# Patient Record
Sex: MALE | Race: WHITE | Employment: OTHER | URBAN - NONMETROPOLITAN AREA
[De-identification: names, ages, dates, MRNs, and addresses within clinical notes are randomized per-mention and may not be internally consistent; named-entity substitution may affect disease eponyms.]

---

## 2017-04-13 LAB
ANION GAP SERPL CALCULATED.3IONS-SCNC: 15 MMOL/L
BUN BLDV-MCNC: 15 MG/DL (ref 10–20)
CALCIUM SERPL-MCNC: 9.9 MG/DL (ref 8.7–10.8)
CHLORIDE BLD-SCNC: 101 MMOL/L (ref 95–111)
CO2: 25 MMOL/L (ref 21–32)
CREAT SERPL-MCNC: 1 MG/DL (ref 0.5–1.3)
EGFR AFRICAN AMERICAN: 89
EGFR IF NONAFRICAN AMERICAN: 74
GLUCOSE: 128 MG/DL (ref 70–100)
POTASSIUM SERPL-SCNC: 4.4 MMOL/L (ref 3.5–5.4)
SODIUM BLD-SCNC: 137 MMOL/L (ref 134–147)

## 2017-04-26 ENCOUNTER — OFFICE VISIT (OUTPATIENT)
Dept: NEPHROLOGY | Age: 72
End: 2017-04-26

## 2017-04-26 VITALS
SYSTOLIC BLOOD PRESSURE: 138 MMHG | HEART RATE: 84 BPM | DIASTOLIC BLOOD PRESSURE: 84 MMHG | WEIGHT: 215 LBS | BODY MASS INDEX: 29.16 KG/M2 | OXYGEN SATURATION: 96 %

## 2017-04-26 DIAGNOSIS — I10 HTN (HYPERTENSION), BENIGN: ICD-10-CM

## 2017-04-26 DIAGNOSIS — N28.1 RENAL CYST: ICD-10-CM

## 2017-04-26 DIAGNOSIS — N18.2 CKD (CHRONIC KIDNEY DISEASE) STAGE 2, GFR 60-89 ML/MIN: Primary | ICD-10-CM

## 2017-04-26 PROCEDURE — G8427 DOCREV CUR MEDS BY ELIG CLIN: HCPCS | Performed by: INTERNAL MEDICINE

## 2017-04-26 PROCEDURE — 4040F PNEUMOC VAC/ADMIN/RCVD: CPT | Performed by: INTERNAL MEDICINE

## 2017-04-26 PROCEDURE — 1123F ACP DISCUSS/DSCN MKR DOCD: CPT | Performed by: INTERNAL MEDICINE

## 2017-04-26 PROCEDURE — 99213 OFFICE O/P EST LOW 20 MIN: CPT | Performed by: INTERNAL MEDICINE

## 2017-04-26 PROCEDURE — 1036F TOBACCO NON-USER: CPT | Performed by: INTERNAL MEDICINE

## 2017-04-26 PROCEDURE — 3017F COLORECTAL CA SCREEN DOC REV: CPT | Performed by: INTERNAL MEDICINE

## 2017-04-26 PROCEDURE — G8420 CALC BMI NORM PARAMETERS: HCPCS | Performed by: INTERNAL MEDICINE

## 2017-04-26 RX ORDER — LISINOPRIL 20 MG/1
20 TABLET ORAL DAILY
Qty: 90 TABLET | Refills: 3 | Status: SHIPPED | OUTPATIENT
Start: 2017-04-26 | End: 2018-04-25 | Stop reason: SDUPTHER

## 2017-04-26 RX ORDER — LEVOTHYROXINE SODIUM 0.05 MG/1
50 TABLET ORAL DAILY
COMMUNITY
End: 2021-04-12 | Stop reason: DRUGHIGH

## 2017-04-26 RX ORDER — ATORVASTATIN CALCIUM 40 MG/1
40 TABLET, FILM COATED ORAL DAILY
COMMUNITY
End: 2021-04-12 | Stop reason: DRUGHIGH

## 2018-04-10 LAB
ANION GAP SERPL CALCULATED.3IONS-SCNC: 16 MEQ/L (ref 10–19)
BUN BLDV-MCNC: 20 MG/DL (ref 8–23)
CALCIUM SERPL-MCNC: 9.4 MG/DL (ref 8.5–10.5)
CHLORIDE BLD-SCNC: 102 MEQ/L (ref 95–107)
CO2: 23 MEQ/L (ref 19–31)
CREAT SERPL-MCNC: 1 MG/DL (ref 0.8–1.4)
EGFR AFRICAN AMERICAN: 87.4 ML/MIN/1.73 M2
EGFR IF NONAFRICAN AMERICAN: 75.4 ML/MIN/1.73 M2
GLUCOSE: 134 MG/DL (ref 70–99)
POTASSIUM SERPL-SCNC: 4.6 MEQ/L (ref 3.5–5.4)
SODIUM BLD-SCNC: 141 MEQ/L (ref 135–146)

## 2018-04-13 LAB
CREATINE, URINE: 128.6 MG/DL (ref 39–259)
PROTEIN, URINE: 21 MG/DL
PROTEIN/CREAT RATIO: 163 MG/G

## 2018-04-25 ENCOUNTER — OFFICE VISIT (OUTPATIENT)
Dept: NEPHROLOGY | Age: 73
End: 2018-04-25
Payer: MEDICARE

## 2018-04-25 VITALS — DIASTOLIC BLOOD PRESSURE: 82 MMHG | SYSTOLIC BLOOD PRESSURE: 118 MMHG | OXYGEN SATURATION: 94 % | HEART RATE: 92 BPM

## 2018-04-25 DIAGNOSIS — N18.2 CKD (CHRONIC KIDNEY DISEASE) STAGE 2, GFR 60-89 ML/MIN: Primary | ICD-10-CM

## 2018-04-25 DIAGNOSIS — N28.1 RENAL CYST: ICD-10-CM

## 2018-04-25 DIAGNOSIS — I10 HTN (HYPERTENSION), BENIGN: ICD-10-CM

## 2018-04-25 PROCEDURE — G8419 CALC BMI OUT NRM PARAM NOF/U: HCPCS | Performed by: INTERNAL MEDICINE

## 2018-04-25 PROCEDURE — 1123F ACP DISCUSS/DSCN MKR DOCD: CPT | Performed by: INTERNAL MEDICINE

## 2018-04-25 PROCEDURE — 3017F COLORECTAL CA SCREEN DOC REV: CPT | Performed by: INTERNAL MEDICINE

## 2018-04-25 PROCEDURE — 1036F TOBACCO NON-USER: CPT | Performed by: INTERNAL MEDICINE

## 2018-04-25 PROCEDURE — G8427 DOCREV CUR MEDS BY ELIG CLIN: HCPCS | Performed by: INTERNAL MEDICINE

## 2018-04-25 PROCEDURE — 4040F PNEUMOC VAC/ADMIN/RCVD: CPT | Performed by: INTERNAL MEDICINE

## 2018-04-25 PROCEDURE — 99213 OFFICE O/P EST LOW 20 MIN: CPT | Performed by: INTERNAL MEDICINE

## 2018-04-25 RX ORDER — LISINOPRIL 20 MG/1
20 TABLET ORAL DAILY
Qty: 90 TABLET | Refills: 3 | Status: ON HOLD | OUTPATIENT
Start: 2018-04-25 | End: 2018-10-28

## 2018-10-21 ENCOUNTER — HOSPITAL ENCOUNTER (INPATIENT)
Age: 73
LOS: 7 days | Discharge: HOME | DRG: 871 | End: 2018-10-28
Attending: EMERGENCY MEDICINE | Admitting: HOSPITALIST
Payer: MEDICARE

## 2018-10-21 ENCOUNTER — APPOINTMENT (OUTPATIENT)
Dept: GENERAL RADIOLOGY | Age: 73
DRG: 871 | End: 2018-10-21
Payer: MEDICARE

## 2018-10-21 DIAGNOSIS — I24.9 ACS (ACUTE CORONARY SYNDROME) (HCC): Primary | ICD-10-CM

## 2018-10-21 DIAGNOSIS — A49.1 STREPTOCOCCUS AGALACTIAE INFECTION: ICD-10-CM

## 2018-10-21 DIAGNOSIS — R50.9 FEVER, UNSPECIFIED FEVER CAUSE: ICD-10-CM

## 2018-10-21 DIAGNOSIS — R57.0 CARDIOGENIC SHOCK (HCC): ICD-10-CM

## 2018-10-21 LAB
ALBUMIN SERPL-MCNC: 3.3 G/DL (ref 3.5–5.1)
ALP BLD-CCNC: 72 U/L (ref 38–126)
ALT SERPL-CCNC: 30 U/L (ref 11–66)
ANION GAP SERPL CALCULATED.3IONS-SCNC: 18 MEQ/L (ref 8–16)
APTT: 25.2 SECONDS (ref 22–38)
AST SERPL-CCNC: 34 U/L (ref 5–40)
BACTERIA: ABNORMAL
BASOPHILS # BLD: 0.3 %
BASOPHILS ABSOLUTE: 0 THOU/MM3 (ref 0–0.1)
BILIRUB SERPL-MCNC: 0.8 MG/DL (ref 0.3–1.2)
BILIRUBIN URINE: ABNORMAL
BLOOD, URINE: NEGATIVE
BUN BLDV-MCNC: 53 MG/DL (ref 7–22)
CALCIUM SERPL-MCNC: 8.7 MG/DL (ref 8.5–10.5)
CASTS: ABNORMAL /LPF
CASTS: ABNORMAL /LPF
CHARACTER, URINE: ABNORMAL
CHLORIDE BLD-SCNC: 96 MEQ/L (ref 98–111)
CO2: 19 MEQ/L (ref 23–33)
COLOR: ABNORMAL
CREAT SERPL-MCNC: 2.4 MG/DL (ref 0.4–1.2)
CRYSTALS: ABNORMAL
EKG ATRIAL RATE: 116 BPM
EKG ATRIAL RATE: 96 BPM
EKG P AXIS: 55 DEGREES
EKG P AXIS: 70 DEGREES
EKG P-R INTERVAL: 128 MS
EKG P-R INTERVAL: 148 MS
EKG Q-T INTERVAL: 370 MS
EKG Q-T INTERVAL: 412 MS
EKG QRS DURATION: 154 MS
EKG QRS DURATION: 158 MS
EKG QTC CALCULATION (BAZETT): 514 MS
EKG QTC CALCULATION (BAZETT): 520 MS
EKG R AXIS: -6 DEGREES
EKG R AXIS: 11 DEGREES
EKG T AXIS: 142 DEGREES
EKG T AXIS: 169 DEGREES
EKG VENTRICULAR RATE: 116 BPM
EKG VENTRICULAR RATE: 96 BPM
EOSINOPHIL # BLD: 0 %
EOSINOPHILS ABSOLUTE: 0 THOU/MM3 (ref 0–0.4)
EPITHELIAL CELLS, UA: ABNORMAL /HPF
ERYTHROCYTE [DISTWIDTH] IN BLOOD BY AUTOMATED COUNT: 13.3 % (ref 11.5–14.5)
ERYTHROCYTE [DISTWIDTH] IN BLOOD BY AUTOMATED COUNT: 45.4 FL (ref 35–45)
FLU A ANTIGEN: NEGATIVE
FLU B ANTIGEN: NEGATIVE
GFR SERPL CREATININE-BSD FRML MDRD: 27 ML/MIN/1.73M2
GLUCOSE BLD-MCNC: 185 MG/DL (ref 70–108)
GLUCOSE, URINE: NEGATIVE MG/DL
HCT VFR BLD CALC: 35.4 % (ref 42–52)
HEMOGLOBIN: 12.2 GM/DL (ref 14–18)
ICTOTEST: NEGATIVE
IMMATURE GRANS (ABS): 0.06 THOU/MM3 (ref 0–0.07)
IMMATURE GRANULOCYTES: 0.7 %
KETONES, URINE: NEGATIVE
LACTIC ACID, SEPSIS: 0.7 MMOL/L (ref 0.5–1.9)
LACTIC ACID, SEPSIS: 1.7 MMOL/L (ref 0.5–1.9)
LEUKOCYTE ESTERASE, URINE: ABNORMAL
LV EF: 35 %
LVEF MODALITY: NORMAL
LYMPHOCYTES # BLD: 4.1 %
LYMPHOCYTES ABSOLUTE: 0.4 THOU/MM3 (ref 1–4.8)
MCH RBC QN AUTO: 31.8 PG (ref 26–33)
MCHC RBC AUTO-ENTMCNC: 34.5 GM/DL (ref 32.2–35.5)
MCV RBC AUTO: 92.2 FL (ref 80–94)
MISCELLANEOUS LAB TEST RESULT: ABNORMAL
MONOCYTES # BLD: 12 %
MONOCYTES ABSOLUTE: 1.1 THOU/MM3 (ref 0.4–1.3)
NITRITE, URINE: NEGATIVE
NUCLEATED RED BLOOD CELLS: 0 /100 WBC
OSMOLALITY CALCULATION: 285.6 MOSMOL/KG (ref 275–300)
PH UA: 5
PLATELET # BLD: 147 THOU/MM3 (ref 130–400)
PMV BLD AUTO: 11.3 FL (ref 9.4–12.4)
POTASSIUM REFLEX MAGNESIUM: 4 MEQ/L (ref 3.5–5.2)
PRO-BNP: 2837 PG/ML (ref 0–900)
PROTEIN UA: 30 MG/DL
RBC # BLD: 3.84 MILL/MM3 (ref 4.7–6.1)
RBC URINE: ABNORMAL /HPF
RENAL EPITHELIAL, UA: ABNORMAL
SEG NEUTROPHILS: 82.9 %
SEGMENTED NEUTROPHILS ABSOLUTE COUNT: 7.3 THOU/MM3 (ref 1.8–7.7)
SODIUM BLD-SCNC: 133 MEQ/L (ref 135–145)
SPECIFIC GRAVITY UA: 1.02 (ref 1–1.03)
TOTAL PROTEIN: 7.4 G/DL (ref 6.1–8)
TROPONIN T: 2.26 NG/ML
UROBILINOGEN, URINE: 1 EU/DL
WBC # BLD: 8.8 THOU/MM3 (ref 4.8–10.8)
WBC UA: ABNORMAL /HPF
YEAST: ABNORMAL

## 2018-10-21 PROCEDURE — 02HV33Z INSERTION OF INFUSION DEVICE INTO SUPERIOR VENA CAVA, PERCUTANEOUS APPROACH: ICD-10-PCS | Performed by: EMERGENCY MEDICINE

## 2018-10-21 PROCEDURE — 84484 ASSAY OF TROPONIN QUANT: CPT

## 2018-10-21 PROCEDURE — 87086 URINE CULTURE/COLONY COUNT: CPT

## 2018-10-21 PROCEDURE — 93005 ELECTROCARDIOGRAM TRACING: CPT | Performed by: EMERGENCY MEDICINE

## 2018-10-21 PROCEDURE — 6370000000 HC RX 637 (ALT 250 FOR IP): Performed by: EMERGENCY MEDICINE

## 2018-10-21 PROCEDURE — 99285 EMERGENCY DEPT VISIT HI MDM: CPT

## 2018-10-21 PROCEDURE — 72170 X-RAY EXAM OF PELVIS: CPT

## 2018-10-21 PROCEDURE — 6370000000 HC RX 637 (ALT 250 FOR IP)

## 2018-10-21 PROCEDURE — 83605 ASSAY OF LACTIC ACID: CPT

## 2018-10-21 PROCEDURE — 87081 CULTURE SCREEN ONLY: CPT

## 2018-10-21 PROCEDURE — 94760 N-INVAS EAR/PLS OXIMETRY 1: CPT

## 2018-10-21 PROCEDURE — 2709999900 HC NON-CHARGEABLE SUPPLY

## 2018-10-21 PROCEDURE — 6360000002 HC RX W HCPCS: Performed by: EMERGENCY MEDICINE

## 2018-10-21 PROCEDURE — 71045 X-RAY EXAM CHEST 1 VIEW: CPT

## 2018-10-21 PROCEDURE — 83880 ASSAY OF NATRIURETIC PEPTIDE: CPT

## 2018-10-21 PROCEDURE — 96365 THER/PROPH/DIAG IV INF INIT: CPT

## 2018-10-21 PROCEDURE — 2000000000 HC ICU R&B

## 2018-10-21 PROCEDURE — 2580000003 HC RX 258: Performed by: EMERGENCY MEDICINE

## 2018-10-21 PROCEDURE — C1751 CATH, INF, PER/CENT/MIDLINE: HCPCS

## 2018-10-21 PROCEDURE — 87077 CULTURE AEROBIC IDENTIFY: CPT

## 2018-10-21 PROCEDURE — 93306 TTE W/DOPPLER COMPLETE: CPT

## 2018-10-21 PROCEDURE — 85730 THROMBOPLASTIN TIME PARTIAL: CPT

## 2018-10-21 PROCEDURE — 2500000003 HC RX 250 WO HCPCS: Performed by: EMERGENCY MEDICINE

## 2018-10-21 PROCEDURE — 2580000003 HC RX 258: Performed by: HOSPITALIST

## 2018-10-21 PROCEDURE — 81001 URINALYSIS AUTO W/SCOPE: CPT

## 2018-10-21 PROCEDURE — 87040 BLOOD CULTURE FOR BACTERIA: CPT

## 2018-10-21 PROCEDURE — 80053 COMPREHEN METABOLIC PANEL: CPT

## 2018-10-21 PROCEDURE — 36556 INSERT NON-TUNNEL CV CATH: CPT

## 2018-10-21 PROCEDURE — 87641 MR-STAPH DNA AMP PROBE: CPT

## 2018-10-21 PROCEDURE — 87804 INFLUENZA ASSAY W/OPTIC: CPT

## 2018-10-21 PROCEDURE — 36415 COLL VENOUS BLD VENIPUNCTURE: CPT

## 2018-10-21 PROCEDURE — 96367 TX/PROPH/DG ADDL SEQ IV INF: CPT

## 2018-10-21 PROCEDURE — 6370000000 HC RX 637 (ALT 250 FOR IP): Performed by: HOSPITALIST

## 2018-10-21 PROCEDURE — 87186 SC STD MICRODIL/AGAR DIL: CPT

## 2018-10-21 PROCEDURE — 93010 ELECTROCARDIOGRAM REPORT: CPT | Performed by: NUCLEAR MEDICINE

## 2018-10-21 PROCEDURE — 87801 DETECT AGNT MULT DNA AMPLI: CPT

## 2018-10-21 PROCEDURE — 85025 COMPLETE CBC W/AUTO DIFF WBC: CPT

## 2018-10-21 PROCEDURE — 99223 1ST HOSP IP/OBS HIGH 75: CPT | Performed by: HOSPITALIST

## 2018-10-21 RX ORDER — LEVOFLOXACIN 5 MG/ML
750 INJECTION, SOLUTION INTRAVENOUS ONCE
Status: DISCONTINUED | OUTPATIENT
Start: 2018-10-21 | End: 2018-10-21

## 2018-10-21 RX ORDER — 0.9 % SODIUM CHLORIDE 0.9 %
2000 INTRAVENOUS SOLUTION INTRAVENOUS ONCE
Status: COMPLETED | OUTPATIENT
Start: 2018-10-21 | End: 2018-10-21

## 2018-10-21 RX ORDER — LISINOPRIL 20 MG/1
20 TABLET ORAL DAILY
Status: DISCONTINUED | OUTPATIENT
Start: 2018-10-22 | End: 2018-10-23

## 2018-10-21 RX ORDER — POTASSIUM CHLORIDE 20MEQ/15ML
40 LIQUID (ML) ORAL PRN
Status: DISCONTINUED | OUTPATIENT
Start: 2018-10-21 | End: 2018-10-28 | Stop reason: HOSPADM

## 2018-10-21 RX ORDER — SODIUM CHLORIDE 0.9 % (FLUSH) 0.9 %
10 SYRINGE (ML) INJECTION EVERY 12 HOURS SCHEDULED
Status: DISCONTINUED | OUTPATIENT
Start: 2018-10-21 | End: 2018-10-23 | Stop reason: SDUPTHER

## 2018-10-21 RX ORDER — HEPARIN SODIUM 1000 [USP'U]/ML
2000 INJECTION, SOLUTION INTRAVENOUS; SUBCUTANEOUS PRN
Status: DISCONTINUED | OUTPATIENT
Start: 2018-10-21 | End: 2018-10-23

## 2018-10-21 RX ORDER — ACETAMINOPHEN 500 MG
TABLET ORAL
Status: COMPLETED
Start: 2018-10-21 | End: 2018-10-21

## 2018-10-21 RX ORDER — ASPIRIN 81 MG/1
TABLET, CHEWABLE ORAL DAILY PRN
Status: COMPLETED | OUTPATIENT
Start: 2018-10-21 | End: 2018-10-21

## 2018-10-21 RX ORDER — HEPARIN SODIUM 10000 [USP'U]/100ML
1000 INJECTION, SOLUTION INTRAVENOUS CONTINUOUS
Status: DISCONTINUED | OUTPATIENT
Start: 2018-10-21 | End: 2018-10-23

## 2018-10-21 RX ORDER — 0.9 % SODIUM CHLORIDE 0.9 %
1000 INTRAVENOUS SOLUTION INTRAVENOUS ONCE
Status: COMPLETED | OUTPATIENT
Start: 2018-10-21 | End: 2018-10-21

## 2018-10-21 RX ORDER — HEPARIN SODIUM 1000 [USP'U]/ML
4000 INJECTION, SOLUTION INTRAVENOUS; SUBCUTANEOUS PRN
Status: DISCONTINUED | OUTPATIENT
Start: 2018-10-21 | End: 2018-10-23

## 2018-10-21 RX ORDER — POTASSIUM CHLORIDE 20 MEQ/1
40 TABLET, EXTENDED RELEASE ORAL PRN
Status: DISCONTINUED | OUTPATIENT
Start: 2018-10-21 | End: 2018-10-28 | Stop reason: HOSPADM

## 2018-10-21 RX ORDER — SODIUM CHLORIDE 0.9 % (FLUSH) 0.9 %
10 SYRINGE (ML) INJECTION PRN
Status: DISCONTINUED | OUTPATIENT
Start: 2018-10-21 | End: 2018-10-23 | Stop reason: SDUPTHER

## 2018-10-21 RX ORDER — NIFEDIPINE 60 MG/1
60 TABLET, EXTENDED RELEASE ORAL DAILY
Status: DISCONTINUED | OUTPATIENT
Start: 2018-10-22 | End: 2018-10-23

## 2018-10-21 RX ORDER — LEVOTHYROXINE SODIUM 0.05 MG/1
50 TABLET ORAL DAILY
Status: DISCONTINUED | OUTPATIENT
Start: 2018-10-22 | End: 2018-10-28 | Stop reason: HOSPADM

## 2018-10-21 RX ORDER — ONDANSETRON 2 MG/ML
4 INJECTION INTRAMUSCULAR; INTRAVENOUS EVERY 6 HOURS PRN
Status: DISCONTINUED | OUTPATIENT
Start: 2018-10-21 | End: 2018-10-28 | Stop reason: HOSPADM

## 2018-10-21 RX ORDER — ATORVASTATIN CALCIUM 40 MG/1
40 TABLET, FILM COATED ORAL NIGHTLY
Status: DISCONTINUED | OUTPATIENT
Start: 2018-10-22 | End: 2018-10-28 | Stop reason: HOSPADM

## 2018-10-21 RX ORDER — GABAPENTIN 400 MG/1
800 CAPSULE ORAL 3 TIMES DAILY
Status: DISCONTINUED | OUTPATIENT
Start: 2018-10-21 | End: 2018-10-28 | Stop reason: HOSPADM

## 2018-10-21 RX ORDER — SODIUM CHLORIDE 9 MG/ML
INJECTION, SOLUTION INTRAVENOUS CONTINUOUS
Status: DISCONTINUED | OUTPATIENT
Start: 2018-10-21 | End: 2018-10-23

## 2018-10-21 RX ORDER — POTASSIUM CHLORIDE 7.45 MG/ML
10 INJECTION INTRAVENOUS PRN
Status: DISCONTINUED | OUTPATIENT
Start: 2018-10-21 | End: 2018-10-28 | Stop reason: HOSPADM

## 2018-10-21 RX ORDER — ACETAMINOPHEN 500 MG
1000 TABLET ORAL ONCE
Status: COMPLETED | OUTPATIENT
Start: 2018-10-21 | End: 2018-10-21

## 2018-10-21 RX ORDER — HEPARIN SODIUM 1000 [USP'U]/ML
4000 INJECTION, SOLUTION INTRAVENOUS; SUBCUTANEOUS ONCE
Status: COMPLETED | OUTPATIENT
Start: 2018-10-21 | End: 2018-10-21

## 2018-10-21 RX ADMIN — ACETAMINOPHEN 1000 MG: 500 TABLET, FILM COATED ORAL at 17:14

## 2018-10-21 RX ADMIN — SODIUM CHLORIDE 1000 ML: 9 INJECTION, SOLUTION INTRAVENOUS at 17:25

## 2018-10-21 RX ADMIN — SODIUM CHLORIDE 1000 ML: 9 INJECTION, SOLUTION INTRAVENOUS at 18:08

## 2018-10-21 RX ADMIN — SODIUM CHLORIDE: 9 INJECTION, SOLUTION INTRAVENOUS at 20:15

## 2018-10-21 RX ADMIN — Medication 2 MCG/MIN: at 19:40

## 2018-10-21 RX ADMIN — ASPIRIN 324 MG: 81 TABLET, CHEWABLE ORAL at 17:20

## 2018-10-21 RX ADMIN — CEFTRIAXONE SODIUM 1 G: 1 INJECTION, POWDER, FOR SOLUTION INTRAMUSCULAR; INTRAVENOUS at 18:44

## 2018-10-21 RX ADMIN — HEPARIN SODIUM 1000 UNITS/HR: 10000 INJECTION, SOLUTION INTRAVENOUS at 20:06

## 2018-10-21 RX ADMIN — GABAPENTIN 800 MG: 400 CAPSULE ORAL at 23:26

## 2018-10-21 RX ADMIN — AZITHROMYCIN MONOHYDRATE 500 MG: 500 INJECTION, POWDER, LYOPHILIZED, FOR SOLUTION INTRAVENOUS at 20:02

## 2018-10-21 RX ADMIN — Medication 1000 MG: at 17:14

## 2018-10-21 RX ADMIN — HEPARIN SODIUM 4000 UNITS: 1000 INJECTION INTRAVENOUS; SUBCUTANEOUS at 20:07

## 2018-10-21 ASSESSMENT — ENCOUNTER SYMPTOMS
DIARRHEA: 0
EYE REDNESS: 0
WHEEZING: 0
ABDOMINAL PAIN: 0
BACK PAIN: 0
EYE DISCHARGE: 0
COUGH: 0
NAUSEA: 1
SHORTNESS OF BREATH: 0
RHINORRHEA: 0
VOMITING: 0
SORE THROAT: 0

## 2018-10-21 NOTE — LETTER
Beneficiary Notification Letter     This [place] Provider is Participating in an Innovative Payment and [address] [place] [address] [place] from Yanet Mu[place]:   Jennifer is participating in a Medicare initiative called the [facility] for [facility]. You are receiving this letter because your health care provider has identified you as a patient who is receiving care through this initiative. Health care providers participating in the [facility], including Jennifer, will work with Medicare to improve care for patients. Your Medicare rights have not been changed. You still have all the same Medicare rights and protections, including the right to choose which hospital, doctor, or other health care provider you see. However, because Jennifer chose to participate in the [address] [place] [address], all Medicare beneficiaries who meet the eligibility criteria of this initiative will receive care under the initiative. If you do not wish to receive care under the [employer] [facility] [address] [facility], you must choose a health care provider that does not participate in this initiative for your care. Regardless of which health care provider you see, Medicare will continue to cover all of your medically necessary services. Bundled Payments for Care Improvement Advanced aims to help improve your care     The [employer]led Payments [facility] [address] [facility] is an innovative Medicare initiative that encourages your doctors, hospitals, and other health care providers to work more closely together so you get better care during and following certain hospital stays.  In this initiative, doctors and hospitals may work closely with certain health care providers and

## 2018-10-21 NOTE — H&P
ONE TOUCH ULTRA TEST strip as needed 1/30/17   Historical Provider, MD   Cholecalciferol (VITAMIN D3) 5000 UNITS TABS Take 5,000 Units by mouth daily    Historical Provider, MD       Allergies:  Patient has no known allergies. Social History:      The patient currently lives at home    TOBACCO:   reports that he has quit smoking. His smoking use included Cigarettes. He has never used smokeless tobacco.  ETOH:   reports that he drinks alcohol. Family History:      Reviewed in detail and negative for DM, CAD, Cancer, CVA. Positive as follows:    History reviewed. No pertinent family history. Diet:  DIET GENERAL; No Added Salt (3-4 GM)    REVIEW OF SYSTEMS:   Pertinent positives as noted in the HPI. All other systems reviewed and negative. PHYSICAL EXAM:    /72   Pulse 115   Temp 101.1 °F (38.4 °C) (Core)   Resp 28   Ht 6' (1.829 m)   Wt 216 lb 4.3 oz (98.1 kg)   SpO2 98%   BMI 29.33 kg/m²     General appearance:  No apparent distress but weak. Appears stated age and cooperative. HEENT:  Normal cephalic, atraumatic without obvious deformity. Conjunctivae/corneas clear. Neck: Supple. No jugular venous distention. Trachea midline. Respiratory:  Normal respiratory effort. Clear to auscultation, bilaterally without Rales/Wheezes/Rhonchi. Cardiovascular:  Regular rate and rhythm with normal S1/S2. Abdomen: Soft, non-tender, non-distended with normal bowel sounds. Musculoskeletal:  No clubbing, cyanosis or edema bilaterally. Skin: Dry and warm. Neurologic:  Neurovascularly intact without any focal sensory/motor deficits.  Cranial nerves: II-XII intact, grossly non-focal.  Psychiatric:  Alert and oriented, thought content appropriate, normal insight  Capillary Refill: Brisk,< 3 seconds   Peripheral Pulses: +2 palpable, equal bilaterally       Labs:     Recent Labs      10/21/18   1725  10/22/18   0428   WBC  8.8  13.9*   HGB  12.2*  12.3*   HCT  35.4*  36.1*   PLT  147  168     Recent Anticoagulation    Code Status: Full Code      Disposition:    [] Home       [] TCU       [x] Rehab       [] Psych       [] SNF       [] [place]       [] Other-    ASSESSMENT:    Active Hospital Problems    Diagnosis Date Noted    ACS (acute coronary syndrome) ([facility] [I24.9] 10/21/2018     Priority: High    Cardiogenic shock ([facility] [R57.0] 10/21/2018    CKD (chronic kidney disease) stage 3, GFR 30-59 ml/min ([facility] [N18.3] 09/23/2015    HTN (hypertension), benign [I10] 09/23/2015       PLAN:    1. ACS and Cardiogenic shock - On pressor. Cardiology aware. Called from ER. No Cath tonight per ER report. Follow up with cardiology recommendation in AM.  Echocardiogram shows global hypokinesis and decreased ejection fraction  2. CKD3  3. Essential hypertension  4. DM2 - sliding scale insulin        Thank you MAHENDRA ELLISON for the opportunity to be involved in this patient's care.     Electronically signed by Caro Galeazzi, DO on 10/22/2018 at 4:54 AM

## 2018-10-22 ENCOUNTER — APPOINTMENT (OUTPATIENT)
Dept: GENERAL RADIOLOGY | Age: 73
DRG: 871 | End: 2018-10-22
Payer: MEDICARE

## 2018-10-22 LAB
ACINETOBACTER BAUMANNII FILM ARRAY: NOT DETECTED
ACINETOBACTER BAUMANNII FILM ARRAY: NOT DETECTED
ALBUMIN SERPL-MCNC: 3 G/DL (ref 3.5–5.1)
ALP BLD-CCNC: 71 U/L (ref 38–126)
ALT SERPL-CCNC: 32 U/L (ref 11–66)
ANION GAP SERPL CALCULATED.3IONS-SCNC: 15 MEQ/L (ref 8–16)
APTT: 42.1 SECONDS (ref 22–38)
APTT: 54.9 SECONDS (ref 22–38)
APTT: 61.5 SECONDS (ref 22–38)
APTT: 62 SECONDS (ref 22–38)
AST SERPL-CCNC: 47 U/L (ref 5–40)
BILIRUB SERPL-MCNC: 0.5 MG/DL (ref 0.3–1.2)
BOTTLE TYPE: ABNORMAL
BOTTLE TYPE: ABNORMAL
BUN BLDV-MCNC: 39 MG/DL (ref 7–22)
CALCIUM SERPL-MCNC: 8 MG/DL (ref 8.5–10.5)
CANDIDA ALBICANS FILM ARRAY: NOT DETECTED
CANDIDA ALBICANS FILM ARRAY: NOT DETECTED
CANDIDA GLABRATA FILM ARRAY: NOT DETECTED
CANDIDA GLABRATA FILM ARRAY: NOT DETECTED
CANDIDA KRUSEI FILM ARRAY: NOT DETECTED
CANDIDA KRUSEI FILM ARRAY: NOT DETECTED
CANDIDA PARAPSILOSIS FILM ARRAY: NOT DETECTED
CANDIDA PARAPSILOSIS FILM ARRAY: NOT DETECTED
CANDIDA TROPICALIS FILM ARRAY: NOT DETECTED
CANDIDA TROPICALIS FILM ARRAY: NOT DETECTED
CARBAPENEM RESITANT FILM ARRAY: ABNORMAL
CARBAPENEM RESITANT FILM ARRAY: ABNORMAL
CHLORIDE BLD-SCNC: 105 MEQ/L (ref 98–111)
CO2: 20 MEQ/L (ref 23–33)
CREAT SERPL-MCNC: 1.8 MG/DL (ref 0.4–1.2)
EKG ATRIAL RATE: 115 BPM
EKG P AXIS: 44 DEGREES
EKG P-R INTERVAL: 128 MS
EKG Q-T INTERVAL: 382 MS
EKG QRS DURATION: 154 MS
EKG QTC CALCULATION (BAZETT): 528 MS
EKG R AXIS: 3 DEGREES
EKG T AXIS: 129 DEGREES
EKG VENTRICULAR RATE: 115 BPM
ENTERBACTER CLOACAE FILM ARRAY: NOT DETECTED
ENTERBACTER CLOACAE FILM ARRAY: NOT DETECTED
ENTERBACTERIACEAE FILM ARRAY: NOT DETECTED
ENTERBACTERIACEAE FILM ARRAY: NOT DETECTED
ENTEROCOCCUS FILM ARRAY: NOT DETECTED
ENTEROCOCCUS FILM ARRAY: NOT DETECTED
ERYTHROCYTE [DISTWIDTH] IN BLOOD BY AUTOMATED COUNT: 13.4 % (ref 11.5–14.5)
ERYTHROCYTE [DISTWIDTH] IN BLOOD BY AUTOMATED COUNT: 46.6 FL (ref 35–45)
ESCHERICHIA COLI FILM ARRAY: NOT DETECTED
ESCHERICHIA COLI FILM ARRAY: NOT DETECTED
GFR SERPL CREATININE-BSD FRML MDRD: 37 ML/MIN/1.73M2
GLUCOSE BLD-MCNC: 140 MG/DL (ref 70–108)
GLUCOSE BLD-MCNC: 167 MG/DL (ref 70–108)
GLUCOSE BLD-MCNC: 183 MG/DL (ref 70–108)
GLUCOSE BLD-MCNC: 196 MG/DL (ref 70–108)
GLUCOSE, WHOLE BLOOD: 125 MG/DL (ref 70–108)
HAEMOPHILUS INFLUENZA FILM ARRAY: NOT DETECTED
HAEMOPHILUS INFLUENZA FILM ARRAY: NOT DETECTED
HCT VFR BLD CALC: 36.1 % (ref 42–52)
HEMOGLOBIN: 12.3 GM/DL (ref 14–18)
KLEBSIELLA OXYTOCA FILM ARRAY: NOT DETECTED
KLEBSIELLA OXYTOCA FILM ARRAY: NOT DETECTED
KLEBSIELLA PNEUMONIAE FILM ARRAY: NOT DETECTED
KLEBSIELLA PNEUMONIAE FILM ARRAY: NOT DETECTED
LACTIC ACID: 1.6 MMOL/L (ref 0.5–2.2)
LISTERIA MONOCYTOGENES FILM ARRAY: NOT DETECTED
LISTERIA MONOCYTOGENES FILM ARRAY: NOT DETECTED
MCH RBC QN AUTO: 32.2 PG (ref 26–33)
MCHC RBC AUTO-ENTMCNC: 34.1 GM/DL (ref 32.2–35.5)
MCV RBC AUTO: 94.5 FL (ref 80–94)
METHICILLIN RESISTANT FILM ARRAY: ABNORMAL
METHICILLIN RESISTANT FILM ARRAY: ABNORMAL
MRSA SCREEN RT-PCR: NEGATIVE
NEISSERIA MENIGITIDIS FILM ARRAY: NOT DETECTED
NEISSERIA MENIGITIDIS FILM ARRAY: NOT DETECTED
PLATELET # BLD: 168 THOU/MM3 (ref 130–400)
PMV BLD AUTO: 11.1 FL (ref 9.4–12.4)
POTASSIUM REFLEX MAGNESIUM: 3.6 MEQ/L (ref 3.5–5.2)
PROCALCITONIN: 4.21 NG/ML (ref 0.01–0.09)
PROTEUS FILM ARRAY: NOT DETECTED
PROTEUS FILM ARRAY: NOT DETECTED
PSEUDOMONAS AERUGINOSA FILM ARRAY: NOT DETECTED
PSEUDOMONAS AERUGINOSA FILM ARRAY: NOT DETECTED
RBC # BLD: 3.82 MILL/MM3 (ref 4.7–6.1)
SERRATIA MARCESCENS FILM ARRAY: NOT DETECTED
SERRATIA MARCESCENS FILM ARRAY: NOT DETECTED
SODIUM BLD-SCNC: 140 MEQ/L (ref 135–145)
SOURCE OF BLOOD CULTURE: ABNORMAL
SOURCE OF BLOOD CULTURE: ABNORMAL
STAPH AUREUS FILM ARRAY: NOT DETECTED
STAPH AUREUS FILM ARRAY: NOT DETECTED
STAPHYLOCOCCUS FILM ARRAY: NOT DETECTED
STAPHYLOCOCCUS FILM ARRAY: NOT DETECTED
STREP AGALACTIAE FILM ARRAY: DETECTED
STREP AGALACTIAE FILM ARRAY: DETECTED
STREP PNEUMONIAE FILM ARRAY: NOT DETECTED
STREP PNEUMONIAE FILM ARRAY: NOT DETECTED
STREP PYOCGENES FILM ARRAY: NOT DETECTED
STREP PYOCGENES FILM ARRAY: NOT DETECTED
STREPTOCOCCUS FILM ARRAY: DETECTED
STREPTOCOCCUS FILM ARRAY: DETECTED
TOTAL PROTEIN: 7 G/DL (ref 6.1–8)
TROPONIN T: 0.82 NG/ML
VANCOMYCIN RESISTANT FILM ARRAY: ABNORMAL
VANCOMYCIN RESISTANT FILM ARRAY: ABNORMAL
WBC # BLD: 13.9 THOU/MM3 (ref 4.8–10.8)

## 2018-10-22 PROCEDURE — 6370000000 HC RX 637 (ALT 250 FOR IP): Performed by: HOSPITALIST

## 2018-10-22 PROCEDURE — 6360000002 HC RX W HCPCS: Performed by: EMERGENCY MEDICINE

## 2018-10-22 PROCEDURE — 99222 1ST HOSP IP/OBS MODERATE 55: CPT | Performed by: INTERNAL MEDICINE

## 2018-10-22 PROCEDURE — 93010 ELECTROCARDIOGRAM REPORT: CPT | Performed by: INTERNAL MEDICINE

## 2018-10-22 PROCEDURE — 2000000000 HC ICU R&B

## 2018-10-22 PROCEDURE — 93005 ELECTROCARDIOGRAM TRACING: CPT | Performed by: HOSPITALIST

## 2018-10-22 PROCEDURE — 71045 X-RAY EXAM CHEST 1 VIEW: CPT

## 2018-10-22 PROCEDURE — 82948 REAGENT STRIP/BLOOD GLUCOSE: CPT

## 2018-10-22 PROCEDURE — 94640 AIRWAY INHALATION TREATMENT: CPT

## 2018-10-22 PROCEDURE — 36592 COLLECT BLOOD FROM PICC: CPT

## 2018-10-22 PROCEDURE — 84484 ASSAY OF TROPONIN QUANT: CPT

## 2018-10-22 PROCEDURE — 94760 N-INVAS EAR/PLS OXIMETRY 1: CPT

## 2018-10-22 PROCEDURE — 6370000000 HC RX 637 (ALT 250 FOR IP): Performed by: INTERNAL MEDICINE

## 2018-10-22 PROCEDURE — 2700000000 HC OXYGEN THERAPY PER DAY

## 2018-10-22 PROCEDURE — 2580000003 HC RX 258: Performed by: PHYSICIAN ASSISTANT

## 2018-10-22 PROCEDURE — 82947 ASSAY GLUCOSE BLOOD QUANT: CPT

## 2018-10-22 PROCEDURE — 6360000002 HC RX W HCPCS: Performed by: PHYSICIAN ASSISTANT

## 2018-10-22 PROCEDURE — 2580000003 HC RX 258: Performed by: HOSPITALIST

## 2018-10-22 PROCEDURE — 36415 COLL VENOUS BLD VENIPUNCTURE: CPT

## 2018-10-22 PROCEDURE — 80053 COMPREHEN METABOLIC PANEL: CPT

## 2018-10-22 PROCEDURE — 2709999900 HC NON-CHARGEABLE SUPPLY

## 2018-10-22 PROCEDURE — 84145 PROCALCITONIN (PCT): CPT

## 2018-10-22 PROCEDURE — 99233 SBSQ HOSP IP/OBS HIGH 50: CPT | Performed by: INTERNAL MEDICINE

## 2018-10-22 PROCEDURE — 93005 ELECTROCARDIOGRAM TRACING: CPT | Performed by: INTERNAL MEDICINE

## 2018-10-22 PROCEDURE — 83605 ASSAY OF LACTIC ACID: CPT

## 2018-10-22 PROCEDURE — 2500000003 HC RX 250 WO HCPCS: Performed by: EMERGENCY MEDICINE

## 2018-10-22 PROCEDURE — 85730 THROMBOPLASTIN TIME PARTIAL: CPT

## 2018-10-22 PROCEDURE — 85027 COMPLETE CBC AUTOMATED: CPT

## 2018-10-22 RX ORDER — DEXTROSE MONOHYDRATE 50 MG/ML
100 INJECTION, SOLUTION INTRAVENOUS PRN
Status: DISCONTINUED | OUTPATIENT
Start: 2018-10-22 | End: 2018-10-28 | Stop reason: HOSPADM

## 2018-10-22 RX ORDER — IPRATROPIUM BROMIDE AND ALBUTEROL SULFATE 2.5; .5 MG/3ML; MG/3ML
1 SOLUTION RESPIRATORY (INHALATION) EVERY 6 HOURS PRN
Status: DISCONTINUED | OUTPATIENT
Start: 2018-10-22 | End: 2018-10-28 | Stop reason: HOSPADM

## 2018-10-22 RX ORDER — DEXTROSE MONOHYDRATE 25 G/50ML
12.5 INJECTION, SOLUTION INTRAVENOUS PRN
Status: DISCONTINUED | OUTPATIENT
Start: 2018-10-22 | End: 2018-10-28 | Stop reason: HOSPADM

## 2018-10-22 RX ORDER — ASPIRIN 81 MG/1
81 TABLET, CHEWABLE ORAL DAILY
Status: DISCONTINUED | OUTPATIENT
Start: 2018-10-22 | End: 2018-10-28 | Stop reason: HOSPADM

## 2018-10-22 RX ORDER — ACETAMINOPHEN 325 MG/1
650 TABLET ORAL EVERY 6 HOURS PRN
Status: DISCONTINUED | OUTPATIENT
Start: 2018-10-22 | End: 2018-10-27 | Stop reason: SDUPTHER

## 2018-10-22 RX ORDER — NICOTINE POLACRILEX 4 MG
15 LOZENGE BUCCAL PRN
Status: DISCONTINUED | OUTPATIENT
Start: 2018-10-22 | End: 2018-10-28 | Stop reason: HOSPADM

## 2018-10-22 RX ADMIN — GABAPENTIN 800 MG: 400 CAPSULE ORAL at 22:31

## 2018-10-22 RX ADMIN — HEPARIN SODIUM 2000 UNITS: 1000 INJECTION INTRAVENOUS; SUBCUTANEOUS at 03:01

## 2018-10-22 RX ADMIN — CEFTRIAXONE SODIUM 2 G: 2 INJECTION, POWDER, FOR SOLUTION INTRAMUSCULAR; INTRAVENOUS at 09:23

## 2018-10-22 RX ADMIN — Medication 10 ML: at 09:23

## 2018-10-22 RX ADMIN — Medication 2 MCG/MIN: at 23:36

## 2018-10-22 RX ADMIN — ATORVASTATIN CALCIUM 40 MG: 40 TABLET, FILM COATED ORAL at 22:32

## 2018-10-22 RX ADMIN — ASPIRIN 81 MG CHEWABLE TABLET 81 MG: 81 TABLET CHEWABLE at 15:44

## 2018-10-22 RX ADMIN — LEVOTHYROXINE SODIUM 50 MCG: 50 TABLET ORAL at 10:31

## 2018-10-22 RX ADMIN — VITAMIN D, TAB 1000IU (100/BT) 5000 UNITS: 25 TAB at 10:31

## 2018-10-22 RX ADMIN — Medication 2 UNITS: at 17:11

## 2018-10-22 RX ADMIN — SODIUM CHLORIDE: 9 INJECTION, SOLUTION INTRAVENOUS at 08:52

## 2018-10-22 RX ADMIN — Medication 2 UNITS: at 10:31

## 2018-10-22 RX ADMIN — ACETAMINOPHEN 650 MG: 325 TABLET ORAL at 04:59

## 2018-10-22 RX ADMIN — IPRATROPIUM BROMIDE AND ALBUTEROL SULFATE 1 AMPULE: .5; 3 SOLUTION RESPIRATORY (INHALATION) at 21:48

## 2018-10-22 RX ADMIN — SODIUM CHLORIDE: 9 INJECTION, SOLUTION INTRAVENOUS at 20:04

## 2018-10-22 RX ADMIN — GABAPENTIN 800 MG: 400 CAPSULE ORAL at 09:23

## 2018-10-22 RX ADMIN — HEPARIN SODIUM 15 UNITS/KG/HR: 10000 INJECTION, SOLUTION INTRAVENOUS at 16:09

## 2018-10-22 RX ADMIN — Medication 10 ML: at 20:22

## 2018-10-22 RX ADMIN — CEFTRIAXONE SODIUM 2 G: 2 INJECTION, POWDER, FOR SOLUTION INTRAMUSCULAR; INTRAVENOUS at 20:22

## 2018-10-22 RX ADMIN — ACETAMINOPHEN 650 MG: 325 TABLET ORAL at 22:32

## 2018-10-22 ASSESSMENT — PAIN SCALES - GENERAL: PAINLEVEL_OUTOF10: 0

## 2018-10-22 NOTE — FLOWSHEET NOTE
10/22/18 0442   Provider Notification   Reason for Communication Review case   Provider Name Dr Valery Romano   Provider Notification Physician   Method of Communication Call   Response See orders   Notification Time [id number]   Contacted physician for pt sob, temp. Tachycardia and chest pain. Stat chest xray ordered along with breathing treatments and labs.

## 2018-10-22 NOTE — PROGRESS NOTES
non-tender, non-distended with normal bowel sounds. Musculoskeletal: passive and active ROM x 4 extremities. Skin: Skin color, texture, turgor normal.  No rashes or lesions. Neurologic:  Neurovascularly intact without any focal sensory/motor deficits. Cranial nerves: II-XII intact, grossly non-focal.  Psychiatric: Alert and oriented, thought content appropriate, normal insight  Capillary Refill: Brisk,< 3 seconds   Peripheral Pulses: +2 palpable, equal bilaterally       Labs:   Recent Labs      10/21/18   1725  10/22/18   0428   WBC  8.8  13.9*   HGB  12.2*  12.3*   HCT  35.4*  36.1*   PLT  147  168     Recent Labs      10/21/18   1725  10/22/18   0428   NA  133*  140   K  4.0  3.6   CL  96*  105   CO2  19*  20*   BUN  53*  39*   CREATININE  2.4*  1.8*   CALCIUM  8.7  8.0*     Recent Labs      10/21/18   1725  10/22/18   0428   AST  34  47*   ALT  30  32   BILITOT  0.8  0.5   ALKPHOS  72  71     No results for input(s): INR in the last 72 hours. No results for input(s): [place]ison [place] in the last 72 hours. Urinalysis:    Lab Results   Component Value Date    NITRU NEGATIVE 10/21/2018    WBCUA 5-10 10/21/2018    BACTERIA NONE 10/21/2018    RBCUA 0-2 10/21/2018    BLOODU NEGATIVE 10/21/2018    SPECGRAV 1.019 10/21/2018       Radiology:  XR CHEST PORTABLE   Final Result      New mild interstitial pulmonary edema. **This report has been created using voice recognition software. It may contain minor errors which are inherent in voice recognition technology. **      Final report electronically signed by Dr. Priscilla Hess on 10/22/2018 5:09 AM      XR PELVIS (1-2 VIEWS)   Final Result       Right femoral line tip is looped upon itself. This should be repositioned. These findings were telephoned to Dr. Radha Patrick, at 8:00 PM on 10/21/2018. **This report has been created using voice recognition software. It may contain minor errors which are inherent in voice recognition technology. **      Final report electronically signed by Dr. Ryan Blackman on 10/21/2018 8:00 PM      XR CHEST PORTABLE   Final Result   Stable radiographic appearance of the chest. No evidence of an acute process. **This report has been created using voice recognition software. It may contain minor errors which are inherent in voice recognition technology. **      Final report electronically signed by Dr. Ryan Blackman on 10/21/2018 5:30 PM          Diet: DIET GENERAL; No Added Salt (3-4 GM)    DVT prophylaxis: [] Lovenox                                 [] SCDs                                 [] SQ Heparin                                 [] Encourage ambulation           [x] Already on Anticoagulation (Heparin drip)      Disposition:    [x] Home       [] TCU       [] Rehab       [] Psych       [] SNF       [] [place]       [] Other-    Code Status: Full Code    Electronically signed by BRIT Pugh on 10/22/2018 at 3:37 PM   Telemetry shows sinus rhythm. Case discussed with Dr. Sarah Green. Suspect dental carries as source of bacteremia. Electronically signed by Danielle Mcneil MD.

## 2018-10-22 NOTE — CARE COORDINATION
10/22/18, 9:42 AM      Rishi Santa       Admitted from: ED 10/21/2018/ [address] day: 1   Location: -04/004-A Reason for admit: ACS (acute coronary syndrome) ([facility] [I24.9] Status: IP  Admit order signed?: yes  PMH:  has a past medical history of Diabetes mellitus ([facility] HTN (hypertension), benign; Hyperlipidemia; and Neuropathy. Procedure:   10/21 Echo with EF 35%; moderative global hypokinesis of LV; Hypokinetic motion of the mid anteroseptal wall noted in LV; moderate aortic stenosis; mild to mod aortic regrug; myxomatotic degeneration of mitral valve; mod to severe mitral regurg  10/21 CVC Right femoral  Pertinent abnormal Imaging:  10/21 CXR: No acute process  10/22 CXR: New mild interstitial pulmonary edema  Medications:  Scheduled Meds:   insulin lispro  0-12 Units Subcutaneous TID WC    insulin lispro  0-6 Units Subcutaneous Nightly    cefTRIAXone (ROCEPHIN) IV  2 g Intravenous Q12H    vitamin D  5,000 Units Oral Daily    atorvastatin  40 mg Oral Nightly    gabapentin  800 mg Oral TID    levothyroxine  50 mcg Oral Daily    lisinopril  20 mg Oral Daily    NIFEdipine  60 mg Oral Daily    sodium chloride flush  10 mL Intravenous 2 times per day     Continuous Infusions:   dextrose      norepinephrine 8 mcg/min (10/22/18 0621)    heparin (porcine) 13 Units/kg/hr (10/22/18 0231)    sodium chloride 100 mL/hr at 10/22/18 8036      Pertinent Info/Orders/Treatment Plan: Presented with generalized weakness for 2 weeks; intermittent chest pain x 2 days. Temp was 104. 1. Hypotensive in ED - started on levo. Troponin 2.26, LBBB - started on heparin. BC x2 +strept agalactiae. Initially was on NRB mask - now Sats 97% on 4L O2. Will need cardiac workup once infection has cleared. Ox4. Cardiology consulted. Telemetry, I&O, saenz care, up with assist. IVF, heparin drip, levo @ 8 mcg/min, lipitor, IV rocephin, neurontin, SSI ACHS, synthroid, lisinopril, procardia xl, Electrolyte replacement protocols.

## 2018-10-23 ENCOUNTER — APPOINTMENT (OUTPATIENT)
Dept: GENERAL RADIOLOGY | Age: 73
DRG: 871 | End: 2018-10-23
Payer: MEDICARE

## 2018-10-23 LAB
ANION GAP SERPL CALCULATED.3IONS-SCNC: 14 MEQ/L (ref 8–16)
APTT: 131.6 SECONDS (ref 22–38)
APTT: 42.8 SECONDS (ref 22–38)
APTT: 48.6 SECONDS (ref 22–38)
BLOOD CULTURE, ROUTINE: ABNORMAL
BLOOD CULTURE, ROUTINE: ABNORMAL
BUN BLDV-MCNC: 25 MG/DL (ref 7–22)
C-REACTIVE PROTEIN: 18.25 MG/DL (ref 0–1)
CALCIUM SERPL-MCNC: 7.6 MG/DL (ref 8.5–10.5)
CHLORIDE BLD-SCNC: 105 MEQ/L (ref 98–111)
CO2: 20 MEQ/L (ref 23–33)
CREAT SERPL-MCNC: 1.2 MG/DL (ref 0.4–1.2)
EKG ATRIAL RATE: 104 BPM
EKG P AXIS: 46 DEGREES
EKG P-R INTERVAL: 124 MS
EKG Q-T INTERVAL: 394 MS
EKG QRS DURATION: 156 MS
EKG QTC CALCULATION (BAZETT): 518 MS
EKG R AXIS: -8 DEGREES
EKG T AXIS: 156 DEGREES
EKG VENTRICULAR RATE: 104 BPM
ERYTHROCYTE [DISTWIDTH] IN BLOOD BY AUTOMATED COUNT: 14.1 % (ref 11.5–14.5)
ERYTHROCYTE [DISTWIDTH] IN BLOOD BY AUTOMATED COUNT: 50 FL (ref 35–45)
GFR SERPL CREATININE-BSD FRML MDRD: 60 ML/MIN/1.73M2
GLUCOSE BLD-MCNC: 115 MG/DL (ref 70–108)
GLUCOSE BLD-MCNC: 182 MG/DL (ref 70–108)
GLUCOSE, WHOLE BLOOD: 161 MG/DL (ref 70–108)
GLUCOSE, WHOLE BLOOD: 179 MG/DL (ref 70–108)
GLUCOSE, WHOLE BLOOD: 186 MG/DL (ref 70–108)
HCT VFR BLD CALC: 32.2 % (ref 42–52)
HEMOGLOBIN: 10.5 GM/DL (ref 14–18)
MAGNESIUM: 1.9 MG/DL (ref 1.6–2.4)
MCH RBC QN AUTO: 31.8 PG (ref 26–33)
MCHC RBC AUTO-ENTMCNC: 32.6 GM/DL (ref 32.2–35.5)
MCV RBC AUTO: 97.6 FL (ref 80–94)
MRSA SCREEN: NORMAL
ORGANISM: ABNORMAL
ORGANISM: ABNORMAL
PLATELET # BLD: 169 THOU/MM3 (ref 130–400)
PLATELET # BLD: 185 THOU/MM3 (ref 130–400)
PMV BLD AUTO: 11.3 FL (ref 9.4–12.4)
POTASSIUM REFLEX MAGNESIUM: 3.8 MEQ/L (ref 3.5–5.2)
POTASSIUM SERPL-SCNC: 3.3 MEQ/L (ref 3.5–5.2)
PRO-BNP: 4342 PG/ML (ref 0–900)
PROCALCITONIN: 2.13 NG/ML (ref 0.01–0.09)
PROCALCITONIN: 2.15 NG/ML (ref 0.01–0.09)
RBC # BLD: 3.3 MILL/MM3 (ref 4.7–6.1)
SODIUM BLD-SCNC: 139 MEQ/L (ref 135–145)
TSH SERPL DL<=0.05 MIU/L-ACNC: 2.82 UIU/ML (ref 0.4–4.2)
URINE CULTURE, ROUTINE: NORMAL
VRE CULTURE: NORMAL
WBC # BLD: 13.8 THOU/MM3 (ref 4.8–10.8)

## 2018-10-23 PROCEDURE — 6370000000 HC RX 637 (ALT 250 FOR IP): Performed by: INTERNAL MEDICINE

## 2018-10-23 PROCEDURE — 99233 SBSQ HOSP IP/OBS HIGH 50: CPT | Performed by: INTERNAL MEDICINE

## 2018-10-23 PROCEDURE — 36415 COLL VENOUS BLD VENIPUNCTURE: CPT

## 2018-10-23 PROCEDURE — 2580000003 HC RX 258: Performed by: HOSPITALIST

## 2018-10-23 PROCEDURE — 71045 X-RAY EXAM CHEST 1 VIEW: CPT

## 2018-10-23 PROCEDURE — 87040 BLOOD CULTURE FOR BACTERIA: CPT

## 2018-10-23 PROCEDURE — 85027 COMPLETE CBC AUTOMATED: CPT

## 2018-10-23 PROCEDURE — 6370000000 HC RX 637 (ALT 250 FOR IP): Performed by: NURSE PRACTITIONER

## 2018-10-23 PROCEDURE — 2000000000 HC ICU R&B

## 2018-10-23 PROCEDURE — 99232 SBSQ HOSP IP/OBS MODERATE 35: CPT | Performed by: PHYSICIAN ASSISTANT

## 2018-10-23 PROCEDURE — 84145 PROCALCITONIN (PCT): CPT

## 2018-10-23 PROCEDURE — 6360000002 HC RX W HCPCS: Performed by: EMERGENCY MEDICINE

## 2018-10-23 PROCEDURE — 36592 COLLECT BLOOD FROM PICC: CPT

## 2018-10-23 PROCEDURE — 6360000002 HC RX W HCPCS: Performed by: NURSE PRACTITIONER

## 2018-10-23 PROCEDURE — 85730 THROMBOPLASTIN TIME PARTIAL: CPT

## 2018-10-23 PROCEDURE — 36569 INSJ PICC 5 YR+ W/O IMAGING: CPT

## 2018-10-23 PROCEDURE — 82948 REAGENT STRIP/BLOOD GLUCOSE: CPT

## 2018-10-23 PROCEDURE — 93010 ELECTROCARDIOGRAM REPORT: CPT | Performed by: INTERNAL MEDICINE

## 2018-10-23 PROCEDURE — 82947 ASSAY GLUCOSE BLOOD QUANT: CPT

## 2018-10-23 PROCEDURE — 2580000003 HC RX 258: Performed by: PHYSICIAN ASSISTANT

## 2018-10-23 PROCEDURE — 83735 ASSAY OF MAGNESIUM: CPT

## 2018-10-23 PROCEDURE — 84443 ASSAY THYROID STIM HORMONE: CPT

## 2018-10-23 PROCEDURE — 2580000003 HC RX 258: Performed by: NURSE PRACTITIONER

## 2018-10-23 PROCEDURE — 2709999900 HC NON-CHARGEABLE SUPPLY

## 2018-10-23 PROCEDURE — 6370000000 HC RX 637 (ALT 250 FOR IP): Performed by: PHYSICIAN ASSISTANT

## 2018-10-23 PROCEDURE — 2700000000 HC OXYGEN THERAPY PER DAY

## 2018-10-23 PROCEDURE — 86140 C-REACTIVE PROTEIN: CPT

## 2018-10-23 PROCEDURE — 6370000000 HC RX 637 (ALT 250 FOR IP)

## 2018-10-23 PROCEDURE — 83880 ASSAY OF NATRIURETIC PEPTIDE: CPT

## 2018-10-23 PROCEDURE — 76937 US GUIDE VASCULAR ACCESS: CPT

## 2018-10-23 PROCEDURE — 6360000002 HC RX W HCPCS: Performed by: PHYSICIAN ASSISTANT

## 2018-10-23 PROCEDURE — 70355 PANORAMIC X-RAY OF JAWS: CPT

## 2018-10-23 PROCEDURE — A4212 NON CORING NEEDLE OR STYLET: HCPCS

## 2018-10-23 PROCEDURE — 02HV33Z INSERTION OF INFUSION DEVICE INTO SUPERIOR VENA CAVA, PERCUTANEOUS APPROACH: ICD-10-PCS | Performed by: HOSPITALIST

## 2018-10-23 PROCEDURE — C1751 CATH, INF, PER/CENT/MIDLINE: HCPCS

## 2018-10-23 PROCEDURE — 85049 AUTOMATED PLATELET COUNT: CPT

## 2018-10-23 PROCEDURE — 80048 BASIC METABOLIC PNL TOTAL CA: CPT

## 2018-10-23 PROCEDURE — 2500000003 HC RX 250 WO HCPCS: Performed by: NURSE PRACTITIONER

## 2018-10-23 PROCEDURE — 94660 CPAP INITIATION&MGMT: CPT

## 2018-10-23 PROCEDURE — 6370000000 HC RX 637 (ALT 250 FOR IP): Performed by: HOSPITALIST

## 2018-10-23 PROCEDURE — 84132 ASSAY OF SERUM POTASSIUM: CPT

## 2018-10-23 RX ORDER — LIDOCAINE HYDROCHLORIDE 10 MG/ML
5 INJECTION, SOLUTION EPIDURAL; INFILTRATION; INTRACAUDAL; PERINEURAL ONCE
Status: DISCONTINUED | OUTPATIENT
Start: 2018-10-23 | End: 2018-10-27

## 2018-10-23 RX ORDER — CHLORHEXIDINE GLUCONATE 0.12 MG/ML
15 RINSE ORAL 2 TIMES DAILY
Status: DISCONTINUED | OUTPATIENT
Start: 2018-10-23 | End: 2018-10-26

## 2018-10-23 RX ORDER — SODIUM CHLORIDE 0.9 % (FLUSH) 0.9 %
10 SYRINGE (ML) INJECTION PRN
Status: DISCONTINUED | OUTPATIENT
Start: 2018-10-23 | End: 2018-10-27 | Stop reason: SDUPTHER

## 2018-10-23 RX ORDER — SODIUM CHLORIDE 0.9 % (FLUSH) 0.9 %
10 SYRINGE (ML) INJECTION EVERY 12 HOURS SCHEDULED
Status: DISCONTINUED | OUTPATIENT
Start: 2018-10-23 | End: 2018-10-27 | Stop reason: SDUPTHER

## 2018-10-23 RX ORDER — BUMETANIDE 0.25 MG/ML
2 INJECTION, SOLUTION INTRAMUSCULAR; INTRAVENOUS ONCE
Status: COMPLETED | OUTPATIENT
Start: 2018-10-23 | End: 2018-10-23

## 2018-10-23 RX ORDER — CLOPIDOGREL BISULFATE 75 MG/1
75 TABLET ORAL DAILY
Status: DISCONTINUED | OUTPATIENT
Start: 2018-10-23 | End: 2018-10-28

## 2018-10-23 RX ORDER — POTASSIUM CHLORIDE 750 MG/1
40 TABLET, FILM COATED, EXTENDED RELEASE ORAL ONCE
Status: COMPLETED | OUTPATIENT
Start: 2018-10-23 | End: 2018-10-23

## 2018-10-23 RX ADMIN — CLOPIDOGREL BISULFATE 75 MG: 75 TABLET ORAL at 14:03

## 2018-10-23 RX ADMIN — GABAPENTIN 800 MG: 400 CAPSULE ORAL at 14:06

## 2018-10-23 RX ADMIN — Medication 10 ML: at 09:57

## 2018-10-23 RX ADMIN — CEFTRIAXONE SODIUM 2 G: 2 INJECTION, POWDER, FOR SOLUTION INTRAMUSCULAR; INTRAVENOUS at 09:09

## 2018-10-23 RX ADMIN — ENOXAPARIN SODIUM 100 MG: 100 INJECTION SUBCUTANEOUS at 16:01

## 2018-10-23 RX ADMIN — Medication 2 UNITS: at 08:22

## 2018-10-23 RX ADMIN — ASPIRIN 81 MG CHEWABLE TABLET 81 MG: 81 TABLET CHEWABLE at 09:57

## 2018-10-23 RX ADMIN — VITAMIN D, TAB 1000IU (100/BT) 5000 UNITS: 25 TAB at 09:57

## 2018-10-23 RX ADMIN — Medication 15 ML: at 20:44

## 2018-10-23 RX ADMIN — ACETAMINOPHEN 650 MG: 325 TABLET ORAL at 04:32

## 2018-10-23 RX ADMIN — HEPARIN SODIUM 2000 UNITS: 1000 INJECTION INTRAVENOUS; SUBCUTANEOUS at 01:22

## 2018-10-23 RX ADMIN — HEPARIN SODIUM 14 UNITS/KG/HR: 10000 INJECTION, SOLUTION INTRAVENOUS at 11:04

## 2018-10-23 RX ADMIN — LEVOTHYROXINE SODIUM 50 MCG: 50 TABLET ORAL at 10:02

## 2018-10-23 RX ADMIN — BUMETANIDE 2 MG: 0.25 INJECTION INTRAMUSCULAR; INTRAVENOUS at 10:09

## 2018-10-23 RX ADMIN — POTASSIUM CHLORIDE 40 MEQ: 750 TABLET, FILM COATED, EXTENDED RELEASE ORAL at 16:01

## 2018-10-23 RX ADMIN — ATORVASTATIN CALCIUM 40 MG: 40 TABLET, FILM COATED ORAL at 20:41

## 2018-10-23 RX ADMIN — GABAPENTIN 800 MG: 400 CAPSULE ORAL at 09:57

## 2018-10-23 RX ADMIN — GABAPENTIN 800 MG: 400 CAPSULE ORAL at 20:41

## 2018-10-23 RX ADMIN — Medication 2 UNITS: at 14:03

## 2018-10-23 RX ADMIN — Medication 10 ML: at 20:44

## 2018-10-23 RX ADMIN — CEFTRIAXONE SODIUM 1 G: 1 INJECTION, POWDER, FOR SOLUTION INTRAMUSCULAR; INTRAVENOUS at 19:54

## 2018-10-23 RX ADMIN — INSULIN LISPRO 1 UNITS: 100 INJECTION, SOLUTION INTRAVENOUS; SUBCUTANEOUS at 21:34

## 2018-10-23 ASSESSMENT — PAIN SCALES - GENERAL
PAINLEVEL_OUTOF10: 4
PAINLEVEL_OUTOF10: 0

## 2018-10-23 NOTE — PROGRESS NOTES
has not been restarted . - Glucose is 140-180              - SSI & Accuchecks      7. DLD               - Continue home medication: Atorvastatin.     8. Hypothyroidism               - Continue home medication: Synthyroid    - TSH 2.820     9. Essential HTN               - Pt takes Nifedipine at home. This should be held while on pressor support.    - BB and ACE will be needed once able to tolerate due to reduced EF of 35%       Dispo: Repeat labs this AM, pending, Chest xray due to respiratory distress overnight, Panorex xray ordered. Electronically signed by Delmer Faulkner on 10/23/2018 at 8:43 AM     Case discussed with Dr. Michelle Savage. Bacteremia. Heart failure. Need MICHELE. Sinus rhythm. ID consulted for cardiology to determine when OK for heart cath. Electronically signed by Will Silva MD.

## 2018-10-23 NOTE — PROGRESS NOTES
Nurse called to bedside, pt complaining of shortness of breath and mild chest pain, vital signs and O2 sats are stable. Patient states this episode is similar to the intermittent chest pain and shortness of breath that brought him into the ED. Stat ekg ordered. Some expiratory wheezes heard in bilat upper lobes. Patient placed on 2 L NC. Patient expresses some relief of the symptoms. Respiratory at bedside to administer breathing treatment. Patient states relief of symptoms after aerosol treatment.  Lung sounds diminished with expiratory wheezes in right upper lobe  EKG shows no changes from previous EKG

## 2018-10-23 NOTE — CARE COORDINATION
10/23/18, 3:09 PM      [address] day: 2  Location: -04/004-A Reason for admit: ACS (acute coronary syndrome) ([facility] [I24.9]   Procedure:   10/21 CXR: No acute process  10/21 Echo with EF 35%; moderative global hypokinesis of LV; Hypokinetic motion of the mid anteroseptal wall noted in LV; moderate aortic stenosis; mild to mod aortic regrug; myxomatotic degeneration of mitral valve; mod to severe mitral regurg  10/21 CVC Right femoral - 10/23 removed  10/22 CXR: New mild interstitial pulmonary edema  10/23 CXR: Worsening perihilar infiltrates. This is more pronounced on the right than the left. The differential diagnosis includes pulmonary edema and pneumonia  10/23 XR Panorex: Chronic changes detailed above. No convincing evidence of acute dental abscess  10/23 PICC MARINA  Treatment Plan of Care: Flash pulmonary edema last night and was placed on bipap. Now on room air. Bipap now PRN. Tmax 101.1. MICHELE ordered. Heparin off today. 2L fld restriction and 2 gm sodium diet. ID consulted. Will need cardiac cath once infection cleared by ID. Ox4. BC x2 +strept agalactiae. Cardiology consulted. Telemetry, I&O, saenz care, up with assist. Levo @ 4 mcg/min, asa, lipitor, plavix, IV rocephin, lovenox, neurontin, SSI ACHS, synthroid, Electrolyte replacement protocols. Received 2 mg IV bumex this am x1. Labs: K+ 3.3, Pro jenny 2.15, crp 18.25, pro-bnp 4342, wbc 13.8, Hgb 10.5. PCP: Deidre ELLISON  Readmission Risk Score: 15%  Discharge Plan: Home with wife, denies needs, declines HH.

## 2018-10-23 NOTE — PROGRESS NOTES
PICC Procedure Note    Coleen Santa   Admitted- 10/21/2018  4:57 PM  Admission diagnosis- ACS (acute coronary syndrome) [facility]) [I24.9]      Attending Physician- Juana Mariee DO  Ordering Physician-SRIRAM Jaffe-CNP  Indication for Insertion: Poor Vascular Access    Catheter Insertion Date- 10/23/2018   Lot Number- [id number]  Gauge-6  Lumen-triple    Insertion Site- MARINA Basilic  Vein Diameter- 3 mm  Catheter Length- 47 cm  Internal Length- 47 cm  Exposed Catheter Length- 0cm   Upper Arm Circumference- 26cm  Tip Confirmation System Bundle met- Yes  If X-ray required, Tip Location- N/A  Radiologist- N/A    PICC insertion successful- Yes  Ultrasound- yes    Okay To Use PICC- Yes    Electronically signed by Merle Olivera RN on 10/23/2018 at 12:44 PM

## 2018-10-24 LAB
ANION GAP SERPL CALCULATED.3IONS-SCNC: 11 MEQ/L (ref 8–16)
BUN BLDV-MCNC: 21 MG/DL (ref 7–22)
CALCIUM SERPL-MCNC: 8 MG/DL (ref 8.5–10.5)
CHLORIDE BLD-SCNC: 106 MEQ/L (ref 98–111)
CO2: 23 MEQ/L (ref 23–33)
CREAT SERPL-MCNC: 1 MG/DL (ref 0.4–1.2)
GFR SERPL CREATININE-BSD FRML MDRD: 74 ML/MIN/1.73M2
GLUCOSE BLD-MCNC: 103 MG/DL (ref 70–108)
GLUCOSE BLD-MCNC: 117 MG/DL (ref 70–108)
GLUCOSE BLD-MCNC: 129 MG/DL (ref 70–108)
GLUCOSE BLD-MCNC: 153 MG/DL (ref 70–108)
GLUCOSE BLD-MCNC: 201 MG/DL (ref 70–108)
POTASSIUM REFLEX MAGNESIUM: 3.8 MEQ/L (ref 3.5–5.2)
POTASSIUM SERPL-SCNC: 3.5 MEQ/L (ref 3.5–5.2)
SODIUM BLD-SCNC: 140 MEQ/L (ref 135–145)

## 2018-10-24 PROCEDURE — 6370000000 HC RX 637 (ALT 250 FOR IP): Performed by: PHYSICIAN ASSISTANT

## 2018-10-24 PROCEDURE — 6360000002 HC RX W HCPCS: Performed by: NURSE PRACTITIONER

## 2018-10-24 PROCEDURE — 6370000000 HC RX 637 (ALT 250 FOR IP): Performed by: HOSPITALIST

## 2018-10-24 PROCEDURE — 99233 SBSQ HOSP IP/OBS HIGH 50: CPT | Performed by: INTERNAL MEDICINE

## 2018-10-24 PROCEDURE — 6370000000 HC RX 637 (ALT 250 FOR IP): Performed by: NURSE PRACTITIONER

## 2018-10-24 PROCEDURE — 2580000003 HC RX 258: Performed by: NURSE PRACTITIONER

## 2018-10-24 PROCEDURE — 2500000003 HC RX 250 WO HCPCS: Performed by: NURSE PRACTITIONER

## 2018-10-24 PROCEDURE — 84132 ASSAY OF SERUM POTASSIUM: CPT

## 2018-10-24 PROCEDURE — 36415 COLL VENOUS BLD VENIPUNCTURE: CPT

## 2018-10-24 PROCEDURE — 6370000000 HC RX 637 (ALT 250 FOR IP): Performed by: INTERNAL MEDICINE

## 2018-10-24 PROCEDURE — 82948 REAGENT STRIP/BLOOD GLUCOSE: CPT

## 2018-10-24 PROCEDURE — 80048 BASIC METABOLIC PNL TOTAL CA: CPT

## 2018-10-24 PROCEDURE — 36592 COLLECT BLOOD FROM PICC: CPT

## 2018-10-24 PROCEDURE — 6370000000 HC RX 637 (ALT 250 FOR IP)

## 2018-10-24 PROCEDURE — 99232 SBSQ HOSP IP/OBS MODERATE 35: CPT | Performed by: PHYSICIAN ASSISTANT

## 2018-10-24 PROCEDURE — 2709999900 HC NON-CHARGEABLE SUPPLY

## 2018-10-24 PROCEDURE — 2140000000 HC CCU INTERMEDIATE R&B

## 2018-10-24 RX ORDER — LISINOPRIL 10 MG/1
10 TABLET ORAL DAILY
Status: DISCONTINUED | OUTPATIENT
Start: 2018-10-24 | End: 2018-10-28 | Stop reason: HOSPADM

## 2018-10-24 RX ORDER — POTASSIUM CHLORIDE 20 MEQ/1
40 TABLET, EXTENDED RELEASE ORAL ONCE
Status: COMPLETED | OUTPATIENT
Start: 2018-10-24 | End: 2018-10-24

## 2018-10-24 RX ORDER — MIDAZOLAM HYDROCHLORIDE 1 MG/ML
INJECTION INTRAMUSCULAR; INTRAVENOUS
Status: DISCONTINUED
Start: 2018-10-24 | End: 2018-10-24 | Stop reason: WASHOUT

## 2018-10-24 RX ORDER — BUMETANIDE 0.25 MG/ML
1 INJECTION, SOLUTION INTRAMUSCULAR; INTRAVENOUS ONCE
Status: COMPLETED | OUTPATIENT
Start: 2018-10-24 | End: 2018-10-24

## 2018-10-24 RX ORDER — FENTANYL CITRATE 50 UG/ML
INJECTION, SOLUTION INTRAMUSCULAR; INTRAVENOUS
Status: DISCONTINUED
Start: 2018-10-24 | End: 2018-10-24 | Stop reason: WASHOUT

## 2018-10-24 RX ADMIN — CEFTRIAXONE SODIUM 1 G: 1 INJECTION, POWDER, FOR SOLUTION INTRAMUSCULAR; INTRAVENOUS at 08:32

## 2018-10-24 RX ADMIN — Medication 15 ML: at 21:15

## 2018-10-24 RX ADMIN — CEFTRIAXONE SODIUM 1 G: 1 INJECTION, POWDER, FOR SOLUTION INTRAMUSCULAR; INTRAVENOUS at 22:30

## 2018-10-24 RX ADMIN — BUMETANIDE 1 MG: 0.25 INJECTION INTRAMUSCULAR; INTRAVENOUS at 08:41

## 2018-10-24 RX ADMIN — Medication 10 ML: at 21:16

## 2018-10-24 RX ADMIN — CLOPIDOGREL BISULFATE 75 MG: 75 TABLET ORAL at 08:44

## 2018-10-24 RX ADMIN — INSULIN LISPRO 1 UNITS: 100 INJECTION, SOLUTION INTRAVENOUS; SUBCUTANEOUS at 21:25

## 2018-10-24 RX ADMIN — ENOXAPARIN SODIUM 100 MG: 100 INJECTION SUBCUTANEOUS at 18:07

## 2018-10-24 RX ADMIN — GABAPENTIN 800 MG: 400 CAPSULE ORAL at 21:15

## 2018-10-24 RX ADMIN — POTASSIUM CHLORIDE 40 MEQ: 20 TABLET, EXTENDED RELEASE ORAL at 21:15

## 2018-10-24 RX ADMIN — VITAMIN D, TAB 1000IU (100/BT) 5000 UNITS: 25 TAB at 08:40

## 2018-10-24 RX ADMIN — LISINOPRIL 10 MG: 10 TABLET ORAL at 09:26

## 2018-10-24 RX ADMIN — ATORVASTATIN CALCIUM 40 MG: 40 TABLET, FILM COATED ORAL at 21:15

## 2018-10-24 RX ADMIN — LEVOTHYROXINE SODIUM 50 MCG: 50 TABLET ORAL at 07:07

## 2018-10-24 RX ADMIN — Medication 15 ML: at 08:41

## 2018-10-24 RX ADMIN — ASPIRIN 81 MG CHEWABLE TABLET 81 MG: 81 TABLET CHEWABLE at 08:41

## 2018-10-24 RX ADMIN — ENOXAPARIN SODIUM 100 MG: 100 INJECTION SUBCUTANEOUS at 03:29

## 2018-10-24 RX ADMIN — GABAPENTIN 800 MG: 400 CAPSULE ORAL at 08:41

## 2018-10-24 RX ADMIN — Medication 10 ML: at 08:49

## 2018-10-24 ASSESSMENT — PAIN SCALES - GENERAL
PAINLEVEL_OUTOF10: 0

## 2018-10-24 NOTE — PROGRESS NOTES
Aerogen nebulizer function checked. Nebulizer is in upright position and aerosolization present. Rate of administration of Epoprostenol is 2.97 ml/hour. No issues seen with administration. Back-up Aerogen Solo nebulizer remains at bedside.

## 2018-10-24 NOTE — PROGRESS NOTES
Progress note: Infectious diseases    Patient - Noe Grant,  Age - 72 y.o.    - 1945      Room Number - 4D-04/004-A   MRN -  [id number]   [facility]  Date of Admission -  10/21/2018  4:57 PM    SUBJECTIVE:   No new issues. Plan for MICHELE  OBJECTIVE   VITALS    height is 6' (1.829 m) and weight is 210 lb 8.6 oz (95.5 kg). His oral temperature is 97.8 °F (36.6 °C). His blood pressure is 98/54 (abnormal) and his pulse is 96. His respiration is 20 and oxygen saturation is 94%.        Wt Readings from Last 3 Encounters:   10/24/18 210 lb 8.6 oz (95.5 kg)   17 215 lb (97.5 kg)   10/26/16 217 lb 9.6 oz (98.7 kg)       I/O (24 Hours)    Intake/Output Summary (Last 24 hours) at 10/24/18 1522  Last data filed at 10/24/18 1440   Gross per 24 hour   Intake           978.77 ml   Output             1650 ml   Net          -671.23 ml       General Appearance  Awake, alert, oriented,     HEENT - normocephalic, atraumatic, pink conjunctiva,  anicteric sclera, dental caries  Neck - Supple, no mass  Lungs -  Bilateral   air entry, no rhonchi, no wheeze  Cardiovascular - Heart sounds are normal. Systolic murmur at the base and regurgitant murmur at the apex  Abdomen - soft, not distended, nontender,   Neurologic -oriented  Skin - No bruising or bleeding  Extremities - No edema, no cyanosis, clubbing     MEDICATIONS:      lisinopril  10 mg Oral Daily    clopidogrel  75 mg Oral Daily    lidocaine 1 % injection  5 mL Intradermal Once    sodium chloride flush  10 mL Intravenous 2 times per day    cefTRIAXone (ROCEPHIN) IV  1 g Intravenous Q12H    enoxaparin  1 mg/kg Subcutaneous Q12H    chlorhexidine  15 mL Mouth/Throat BID    insulin lispro  0-12 Units Subcutaneous TID WC    insulin lispro  0-6 Units Subcutaneous Nightly    aspirin  81 mg Oral Daily    vitamin D  5,000 Units Oral Daily    atorvastatin  40 mg Oral BLOODU  NEGATIVE   RBCUA  0-2   WBCUA  5-10   BACTERIA  NONE   NITRU  NEGATIVE   BILIRUBINUR  SMALL*   UROBILINOGEN  1.0   KETUA  NEGATIVE   LABCAST  >15 HYALINE  0-4 C. GRAN     Micro:   Lab Results   Component Value Date    BC No growth-preliminary 10/23/2018         IMAGING:         Problem list of patient:     Patient Active Problem List   Diagnosis Code    CKD (chronic kidney disease) stage 3, GFR 30-59 ml/min ([facility] N18.3    HTN (hypertension), benign I10    Vitamin D deficiency E55.9    MISA (acute kidney injury) ([facility] N17.9    CKD (chronic kidney disease) stage 2, GFR 60-89 ml/min N18.2    Renal cyst N28.1    ACS (acute coronary syndrome) ([facility]) I24.9    Cardiogenic shock ([facility] R57.0         ASSESSMENT/PLAN   Sepsis due to Streptotoccus  CKD  ACS with low EF: will need work up  MICHELE to rule out endocardites  Will change rocephin to 2 gm daily to decrease fluid adminstration      Yayo Rodriguez MD, FACP 10/24/2018 3:22 PM

## 2018-10-25 ENCOUNTER — APPOINTMENT (OUTPATIENT)
Dept: CT IMAGING | Age: 73
DRG: 871 | End: 2018-10-25
Payer: MEDICARE

## 2018-10-25 ENCOUNTER — ANESTHESIA (OUTPATIENT)
Dept: ENDOSCOPY | Age: 73
DRG: 871 | End: 2018-10-25
Payer: MEDICARE

## 2018-10-25 ENCOUNTER — ANESTHESIA EVENT (OUTPATIENT)
Dept: ENDOSCOPY | Age: 73
DRG: 871 | End: 2018-10-25
Payer: MEDICARE

## 2018-10-25 ENCOUNTER — APPOINTMENT (OUTPATIENT)
Dept: INTERVENTIONAL RADIOLOGY/VASCULAR | Age: 73
DRG: 871 | End: 2018-10-25
Payer: MEDICARE

## 2018-10-25 VITALS
DIASTOLIC BLOOD PRESSURE: 58 MMHG | RESPIRATION RATE: 19 BRPM | SYSTOLIC BLOOD PRESSURE: 103 MMHG | OXYGEN SATURATION: 97 %

## 2018-10-25 LAB
ANION GAP SERPL CALCULATED.3IONS-SCNC: 12 MEQ/L (ref 8–16)
BUN BLDV-MCNC: 21 MG/DL (ref 7–22)
CALCIUM SERPL-MCNC: 8.3 MG/DL (ref 8.5–10.5)
CHLORIDE BLD-SCNC: 105 MEQ/L (ref 98–111)
CO2: 23 MEQ/L (ref 23–33)
CREAT SERPL-MCNC: 1 MG/DL (ref 0.4–1.2)
GFR SERPL CREATININE-BSD FRML MDRD: 74 ML/MIN/1.73M2
GLUCOSE BLD-MCNC: 108 MG/DL (ref 70–108)
GLUCOSE BLD-MCNC: 116 MG/DL (ref 70–108)
GLUCOSE BLD-MCNC: 124 MG/DL (ref 70–108)
GLUCOSE BLD-MCNC: 136 MG/DL (ref 70–108)
GLUCOSE BLD-MCNC: 168 MG/DL (ref 70–108)
LV EF: 28 %
LVEF MODALITY: NORMAL
POTASSIUM REFLEX MAGNESIUM: 4.2 MEQ/L (ref 3.5–5.2)
SODIUM BLD-SCNC: 140 MEQ/L (ref 135–145)

## 2018-10-25 PROCEDURE — 2500000003 HC RX 250 WO HCPCS: Performed by: ANESTHESIOLOGY

## 2018-10-25 PROCEDURE — G8988 SELF CARE GOAL STATUS: HCPCS

## 2018-10-25 PROCEDURE — 7100000000 HC PACU RECOVERY - FIRST 15 MIN: Performed by: INTERNAL MEDICINE

## 2018-10-25 PROCEDURE — 2580000003 HC RX 258: Performed by: ANESTHESIOLOGY

## 2018-10-25 PROCEDURE — 93325 DOPPLER ECHO COLOR FLOW MAPG: CPT | Performed by: INTERNAL MEDICINE

## 2018-10-25 PROCEDURE — 82948 REAGENT STRIP/BLOOD GLUCOSE: CPT

## 2018-10-25 PROCEDURE — G8978 MOBILITY CURRENT STATUS: HCPCS

## 2018-10-25 PROCEDURE — 97166 OT EVAL MOD COMPLEX 45 MIN: CPT

## 2018-10-25 PROCEDURE — 93312 ECHO TRANSESOPHAGEAL: CPT

## 2018-10-25 PROCEDURE — 71250 CT THORAX DX C-: CPT

## 2018-10-25 PROCEDURE — G8979 MOBILITY GOAL STATUS: HCPCS

## 2018-10-25 PROCEDURE — 3700000001 HC ADD 15 MINUTES (ANESTHESIA): Performed by: INTERNAL MEDICINE

## 2018-10-25 PROCEDURE — 93312 ECHO TRANSESOPHAGEAL: CPT | Performed by: INTERNAL MEDICINE

## 2018-10-25 PROCEDURE — 93880 EXTRACRANIAL BILAT STUDY: CPT

## 2018-10-25 PROCEDURE — 6370000000 HC RX 637 (ALT 250 FOR IP): Performed by: HOSPITALIST

## 2018-10-25 PROCEDURE — 6370000000 HC RX 637 (ALT 250 FOR IP): Performed by: NURSE PRACTITIONER

## 2018-10-25 PROCEDURE — 2140000000 HC CCU INTERMEDIATE R&B

## 2018-10-25 PROCEDURE — 6370000000 HC RX 637 (ALT 250 FOR IP): Performed by: INTERNAL MEDICINE

## 2018-10-25 PROCEDURE — 93320 DOPPLER ECHO COMPLETE: CPT | Performed by: INTERNAL MEDICINE

## 2018-10-25 PROCEDURE — 93320 DOPPLER ECHO COMPLETE: CPT

## 2018-10-25 PROCEDURE — 2709999900 HC NON-CHARGEABLE SUPPLY

## 2018-10-25 PROCEDURE — 97535 SELF CARE MNGMENT TRAINING: CPT

## 2018-10-25 PROCEDURE — 2580000003 HC RX 258: Performed by: NURSE PRACTITIONER

## 2018-10-25 PROCEDURE — 6370000000 HC RX 637 (ALT 250 FOR IP): Performed by: PHYSICIAN ASSISTANT

## 2018-10-25 PROCEDURE — 6360000002 HC RX W HCPCS: Performed by: NURSE PRACTITIONER

## 2018-10-25 PROCEDURE — 6360000002 HC RX W HCPCS: Performed by: ANESTHESIOLOGY

## 2018-10-25 PROCEDURE — 97162 PT EVAL MOD COMPLEX 30 MIN: CPT

## 2018-10-25 PROCEDURE — G8987 SELF CARE CURRENT STATUS: HCPCS

## 2018-10-25 PROCEDURE — 99233 SBSQ HOSP IP/OBS HIGH 50: CPT | Performed by: INTERNAL MEDICINE

## 2018-10-25 PROCEDURE — B246ZZ4 ULTRASONOGRAPHY OF RIGHT AND LEFT HEART, TRANSESOPHAGEAL: ICD-10-PCS | Performed by: INTERNAL MEDICINE

## 2018-10-25 PROCEDURE — 80048 BASIC METABOLIC PNL TOTAL CA: CPT

## 2018-10-25 PROCEDURE — 99223 1ST HOSP IP/OBS HIGH 75: CPT | Performed by: THORACIC SURGERY (CARDIOTHORACIC VASCULAR SURGERY)

## 2018-10-25 PROCEDURE — 3700000000 HC ANESTHESIA ATTENDED CARE: Performed by: INTERNAL MEDICINE

## 2018-10-25 PROCEDURE — 36415 COLL VENOUS BLD VENIPUNCTURE: CPT

## 2018-10-25 PROCEDURE — 93325 DOPPLER ECHO COLOR FLOW MAPG: CPT

## 2018-10-25 RX ORDER — LIDOCAINE HYDROCHLORIDE 20 MG/ML
INJECTION, SOLUTION INFILTRATION; PERINEURAL PRN
Status: DISCONTINUED | OUTPATIENT
Start: 2018-10-25 | End: 2018-10-25 | Stop reason: SDUPTHER

## 2018-10-25 RX ORDER — SODIUM CHLORIDE 9 MG/ML
INJECTION, SOLUTION INTRAVENOUS CONTINUOUS PRN
Status: DISCONTINUED | OUTPATIENT
Start: 2018-10-25 | End: 2018-10-25 | Stop reason: SDUPTHER

## 2018-10-25 RX ORDER — PROPOFOL 10 MG/ML
INJECTION, EMULSION INTRAVENOUS PRN
Status: DISCONTINUED | OUTPATIENT
Start: 2018-10-25 | End: 2018-10-25 | Stop reason: SDUPTHER

## 2018-10-25 RX ADMIN — GABAPENTIN 800 MG: 400 CAPSULE ORAL at 20:16

## 2018-10-25 RX ADMIN — LEVOTHYROXINE SODIUM 50 MCG: 50 TABLET ORAL at 09:17

## 2018-10-25 RX ADMIN — CLOPIDOGREL BISULFATE 75 MG: 75 TABLET ORAL at 09:17

## 2018-10-25 RX ADMIN — LIDOCAINE HYDROCHLORIDE 100 MG: 20 INJECTION, SOLUTION INFILTRATION; PERINEURAL at 12:37

## 2018-10-25 RX ADMIN — CEFTRIAXONE SODIUM 1 G: 1 INJECTION, POWDER, FOR SOLUTION INTRAMUSCULAR; INTRAVENOUS at 08:26

## 2018-10-25 RX ADMIN — Medication 10 ML: at 08:42

## 2018-10-25 RX ADMIN — INSULIN LISPRO 1 UNITS: 100 INJECTION, SOLUTION INTRAVENOUS; SUBCUTANEOUS at 20:25

## 2018-10-25 RX ADMIN — VITAMIN D, TAB 1000IU (100/BT) 5000 UNITS: 25 TAB at 09:18

## 2018-10-25 RX ADMIN — GABAPENTIN 800 MG: 400 CAPSULE ORAL at 17:56

## 2018-10-25 RX ADMIN — PROPOFOL 50 MG: 10 INJECTION, EMULSION INTRAVENOUS at 12:36

## 2018-10-25 RX ADMIN — ASPIRIN 81 MG CHEWABLE TABLET 81 MG: 81 TABLET CHEWABLE at 09:17

## 2018-10-25 RX ADMIN — ENOXAPARIN SODIUM 100 MG: 100 INJECTION SUBCUTANEOUS at 03:43

## 2018-10-25 RX ADMIN — SODIUM CHLORIDE: 9 INJECTION, SOLUTION INTRAVENOUS at 12:19

## 2018-10-25 RX ADMIN — Medication 10 ML: at 20:16

## 2018-10-25 RX ADMIN — ENOXAPARIN SODIUM 100 MG: 100 INJECTION SUBCUTANEOUS at 17:55

## 2018-10-25 RX ADMIN — ATORVASTATIN CALCIUM 40 MG: 40 TABLET, FILM COATED ORAL at 20:16

## 2018-10-25 RX ADMIN — PROPOFOL 30 MG: 10 INJECTION, EMULSION INTRAVENOUS at 12:39

## 2018-10-25 RX ADMIN — PROPOFOL 20 MG: 10 INJECTION, EMULSION INTRAVENOUS at 12:38

## 2018-10-25 RX ADMIN — GABAPENTIN 800 MG: 400 CAPSULE ORAL at 09:17

## 2018-10-25 RX ADMIN — CEFTRIAXONE SODIUM 1 G: 1 INJECTION, POWDER, FOR SOLUTION INTRAMUSCULAR; INTRAVENOUS at 20:16

## 2018-10-25 RX ADMIN — PROPOFOL 30 MG: 10 INJECTION, EMULSION INTRAVENOUS at 12:41

## 2018-10-25 RX ADMIN — LISINOPRIL 10 MG: 10 TABLET ORAL at 09:18

## 2018-10-25 RX ADMIN — Medication 15 ML: at 20:20

## 2018-10-25 RX ADMIN — Medication 15 ML: at 09:18

## 2018-10-25 ASSESSMENT — ENCOUNTER SYMPTOMS
ABDOMINAL PAIN: 0
COLOR CHANGE: 0
EYE DISCHARGE: 0
SHORTNESS OF BREATH: 1
CHEST TIGHTNESS: 1

## 2018-10-25 ASSESSMENT — PAIN SCALES - GENERAL
PAINLEVEL_OUTOF10: 0

## 2018-10-25 NOTE — PROGRESS NOTES
NA  139   --   140   --   140   K  3.8   < >  3.8  3.5  4.2   CL  105   --   106   --   105   CO2  20*   --   23   --   23   BUN  25*   --   21   --   21   CREATININE  1.2   --   1.0   --   1.0   CALCIUM  7.6*   --   8.0*   --   8.3*    < > = values in this interval not displayed. No results for input(s): AST, ALT, BILIDIR, BILITOT, ALKPHOS in the last 72 hours. No results for input(s): INR in the last 72 hours. No results for input(s): Myra Roseline in the last 72 hours. Urinalysis:    Lab Results   Component Value Date    NITRU NEGATIVE 10/21/2018    WBCUA 5-10 10/21/2018    BACTERIA NONE 10/21/2018    RBCUA 0-2 10/21/2018    BLOODU NEGATIVE 10/21/2018    SPECGRAV 1.019 10/21/2018       Radiology:  RADIOLOGY REPORT   Final Result      XR Panorex   Final Result   Chronic changes detailed above. No convincing evidence of acute dental abscess            **This report has been created using voice recognition software. It may contain minor errors which are inherent in voice recognition technology. **      Final report electronically signed by Dr. Erika May on 10/23/2018 10:08 AM      XR CHEST PORTABLE   Final Result   Worsening perihilar infiltrates. This is more pronounced on the right than the left. The differential diagnosis includes pulmonary edema and pneumonia. **This report has been created using voice recognition software. It may contain minor errors which are inherent in voice recognition technology. **      Final report electronically signed by Dr. Mendel Cheers on 10/23/2018 9:09 AM      XR CHEST PORTABLE   Final Result      New mild interstitial pulmonary edema. **This report has been created using voice recognition software. It may contain minor errors which are inherent in voice recognition technology. **      Final report electronically signed by Dr. Danielle Sims on 10/22/2018 5:09 AM      XR PELVIS (1-2 VIEWS)   Final Result       Right femoral line tip is looped

## 2018-10-25 NOTE — CONSULTS
Cardiothoracic Surgery History & Physical       Patient:  Sita Cary  YOB: 1945    MRN: [id number]     Acct: [de-identified]    PCP: Jodi ELLISON    Date of Admission: 10/21/2018    Chief Complaint:    Chief Complaint   Patient presents with    Fatigue       History Of Present Illness:  72 y.o. diabetic male admitted with fever, fatigue, and hypotension; acute symptoms present for 2 weeks, but has been chronically fatigued and dyspneic on minimal exertion for months. Found to have group B strep bacteremia. Feels better, hypotension resolved. Denies chest pain. Past Medical History:          Diagnosis Date    Diabetes mellitus ([facility]     HTN (hypertension), benign     Hyperlipidemia     Neuropathy        Past Surgical History:          Procedure Laterality Date    CATARACT REMOVAL WITH IMPLANT         Medications Prior to Admission:      Prior to Admission medications    Medication Sig Start Date End Date Taking? Authorizing Provider   lisinopril (PRINIVIL;ZESTRIL) 20 MG tablet Take 1 tablet by mouth daily 4/25/18  Yes Jazmín Kerr MD   atorvastatin (LIPITOR) 80 MG tablet Take 40 mg by mouth daily    Yes Historical Provider, MD   levothyroxine (SYNTHROID) 50 MCG tablet Take 50 mcg by mouth Daily   Yes Historical Provider, MD   ONE TOUCH ULTRA TEST strip as needed 1/30/17  Yes Historical Provider, MD   NIFEdipine (ADALAT CC) 60 MG extended release tablet Take 1 tablet by mouth daily 11/2/16  Yes Jazmín Kerr MD   Cholecalciferol (VITAMIN D3) 5000 UNITS TABS Take 5,000 Units by mouth daily   Yes Historical Provider, MD   glimepiride (AMARYL) 1 MG tablet Take 0.5 mg by mouth daily  4/10/16  Yes Historical Provider, MD   gabapentin (NEURONTIN) 800 MG tablet Take 800 mg by mouth 3 times daily as needed. Anastacio Quevedo Historical Provider, MD       Allergies:  Patient has no known allergies. Social History:      TOBACCO:   reports that he has quit smoking. His smoking use included Cigarettes.  He

## 2018-10-25 NOTE — PROGRESS NOTES
[address] [address]  INPATIENT PHYSICAL THERAPY  EVALUATION  [facility] ENDOSCOPY - [facility] ENDO POOL RM/NONE    Time In: 7  Time Out: 1026  Timed Code Treatment Minutes: 0 Minutes  Minutes: 9          Date: 10/25/2018  Patient Name: Jennifer Yang,  Gender:  male        MRN: [id number]  : 1945  (72 y.o.)      Referring Practitioner: Dr. Fely Martin  Diagnosis: ACS  Additional Pertinent Hx: 72 y.o. male who presents to the Emergency Department on 10/21/18 with daughter and wife at bedside. Patient has a history of diabetes, hypertension, hyperlipidemia, and neuropathy and is a former smoker. The patient comes in this afternoon with multiple complaints including fatigue, generalized weakness,  nausea, fever, and chest pains. According to family, patient has intermittently experienced the chest pain over the past week, but patient states he last had pain 1 month ago     Past Medical History:   Diagnosis Date    Diabetes mellitus ([address]     HTN (hypertension), benign     Hyperlipidemia     Neuropathy      Past Surgical History:   Procedure Laterality Date    CATARACT REMOVAL WITH IMPLANT         Restrictions/Precautions:  Fall Risk    Subjective:  Chart Reviewed: Yes  Patient assessed for rehabilitation services?: Yes  Family / Caregiver Present: No  Subjective: pt stated will work with PT but doesn't want to get too tired before goes for MICHELE at 1230    General:       Vision: Impaired  Vision Exceptions: Wears glasses at all times    Hearing: Within functional limits    Pain:  Denies.   Pain Assessment  Pain Level: 0       Social/Functional History:    Lives With: Spouse  Type of Home: House  Home Layout: One level  Home Access: Stairs to enter with rails  Entrance Stairs - Number of Steps: 4  Entrance Stairs - Rails: Both  Home Equipment: 4 wheeled walker, Cane   Bathroom Shower/Tub: Tub/Shower unit  Bathroom Toilet: Standard  Bathroom Accessibility: Accessible  Receives Help From: Family  ADL Assistance:

## 2018-10-25 NOTE — PRE SEDATION
proceed; the consent form was signed. MEDICAL HISTORY  []ASHD/ANGINA/MI/CHF   []Hypertension  []Diabetes  []Hyperlipidemia  []Smoking  []Family Hx of ASHD  [x]Additional information:       has a past medical history of Diabetes mellitus ([address] HTN (hypertension), benign; Hyperlipidemia; and Neuropathy. SURGICAL HISTORY   has a past surgical history that includes Cataract removal with implant.   Additional information:       ALLERGIES   Allergies as of 10/21/2018    (No Known Allergies)     Additional information:       MEDICATIONS   Aspirin  [x] 81 mg  [] 325 mg  [] None  Antiplatelet drug therapy use last 5 days  []No [x]Yes  Coumadin Use Last 5 Days [x]No []Yes  Other anticoagulant use last 5 days  [x]No []Yes    Current Facility-Administered Medications:     lisinopril (PRINIVIL;ZESTRIL) tablet 10 mg, 10 mg, Oral, Daily, SRIRAM Pan CNP, 10 mg at 10/25/18 9234    clopidogrel (PLAVIX) tablet 75 mg, 75 mg, Oral, Daily, Monica Cameron PA-C, 75 mg at 10/25/18 0917    lidocaine PF 1 % injection 5 mL, 5 mL, Intradermal, Once, SRIRAM Pan CNP    sodium chloride flush 0.9 % injection 10 mL, 10 mL, Intravenous, 2 times per day, SRIRAM Pan CNP, 10 mL at 10/25/18 0842    sodium chloride flush 0.9 % injection 10 mL, 10 mL, Intravenous, PRN, SRIRAM Pan CNP    cefTRIAXone (ROCEPHIN) 1 g IVPB in 50 mL D5W minibag, 1 g, Intravenous, Q12H, SRIRAM Pan CNP, Stopped at 10/25/18 0923    enoxaparin (LOVENOX) injection 100 mg, 1 mg/kg, Subcutaneous, Q12H, SRIRAM Pan CNP, 100 mg at 10/25/18 0343    chlorhexidine (PERIDEX) 0.12 % solution 15 mL, 15 mL, Mouth/Throat, BID, SRIRAM Pan CNP, 15 mL at 10/25/18 0918    insulin lispro (HUMALOG) injection vial 0-12 Units, 0-12 Units, Subcutaneous, TID RISHI, Montgomery Hodgkin, DO, 2 Units at 10/23/18

## 2018-10-26 PROBLEM — Z22.338 STREPTOCOCCUS A CARRIER OR SUSPECTED CARRIER: Status: ACTIVE | Noted: 2018-10-26

## 2018-10-26 LAB
ANION GAP SERPL CALCULATED.3IONS-SCNC: 11 MEQ/L (ref 8–16)
BASOPHILS # BLD: 0.7 %
BASOPHILS ABSOLUTE: 0 THOU/MM3 (ref 0–0.1)
BUN BLDV-MCNC: 17 MG/DL (ref 7–22)
CALCIUM SERPL-MCNC: 8.6 MG/DL (ref 8.5–10.5)
CHLORIDE BLD-SCNC: 108 MEQ/L (ref 98–111)
CO2: 24 MEQ/L (ref 23–33)
CREAT SERPL-MCNC: 0.9 MG/DL (ref 0.4–1.2)
EOSINOPHIL # BLD: 2.2 %
EOSINOPHILS ABSOLUTE: 0.2 THOU/MM3 (ref 0–0.4)
ERYTHROCYTE [DISTWIDTH] IN BLOOD BY AUTOMATED COUNT: 13.9 % (ref 11.5–14.5)
ERYTHROCYTE [DISTWIDTH] IN BLOOD BY AUTOMATED COUNT: 50.2 FL (ref 35–45)
GFR SERPL CREATININE-BSD FRML MDRD: 83 ML/MIN/1.73M2
GLUCOSE BLD-MCNC: 115 MG/DL (ref 70–108)
GLUCOSE BLD-MCNC: 119 MG/DL (ref 70–108)
GLUCOSE BLD-MCNC: 120 MG/DL (ref 70–108)
GLUCOSE BLD-MCNC: 129 MG/DL (ref 70–108)
GLUCOSE BLD-MCNC: 135 MG/DL (ref 70–108)
HCT VFR BLD CALC: 29.1 % (ref 42–52)
HEMOGLOBIN: 9.3 GM/DL (ref 14–18)
IMMATURE GRANS (ABS): 0.04 THOU/MM3 (ref 0–0.07)
IMMATURE GRANULOCYTES: 0.6 %
LYMPHOCYTES # BLD: 18.5 %
LYMPHOCYTES ABSOLUTE: 1.3 THOU/MM3 (ref 1–4.8)
MAGNESIUM: 1.8 MG/DL (ref 1.6–2.4)
MCH RBC QN AUTO: 31.5 PG (ref 26–33)
MCHC RBC AUTO-ENTMCNC: 32 GM/DL (ref 32.2–35.5)
MCV RBC AUTO: 98.6 FL (ref 80–94)
MONOCYTES # BLD: 7.5 %
MONOCYTES ABSOLUTE: 0.5 THOU/MM3 (ref 0.4–1.3)
NUCLEATED RED BLOOD CELLS: 0 /100 WBC
PHOSPHORUS: 2.9 MG/DL (ref 2.4–4.7)
PLATELET # BLD: 193 THOU/MM3 (ref 130–400)
PMV BLD AUTO: 10.1 FL (ref 9.4–12.4)
POTASSIUM SERPL-SCNC: 4.2 MEQ/L (ref 3.5–5.2)
RBC # BLD: 2.95 MILL/MM3 (ref 4.7–6.1)
SEG NEUTROPHILS: 70.5 %
SEGMENTED NEUTROPHILS ABSOLUTE COUNT: 4.9 THOU/MM3 (ref 1.8–7.7)
SODIUM BLD-SCNC: 143 MEQ/L (ref 135–145)
WBC # BLD: 6.9 THOU/MM3 (ref 4.8–10.8)

## 2018-10-26 PROCEDURE — 84100 ASSAY OF PHOSPHORUS: CPT

## 2018-10-26 PROCEDURE — 6360000002 HC RX W HCPCS: Performed by: INTERNAL MEDICINE

## 2018-10-26 PROCEDURE — 6360000002 HC RX W HCPCS: Performed by: NURSE PRACTITIONER

## 2018-10-26 PROCEDURE — 6370000000 HC RX 637 (ALT 250 FOR IP): Performed by: NURSE PRACTITIONER

## 2018-10-26 PROCEDURE — 2580000003 HC RX 258: Performed by: INTERNAL MEDICINE

## 2018-10-26 PROCEDURE — 97110 THERAPEUTIC EXERCISES: CPT

## 2018-10-26 PROCEDURE — 97116 GAIT TRAINING THERAPY: CPT

## 2018-10-26 PROCEDURE — APPSS60 APP SPLIT SHARED TIME 46-60 MINUTES: Performed by: PHYSICIAN ASSISTANT

## 2018-10-26 PROCEDURE — 85025 COMPLETE CBC W/AUTO DIFF WBC: CPT

## 2018-10-26 PROCEDURE — 80048 BASIC METABOLIC PNL TOTAL CA: CPT

## 2018-10-26 PROCEDURE — 6370000000 HC RX 637 (ALT 250 FOR IP): Performed by: HOSPITALIST

## 2018-10-26 PROCEDURE — 2140000000 HC CCU INTERMEDIATE R&B

## 2018-10-26 PROCEDURE — 6370000000 HC RX 637 (ALT 250 FOR IP): Performed by: INTERNAL MEDICINE

## 2018-10-26 PROCEDURE — 82948 REAGENT STRIP/BLOOD GLUCOSE: CPT

## 2018-10-26 PROCEDURE — 6370000000 HC RX 637 (ALT 250 FOR IP): Performed by: PHYSICIAN ASSISTANT

## 2018-10-26 PROCEDURE — 83735 ASSAY OF MAGNESIUM: CPT

## 2018-10-26 PROCEDURE — 97530 THERAPEUTIC ACTIVITIES: CPT

## 2018-10-26 PROCEDURE — 99233 SBSQ HOSP IP/OBS HIGH 50: CPT | Performed by: INTERNAL MEDICINE

## 2018-10-26 PROCEDURE — 2709999900 HC NON-CHARGEABLE SUPPLY

## 2018-10-26 PROCEDURE — 36415 COLL VENOUS BLD VENIPUNCTURE: CPT

## 2018-10-26 PROCEDURE — 2580000003 HC RX 258: Performed by: NURSE PRACTITIONER

## 2018-10-26 RX ADMIN — Medication 15 ML: at 09:45

## 2018-10-26 RX ADMIN — ENOXAPARIN SODIUM 100 MG: 100 INJECTION SUBCUTANEOUS at 10:35

## 2018-10-26 RX ADMIN — ATORVASTATIN CALCIUM 40 MG: 40 TABLET, FILM COATED ORAL at 20:16

## 2018-10-26 RX ADMIN — GABAPENTIN 800 MG: 400 CAPSULE ORAL at 09:45

## 2018-10-26 RX ADMIN — GABAPENTIN 800 MG: 400 CAPSULE ORAL at 16:50

## 2018-10-26 RX ADMIN — ASPIRIN 81 MG CHEWABLE TABLET 81 MG: 81 TABLET CHEWABLE at 09:45

## 2018-10-26 RX ADMIN — CLOPIDOGREL BISULFATE 75 MG: 75 TABLET ORAL at 09:45

## 2018-10-26 RX ADMIN — VITAMIN D, TAB 1000IU (100/BT) 5000 UNITS: 25 TAB at 09:45

## 2018-10-26 RX ADMIN — LISINOPRIL 10 MG: 10 TABLET ORAL at 09:45

## 2018-10-26 RX ADMIN — CEFTRIAXONE SODIUM 2 G: 2 INJECTION, POWDER, FOR SOLUTION INTRAMUSCULAR; INTRAVENOUS at 09:44

## 2018-10-26 RX ADMIN — Medication 10 ML: at 20:16

## 2018-10-26 RX ADMIN — Medication 10 ML: at 09:51

## 2018-10-26 RX ADMIN — LEVOTHYROXINE SODIUM 50 MCG: 50 TABLET ORAL at 04:33

## 2018-10-26 RX ADMIN — GABAPENTIN 800 MG: 400 CAPSULE ORAL at 20:16

## 2018-10-26 ASSESSMENT — ENCOUNTER SYMPTOMS: DYSPNEA ACTIVITY LEVEL: AFTER AMBULATING 1 FLIGHT OF STAIRS

## 2018-10-26 ASSESSMENT — PAIN SCALES - GENERAL
PAINLEVEL_OUTOF10: 0
PAINLEVEL_OUTOF10: 0

## 2018-10-26 NOTE — PROGRESS NOTES
Hospitalist Progress Note    Patient:  Oralia Alvarez      Unit/Bed:3B-36/036-A    YOB: 1945    MRN: [id number]       Acct: [de-identified]     PCP: Smiley ELLISON    Date of Admission: 10/21/2018    Chief Complaint: generalized weakness     Hospital Course:  Rossana Easley is a 72 y. o. male presented to [address] [place] [address] with generalized weakness.  Patient has been feeling weak for 2 weeks.  Patient had chest pain on and off for 2 days. While in the ER further work up identified troponin of 2 and LBBB on EKG. Cardiology evaluation at time of admission for possible STEMI and cardiology decided not to intervene at that time. Zee [place] was started on heparin drip patient was admitted to the ICU. Patient became hypotensive while in ER. Central line was placed and Levophed was started. Bipap was applied and responsive to IVP Bumex.       Pt treated for septic shock with levophed from bacteremia due to Strep Agalactiae. Concerns poor dentition-Panorex Xray 4 broken crowns-no acute dental abscess identified. PCT 2.15 and CRP 18.25. ID recommending IV Rocephin.      Also found to have new onset LBBB with elevated troponin. Found to have LVEF 35% with moderate AS and MR. Cardiology on board. Will heart catheterization.      MICHELE on 10/25 not indicative of I/E however does show severe MR and moderate AS. Plan is for patient to get a heart catheterization on 10/27, teeth extraction on 11/2/18, followed by open heart surgery for a bioprosthetic AVR/MVR. Will get teeth extraction and valve repair as an outpatient. Subjective: no acute events overnight. Denies any lightheadedness, dizziness, chest pain, sob, fevers or chills.        Medications:  Reviewed    Infusion Medications    dextrose       Scheduled Medications    cefTRIAXone (ROCEPHIN) IV  2 g Intravenous Q24H    lisinopril  10 mg Oral Daily    clopidogrel  75 mg Oral Daily    lidocaine 1 % injection  5 mL Intradermal Once    sodium chloride flush  10 mL Intravenous 2 times per day    enoxaparin  1 mg/kg Subcutaneous Q12H    insulin lispro  0-12 Units Subcutaneous TID WC    insulin lispro  0-6 Units Subcutaneous Nightly    aspirin  81 mg Oral Daily    vitamin D  5,000 Units Oral Daily    atorvastatin  40 mg Oral Nightly    gabapentin  800 mg Oral TID    levothyroxine  50 mcg Oral Daily     PRN Meds: sodium chloride flush, glucose, dextrose, glucagon (rDNA), dextrose, acetaminophen, ipratropium-albuterol, ondansetron, potassium chloride **OR** potassium chloride **OR** potassium chloride, magnesium sulfate      Intake/Output Summary (Last 24 hours) at 10/26/18 1534  Last data filed at 10/26/18 1316   Gross per 24 hour   Intake              530 ml   Output              225 ml   Net              305 ml       Diet:  DIET CARDIAC; Low Sodium (2 GM)    Exam:  /60   Pulse 100   Temp 98.4 °F (36.9 °C) (Oral)   Resp 18   Ht 6' (1.829 m)   Wt 211 lb 11.2 oz (96 kg)   SpO2 94%   BMI 28.71 kg/m²     General appearance: Pleasant chronically ill in appearance . HEENT: Pupils equal, round, and reactive to light. Conjunctivae/corneas clear. Neck: Supple, with full range of motion. No jugular venous distention. Trachea midline. Respiratory:  clear with expiratory wheezing thru out, orthopnea and exertional mild dyspnea noted.   No retractions    Cardiovascular: RRR Cardiac Telemetry Sinus Tachycardia with LBBB, audible systolic 2/6 murmer    Abdomen: Soft, non-tender, non-distended with normal bowel sounds. Musculoskeletal: passive and active ROM x 4 extremities. Skin: Skin color, texture, turgor normal.  No rashes or lesions. Neurologic:  Neurovascularly intact without any focal sensory/motor deficits.  Cranial nerves: II-XII intact, grossly non-focal.  Psychiatric: Alert and oriented, thought content appropriate, normal insight  Capillary Refill: Brisk,< 3 seconds   Peripheral Pulses: +2 palpable, equal bilaterally       Labs:   Recent Labs 10/26/18   0440   WBC  6.9   HGB  9.3*   HCT  29.1*   PLT  193     Recent Labs      10/24/18   0342  10/24/18   1525  10/25/18   0343  10/26/18   0440   NA  140   --   140  143   K  3.8  3.5  4.2  4.2   CL  106   --   105  108   CO2  23   --   23  24   BUN  21   --   21  17   CREATININE  1.0   --   1.0  0.9   CALCIUM  8.0*   --   8.3*  8.6   PHOS   --    --    --   2.9     No results for input(s): AST, ALT, BILIDIR, BILITOT, ALKPHOS in the last 72 hours. No results for input(s): INR in the last 72 hours. No results for input(s): Luberta Moll in the last 72 hours. Urinalysis:    Lab Results   Component Value Date    NITRU NEGATIVE 10/21/2018    WBCUA 5-10 10/21/2018    BACTERIA NONE 10/21/2018    RBCUA 0-2 10/21/2018    BLOODU NEGATIVE 10/21/2018    SPECGRAV 1.019 10/21/2018       Radiology:  CT CHEST WO CONTRAST   Final Result   1. Fibroemphysematous lungs. Mild bibasilar atelectasis/pneumonia. Small effusion right side. 2. There is moderate calcification of the aortic valve but only mild calcification of the aortic root. Extensive coronary artery calcifications are seen. **This report has been created using voice recognition software. It may contain minor errors which are inherent in voice recognition technology. **      Final report electronically signed by Dr. Aby Stevenson on 10/26/2018 8:38 AM      VL DUP CAROTID BILATERAL   Final Result   Atherosclerotic changes in the bilateral carotid bulbs resulting in 50-69% stenosis on the left side and less than 50% stenosis on the right side. **This report has been created using voice recognition software. It may contain minor errors which are inherent in voice recognition technology. **      Final report electronically signed by Dr Светлана Baig on 10/25/2018 5:44 PM      RADIOLOGY REPORT   Final Result      XR Panorex   Final Result   Chronic changes detailed above.  No convincing evidence of acute dental abscess            **This

## 2018-10-26 NOTE — PROGRESS NOTES
Aayush Richardson 62  INPATIENT OCCUPATIONAL THERAPY  [facility] CCU-STEPDOWN 3B  DAILY NOTE    Time:  Time In: 6593  Time Out: 1458  Timed Code Treatment Minutes: 23 Minutes  Minutes: 23          Date: 10/26/2018  Patient Name: Luis Fernando Lovell,   Gender: male      Room: -36/036-A  MRN: [id number]  : 1945  (72 y.o.)  Referring Practitioner: Dr. Margie Alvarado  Diagnosis: ACS   Additional Pertinent Hx: 72 y.o. male who presents to the Emergency Department on 10/21/18 with daughter and wife at bedside. Patient has a history of diabetes, hypertension, hyperlipidemia, and neuropathy and is a former smoker. The patient comes in this afternoon with multiple complaints including fatigue, generalized weakness,  nausea, fever, and chest pains. According to family, patient has intermittently experienced the chest pain over the past week, but patient states he last had pain 1 month ago    Past Medical History:   Diagnosis Date    Diabetes mellitus ([address]     HTN (hypertension), benign     Hyperlipidemia     Neuropathy      Past Surgical History:   Procedure Laterality Date    CATARACT REMOVAL WITH IMPLANT      MA ECHO TRANSESOPHAG R-T 2D IMG ACQUISJ I&R ONLY Left 10/25/2018    TRANSESOPHAGEAL ECHOCARDIOGRAM performed by Boubacar Brooks MD at [facility]       Restrictions/Precautions:  Fall Risk                            Prior Level of Function:  ADL Assistance: Independent  Ambulation Assistance: Independent  Transfer Assistance: Independent  Additional Comments: Pt reporting he was able to complete his self care task but it was making him more tired PTA. no AD used PTA    Subjective       Subjective: Pt lying in bed when arrived. He was agreeable to OT . PT stated he feels alot better today.    Comments: RN okayed therapy session      Pain:  Pain Assessment  Patient Currently in Pain: Denies       Objective  Cognition Comment: discussed heart surgery in 3 weeks and what to expect and to continue HEP and ambulation when go home prior to surgery to condition self for surgery     ADL  LE Dressing: Supervision (don slippers sitting EOB )          Bed mobility  Supine to Sit: Modified independent    Transfers  Sit to stand: Contact guard assistance (EOB and hand placment verbal cues )  Stand to sit: Contact guard assistance (hand placement verbal cues to EOB )       Balance  Sitting Balance: Stand by assistance  Standing Balance: Contact guard assistance     Time: x1 min   Activity: preparing to ambulate      Functional Mobility  Functional - Mobility Device: Rolling Walker  Activity: Other  Assist Level: Stand by assistance  Functional Mobility Comments: pt ambulated in hallway of unit and back to room short distance with no LOB and palak to talk while walking. Pt had slight SOB when returned to room. Pt stated he has a cane at home and a RW to use if need       Type of ROM/Therapeutic Exercise: AROM  Comment: yellow theraband x15 reps x1 set sitting EOB in all planes and joints with RB between each exercise. Pt completed exercises to increase strength and endurance for surgery in 3 weeks and safety with transfers             Activity Tolerance:  Activity Tolerance: Patient Tolerated treatment well    Assessment:     Performance deficits / Impairments: Decreased safe awareness, Decreased balance, Decreased ADL status, Decreased endurance  Prognosis: Good    Discharge Recommendations:  Discharge Recommendations: Home with assist PRN    Patient Education:  Patient Education: HEP, surgery expectations , and continue theraband and walking prior to surgery   Barriers to Learning: none     Equipment Recommendations:  Equipment Needed: No    Safety:  Safety Devices in place: Yes  Type of devices:  All fall risk precautions in place, Bed alarm in place, Call light within reach, Nurse notified (pt left sitting EOB)    Plan:  Times per week: 5x  Current Treatment Recommendations: Strengthening, Endurance Training, Patient/Caregiver

## 2018-10-26 NOTE — PROGRESS NOTES
Acct: [de-identified]  Admit Date:  10/21/2018  Primary Cardiologist: Seen by Dr Carmen Case    Chief Complaint/Reason for Consultation: Need AVR/MVR    Subjective (Events in last 24 hours):   Pt denies any CP, SOB or palpitations. Has poor dentition and needs eval by dentist.  Also needs heart cath proir to his double valve.         Objective:   /60   Pulse 100   Temp 98.4 °F (36.9 °C) (Oral)   Resp 18   Ht 6' (1.829 m)   Wt 211 lb 11.2 oz (96 kg)   SpO2 94%   BMI 28.71 kg/m²        TELEMETRY: NSR    Physical Exam:  General Appearance: alert and oriented to person, place and time, in no acute distress  Cardiovascular: normal rate, regular rhythm, normal S1 and S2, no murmurs, rubs, clicks, or gallops, distal pulses intact, no carotid bruits, no JVD  Pulmonary/Chest: clear to auscultation bilaterally- no wheezes, rales or rhonchi, normal air movement, no respiratory distress  Abdomen: soft, non-tender, non-distended, normal bowel sounds, no masses Extremities: no cyanosis, clubbing or edema, pulse   Skin: warm and dry, no rash or erythema  Head: normocephalic and atraumatic  Eyes: pupils equal, round, and reactive to light  Neck: supple and non-tender without mass, no thyromegaly   Musculoskeletal: normal range of motion, no joint swelling, deformity or tenderness  Neurological: alert, oriented, normal speech, no focal findings or movement disorder noted    Medications:    cefTRIAXone (ROCEPHIN) IV  2 g Intravenous Q24H    lisinopril  10 mg Oral Daily    clopidogrel  75 mg Oral Daily    lidocaine 1 % injection  5 mL Intradermal Once    sodium chloride flush  10 mL Intravenous 2 times per day    enoxaparin  1 mg/kg Subcutaneous Q12H    chlorhexidine  15 mL Mouth/Throat BID    insulin lispro  0-12 Units Subcutaneous TID WC    insulin lispro  0-6 Units Subcutaneous Nightly    aspirin  81 mg Oral Daily    vitamin D  5,000 Units Oral Daily    atorvastatin  40 mg Oral Nightly    gabapentin  800 mg Oral TID    levothyroxine  50 mcg Oral Daily      dextrose         sodium chloride flush 10 mL PRN   glucose 15 g PRN   dextrose 12.5 g PRN   glucagon (rDNA) 1 mg PRN   dextrose 100 mL/hr PRN   acetaminophen 650 mg Q6H PRN   ipratropium-albuterol 1 ampule Q6H PRN   ondansetron 4 mg Q6H PRN   potassium chloride 40 mEq PRN   Or     potassium chloride 40 mEq PRN   Or     potassium chloride 10 mEq PRN   magnesium sulfate 1 g PRN       Diagnostics:  EKG:      MICHELE: 10/26/2018  Conclusions      Summary   Mildly dilated left ventricle size and severely reduced systolic function.   Ejection fraction was estimated at 25-30%.   There was severe global hypokinesis   Left atrial size was normal with no thrombus identified. APPENDAGE: The   left atrial appendage size was normal with no thrombus identified.   DOPPLER: The function was normal (normal emptying velocity).   The aortic valve was trileaflet with increased thickness and reduced   cuspal separation. There was no echocardiographic evidence of a   vegetation. DOPPLER: Transaortic velocity was elevated to 2.8 m/s (KAMRAN   0.99 cm2, mean gradient 27 mmHg) likely due to the regurgitation and   elevated ejection volume across the AoV.   There is severe aortic regurgitation related to malcoaptation due to   calcification of the [place] and RCC.   There was moderate to severe mitral regurgitation.   No obvious definitive vegetation seen.         Lab Data:    Cardiac Enzymes:  No results for input(s): CKTOTAL, CKMB, CKMBINDEX, TROPONINI in the last 72 hours.     CBC:   Lab Results   Component Value Date    WBC 6.9 10/26/2018    RBC 2.95 10/26/2018    RBC 0-5 10/14/2016    HGB 9.3 10/26/2018    HCT 29.1 10/26/2018     10/26/2018       CMP:  Lab Results   Component Value Date     10/26/2018    K 4.2 10/26/2018    K 4.2 10/25/2018     10/26/2018    CO2 24 10/26/2018    BUN 17 10/26/2018    CREATININE 0.9 10/26/2018    LABGLOM 83 10/26/2018    GLUCOSE 115 10/26/2018

## 2018-10-26 NOTE — PLAN OF CARE
Problem: Discharge Planning:  Goal: Discharged to appropriate level of care  Discharged to appropriate level of care   Outcome: Ongoing  Pt plans to be discharged home with wife when able. Problem: Cardiac Output - Decreased:  Goal: Hemodynamic stability will improve  Hemodynamic stability will improve   Outcome: Ongoing  Ongoing assessment & interventions provided throughout shift. Patient on continuous telemetry monitoring, heart tones, vitals & pulses checked at least 3 times per shift & PRN. Vitals within acceptable limits. Peripheral pulses palpable. Problem: Falls - Risk of:  Goal: Will remain free from falls  Will remain free from falls    Outcome: Ongoing  Assessment & interventions provided throughout shift. Bed locked & in low position, call light in reach, side-rails up x2, non-slip socks on when ambulating, reminded patient to use call light to call for assistance. Problem: Pain:  Goal: Control of acute pain  Control of acute pain   Outcome: Ongoing  Ongoing assessment & interventions provided throughout shift. Reminded patient to report any pain, pressure, or shortness of breath to the nurse. Pain medications provided per physician's orders.     Problem: Serum Glucose Level - Abnormal:  Goal: Ability to maintain appropriate glucose levels will improve  Ability to maintain appropriate glucose levels will improve   Outcome: Ongoing  Low Dose Corrective Insulin Scale for Meals  Glucose:               Dose:         No insulin  140-199      1 unit  200-249                2 units  250-299                3 units  300-349                4 units  350-399                5 units  Over 399              6 units      Low Dose Corrective Insulin Scale for Bedtime  Glucose: Dose:               No Insulin  140-249 1 Unit  250-349 2 Units  Over 350 3 Units    Problem: Infection - Central Venous Catheter-Associated Bloodstream Infection:  Intervention: Central line needs assessment  Sterile
Problem: Discharge Planning:  Goal: Participates in care planning  Participates in care planning   Outcome: Ongoing  Discharge planning in process and discussed with patient/family. Social work consulted for any additional needs. Care manager aware of discharge needs. Goal: Discharged to appropriate level of care  Discharged to appropriate level of care   Outcome: Ongoing  Patient continues to require ICU care    Problem: Cardiac Output - Decreased:  Goal: Hemodynamic stability will improve  Hemodynamic stability will improve   Outcome: Ongoing  Patient continues to require levophed gtt for blood pressure control    Problem: Skin Integrity - Impaired:  Goal: Absence of new skin breakdown  Absence of new skin breakdown   Outcome: Met This Shift  No signs of new skin breakdown with each assessment. Skin remains warm, dry, intact. Mucous membranes pink & moist. Patient is able to turn self. Problem: Falls - Risk of:  Goal: Will remain free from falls  Will remain free from falls   Outcome: Met This Shift  Call light in reach, bed in lowest position, and bed alarm activated. Education given on use of call light before ambulation and when in need of assistance. Patient expressed understanding. Hourly visual checks performed and charted. Toileting offered to patient, saenz catheter in place. No falls this shift, at any time. Arm band and falling star in place. Will continue to monitor. Problem: Pain:  Goal: Pain level will decrease  Pain level will decrease   Outcome: Ongoing  Patient denies pain at this time  Goal: Control of acute pain  Control of acute pain   Outcome: Ongoing  Patient denies pain at this time    Problem: Infection - Central Venous Catheter-Associated Bloodstream Infection:  Goal: Will show no infection signs and symptoms  Will show no infection signs and symptoms   Outcome: Ongoing  Patient remains afebrile at this time.  Will continue to monitor and treat as
will improve  Ability to maintain appropriate glucose levels will improve   Outcome: Met This Shift  Patient's glucose was slightly elevated at the beginning of the shift, requiring one unit of insulin, however has shown no other signs or symptoms of an inappropriate glucose level. Problem: Infection - Central Venous Catheter-Associated Bloodstream Infection:  Goal: Will show no infection signs and symptoms  Will show no infection signs and symptoms   Outcome: Met This Shift  Patient shows no signs or symptoms of infection in his PICC at this time. Will continue to assess. Problem: Musculor/Skeletal Functional Status  Goal: Highest potential functional level  Outcome: Ongoing  Continue to work with PT to return to the highest potential function level. Comments: Care plan reviewed with patient. Patient verbalizes understanding of the care plan and contributed to goal setting.

## 2018-10-26 NOTE — CARE COORDINATION
10/26/18 3:33 PM    Dr. Trena Rangel states patient to stay and get dose of ATB on Sunday, 10/28 and then can get discharged. 1520 Antibiotic therapy plans faxed to [facility],  [facility], and [facility]. Called [facility] and spoke with Florin Romo; patient scheduled for OP IV ATB for 3 weeks beginning Monday, October 29th at 1200. Remainder of M-F appointments will be at 12:00 noon except for Monday 11/7 at 0900. (Pt has appointment at [facility] at [phone] on 10/29 and with Dr. Sharon Green on 11/7 at 36.) Saturday and Sunday appointments on 11/3, 11/4, 11/10, 11/11, 11/17, and 11/18 at [facility] at 1200. Appointments added to AVS. Primary RN, Kaia Eid, updated.

## 2018-10-27 ENCOUNTER — APPOINTMENT (OUTPATIENT)
Dept: GENERAL RADIOLOGY | Age: 73
DRG: 871 | End: 2018-10-27
Payer: MEDICARE

## 2018-10-27 LAB
ABO: NORMAL
ANION GAP SERPL CALCULATED.3IONS-SCNC: 14 MEQ/L (ref 8–16)
ANTIBODY SCREEN: NORMAL
BUN BLDV-MCNC: 14 MG/DL (ref 7–22)
CALCIUM SERPL-MCNC: 8.7 MG/DL (ref 8.5–10.5)
CHLORIDE BLD-SCNC: 104 MEQ/L (ref 98–111)
CHOLESTEROL, TOTAL: 105 MG/DL (ref 100–199)
CO2: 24 MEQ/L (ref 23–33)
CREAT SERPL-MCNC: 0.9 MG/DL (ref 0.4–1.2)
ERYTHROCYTE [DISTWIDTH] IN BLOOD BY AUTOMATED COUNT: 14 % (ref 11.5–14.5)
ERYTHROCYTE [DISTWIDTH] IN BLOOD BY AUTOMATED COUNT: 49.6 FL (ref 35–45)
GFR SERPL CREATININE-BSD FRML MDRD: 83 ML/MIN/1.73M2
GLUCOSE BLD-MCNC: 125 MG/DL (ref 70–108)
GLUCOSE BLD-MCNC: 135 MG/DL (ref 70–108)
GLUCOSE BLD-MCNC: 137 MG/DL (ref 70–108)
GLUCOSE BLD-MCNC: 150 MG/DL (ref 70–108)
GLUCOSE BLD-MCNC: 164 MG/DL (ref 70–108)
HCT VFR BLD CALC: 29.1 % (ref 42–52)
HDLC SERPL-MCNC: 16 MG/DL
HEMOGLOBIN: 9.6 GM/DL (ref 14–18)
INR BLD: 1.18 (ref 0.85–1.13)
LDL CHOLESTEROL CALCULATED: 58 MG/DL
MCH RBC QN AUTO: 32.2 PG (ref 26–33)
MCHC RBC AUTO-ENTMCNC: 33 GM/DL (ref 32.2–35.5)
MCV RBC AUTO: 97.7 FL (ref 80–94)
PLATELET # BLD: 207 THOU/MM3 (ref 130–400)
PMV BLD AUTO: 9.8 FL (ref 9.4–12.4)
POTASSIUM REFLEX MAGNESIUM: 4.1 MEQ/L (ref 3.5–5.2)
RBC # BLD: 2.98 MILL/MM3 (ref 4.7–6.1)
RH FACTOR: NORMAL
SODIUM BLD-SCNC: 142 MEQ/L (ref 135–145)
TRIGL SERPL-MCNC: 155 MG/DL (ref 0–199)
WBC # BLD: 7 THOU/MM3 (ref 4.8–10.8)

## 2018-10-27 PROCEDURE — 85610 PROTHROMBIN TIME: CPT

## 2018-10-27 PROCEDURE — 93454 CORONARY ARTERY ANGIO S&I: CPT | Performed by: INTERNAL MEDICINE

## 2018-10-27 PROCEDURE — 2709999900 HC NON-CHARGEABLE SUPPLY

## 2018-10-27 PROCEDURE — 71045 X-RAY EXAM CHEST 1 VIEW: CPT

## 2018-10-27 PROCEDURE — 6370000000 HC RX 637 (ALT 250 FOR IP): Performed by: NURSE PRACTITIONER

## 2018-10-27 PROCEDURE — 82948 REAGENT STRIP/BLOOD GLUCOSE: CPT

## 2018-10-27 PROCEDURE — C1894 INTRO/SHEATH, NON-LASER: HCPCS

## 2018-10-27 PROCEDURE — B2111ZZ FLUOROSCOPY OF MULTIPLE CORONARY ARTERIES USING LOW OSMOLAR CONTRAST: ICD-10-PCS | Performed by: INTERNAL MEDICINE

## 2018-10-27 PROCEDURE — 36415 COLL VENOUS BLD VENIPUNCTURE: CPT

## 2018-10-27 PROCEDURE — C1769 GUIDE WIRE: HCPCS

## 2018-10-27 PROCEDURE — 86850 RBC ANTIBODY SCREEN: CPT

## 2018-10-27 PROCEDURE — 80048 BASIC METABOLIC PNL TOTAL CA: CPT

## 2018-10-27 PROCEDURE — 2580000003 HC RX 258: Performed by: PHYSICIAN ASSISTANT

## 2018-10-27 PROCEDURE — 93458 L HRT ARTERY/VENTRICLE ANGIO: CPT | Performed by: INTERNAL MEDICINE

## 2018-10-27 PROCEDURE — 86901 BLOOD TYPING SEROLOGIC RH(D): CPT

## 2018-10-27 PROCEDURE — 85027 COMPLETE CBC AUTOMATED: CPT

## 2018-10-27 PROCEDURE — 6360000002 HC RX W HCPCS: Performed by: INTERNAL MEDICINE

## 2018-10-27 PROCEDURE — 2500000003 HC RX 250 WO HCPCS

## 2018-10-27 PROCEDURE — 6360000002 HC RX W HCPCS

## 2018-10-27 PROCEDURE — 99233 SBSQ HOSP IP/OBS HIGH 50: CPT | Performed by: INTERNAL MEDICINE

## 2018-10-27 PROCEDURE — 2580000003 HC RX 258: Performed by: INTERNAL MEDICINE

## 2018-10-27 PROCEDURE — 4A023N8 MEASUREMENT OF CARDIAC SAMPLING AND PRESSURE, BILATERAL, PERCUTANEOUS APPROACH: ICD-10-PCS | Performed by: INTERNAL MEDICINE

## 2018-10-27 PROCEDURE — 6360000004 HC RX CONTRAST MEDICATION: Performed by: INTERNAL MEDICINE

## 2018-10-27 PROCEDURE — 6370000000 HC RX 637 (ALT 250 FOR IP): Performed by: HOSPITALIST

## 2018-10-27 PROCEDURE — 6370000000 HC RX 637 (ALT 250 FOR IP): Performed by: PHYSICIAN ASSISTANT

## 2018-10-27 PROCEDURE — 93005 ELECTROCARDIOGRAM TRACING: CPT | Performed by: PHYSICIAN ASSISTANT

## 2018-10-27 PROCEDURE — 80061 LIPID PANEL: CPT

## 2018-10-27 PROCEDURE — 86900 BLOOD TYPING SEROLOGIC ABO: CPT

## 2018-10-27 PROCEDURE — 2140000000 HC CCU INTERMEDIATE R&B

## 2018-10-27 RX ORDER — ASPIRIN 81 MG/1
324 TABLET, CHEWABLE ORAL ONCE
Status: DISCONTINUED | OUTPATIENT
Start: 2018-10-27 | End: 2018-10-27

## 2018-10-27 RX ORDER — SODIUM CHLORIDE 9 MG/ML
INJECTION, SOLUTION INTRAVENOUS CONTINUOUS
Status: DISCONTINUED | OUTPATIENT
Start: 2018-10-27 | End: 2018-10-27

## 2018-10-27 RX ORDER — SODIUM CHLORIDE 0.9 % (FLUSH) 0.9 %
10 SYRINGE (ML) INJECTION EVERY 12 HOURS SCHEDULED
Status: DISCONTINUED | OUTPATIENT
Start: 2018-10-27 | End: 2018-10-27 | Stop reason: SDUPTHER

## 2018-10-27 RX ORDER — DIPHENHYDRAMINE HYDROCHLORIDE 50 MG/ML
25 INJECTION INTRAMUSCULAR; INTRAVENOUS ONCE
Status: DISCONTINUED | OUTPATIENT
Start: 2018-10-27 | End: 2018-10-27

## 2018-10-27 RX ORDER — SODIUM CHLORIDE 0.9 % (FLUSH) 0.9 %
10 SYRINGE (ML) INJECTION PRN
Status: DISCONTINUED | OUTPATIENT
Start: 2018-10-27 | End: 2018-10-27 | Stop reason: SDUPTHER

## 2018-10-27 RX ORDER — SODIUM CHLORIDE 9 MG/ML
INJECTION, SOLUTION INTRAVENOUS CONTINUOUS
Status: ACTIVE | OUTPATIENT
Start: 2018-10-27 | End: 2018-10-27

## 2018-10-27 RX ORDER — SODIUM CHLORIDE 0.9 % (FLUSH) 0.9 %
10 SYRINGE (ML) INJECTION PRN
Status: DISCONTINUED | OUTPATIENT
Start: 2018-10-27 | End: 2018-10-28 | Stop reason: HOSPADM

## 2018-10-27 RX ORDER — ACETAMINOPHEN 325 MG/1
650 TABLET ORAL EVERY 4 HOURS PRN
Status: DISCONTINUED | OUTPATIENT
Start: 2018-10-27 | End: 2018-10-28 | Stop reason: HOSPADM

## 2018-10-27 RX ORDER — SODIUM CHLORIDE 0.9 % (FLUSH) 0.9 %
10 SYRINGE (ML) INJECTION EVERY 12 HOURS SCHEDULED
Status: DISCONTINUED | OUTPATIENT
Start: 2018-10-27 | End: 2018-10-28 | Stop reason: HOSPADM

## 2018-10-27 RX ORDER — NITROGLYCERIN 0.4 MG/1
0.4 TABLET SUBLINGUAL EVERY 5 MIN PRN
Status: DISCONTINUED | OUTPATIENT
Start: 2018-10-27 | End: 2018-10-28 | Stop reason: HOSPADM

## 2018-10-27 RX ORDER — ASPIRIN 81 MG/1
324 TABLET, CHEWABLE ORAL ONCE
Status: COMPLETED | OUTPATIENT
Start: 2018-10-27 | End: 2018-10-27

## 2018-10-27 RX ADMIN — IOPAMIDOL 70 ML: 755 INJECTION, SOLUTION INTRAVENOUS at 09:14

## 2018-10-27 RX ADMIN — LISINOPRIL 10 MG: 10 TABLET ORAL at 11:39

## 2018-10-27 RX ADMIN — GABAPENTIN 800 MG: 400 CAPSULE ORAL at 10:50

## 2018-10-27 RX ADMIN — Medication 10 ML: at 10:57

## 2018-10-27 RX ADMIN — Medication 10 ML: at 20:38

## 2018-10-27 RX ADMIN — ASPIRIN 81 MG CHEWABLE TABLET 324 MG: 81 TABLET CHEWABLE at 06:11

## 2018-10-27 RX ADMIN — GABAPENTIN 800 MG: 400 CAPSULE ORAL at 20:38

## 2018-10-27 RX ADMIN — LEVOTHYROXINE SODIUM 50 MCG: 50 TABLET ORAL at 06:11

## 2018-10-27 RX ADMIN — SODIUM CHLORIDE: 9 INJECTION, SOLUTION INTRAVENOUS at 06:11

## 2018-10-27 RX ADMIN — VITAMIN D, TAB 1000IU (100/BT) 5000 UNITS: 25 TAB at 10:50

## 2018-10-27 RX ADMIN — ATORVASTATIN CALCIUM 40 MG: 40 TABLET, FILM COATED ORAL at 20:38

## 2018-10-27 RX ADMIN — Medication 2 UNITS: at 18:21

## 2018-10-27 RX ADMIN — CLOPIDOGREL BISULFATE 75 MG: 75 TABLET ORAL at 10:50

## 2018-10-27 RX ADMIN — CEFTRIAXONE SODIUM 2 G: 2 INJECTION, POWDER, FOR SOLUTION INTRAMUSCULAR; INTRAVENOUS at 10:56

## 2018-10-27 RX ADMIN — GABAPENTIN 800 MG: 400 CAPSULE ORAL at 13:32

## 2018-10-27 ASSESSMENT — PAIN SCALES - GENERAL: PAINLEVEL_OUTOF10: 0

## 2018-10-27 NOTE — PROGRESS NOTES
clopidogrel  75 mg Oral Daily    insulin lispro  0-12 Units Subcutaneous TID     insulin lispro  0-6 Units Subcutaneous Nightly    aspirin  81 mg Oral Daily    vitamin D  5,000 Units Oral Daily    atorvastatin  40 mg Oral Nightly    gabapentin  800 mg Oral TID    levothyroxine  50 mcg Oral Daily     PRN Meds: sodium chloride flush, nitroGLYCERIN, sodium chloride flush, acetaminophen, magnesium hydroxide, glucose, dextrose, glucagon (rDNA), dextrose, acetaminophen, ipratropium-albuterol, ondansetron, potassium chloride **OR** potassium chloride **OR** potassium chloride, magnesium sulfate      Intake/Output Summary (Last 24 hours) at 10/27/18 1010  Last data filed at 10/27/18 0430   Gross per 24 hour   Intake              390 ml   Output              500 ml   Net             -110 ml       Diet:  Diet NPO Time Specified  DIET CARDIAC; Low Sodium (2 GM)    Exam:  BP (!) 98/58   Pulse 110   Temp 98.3 °F (36.8 °C) (Oral)   Resp 18   Ht 6' (1.829 m)   Wt 211 lb 11.2 oz (96 kg)   SpO2 91%   BMI 28.71 kg/m²     General appearance: No apparent distress, appears stated age and cooperative. HEENT: Pupils equal, round, and reactive to light. Conjunctivae/corneas clear. Neck: Supple, with full range of motion. No jugular venous distention. Trachea midline. Respiratory:  Normal respiratory effort. Clear to auscultation, bilaterally without Rales/Wheezes/Rhonchi. Cardiovascular: Regular rate and rhythm with normal S1/S2 without murmurs, rubs or gallops. Abdomen: Soft, non-tender, non-distended with normal bowel sounds. Musculoskeletal: passive and active ROM x 4 extremities. Skin: Skin color, texture, turgor normal.  No rashes or lesions. Neurologic:  Neurovascularly intact without any focal sensory/motor deficits.  Cranial nerves: II-XII intact, grossly non-focal.  Psychiatric: Alert and oriented, thought content appropriate, normal insight  Capillary Refill: Brisk,< 3 seconds   Peripheral Pulses: +2 palpable, equal bilaterally       Labs:   Recent Labs      10/26/18   0440  10/27/18   0559   WBC  6.9  7.0   HGB  9.3*  9.6*   HCT  29.1*  29.1*   PLT  193  207     Recent Labs      10/25/18   0343  10/26/18   0440  10/27/18   0559   NA  140  143  142   K  4.2  4.2  4.1   CL  105  108  104   CO2  23  24  24   BUN  21  17  14   CREATININE  1.0  0.9  0.9   CALCIUM  8.3*  8.6  8.7   PHOS   --   2.9   --      No results for input(s): AST, ALT, BILIDIR, BILITOT, ALKPHOS in the last 72 hours. Recent Labs      10/27/18   0559   INR  1.18*     No results for input(s): [place] Hosteller in the last 72 hours. Urinalysis:    Lab Results   Component Value Date    NITRU NEGATIVE 10/21/2018    WBCUA 5-10 10/21/2018    BACTERIA NONE 10/21/2018    RBCUA 0-2 10/21/2018    BLOODU NEGATIVE 10/21/2018    SPECGRAV 1.019 10/21/2018       Radiology:  CT CHEST WO CONTRAST   Final Result   1. Fibroemphysematous lungs. Mild bibasilar atelectasis/pneumonia. Small effusion right side. 2. There is moderate calcification of the aortic valve but only mild calcification of the aortic root. Extensive coronary artery calcifications are seen. **This report has been created using voice recognition software. It may contain minor errors which are inherent in voice recognition technology. **      Final report electronically signed by Dr. Yarely Costello on 10/26/2018 8:38 AM      VL DUP CAROTID BILATERAL   Final Result   Atherosclerotic changes in the bilateral carotid bulbs resulting in 50-69% stenosis on the left side and less than 50% stenosis on the right side. **This report has been created using voice recognition software. It may contain minor errors which are inherent in voice recognition technology. **      Final report electronically signed by Dr Dunia Burch on 10/25/2018 5:44 PM      RADIOLOGY REPORT   Final Result      XR Panorex   Final Result   Chronic changes detailed above.  No convincing evidence of acute dental abscess            **This report has been created using voice recognition software. It may contain minor errors which are inherent in voice recognition technology. **      Final report electronically signed by Dr. Erika May on 10/23/2018 10:08 AM      XR CHEST PORTABLE   Final Result   Worsening perihilar infiltrates. This is more pronounced on the right than the left. The differential diagnosis includes pulmonary edema and pneumonia. **This report has been created using voice recognition software. It may contain minor errors which are inherent in voice recognition technology. **      Final report electronically signed by Dr. Mendel Cheers on 10/23/2018 9:09 AM      XR CHEST PORTABLE   Final Result      New mild interstitial pulmonary edema. **This report has been created using voice recognition software. It may contain minor errors which are inherent in voice recognition technology. **      Final report electronically signed by Dr. Danielle Sims on 10/22/2018 5:09 AM      XR PELVIS (1-2 VIEWS)   Final Result       Right femoral line tip is looped upon itself. This should be repositioned. These findings were telephoned to Dr. Brenden Spears, at 8:00 PM on 10/21/2018. **This report has been created using voice recognition software. It may contain minor errors which are inherent in voice recognition technology. **      Final report electronically signed by Dr. Mendel Cheers on 10/21/2018 8:00 PM      XR CHEST PORTABLE   Final Result   Stable radiographic appearance of the chest. No evidence of an acute process. **This report has been created using voice recognition software. It may contain minor errors which are inherent in voice recognition technology. **      Final report electronically signed by Dr. Mendel Cheers on 10/21/2018 5:30 PM      XR CHEST 1 VW    (Results Pending)       Diet: Diet NPO Time Specified  DIET CARDIAC; Low Sodium (2 GM)    DVT prophylaxis: [x]

## 2018-10-28 VITALS
HEIGHT: 72 IN | RESPIRATION RATE: 16 BRPM | TEMPERATURE: 98.1 F | BODY MASS INDEX: 28.68 KG/M2 | DIASTOLIC BLOOD PRESSURE: 60 MMHG | OXYGEN SATURATION: 92 % | WEIGHT: 211.7 LBS | SYSTOLIC BLOOD PRESSURE: 111 MMHG | HEART RATE: 92 BPM

## 2018-10-28 LAB
ANION GAP SERPL CALCULATED.3IONS-SCNC: 10 MEQ/L (ref 8–16)
BASOPHILS # BLD: 0.5 %
BASOPHILS ABSOLUTE: 0 THOU/MM3 (ref 0–0.1)
BUN BLDV-MCNC: 13 MG/DL (ref 7–22)
CALCIUM SERPL-MCNC: 8.7 MG/DL (ref 8.5–10.5)
CHLORIDE BLD-SCNC: 107 MEQ/L (ref 98–111)
CO2: 25 MEQ/L (ref 23–33)
CREAT SERPL-MCNC: 0.8 MG/DL (ref 0.4–1.2)
EKG ATRIAL RATE: 103 BPM
EKG P AXIS: 64 DEGREES
EKG P-R INTERVAL: 136 MS
EKG Q-T INTERVAL: 420 MS
EKG QRS DURATION: 152 MS
EKG QTC CALCULATION (BAZETT): 550 MS
EKG R AXIS: 13 DEGREES
EKG T AXIS: 160 DEGREES
EKG VENTRICULAR RATE: 103 BPM
EOSINOPHIL # BLD: 1.9 %
EOSINOPHILS ABSOLUTE: 0.1 THOU/MM3 (ref 0–0.4)
ERYTHROCYTE [DISTWIDTH] IN BLOOD BY AUTOMATED COUNT: 14.1 % (ref 11.5–14.5)
ERYTHROCYTE [DISTWIDTH] IN BLOOD BY AUTOMATED COUNT: 51.6 FL (ref 35–45)
GFR SERPL CREATININE-BSD FRML MDRD: > 90 ML/MIN/1.73M2
GLUCOSE BLD-MCNC: 120 MG/DL (ref 70–108)
GLUCOSE BLD-MCNC: 128 MG/DL (ref 70–108)
GLUCOSE BLD-MCNC: 132 MG/DL (ref 70–108)
HCT VFR BLD CALC: 28.1 % (ref 42–52)
HEMOGLOBIN: 8.9 GM/DL (ref 14–18)
IMMATURE GRANS (ABS): 0.05 THOU/MM3 (ref 0–0.07)
IMMATURE GRANULOCYTES: 0.8 %
LYMPHOCYTES # BLD: 17 %
LYMPHOCYTES ABSOLUTE: 1.1 THOU/MM3 (ref 1–4.8)
MAGNESIUM: 1.8 MG/DL (ref 1.6–2.4)
MCH RBC QN AUTO: 31.7 PG (ref 26–33)
MCHC RBC AUTO-ENTMCNC: 31.7 GM/DL (ref 32.2–35.5)
MCV RBC AUTO: 100 FL (ref 80–94)
MONOCYTES # BLD: 8.2 %
MONOCYTES ABSOLUTE: 0.5 THOU/MM3 (ref 0.4–1.3)
NUCLEATED RED BLOOD CELLS: 0 /100 WBC
PLATELET # BLD: 206 THOU/MM3 (ref 130–400)
PMV BLD AUTO: 10.1 FL (ref 9.4–12.4)
POTASSIUM SERPL-SCNC: 4.2 MEQ/L (ref 3.5–5.2)
RBC # BLD: 2.81 MILL/MM3 (ref 4.7–6.1)
SEG NEUTROPHILS: 71.6 %
SEGMENTED NEUTROPHILS ABSOLUTE COUNT: 4.6 THOU/MM3 (ref 1.8–7.7)
SODIUM BLD-SCNC: 142 MEQ/L (ref 135–145)
WBC # BLD: 6.4 THOU/MM3 (ref 4.8–10.8)

## 2018-10-28 PROCEDURE — 93010 ELECTROCARDIOGRAM REPORT: CPT | Performed by: INTERNAL MEDICINE

## 2018-10-28 PROCEDURE — 2580000003 HC RX 258: Performed by: INTERNAL MEDICINE

## 2018-10-28 PROCEDURE — 6360000002 HC RX W HCPCS: Performed by: INTERNAL MEDICINE

## 2018-10-28 PROCEDURE — 83735 ASSAY OF MAGNESIUM: CPT

## 2018-10-28 PROCEDURE — 36415 COLL VENOUS BLD VENIPUNCTURE: CPT

## 2018-10-28 PROCEDURE — 85025 COMPLETE CBC W/AUTO DIFF WBC: CPT

## 2018-10-28 PROCEDURE — 6370000000 HC RX 637 (ALT 250 FOR IP): Performed by: NURSE PRACTITIONER

## 2018-10-28 PROCEDURE — 2709999900 HC NON-CHARGEABLE SUPPLY

## 2018-10-28 PROCEDURE — 6370000000 HC RX 637 (ALT 250 FOR IP): Performed by: HOSPITALIST

## 2018-10-28 PROCEDURE — 80048 BASIC METABOLIC PNL TOTAL CA: CPT

## 2018-10-28 PROCEDURE — 99239 HOSP IP/OBS DSCHRG MGMT >30: CPT | Performed by: INTERNAL MEDICINE

## 2018-10-28 PROCEDURE — 82948 REAGENT STRIP/BLOOD GLUCOSE: CPT

## 2018-10-28 PROCEDURE — 6370000000 HC RX 637 (ALT 250 FOR IP): Performed by: INTERNAL MEDICINE

## 2018-10-28 RX ORDER — BUMETANIDE 1 MG/1
0.5 TABLET ORAL ONCE
Status: COMPLETED | OUTPATIENT
Start: 2018-10-28 | End: 2018-10-28

## 2018-10-28 RX ORDER — LISINOPRIL 10 MG/1
10 TABLET ORAL DAILY
Qty: 30 TABLET | Refills: 1 | Status: ON HOLD | OUTPATIENT
Start: 2018-10-28 | End: 2018-11-09 | Stop reason: HOSPADM

## 2018-10-28 RX ORDER — BUMETANIDE 0.5 MG/1
0.5 TABLET ORAL DAILY
Qty: 30 TABLET | Refills: 0 | Status: SHIPPED | OUTPATIENT
Start: 2018-10-28 | End: 2018-11-05

## 2018-10-28 RX ORDER — ASPIRIN 81 MG/1
81 TABLET, CHEWABLE ORAL DAILY
Qty: 30 TABLET | Refills: 3 | Status: ON HOLD | OUTPATIENT
Start: 2018-10-29 | End: 2018-12-06 | Stop reason: HOSPADM

## 2018-10-28 RX ORDER — NITROGLYCERIN 0.4 MG/1
TABLET SUBLINGUAL
Qty: 25 TABLET | Refills: 3 | Status: SHIPPED | OUTPATIENT
Start: 2018-10-28 | End: 2021-12-13 | Stop reason: SDUPTHER

## 2018-10-28 RX ADMIN — GABAPENTIN 800 MG: 400 CAPSULE ORAL at 10:25

## 2018-10-28 RX ADMIN — Medication 10 ML: at 10:27

## 2018-10-28 RX ADMIN — METOPROLOL TARTRATE 25 MG: 25 TABLET ORAL at 11:15

## 2018-10-28 RX ADMIN — LEVOTHYROXINE SODIUM 50 MCG: 50 TABLET ORAL at 06:22

## 2018-10-28 RX ADMIN — ASPIRIN 81 MG CHEWABLE TABLET 81 MG: 81 TABLET CHEWABLE at 10:25

## 2018-10-28 RX ADMIN — CEFTRIAXONE SODIUM 2 G: 2 INJECTION, POWDER, FOR SOLUTION INTRAMUSCULAR; INTRAVENOUS at 10:26

## 2018-10-28 RX ADMIN — LISINOPRIL 10 MG: 10 TABLET ORAL at 10:25

## 2018-10-28 RX ADMIN — BUMETANIDE 0.5 MG: 1 TABLET ORAL at 13:23

## 2018-10-28 RX ADMIN — VITAMIN D, TAB 1000IU (100/BT) 5000 UNITS: 25 TAB at 10:26

## 2018-10-28 ASSESSMENT — PAIN SCALES - GENERAL: PAINLEVEL_OUTOF10: 0

## 2018-10-28 NOTE — DISCHARGE SUMMARY
Hospital Medicine Discharge Summary      Patient Identification:   Arelis Cruz   : 1945  MRN: [id number]   Account: [de-identified]      Patient's PCP: Allison Oh Date: 10/21/2018     Discharge Date:   10/28/18    Admitting Physician: Montgomery Hodgkin, DO     Discharge Physician: Margaret Morrow MD     Discharge Diagnoses: Septic Shock, Group A Strep Bacteremia, Severe CAD, Severe MR and Moderate AS    Active Hospital Problems    Diagnosis Date Noted    Streptococcus A carrier or suspected carrier [Z22.338] 10/26/2018    Aortic stenosis with mitral and aortic insufficiency [I08.0]     ACS (acute coronary syndrome) ([facility] [I24.9] 10/21/2018    Cardiogenic shock ([facility] [R57.0] 10/21/2018    CKD (chronic kidney disease) stage 3, GFR 30-59 ml/min ([facility] [N18.3] 2015    HTN (hypertension), benign [I10] 2015       The patient was seen and examined on day of discharge and this discharge summary is in conjunction with any daily progress note from day of discharge. Hospital Course:   Coco Arevalo is a 72 y. o. male presented to [address] [place] [address] with generalized weakness.  Patient has been feeling weak for 2 weeks.  Patient had chest pain on and off for 2 days. While in the ER further work up identified troponin of 2 and LBBB on EKG. Cardiology evaluation at time of admission for possible STEMI and cardiology decided not to intervene at that time. Michael Fitting was started on heparin drip patient was admitted to the ICU. Patient became hypotensive while in ER. Central line was placed and Levophed was started. Bipap was applied and responsive to IVP Bumex.       Pt treated for septic shock with levophed from bacteremia due to Strep Agalactiae. Concerns poor dentition-Panorex Xray 4 broken crowns-no acute dental abscess identified. PCT 2.15 and CRP 18.25. ID recommending IV Rocephin for 2-3 weeks.  Will schedule outpatient IV ABx visits.       Also found to have new onset LBBB with elevated troponin. Found to have LVEF 35% with moderate AS and MR. [facility] Catheterization showing severe CAD with 100 RCA occlusion, OM1 severe disease, and Cx and LAD disease.      MICHELE on 10/25 not indicative of I/E however does show severe MR and moderate AS. Plan is for Dental evaluation on 10/29, teeth extraction on 11/2, then CABG and AVR + MVR evaluation in CTS clinic with Dr. Flaco Abbott on 11/7/18. Discharged on: Lopressor 25 BID, Lisinopril 10, Bumex 0.5 qd. Exam:     Vitals:  Vitals:    10/27/18 2030 10/28/18 0002 10/28/18 0409 10/28/18 1015   BP: (!) 105/57 112/64 (!) 108/58 (!) 118/58   Pulse: 94 96 96 91   Resp: 18 18 18 16   Temp: 98.5 °F (36.9 °C) 97.9 °F (36.6 °C) 98.3 °F (36.8 °C) 99 °F (37.2 °C)   TempSrc: Oral Oral Oral Oral   SpO2: 94% 94% 92% 93%   Weight:       Height:         Weight: Weight: 211 lb 11.2 oz (96 kg)     24 hour intake/output:  Intake/Output Summary (Last 24 hours) at 10/28/18 1047  Last data filed at 10/28/18 0533   Gross per 24 hour   Intake          1700.21 ml   Output             1200 ml   Net           500.21 ml         General appearance:  No apparent distress, appears stated age and cooperative. HEENT:  Normal cephalic, atraumatic without obvious deformity. Pupils equal, round, and reactive to light. Extra ocular muscles intact. Conjunctivae/corneas clear. Neck: Supple, with full range of motion. No jugular venous distention. Trachea midline. Respiratory:  Normal respiratory effort. Clear to auscultation, bilaterally without Rales/Wheezes/Rhonchi. Cardiovascular:  Regular rate and rhythm with normal S1/S2 without murmurs, rubs or gallops. Abdomen: Soft, non-tender, non-distended with normal bowel sounds. Musculoskeletal:  No clubbing, cyanosis or edema bilaterally. Full range of motion without deformity. Skin: Skin color, texture, turgor normal.  No rashes or lesions. Neurologic:  Neurovascularly intact without any focal sensory/motor deficits.  Cranial nerves: II-XII

## 2018-10-29 ENCOUNTER — CARE COORDINATION (OUTPATIENT)
Dept: CASE MANAGEMENT | Age: 73
End: 2018-10-29

## 2018-10-29 ENCOUNTER — HOSPITAL ENCOUNTER (OUTPATIENT)
Dept: NURSING | Age: 73
Discharge: HOME | End: 2018-10-29
Payer: MEDICARE

## 2018-10-29 VITALS
TEMPERATURE: 97.3 F | RESPIRATION RATE: 16 BRPM | HEART RATE: 78 BPM | SYSTOLIC BLOOD PRESSURE: 100 MMHG | DIASTOLIC BLOOD PRESSURE: 61 MMHG

## 2018-10-29 DIAGNOSIS — A40.0 SEPSIS DUE TO STREPTOCOCCUS PYOGENES (HCC): ICD-10-CM

## 2018-10-29 LAB — BLOOD CULTURE, ROUTINE: NORMAL

## 2018-10-29 PROCEDURE — 6360000002 HC RX W HCPCS: Performed by: INTERNAL MEDICINE

## 2018-10-29 PROCEDURE — 96365 THER/PROPH/DIAG IV INF INIT: CPT

## 2018-10-29 PROCEDURE — 2709999900 HC NON-CHARGEABLE SUPPLY

## 2018-10-29 PROCEDURE — 2580000003 HC RX 258: Performed by: INTERNAL MEDICINE

## 2018-10-29 RX ORDER — SODIUM CHLORIDE 0.9 % (FLUSH) 0.9 %
10 SYRINGE (ML) INJECTION PRN
Status: DISCONTINUED | OUTPATIENT
Start: 2018-10-29 | End: 2018-10-30 | Stop reason: HOSPADM

## 2018-10-29 RX ORDER — SODIUM CHLORIDE 0.9 % (FLUSH) 0.9 %
10 SYRINGE (ML) INJECTION PRN
Status: CANCELLED | OUTPATIENT
Start: 2018-10-29

## 2018-10-29 RX ADMIN — Medication 10 ML: at 10:14

## 2018-10-29 RX ADMIN — CEFTRIAXONE SODIUM: 2 INJECTION, POWDER, FOR SOLUTION INTRAMUSCULAR; INTRAVENOUS at 10:14

## 2018-10-29 RX ADMIN — Medication 10 ML: at 10:47

## 2018-10-29 ASSESSMENT — PAIN SCALES - GENERAL: PAINLEVEL_OUTOF10: 0

## 2018-10-29 NOTE — PROGRESS NOTES
1000 pt arrives to [facility] with son for daily IV Rocephin infusion. Oriented to room and call system. All questions and concerns addressed. Refreshments offered, pt and family declines. Side rail up x1, call light in reach  [address] PT TO READ.   1030 __m__ Safety:       (Environmental)   [place] to environment   Ensure ID band is correct and in place/ allergy band as needed   Assess for fall risk   Initiate fall precautions as applicable (fall band, side rails, etc.)   Call light within reach   Bed in low position/ wheels locked    _m___ Pain:        Assess pain level and characteristics   Administer analgesics as ordered   Assess effectiveness of pain management and report to MD as needed    _m___ Knowledge Deficit:   Assess baseline knowledge   Provide teaching at level of understanding   Provide teaching via preferred learning method   Evaluate teaching effectiveness    _m_ Hemodynamic/Respiratory Status:       (Pre and Post Procedure Monitoring)   Assess/Monitor vital signs and LOC   Assess Baseline SpO2 prior to any sedation   Obtain weight/height   Assess vital signs/ LOC until patient meets discharge criteria   Monitor procedure site and notify MD of any issues    _m___ Infection-Risk of Central Venous Catheter:   Monitor for infection signs and symptoms (catheter site redness, temperature elevation, etc)   Assess for infection risks   Educate regarding infection prevention   Manage central venous catheter (flushes/ dressing changes per protocol)    1055 WRITTEN DISCHARGE INSTRUCTIONS GIVEN TO PT-VERBALIZES UNDERSTANDING  1100   PT DISCHARGED PER WHEEL CHAIR IN SATISFACTORY CONDITION

## 2018-10-30 ENCOUNTER — HOSPITAL ENCOUNTER (OUTPATIENT)
Dept: NURSING | Age: 73
Discharge: HOME | End: 2018-10-30
Payer: MEDICARE

## 2018-10-30 VITALS
RESPIRATION RATE: 16 BRPM | DIASTOLIC BLOOD PRESSURE: 52 MMHG | HEART RATE: 76 BPM | TEMPERATURE: 97 F | SYSTOLIC BLOOD PRESSURE: 101 MMHG

## 2018-10-30 DIAGNOSIS — A40.0 SEPSIS DUE TO STREPTOCOCCUS PYOGENES (HCC): ICD-10-CM

## 2018-10-30 PROCEDURE — 6360000002 HC RX W HCPCS: Performed by: INTERNAL MEDICINE

## 2018-10-30 PROCEDURE — 2709999900 HC NON-CHARGEABLE SUPPLY

## 2018-10-30 PROCEDURE — 2580000003 HC RX 258: Performed by: INTERNAL MEDICINE

## 2018-10-30 PROCEDURE — 96365 THER/PROPH/DIAG IV INF INIT: CPT

## 2018-10-30 RX ORDER — SODIUM CHLORIDE 0.9 % (FLUSH) 0.9 %
10 SYRINGE (ML) INJECTION PRN
Status: DISCONTINUED | OUTPATIENT
Start: 2018-10-30 | End: 2018-10-31 | Stop reason: HOSPADM

## 2018-10-30 RX ORDER — SODIUM CHLORIDE 0.9 % (FLUSH) 0.9 %
10 SYRINGE (ML) INJECTION PRN
Status: CANCELLED | OUTPATIENT
Start: 2018-10-30

## 2018-10-30 RX ADMIN — Medication 10 ML: at 11:13

## 2018-10-30 RX ADMIN — Medication 10 ML: at 11:51

## 2018-10-30 RX ADMIN — CEFTRIAXONE SODIUM: 2 INJECTION, POWDER, FOR SOLUTION INTRAMUSCULAR; INTRAVENOUS at 11:13

## 2018-10-30 NOTE — PROGRESS NOTES
1105 pt arrives to \[facility]"" for IV Rocephin infusion. All questions and concerns addressed. Pt arrives via wheelchair, daughter at bedside  [address] PT TO READ.   1110 denies needs, refreshments and blankets offered to pt and family  200 __m__ Safety:       (Environmental)   [place] to environment   Ensure ID band is correct and in place/ allergy band as needed   Assess for fall risk   Initiate fall precautions as applicable (fall band, side rails, etc.)   Call light within reach   Bed in low position/ wheels locked    __m__ Pain:        Assess pain level and characteristics   Administer analgesics as ordered   Assess effectiveness of pain management and report to MD as needed    __m__ Knowledge Deficit:   Assess baseline knowledge   Provide teaching at level of understanding   Provide teaching via preferred learning method   Evaluate teaching effectiveness    __m__ Hemodynamic/Respiratory Status:       (Pre and Post Procedure Monitoring)   Assess/Monitor vital signs and LOC   Assess Baseline SpO2 prior to any sedation   Obtain weight/height   Assess vital signs/ LOC until patient meets discharge criteria   Monitor procedure site and notify MD of any issues    _m___ Infection-Risk of Central Venous Catheter:   Monitor for infection signs and symptoms (catheter site redness, temperature elevation, etc)   Assess for infection risks   Educate regarding infection prevention   Manage central venous catheter (flushes/ dressing changes per protocol)    1145 infusion complete  1150 WRITTEN DISCHARGE INSTRUCTIONS GIVEN TO PT-VERBALIZES UNDERSTANDING  1155   PT DISCHARGED PER WHEEL CHAIR IN SATISFACTORY CONDITION

## 2018-10-31 ENCOUNTER — HOSPITAL ENCOUNTER (OUTPATIENT)
Dept: NURSING | Age: 73
Discharge: HOME | End: 2018-10-31
Payer: MEDICARE

## 2018-10-31 VITALS
TEMPERATURE: 97.6 F | DIASTOLIC BLOOD PRESSURE: 53 MMHG | OXYGEN SATURATION: 96 % | HEART RATE: 77 BPM | RESPIRATION RATE: 18 BRPM | SYSTOLIC BLOOD PRESSURE: 102 MMHG

## 2018-10-31 DIAGNOSIS — A40.0 SEPSIS DUE TO STREPTOCOCCUS PYOGENES (HCC): ICD-10-CM

## 2018-10-31 PROCEDURE — 2580000003 HC RX 258: Performed by: INTERNAL MEDICINE

## 2018-10-31 PROCEDURE — 2709999900 HC NON-CHARGEABLE SUPPLY

## 2018-10-31 PROCEDURE — 6360000002 HC RX W HCPCS: Performed by: INTERNAL MEDICINE

## 2018-10-31 PROCEDURE — 96365 THER/PROPH/DIAG IV INF INIT: CPT

## 2018-10-31 PROCEDURE — G0463 HOSPITAL OUTPT CLINIC VISIT: HCPCS

## 2018-10-31 RX ORDER — SODIUM CHLORIDE 0.9 % (FLUSH) 0.9 %
10 SYRINGE (ML) INJECTION PRN
Status: CANCELLED | OUTPATIENT
Start: 2018-10-31

## 2018-10-31 RX ORDER — SODIUM CHLORIDE 0.9 % (FLUSH) 0.9 %
10 SYRINGE (ML) INJECTION PRN
Status: DISCONTINUED | OUTPATIENT
Start: 2018-10-31 | End: 2018-11-01 | Stop reason: HOSPADM

## 2018-10-31 RX ADMIN — Medication 10 ML: at 11:34

## 2018-10-31 RX ADMIN — Medication 10 ML: at 12:09

## 2018-10-31 RX ADMIN — CEFTRIAXONE SODIUM 2 G: 2 INJECTION, POWDER, FOR SOLUTION INTRAMUSCULAR; INTRAVENOUS at 11:37

## 2018-10-31 RX ADMIN — Medication 10 ML: at 11:35

## 2018-10-31 RX ADMIN — Medication 10 ML: at 11:33

## 2018-10-31 ASSESSMENT — PAIN - FUNCTIONAL ASSESSMENT: PAIN_FUNCTIONAL_ASSESSMENT: 0-10

## 2018-10-31 NOTE — PROGRESS NOTES
1130:  ARRIVES VIA WC  FOR IV ROCEPHIN. PROCESS REVIEWED AND PT RIGHTS AND RESPONSIBILITIES OFFERED TO PT.  3558:  PICC DRESSING COMPLETE  1220:  TOLERATED INFUSION WITHOUT COMPLAINTS. 1215:  PT DISCHARGED VIA WC IN CARE OF FAMILY.   INSTRUCTIONS GIVEN.    _M___ Safety:       (Environmental)   [place] to environment   Ensure ID band is correct and in place/ allergy band as needed   Assess for fall risk   Initiate fall precautions as applicable (fall band, side rails, etc.)   Call light within reach   Bed in low position/ wheels locked    _M___ Pain:        Assess pain level and characteristics   Administer analgesics as ordered   Assess effectiveness of pain management and report to MD as needed    _M___ Knowledge Deficit:   Assess baseline knowledge   Provide teaching at level of understanding   Provide teaching via preferred learning method   Evaluate teaching effectiveness    _M___ Hemodynamic/Respiratory Status:       (Pre and Post Procedure Monitoring)   Assess/Monitor vital signs and LOC   Assess Baseline SpO2 prior to any sedation   Obtain weight/height   Assess vital signs/ LOC until patient meets discharge criteria   Monitor procedure site and notify MD of any issues    _M__ Infection-Risk of Central Venous Catheter:  PICC MARINA   Monitor for infection signs and symptoms (catheter site redness, temperature elevation, etc)   Assess for infection risks   Educate regarding infection prevention   Manage central venous catheter (flushes/ dressing changes per protocol)

## 2018-11-01 ENCOUNTER — HOSPITAL ENCOUNTER (OUTPATIENT)
Dept: NURSING | Age: 73
Discharge: HOME | End: 2018-11-01
Payer: MEDICARE

## 2018-11-01 VITALS
TEMPERATURE: 97.3 F | HEART RATE: 75 BPM | RESPIRATION RATE: 18 BRPM | DIASTOLIC BLOOD PRESSURE: 53 MMHG | SYSTOLIC BLOOD PRESSURE: 88 MMHG

## 2018-11-01 DIAGNOSIS — A40.0 SEPSIS DUE TO STREPTOCOCCUS PYOGENES (HCC): ICD-10-CM

## 2018-11-01 LAB
ALBUMIN SERPL-MCNC: 3.4 G/DL (ref 3.5–5.1)
ALP BLD-CCNC: 79 U/L (ref 38–126)
ALT SERPL-CCNC: 37 U/L (ref 11–66)
ANION GAP SERPL CALCULATED.3IONS-SCNC: 13 MEQ/L (ref 8–16)
AST SERPL-CCNC: 28 U/L (ref 5–40)
BASOPHILS # BLD: 0.7 %
BASOPHILS ABSOLUTE: 0.1 THOU/MM3 (ref 0–0.1)
BILIRUB SERPL-MCNC: 0.4 MG/DL (ref 0.3–1.2)
BILIRUBIN DIRECT: < 0.2 MG/DL (ref 0–0.3)
BUN BLDV-MCNC: 29 MG/DL (ref 7–22)
C-REACTIVE PROTEIN: 2.43 MG/DL (ref 0–1)
CALCIUM SERPL-MCNC: 9.1 MG/DL (ref 8.5–10.5)
CHLORIDE BLD-SCNC: 102 MEQ/L (ref 98–111)
CO2: 25 MEQ/L (ref 23–33)
CREAT SERPL-MCNC: 1.7 MG/DL (ref 0.4–1.2)
EOSINOPHIL # BLD: 0.8 %
EOSINOPHILS ABSOLUTE: 0.1 THOU/MM3 (ref 0–0.4)
ERYTHROCYTE [DISTWIDTH] IN BLOOD BY AUTOMATED COUNT: 14.3 % (ref 11.5–14.5)
ERYTHROCYTE [DISTWIDTH] IN BLOOD BY AUTOMATED COUNT: 50.6 FL (ref 35–45)
GFR SERPL CREATININE-BSD FRML MDRD: 40 ML/MIN/1.73M2
GLUCOSE BLD-MCNC: 121 MG/DL (ref 70–108)
HCT VFR BLD CALC: 31.2 % (ref 42–52)
HEMOGLOBIN: 9.9 GM/DL (ref 14–18)
IMMATURE GRANS (ABS): 0.02 THOU/MM3 (ref 0–0.07)
IMMATURE GRANULOCYTES: 0.2 %
LYMPHOCYTES # BLD: 14.9 %
LYMPHOCYTES ABSOLUTE: 1.3 THOU/MM3 (ref 1–4.8)
MCH RBC QN AUTO: 31.1 PG (ref 26–33)
MCHC RBC AUTO-ENTMCNC: 31.7 GM/DL (ref 32.2–35.5)
MCV RBC AUTO: 98.1 FL (ref 80–94)
MONOCYTES # BLD: 7.5 %
MONOCYTES ABSOLUTE: 0.7 THOU/MM3 (ref 0.4–1.3)
NUCLEATED RED BLOOD CELLS: 0 /100 WBC
PLATELET # BLD: 284 THOU/MM3 (ref 130–400)
PMV BLD AUTO: 9.7 FL (ref 9.4–12.4)
POTASSIUM SERPL-SCNC: 4.4 MEQ/L (ref 3.5–5.2)
RBC # BLD: 3.18 MILL/MM3 (ref 4.7–6.1)
SEG NEUTROPHILS: 75.9 %
SEGMENTED NEUTROPHILS ABSOLUTE COUNT: 6.8 THOU/MM3 (ref 1.8–7.7)
SODIUM BLD-SCNC: 140 MEQ/L (ref 135–145)
TOTAL PROTEIN: 7.7 G/DL (ref 6.1–8)
WBC # BLD: 8.9 THOU/MM3 (ref 4.8–10.8)

## 2018-11-01 PROCEDURE — 85025 COMPLETE CBC W/AUTO DIFF WBC: CPT

## 2018-11-01 PROCEDURE — 82248 BILIRUBIN DIRECT: CPT

## 2018-11-01 PROCEDURE — 2709999900 HC NON-CHARGEABLE SUPPLY

## 2018-11-01 PROCEDURE — 36592 COLLECT BLOOD FROM PICC: CPT

## 2018-11-01 PROCEDURE — 2580000003 HC RX 258: Performed by: INTERNAL MEDICINE

## 2018-11-01 PROCEDURE — 96365 THER/PROPH/DIAG IV INF INIT: CPT

## 2018-11-01 PROCEDURE — 36415 COLL VENOUS BLD VENIPUNCTURE: CPT

## 2018-11-01 PROCEDURE — 86140 C-REACTIVE PROTEIN: CPT

## 2018-11-01 PROCEDURE — 80053 COMPREHEN METABOLIC PANEL: CPT

## 2018-11-01 PROCEDURE — 6360000002 HC RX W HCPCS: Performed by: INTERNAL MEDICINE

## 2018-11-01 RX ORDER — SODIUM CHLORIDE 0.9 % (FLUSH) 0.9 %
10 SYRINGE (ML) INJECTION PRN
Status: DISCONTINUED | OUTPATIENT
Start: 2018-11-01 | End: 2018-11-02 | Stop reason: HOSPADM

## 2018-11-01 RX ORDER — SODIUM CHLORIDE 0.9 % (FLUSH) 0.9 %
10 SYRINGE (ML) INJECTION PRN
Status: CANCELLED | OUTPATIENT
Start: 2018-11-01

## 2018-11-01 RX ADMIN — CEFTRIAXONE SODIUM 2 G: 2 INJECTION, POWDER, FOR SOLUTION INTRAMUSCULAR; INTRAVENOUS at 11:00

## 2018-11-01 RX ADMIN — Medication 10 ML: at 11:00

## 2018-11-01 NOTE — PROGRESS NOTES
1055 pt arrives via wheelchair for IV rocephin and lab work. Infusion explained and questions answered. PT RIGHTS AND RESPONSIBILITIES OFFERED TO PT.  4286 blood work sent to lab as ordered. Pt denies needs at this time. 1138 infusion complete. Pt tolerated it well with no complaints. BP low. Pt states he took whole pill instead of half pill of BP med. Pt educated to go to ER if BP gets lower or has and S&S (SOB, lower BP, dizziness). Pt verbalized understanding. [address] [place] [address] Pky with Jonathon Cortez at Dr. Shaylee Cline office about some of pt's labwork being significantly elevated from last lab draw (BUN, CR). Jonathon Cortez states that she will let Dr Cleopatra Marvin know. Labwork faxed to Dr. Shaylee Cline office.                _m___ Safety:       (Environmental)   [place] to environment   Ensure ID band is correct and in place/ allergy band as needed   Assess for fall risk   Initiate fall precautions as applicable (fall band, side rails, etc.)   Call light within reach   Bed in low position/ wheels locked    __m__ Pain:        Assess pain level and characteristics   Administer analgesics as ordered   Assess effectiveness of pain management and report to MD as needed    __m__ Knowledge Deficit:   Assess baseline knowledge   Provide teaching at level of understanding   Provide teaching via preferred learning method   Evaluate teaching effectiveness    __m__ Hemodynamic/Respiratory Status:       (Pre and Post Procedure Monitoring)   Assess/Monitor vital signs and LOC   Assess Baseline SpO2 prior to any sedation   Obtain weight/height   Assess vital signs/ LOC until patient meets discharge criteria   Monitor procedure site and notify MD of any issues    _m___ Infection-Risk of Central Venous Catheter:   Monitor for infection signs and symptoms (catheter site redness, temperature elevation, etc)   Assess for infection risks   Educate regarding infection prevention   Manage central venous catheter (flushes/ dressing changes per protocol)

## 2018-11-02 ENCOUNTER — HOSPITAL ENCOUNTER (OUTPATIENT)
Dept: NURSING | Age: 73
Discharge: HOME | End: 2018-11-02
Payer: MEDICARE

## 2018-11-02 ENCOUNTER — TELEPHONE (OUTPATIENT)
Dept: CARDIOLOGY CLINIC | Age: 73
End: 2018-11-02

## 2018-11-02 VITALS
SYSTOLIC BLOOD PRESSURE: 99 MMHG | TEMPERATURE: 97 F | RESPIRATION RATE: 18 BRPM | DIASTOLIC BLOOD PRESSURE: 53 MMHG | HEART RATE: 88 BPM

## 2018-11-02 DIAGNOSIS — A40.0 SEPSIS DUE TO STREPTOCOCCUS PYOGENES (HCC): ICD-10-CM

## 2018-11-02 PROCEDURE — 6360000002 HC RX W HCPCS: Performed by: INTERNAL MEDICINE

## 2018-11-02 PROCEDURE — 96365 THER/PROPH/DIAG IV INF INIT: CPT

## 2018-11-02 PROCEDURE — 2709999900 HC NON-CHARGEABLE SUPPLY

## 2018-11-02 PROCEDURE — 2580000003 HC RX 258: Performed by: INTERNAL MEDICINE

## 2018-11-02 RX ORDER — SODIUM CHLORIDE 0.9 % (FLUSH) 0.9 %
10 SYRINGE (ML) INJECTION PRN
Status: CANCELLED | OUTPATIENT
Start: 2018-11-02

## 2018-11-02 RX ORDER — SODIUM CHLORIDE 0.9 % (FLUSH) 0.9 %
10 SYRINGE (ML) INJECTION PRN
Status: DISCONTINUED | OUTPATIENT
Start: 2018-11-02 | End: 2018-11-03 | Stop reason: HOSPADM

## 2018-11-02 RX ADMIN — CEFTRIAXONE SODIUM 2 G: 2 INJECTION, POWDER, FOR SOLUTION INTRAMUSCULAR; INTRAVENOUS at 10:39

## 2018-11-02 RX ADMIN — Medication 10 ML: at 10:36

## 2018-11-02 RX ADMIN — Medication 10 ML: at 11:12

## 2018-11-02 RX ADMIN — Medication 10 ML: at 10:35

## 2018-11-02 RX ADMIN — Medication 10 ML: at 10:34

## 2018-11-02 ASSESSMENT — PAIN SCALES - GENERAL: PAINLEVEL_OUTOF10: 0

## 2018-11-02 NOTE — PROGRESS NOTES
[address] __m__ Safety:       (Environmental)   [place] to environment   Ensure ID band is correct and in place/ allergy band as needed   Assess for fall risk   Initiate fall precautions as applicable (fall band, side rails, etc.)   Call light within reach   Bed in low position/ wheels locked    __m__ Pain:        Assess pain level and characteristics   Administer analgesics as ordered   Assess effectiveness of pain management and report to MD as needed    _m___ Knowledge Deficit:   Assess baseline knowledge   Provide teaching at level of understanding   Provide teaching via preferred learning method   Evaluate teaching effectiveness    __m__ Hemodynamic/Respiratory Status:       (Pre and Post Procedure Monitoring)   Assess/Monitor vital signs and LOC   Assess Baseline SpO2 prior to any sedation   Obtain weight/height   Assess vital signs/ LOC until patient meets discharge criteria   Monitor procedure site and notify MD of any issues    __m_ Infection-Risk of Central Venous Catheter:   Monitor for infection signs and symptoms (catheter site redness, temperature elevation, etc)   Assess for infection risks   Educate regarding infection prevention   Manage central venous catheter (flushes/ dressing changes per protocol)    1115   PT DISCHARGED PER WHEEL CHAIR IN SATISFACTORY CONDITION

## 2018-11-02 NOTE — PROGRESS NOTES
1033:  ARRIVES VIA  FOR IV ROCEPHIN. PROCESS REVIEWED AND PT RIGHTS AND RESPONSIBILITIES OFFERED TO PT.  ASSISTED TO BED  1045:  READING PAPER.   NO COMPLAINTS

## 2018-11-03 ENCOUNTER — HOSPITAL ENCOUNTER (OUTPATIENT)
Dept: GENERAL RADIOLOGY | Age: 73
Discharge: HOME | DRG: 291 | End: 2018-11-03
Payer: MEDICARE

## 2018-11-03 VITALS
TEMPERATURE: 96.7 F | HEART RATE: 86 BPM | DIASTOLIC BLOOD PRESSURE: 55 MMHG | OXYGEN SATURATION: 96 % | RESPIRATION RATE: 18 BRPM | SYSTOLIC BLOOD PRESSURE: 109 MMHG

## 2018-11-03 DIAGNOSIS — A40.0 SEPSIS DUE TO STREPTOCOCCUS PYOGENES (HCC): ICD-10-CM

## 2018-11-03 PROCEDURE — 2709999900 HC NON-CHARGEABLE SUPPLY

## 2018-11-03 PROCEDURE — 2580000003 HC RX 258: Performed by: INTERNAL MEDICINE

## 2018-11-03 PROCEDURE — 6360000002 HC RX W HCPCS: Performed by: INTERNAL MEDICINE

## 2018-11-03 PROCEDURE — 96365 THER/PROPH/DIAG IV INF INIT: CPT

## 2018-11-03 RX ORDER — SODIUM CHLORIDE 0.9 % (FLUSH) 0.9 %
10 SYRINGE (ML) INJECTION PRN
Status: DISCONTINUED | OUTPATIENT
Start: 2018-11-03 | End: 2018-11-04 | Stop reason: HOSPADM

## 2018-11-03 RX ORDER — SODIUM CHLORIDE 0.9 % (FLUSH) 0.9 %
10 SYRINGE (ML) INJECTION PRN
Status: CANCELLED | OUTPATIENT
Start: 2018-11-03

## 2018-11-03 RX ADMIN — Medication 10 ML: at 09:39

## 2018-11-03 RX ADMIN — Medication 10 ML: at 10:07

## 2018-11-03 RX ADMIN — CEFTRIAXONE SODIUM 2 G: 2 INJECTION, POWDER, FOR SOLUTION INTRAMUSCULAR; INTRAVENOUS at 09:40

## 2018-11-03 NOTE — PROGRESS NOTES
__met__ Safety   GOAL: Patient will have ID or Allergy band on in the Urgent Care       (Environmental)   [place] to environment   Ensure ID band is correct and in place/ allergy band as needed   Assess for fall risk   Initiate fall precautions as applicable (fall band, side rails, etc.)   Call light within reach   Bed in low position/ wheels locked    __met__ Pain   GOAL:  Patient pain will be under control while here in the Urgent Care      Assess pain level and characteristics   Administer analgesics as ordered   Assess effectiveness of pain management and report to MD as needed    _met__ Knowledge Deficit:   GOAL: Patient will be educated on the medication they are receiving here in the Urgent Care   Assess baseline knowledge   Provide teaching at level of understanding   Provide teaching via preferred learning method   Evaluate teaching effectiveness    __met__ Hemodynamic/Respiratory Status:   GOAL: Patient vital signs will be assessed and monitored in the Urgent Care       (Pre and Post Procedure Monitoring)   Assess/Monitor vital signs and LOC   Assess Baseline SpO2 prior to any sedation   Obtain weight/height   Assess vital signs/ LOC until patient meets discharge criteria   Monitor procedure site and notify MD of any issues    __met__ Infection-Risk of Central Venous Catheter:   GOAL: Patient will be monitored for infection while in the Urgent Care   Monitor for infection signs and symptoms (catheter site redness, temperature elevation, etc)   Assess for infection risks   Educate regarding infection prevention   Manage central venous catheter (flushes/ dressing changes per protocol)    CARE PLAN END DATE: 11/3/2018

## 2018-11-03 NOTE — PROGRESS NOTES
To [facility] for oupt IV antibiotics. Vitals obtained, stable. Denies needs or concerns. Medicated as ordered.

## 2018-11-04 ENCOUNTER — HOSPITAL ENCOUNTER (OUTPATIENT)
Dept: GENERAL RADIOLOGY | Age: 73
Discharge: HOME | DRG: 291 | End: 2018-11-04
Payer: MEDICARE

## 2018-11-04 VITALS
HEART RATE: 77 BPM | SYSTOLIC BLOOD PRESSURE: 82 MMHG | TEMPERATURE: 97 F | RESPIRATION RATE: 18 BRPM | OXYGEN SATURATION: 95 % | DIASTOLIC BLOOD PRESSURE: 49 MMHG

## 2018-11-04 DIAGNOSIS — A40.0 SEPSIS DUE TO STREPTOCOCCUS PYOGENES (HCC): ICD-10-CM

## 2018-11-04 PROCEDURE — 2580000003 HC RX 258: Performed by: INTERNAL MEDICINE

## 2018-11-04 PROCEDURE — 6360000002 HC RX W HCPCS: Performed by: INTERNAL MEDICINE

## 2018-11-04 PROCEDURE — 2709999900 HC NON-CHARGEABLE SUPPLY

## 2018-11-04 PROCEDURE — 96365 THER/PROPH/DIAG IV INF INIT: CPT

## 2018-11-04 RX ORDER — SODIUM CHLORIDE 0.9 % (FLUSH) 0.9 %
10 SYRINGE (ML) INJECTION PRN
Status: DISCONTINUED | OUTPATIENT
Start: 2018-11-04 | End: 2018-11-05 | Stop reason: HOSPADM

## 2018-11-04 RX ORDER — SODIUM CHLORIDE 0.9 % (FLUSH) 0.9 %
10 SYRINGE (ML) INJECTION PRN
Status: CANCELLED | OUTPATIENT
Start: 2018-11-04

## 2018-11-04 RX ADMIN — Medication 10 ML: at 10:15

## 2018-11-04 RX ADMIN — CEFTRIAXONE SODIUM 2 G: 2 INJECTION, POWDER, FOR SOLUTION INTRAMUSCULAR; INTRAVENOUS at 10:16

## 2018-11-04 RX ADMIN — Medication 10 ML: at 10:43

## 2018-11-04 NOTE — PROGRESS NOTES
Ambulatory to [facility] room 1 for outpt IV antibiotics. Vitals obtained, BP low. Pt states that that is normal for him recently. States no dizziness, lightheadedness, or any other symptoms. No other concerns. Medicated as ordered.

## 2018-11-04 NOTE — PROGRESS NOTES
__met__ Safety   GOAL: Patient will have ID or Allergy band on in the Urgent Care       (Environmental)   [place] to environment   Ensure ID band is correct and in place/ allergy band as needed   Assess for fall risk   Initiate fall precautions as applicable (fall band, side rails, etc.)   Call light within reach   Bed in low position/ wheels locked    __met__ Pain   GOAL:  Patient pain will be under control while here in the Urgent Care      Assess pain level and characteristics   Administer analgesics as ordered   Assess effectiveness of pain management and report to MD as needed    _met__ Knowledge Deficit:   GOAL: Patient will be educated on the medication they are receiving here in the Urgent Care   Assess baseline knowledge   Provide teaching at level of understanding   Provide teaching via preferred learning method   Evaluate teaching effectiveness    __met__ Hemodynamic/Respiratory Status:   GOAL: Patient vital signs will be assessed and monitored in the Urgent Care       (Pre and Post Procedure Monitoring)   Assess/Monitor vital signs and LOC   Assess Baseline SpO2 prior to any sedation   Obtain weight/height   Assess vital signs/ LOC until patient meets discharge criteria   Monitor procedure site and notify MD of any issues    __met__ Infection-Risk of Central Venous Catheter:   GOAL: Patient will be monitored for infection while in the Urgent Care   Monitor for infection signs and symptoms (catheter site redness, temperature elevation, etc)   Assess for infection risks   Educate regarding infection prevention   Manage central venous catheter (flushes/ dressing changes per protocol)    CARE PLAN END DATE: 11/4/2108

## 2018-11-05 ENCOUNTER — HOSPITAL ENCOUNTER (OUTPATIENT)
Dept: NURSING | Age: 73
Discharge: HOME | DRG: 291 | End: 2018-11-05
Payer: MEDICARE

## 2018-11-05 ENCOUNTER — HOSPITAL ENCOUNTER (EMERGENCY)
Age: 73
Discharge: HOME | DRG: 291 | End: 2018-11-05
Attending: EMERGENCY MEDICINE
Payer: MEDICARE

## 2018-11-05 ENCOUNTER — APPOINTMENT (OUTPATIENT)
Dept: GENERAL RADIOLOGY | Age: 73
DRG: 291 | End: 2018-11-05
Payer: MEDICARE

## 2018-11-05 VITALS
OXYGEN SATURATION: 95 % | BODY MASS INDEX: 27.94 KG/M2 | DIASTOLIC BLOOD PRESSURE: 55 MMHG | WEIGHT: 206.3 LBS | RESPIRATION RATE: 18 BRPM | HEIGHT: 72 IN | HEART RATE: 89 BPM | TEMPERATURE: 97.7 F | SYSTOLIC BLOOD PRESSURE: 116 MMHG

## 2018-11-05 VITALS
SYSTOLIC BLOOD PRESSURE: 97 MMHG | DIASTOLIC BLOOD PRESSURE: 57 MMHG | OXYGEN SATURATION: 94 % | RESPIRATION RATE: 16 BRPM | HEART RATE: 93 BPM

## 2018-11-05 DIAGNOSIS — I08.0 AORTIC STENOSIS WITH MITRAL AND AORTIC INSUFFICIENCY: ICD-10-CM

## 2018-11-05 DIAGNOSIS — A40.0 SEPSIS DUE TO STREPTOCOCCUS PYOGENES (HCC): ICD-10-CM

## 2018-11-05 DIAGNOSIS — Z86.19: ICD-10-CM

## 2018-11-05 DIAGNOSIS — I25.10 CORONARY ARTERY DISEASE INVOLVING NATIVE CORONARY ARTERY OF NATIVE HEART WITHOUT ANGINA PECTORIS: ICD-10-CM

## 2018-11-05 DIAGNOSIS — I50.41 ACUTE COMBINED SYSTOLIC AND DIASTOLIC CONGESTIVE HEART FAILURE (HCC): Primary | ICD-10-CM

## 2018-11-05 DIAGNOSIS — I24.9 ACS (ACUTE CORONARY SYNDROME) (HCC): ICD-10-CM

## 2018-11-05 LAB
ALBUMIN SERPL-MCNC: 3.2 G/DL (ref 3.5–5.1)
ALP BLD-CCNC: 86 U/L (ref 38–126)
ALT SERPL-CCNC: 20 U/L (ref 11–66)
ANION GAP SERPL CALCULATED.3IONS-SCNC: 15 MEQ/L (ref 8–16)
AST SERPL-CCNC: 21 U/L (ref 5–40)
BASOPHILS # BLD: 0.8 %
BASOPHILS ABSOLUTE: 0 THOU/MM3 (ref 0–0.1)
BILIRUB SERPL-MCNC: 0.6 MG/DL (ref 0.3–1.2)
BILIRUBIN DIRECT: < 0.2 MG/DL (ref 0–0.3)
BUN BLDV-MCNC: 33 MG/DL (ref 7–22)
CALCIUM SERPL-MCNC: 9 MG/DL (ref 8.5–10.5)
CHLORIDE BLD-SCNC: 101 MEQ/L (ref 98–111)
CO2: 25 MEQ/L (ref 23–33)
CREAT SERPL-MCNC: 1.5 MG/DL (ref 0.4–1.2)
EKG ATRIAL RATE: 98 BPM
EKG P AXIS: 61 DEGREES
EKG P-R INTERVAL: 140 MS
EKG Q-T INTERVAL: 402 MS
EKG QRS DURATION: 152 MS
EKG QTC CALCULATION (BAZETT): 513 MS
EKG R AXIS: 22 DEGREES
EKG T AXIS: -129 DEGREES
EKG VENTRICULAR RATE: 98 BPM
EOSINOPHIL # BLD: 0.7 %
EOSINOPHILS ABSOLUTE: 0 THOU/MM3 (ref 0–0.4)
ERYTHROCYTE [DISTWIDTH] IN BLOOD BY AUTOMATED COUNT: 14.6 % (ref 11.5–14.5)
ERYTHROCYTE [DISTWIDTH] IN BLOOD BY AUTOMATED COUNT: 51.8 FL (ref 35–45)
GFR SERPL CREATININE-BSD FRML MDRD: 46 ML/MIN/1.73M2
GLUCOSE BLD-MCNC: 109 MG/DL (ref 70–108)
HCT VFR BLD CALC: 30.6 % (ref 42–52)
HEMOGLOBIN: 9.8 GM/DL (ref 14–18)
IMMATURE GRANS (ABS): 0.01 THOU/MM3 (ref 0–0.07)
IMMATURE GRANULOCYTES: 0.2 %
LACTIC ACID: 1.5 MMOL/L (ref 0.5–2.2)
LYMPHOCYTES # BLD: 17.8 %
LYMPHOCYTES ABSOLUTE: 1.1 THOU/MM3 (ref 1–4.8)
MAGNESIUM: 2.6 MG/DL (ref 1.6–2.4)
MCH RBC QN AUTO: 31.4 PG (ref 26–33)
MCHC RBC AUTO-ENTMCNC: 32 GM/DL (ref 32.2–35.5)
MCV RBC AUTO: 98.1 FL (ref 80–94)
MONOCYTES # BLD: 9.1 %
MONOCYTES ABSOLUTE: 0.5 THOU/MM3 (ref 0.4–1.3)
NUCLEATED RED BLOOD CELLS: 0 /100 WBC
OSMOLALITY CALCULATION: 289.1 MOSMOL/KG (ref 275–300)
PLATELET # BLD: 283 THOU/MM3 (ref 130–400)
PMV BLD AUTO: 9.9 FL (ref 9.4–12.4)
POTASSIUM SERPL-SCNC: 4.7 MEQ/L (ref 3.5–5.2)
PRO-BNP: 5006 PG/ML (ref 0–900)
PROLACTIN: 12.8 NG/ML
RBC # BLD: 3.12 MILL/MM3 (ref 4.7–6.1)
SEG NEUTROPHILS: 71.4 %
SEGMENTED NEUTROPHILS ABSOLUTE COUNT: 4.3 THOU/MM3 (ref 1.8–7.7)
SODIUM BLD-SCNC: 141 MEQ/L (ref 135–145)
TOTAL PROTEIN: 7.7 G/DL (ref 6.1–8)
TROPONIN T: 0.11 NG/ML
WBC # BLD: 6 THOU/MM3 (ref 4.8–10.8)

## 2018-11-05 PROCEDURE — 2580000003 HC RX 258: Performed by: EMERGENCY MEDICINE

## 2018-11-05 PROCEDURE — 87040 BLOOD CULTURE FOR BACTERIA: CPT

## 2018-11-05 PROCEDURE — 2709999900 HC NON-CHARGEABLE SUPPLY

## 2018-11-05 PROCEDURE — 85025 COMPLETE CBC W/AUTO DIFF WBC: CPT

## 2018-11-05 PROCEDURE — 80053 COMPREHEN METABOLIC PANEL: CPT

## 2018-11-05 PROCEDURE — 93010 ELECTROCARDIOGRAM REPORT: CPT | Performed by: NUCLEAR MEDICINE

## 2018-11-05 PROCEDURE — 2500000003 HC RX 250 WO HCPCS: Performed by: EMERGENCY MEDICINE

## 2018-11-05 PROCEDURE — 71046 X-RAY EXAM CHEST 2 VIEWS: CPT

## 2018-11-05 PROCEDURE — 84484 ASSAY OF TROPONIN QUANT: CPT

## 2018-11-05 PROCEDURE — 6360000002 HC RX W HCPCS: Performed by: INTERNAL MEDICINE

## 2018-11-05 PROCEDURE — 82248 BILIRUBIN DIRECT: CPT

## 2018-11-05 PROCEDURE — 99285 EMERGENCY DEPT VISIT HI MDM: CPT

## 2018-11-05 PROCEDURE — 96365 THER/PROPH/DIAG IV INF INIT: CPT

## 2018-11-05 PROCEDURE — 83735 ASSAY OF MAGNESIUM: CPT

## 2018-11-05 PROCEDURE — 36415 COLL VENOUS BLD VENIPUNCTURE: CPT

## 2018-11-05 PROCEDURE — 93005 ELECTROCARDIOGRAM TRACING: CPT | Performed by: EMERGENCY MEDICINE

## 2018-11-05 PROCEDURE — 83605 ASSAY OF LACTIC ACID: CPT

## 2018-11-05 PROCEDURE — 2580000003 HC RX 258: Performed by: INTERNAL MEDICINE

## 2018-11-05 PROCEDURE — 84146 ASSAY OF PROLACTIN: CPT

## 2018-11-05 PROCEDURE — 96374 THER/PROPH/DIAG INJ IV PUSH: CPT

## 2018-11-05 PROCEDURE — 83880 ASSAY OF NATRIURETIC PEPTIDE: CPT

## 2018-11-05 RX ORDER — BUMETANIDE 0.25 MG/ML
1 INJECTION, SOLUTION INTRAMUSCULAR; INTRAVENOUS ONCE
Status: COMPLETED | OUTPATIENT
Start: 2018-11-05 | End: 2018-11-05

## 2018-11-05 RX ORDER — SODIUM CHLORIDE 0.9 % (FLUSH) 0.9 %
10 SYRINGE (ML) INJECTION PRN
Status: DISCONTINUED | OUTPATIENT
Start: 2018-11-05 | End: 2018-11-06 | Stop reason: HOSPADM

## 2018-11-05 RX ORDER — SODIUM CHLORIDE 9 MG/ML
INJECTION, SOLUTION INTRAVENOUS CONTINUOUS
Status: DISCONTINUED | OUTPATIENT
Start: 2018-11-05 | End: 2018-11-05 | Stop reason: HOSPADM

## 2018-11-05 RX ORDER — BUMETANIDE 0.5 MG/1
0.5 TABLET ORAL 2 TIMES DAILY
Qty: 60 TABLET | Refills: 0 | Status: ON HOLD | OUTPATIENT
Start: 2018-11-05 | End: 2018-12-06 | Stop reason: HOSPADM

## 2018-11-05 RX ORDER — SODIUM CHLORIDE 0.9 % (FLUSH) 0.9 %
10 SYRINGE (ML) INJECTION PRN
Status: CANCELLED | OUTPATIENT
Start: 2018-11-05

## 2018-11-05 RX ADMIN — Medication 10 ML: at 12:22

## 2018-11-05 RX ADMIN — CEFTRIAXONE SODIUM 2 G: 2 INJECTION, POWDER, FOR SOLUTION INTRAMUSCULAR; INTRAVENOUS at 12:23

## 2018-11-05 RX ADMIN — BUMETANIDE 1 MG: 0.25 INJECTION INTRAMUSCULAR; INTRAVENOUS at 18:51

## 2018-11-05 RX ADMIN — Medication 10 ML: at 12:55

## 2018-11-05 RX ADMIN — SODIUM CHLORIDE: 9 INJECTION, SOLUTION INTRAVENOUS at 17:18

## 2018-11-05 ASSESSMENT — ENCOUNTER SYMPTOMS
RHINORRHEA: 0
SHORTNESS OF BREATH: 1
VOMITING: 0
CHEST TIGHTNESS: 1
ABDOMINAL PAIN: 0
COUGH: 0
SORE THROAT: 0
NAUSEA: 0
WHEEZING: 0
DIARRHEA: 0
EYE DISCHARGE: 0
EYE REDNESS: 0
BACK PAIN: 0

## 2018-11-05 NOTE — PROGRESS NOTES
1214 pt arrives via wheelchair with son for IV rocephin infusion. Infusion explained and questions answered. PT RIGHTS AND RESPONSIBILITIES OFFERED TO PT.  1254 infusion complete. Pt tolerated it well with no complaints. Vitals stable.  Pt discharged via wheelchair with instructions with no complaints.           m____ Safety:       (Environmental)   [place] to environment   Ensure ID band is correct and in place/ allergy band as needed   Assess for fall risk   Initiate fall precautions as applicable (fall band, side rails, etc.)   Call light within reach   Bed in low position/ wheels locked    _m___ Pain:        Assess pain level and characteristics   Administer analgesics as ordered   Assess effectiveness of pain management and report to MD as needed    _m___ Knowledge Deficit:   Assess baseline knowledge   Provide teaching at level of understanding   Provide teaching via preferred learning method   Evaluate teaching effectiveness    _m___ Hemodynamic/Respiratory Status:       (Pre and Post Procedure Monitoring)   Assess/Monitor vital signs and LOC   Assess Baseline SpO2 prior to any sedation   Obtain weight/height   Assess vital signs/ LOC until patient meets discharge criteria   Monitor procedure site and notify MD of any issues    _m___ Infection-Risk of Central Venous Catheter:   Monitor for infection signs and symptoms (catheter site redness, temperature elevation, etc)   Assess for infection risks   Educate regarding infection prevention   Manage central venous catheter (flushes/ dressing changes per protocol)

## 2018-11-06 ENCOUNTER — HOSPITAL ENCOUNTER (INPATIENT)
Age: 73
LOS: 3 days | Discharge: HOME | DRG: 291 | End: 2018-11-09
Attending: EMERGENCY MEDICINE | Admitting: HOSPITALIST
Payer: MEDICARE

## 2018-11-06 ENCOUNTER — APPOINTMENT (OUTPATIENT)
Dept: GENERAL RADIOLOGY | Age: 73
DRG: 291 | End: 2018-11-06
Payer: MEDICARE

## 2018-11-06 DIAGNOSIS — I50.23 HEART FAILURE, SYSTOLIC, WITH ACUTE DECOMPENSATION (HCC): Primary | ICD-10-CM

## 2018-11-06 PROBLEM — I50.9 CHF EXACERBATION (HCC): Status: ACTIVE | Noted: 2018-11-06

## 2018-11-06 PROBLEM — E44.0 MODERATE MALNUTRITION (HCC): Status: ACTIVE | Noted: 2018-11-06

## 2018-11-06 LAB
ANION GAP SERPL CALCULATED.3IONS-SCNC: 16 MEQ/L (ref 8–16)
BASOPHILS # BLD: 1 %
BASOPHILS ABSOLUTE: 0.1 THOU/MM3 (ref 0–0.1)
BUN BLDV-MCNC: 32 MG/DL (ref 7–22)
CALCIUM SERPL-MCNC: 8.8 MG/DL (ref 8.5–10.5)
CHLORIDE BLD-SCNC: 101 MEQ/L (ref 98–111)
CO2: 23 MEQ/L (ref 23–33)
CREAT SERPL-MCNC: 1.5 MG/DL (ref 0.4–1.2)
EKG ATRIAL RATE: 88 BPM
EKG P AXIS: 65 DEGREES
EKG P-R INTERVAL: 138 MS
EKG Q-T INTERVAL: 428 MS
EKG QRS DURATION: 158 MS
EKG QTC CALCULATION (BAZETT): 517 MS
EKG R AXIS: 36 DEGREES
EKG T AXIS: -94 DEGREES
EKG VENTRICULAR RATE: 88 BPM
EOSINOPHIL # BLD: 1 %
EOSINOPHILS ABSOLUTE: 0.1 THOU/MM3 (ref 0–0.4)
ERYTHROCYTE [DISTWIDTH] IN BLOOD BY AUTOMATED COUNT: 14.6 % (ref 11.5–14.5)
ERYTHROCYTE [DISTWIDTH] IN BLOOD BY AUTOMATED COUNT: 51.8 FL (ref 35–45)
GFR SERPL CREATININE-BSD FRML MDRD: 46 ML/MIN/1.73M2
GLUCOSE BLD-MCNC: 115 MG/DL (ref 70–108)
GLUCOSE BLD-MCNC: 122 MG/DL (ref 70–108)
GLUCOSE BLD-MCNC: 129 MG/DL (ref 70–108)
HCT VFR BLD CALC: 30.1 % (ref 42–52)
HEMOGLOBIN: 9.7 GM/DL (ref 14–18)
IMMATURE GRANS (ABS): 0.02 THOU/MM3 (ref 0–0.07)
IMMATURE GRANULOCYTES: 0.3 %
LYMPHOCYTES # BLD: 11.4 %
LYMPHOCYTES ABSOLUTE: 0.8 THOU/MM3 (ref 1–4.8)
MCH RBC QN AUTO: 31.4 PG (ref 26–33)
MCHC RBC AUTO-ENTMCNC: 32.2 GM/DL (ref 32.2–35.5)
MCV RBC AUTO: 97.4 FL (ref 80–94)
MONOCYTES # BLD: 7.7 %
MONOCYTES ABSOLUTE: 0.5 THOU/MM3 (ref 0.4–1.3)
NUCLEATED RED BLOOD CELLS: 0 /100 WBC
OSMOLALITY CALCULATION: 288 MOSMOL/KG (ref 275–300)
PLATELET # BLD: 277 THOU/MM3 (ref 130–400)
PMV BLD AUTO: 10 FL (ref 9.4–12.4)
POTASSIUM REFLEX MAGNESIUM: 4.3 MEQ/L (ref 3.5–5.2)
PRO-BNP: 4608 PG/ML (ref 0–900)
RBC # BLD: 3.09 MILL/MM3 (ref 4.7–6.1)
SEG NEUTROPHILS: 78.6 %
SEGMENTED NEUTROPHILS ABSOLUTE COUNT: 5.4 THOU/MM3 (ref 1.8–7.7)
SODIUM BLD-SCNC: 140 MEQ/L (ref 135–145)
TROPONIN T: 0.09 NG/ML
TROPONIN T: 0.11 NG/ML
TROPONIN T: 0.11 NG/ML
WBC # BLD: 6.9 THOU/MM3 (ref 4.8–10.8)

## 2018-11-06 PROCEDURE — 99223 1ST HOSP IP/OBS HIGH 75: CPT | Performed by: HOSPITALIST

## 2018-11-06 PROCEDURE — 6360000002 HC RX W HCPCS: Performed by: HOSPITALIST

## 2018-11-06 PROCEDURE — 93010 ELECTROCARDIOGRAM REPORT: CPT | Performed by: NUCLEAR MEDICINE

## 2018-11-06 PROCEDURE — 36415 COLL VENOUS BLD VENIPUNCTURE: CPT

## 2018-11-06 PROCEDURE — 83880 ASSAY OF NATRIURETIC PEPTIDE: CPT

## 2018-11-06 PROCEDURE — 93005 ELECTROCARDIOGRAM TRACING: CPT | Performed by: EMERGENCY MEDICINE

## 2018-11-06 PROCEDURE — 71045 X-RAY EXAM CHEST 1 VIEW: CPT

## 2018-11-06 PROCEDURE — 99285 EMERGENCY DEPT VISIT HI MDM: CPT

## 2018-11-06 PROCEDURE — 99232 SBSQ HOSP IP/OBS MODERATE 35: CPT | Performed by: FAMILY MEDICINE

## 2018-11-06 PROCEDURE — 99222 1ST HOSP IP/OBS MODERATE 55: CPT | Performed by: INTERNAL MEDICINE

## 2018-11-06 PROCEDURE — 6370000000 HC RX 637 (ALT 250 FOR IP): Performed by: HOSPITALIST

## 2018-11-06 PROCEDURE — 80048 BASIC METABOLIC PNL TOTAL CA: CPT

## 2018-11-06 PROCEDURE — 85025 COMPLETE CBC W/AUTO DIFF WBC: CPT

## 2018-11-06 PROCEDURE — 84484 ASSAY OF TROPONIN QUANT: CPT

## 2018-11-06 PROCEDURE — 82948 REAGENT STRIP/BLOOD GLUCOSE: CPT

## 2018-11-06 PROCEDURE — 2580000003 HC RX 258: Performed by: HOSPITALIST

## 2018-11-06 PROCEDURE — 1200000003 HC TELEMETRY R&B

## 2018-11-06 PROCEDURE — 99223 1ST HOSP IP/OBS HIGH 75: CPT | Performed by: INTERNAL MEDICINE

## 2018-11-06 RX ORDER — LEVOTHYROXINE SODIUM 0.05 MG/1
50 TABLET ORAL DAILY
Status: DISCONTINUED | OUTPATIENT
Start: 2018-11-06 | End: 2018-11-09 | Stop reason: HOSPADM

## 2018-11-06 RX ORDER — BUMETANIDE 0.25 MG/ML
0.5 INJECTION, SOLUTION INTRAMUSCULAR; INTRAVENOUS 2 TIMES DAILY
Status: DISCONTINUED | OUTPATIENT
Start: 2018-11-06 | End: 2018-11-06 | Stop reason: CLARIF

## 2018-11-06 RX ORDER — ASPIRIN 81 MG/1
324 TABLET, CHEWABLE ORAL ONCE
Status: DISCONTINUED | OUTPATIENT
Start: 2018-11-06 | End: 2018-11-06 | Stop reason: ALTCHOICE

## 2018-11-06 RX ORDER — ONDANSETRON 2 MG/ML
4 INJECTION INTRAMUSCULAR; INTRAVENOUS EVERY 6 HOURS PRN
Status: DISCONTINUED | OUTPATIENT
Start: 2018-11-06 | End: 2018-11-09 | Stop reason: HOSPADM

## 2018-11-06 RX ORDER — LISINOPRIL 10 MG/1
10 TABLET ORAL DAILY
Status: DISCONTINUED | OUTPATIENT
Start: 2018-11-06 | End: 2018-11-06

## 2018-11-06 RX ORDER — POTASSIUM CHLORIDE 7.45 MG/ML
10 INJECTION INTRAVENOUS PRN
Status: DISCONTINUED | OUTPATIENT
Start: 2018-11-06 | End: 2018-11-09 | Stop reason: HOSPADM

## 2018-11-06 RX ORDER — SODIUM CHLORIDE 0.9 % (FLUSH) 0.9 %
10 SYRINGE (ML) INJECTION EVERY 12 HOURS SCHEDULED
Status: DISCONTINUED | OUTPATIENT
Start: 2018-11-06 | End: 2018-11-09 | Stop reason: HOSPADM

## 2018-11-06 RX ORDER — POTASSIUM CHLORIDE 20MEQ/15ML
40 LIQUID (ML) ORAL PRN
Status: DISCONTINUED | OUTPATIENT
Start: 2018-11-06 | End: 2018-11-09 | Stop reason: HOSPADM

## 2018-11-06 RX ORDER — ATORVASTATIN CALCIUM 40 MG/1
40 TABLET, FILM COATED ORAL DAILY
Status: DISCONTINUED | OUTPATIENT
Start: 2018-11-06 | End: 2018-11-09 | Stop reason: HOSPADM

## 2018-11-06 RX ORDER — FUROSEMIDE 10 MG/ML
20 INJECTION INTRAMUSCULAR; INTRAVENOUS 2 TIMES DAILY
Status: DISCONTINUED | OUTPATIENT
Start: 2018-11-06 | End: 2018-11-09 | Stop reason: HOSPADM

## 2018-11-06 RX ORDER — NITROGLYCERIN 0.4 MG/1
0.4 TABLET SUBLINGUAL EVERY 5 MIN PRN
Status: DISCONTINUED | OUTPATIENT
Start: 2018-11-06 | End: 2018-11-09 | Stop reason: HOSPADM

## 2018-11-06 RX ORDER — SODIUM CHLORIDE 0.9 % (FLUSH) 0.9 %
10 SYRINGE (ML) INJECTION PRN
Status: DISCONTINUED | OUTPATIENT
Start: 2018-11-06 | End: 2018-11-09 | Stop reason: HOSPADM

## 2018-11-06 RX ORDER — HEPARIN SODIUM 5000 [USP'U]/ML
5000 INJECTION, SOLUTION INTRAVENOUS; SUBCUTANEOUS EVERY 8 HOURS
Status: DISCONTINUED | OUTPATIENT
Start: 2018-11-06 | End: 2018-11-09 | Stop reason: HOSPADM

## 2018-11-06 RX ORDER — POTASSIUM CHLORIDE 20 MEQ/1
40 TABLET, EXTENDED RELEASE ORAL PRN
Status: DISCONTINUED | OUTPATIENT
Start: 2018-11-06 | End: 2018-11-09 | Stop reason: HOSPADM

## 2018-11-06 RX ORDER — NIFEDIPINE 60 MG/1
60 TABLET, FILM COATED, EXTENDED RELEASE ORAL DAILY
Status: ON HOLD | COMMUNITY
End: 2018-11-09 | Stop reason: HOSPADM

## 2018-11-06 RX ORDER — BUMETANIDE 0.5 MG/1
0.5 TABLET ORAL 2 TIMES DAILY
Status: DISCONTINUED | OUTPATIENT
Start: 2018-11-06 | End: 2018-11-06

## 2018-11-06 RX ORDER — ASPIRIN 81 MG/1
81 TABLET, CHEWABLE ORAL DAILY
Status: DISCONTINUED | OUTPATIENT
Start: 2018-11-06 | End: 2018-11-09 | Stop reason: HOSPADM

## 2018-11-06 RX ADMIN — Medication 10 ML: at 21:32

## 2018-11-06 RX ADMIN — HEPARIN SODIUM 5000 UNITS: 5000 INJECTION INTRAVENOUS; SUBCUTANEOUS at 09:21

## 2018-11-06 RX ADMIN — METOPROLOL TARTRATE 25 MG: 25 TABLET ORAL at 11:31

## 2018-11-06 RX ADMIN — ATORVASTATIN CALCIUM 40 MG: 40 TABLET, FILM COATED ORAL at 20:34

## 2018-11-06 RX ADMIN — CEFTRIAXONE SODIUM 1 G: 1 INJECTION, POWDER, FOR SOLUTION INTRAMUSCULAR; INTRAVENOUS at 12:40

## 2018-11-06 RX ADMIN — METOPROLOL TARTRATE 25 MG: 25 TABLET ORAL at 20:34

## 2018-11-06 RX ADMIN — ASPIRIN 81 MG 81 MG: 81 TABLET ORAL at 09:21

## 2018-11-06 RX ADMIN — Medication 10 ML: at 12:41

## 2018-11-06 RX ADMIN — HEPARIN SODIUM 5000 UNITS: 5000 INJECTION INTRAVENOUS; SUBCUTANEOUS at 17:22

## 2018-11-06 RX ADMIN — BUMETANIDE 0.5 MG: 0.5 TABLET ORAL at 09:20

## 2018-11-06 RX ADMIN — HEPARIN SODIUM 5000 UNITS: 5000 INJECTION INTRAVENOUS; SUBCUTANEOUS at 21:29

## 2018-11-06 RX ADMIN — BUMETANIDE 0.5 MG: 0.5 TABLET ORAL at 17:30

## 2018-11-06 RX ADMIN — LEVOTHYROXINE SODIUM 50 MCG: 50 TABLET ORAL at 09:21

## 2018-11-06 ASSESSMENT — PAIN DESCRIPTION - ORIENTATION: ORIENTATION: LEFT;UPPER

## 2018-11-06 ASSESSMENT — ENCOUNTER SYMPTOMS
EYE REDNESS: 0
EYE DISCHARGE: 0
NAUSEA: 0
RHINORRHEA: 0
DIARRHEA: 0
COUGH: 0
SHORTNESS OF BREATH: 1
VOMITING: 0
EYES NEGATIVE: 1
COUGH: 1
SORE THROAT: 0
BACK PAIN: 0
WHEEZING: 0
CHEST TIGHTNESS: 1
ABDOMINAL PAIN: 0

## 2018-11-06 ASSESSMENT — PAIN SCALES - GENERAL
PAINLEVEL_OUTOF10: 0
PAINLEVEL_OUTOF10: 4

## 2018-11-06 ASSESSMENT — PAIN DESCRIPTION - PAIN TYPE: TYPE: ACUTE PAIN

## 2018-11-06 ASSESSMENT — PAIN DESCRIPTION - LOCATION: LOCATION: CHEST

## 2018-11-07 ENCOUNTER — PREP FOR PROCEDURE (OUTPATIENT)
Dept: CARDIOTHORACIC SURGERY | Age: 73
End: 2018-11-07

## 2018-11-07 DIAGNOSIS — Z01.818 PREOP EXAMINATION: Primary | ICD-10-CM

## 2018-11-07 PROBLEM — I25.10 MULTI-VESSEL CORONARY ARTERY STENOSIS: Status: ACTIVE | Noted: 2018-11-07

## 2018-11-07 PROBLEM — I50.23 ACUTE ON CHRONIC SYSTOLIC CHF (CONGESTIVE HEART FAILURE), NYHA CLASS 3 (HCC): Status: ACTIVE | Noted: 2018-11-07

## 2018-11-07 LAB
ALBUMIN SERPL-MCNC: 3.5 G/DL (ref 3.5–5.1)
ALP BLD-CCNC: 87 U/L (ref 38–126)
ALT SERPL-CCNC: 17 U/L (ref 11–66)
ANION GAP SERPL CALCULATED.3IONS-SCNC: 13 MEQ/L (ref 8–16)
AST SERPL-CCNC: 18 U/L (ref 5–40)
BILIRUB SERPL-MCNC: 0.6 MG/DL (ref 0.3–1.2)
BUN BLDV-MCNC: 32 MG/DL (ref 7–22)
CALCIUM SERPL-MCNC: 8.8 MG/DL (ref 8.5–10.5)
CHLORIDE BLD-SCNC: 101 MEQ/L (ref 98–111)
CHLORIDE, URINE: 81 MEQ/L
CO2: 28 MEQ/L (ref 23–33)
CREAT SERPL-MCNC: 1.4 MG/DL (ref 0.4–1.2)
EKG ATRIAL RATE: 89 BPM
EKG P AXIS: 53 DEGREES
EKG P-R INTERVAL: 130 MS
EKG Q-T INTERVAL: 430 MS
EKG QRS DURATION: 156 MS
EKG QTC CALCULATION (BAZETT): 523 MS
EKG R AXIS: 2 DEGREES
EKG T AXIS: -96 DEGREES
EKG VENTRICULAR RATE: 89 BPM
ERYTHROCYTE [DISTWIDTH] IN BLOOD BY AUTOMATED COUNT: 14.7 % (ref 11.5–14.5)
ERYTHROCYTE [DISTWIDTH] IN BLOOD BY AUTOMATED COUNT: 52.8 FL (ref 35–45)
GFR SERPL CREATININE-BSD FRML MDRD: 50 ML/MIN/1.73M2
GLUCOSE BLD-MCNC: 100 MG/DL (ref 70–108)
GLUCOSE BLD-MCNC: 102 MG/DL (ref 70–108)
GLUCOSE BLD-MCNC: 107 MG/DL (ref 70–108)
GLUCOSE BLD-MCNC: 109 MG/DL (ref 70–108)
HCT VFR BLD CALC: 29.1 % (ref 42–52)
HEMOGLOBIN: 9.2 GM/DL (ref 14–18)
MCH RBC QN AUTO: 31.2 PG (ref 26–33)
MCHC RBC AUTO-ENTMCNC: 31.6 GM/DL (ref 32.2–35.5)
MCV RBC AUTO: 98.6 FL (ref 80–94)
PLATELET # BLD: 214 THOU/MM3 (ref 130–400)
PMV BLD AUTO: 10 FL (ref 9.4–12.4)
POTASSIUM REFLEX MAGNESIUM: 4.2 MEQ/L (ref 3.5–5.2)
POTASSIUM, URINE: 44.3 MEQ/L
RBC # BLD: 2.95 MILL/MM3 (ref 4.7–6.1)
SODIUM BLD-SCNC: 142 MEQ/L (ref 135–145)
SODIUM URINE: 73 MEQ/L
TOTAL PROTEIN: 7.4 G/DL (ref 6.1–8)
WBC # BLD: 5.2 THOU/MM3 (ref 4.8–10.8)

## 2018-11-07 PROCEDURE — 84133 ASSAY OF URINE POTASSIUM: CPT

## 2018-11-07 PROCEDURE — 36415 COLL VENOUS BLD VENIPUNCTURE: CPT

## 2018-11-07 PROCEDURE — 6360000002 HC RX W HCPCS: Performed by: HOSPITALIST

## 2018-11-07 PROCEDURE — 85027 COMPLETE CBC AUTOMATED: CPT

## 2018-11-07 PROCEDURE — 80053 COMPREHEN METABOLIC PANEL: CPT

## 2018-11-07 PROCEDURE — 99232 SBSQ HOSP IP/OBS MODERATE 35: CPT | Performed by: NURSE PRACTITIONER

## 2018-11-07 PROCEDURE — 84300 ASSAY OF URINE SODIUM: CPT

## 2018-11-07 PROCEDURE — 36592 COLLECT BLOOD FROM PICC: CPT

## 2018-11-07 PROCEDURE — 6370000000 HC RX 637 (ALT 250 FOR IP): Performed by: HOSPITALIST

## 2018-11-07 PROCEDURE — 6370000000 HC RX 637 (ALT 250 FOR IP): Performed by: NURSE PRACTITIONER

## 2018-11-07 PROCEDURE — 6360000002 HC RX W HCPCS: Performed by: INTERNAL MEDICINE

## 2018-11-07 PROCEDURE — 82436 ASSAY OF URINE CHLORIDE: CPT

## 2018-11-07 PROCEDURE — 6360000002 HC RX W HCPCS: Performed by: NURSE PRACTITIONER

## 2018-11-07 PROCEDURE — 2580000003 HC RX 258: Performed by: HOSPITALIST

## 2018-11-07 PROCEDURE — 83036 HEMOGLOBIN GLYCOSYLATED A1C: CPT

## 2018-11-07 PROCEDURE — 93010 ELECTROCARDIOGRAM REPORT: CPT | Performed by: NUCLEAR MEDICINE

## 2018-11-07 PROCEDURE — 94760 N-INVAS EAR/PLS OXIMETRY 1: CPT

## 2018-11-07 PROCEDURE — 82948 REAGENT STRIP/BLOOD GLUCOSE: CPT

## 2018-11-07 PROCEDURE — 93005 ELECTROCARDIOGRAM TRACING: CPT | Performed by: INTERNAL MEDICINE

## 2018-11-07 PROCEDURE — 1200000003 HC TELEMETRY R&B

## 2018-11-07 PROCEDURE — 99232 SBSQ HOSP IP/OBS MODERATE 35: CPT | Performed by: INTERNAL MEDICINE

## 2018-11-07 PROCEDURE — 99233 SBSQ HOSP IP/OBS HIGH 50: CPT | Performed by: INTERNAL MEDICINE

## 2018-11-07 RX ORDER — DOBUTAMINE HYDROCHLORIDE 200 MG/100ML
2.5 INJECTION INTRAVENOUS CONTINUOUS
Status: DISCONTINUED | OUTPATIENT
Start: 2018-11-07 | End: 2018-11-08

## 2018-11-07 RX ORDER — SODIUM CHLORIDE 0.9 % (FLUSH) 0.9 %
10 SYRINGE (ML) INJECTION EVERY 12 HOURS SCHEDULED
Status: CANCELLED | OUTPATIENT
Start: 2018-11-07

## 2018-11-07 RX ORDER — CHLORHEXIDINE GLUCONATE 0.12 MG/ML
15 RINSE ORAL ONCE
Status: CANCELLED | OUTPATIENT
Start: 2018-11-07 | End: 2018-11-07

## 2018-11-07 RX ORDER — CHLORHEXIDINE GLUCONATE 4 G/100ML
SOLUTION TOPICAL ONCE
Status: CANCELLED | OUTPATIENT
Start: 2018-11-07 | End: 2018-11-07

## 2018-11-07 RX ORDER — FUROSEMIDE 10 MG/ML
20 INJECTION INTRAMUSCULAR; INTRAVENOUS ONCE
Status: COMPLETED | OUTPATIENT
Start: 2018-11-07 | End: 2018-11-07

## 2018-11-07 RX ORDER — GABAPENTIN 400 MG/1
800 CAPSULE ORAL 3 TIMES DAILY PRN
Status: DISCONTINUED | OUTPATIENT
Start: 2018-11-07 | End: 2018-11-09 | Stop reason: HOSPADM

## 2018-11-07 RX ORDER — METOPROLOL SUCCINATE 25 MG/1
25 TABLET, EXTENDED RELEASE ORAL DAILY
Status: DISCONTINUED | OUTPATIENT
Start: 2018-11-08 | End: 2018-11-09 | Stop reason: HOSPADM

## 2018-11-07 RX ORDER — AMIODARONE HYDROCHLORIDE 200 MG/1
200 TABLET ORAL ONCE
Status: CANCELLED | OUTPATIENT
Start: 2018-11-07 | End: 2018-11-07

## 2018-11-07 RX ORDER — SODIUM CHLORIDE 0.9 % (FLUSH) 0.9 %
10 SYRINGE (ML) INJECTION PRN
Status: CANCELLED | OUTPATIENT
Start: 2018-11-07

## 2018-11-07 RX ADMIN — METOPROLOL TARTRATE 25 MG: 25 TABLET ORAL at 21:59

## 2018-11-07 RX ADMIN — ATORVASTATIN CALCIUM 40 MG: 40 TABLET, FILM COATED ORAL at 21:59

## 2018-11-07 RX ADMIN — HEPARIN SODIUM 5000 UNITS: 5000 INJECTION INTRAVENOUS; SUBCUTANEOUS at 13:24

## 2018-11-07 RX ADMIN — FUROSEMIDE 20 MG: 10 INJECTION, SOLUTION INTRAMUSCULAR; INTRAVENOUS at 20:06

## 2018-11-07 RX ADMIN — ASPIRIN 81 MG 81 MG: 81 TABLET ORAL at 09:30

## 2018-11-07 RX ADMIN — HEPARIN SODIUM 5000 UNITS: 5000 INJECTION INTRAVENOUS; SUBCUTANEOUS at 21:58

## 2018-11-07 RX ADMIN — FUROSEMIDE 20 MG: 10 INJECTION, SOLUTION INTRAMUSCULAR; INTRAVENOUS at 09:30

## 2018-11-07 RX ADMIN — DOBUTAMINE HYDROCHLORIDE 2.5 MCG/KG/MIN: 200 INJECTION INTRAVENOUS at 13:18

## 2018-11-07 RX ADMIN — METOPROLOL TARTRATE 25 MG: 25 TABLET ORAL at 09:30

## 2018-11-07 RX ADMIN — Medication 10 ML: at 20:20

## 2018-11-07 RX ADMIN — FUROSEMIDE 20 MG: 10 INJECTION, SOLUTION INTRAMUSCULAR; INTRAVENOUS at 20:07

## 2018-11-07 RX ADMIN — LEVOTHYROXINE SODIUM 50 MCG: 50 TABLET ORAL at 04:55

## 2018-11-07 RX ADMIN — Medication 10 ML: at 09:00

## 2018-11-07 RX ADMIN — Medication 10 ML: at 22:02

## 2018-11-07 RX ADMIN — CEFTRIAXONE SODIUM 1 G: 1 INJECTION, POWDER, FOR SOLUTION INTRAMUSCULAR; INTRAVENOUS at 12:36

## 2018-11-07 RX ADMIN — FUROSEMIDE 20 MG: 10 INJECTION, SOLUTION INTRAMUSCULAR; INTRAVENOUS at 00:16

## 2018-11-07 RX ADMIN — Medication 10 ML: at 20:21

## 2018-11-07 RX ADMIN — HEPARIN SODIUM 5000 UNITS: 5000 INJECTION INTRAVENOUS; SUBCUTANEOUS at 04:54

## 2018-11-07 ASSESSMENT — PAIN DESCRIPTION - ORIENTATION
ORIENTATION: LEFT;RIGHT
ORIENTATION: RIGHT;LEFT

## 2018-11-07 ASSESSMENT — PAIN DESCRIPTION - LOCATION
LOCATION: FOOT
LOCATION: FOOT

## 2018-11-07 ASSESSMENT — PAIN SCALES - GENERAL
PAINLEVEL_OUTOF10: 2
PAINLEVEL_OUTOF10: 0
PAINLEVEL_OUTOF10: 4
PAINLEVEL_OUTOF10: 0

## 2018-11-07 ASSESSMENT — PAIN DESCRIPTION - PAIN TYPE: TYPE: NEUROPATHIC PAIN;CHRONIC PAIN

## 2018-11-08 ENCOUNTER — HOSPITAL ENCOUNTER (OUTPATIENT)
Dept: NURSING | Age: 73
End: 2018-11-08
Payer: MEDICARE

## 2018-11-08 ENCOUNTER — APPOINTMENT (OUTPATIENT)
Dept: GENERAL RADIOLOGY | Age: 73
DRG: 291 | End: 2018-11-08
Payer: MEDICARE

## 2018-11-08 LAB
ANION GAP SERPL CALCULATED.3IONS-SCNC: 15 MEQ/L (ref 8–16)
AVERAGE GLUCOSE: 120 MG/DL (ref 70–126)
BUN BLDV-MCNC: 31 MG/DL (ref 7–22)
CALCIUM SERPL-MCNC: 8.9 MG/DL (ref 8.5–10.5)
CHLORIDE BLD-SCNC: 99 MEQ/L (ref 98–111)
CO2: 27 MEQ/L (ref 23–33)
CREAT SERPL-MCNC: 1.4 MG/DL (ref 0.4–1.2)
FERRITIN: 406 NG/ML (ref 22–322)
FOLATE: 8.5 NG/ML (ref 4.8–24.2)
GFR SERPL CREATININE-BSD FRML MDRD: 50 ML/MIN/1.73M2
GLUCOSE BLD-MCNC: 106 MG/DL (ref 70–108)
GLUCOSE BLD-MCNC: 111 MG/DL (ref 70–108)
GLUCOSE BLD-MCNC: 111 MG/DL (ref 70–108)
GLUCOSE BLD-MCNC: 112 MG/DL (ref 70–108)
GLUCOSE BLD-MCNC: 132 MG/DL (ref 70–108)
HBA1C MFR BLD: 6 % (ref 4.4–6.4)
IRON: 49 UG/DL (ref 65–195)
POTASSIUM SERPL-SCNC: 3.8 MEQ/L (ref 3.5–5.2)
SODIUM BLD-SCNC: 141 MEQ/L (ref 135–145)
VITAMIN B-12: 418 PG/ML (ref 211–911)

## 2018-11-08 PROCEDURE — 6370000000 HC RX 637 (ALT 250 FOR IP): Performed by: INTERNAL MEDICINE

## 2018-11-08 PROCEDURE — 99232 SBSQ HOSP IP/OBS MODERATE 35: CPT | Performed by: INTERNAL MEDICINE

## 2018-11-08 PROCEDURE — 36415 COLL VENOUS BLD VENIPUNCTURE: CPT

## 2018-11-08 PROCEDURE — 6360000002 HC RX W HCPCS: Performed by: INTERNAL MEDICINE

## 2018-11-08 PROCEDURE — 6360000002 HC RX W HCPCS: Performed by: HOSPITALIST

## 2018-11-08 PROCEDURE — 2700000000 HC OXYGEN THERAPY PER DAY

## 2018-11-08 PROCEDURE — 83921 ORGANIC ACID SINGLE QUANT: CPT

## 2018-11-08 PROCEDURE — 36592 COLLECT BLOOD FROM PICC: CPT

## 2018-11-08 PROCEDURE — 6370000000 HC RX 637 (ALT 250 FOR IP): Performed by: NURSE PRACTITIONER

## 2018-11-08 PROCEDURE — 94761 N-INVAS EAR/PLS OXIMETRY MLT: CPT

## 2018-11-08 PROCEDURE — 6370000000 HC RX 637 (ALT 250 FOR IP): Performed by: HOSPITALIST

## 2018-11-08 PROCEDURE — 99232 SBSQ HOSP IP/OBS MODERATE 35: CPT | Performed by: FAMILY MEDICINE

## 2018-11-08 PROCEDURE — 1200000003 HC TELEMETRY R&B

## 2018-11-08 PROCEDURE — 93005 ELECTROCARDIOGRAM TRACING: CPT | Performed by: INTERNAL MEDICINE

## 2018-11-08 PROCEDURE — 71045 X-RAY EXAM CHEST 1 VIEW: CPT

## 2018-11-08 PROCEDURE — 2580000003 HC RX 258: Performed by: HOSPITALIST

## 2018-11-08 PROCEDURE — 82607 VITAMIN B-12: CPT

## 2018-11-08 PROCEDURE — 94760 N-INVAS EAR/PLS OXIMETRY 1: CPT

## 2018-11-08 PROCEDURE — 82948 REAGENT STRIP/BLOOD GLUCOSE: CPT

## 2018-11-08 PROCEDURE — 84466 ASSAY OF TRANSFERRIN: CPT

## 2018-11-08 PROCEDURE — 82728 ASSAY OF FERRITIN: CPT

## 2018-11-08 PROCEDURE — 80048 BASIC METABOLIC PNL TOTAL CA: CPT

## 2018-11-08 PROCEDURE — 82746 ASSAY OF FOLIC ACID SERUM: CPT

## 2018-11-08 PROCEDURE — 83540 ASSAY OF IRON: CPT

## 2018-11-08 RX ORDER — IPRATROPIUM BROMIDE AND ALBUTEROL SULFATE 2.5; .5 MG/3ML; MG/3ML
1 SOLUTION RESPIRATORY (INHALATION)
Status: DISCONTINUED | OUTPATIENT
Start: 2018-11-09 | End: 2018-11-09 | Stop reason: HOSPADM

## 2018-11-08 RX ORDER — MIDODRINE HYDROCHLORIDE 5 MG/1
5 TABLET ORAL
Status: DISCONTINUED | OUTPATIENT
Start: 2018-11-08 | End: 2018-11-09

## 2018-11-08 RX ADMIN — HEPARIN SODIUM 5000 UNITS: 5000 INJECTION INTRAVENOUS; SUBCUTANEOUS at 05:35

## 2018-11-08 RX ADMIN — FUROSEMIDE 20 MG: 10 INJECTION, SOLUTION INTRAMUSCULAR; INTRAVENOUS at 08:34

## 2018-11-08 RX ADMIN — Medication 10 ML: at 20:53

## 2018-11-08 RX ADMIN — GABAPENTIN 800 MG: 400 CAPSULE ORAL at 05:35

## 2018-11-08 RX ADMIN — ASPIRIN 81 MG 81 MG: 81 TABLET ORAL at 08:34

## 2018-11-08 RX ADMIN — MIDODRINE HYDROCHLORIDE 5 MG: 5 TABLET ORAL at 17:56

## 2018-11-08 RX ADMIN — HEPARIN SODIUM 5000 UNITS: 5000 INJECTION INTRAVENOUS; SUBCUTANEOUS at 14:56

## 2018-11-08 RX ADMIN — METOPROLOL SUCCINATE 25 MG: 25 TABLET, FILM COATED, EXTENDED RELEASE ORAL at 08:33

## 2018-11-08 RX ADMIN — CEFTRIAXONE SODIUM 1 G: 1 INJECTION, POWDER, FOR SOLUTION INTRAMUSCULAR; INTRAVENOUS at 12:21

## 2018-11-08 RX ADMIN — NITROGLYCERIN 0.4 MG: 0.4 TABLET, ORALLY DISINTEGRATING SUBLINGUAL at 20:53

## 2018-11-08 RX ADMIN — ATORVASTATIN CALCIUM 40 MG: 40 TABLET, FILM COATED ORAL at 20:53

## 2018-11-08 RX ADMIN — LEVOTHYROXINE SODIUM 50 MCG: 50 TABLET ORAL at 05:35

## 2018-11-08 RX ADMIN — MIDODRINE HYDROCHLORIDE 5 MG: 5 TABLET ORAL at 12:21

## 2018-11-08 RX ADMIN — FUROSEMIDE 20 MG: 10 INJECTION, SOLUTION INTRAMUSCULAR; INTRAVENOUS at 17:56

## 2018-11-08 ASSESSMENT — PAIN DESCRIPTION - PAIN TYPE: TYPE: NEUROPATHIC PAIN;CHRONIC PAIN

## 2018-11-08 ASSESSMENT — PAIN DESCRIPTION - DESCRIPTORS: DESCRIPTORS: PRESSURE

## 2018-11-08 ASSESSMENT — PAIN SCALES - GENERAL
PAINLEVEL_OUTOF10: 0
PAINLEVEL_OUTOF10: 4
PAINLEVEL_OUTOF10: 6

## 2018-11-08 ASSESSMENT — PAIN DESCRIPTION - LOCATION
LOCATION: CHEST
LOCATION: FOOT

## 2018-11-08 ASSESSMENT — PAIN DESCRIPTION - ORIENTATION
ORIENTATION: LEFT;RIGHT
ORIENTATION: LEFT

## 2018-11-09 ENCOUNTER — APPOINTMENT (OUTPATIENT)
Dept: GENERAL RADIOLOGY | Age: 73
DRG: 219 | End: 2018-11-09
Payer: MEDICARE

## 2018-11-09 ENCOUNTER — HOSPITAL ENCOUNTER (INPATIENT)
Age: 73
LOS: 26 days | Discharge: HOME HEALTH | DRG: 219 | End: 2018-12-06
Attending: EMERGENCY MEDICINE | Admitting: THORACIC SURGERY (CARDIOTHORACIC VASCULAR SURGERY)
Payer: MEDICARE

## 2018-11-09 ENCOUNTER — APPOINTMENT (OUTPATIENT)
Dept: NUCLEAR MEDICINE | Age: 73
DRG: 291 | End: 2018-11-09
Payer: MEDICARE

## 2018-11-09 ENCOUNTER — APPOINTMENT (OUTPATIENT)
Dept: INTERVENTIONAL RADIOLOGY/VASCULAR | Age: 73
DRG: 291 | End: 2018-11-09
Payer: MEDICARE

## 2018-11-09 ENCOUNTER — HOSPITAL ENCOUNTER (OUTPATIENT)
Dept: NURSING | Age: 73
Discharge: HOME | End: 2018-11-09
Payer: MEDICARE

## 2018-11-09 VITALS
DIASTOLIC BLOOD PRESSURE: 55 MMHG | WEIGHT: 201.5 LBS | BODY MASS INDEX: 27.29 KG/M2 | HEIGHT: 72 IN | SYSTOLIC BLOOD PRESSURE: 106 MMHG | OXYGEN SATURATION: 95 % | HEART RATE: 95 BPM | TEMPERATURE: 98.6 F | RESPIRATION RATE: 18 BRPM

## 2018-11-09 DIAGNOSIS — R07.9 CHEST PAIN, UNSPECIFIED TYPE: ICD-10-CM

## 2018-11-09 DIAGNOSIS — I38 VALVULAR DISEASE: Primary | ICD-10-CM

## 2018-11-09 DIAGNOSIS — N17.9 AKI (ACUTE KIDNEY INJURY) (HCC): ICD-10-CM

## 2018-11-09 DIAGNOSIS — R77.8 ELEVATED TROPONIN LEVEL: ICD-10-CM

## 2018-11-09 LAB
ALBUMIN SERPL-MCNC: 3.4 G/DL (ref 3.5–5.1)
ALBUMIN SERPL-MCNC: 3.6 G/DL (ref 3.5–5.1)
ALP BLD-CCNC: 88 U/L (ref 38–126)
ALT SERPL-CCNC: 20 U/L (ref 11–66)
ANION GAP SERPL CALCULATED.3IONS-SCNC: 13 MEQ/L (ref 8–16)
ANION GAP SERPL CALCULATED.3IONS-SCNC: 16 MEQ/L (ref 8–16)
APTT: 27.9 SECONDS (ref 22–38)
AST SERPL-CCNC: 27 U/L (ref 5–40)
BASOPHILS # BLD: 1.3 %
BASOPHILS ABSOLUTE: 0.1 THOU/MM3 (ref 0–0.1)
BILIRUB SERPL-MCNC: 0.6 MG/DL (ref 0.3–1.2)
BUN BLDV-MCNC: 28 MG/DL (ref 7–22)
BUN BLDV-MCNC: 29 MG/DL (ref 7–22)
CALCIUM SERPL-MCNC: 9.1 MG/DL (ref 8.5–10.5)
CALCIUM SERPL-MCNC: 9.5 MG/DL (ref 8.5–10.5)
CHLORIDE BLD-SCNC: 99 MEQ/L (ref 98–111)
CHLORIDE BLD-SCNC: 99 MEQ/L (ref 98–111)
CO2: 28 MEQ/L (ref 23–33)
CO2: 30 MEQ/L (ref 23–33)
CREAT SERPL-MCNC: 1.3 MG/DL (ref 0.4–1.2)
CREAT SERPL-MCNC: 1.4 MG/DL (ref 0.4–1.2)
D-DIMER QUANTITATIVE: 1588 NG/ML FEU (ref 0–500)
EKG ATRIAL RATE: 103 BPM
EKG ATRIAL RATE: 104 BPM
EKG P AXIS: 45 DEGREES
EKG P AXIS: 72 DEGREES
EKG P-R INTERVAL: 130 MS
EKG P-R INTERVAL: 136 MS
EKG Q-T INTERVAL: 396 MS
EKG Q-T INTERVAL: 404 MS
EKG QRS DURATION: 152 MS
EKG QRS DURATION: 158 MS
EKG QTC CALCULATION (BAZETT): 520 MS
EKG QTC CALCULATION (BAZETT): 529 MS
EKG R AXIS: 16 DEGREES
EKG R AXIS: 8 DEGREES
EKG T AXIS: -60 DEGREES
EKG T AXIS: -89 DEGREES
EKG VENTRICULAR RATE: 103 BPM
EKG VENTRICULAR RATE: 104 BPM
EOSINOPHIL # BLD: 1.4 %
EOSINOPHILS ABSOLUTE: 0.1 THOU/MM3 (ref 0–0.4)
ERYTHROCYTE [DISTWIDTH] IN BLOOD BY AUTOMATED COUNT: 14.6 % (ref 11.5–14.5)
ERYTHROCYTE [DISTWIDTH] IN BLOOD BY AUTOMATED COUNT: 14.7 % (ref 11.5–14.5)
ERYTHROCYTE [DISTWIDTH] IN BLOOD BY AUTOMATED COUNT: 50.5 FL (ref 35–45)
ERYTHROCYTE [DISTWIDTH] IN BLOOD BY AUTOMATED COUNT: 51.7 FL (ref 35–45)
GFR SERPL CREATININE-BSD FRML MDRD: 50 ML/MIN/1.73M2
GFR SERPL CREATININE-BSD FRML MDRD: 54 ML/MIN/1.73M2
GLUCOSE BLD-MCNC: 113 MG/DL (ref 70–108)
GLUCOSE BLD-MCNC: 115 MG/DL (ref 70–108)
GLUCOSE BLD-MCNC: 127 MG/DL (ref 70–108)
GLUCOSE BLD-MCNC: 129 MG/DL (ref 70–108)
GLUCOSE BLD-MCNC: 132 MG/DL (ref 70–108)
HCT VFR BLD CALC: 28.8 % (ref 42–52)
HCT VFR BLD CALC: 30.4 % (ref 42–52)
HEMOGLOBIN: 9.3 GM/DL (ref 14–18)
HEMOGLOBIN: 9.9 GM/DL (ref 14–18)
IMMATURE GRANS (ABS): 0.03 THOU/MM3 (ref 0–0.07)
IMMATURE GRANULOCYTES: 0.4 %
INR BLD: 1.16 (ref 0.85–1.13)
LYMPHOCYTES # BLD: 12.1 %
LYMPHOCYTES ABSOLUTE: 0.8 THOU/MM3 (ref 1–4.8)
MAGNESIUM: 2.2 MG/DL (ref 1.6–2.4)
MCH RBC QN AUTO: 31.1 PG (ref 26–33)
MCH RBC QN AUTO: 31.1 PG (ref 26–33)
MCHC RBC AUTO-ENTMCNC: 32.3 GM/DL (ref 32.2–35.5)
MCHC RBC AUTO-ENTMCNC: 32.6 GM/DL (ref 32.2–35.5)
MCV RBC AUTO: 95.6 FL (ref 80–94)
MCV RBC AUTO: 96.3 FL (ref 80–94)
MONOCYTES # BLD: 8.6 %
MONOCYTES ABSOLUTE: 0.6 THOU/MM3 (ref 0.4–1.3)
NUCLEATED RED BLOOD CELLS: 0 /100 WBC
OSMOLALITY CALCULATION: 292 MOSMOL/KG (ref 275–300)
PHOSPHORUS: 4.3 MG/DL (ref 2.4–4.7)
PLATELET # BLD: 221 THOU/MM3 (ref 130–400)
PLATELET # BLD: 282 THOU/MM3 (ref 130–400)
PMV BLD AUTO: 10.1 FL (ref 9.4–12.4)
PMV BLD AUTO: 10.3 FL (ref 9.4–12.4)
POTASSIUM SERPL-SCNC: 3.9 MEQ/L (ref 3.5–5.2)
POTASSIUM SERPL-SCNC: 4 MEQ/L (ref 3.5–5.2)
PRO-BNP: 3627 PG/ML (ref 0–900)
RBC # BLD: 2.99 MILL/MM3 (ref 4.7–6.1)
RBC # BLD: 3.18 MILL/MM3 (ref 4.7–6.1)
SEG NEUTROPHILS: 76.2 %
SEGMENTED NEUTROPHILS ABSOLUTE COUNT: 5.3 THOU/MM3 (ref 1.8–7.7)
SODIUM BLD-SCNC: 142 MEQ/L (ref 135–145)
SODIUM BLD-SCNC: 143 MEQ/L (ref 135–145)
TOTAL PROTEIN: 7.7 G/DL (ref 6.1–8)
TROPONIN T: 0.09 NG/ML
TROPONIN T: 0.1 NG/ML
WBC # BLD: 5.2 THOU/MM3 (ref 4.8–10.8)
WBC # BLD: 7 THOU/MM3 (ref 4.8–10.8)

## 2018-11-09 PROCEDURE — 2700000000 HC OXYGEN THERAPY PER DAY

## 2018-11-09 PROCEDURE — 6370000000 HC RX 637 (ALT 250 FOR IP): Performed by: HOSPITALIST

## 2018-11-09 PROCEDURE — 99231 SBSQ HOSP IP/OBS SF/LOW 25: CPT | Performed by: PHYSICIAN ASSISTANT

## 2018-11-09 PROCEDURE — 99285 EMERGENCY DEPT VISIT HI MDM: CPT

## 2018-11-09 PROCEDURE — 2709999900 HC NON-CHARGEABLE SUPPLY

## 2018-11-09 PROCEDURE — 6360000002 HC RX W HCPCS: Performed by: EMERGENCY MEDICINE

## 2018-11-09 PROCEDURE — 85730 THROMBOPLASTIN TIME PARTIAL: CPT

## 2018-11-09 PROCEDURE — 96372 THER/PROPH/DIAG INJ SC/IM: CPT

## 2018-11-09 PROCEDURE — 6360000002 HC RX W HCPCS: Performed by: HOSPITALIST

## 2018-11-09 PROCEDURE — 6360000002 HC RX W HCPCS: Performed by: INTERNAL MEDICINE

## 2018-11-09 PROCEDURE — 85379 FIBRIN DEGRADATION QUANT: CPT

## 2018-11-09 PROCEDURE — 93010 ELECTROCARDIOGRAM REPORT: CPT | Performed by: NUCLEAR MEDICINE

## 2018-11-09 PROCEDURE — 83880 ASSAY OF NATRIURETIC PEPTIDE: CPT

## 2018-11-09 PROCEDURE — 93005 ELECTROCARDIOGRAM TRACING: CPT | Performed by: HOSPITALIST

## 2018-11-09 PROCEDURE — 6370000000 HC RX 637 (ALT 250 FOR IP): Performed by: EMERGENCY MEDICINE

## 2018-11-09 PROCEDURE — 93005 ELECTROCARDIOGRAM TRACING: CPT | Performed by: EMERGENCY MEDICINE

## 2018-11-09 PROCEDURE — 85025 COMPLETE CBC W/AUTO DIFF WBC: CPT

## 2018-11-09 PROCEDURE — 80053 COMPREHEN METABOLIC PANEL: CPT

## 2018-11-09 PROCEDURE — 82948 REAGENT STRIP/BLOOD GLUCOSE: CPT

## 2018-11-09 PROCEDURE — 6370000000 HC RX 637 (ALT 250 FOR IP): Performed by: INTERNAL MEDICINE

## 2018-11-09 PROCEDURE — A9540 TC99M MAA: HCPCS | Performed by: FAMILY MEDICINE

## 2018-11-09 PROCEDURE — 93970 EXTREMITY STUDY: CPT

## 2018-11-09 PROCEDURE — 96375 TX/PRO/DX INJ NEW DRUG ADDON: CPT

## 2018-11-09 PROCEDURE — 78582 LUNG VENTILAT&PERFUS IMAGING: CPT

## 2018-11-09 PROCEDURE — G0378 HOSPITAL OBSERVATION PER HR: HCPCS

## 2018-11-09 PROCEDURE — 85027 COMPLETE CBC AUTOMATED: CPT

## 2018-11-09 PROCEDURE — 6370000000 HC RX 637 (ALT 250 FOR IP): Performed by: NURSE PRACTITIONER

## 2018-11-09 PROCEDURE — 71046 X-RAY EXAM CHEST 2 VIEWS: CPT

## 2018-11-09 PROCEDURE — 84484 ASSAY OF TROPONIN QUANT: CPT

## 2018-11-09 PROCEDURE — 94760 N-INVAS EAR/PLS OXIMETRY 1: CPT

## 2018-11-09 PROCEDURE — A9558 XE133 XENON 10MCI: HCPCS | Performed by: FAMILY MEDICINE

## 2018-11-09 PROCEDURE — 94761 N-INVAS EAR/PLS OXIMETRY MLT: CPT

## 2018-11-09 PROCEDURE — 2580000003 HC RX 258: Performed by: HOSPITALIST

## 2018-11-09 PROCEDURE — 99232 SBSQ HOSP IP/OBS MODERATE 35: CPT | Performed by: INTERNAL MEDICINE

## 2018-11-09 PROCEDURE — 99239 HOSP IP/OBS DSCHRG MGMT >30: CPT | Performed by: FAMILY MEDICINE

## 2018-11-09 PROCEDURE — 3430000000 HC RX DIAGNOSTIC RADIOPHARMACEUTICAL: Performed by: FAMILY MEDICINE

## 2018-11-09 PROCEDURE — 36415 COLL VENOUS BLD VENIPUNCTURE: CPT

## 2018-11-09 PROCEDURE — 94640 AIRWAY INHALATION TREATMENT: CPT

## 2018-11-09 PROCEDURE — 85610 PROTHROMBIN TIME: CPT

## 2018-11-09 PROCEDURE — 80069 RENAL FUNCTION PANEL: CPT

## 2018-11-09 PROCEDURE — 83735 ASSAY OF MAGNESIUM: CPT

## 2018-11-09 RX ORDER — BUMETANIDE 1 MG/1
0.5 TABLET ORAL 2 TIMES DAILY
Status: DISCONTINUED | OUTPATIENT
Start: 2018-11-09 | End: 2018-11-14

## 2018-11-09 RX ORDER — MIDODRINE HYDROCHLORIDE 10 MG/1
10 TABLET ORAL ONCE
Status: COMPLETED | OUTPATIENT
Start: 2018-11-09 | End: 2018-11-09

## 2018-11-09 RX ORDER — FENTANYL CITRATE 50 UG/ML
25 INJECTION, SOLUTION INTRAMUSCULAR; INTRAVENOUS ONCE
Status: COMPLETED | OUTPATIENT
Start: 2018-11-09 | End: 2018-11-09

## 2018-11-09 RX ORDER — NICOTINE POLACRILEX 4 MG
15 LOZENGE BUCCAL PRN
Status: DISCONTINUED | OUTPATIENT
Start: 2018-11-09 | End: 2018-11-14

## 2018-11-09 RX ORDER — ONDANSETRON 2 MG/ML
4 INJECTION INTRAMUSCULAR; INTRAVENOUS ONCE
Status: DISCONTINUED | OUTPATIENT
Start: 2018-11-09 | End: 2018-11-14

## 2018-11-09 RX ORDER — 0.9 % SODIUM CHLORIDE 0.9 %
250 INTRAVENOUS SOLUTION INTRAVENOUS ONCE
Status: DISCONTINUED | OUTPATIENT
Start: 2018-11-09 | End: 2018-11-14

## 2018-11-09 RX ORDER — DEXTROSE MONOHYDRATE 50 MG/ML
100 INJECTION, SOLUTION INTRAVENOUS PRN
Status: DISCONTINUED | OUTPATIENT
Start: 2018-11-09 | End: 2018-11-14

## 2018-11-09 RX ORDER — DEXTROSE MONOHYDRATE 25 G/50ML
12.5 INJECTION, SOLUTION INTRAVENOUS PRN
Status: DISCONTINUED | OUTPATIENT
Start: 2018-11-09 | End: 2018-11-14

## 2018-11-09 RX ORDER — ASPIRIN 81 MG/1
81 TABLET, CHEWABLE ORAL DAILY
Status: DISCONTINUED | OUTPATIENT
Start: 2018-11-10 | End: 2018-11-13

## 2018-11-09 RX ORDER — ONDANSETRON 2 MG/ML
4 INJECTION INTRAMUSCULAR; INTRAVENOUS EVERY 6 HOURS PRN
Status: DISCONTINUED | OUTPATIENT
Start: 2018-11-09 | End: 2018-11-14

## 2018-11-09 RX ORDER — MIDODRINE HYDROCHLORIDE 10 MG/1
10 TABLET ORAL
Qty: 30 TABLET | Refills: 0 | Status: SHIPPED | OUTPATIENT
Start: 2018-11-09 | End: 2018-11-09

## 2018-11-09 RX ORDER — HEPARIN SODIUM 5000 [USP'U]/ML
5000 INJECTION, SOLUTION INTRAVENOUS; SUBCUTANEOUS EVERY 8 HOURS
Status: DISCONTINUED | OUTPATIENT
Start: 2018-11-09 | End: 2018-11-10

## 2018-11-09 RX ORDER — NITROGLYCERIN 0.4 MG/1
0.4 TABLET SUBLINGUAL EVERY 5 MIN PRN
Status: DISCONTINUED | OUTPATIENT
Start: 2018-11-09 | End: 2018-11-14

## 2018-11-09 RX ORDER — SODIUM CHLORIDE 0.9 % (FLUSH) 0.9 %
10 SYRINGE (ML) INJECTION PRN
Status: DISCONTINUED | OUTPATIENT
Start: 2018-11-09 | End: 2018-11-13 | Stop reason: SDUPTHER

## 2018-11-09 RX ORDER — ASPIRIN 81 MG/1
324 TABLET, CHEWABLE ORAL ONCE
Status: COMPLETED | OUTPATIENT
Start: 2018-11-09 | End: 2018-11-09

## 2018-11-09 RX ORDER — MIDODRINE HYDROCHLORIDE 10 MG/1
10 TABLET ORAL
Qty: 90 TABLET | Refills: 0 | Status: SHIPPED | OUTPATIENT
Start: 2018-11-09 | End: 2018-11-09

## 2018-11-09 RX ORDER — LEVOTHYROXINE SODIUM 0.05 MG/1
50 TABLET ORAL DAILY
Status: DISCONTINUED | OUTPATIENT
Start: 2018-11-10 | End: 2018-11-14

## 2018-11-09 RX ORDER — GABAPENTIN 400 MG/1
800 CAPSULE ORAL 2 TIMES DAILY
Status: DISCONTINUED | OUTPATIENT
Start: 2018-11-09 | End: 2018-11-14

## 2018-11-09 RX ORDER — ACETAMINOPHEN 325 MG/1
650 TABLET ORAL EVERY 6 HOURS PRN
Status: DISCONTINUED | OUTPATIENT
Start: 2018-11-09 | End: 2018-11-13

## 2018-11-09 RX ORDER — MIDODRINE HYDROCHLORIDE 10 MG/1
10 TABLET ORAL
Status: DISCONTINUED | OUTPATIENT
Start: 2018-11-09 | End: 2018-11-09 | Stop reason: HOSPADM

## 2018-11-09 RX ORDER — ATORVASTATIN CALCIUM 40 MG/1
40 TABLET, FILM COATED ORAL DAILY
Status: DISCONTINUED | OUTPATIENT
Start: 2018-11-09 | End: 2018-11-10

## 2018-11-09 RX ORDER — MIDODRINE HYDROCHLORIDE 10 MG/1
10 TABLET ORAL
Qty: 90 TABLET | Refills: 0 | Status: ON HOLD | OUTPATIENT
Start: 2018-11-09 | End: 2018-12-06 | Stop reason: HOSPADM

## 2018-11-09 RX ORDER — XENON XE-133 10 MCI/1
7.8 GAS RESPIRATORY (INHALATION)
Status: COMPLETED | OUTPATIENT
Start: 2018-11-09 | End: 2018-11-09

## 2018-11-09 RX ORDER — MIDODRINE HYDROCHLORIDE 10 MG/1
10 TABLET ORAL
Status: DISCONTINUED | OUTPATIENT
Start: 2018-11-10 | End: 2018-11-14

## 2018-11-09 RX ORDER — SODIUM CHLORIDE 0.9 % (FLUSH) 0.9 %
10 SYRINGE (ML) INJECTION EVERY 12 HOURS SCHEDULED
Status: DISCONTINUED | OUTPATIENT
Start: 2018-11-09 | End: 2018-11-13 | Stop reason: SDUPTHER

## 2018-11-09 RX ADMIN — Medication 10 ML: at 23:40

## 2018-11-09 RX ADMIN — ASPIRIN 81 MG 324 MG: 81 TABLET ORAL at 16:56

## 2018-11-09 RX ADMIN — MIDODRINE HYDROCHLORIDE 10 MG: 10 TABLET ORAL at 23:39

## 2018-11-09 RX ADMIN — IPRATROPIUM BROMIDE AND ALBUTEROL SULFATE 1 AMPULE: .5; 3 SOLUTION RESPIRATORY (INHALATION) at 07:57

## 2018-11-09 RX ADMIN — FENTANYL CITRATE 25 MCG: 50 INJECTION INTRAMUSCULAR; INTRAVENOUS at 18:19

## 2018-11-09 RX ADMIN — Medication 10 ML: at 11:12

## 2018-11-09 RX ADMIN — Medication 3.1 MILLICURIE: at 09:35

## 2018-11-09 RX ADMIN — XENON XE-133 7.8 MILLICURIE: 10 GAS RESPIRATORY (INHALATION) at 09:30

## 2018-11-09 RX ADMIN — LEVOTHYROXINE SODIUM 50 MCG: 50 TABLET ORAL at 04:54

## 2018-11-09 RX ADMIN — ACETAMINOPHEN 650 MG: 325 TABLET ORAL at 21:58

## 2018-11-09 RX ADMIN — ATORVASTATIN CALCIUM 40 MG: 40 TABLET, FILM COATED ORAL at 23:39

## 2018-11-09 RX ADMIN — HEPARIN SODIUM 5000 UNITS: 5000 INJECTION INTRAVENOUS; SUBCUTANEOUS at 23:27

## 2018-11-09 RX ADMIN — CEFTRIAXONE SODIUM 1 G: 1 INJECTION, POWDER, FOR SOLUTION INTRAMUSCULAR; INTRAVENOUS at 11:13

## 2018-11-09 RX ADMIN — FUROSEMIDE 20 MG: 10 INJECTION, SOLUTION INTRAMUSCULAR; INTRAVENOUS at 11:11

## 2018-11-09 RX ADMIN — BUMETANIDE 0.5 MG: 1 TABLET ORAL at 23:28

## 2018-11-09 RX ADMIN — MIDODRINE HYDROCHLORIDE 10 MG: 5 TABLET ORAL at 11:11

## 2018-11-09 RX ADMIN — METOPROLOL TARTRATE 25 MG: 25 TABLET ORAL at 23:28

## 2018-11-09 RX ADMIN — METOPROLOL SUCCINATE 25 MG: 25 TABLET, FILM COATED, EXTENDED RELEASE ORAL at 11:11

## 2018-11-09 ASSESSMENT — PAIN SCALES - GENERAL
PAINLEVEL_OUTOF10: 6
PAINLEVEL_OUTOF10: 7
PAINLEVEL_OUTOF10: 6
PAINLEVEL_OUTOF10: 7
PAINLEVEL_OUTOF10: 0

## 2018-11-09 ASSESSMENT — ENCOUNTER SYMPTOMS
ABDOMINAL PAIN: 0
WHEEZING: 0
RHINORRHEA: 0
VOMITING: 0
BACK PAIN: 0
COUGH: 0
DIARRHEA: 0
SORE THROAT: 0
EYE REDNESS: 0
NAUSEA: 0
SHORTNESS OF BREATH: 1
EYE DISCHARGE: 0

## 2018-11-09 ASSESSMENT — PAIN DESCRIPTION - LOCATION
LOCATION: JAW
LOCATION: JAW

## 2018-11-09 ASSESSMENT — PAIN DESCRIPTION - ONSET
ONSET: ON-GOING
ONSET: ON-GOING

## 2018-11-09 ASSESSMENT — HEART SCORE: ECG: 1

## 2018-11-09 ASSESSMENT — PAIN DESCRIPTION - DESCRIPTORS
DESCRIPTORS: ACHING
DESCRIPTORS: ACHING

## 2018-11-09 ASSESSMENT — PAIN DESCRIPTION - FREQUENCY: FREQUENCY: CONTINUOUS

## 2018-11-09 ASSESSMENT — PAIN DESCRIPTION - PAIN TYPE
TYPE: ACUTE PAIN
TYPE: ACUTE PAIN

## 2018-11-09 ASSESSMENT — PAIN DESCRIPTION - PROGRESSION: CLINICAL_PROGRESSION: GRADUALLY WORSENING

## 2018-11-09 ASSESSMENT — PAIN DESCRIPTION - ORIENTATION
ORIENTATION: RIGHT;LEFT
ORIENTATION: RIGHT;LEFT

## 2018-11-09 NOTE — ED TRIAGE NOTES
Pt to ED from dentist office. Pt was discharged today from this facility and sent to the dentist to have teeth removed due to suspection of infection coming from teeth. During procedure pt develped sob and chest pain. Pt states taking 2 nitro with full relief. Pt reports he was being laid flat and that causes him difficulty in breathing. ekg obtained. Pt in no distress.  Vitals as documented

## 2018-11-09 NOTE — ED PROVIDER NOTES
[facility]  eMERGENCY dEPARTMENT eNCOUnter          [address] [facility]       Chief Complaint   Patient presents with    Shortness of Breath    Chest Pain       Nurses Notes reviewed and I agree except as noted in the HPI. HISTORY OF PRESENT ILLNESS    Caleb Hatfield is a 72 y.o. male with a history of hypertension, CAD, CHF, and chronic kidney disease who presents to the ED complaining of shortness of breath and chest pain. Patient was admitted with bacteremia from a dental infection, has indwelling RUE PICC for antibiotics, found to have CAD on cardiac cath recently and will need cardiac surgery and was discharged this morning. He is scheduled to have CABG and valve replacement, and needed his infected teeth extracted beforehand. During the dental extraction today while dentist was attempting to remove a broken piece of the root of the last tooth (all others had been extracted without difficulty), patient began experiencing chest pain and shortness of breath. The episode lasted about 20 minutes. Patient took 2 nitro which provided relief of symptoms. He states he was being laid flat during the extraction, which causes him to become short of breath. Patient is no longer experiencing pain. He is not experiencing nausea, vomiting, diaphoresis, palpations, abdominal pain, fainting, fever, cough, or pain in lower extremities. The patient's cardiologist is Dr. Kaushal Quintanilla. Patient has a life vest and PICC line. Location/Symptom: chest pain, shortness of breath  Timing/Onset: today  Context/Setting: laying flat during tooth extraction  Modifying Factors: laying flat      REVIEW OF SYSTEMS     Review of Systems   Constitutional: Negative for appetite change, chills, fatigue and fever. HENT: Negative for congestion, ear pain, rhinorrhea and sore throat. Eyes: Negative for discharge, redness and visual disturbance. Respiratory: Positive for shortness of breath. Negative for cough and wheezing. Cardiovascular: Positive for chest pain. Negative for palpitations and leg swelling. Gastrointestinal: Negative for abdominal pain, diarrhea, nausea and vomiting. Genitourinary: Negative for decreased urine volume, difficulty urinating, dysuria and hematuria. Musculoskeletal: Negative for arthralgias, back pain, joint swelling and neck pain. Skin: Negative for pallor and rash. Allergic/Immunologic: Negative for environmental allergies. Neurological: Negative for dizziness, syncope, weakness, light-headedness, numbness and headaches. Hematological: Negative for adenopathy. Psychiatric/Behavioral: Negative for confusion and suicidal ideas. The patient is nervous/anxious. PAST MEDICAL HISTORY    has a past medical history of CAD (coronary artery disease); CHF (congestive heart failure) ([facility] [address] Chronic kidney disease; Diabetes mellitus ([facility] [address] HTN (hypertension), benign; Hyperlipidemia; Neuropathy; Positive blood culture; and Thyroid disease. SURGICAL HISTORY      has a past surgical history that includes Cataract removal with implant (Bilateral); pr echo transesophag r-t 2d img acquisj i&r only (Left, 10/25/2018); and Colonoscopy. CURRENT MEDICATIONS       Previous Medications    ASPIRIN 81 MG CHEWABLE TABLET    Take 1 tablet by mouth daily    ATORVASTATIN (LIPITOR) 40 MG TABLET    Take 40 mg by mouth daily     BUMETANIDE (BUMEX) 0.5 MG TABLET    Take 1 tablet by mouth 2 times daily    GABAPENTIN (NEURONTIN) 800 MG TABLET    Take 800 mg by mouth 3 times daily as needed. Danica Villegas GLIMEPIRIDE (AMARYL) 1 MG TABLET    Take 0.5 mg by mouth daily     LEVOTHYROXINE (SYNTHROID) 50 MCG TABLET    Take 50 mcg by mouth Daily    METOPROLOL TARTRATE (LOPRESSOR) 25 MG TABLET    Take 1 tablet by mouth 2 times daily    MIDODRINE (PROAMATINE) 10 MG TABLET    Take 1 tablet by mouth 3 times daily (with meals)    NITROGLYCERIN (NITROSTAT) 0.4 MG SL TABLET    up to max of 3 total doses.  If no relief after 1 dose,

## 2018-11-10 PROBLEM — E44.0 MODERATE MALNUTRITION (HCC): Status: ACTIVE | Noted: 2018-11-10

## 2018-11-10 LAB
ALBUMIN SERPL-MCNC: 3.5 G/DL (ref 3.5–5.1)
ALP BLD-CCNC: 85 U/L (ref 38–126)
ALT SERPL-CCNC: 19 U/L (ref 11–66)
ANION GAP SERPL CALCULATED.3IONS-SCNC: 16 MEQ/L (ref 8–16)
AST SERPL-CCNC: 26 U/L (ref 5–40)
BASOPHILS # BLD: 1 %
BASOPHILS ABSOLUTE: 0.1 THOU/MM3 (ref 0–0.1)
BILIRUB SERPL-MCNC: 0.8 MG/DL (ref 0.3–1.2)
BUN BLDV-MCNC: 31 MG/DL (ref 7–22)
CALCIUM SERPL-MCNC: 9.4 MG/DL (ref 8.5–10.5)
CHLORIDE BLD-SCNC: 98 MEQ/L (ref 98–111)
CHOLESTEROL, TOTAL: 131 MG/DL (ref 100–199)
CO2: 28 MEQ/L (ref 23–33)
CREAT SERPL-MCNC: 1.7 MG/DL (ref 0.4–1.2)
EKG ATRIAL RATE: 84 BPM
EKG ATRIAL RATE: 96 BPM
EKG P AXIS: 28 DEGREES
EKG P AXIS: 69 DEGREES
EKG P-R INTERVAL: 128 MS
EKG P-R INTERVAL: 152 MS
EKG Q-T INTERVAL: 422 MS
EKG Q-T INTERVAL: 444 MS
EKG QRS DURATION: 158 MS
EKG QRS DURATION: 160 MS
EKG QTC CALCULATION (BAZETT): 524 MS
EKG QTC CALCULATION (BAZETT): 533 MS
EKG R AXIS: 10 DEGREES
EKG R AXIS: 25 DEGREES
EKG T AXIS: -113 DEGREES
EKG T AXIS: -129 DEGREES
EKG VENTRICULAR RATE: 84 BPM
EKG VENTRICULAR RATE: 96 BPM
EOSINOPHIL # BLD: 2.4 %
EOSINOPHILS ABSOLUTE: 0.1 THOU/MM3 (ref 0–0.4)
ERYTHROCYTE [DISTWIDTH] IN BLOOD BY AUTOMATED COUNT: 14.8 % (ref 11.5–14.5)
ERYTHROCYTE [DISTWIDTH] IN BLOOD BY AUTOMATED COUNT: 51.4 FL (ref 35–45)
GFR SERPL CREATININE-BSD FRML MDRD: 40 ML/MIN/1.73M2
GLUCOSE BLD-MCNC: 108 MG/DL (ref 70–108)
GLUCOSE BLD-MCNC: 113 MG/DL (ref 70–108)
GLUCOSE BLD-MCNC: 118 MG/DL (ref 70–108)
GLUCOSE BLD-MCNC: 127 MG/DL (ref 70–108)
GLUCOSE BLD-MCNC: 131 MG/DL (ref 70–108)
GLUCOSE BLD-MCNC: 173 MG/DL (ref 70–108)
HCT VFR BLD CALC: 29.2 % (ref 42–52)
HDLC SERPL-MCNC: 27 MG/DL
HEMOGLOBIN: 9.3 GM/DL (ref 14–18)
IMMATURE GRANS (ABS): 0.02 THOU/MM3 (ref 0–0.07)
IMMATURE GRANULOCYTES: 0.3 %
INR BLD: 1.18 (ref 0.85–1.13)
LDL CHOLESTEROL CALCULATED: 70 MG/DL
LYMPHOCYTES # BLD: 14.5 %
LYMPHOCYTES ABSOLUTE: 0.9 THOU/MM3 (ref 1–4.8)
MAGNESIUM: 2.4 MG/DL (ref 1.6–2.4)
MCH RBC QN AUTO: 30.8 PG (ref 26–33)
MCHC RBC AUTO-ENTMCNC: 31.8 GM/DL (ref 32.2–35.5)
MCV RBC AUTO: 96.7 FL (ref 80–94)
MONOCYTES # BLD: 11.1 %
MONOCYTES ABSOLUTE: 0.7 THOU/MM3 (ref 0.4–1.3)
NUCLEATED RED BLOOD CELLS: 0 /100 WBC
PLATELET # BLD: 239 THOU/MM3 (ref 130–400)
PMV BLD AUTO: 10 FL (ref 9.4–12.4)
POTASSIUM REFLEX MAGNESIUM: 4 MEQ/L (ref 3.5–5.2)
PROCALCITONIN: 0.47 NG/ML (ref 0.01–0.09)
RBC # BLD: 3.02 MILL/MM3 (ref 4.7–6.1)
SEG NEUTROPHILS: 70.7 %
SEGMENTED NEUTROPHILS ABSOLUTE COUNT: 4.4 THOU/MM3 (ref 1.8–7.7)
SODIUM BLD-SCNC: 142 MEQ/L (ref 135–145)
TOTAL PROTEIN: 7.4 G/DL (ref 6.1–8)
TRANSFERRIN: 239 MG/DL (ref 200–400)
TRIGL SERPL-MCNC: 172 MG/DL (ref 0–199)
TROPONIN T: 0.2 NG/ML
TROPONIN T: 0.22 NG/ML
WBC # BLD: 6.2 THOU/MM3 (ref 4.8–10.8)

## 2018-11-10 PROCEDURE — 93010 ELECTROCARDIOGRAM REPORT: CPT | Performed by: NUCLEAR MEDICINE

## 2018-11-10 PROCEDURE — 80053 COMPREHEN METABOLIC PANEL: CPT

## 2018-11-10 PROCEDURE — 99222 1ST HOSP IP/OBS MODERATE 55: CPT | Performed by: INTERNAL MEDICINE

## 2018-11-10 PROCEDURE — 6370000000 HC RX 637 (ALT 250 FOR IP): Performed by: FAMILY MEDICINE

## 2018-11-10 PROCEDURE — 2709999900 HC NON-CHARGEABLE SUPPLY

## 2018-11-10 PROCEDURE — 51798 US URINE CAPACITY MEASURE: CPT

## 2018-11-10 PROCEDURE — 82948 REAGENT STRIP/BLOOD GLUCOSE: CPT

## 2018-11-10 PROCEDURE — 99232 SBSQ HOSP IP/OBS MODERATE 35: CPT | Performed by: FAMILY MEDICINE

## 2018-11-10 PROCEDURE — 6370000000 HC RX 637 (ALT 250 FOR IP): Performed by: INTERNAL MEDICINE

## 2018-11-10 PROCEDURE — 85025 COMPLETE CBC W/AUTO DIFF WBC: CPT

## 2018-11-10 PROCEDURE — 80061 LIPID PANEL: CPT

## 2018-11-10 PROCEDURE — 93005 ELECTROCARDIOGRAM TRACING: CPT | Performed by: INTERNAL MEDICINE

## 2018-11-10 PROCEDURE — 2700000000 HC OXYGEN THERAPY PER DAY

## 2018-11-10 PROCEDURE — 84145 PROCALCITONIN (PCT): CPT

## 2018-11-10 PROCEDURE — 84484 ASSAY OF TROPONIN QUANT: CPT

## 2018-11-10 PROCEDURE — 99223 1ST HOSP IP/OBS HIGH 75: CPT | Performed by: HOSPITALIST

## 2018-11-10 PROCEDURE — 6370000000 HC RX 637 (ALT 250 FOR IP): Performed by: HOSPITALIST

## 2018-11-10 PROCEDURE — 94640 AIRWAY INHALATION TREATMENT: CPT

## 2018-11-10 PROCEDURE — 36415 COLL VENOUS BLD VENIPUNCTURE: CPT

## 2018-11-10 PROCEDURE — 1200000003 HC TELEMETRY R&B

## 2018-11-10 PROCEDURE — 96365 THER/PROPH/DIAG IV INF INIT: CPT

## 2018-11-10 PROCEDURE — 6360000002 HC RX W HCPCS: Performed by: HOSPITALIST

## 2018-11-10 PROCEDURE — 85610 PROTHROMBIN TIME: CPT

## 2018-11-10 PROCEDURE — 6360000002 HC RX W HCPCS: Performed by: FAMILY MEDICINE

## 2018-11-10 PROCEDURE — 94760 N-INVAS EAR/PLS OXIMETRY 1: CPT

## 2018-11-10 PROCEDURE — 83735 ASSAY OF MAGNESIUM: CPT

## 2018-11-10 PROCEDURE — 2580000003 HC RX 258: Performed by: HOSPITALIST

## 2018-11-10 RX ORDER — ALBUTEROL SULFATE 2.5 MG/3ML
2.5 SOLUTION RESPIRATORY (INHALATION) ONCE
Status: COMPLETED | OUTPATIENT
Start: 2018-11-10 | End: 2018-11-10

## 2018-11-10 RX ORDER — ATORVASTATIN CALCIUM 80 MG/1
80 TABLET, FILM COATED ORAL DAILY
Status: DISCONTINUED | OUTPATIENT
Start: 2018-11-10 | End: 2018-11-14

## 2018-11-10 RX ADMIN — MIDODRINE HYDROCHLORIDE 10 MG: 10 TABLET ORAL at 11:33

## 2018-11-10 RX ADMIN — CEFTRIAXONE SODIUM 1 G: 1 INJECTION, POWDER, FOR SOLUTION INTRAMUSCULAR; INTRAVENOUS at 11:33

## 2018-11-10 RX ADMIN — LEVOTHYROXINE SODIUM 50 MCG: 50 TABLET ORAL at 08:42

## 2018-11-10 RX ADMIN — BUMETANIDE 0.5 MG: 1 TABLET ORAL at 16:59

## 2018-11-10 RX ADMIN — NITROGLYCERIN 0.4 MG: 0.4 TABLET SUBLINGUAL at 02:47

## 2018-11-10 RX ADMIN — METOPROLOL TARTRATE 25 MG: 25 TABLET ORAL at 20:52

## 2018-11-10 RX ADMIN — ATORVASTATIN CALCIUM 80 MG: 80 TABLET, FILM COATED ORAL at 20:53

## 2018-11-10 RX ADMIN — ASPIRIN 81 MG 81 MG: 81 TABLET ORAL at 08:42

## 2018-11-10 RX ADMIN — MIDODRINE HYDROCHLORIDE 10 MG: 10 TABLET ORAL at 08:42

## 2018-11-10 RX ADMIN — BUMETANIDE 0.5 MG: 1 TABLET ORAL at 08:42

## 2018-11-10 RX ADMIN — ACETAMINOPHEN 650 MG: 325 TABLET ORAL at 04:08

## 2018-11-10 RX ADMIN — ACETAMINOPHEN 650 MG: 325 TABLET ORAL at 10:08

## 2018-11-10 RX ADMIN — METOPROLOL TARTRATE 25 MG: 25 TABLET ORAL at 08:42

## 2018-11-10 RX ADMIN — MIDODRINE HYDROCHLORIDE 10 MG: 10 TABLET ORAL at 16:59

## 2018-11-10 RX ADMIN — ALBUTEROL SULFATE 2.5 MG: 2.5 SOLUTION RESPIRATORY (INHALATION) at 11:58

## 2018-11-10 RX ADMIN — GABAPENTIN 800 MG: 400 CAPSULE ORAL at 08:42

## 2018-11-10 RX ADMIN — Medication 10 ML: at 08:43

## 2018-11-10 ASSESSMENT — PAIN SCALES - GENERAL
PAINLEVEL_OUTOF10: 5
PAINLEVEL_OUTOF10: 4
PAINLEVEL_OUTOF10: 0
PAINLEVEL_OUTOF10: 3
PAINLEVEL_OUTOF10: 5

## 2018-11-10 ASSESSMENT — PAIN DESCRIPTION - PAIN TYPE
TYPE: ACUTE PAIN
TYPE: ACUTE PAIN

## 2018-11-10 ASSESSMENT — PAIN DESCRIPTION - LOCATION
LOCATION: JAW
LOCATION: MOUTH

## 2018-11-10 ASSESSMENT — PAIN DESCRIPTION - DESCRIPTORS: DESCRIPTORS: ACHING

## 2018-11-10 ASSESSMENT — PAIN DESCRIPTION - ONSET: ONSET: ON-GOING

## 2018-11-10 ASSESSMENT — PAIN DESCRIPTION - PROGRESSION: CLINICAL_PROGRESSION: GRADUALLY WORSENING

## 2018-11-10 ASSESSMENT — PAIN DESCRIPTION - ORIENTATION
ORIENTATION: RIGHT;LEFT
ORIENTATION: RIGHT;LEFT

## 2018-11-10 ASSESSMENT — PAIN DESCRIPTION - FREQUENCY: FREQUENCY: CONTINUOUS

## 2018-11-10 NOTE — PROGRESS NOTES
electronically signed by Dr Dunia Burch on 11/9/2018 4:59 PM              Assessment/Plan: This is a 72 y.o. w CHF, CAD, CKD stage 2, cardiomyopathy, EF 25-30% (MICHELE 10/25/18) admitted for NSTEMI    1. NSTEMI    -cardio on-board. Appreciate Dr. Nick Hamman input  -due to risk of bleeding, heparin cannot start right now, Dr. Nick Hamman recommend to continue watchful monitoring and that patient will stay in the hospital and see patient's physician on Monday and plan procedure ( CABG and valvular repair) 11/14/18.   -cont statin, asa, metoprolol. Lisinopril on hold due to MISA  -VS per protocol  -tele monitoring    2. Multivessel CAD     -s/p cardiac cath 10/27/18: \"left anterior descending artery is of  moderate size caliber and has a 50% mid vessel narrowing.  The distal  vessel is of good caliber. The circumflex vessel has a proximal 70% stenosis with a large OM  branch with an 80% proximal stenosis including the small aneurysm. There is an OM2 vessel distal to this which is of small caliber and diffusely diseased. The right coronary artery is 100% occluded proximally. \"  -cardio on-board. Plan for CABG 11/14/18  -cont statin, asa, metoprolol       3. Recent acute systolic CHF exacerbation, EF 25-30% (MICHELE 10/25/18), improving    -CXR on admission 11/9/18: \"Cardiomegaly with mild pulmonary vascular congestion which is slightly improved since prior exam.Bibasilar opacities which may be on the basis of atelectasis or infiltrate\"  -pt is on bumex 0.5 mg po bid, metoprolol 25 mg po daily  -fluid and salt restriction   -I/O  -daily weights      4. Possible pneumona    -CXR on 11/9/18 showed bibasilar opacities which may be on the basis of atelectasis or infiltrate  -pt has crackles on bibasilar lung fields which could be from pulmonary congestion. Pt is afebrile, WBC normal. Pt is already on IV Rocephin for h/o strep agalactiae bacteremia, will check procalcitonin, will add other abx for possible pna if procalcitonin abnl.  Pt has wheezing today, no h/o asthma or copd, give 1 dose of albuterol neb. 5. MISA on CKD2    -creatinine now 1.7, was 1.3 on discharge 11/9/18  -nephro consult  -bmp in am  -avoid nsaids and nephrotoxic      6. H/o strep agalactiae bacteremia    -on Rocephin  -blood culture on 11/5/18: no growth so far  -pt was seen by Dr. Ad Basurto ( ID) on 10/23/18.   -I spoke w Dr. Ad Basurto over the phone on 11/8/18 and discussed the case w him. Dr. Ad Basurto recommend cont IV Rocephin until he sees pt.     7. Severe Aortic regurgitation     -cardio on-board. Plan for valvular repair 11/14/18  -life vest in placed     8. Moderate to severe MR     -cardio on-board. Plan for valvular repair 11/14/18  -life vest in placed     9. Hypothyroidism     -cont synthroid      10. Mild hypotension     -BP stable. Pt is asymptomatic   -on midodrine   -VS per protocol        Diet: DIET CARDIAC;    DVT prophylaxis: [] Lovenox                                 [x] SCDs                                 [] SQ Heparin                                 [] Encourage ambulation           [] Already on Anticoagulation     Disposition:    [] Home       [] TCU       [] Rehab       [] Psych       [] SNF       [] [place]       [x] Other- awaiting CABG and valvular repair.  Switch to inpatient from obs    Code Status: Full Code        Electronically signed by Jessica Colbert MD on 11/10/2018 at 11:33 AM

## 2018-11-10 NOTE — CONSULTS
[facility]  Cardiology Consult      Patient:  Zuhair Coronado  YOB: 1945    MRN: [id number]   Acct: [de-identified]     Primary Care Physician: Artice Barthel DAS        REASON FOR CONSULT:         CHIEF COMPLAINT:        HISTORY OF PRESENT ILLNESS:                All labs, EKG's, diagnostic testing and images as well as cardiac cath, stress testing   were reviewed during this encounter    PREVIOUS CARDIAC TESTING    Ekg:     Echo:    Str. Test:    Cath:      Past Medical History:    Past Medical History:   Diagnosis Date    CAD (coronary artery disease)     [facility]     Chronic kidney disease     Diabetes mellitus ([facility]     HTN (hypertension), benign     Hyperlipidemia     Mitral and aortic incompetence     Neuropathy     Positive blood culture     Being treated with rocephhin as outpt.  Thyroid disease        Past Surgical History:    Past Surgical History:   Procedure Laterality Date    CARDIAC SURGERY      heart cath    CATARACT REMOVAL WITH IMPLANT Bilateral     COLONOSCOPY      EYE SURGERY      [place] ECHO TRANSESOPHAG R-T 2D IMG ACQUISJ I&R ONLY Left 10/25/2018    TRANSESOPHAGEAL ECHOCARDIOGRAM performed by Gia Villanueva MD at [facility]       Medications Prior to Admission:    Prescriptions Prior to Admission: midodrine (PROAMATINE) 10 MG tablet, Take 1 tablet by mouth 3 times daily (with meals)  bumetanide (BUMEX) 0.5 MG tablet, Take 1 tablet by mouth 2 times daily  aspirin 81 MG chewable tablet, Take 1 tablet by mouth daily  nitroGLYCERIN (NITROSTAT) 0.4 MG SL tablet, up to max of 3 total doses. If no relief after 1 dose, call 911.   metoprolol tartrate (LOPRESSOR) 25 MG tablet, Take 1 tablet by mouth 2 times daily  atorvastatin (LIPITOR) 40 MG tablet, Take 40 mg by mouth daily   levothyroxine (SYNTHROID) 50 MCG tablet, Take 50 mcg by mouth Daily  glimepiride (AMARYL) 1 MG tablet, Take 0.5 mg by mouth daily   gabapentin (NEURONTIN) 800 MG tablet, Take 800 mg by mouth 3 times daily as needed. .  ONE TOUCH ULTRA TEST strip, as needed    Allergies:    Patient has no known allergies. Social History:    reports that he is a non-smoker but has been exposed to tobacco smoke. He has been exposed to tobacco smoke for the past 3.00 years. He has never used smokeless tobacco. He reports that he does not drink alcohol or use drugs. Family History:   family history includes Cancer (age of onset: 28) in his brother; Cancer (age of onset: 46) in his brother; Diabetes in his mother; Other in his mother. REVIEW OF SYSTEMS:    Constitutional: negative for anorexia, chills and fevers,weight change  Respiratory: negative for cough, dyspnea on exertion, hemoptysis, shortness of breath and wheezing  Cardiovascular: negative for  orthopnea, palpitations and syncope. Gastrointestinal: negative for abdominal pain,nausea , vomiting, constipation, diarrhea.   Hematologic/lymphatic: negative for bruising,prolonged bleeding,blood clots  Musculoskeletal:negative for muscle weakness, myalgias,wasting  Neurological: negative for coordination problems, dizziness, gait problems and vertigo  Behavioral/Psych:negative for mood/sleep disturbance      PHYSICAL EXAM:  Vitals:Patient Vitals for the past 24 hrs:   BP Temp Temp src Pulse Resp SpO2 Height Weight   11/10/18 0333 (!) 97/51 98 °F (36.7 °C) Oral 90 20 98 % - -   11/10/18 0235 (!) 103/51 - - 87 - 98 % - -   11/10/18 0212 (!) 95/53 - - 95 - - - -   11/10/18 0153 (!) 101/57 - - 98 16 96 % - -   11/10/18 0151 (!) 88/55 - - 100 - 96 % - -   11/09/18 2340 (!) 100/47 97.7 °F (36.5 °C) Oral 83 18 98 % - -   11/09/18 2037 - - - - - 92 % - -   11/09/18 2016 110/65 98.3 °F (36.8 °C) Oral 92 16 93 % - -   11/09/18 1902 (!) 98/58 97.5 °F (36.4 °C) Oral 93 16 90 % - -   11/09/18 1826 (!) 110/55 - - 86 19 94 % - -   11/09/18 1753 (!) 113/56 - - 86 19 92 % - -   11/09/18 1658 (!) 118/49 - - 94 19 92 % - -   11/09/18

## 2018-11-10 NOTE — CONSULTS
[facility]    [address]  [facility]  11/10/2018 12:19 PM    Pt Name:    Kendrick Amin  MRN:     [id number]   [id number]  YOB: 1945  Admit Date:    11/9/2018  4:01 PM  Primary Care Physician:  Aline ELLISON        Reason for Consult:      History: MISA on CKD  The patient is a 72 y.o. male with past medical history as below, just discharged from [facility]. Kaleigh on 11/9 was readmitted last night due to shortness of breath and chest pain while at his dentist appt getting teeth extraction. His wife states he started having trouble breathing when they tried to lie him flat and had chest pain so he presented to the ED. He was recently hospitalized for decompensated systolic CHF and is supposed to have CABG + aortic and mitral valve replacement on 11/14. Patient was seen by Dr. Brendan Hdez last hospital stay for MISA. His creatinine peaked at 1.7 with juan of 1.3. This admission his creatinine noted to be 1.7 so nephrology was consulted. He was receiving Midodrine 10 mg TID and lasix 20 mg BID and was discharged with Bumex but never took any of his meds because he never actually went home. BP in the 21M systolic. He denies any difficulty urinating. No dizziness. Previous creatinine in October was 0.8. Past Medical History:  Past Medical History:   Diagnosis Date    CAD (coronary artery disease)     CHF (congestive heart failure) (H[facility])     Chronic kidney disease     [facility] mellitus [facility]     HTN (hypertension), benign     Hyperlipidemia     Mitral and aortic incompetence     Neuropathy     Positive blood culture     Being treated with rocephhin as outpt.     Thyroid disease        Past Surgical History:  Past Surgical History:   Procedure Laterality Date    CARDIAC SURGERY      heart cath    CATARACT REMOVAL WITH IMPLANT Bilateral     COLONOSCOPY      EYE SURGERY      [place] ECHO TRANSESOPHAG R-T 2D IMG ACQUISJ I&R ONLY gabapentin (NEURONTIN) 800 MG tablet Take 800 mg by mouth 3 times daily as needed. Rui Segura Historical Provider, MD   ONE TOUCH ULTRA TEST strip as needed 1/30/17   Historical Provider, MD       Review of Systems:  Constitutional: no fever or chills  Head: No headaches  Eyes: no blurry vision, no discharge  Ears: no ear pain or hearing changes  Nose: no runny nose or epistaxis  Respiratory: +orthopnea + dyspnea on exertion  Cardiovascular: + chest pain, no edema +life vest  GI: no nausea, no vomiting or diarrhea  : denies any hematuria, no flank pain  Skin: no rash  Musculoskeletal: no joint pain, moves all ext  Neuro: no tremor, no slurred speech  Psychiatric: stable mood, no depression or insomnia    Current Meds:  Infusion:    dextrose       Meds:    cefTRIAXone (ROCEPHIN) IV  1 g Intravenous Q24H    sodium chloride  250 mL Intravenous Once    ondansetron  4 mg Intravenous Once    sodium chloride flush  10 mL Intravenous 2 times per day    aspirin  81 mg Oral Daily    atorvastatin  40 mg Oral Daily    bumetanide  0.5 mg Oral BID    levothyroxine  50 mcg Oral Daily    midodrine  10 mg Oral TID WC    metoprolol tartrate  25 mg Oral BID    gabapentin  800 mg Oral BID    insulin lispro  0-6 Units Subcutaneous 4x Daily AC & HS     Meds prn: sodium chloride flush, magnesium hydroxide, ondansetron, nitroGLYCERIN, glucose, dextrose, glucagon (rDNA), dextrose, acetaminophen     Allergies/Intolerances: ALLERGIES: Patient has no known allergies. 24HR INTAKE/OUTPUT:    Intake/Output Summary (Last 24 hours) at 11/10/18 1219  Last data filed at 11/10/18 1211   Gross per 24 hour   Intake              310 ml   Output                0 ml   Net              310 ml     I/O last 3 completed shifts:   In: 100 [P.O.:100]  Out: 0   I/O this shift:  In: 210 [P.O.:180; I.V.:30]  Out: -   Admission weight: 206 lb (93.4 kg)  Wt Readings from Last 3 Encounters:   11/09/18 206 lb (93.4 kg)   11/09/18 201 lb 8 oz (91.4 kg) 11/05/18 206 lb 4.8 oz (93.6 kg)     Body mass index is 27.94 kg/m². Physical Examination:  VITALS:  BP (!) 98/54   Pulse 79   Temp 97.5 °F (36.4 °C) (Oral)   Resp 18   Ht 6' (1.829 m)   Wt 206 lb (93.4 kg)   SpO2 99%   BMI 27.94 kg/m²   Weight:   Wt Readings from Last 3 Encounters:   11/09/18 206 lb (93.4 kg)   11/09/18 201 lb 8 oz (91.4 kg)   11/05/18 206 lb 4.8 oz (93.6 kg)     Constitutional and General Appearance: awake, alert, nontoxic  Eyes: no icteric sclera, no pallor conjunctiva, no discharge seen from either eye  Ears and Nose: normal external appearance of left and right ear and nose. No active drainage from nose. Oral: moist oral mucus membranes  Neck: No jugular venous distention, appears symmetric, good ROM  Lungs: CTAB no W/R/R  Heart: regular rate, S1, S2  +murmur  +life vest  Extremities: no LE edema no cyanosis  GI: soft, non-tender, no guarding  Skin: no rash seen on exposed extremities  Musculo: moves all extremities  Neuro: no slurred speech, no facial drooping, no asterixis  Psychiatric: Awake, alert, Oriented    Lab Data  CBC:   Recent Labs      11/09/18   0445  11/09/18   1630  11/10/18   0440   WBC  5.2  7.0  6.2   HGB  9.3*  9.9*  9.3*   HCT  28.8*  30.4*  29.2*   PLT  221  282  239     BMP:  Recent Labs      11/09/18   0445  11/09/18   1630  11/10/18   0440   NA  142  143  142   K  4.0  3.9  4.0   CL  99  99  98   CO2  30  28  28   BUN  29*  28*  31*   CREATININE  1.3*  1.4*  1.7*   GLUCOSE  115*  127*  118*   CALCIUM  9.1  9.5  9.4   MG   --   2.2  2.4     PTH: @PTH@  TSH: No results for input(s): TSH in the last 72 hours.   HgBa1c:   Recent Labs      11/07/18   2200   LABA1C  6.0     Hepatic: Recent Labs      11/09/18   0445  11/09/18   1630  11/10/18   0440   LABALBU  3.4*  3.6  3.5   AST   --   27  26   ALT   --   20  19   BILITOT   --   0.6  0.8   ALKPHOS   --   88  85     ABGs: No results found for: PHART, PO2ART, [id number]  Troponin: No results for input(s): TROPONINI

## 2018-11-10 NOTE — PLAN OF CARE
Problem: Pain:  Goal: Pain level will decrease  Pain level will decrease   Outcome: Ongoing  PRN tylenol  Goal: Control of acute pain  Control of acute pain   Outcome: Ongoing  PRN tylenol   Goal: Control of chronic pain  Control of chronic pain   Outcome: Ongoing  PRN tylenol     Problem: Falls - Risk of:  Goal: Will remain free from falls  Will remain free from falls   Outcome: Ongoing  Bed alarm on. Call light is with in reach. Goal: Absence of physical injury  Absence of physical injury   Outcome: Ongoing  Free from injury    Problem: Discharge Planning:  Goal: Discharged to appropriate level of care  Discharged to appropriate level of care  Outcome: Ongoing  Discharge home with wife. Date and time pending. Comments: Care plan reviewed with patient. Patient  verbalize understanding of the plan of care and contribute to goal setting.

## 2018-11-10 NOTE — PROGRESS NOTES
· Problem: Moderate malnutrition, In context of acute illness or injury  · Etiology: related to Insufficient energy/nutrient consumption     Signs and symptoms:  as evidenced by Intake 0-25%    Objective Information:  · Nutrition-Focused Physical Findings:  APPEARS LETHARGIC, MILD FAT & MUSCLE LOSS  · Wound Type: None  · Current Nutrition Therapies:  · Oral Diet Orders: Cardiac   · Oral Diet intake: 1-25%  · Oral Nutrition Supplement (ONS) Orders: Diabetic Oral Supplement  · ONS intake:  (new order)  · Anthropometric Measures:  · Ht: 6' (182.9 cm)   · Current Body Wt: 205 lb 14.6 oz (93.4 kg) (stated wt.)  · Admission Body Wt: 201 lb 8 oz (91.4 kg) (11/6/18 on 6K was last actual wt. )  · Usual Body Wt:  (219-220# per pt, per EMR: 10/21/18: 216# 4.3OZ, (no edema), 10/26/18): 211# 11.2oz, 11/5/18: 206# 4.8oz )  · % Weight Change: 5.1% wt. loss in last 20 days, pt. admitted with CHF, on Bumex,     · [place] Body Wt: 178 lb (80.7 kg),   · BMI Classification: BMI 25.0 - 29.9 Overweight    Nutrition Interventions:   Continue current diet, Start ONS  Continued Inpatient Monitoring, Education not appropriate at this time, Coordination of Care (Pt. received CHF diet education/handouts on 11/6/18 by our staff, )    Nutrition Evaluation:   · Evaluation: Goals set   · Goals: Pt. will consume 75% or more at meals during LOS.      · Monitoring: Meal Intake, Supplement Intake, Diet Tolerance, Weight, Skin Integrity, Pertinent Labs, Patient/Family Education, Monitor Hemodynamic Status, Monitor Bowel Function      Electronically signed by Danni Lambert RD, LD on 11/10/18 at 12:04 PM    Contact Number: [phone]

## 2018-11-11 LAB
ANION GAP SERPL CALCULATED.3IONS-SCNC: 13 MEQ/L (ref 8–16)
BLOOD CULTURE, ROUTINE: NORMAL
BLOOD CULTURE, ROUTINE: NORMAL
BUN BLDV-MCNC: 38 MG/DL (ref 7–22)
CALCIUM SERPL-MCNC: 9.5 MG/DL (ref 8.5–10.5)
CHLORIDE BLD-SCNC: 97 MEQ/L (ref 98–111)
CO2: 30 MEQ/L (ref 23–33)
CREAT SERPL-MCNC: 1.7 MG/DL (ref 0.4–1.2)
EKG ATRIAL RATE: 86 BPM
EKG P AXIS: 38 DEGREES
EKG P-R INTERVAL: 132 MS
EKG Q-T INTERVAL: 430 MS
EKG QRS DURATION: 156 MS
EKG QTC CALCULATION (BAZETT): 514 MS
EKG R AXIS: 11 DEGREES
EKG T AXIS: -123 DEGREES
EKG VENTRICULAR RATE: 86 BPM
GFR SERPL CREATININE-BSD FRML MDRD: 40 ML/MIN/1.73M2
GLUCOSE BLD-MCNC: 118 MG/DL (ref 70–108)
GLUCOSE BLD-MCNC: 127 MG/DL (ref 70–108)
GLUCOSE BLD-MCNC: 138 MG/DL (ref 70–108)
GLUCOSE BLD-MCNC: 139 MG/DL (ref 70–108)
GLUCOSE BLD-MCNC: 151 MG/DL (ref 70–108)
METHYLMALONIC ACID: NORMAL
POTASSIUM REFLEX MAGNESIUM: 4.2 MEQ/L (ref 3.5–5.2)
POTASSIUM SERPL-SCNC: 4.2 MEQ/L (ref 3.5–5.2)
SODIUM BLD-SCNC: 140 MEQ/L (ref 135–145)

## 2018-11-11 PROCEDURE — 99999 PR OFFICE/OUTPT VISIT,PROCEDURE ONLY: CPT | Performed by: NURSE PRACTITIONER

## 2018-11-11 PROCEDURE — APPSS30 APP SPLIT SHARED TIME 16-30 MINUTES: Performed by: NURSE PRACTITIONER

## 2018-11-11 PROCEDURE — 99232 SBSQ HOSP IP/OBS MODERATE 35: CPT | Performed by: FAMILY MEDICINE

## 2018-11-11 PROCEDURE — 82948 REAGENT STRIP/BLOOD GLUCOSE: CPT

## 2018-11-11 PROCEDURE — 93010 ELECTROCARDIOGRAM REPORT: CPT | Performed by: NUCLEAR MEDICINE

## 2018-11-11 PROCEDURE — 51798 US URINE CAPACITY MEASURE: CPT

## 2018-11-11 PROCEDURE — 6370000000 HC RX 637 (ALT 250 FOR IP): Performed by: FAMILY MEDICINE

## 2018-11-11 PROCEDURE — 80048 BASIC METABOLIC PNL TOTAL CA: CPT

## 2018-11-11 PROCEDURE — 1200000003 HC TELEMETRY R&B

## 2018-11-11 PROCEDURE — 36415 COLL VENOUS BLD VENIPUNCTURE: CPT

## 2018-11-11 PROCEDURE — 6370000000 HC RX 637 (ALT 250 FOR IP): Performed by: INTERNAL MEDICINE

## 2018-11-11 PROCEDURE — 99231 SBSQ HOSP IP/OBS SF/LOW 25: CPT | Performed by: PHYSICIAN ASSISTANT

## 2018-11-11 PROCEDURE — 6370000000 HC RX 637 (ALT 250 FOR IP): Performed by: HOSPITALIST

## 2018-11-11 PROCEDURE — 2580000003 HC RX 258: Performed by: HOSPITALIST

## 2018-11-11 PROCEDURE — 6360000002 HC RX W HCPCS: Performed by: HOSPITALIST

## 2018-11-11 PROCEDURE — 99232 SBSQ HOSP IP/OBS MODERATE 35: CPT | Performed by: INTERNAL MEDICINE

## 2018-11-11 PROCEDURE — 93005 ELECTROCARDIOGRAM TRACING: CPT | Performed by: FAMILY MEDICINE

## 2018-11-11 RX ORDER — GUAIFENESIN 600 MG/1
600 TABLET, EXTENDED RELEASE ORAL 2 TIMES DAILY
Status: DISCONTINUED | OUTPATIENT
Start: 2018-11-11 | End: 2018-11-14

## 2018-11-11 RX ORDER — POLYETHYLENE GLYCOL 3350 17 G/17G
17 POWDER, FOR SOLUTION ORAL DAILY PRN
Status: DISCONTINUED | OUTPATIENT
Start: 2018-11-11 | End: 2018-11-14

## 2018-11-11 RX ADMIN — ATORVASTATIN CALCIUM 80 MG: 80 TABLET, FILM COATED ORAL at 20:48

## 2018-11-11 RX ADMIN — MIDODRINE HYDROCHLORIDE 10 MG: 10 TABLET ORAL at 11:26

## 2018-11-11 RX ADMIN — CEFTRIAXONE SODIUM 1 G: 1 INJECTION, POWDER, FOR SOLUTION INTRAMUSCULAR; INTRAVENOUS at 11:26

## 2018-11-11 RX ADMIN — METOPROLOL TARTRATE 25 MG: 25 TABLET ORAL at 20:48

## 2018-11-11 RX ADMIN — GABAPENTIN 800 MG: 400 CAPSULE ORAL at 09:06

## 2018-11-11 RX ADMIN — GUAIFENESIN 600 MG: 600 TABLET, EXTENDED RELEASE ORAL at 20:55

## 2018-11-11 RX ADMIN — ASPIRIN 81 MG 81 MG: 81 TABLET ORAL at 09:06

## 2018-11-11 RX ADMIN — BUMETANIDE 0.5 MG: 1 TABLET ORAL at 17:15

## 2018-11-11 RX ADMIN — MIDODRINE HYDROCHLORIDE 10 MG: 10 TABLET ORAL at 17:16

## 2018-11-11 RX ADMIN — MIDODRINE HYDROCHLORIDE 10 MG: 10 TABLET ORAL at 09:05

## 2018-11-11 RX ADMIN — Medication 10 ML: at 09:06

## 2018-11-11 RX ADMIN — LEVOTHYROXINE SODIUM 50 MCG: 50 TABLET ORAL at 09:05

## 2018-11-11 ASSESSMENT — PAIN SCALES - GENERAL
PAINLEVEL_OUTOF10: 0
PAINLEVEL_OUTOF10: 0

## 2018-11-11 NOTE — PROGRESS NOTES
function.   Ejection fraction was estimated at 25-30%. There was severe global   hypokinesis and the wall thickness was within normal limits.      Right Atrium   Right atrial size was normal with no thrombus identified. ATRIAL SEPTUM:   No defect or patent foramen ovale was identified. There was no   right-to-left shunt, with provocative maneuvers to increase right atrial   pressure.      Right Ventricle   The right ventricular size was normal with normal systolic function and   wall thickness.      Pericardial Effusion   The pericardium was normal in appearance with no evidence of a pericardial   effusion.      Pleural Effusion   No evidence of pleural effusion.      Procedure Descriptor   -The transesophageal approach was used.   -Probe inserted with no complications by cardiologist.   -The study included complete 2D imaging, complete spectral doppler, and   color doppler.   -Procedure performed without complications.        Left Heart Cath:   FINDINGS:  Left main vessel is of moderate size caliber and shows no  focal luminal narrowing.  The left anterior descending artery is of  moderate size caliber and has a 50% mid vessel narrowing.  The distal  vessel is of good caliber.     The circumflex vessel has a proximal 70% stenosis with a large OM  branch with an 80% proximal stenosis including the small aneurysm.     There is an OM2 vessel distal to this which is of small caliber and  diffusely diseased.     The right coronary artery is 100% occluded proximally     Swapnil Nayak MD  D: 10/29/2018     Lab Data:    Cardiac Enzymes:  No results for input(s): CKTOTAL, CKMB, CKMBINDEX, TROPONINI in the last 72 hours.     CBC:   Lab Results   Component Value Date    WBC 6.2 11/10/2018    RBC 3.02 11/10/2018    RBC 0-5 10/14/2016    HGB 9.3 11/10/2018    HCT 29.2 11/10/2018     11/10/2018       CMP:  Lab Results   Component Value Date     11/10/2018    K 4.0 11/10/2018    CL 98 11/10/2018    CO2 28 11/10/2018

## 2018-11-11 NOTE — PLAN OF CARE
Problem: Pain:  Goal: Pain level will decrease  Pain level will decrease   Outcome: Ongoing  Patient voices pain 0/10. Patients pain goal is 0/10. PRN pain medications given as ordered. Problem: Falls - Risk of:  Goal: Will remain free from falls  Will remain free from falls   Outcome: Ongoing  Patient absent of falls this shift, fall band intact, bed alarm set, falling star magnet in place. Problem: Discharge Planning:  Goal: Discharged to appropriate level of care  Discharged to appropriate level of care   Outcome: Ongoing  Patient plans to be transferred to another unit prior to CABG procedure. Comments: Care plan reviewed with patient. Patient verbalize understanding of the plan of care and contribute to goal setting.

## 2018-11-12 ENCOUNTER — APPOINTMENT (OUTPATIENT)
Dept: GENERAL RADIOLOGY | Age: 73
DRG: 219 | End: 2018-11-12
Payer: MEDICARE

## 2018-11-12 LAB
ANION GAP SERPL CALCULATED.3IONS-SCNC: 12 MEQ/L (ref 8–16)
BUN BLDV-MCNC: 34 MG/DL (ref 7–22)
CALCIUM SERPL-MCNC: 9.5 MG/DL (ref 8.5–10.5)
CHLORIDE BLD-SCNC: 98 MEQ/L (ref 98–111)
CO2: 29 MEQ/L (ref 23–33)
CREAT SERPL-MCNC: 1.3 MG/DL (ref 0.4–1.2)
ERYTHROCYTE [DISTWIDTH] IN BLOOD BY AUTOMATED COUNT: 15.3 % (ref 11.5–14.5)
ERYTHROCYTE [DISTWIDTH] IN BLOOD BY AUTOMATED COUNT: 55.1 FL (ref 35–45)
GFR SERPL CREATININE-BSD FRML MDRD: 54 ML/MIN/1.73M2
GLUCOSE BLD-MCNC: 119 MG/DL (ref 70–108)
GLUCOSE BLD-MCNC: 126 MG/DL (ref 70–108)
GLUCOSE BLD-MCNC: 157 MG/DL (ref 70–108)
GLUCOSE BLD-MCNC: 158 MG/DL (ref 70–108)
GLUCOSE BLD-MCNC: 176 MG/DL (ref 70–108)
HCT VFR BLD CALC: 29 % (ref 42–52)
HEMOGLOBIN: 9.2 GM/DL (ref 14–18)
MCH RBC QN AUTO: 31.5 PG (ref 26–33)
MCHC RBC AUTO-ENTMCNC: 31.7 GM/DL (ref 32.2–35.5)
MCV RBC AUTO: 99.3 FL (ref 80–94)
PLATELET # BLD: 212 THOU/MM3 (ref 130–400)
PMV BLD AUTO: 10.8 FL (ref 9.4–12.4)
POTASSIUM REFLEX MAGNESIUM: 4.1 MEQ/L (ref 3.5–5.2)
POTASSIUM SERPL-SCNC: 4.1 MEQ/L (ref 3.5–5.2)
RBC # BLD: 2.92 MILL/MM3 (ref 4.7–6.1)
SODIUM BLD-SCNC: 139 MEQ/L (ref 135–145)
WBC # BLD: 5.6 THOU/MM3 (ref 4.8–10.8)

## 2018-11-12 PROCEDURE — G8987 SELF CARE CURRENT STATUS: HCPCS

## 2018-11-12 PROCEDURE — 6370000000 HC RX 637 (ALT 250 FOR IP): Performed by: HOSPITALIST

## 2018-11-12 PROCEDURE — 71046 X-RAY EXAM CHEST 2 VIEWS: CPT

## 2018-11-12 PROCEDURE — 6370000000 HC RX 637 (ALT 250 FOR IP): Performed by: FAMILY MEDICINE

## 2018-11-12 PROCEDURE — 6360000002 HC RX W HCPCS: Performed by: HOSPITALIST

## 2018-11-12 PROCEDURE — 99232 SBSQ HOSP IP/OBS MODERATE 35: CPT | Performed by: PHYSICIAN ASSISTANT

## 2018-11-12 PROCEDURE — 99232 SBSQ HOSP IP/OBS MODERATE 35: CPT | Performed by: INTERNAL MEDICINE

## 2018-11-12 PROCEDURE — 6370000000 HC RX 637 (ALT 250 FOR IP): Performed by: INTERNAL MEDICINE

## 2018-11-12 PROCEDURE — 99233 SBSQ HOSP IP/OBS HIGH 50: CPT | Performed by: HOSPITALIST

## 2018-11-12 PROCEDURE — G8979 MOBILITY GOAL STATUS: HCPCS

## 2018-11-12 PROCEDURE — 36415 COLL VENOUS BLD VENIPUNCTURE: CPT

## 2018-11-12 PROCEDURE — 2580000003 HC RX 258: Performed by: HOSPITALIST

## 2018-11-12 PROCEDURE — G8988 SELF CARE GOAL STATUS: HCPCS

## 2018-11-12 PROCEDURE — 97161 PT EVAL LOW COMPLEX 20 MIN: CPT

## 2018-11-12 PROCEDURE — G8978 MOBILITY CURRENT STATUS: HCPCS

## 2018-11-12 PROCEDURE — 80048 BASIC METABOLIC PNL TOTAL CA: CPT

## 2018-11-12 PROCEDURE — 82948 REAGENT STRIP/BLOOD GLUCOSE: CPT

## 2018-11-12 PROCEDURE — 97165 OT EVAL LOW COMPLEX 30 MIN: CPT

## 2018-11-12 PROCEDURE — 2709999900 HC NON-CHARGEABLE SUPPLY

## 2018-11-12 PROCEDURE — 85027 COMPLETE CBC AUTOMATED: CPT

## 2018-11-12 PROCEDURE — 2060000000 HC ICU INTERMEDIATE R&B

## 2018-11-12 RX ORDER — HEPARIN SODIUM 5000 [USP'U]/ML
5000 INJECTION, SOLUTION INTRAVENOUS; SUBCUTANEOUS EVERY 8 HOURS SCHEDULED
Status: DISCONTINUED | OUTPATIENT
Start: 2018-11-12 | End: 2018-11-14

## 2018-11-12 RX ADMIN — Medication 10 ML: at 09:46

## 2018-11-12 RX ADMIN — METOPROLOL TARTRATE 25 MG: 25 TABLET ORAL at 09:45

## 2018-11-12 RX ADMIN — GABAPENTIN 800 MG: 400 CAPSULE ORAL at 21:36

## 2018-11-12 RX ADMIN — Medication 10 ML: at 21:39

## 2018-11-12 RX ADMIN — ASPIRIN 81 MG 81 MG: 81 TABLET ORAL at 09:53

## 2018-11-12 RX ADMIN — MIDODRINE HYDROCHLORIDE 10 MG: 10 TABLET ORAL at 21:36

## 2018-11-12 RX ADMIN — GUAIFENESIN 600 MG: 600 TABLET, EXTENDED RELEASE ORAL at 21:36

## 2018-11-12 RX ADMIN — LEVOTHYROXINE SODIUM 50 MCG: 50 TABLET ORAL at 04:18

## 2018-11-12 RX ADMIN — BUMETANIDE 0.5 MG: 1 TABLET ORAL at 09:46

## 2018-11-12 RX ADMIN — HEPARIN SODIUM 5000 UNITS: 5000 INJECTION INTRAVENOUS; SUBCUTANEOUS at 21:36

## 2018-11-12 RX ADMIN — CEFTRIAXONE SODIUM 1 G: 1 INJECTION, POWDER, FOR SOLUTION INTRAMUSCULAR; INTRAVENOUS at 12:14

## 2018-11-12 RX ADMIN — MIDODRINE HYDROCHLORIDE 10 MG: 10 TABLET ORAL at 09:45

## 2018-11-12 RX ADMIN — ATORVASTATIN CALCIUM 80 MG: 80 TABLET, FILM COATED ORAL at 21:35

## 2018-11-12 RX ADMIN — GABAPENTIN 800 MG: 400 CAPSULE ORAL at 09:45

## 2018-11-12 RX ADMIN — MIDODRINE HYDROCHLORIDE 10 MG: 10 TABLET ORAL at 12:14

## 2018-11-12 RX ADMIN — GUAIFENESIN 600 MG: 600 TABLET, EXTENDED RELEASE ORAL at 09:45

## 2018-11-12 RX ADMIN — ACETAMINOPHEN 650 MG: 325 TABLET ORAL at 19:46

## 2018-11-12 ASSESSMENT — PAIN SCALES - GENERAL
PAINLEVEL_OUTOF10: 0
PAINLEVEL_OUTOF10: 6
PAINLEVEL_OUTOF10: 0
PAINLEVEL_OUTOF10: 0
PAINLEVEL_OUTOF10: 5

## 2018-11-12 ASSESSMENT — PAIN DESCRIPTION - PROGRESSION
CLINICAL_PROGRESSION: GRADUALLY WORSENING
CLINICAL_PROGRESSION: GRADUALLY IMPROVING

## 2018-11-12 ASSESSMENT — PAIN DESCRIPTION - LOCATION
LOCATION: MOUTH
LOCATION: MOUTH

## 2018-11-12 ASSESSMENT — PAIN DESCRIPTION - FREQUENCY
FREQUENCY: INTERMITTENT
FREQUENCY: CONTINUOUS

## 2018-11-12 ASSESSMENT — PAIN DESCRIPTION - DESCRIPTORS
DESCRIPTORS: ACHING;SORE
DESCRIPTORS: ACHING;SORE

## 2018-11-12 ASSESSMENT — PAIN DESCRIPTION - ONSET
ONSET: ON-GOING
ONSET: ON-GOING

## 2018-11-12 ASSESSMENT — PAIN DESCRIPTION - PAIN TYPE
TYPE: SURGICAL PAIN
TYPE: SURGICAL PAIN

## 2018-11-12 NOTE — PROGRESS NOTES
notified    Plan:  Times per week: 3-5X GM  Times per day: Daily  Specific instructions for Next Treatment: therex and mobility  Current Treatment Recommendations: Strengthening, Functional Mobility Training, Transfer Training, Balance Training, Endurance Training, Gait Training, Stair training, Patient/Caregiver Education & Training, Safety Education & Training, Home Exercise Program, Equipment Evaluation, Education, & procurement    Goals:  Patient goals : not stated  Short term goals  Time Frame for Short term goals: 2 weeks  Short term goal 1: bed mobility with MOD I to get in/out of bed  Short term goal 2: transfer with MOD I to get in/out of chairs  Short term goal 3: amb 150'x1 with/without AD and MOD I to walk safely in home  Short term goal 4: negotiate 4 steps with HRs and MOD I to enter home safely  Long term goals  Time Frame for Long term goals : no LTGs set secondary to short ELOS    Evaluation Complexity: Based on the findings of patient history, examination, clinical presentation, and decision making during this evaluation, the evaluation of Noe Grant  is of low complexity. PT G-Codes  Functional Limitation: Mobility: Walking and moving around  Mobility: Walking and Moving Around Current Status (): At least 20 percent but less than 40 percent impaired, limited or restricted  Mobility: Walking and Moving Around Goal Status ():  At least 20 percent but less than 40 percent impaired, limited or restricted       AM-PAC Inpatient Mobility without Stair Climbing Raw Score : 17  AM-PAC Inpatient without Stair Climbing T-Scale Score : 48.47  Mobility Inpatient CMS 0-100% Score: 32.72  Mobility Inpatient without Stair CMS G-Code Modifier : MAURICE

## 2018-11-12 NOTE — PROGRESS NOTES
Oral BID    levothyroxine  50 mcg Oral Daily    midodrine  10 mg Oral TID WC    metoprolol tartrate  25 mg Oral BID    gabapentin  800 mg Oral BID    insulin lispro  0-6 Units Subcutaneous 4x Daily AC & HS      dextrose         polyethylene glycol 17 g Daily PRN   sodium chloride flush 10 mL PRN   magnesium hydroxide 30 mL Daily PRN   ondansetron 4 mg Q6H PRN   nitroGLYCERIN 0.4 mg Q5 Min PRN   glucose 15 g PRN   dextrose 12.5 g PRN   glucagon (rDNA) 1 mg PRN   dextrose 100 mL/hr PRN   acetaminophen 650 mg Q6H PRN       Lab Data:    Cardiac Enzymes:  No results for input(s): CKTOTAL, CKMB, CKMBINDEX, TROPONINI in the last 72 hours.     CBC:   Lab Results   Component Value Date    WBC 5.6 11/12/2018    RBC 2.92 11/12/2018    RBC 0-5 10/14/2016    HGB 9.2 11/12/2018    HCT 29.0 11/12/2018     11/12/2018       CMP:  Lab Results   Component Value Date     11/12/2018    K 4.1 11/12/2018    K 4.2 11/11/2018    CL 98 11/12/2018    CO2 29 11/12/2018    BUN 34 11/12/2018    CREATININE 1.3 11/12/2018    LABGLOM 54 11/12/2018    GLUCOSE 126 11/12/2018    GLUCOSE 134 04/09/2018    CALCIUM 9.5 11/12/2018       Hepatic Function Panel:  Lab Results   Component Value Date    ALKPHOS 85 11/10/2018    ALT 19 11/10/2018    AST 26 11/10/2018    PROT 7.4 11/10/2018    BILITOT 0.8 11/10/2018    BILITOT NEGATIVE 10/14/2016    BILIDIR <0.2 11/05/2018    LABALBU 3.5 11/10/2018       Magnesium:    Lab Results   Component Value Date    MG 2.4 11/10/2018       PT/INR:    Lab Results   Component Value Date    INR 1.18 11/10/2018       HgBA1c:    Lab Results   Component Value Date    LABA1C 6.0 11/07/2018       FLP:  Lab Results   Component Value Date    TRIG 172 11/10/2018    HDL 27 11/10/2018    LDLCALC 70 11/10/2018       TSH:    Lab Results   Component Value Date    TSH 2.820 10/23/2018         Assessment:    Acute on chronic systolic CHF - clinically compensated  ICMP - ef 25-30 per MICHELE 10/25/18 - wearing lifevest  MV

## 2018-11-12 NOTE — FLOWSHEET NOTE
Patient is a 68-year-old male who was sitting in his chair with the TV on upon entry to his room. He was approachable, but seemed to be reserved and anxious during the encounter. He expressed recovering from having open-heart surgery. He added that he was able to get up and move about the area. He shared that he was a retired  in [place]. Patient is anxious in appearance and had little to share by way of conversation, simply answering briefly questions which were asked. He is sustained through support from his wife and other family members. A spiritual assessment was not able to be conducted at this time due to the patient receiving a phone call during the encounter which was answered. Lg Jimenez will attempt to follow-up with the patient while rounding in the unit during his shift. Chaplains remain available for further emotional and spiritual support as needed. 11/12/18 0900   Encounter Summary   Services provided to: Patient   Referral/Consult From: Rounding   Support System Spouse   Continue Visiting Yes  (11/12/2018)   Complexity of Encounter Low   Length of Encounter 30 minutes   Spiritual/Yazdanism   Type Spiritual support   Assessment Approachable; Anxious   Intervention Active listening;Explored feelings, thoughts, concerns; Discussed illness/injury and it's impact;Sustaining presence/ Ministry of presence   Outcome Receptive; Expressed feelings/needs/concerns;Engaged in conversation

## 2018-11-12 NOTE — CARE COORDINATION
prescriptions? yes  Patient is taking medications as prescribed? yes  Cause for readmission? Chest pain    Readmission Risk Score: 21%    Discharge Planning  Current Residence:  Private Residence  Living Arrangements:  Spouse/Significant Other   Support Systems:  Spouse/Significant Other, Family Members, Children  Current Services PTA:   no  Potential Assistance Needed:  N/A  Potential Assistance Purchasing Medications:  No  Does patient want to participate in local refill/ meds to beds program?  No  Type of Home Care Services:  None  Patient expects to be discharged to:  home w/ wife  Expected Discharge date:  11/10/18  Follow Up Appointment: Best Day/ Time: Tuesday AM    Discharge Plan: Spoke with Dr Jeremy Mayfield earlier; updated patient needs to get to dental appointment for tooth extractions at [facility] that will enable him to have CABG and valvular repair this coming Wed. Life Vest in place; on phone with Donavan Palacio CNP re: plan to remove teeth p/t surgery. 1155 am-[place] spoke with Dr Jeremy Mayfield re: plan.        Evaluation: no

## 2018-11-12 NOTE — PROGRESS NOTES
G-codes  Functional Assessment Tool Used: [facility]  Score: 19  Functional Limitation: Self care  Self Care Current Status (): At least 40 percent but less than 60 percent impaired, limited or restricted  Self Care Goal Status ():  At least 20 percent but less than 40 percent impaired, limited or restricted  AM-[facility] Inpatient Daily Activity Raw Score: 19  AM-PAC Inpatient ADL T-Scale Score : 40.22  ADL Inpatient CMS 0-100% Score: 42.8  ADL Inpatient CMS G-Code Modifier : CK

## 2018-11-13 ENCOUNTER — ANESTHESIA EVENT (OUTPATIENT)
Dept: OPERATING ROOM | Age: 73
DRG: 219 | End: 2018-11-13
Payer: MEDICARE

## 2018-11-13 ENCOUNTER — APPOINTMENT (OUTPATIENT)
Dept: INTERVENTIONAL RADIOLOGY/VASCULAR | Age: 73
DRG: 219 | End: 2018-11-13
Payer: MEDICARE

## 2018-11-13 LAB
ALLEN TEST: POSITIVE
ANION GAP SERPL CALCULATED.3IONS-SCNC: 12 MEQ/L (ref 8–16)
BACTERIA: ABNORMAL
BASE EXCESS (CALCULATED): 6.8 MMOL/L (ref -2.5–2.5)
BILIRUBIN URINE: NEGATIVE
BLOOD, URINE: NEGATIVE
BUN BLDV-MCNC: 31 MG/DL (ref 7–22)
CALCIUM SERPL-MCNC: 9.7 MG/DL (ref 8.5–10.5)
CASTS: ABNORMAL /LPF
CASTS: ABNORMAL /LPF
CHARACTER, URINE: CLEAR
CHLORIDE BLD-SCNC: 98 MEQ/L (ref 98–111)
CO2: 30 MEQ/L (ref 23–33)
COLLECTED BY:: ABNORMAL
COLOR: YELLOW
CREAT SERPL-MCNC: 1.2 MG/DL (ref 0.4–1.2)
CRYSTALS: ABNORMAL
DEVICE: ABNORMAL
EPITHELIAL CELLS, UA: ABNORMAL /HPF
ERYTHROCYTE [DISTWIDTH] IN BLOOD BY AUTOMATED COUNT: 15.5 % (ref 11.5–14.5)
ERYTHROCYTE [DISTWIDTH] IN BLOOD BY AUTOMATED COUNT: 55.1 FL (ref 35–45)
GFR SERPL CREATININE-BSD FRML MDRD: 60 ML/MIN/1.73M2
GLUCOSE BLD-MCNC: 125 MG/DL (ref 70–108)
GLUCOSE BLD-MCNC: 128 MG/DL (ref 70–108)
GLUCOSE BLD-MCNC: 145 MG/DL (ref 70–108)
GLUCOSE BLD-MCNC: 158 MG/DL (ref 70–108)
GLUCOSE BLD-MCNC: 160 MG/DL (ref 70–108)
GLUCOSE, URINE: NEGATIVE MG/DL
HCO3: 32 MMOL/L (ref 23–28)
HCT VFR BLD CALC: 28.4 % (ref 42–52)
HEMOGLOBIN: 8.8 GM/DL (ref 14–18)
KETONES, URINE: NEGATIVE
LEUKOCYTE EST, POC: ABNORMAL
MCH RBC QN AUTO: 31.1 PG (ref 26–33)
MCHC RBC AUTO-ENTMCNC: 31 GM/DL (ref 32.2–35.5)
MCV RBC AUTO: 100.4 FL (ref 80–94)
MISCELLANEOUS LAB TEST RESULT: ABNORMAL
MISCELLANEOUS LAB TEST RESULT: ABNORMAL
NITRITE, URINE: NEGATIVE
O2 SATURATION: 88 %
PCO2: 45 MMHG (ref 35–45)
PH BLOOD GAS: 7.45 (ref 7.35–7.45)
PH UA: 5.5
PLATELET # BLD: 193 THOU/MM3 (ref 130–400)
PMV BLD AUTO: 10.3 FL (ref 9.4–12.4)
PO2: 53 MMHG (ref 71–104)
POTASSIUM REFLEX MAGNESIUM: 4 MEQ/L (ref 3.5–5.2)
PROTEIN UA: NEGATIVE MG/DL
RBC # BLD: 2.83 MILL/MM3 (ref 4.7–6.1)
RBC URINE: ABNORMAL /HPF
RENAL EPITHELIAL, UA: ABNORMAL
SODIUM BLD-SCNC: 140 MEQ/L (ref 135–145)
SOURCE, BLOOD GAS: ABNORMAL
SPECIFIC GRAVITY UA: 1.02 (ref 1–1.03)
UROBILINOGEN, URINE: 0.2 EU/DL
WBC # BLD: 5.6 THOU/MM3 (ref 4.8–10.8)
WBC UA: ABNORMAL /HPF
YEAST: ABNORMAL

## 2018-11-13 PROCEDURE — 2060000000 HC ICU INTERMEDIATE R&B

## 2018-11-13 PROCEDURE — 6370000000 HC RX 637 (ALT 250 FOR IP): Performed by: FAMILY MEDICINE

## 2018-11-13 PROCEDURE — 86850 RBC ANTIBODY SCREEN: CPT

## 2018-11-13 PROCEDURE — 81001 URINALYSIS AUTO W/SCOPE: CPT

## 2018-11-13 PROCEDURE — 82803 BLOOD GASES ANY COMBINATION: CPT

## 2018-11-13 PROCEDURE — 86901 BLOOD TYPING SEROLOGIC RH(D): CPT

## 2018-11-13 PROCEDURE — 36600 WITHDRAWAL OF ARTERIAL BLOOD: CPT

## 2018-11-13 PROCEDURE — 2580000003 HC RX 258: Performed by: HOSPITALIST

## 2018-11-13 PROCEDURE — 93880 EXTRACRANIAL BILAT STUDY: CPT

## 2018-11-13 PROCEDURE — 2709999900 HC NON-CHARGEABLE SUPPLY

## 2018-11-13 PROCEDURE — 99231 SBSQ HOSP IP/OBS SF/LOW 25: CPT | Performed by: PHYSICIAN ASSISTANT

## 2018-11-13 PROCEDURE — 97116 GAIT TRAINING THERAPY: CPT

## 2018-11-13 PROCEDURE — 99232 SBSQ HOSP IP/OBS MODERATE 35: CPT | Performed by: INTERNAL MEDICINE

## 2018-11-13 PROCEDURE — 6370000000 HC RX 637 (ALT 250 FOR IP): Performed by: HOSPITALIST

## 2018-11-13 PROCEDURE — 6370000000 HC RX 637 (ALT 250 FOR IP): Performed by: INTERNAL MEDICINE

## 2018-11-13 PROCEDURE — 6360000002 HC RX W HCPCS: Performed by: HOSPITALIST

## 2018-11-13 PROCEDURE — 36415 COLL VENOUS BLD VENIPUNCTURE: CPT

## 2018-11-13 PROCEDURE — 99233 SBSQ HOSP IP/OBS HIGH 50: CPT | Performed by: INTERNAL MEDICINE

## 2018-11-13 PROCEDURE — 2580000003 HC RX 258: Performed by: PHYSICIAN ASSISTANT

## 2018-11-13 PROCEDURE — 36592 COLLECT BLOOD FROM PICC: CPT

## 2018-11-13 PROCEDURE — 82948 REAGENT STRIP/BLOOD GLUCOSE: CPT

## 2018-11-13 PROCEDURE — 86923 COMPATIBILITY TEST ELECTRIC: CPT

## 2018-11-13 PROCEDURE — 97110 THERAPEUTIC EXERCISES: CPT

## 2018-11-13 PROCEDURE — 85027 COMPLETE CBC AUTOMATED: CPT

## 2018-11-13 PROCEDURE — 80048 BASIC METABOLIC PNL TOTAL CA: CPT

## 2018-11-13 PROCEDURE — 86900 BLOOD TYPING SEROLOGIC ABO: CPT

## 2018-11-13 RX ORDER — SODIUM CHLORIDE 0.9 % (FLUSH) 0.9 %
10 SYRINGE (ML) INJECTION EVERY 12 HOURS SCHEDULED
Status: DISCONTINUED | OUTPATIENT
Start: 2018-11-13 | End: 2018-11-14

## 2018-11-13 RX ORDER — SODIUM CHLORIDE 0.9 % (FLUSH) 0.9 %
10 SYRINGE (ML) INJECTION PRN
Status: DISCONTINUED | OUTPATIENT
Start: 2018-11-13 | End: 2018-11-14

## 2018-11-13 RX ORDER — HYDROCODONE BITARTRATE AND ACETAMINOPHEN 5; 325 MG/1; MG/1
1 TABLET ORAL EVERY 6 HOURS PRN
Status: DISCONTINUED | OUTPATIENT
Start: 2018-11-13 | End: 2018-11-14

## 2018-11-13 RX ORDER — ASPIRIN 81 MG/1
81 TABLET ORAL DAILY
Status: DISCONTINUED | OUTPATIENT
Start: 2018-11-14 | End: 2018-11-14

## 2018-11-13 RX ORDER — ACETAMINOPHEN 325 MG/1
325 TABLET ORAL EVERY 6 HOURS PRN
Status: DISCONTINUED | OUTPATIENT
Start: 2018-11-13 | End: 2018-11-14

## 2018-11-13 RX ORDER — LISINOPRIL 5 MG/1
5 TABLET ORAL DAILY
Status: ON HOLD | COMMUNITY
End: 2018-12-06 | Stop reason: HOSPADM

## 2018-11-13 RX ADMIN — LEVOTHYROXINE SODIUM 50 MCG: 50 TABLET ORAL at 05:56

## 2018-11-13 RX ADMIN — MAGNESIUM HYDROXIDE 30 ML: 400 SUSPENSION ORAL at 08:44

## 2018-11-13 RX ADMIN — HEPARIN SODIUM 5000 UNITS: 5000 INJECTION INTRAVENOUS; SUBCUTANEOUS at 12:16

## 2018-11-13 RX ADMIN — GUAIFENESIN 600 MG: 600 TABLET, EXTENDED RELEASE ORAL at 08:39

## 2018-11-13 RX ADMIN — Medication 1 UNITS: at 16:50

## 2018-11-13 RX ADMIN — MIDODRINE HYDROCHLORIDE 10 MG: 10 TABLET ORAL at 11:52

## 2018-11-13 RX ADMIN — GABAPENTIN 800 MG: 400 CAPSULE ORAL at 20:29

## 2018-11-13 RX ADMIN — Medication 10 ML: at 20:30

## 2018-11-13 RX ADMIN — MIDODRINE HYDROCHLORIDE 10 MG: 10 TABLET ORAL at 08:39

## 2018-11-13 RX ADMIN — Medication 1 UNITS: at 21:28

## 2018-11-13 RX ADMIN — METOPROLOL TARTRATE 25 MG: 25 TABLET ORAL at 08:40

## 2018-11-13 RX ADMIN — ACETAMINOPHEN 650 MG: 325 TABLET ORAL at 02:36

## 2018-11-13 RX ADMIN — HYDROCODONE BITARTRATE AND ACETAMINOPHEN 1 TABLET: 5; 325 TABLET ORAL at 10:45

## 2018-11-13 RX ADMIN — Medication 1 UNITS: at 12:15

## 2018-11-13 RX ADMIN — CEFTRIAXONE SODIUM 1 G: 1 INJECTION, POWDER, FOR SOLUTION INTRAMUSCULAR; INTRAVENOUS at 10:45

## 2018-11-13 RX ADMIN — BUMETANIDE 0.5 MG: 1 TABLET ORAL at 16:49

## 2018-11-13 RX ADMIN — BUMETANIDE 0.5 MG: 1 TABLET ORAL at 08:39

## 2018-11-13 RX ADMIN — Medication 10 ML: at 08:48

## 2018-11-13 RX ADMIN — GABAPENTIN 800 MG: 400 CAPSULE ORAL at 08:40

## 2018-11-13 RX ADMIN — GUAIFENESIN 600 MG: 600 TABLET, EXTENDED RELEASE ORAL at 20:29

## 2018-11-13 RX ADMIN — HEPARIN SODIUM 5000 UNITS: 5000 INJECTION INTRAVENOUS; SUBCUTANEOUS at 05:56

## 2018-11-13 RX ADMIN — MIDODRINE HYDROCHLORIDE 10 MG: 10 TABLET ORAL at 16:49

## 2018-11-13 RX ADMIN — METOPROLOL TARTRATE 25 MG: 25 TABLET ORAL at 20:29

## 2018-11-13 RX ADMIN — ATORVASTATIN CALCIUM 80 MG: 80 TABLET, FILM COATED ORAL at 20:29

## 2018-11-13 RX ADMIN — ASPIRIN 81 MG 81 MG: 81 TABLET ORAL at 08:39

## 2018-11-13 ASSESSMENT — PAIN SCALES - GENERAL
PAINLEVEL_OUTOF10: 6
PAINLEVEL_OUTOF10: 0
PAINLEVEL_OUTOF10: 0
PAINLEVEL_OUTOF10: 6
PAINLEVEL_OUTOF10: 0

## 2018-11-13 ASSESSMENT — PAIN DESCRIPTION - DESCRIPTORS: DESCRIPTORS: ACHING;SORE

## 2018-11-13 ASSESSMENT — PAIN DESCRIPTION - PROGRESSION: CLINICAL_PROGRESSION: GRADUALLY WORSENING

## 2018-11-13 ASSESSMENT — PAIN DESCRIPTION - FREQUENCY: FREQUENCY: CONTINUOUS

## 2018-11-13 ASSESSMENT — PAIN DESCRIPTION - PAIN TYPE: TYPE: SURGICAL PAIN

## 2018-11-13 ASSESSMENT — PAIN DESCRIPTION - LOCATION: LOCATION: MOUTH

## 2018-11-13 ASSESSMENT — PAIN DESCRIPTION - ONSET: ONSET: ON-GOING

## 2018-11-13 NOTE — CARE COORDINATION
DISCHARGE BARRIERS  11/13/18, 11:45 AM    Reason for Referral: Discharge planning/OHS scheduled for 11/14/18  Mental Status: alert and oriented  Decision Making: patient is able to make own decisions with assistance/input from spouse  Family/Social/Home Environment: Patient is a 72year old  male. Spouse present and appears to be in good health and continues to work. They reside in a mobile home. Their grandchildren (ages 16 and 25 and still in high school) reside with them. There are 4 steps with handrails on both sides to enter the home. Patient states that he does not have any difficulty using the steps. Bathroom has a tub/shower combination and patient reports being able to get in and out safely and is able to do his own personal care. Spouse does most of the housework with assistance from their grandchildren. Both continue to drive. Discussed needs after discharge-home health with 24 assistance from family vs rehab/recuperation stay in an ECF. Questions answered re: medicare coverage. If home, patient states that family will be available to stay with him. Advised him and spouse that SW will follow post operatively and will be discussing discharge plans/needs again. Current Services: none PTA  Current Equipment: none PTA  Payment Source: medicare with supplement  Concerns or Barriers to Discharge: none indicated  Collabrative List of ECF/HH were provided: yes-ECF and HH    Teach Back Method used with patient and spouse regarding care plan and discharge needs   Patient and spouse verbalize understanding of the plan of care and contribute to goal setting. Anticipated Needs/Discharge Plan: home with home health vs ECF pending post operative progress.     Electronically signed by LUIS Davidson on 11/13/2018 at 11:45 AM

## 2018-11-13 NOTE — PROGRESS NOTES
\   [facility]  INPATIENT PHYSICAL THERAPY  DAILYNOTE  STRZ ICU STEPDOWN TELEMETRY 4K - 5A-13/103-L    Time In: 1100  Time Out: 1124  Timed Code Treatment Minutes: 24 Minutes  Minutes: 24          Date: 2018  Patient Name: Linda Lara,  Gender:  male        MRN: [id number]  : 1945  (77 y.o.)     Referring Practitioner: Dr. Loli Hall  Diagnosis: chest pain  Additional Pertinent Hx: admit with above diagnosis, NSTEMI, pt possible surgery  for valvular repair     Past Medical History:   Diagnosis Date    CAD (coronary artery disease)     [facility]estive [facility] failure) [facility]     Chronic kidney disease     Diabetes mellitus ([facility]     HTN (hypertension), benign     Hyperlipidemia     Mitral and aortic incompetence     Neuropathy     Positive blood culture     Being treated with rocephhin as outpt.     Thyroid disease      Past Surgical History:   Procedure Laterality Date    CARDIAC SURGERY      heart cath    CATARACT REMOVAL WITH IMPLANT Bilateral     COLONOSCOPY      EYE SURGERY      OK ECHO TRANSESOPHAG R-T 2D IMG ACQUISJ I&R ONLY Left 10/25/2018    TRANSESOPHAGEAL ECHOCARDIOGRAM performed by Paul Vidal MD at [facility]       Restrictions/Precautions:  General Precautions                    Other position/activity restrictions: O2       Prior Level of Function:  ADL Assistance: Independent  Homemaking Assistance: Needs assistance (wife completes most, able to do yardwork, has a son that helps)  Ambulation Assistance: Independent  Transfer Assistance: Independent       Subjective:     Subjective: nursing ok'd therapy pt is scheduled for open heart sx tomorrow, discussed what to expect following sx his sternal precautions and activity following, pt on 3L O2 min SOB following activity he wasn't able to tolerate HOB flat     Pain:   .  Pain Assessment  Pain Level: 0       Social/Functional:  Lives With: Spouse  Type of Home: House  Home Layout: One level  Home Access: Stairs to enter with rails  Entrance Stairs - Number of Steps: 4  Entrance Stairs - Rails: Both  Home Equipment: Rolling walker, Cane (cane intermittently for longer trips ie: grocery shopping)     Objective:  Supine to Sit: Stand by assistance (discussed how to do with sternal precautions )  Sit to Supine: Stand by assistance (again discussed how to do following sx as he used his UEs )    Transfers  Sit to Stand: Minimal Assistance (without use of UEs in prep for open heart sx tomorrow and took 2 attempts to come to stand )  Stand to sit: Contact guard assistance (poor control on descend)       Ambulation 1  Device: No Device  Assistance: Contact guard assistance  Quality of Gait: pt on 3L O2 with min path deviation and slgihtly unsteady noted ER at [facility] with decreased step length min SOB following walk  Distance: 100x1     Exercises:  Exercises  Comments: initiated him on ex that he will do following open heart sx pt completed ankle pumps, hip flexion, long arc quads, upper trunk rotations, shoulder rolls and shoulder horz abd/add with min SOB with activity x 10 reps each                 Activity Tolerance:  Activity Tolerance: Patient Tolerated treatment well    Assessment: Body structures, Functions, Activity limitations: Decreased functional mobility , Decreased strength, Decreased endurance, Decreased balance  Assessment: pt tolerated session well, he is to have open heart sx tomorrow will cont therapy following and await for new orders   Prognosis: Excellent     REQUIRES PT FOLLOW UP: Yes    Discharge Recommendations:  Discharge Recommendations: Continue to assess pending progress (will see how pt does following open heart sx )    Patient Education:  Patient Education: POC, what to expect following ex     Equipment Recommendations:  Equipment Needed: No    Safety:  Type of devices:  All fall risk precautions in place, Gait belt, Left in chair, Call light within reach, Chair alarm in place, Nurse

## 2018-11-13 NOTE — PROGRESS NOTES
Hospitalist Progress Note    Patient:  Oralia Alvarez      Unit/Bed:4K-19/019-A    YOB: 1945    MRN: [id number]       Acct: [de-identified]     PCP: Smiley ELLISON    Date of Admission: 11/9/2018      Assessment/Plan:  Active Hospital Problems    Diagnosis Date Noted    Arterial hypotension [I95.9]     Elevated troponin level [R74.8]     Moderate malnutrition ([facility] [E44.0] 11/10/2018    Chest pain [R07.9] 11/09/2018    MISA (acute kidney injury) ([facility] [facility] [N17.9] 04/27/2016               Recent NSTEMI - with Multivessel CAD - planned CABG and MVR/AVR on 11/14/18.   - Cardiology, CT Surgery following, appreciate care  - continue OMT as able with limitation of hypotension    Severe valvular disease - severe AR/MR   - as above, plan for repair on 11/14/18    Acute Hypoxic Respiratory Failure - with mild O2 requirement  - suspect this is related to AR/MR and pulmonary edema  - also noted bilateral pleural effusions on CXR 11/12  - continue to monitor, continue Bumex    Hypotension - cont midodrine, avoid ACE/ARB per cardiology. CAD - cont ASA, statin, BB    Chronic Systolic CHF - Ischemic Cardiomyopathy - 25-30% EF per MICHELE `0/15/18  - life vest in place  - currently clinically compensated. - Cont bumex, BB.  (unable to use Ace/Arb with hypotension)  - Daily weights, I/O. MISA on CKD 2 - renal function resolving back towards baseline  - suspect multifactorial - Cardiorenal syndrome, hypotension related, vs HIWOT. - improving back towards baseline  - avoid nephrotoxins, NSAIDs  - renally dose medications  - Nephrology following, appreciate participation. - monitor RFP    History of Strep Agalectia bacteremia - related to previous dental extraction, discharged on 10/28 with IV rocephin. Per previous Hospitalist d/w  [facility] - to continue IV Rocephin until seen by ID/ [facility] in 1 week.    - 11/5 blood culture - no growth    Dental Carries - with incomplete extraction PTA, this was completed Abdomen: Soft, non-tender, non-distended with normal bowel sounds. Musculoskeletal: passive and active ROM x 4 extremities. Skin: Skin color, texture, turgor normal.  No rashes or lesions. Neurologic:  Neurovascularly intact without any focal sensory/motor deficits. Cranial nerves:  grossly non-focal.  Psychiatric: Alert and oriented, thought content appropriate, normal insight  Capillary Refill: Brisk,< 3 seconds   Peripheral Pulses: +2 palpable, equal bilaterally       Labs:   Recent Labs      11/12/18   0435  11/13/18   0603   WBC  5.6  5.6   HGB  9.2*  8.8*   HCT  29.0*  28.4*   PLT  212  193     Recent Labs      11/11/18   1053  11/12/18   0435  11/13/18   0603   NA  140  139  140   K  4.2  4.2  4.1  4.1  4.0   CL  97*  98  98   CO2  30  29  30   BUN  38*  34*  31*   CREATININE  1.7*  1.3*  1.2   CALCIUM  9.5  9.5  9.7     No results for input(s): AST, ALT, BILIDIR, BILITOT, ALKPHOS in the last 72 hours. No results for input(s): INR in the last 72 hours. No results for input(s): Claretha Rocker in the last 72 hours. Microbiology:      Urinalysis:    Lab Results   Component Value Date    NITRU NEGATIVE 10/21/2018    WBCUA 5-10 10/21/2018    BACTERIA NONE 10/21/2018    RBCUA 0-2 10/21/2018    BLOODU NEGATIVE 10/21/2018    SPECGRAV 1.019 10/21/2018       Radiology:  XR CHEST STANDARD (2 VW)   Final Result   Bilateral pleural effusions left greater than right. Increased left pleural effusion. Since the previous exam. Very subtle minimal increased interstitial markings at the lower lungs               **This report has been created using voice recognition software. It may contain minor errors which are inherent in voice recognition technology. **      Final report electronically signed by Dr. Jeremy Paz on 11/12/2018 9:42 AM      XR CHEST STANDARD (2 VW)   Final Result      1.  Cardiomegaly with mild pulmonary vascular congestion which is slightly improved since prior exam.   2. Bibasilar opacities which may be on the basis of atelectasis or infiltrate. **This report has been created using voice recognition software. It may contain minor errors which are inherent in voice recognition technology. **      Final report electronically signed by Dr Stacie Mai on 11/9/2018 4:59 PM          DVT prophylaxis: [] Lovenox                                  [] SCDs                                 [x] SQ Heparin                                 [] Encourage ambulation           [] Already on Anticoagulation     Code Status: Full Code    PT/OT Eval Status:     Tele:   [x] yes             [] no      Electronically signed by Radhika Queen DO on 11/13/2018 at 7:44 AM

## 2018-11-14 ENCOUNTER — ANESTHESIA (OUTPATIENT)
Dept: OPERATING ROOM | Age: 73
DRG: 219 | End: 2018-11-14
Payer: MEDICARE

## 2018-11-14 ENCOUNTER — APPOINTMENT (OUTPATIENT)
Dept: GENERAL RADIOLOGY | Age: 73
DRG: 219 | End: 2018-11-14
Payer: MEDICARE

## 2018-11-14 VITALS
TEMPERATURE: 98.6 F | SYSTOLIC BLOOD PRESSURE: 127 MMHG | RESPIRATION RATE: 12 BRPM | OXYGEN SATURATION: 85 % | DIASTOLIC BLOOD PRESSURE: 75 MMHG

## 2018-11-14 LAB
ABO: NORMAL
ACTIVATED CLOTTING TIME: 130 SECONDS (ref 99–130)
ACTIVATED CLOTTING TIME: 143 SECONDS (ref 99–130)
ACTIVATED CLOTTING TIME: 500 SECONDS (ref 99–130)
ACTIVATED CLOTTING TIME: 516 SECONDS (ref 99–130)
ACTIVATED CLOTTING TIME: 520 SECONDS (ref 99–130)
ACTIVATED CLOTTING TIME: 526 SECONDS (ref 99–130)
ACTIVATED CLOTTING TIME: 551 SECONDS (ref 99–130)
ACTIVATED CLOTTING TIME: 560 SECONDS (ref 99–130)
ACTIVATED CLOTTING TIME: 606 SECONDS (ref 99–130)
ACTIVATED CLOTTING TIME: 679 SECONDS (ref 99–130)
ACTIVATED CLOTTING TIME: 739 SECONDS (ref 99–130)
ALLEN TEST: ABNORMAL
ANION GAP SERPL CALCULATED.3IONS-SCNC: 10 MEQ/L (ref 8–16)
ANION GAP SERPL CALCULATED.3IONS-SCNC: 18 MEQ/L (ref 8–16)
ANTIBODY SCREEN: NORMAL
BASE EXCESS (CALCULATED): -0.6 MMOL/L (ref -2.5–2.5)
BASE EXCESS (CALCULATED): -1 MMOL/L (ref -2.5–2.5)
BASE EXCESS (CALCULATED): -2.5 MMOL/L (ref -2.5–2.5)
BASE EXCESS (CALCULATED): -3.4 MMOL/L (ref -2.5–2.5)
BASE EXCESS (CALCULATED): 1.6 MMOL/L (ref -2.5–2.5)
BASE EXCESS (CALCULATED): 1.9 MMOL/L (ref -2.5–2.5)
BASE EXCESS (CALCULATED): 6.7 MMOL/L (ref -2.5–2.5)
BASE EXCESS (CALCULATED): 6.8 MMOL/L (ref -2.5–2.5)
BASE EXCESS (CALCULATED): 7.3 MMOL/L (ref -2.5–2.5)
BASE EXCESS (CALCULATED): 7.4 MMOL/L (ref -2.5–2.5)
BASE EXCESS MIXED: 2.5 MMOL/L (ref -2–3)
BUN BLDV-MCNC: 28 MG/DL (ref 7–22)
BUN BLDV-MCNC: 33 MG/DL (ref 7–22)
CALCIUM IONIZED SERUM: 1.06 MMOL/L (ref 1.12–1.32)
CALCIUM IONIZED SERUM: 1.07 MMOL/L (ref 1.12–1.32)
CALCIUM IONIZED SERUM: 1.1 MMOL/L (ref 1.12–1.32)
CALCIUM IONIZED SERUM: 1.1 MMOL/L (ref 1.12–1.32)
CALCIUM IONIZED SERUM: 1.13 MMOL/L (ref 1.12–1.32)
CALCIUM IONIZED SERUM: 1.19 MMOL/L (ref 1.12–1.32)
CALCIUM IONIZED SERUM: 1.2 MMOL/L (ref 1.12–1.32)
CALCIUM IONIZED SERUM: 1.44 MMOL/L (ref 1.12–1.32)
CALCIUM IONIZED: 1.2 MMOL/L (ref 1.12–1.32)
CALCIUM SERPL-MCNC: 9.6 MG/DL (ref 8.5–10.5)
CALCIUM SERPL-MCNC: 9.6 MG/DL (ref 8.5–10.5)
CARTRIDGE COLOR: NORMAL
CHLORIDE BLD-SCNC: 95 MEQ/L (ref 98–111)
CHLORIDE BLD-SCNC: 99 MEQ/L (ref 98–111)
CO2: 25 MEQ/L (ref 23–33)
CO2: 31 MEQ/L (ref 23–33)
COLLECTED BY:: ABNORMAL
COLLECTED BY:: NORMAL
COLLECTED BY:: NORMAL
COMMENT: ABNORMAL
CREAT SERPL-MCNC: 1.3 MG/DL (ref 0.4–1.2)
CREAT SERPL-MCNC: 1.4 MG/DL (ref 0.4–1.2)
DEVICE: ABNORMAL
DEVICE: NORMAL
DEVICE: NORMAL
ERYTHROCYTE [DISTWIDTH] IN BLOOD BY AUTOMATED COUNT: 15.9 % (ref 11.5–14.5)
ERYTHROCYTE [DISTWIDTH] IN BLOOD BY AUTOMATED COUNT: 16 % (ref 11.5–14.5)
ERYTHROCYTE [DISTWIDTH] IN BLOOD BY AUTOMATED COUNT: 55 FL (ref 35–45)
ERYTHROCYTE [DISTWIDTH] IN BLOOD BY AUTOMATED COUNT: 56.3 FL (ref 35–45)
GFR SERPL CREATININE-BSD FRML MDRD: 50 ML/MIN/1.73M2
GFR SERPL CREATININE-BSD FRML MDRD: 54 ML/MIN/1.73M2
GLUCOSE BLD-MCNC: 124 MG/DL (ref 70–108)
GLUCOSE BLD-MCNC: 133 MG/DL (ref 70–108)
GLUCOSE BLD-MCNC: 177 MG/DL (ref 70–108)
GLUCOSE, WHOLE BLOOD: 149 MG/DL (ref 70–108)
GLUCOSE, WHOLE BLOOD: 157 MG/DL (ref 70–108)
GLUCOSE, WHOLE BLOOD: 163 MG/DL (ref 70–108)
GLUCOSE, WHOLE BLOOD: 163 MG/DL (ref 70–108)
GLUCOSE, WHOLE BLOOD: 168 MG/DL (ref 70–108)
GLUCOSE, WHOLE BLOOD: 171 MG/DL (ref 70–108)
GLUCOSE, WHOLE BLOOD: 175 MG/DL (ref 70–108)
GLUCOSE, WHOLE BLOOD: 179 MG/DL (ref 70–108)
GLUCOSE, WHOLE BLOOD: 180 MG/DL (ref 70–108)
GLUCOSE, WHOLE BLOOD: 184 MG/DL (ref 70–108)
GLUCOSE, WHOLE BLOOD: 198 MG/DL (ref 70–108)
GLUCOSE, WHOLE BLOOD: 199 MG/DL (ref 70–108)
GLUCOSE, WHOLE BLOOD: 206 MG/DL (ref 70–108)
GLUCOSE, WHOLE BLOOD: 230 MG/DL (ref 70–108)
GLUCOSE, WHOLE BLOOD: 262 MG/DL (ref 70–108)
HCO3, MIXED: 27 MMOL/L (ref 23–28)
HCO3: 22 MMOL/L (ref 23–28)
HCO3: 23 MMOL/L (ref 23–28)
HCO3: 24 MMOL/L (ref 23–28)
HCO3: 24 MMOL/L (ref 23–28)
HCO3: 26 MMOL/L (ref 23–28)
HCO3: 27 MMOL/L (ref 23–28)
HCO3: 29 MMOL/L (ref 23–28)
HCO3: 31 MMOL/L (ref 23–28)
HCO3: 31 MMOL/L (ref 23–28)
HCO3: 32 MMOL/L (ref 23–28)
HCT VFR BLD CALC: 19.5 % (ref 42–52)
HCT VFR BLD CALC: 26 % (ref 42–52)
HCT VFR BLD CALC: 26.3 % (ref 42–52)
HCT VFR BLD CALC: 29.2 % (ref 42–52)
HEMOGLOBIN FINGERSTICK, POC: 6.7 G/DL (ref 14–18)
HEMOGLOBIN FINGERSTICK, POC: 7.2 G/DL (ref 14–18)
HEMOGLOBIN FINGERSTICK, POC: 7.4 G/DL (ref 14–18)
HEMOGLOBIN FINGERSTICK, POC: 7.6 G/DL (ref 14–18)
HEMOGLOBIN FINGERSTICK, POC: 8 G/DL (ref 14–18)
HEMOGLOBIN FINGERSTICK, POC: 8.7 G/DL (ref 14–18)
HEMOGLOBIN: 6.7 GM/DL (ref 14–18)
HEMOGLOBIN: 8.4 GM/DL (ref 14–18)
HEMOGLOBIN: 8.7 GM/DL (ref 14–18)
HEMOGLOBIN: 9.3 GM/DL (ref 14–18)
IFIO2: 50
IFIO2: 50
IFIO2: 80
MAGNESIUM: 3.6 MG/DL (ref 1.6–2.4)
MCH RBC QN AUTO: 31.3 PG (ref 26–33)
MCH RBC QN AUTO: 31.7 PG (ref 26–33)
MCHC RBC AUTO-ENTMCNC: 31.8 GM/DL (ref 32.2–35.5)
MCHC RBC AUTO-ENTMCNC: 32.3 GM/DL (ref 32.2–35.5)
MCV RBC AUTO: 97 FL (ref 80–94)
MCV RBC AUTO: 99.7 FL (ref 80–94)
MODE: ABNORMAL
O2 SAT, MIXED: 74 %
O2 SATURATION: 100 %
O2 SATURATION: 93 %
O2 SATURATION: 94 %
O2 SATURATION: 95 %
O2 SATURATION: 96 %
O2 SATURATION: 98 %
O2 SATURATION: 99 %
PATIENT BOLUS: NORMAL
PATIENT HEPARIN CONCENTRATION: 0
PATIENT HEPARIN CONCENTRATION: 2
PATIENT HEPARIN CONCENTRATION: 2
PATIENT HEPARIN CONCENTRATION: 3
PATIENT HEPARIN CONCENTRATION: 3
PATIENT HEPARIN CONCENTRATION: 4
PATIENT HEPARIN CONCENTRATION: NORMAL
PCO2, MIXED VENOUS: 40 MMHG (ref 41–51)
PCO2: 30 MMHG (ref 35–45)
PCO2: 33 MMHG (ref 35–45)
PCO2: 38 MMHG (ref 35–45)
PCO2: 40 MMHG (ref 35–45)
PCO2: 41 MMHG (ref 35–45)
PCO2: 47 MMHG (ref 35–45)
PH BLOOD GAS: 7.36 (ref 7.35–7.45)
PH BLOOD GAS: 7.37 (ref 7.35–7.45)
PH BLOOD GAS: 7.38 (ref 7.35–7.45)
PH BLOOD GAS: 7.41 (ref 7.35–7.45)
PH BLOOD GAS: 7.42 (ref 7.35–7.45)
PH BLOOD GAS: 7.44 (ref 7.35–7.45)
PH BLOOD GAS: 7.46 (ref 7.35–7.45)
PH BLOOD GAS: 7.49 (ref 7.35–7.45)
PH BLOOD GAS: 7.5 (ref 7.35–7.45)
PH BLOOD GAS: 7.59 (ref 7.35–7.45)
PH, MIXED: 7.44 (ref 7.31–7.41)
PLATELET # BLD: 120 THOU/MM3 (ref 130–400)
PLATELET # BLD: 133 THOU/MM3 (ref 130–400)
PLATELET # BLD: 170 THOU/MM3 (ref 130–400)
PLATELET # BLD: 223 THOU/MM3 (ref 130–400)
PMV BLD AUTO: 10.1 FL (ref 9.4–12.4)
PMV BLD AUTO: 10.7 FL (ref 9.4–12.4)
PO2 MIXED: 38 MMHG (ref 25–40)
PO2: 103 MMHG (ref 71–104)
PO2: 156 MMHG (ref 71–104)
PO2: 474 MMHG (ref 71–104)
PO2: 520 MMHG (ref 71–104)
PO2: 564 MMHG (ref 71–104)
PO2: 629 MMHG (ref 71–104)
PO2: 70 MMHG (ref 71–104)
PO2: 71 MMHG (ref 71–104)
PO2: 74 MMHG (ref 71–104)
PO2: 75 MMHG (ref 71–104)
POTASSIUM REFLEX MAGNESIUM: 4.4 MEQ/L (ref 3.5–5.2)
POTASSIUM SERPL-SCNC: 3.8 MEQ/L (ref 3.5–5.2)
POTASSIUM SERPL-SCNC: 4.1 MEQ/L (ref 3.5–5.2)
POTASSIUM, WHOLE BLOOD: 3.6 MEQ/L (ref 3.5–4.9)
POTASSIUM, WHOLE BLOOD: 4.1 MEQ/L (ref 3.5–4.9)
POTASSIUM, WHOLE BLOOD: 4.2 MEQ/L (ref 3.5–4.9)
POTASSIUM, WHOLE BLOOD: 4.2 MEQ/L (ref 3.5–4.9)
POTASSIUM, WHOLE BLOOD: 4.5 MEQ/L (ref 3.5–4.9)
POTASSIUM, WHOLE BLOOD: 4.7 MEQ/L (ref 3.5–4.9)
POTASSIUM, WHOLE BLOOD: 5.1 MEQ/L (ref 3.5–4.9)
POTASSIUM, WHOLE BLOOD: 5.6 MEQ/L (ref 3.5–4.9)
PROJECTED HEPARIN CONCENTATION: 3
RANGE: NORMAL
RBC # BLD: 2.68 MILL/MM3 (ref 4.7–6.1)
RBC # BLD: 2.93 MILL/MM3 (ref 4.7–6.1)
RH FACTOR: NORMAL
SET PEEP: 5 MMHG
SET PEEP: 8 MMHG
SET PEEP: 8 MMHG
SET PRESS SUPP: 10 CMH2O
SET RESPIRATORY RATE: 16 BPM
SET RESPIRATORY RATE: 16 BPM
SET TIDAL VOLUME: 620 ML
SET TIDAL VOLUME: 620 ML
SODIUM BLD-SCNC: 136 MEQ/L (ref 135–145)
SODIUM BLD-SCNC: 142 MEQ/L (ref 135–145)
SODIUM, WHOLE BLOOD: 137 MEQ/L (ref 138–146)
SODIUM, WHOLE BLOOD: 137 MEQ/L (ref 138–146)
SODIUM, WHOLE BLOOD: 139 MEQ/L (ref 138–146)
SODIUM, WHOLE BLOOD: 139 MEQ/L (ref 138–146)
SODIUM, WHOLE BLOOD: 140 MEQ/L (ref 138–146)
SODIUM, WHOLE BLOOD: 141 MEQ/L (ref 138–146)
SODIUM, WHOLE BLOOD: 141 MEQ/L (ref 138–146)
SODIUM, WHOLE BLOOD: 142 MEQ/L (ref 138–146)
SOURCE, BLOOD GAS: ABNORMAL
SOURCE, BLOOD GAS: NORMAL
SOURCE, BLOOD GAS: NORMAL
WBC # BLD: 10.7 THOU/MM3 (ref 4.8–10.8)
WBC # BLD: 6.2 THOU/MM3 (ref 4.8–10.8)

## 2018-11-14 PROCEDURE — 3700000001 HC ADD 15 MINUTES (ANESTHESIA): Performed by: THORACIC SURGERY (CARDIOTHORACIC VASCULAR SURGERY)

## 2018-11-14 PROCEDURE — 33426 REPAIR OF MITRAL VALVE: CPT | Performed by: THORACIC SURGERY (CARDIOTHORACIC VASCULAR SURGERY)

## 2018-11-14 PROCEDURE — 84295 ASSAY OF SERUM SODIUM: CPT

## 2018-11-14 PROCEDURE — 2580000003 HC RX 258: Performed by: PHYSICIAN ASSISTANT

## 2018-11-14 PROCEDURE — 2709999900 HC NON-CHARGEABLE SUPPLY: Performed by: THORACIC SURGERY (CARDIOTHORACIC VASCULAR SURGERY)

## 2018-11-14 PROCEDURE — 33405 REPLACEMENT AORTIC VALVE OPN: CPT | Performed by: PHYSICIAN ASSISTANT

## 2018-11-14 PROCEDURE — 99232 SBSQ HOSP IP/OBS MODERATE 35: CPT | Performed by: INTERNAL MEDICINE

## 2018-11-14 PROCEDURE — 2700000000 HC OXYGEN THERAPY PER DAY

## 2018-11-14 PROCEDURE — 36592 COLLECT BLOOD FROM PICC: CPT

## 2018-11-14 PROCEDURE — 82948 REAGENT STRIP/BLOOD GLUCOSE: CPT

## 2018-11-14 PROCEDURE — 2580000003 HC RX 258: Performed by: NURSE PRACTITIONER

## 2018-11-14 PROCEDURE — 021009W BYPASS CORONARY ARTERY, ONE ARTERY FROM AORTA WITH AUTOLOGOUS VENOUS TISSUE, OPEN APPROACH: ICD-10-PCS | Performed by: THORACIC SURGERY (CARDIOTHORACIC VASCULAR SURGERY)

## 2018-11-14 PROCEDURE — 84132 ASSAY OF SERUM POTASSIUM: CPT

## 2018-11-14 PROCEDURE — P9035 PLATELET PHERES LEUKOREDUCED: HCPCS

## 2018-11-14 PROCEDURE — S0028 INJECTION, FAMOTIDINE, 20 MG: HCPCS | Performed by: THORACIC SURGERY (CARDIOTHORACIC VASCULAR SURGERY)

## 2018-11-14 PROCEDURE — 85520 HEPARIN ASSAY: CPT

## 2018-11-14 PROCEDURE — 2580000003 HC RX 258: Performed by: REGISTERED NURSE

## 2018-11-14 PROCEDURE — 33517 CABG ARTERY-VEIN SINGLE: CPT | Performed by: THORACIC SURGERY (CARDIOTHORACIC VASCULAR SURGERY)

## 2018-11-14 PROCEDURE — 85027 COMPLETE CBC AUTOMATED: CPT

## 2018-11-14 PROCEDURE — 71045 X-RAY EXAM CHEST 1 VIEW: CPT

## 2018-11-14 PROCEDURE — 33970 AORTIC CIRCULATION ASSIST: CPT | Performed by: THORACIC SURGERY (CARDIOTHORACIC VASCULAR SURGERY)

## 2018-11-14 PROCEDURE — 2500000003 HC RX 250 WO HCPCS: Performed by: ANESTHESIOLOGY

## 2018-11-14 PROCEDURE — 02100Z9 BYPASS CORONARY ARTERY, ONE ARTERY FROM LEFT INTERNAL MAMMARY, OPEN APPROACH: ICD-10-PCS | Performed by: THORACIC SURGERY (CARDIOTHORACIC VASCULAR SURGERY)

## 2018-11-14 PROCEDURE — 02UG0JZ SUPPLEMENT MITRAL VALVE WITH SYNTHETIC SUBSTITUTE, OPEN APPROACH: ICD-10-PCS | Performed by: THORACIC SURGERY (CARDIOTHORACIC VASCULAR SURGERY)

## 2018-11-14 PROCEDURE — 2709999900 HC NON-CHARGEABLE SUPPLY

## 2018-11-14 PROCEDURE — 3700000000 HC ANESTHESIA ATTENDED CARE: Performed by: THORACIC SURGERY (CARDIOTHORACIC VASCULAR SURGERY)

## 2018-11-14 PROCEDURE — 33508 ENDOSCOPIC VEIN HARVEST: CPT | Performed by: THORACIC SURGERY (CARDIOTHORACIC VASCULAR SURGERY)

## 2018-11-14 PROCEDURE — 6370000000 HC RX 637 (ALT 250 FOR IP): Performed by: NURSE PRACTITIONER

## 2018-11-14 PROCEDURE — 82330 ASSAY OF CALCIUM: CPT

## 2018-11-14 PROCEDURE — 2500000003 HC RX 250 WO HCPCS: Performed by: THORACIC SURGERY (CARDIOTHORACIC VASCULAR SURGERY)

## 2018-11-14 PROCEDURE — C1773 RET DEV, INSERTABLE: HCPCS | Performed by: THORACIC SURGERY (CARDIOTHORACIC VASCULAR SURGERY)

## 2018-11-14 PROCEDURE — 88305 TISSUE EXAM BY PATHOLOGIST: CPT

## 2018-11-14 PROCEDURE — 33426 REPAIR OF MITRAL VALVE: CPT | Performed by: PHYSICIAN ASSISTANT

## 2018-11-14 PROCEDURE — 3600000008 HC SURGERY OHS BASE: Performed by: THORACIC SURGERY (CARDIOTHORACIC VASCULAR SURGERY)

## 2018-11-14 PROCEDURE — 5A02210 ASSISTANCE WITH CARDIAC OUTPUT USING BALLOON PUMP, CONTINUOUS: ICD-10-PCS | Performed by: THORACIC SURGERY (CARDIOTHORACIC VASCULAR SURGERY)

## 2018-11-14 PROCEDURE — 94002 VENT MGMT INPAT INIT DAY: CPT

## 2018-11-14 PROCEDURE — 37799 UNLISTED PX VASCULAR SURGERY: CPT

## 2018-11-14 PROCEDURE — 82803 BLOOD GASES ANY COMBINATION: CPT

## 2018-11-14 PROCEDURE — 33405 REPLACEMENT AORTIC VALVE OPN: CPT | Performed by: THORACIC SURGERY (CARDIOTHORACIC VASCULAR SURGERY)

## 2018-11-14 PROCEDURE — 6360000002 HC RX W HCPCS: Performed by: PHYSICIAN ASSISTANT

## 2018-11-14 PROCEDURE — P9017 PLASMA 1 DONOR FRZ W/IN 8 HR: HCPCS

## 2018-11-14 PROCEDURE — X2RF032 REPLACEMENT OF AORTIC VALVE USING ZOOPLASTIC TISSUE, RAPID DEPLOYMENT TECHNIQUE, OPEN APPROACH, NEW TECHNOLOGY GROUP 2: ICD-10-PCS | Performed by: THORACIC SURGERY (CARDIOTHORACIC VASCULAR SURGERY)

## 2018-11-14 PROCEDURE — 6360000002 HC RX W HCPCS: Performed by: REGISTERED NURSE

## 2018-11-14 PROCEDURE — 2580000003 HC RX 258: Performed by: THORACIC SURGERY (CARDIOTHORACIC VASCULAR SURGERY)

## 2018-11-14 PROCEDURE — 6360000002 HC RX W HCPCS: Performed by: THORACIC SURGERY (CARDIOTHORACIC VASCULAR SURGERY)

## 2018-11-14 PROCEDURE — 2720000010 HC SURG SUPPLY STERILE: Performed by: THORACIC SURGERY (CARDIOTHORACIC VASCULAR SURGERY)

## 2018-11-14 PROCEDURE — 2780000006 HC MISC HEART VALVE: Performed by: THORACIC SURGERY (CARDIOTHORACIC VASCULAR SURGERY)

## 2018-11-14 PROCEDURE — 36415 COLL VENOUS BLD VENIPUNCTURE: CPT

## 2018-11-14 PROCEDURE — 2000000000 HC ICU R&B

## 2018-11-14 PROCEDURE — 32551 INSERTION OF CHEST TUBE: CPT

## 2018-11-14 PROCEDURE — 83735 ASSAY OF MAGNESIUM: CPT

## 2018-11-14 PROCEDURE — 82947 ASSAY GLUCOSE BLOOD QUANT: CPT

## 2018-11-14 PROCEDURE — 80048 BASIC METABOLIC PNL TOTAL CA: CPT

## 2018-11-14 PROCEDURE — 2580000003 HC RX 258: Performed by: ANESTHESIOLOGY

## 2018-11-14 PROCEDURE — 33533 CABG ARTERIAL SINGLE: CPT | Performed by: THORACIC SURGERY (CARDIOTHORACIC VASCULAR SURGERY)

## 2018-11-14 PROCEDURE — 2780000010 HC IMPLANT OTHER: Performed by: THORACIC SURGERY (CARDIOTHORACIC VASCULAR SURGERY)

## 2018-11-14 PROCEDURE — 06BP4ZZ EXCISION OF RIGHT SAPHENOUS VEIN, PERCUTANEOUS ENDOSCOPIC APPROACH: ICD-10-PCS | Performed by: THORACIC SURGERY (CARDIOTHORACIC VASCULAR SURGERY)

## 2018-11-14 PROCEDURE — P9016 RBC LEUKOCYTES REDUCED: HCPCS

## 2018-11-14 PROCEDURE — 6370000000 HC RX 637 (ALT 250 FOR IP): Performed by: THORACIC SURGERY (CARDIOTHORACIC VASCULAR SURGERY)

## 2018-11-14 PROCEDURE — 3600000018 HC SURGERY OHS ADDTL 15MIN: Performed by: THORACIC SURGERY (CARDIOTHORACIC VASCULAR SURGERY)

## 2018-11-14 PROCEDURE — P9045 ALBUMIN (HUMAN), 5%, 250 ML: HCPCS | Performed by: THORACIC SURGERY (CARDIOTHORACIC VASCULAR SURGERY)

## 2018-11-14 PROCEDURE — 85018 HEMOGLOBIN: CPT

## 2018-11-14 PROCEDURE — 6360000002 HC RX W HCPCS

## 2018-11-14 PROCEDURE — 6370000000 HC RX 637 (ALT 250 FOR IP): Performed by: HOSPITALIST

## 2018-11-14 PROCEDURE — P9045 ALBUMIN (HUMAN), 5%, 250 ML: HCPCS | Performed by: REGISTERED NURSE

## 2018-11-14 PROCEDURE — 2500000003 HC RX 250 WO HCPCS: Performed by: REGISTERED NURSE

## 2018-11-14 PROCEDURE — 5A1221Z PERFORMANCE OF CARDIAC OUTPUT, CONTINUOUS: ICD-10-PCS | Performed by: THORACIC SURGERY (CARDIOTHORACIC VASCULAR SURGERY)

## 2018-11-14 DEVICE — RING ANNULO HEART MITRAL PHYSIO 28MM: Type: IMPLANTABLE DEVICE | Site: HEART | Status: FUNCTIONAL

## 2018-11-14 DEVICE — ZINACTIVE USE 2540329 DEVICE OCCL CLP L40MM PLUNG GRP FLX SHFT FOR GILLINOV: Type: IMPLANTABLE DEVICE | Site: HEART | Status: FUNCTIONAL

## 2018-11-14 DEVICE — VALVE AORT SZ 23 H17MM CUF DIA28MM TISS ANNULUS DIA23MM BOV: Type: IMPLANTABLE DEVICE | Site: HEART | Status: FUNCTIONAL

## 2018-11-14 RX ORDER — SENNOSIDES 8.8 MG/5ML
10 LIQUID ORAL DAILY PRN
Status: DISCONTINUED | OUTPATIENT
Start: 2018-11-14 | End: 2018-12-06 | Stop reason: HOSPADM

## 2018-11-14 RX ORDER — NITROGLYCERIN 20 MG/100ML
INJECTION INTRAVENOUS PRN
Status: DISCONTINUED | OUTPATIENT
Start: 2018-11-14 | End: 2018-11-14 | Stop reason: SDUPTHER

## 2018-11-14 RX ORDER — SODIUM CHLORIDE, SODIUM LACTATE, POTASSIUM CHLORIDE, CALCIUM CHLORIDE 600; 310; 30; 20 MG/100ML; MG/100ML; MG/100ML; MG/100ML
INJECTION, SOLUTION INTRAVENOUS CONTINUOUS PRN
Status: DISCONTINUED | OUTPATIENT
Start: 2018-11-14 | End: 2018-11-14 | Stop reason: SDUPTHER

## 2018-11-14 RX ORDER — MIDAZOLAM HYDROCHLORIDE 1 MG/ML
INJECTION INTRAMUSCULAR; INTRAVENOUS PRN
Status: DISCONTINUED | OUTPATIENT
Start: 2018-11-14 | End: 2018-11-14 | Stop reason: SDUPTHER

## 2018-11-14 RX ORDER — MORPHINE SULFATE 2 MG/ML
INJECTION, SOLUTION INTRAMUSCULAR; INTRAVENOUS
Status: COMPLETED
Start: 2018-11-14 | End: 2018-11-14

## 2018-11-14 RX ORDER — SODIUM CHLORIDE 9 MG/ML
INJECTION, SOLUTION INTRAVENOUS CONTINUOUS
Status: DISCONTINUED | OUTPATIENT
Start: 2018-11-14 | End: 2018-11-19

## 2018-11-14 RX ORDER — AMIODARONE HYDROCHLORIDE 200 MG/1
200 TABLET ORAL 2 TIMES DAILY
Status: DISCONTINUED | OUTPATIENT
Start: 2018-11-14 | End: 2018-11-17 | Stop reason: SDUPTHER

## 2018-11-14 RX ORDER — CHLORHEXIDINE GLUCONATE 4 G/100ML
SOLUTION TOPICAL ONCE
Status: COMPLETED | OUTPATIENT
Start: 2018-11-14 | End: 2018-11-14

## 2018-11-14 RX ORDER — PROTAMINE SULFATE 10 MG/ML
50 INJECTION, SOLUTION INTRAVENOUS
Status: ACTIVE | OUTPATIENT
Start: 2018-11-14 | End: 2018-11-14

## 2018-11-14 RX ORDER — LIDOCAINE HYDROCHLORIDE 20 MG/ML
INJECTION, SOLUTION INFILTRATION; PERINEURAL PRN
Status: DISCONTINUED | OUTPATIENT
Start: 2018-11-14 | End: 2018-11-14 | Stop reason: SDUPTHER

## 2018-11-14 RX ORDER — NOREPINEPHRINE BITARTRATE 1 MG/ML
INJECTION, SOLUTION INTRAVENOUS PRN
Status: DISCONTINUED | OUTPATIENT
Start: 2018-11-14 | End: 2018-11-14 | Stop reason: SDUPTHER

## 2018-11-14 RX ORDER — SODIUM CHLORIDE 0.9 % (FLUSH) 0.9 %
10 SYRINGE (ML) INJECTION PRN
Status: DISCONTINUED | OUTPATIENT
Start: 2018-11-14 | End: 2018-12-06 | Stop reason: HOSPADM

## 2018-11-14 RX ORDER — SODIUM CHLORIDE 0.9 % (FLUSH) 0.9 %
10 SYRINGE (ML) INJECTION EVERY 12 HOURS SCHEDULED
Status: DISCONTINUED | OUTPATIENT
Start: 2018-11-14 | End: 2018-12-06 | Stop reason: HOSPADM

## 2018-11-14 RX ORDER — BISACODYL 10 MG
10 SUPPOSITORY, RECTAL RECTAL DAILY PRN
Status: DISCONTINUED | OUTPATIENT
Start: 2018-11-14 | End: 2018-12-06 | Stop reason: HOSPADM

## 2018-11-14 RX ORDER — KETAMINE HYDROCHLORIDE 50 MG/ML
INJECTION, SOLUTION, CONCENTRATE INTRAMUSCULAR; INTRAVENOUS PRN
Status: DISCONTINUED | OUTPATIENT
Start: 2018-11-14 | End: 2018-11-14 | Stop reason: SDUPTHER

## 2018-11-14 RX ORDER — VANCOMYCIN HYDROCHLORIDE 1 G/20ML
INJECTION, POWDER, LYOPHILIZED, FOR SOLUTION INTRAVENOUS PRN
Status: DISCONTINUED | OUTPATIENT
Start: 2018-11-14 | End: 2018-11-14 | Stop reason: SDUPTHER

## 2018-11-14 RX ORDER — POTASSIUM CHLORIDE 29.8 MG/ML
20 INJECTION INTRAVENOUS PRN
Status: DISCONTINUED | OUTPATIENT
Start: 2018-11-15 | End: 2018-12-06 | Stop reason: HOSPADM

## 2018-11-14 RX ORDER — ALBUMIN, HUMAN INJ 5% 5 %
SOLUTION INTRAVENOUS PRN
Status: DISCONTINUED | OUTPATIENT
Start: 2018-11-14 | End: 2018-11-14 | Stop reason: SDUPTHER

## 2018-11-14 RX ORDER — ACETAMINOPHEN 650 MG/1
650 SUPPOSITORY RECTAL EVERY 4 HOURS PRN
Status: DISCONTINUED | OUTPATIENT
Start: 2018-11-14 | End: 2018-12-06 | Stop reason: HOSPADM

## 2018-11-14 RX ORDER — DEXTROSE MONOHYDRATE 50 MG/ML
100 INJECTION, SOLUTION INTRAVENOUS PRN
Status: DISCONTINUED | OUTPATIENT
Start: 2018-11-14 | End: 2018-12-06 | Stop reason: HOSPADM

## 2018-11-14 RX ORDER — CALCIUM CHLORIDE 100 MG/ML
INJECTION INTRAVENOUS; INTRAVENTRICULAR PRN
Status: DISCONTINUED | OUTPATIENT
Start: 2018-11-14 | End: 2018-11-14 | Stop reason: SDUPTHER

## 2018-11-14 RX ORDER — HEPARIN SODIUM 1000 [USP'U]/ML
INJECTION, SOLUTION INTRAVENOUS; SUBCUTANEOUS PRN
Status: DISCONTINUED | OUTPATIENT
Start: 2018-11-14 | End: 2018-11-14 | Stop reason: SDUPTHER

## 2018-11-14 RX ORDER — ONDANSETRON 2 MG/ML
4 INJECTION INTRAMUSCULAR; INTRAVENOUS EVERY 6 HOURS PRN
Status: DISCONTINUED | OUTPATIENT
Start: 2018-11-14 | End: 2018-12-06 | Stop reason: HOSPADM

## 2018-11-14 RX ORDER — ALBUTEROL SULFATE 90 UG/1
2 AEROSOL, METERED RESPIRATORY (INHALATION) EVERY 6 HOURS PRN
Status: DISCONTINUED | OUTPATIENT
Start: 2018-11-14 | End: 2018-12-06 | Stop reason: HOSPADM

## 2018-11-14 RX ORDER — DEXTROSE MONOHYDRATE 25 G/50ML
12.5 INJECTION, SOLUTION INTRAVENOUS PRN
Status: DISCONTINUED | OUTPATIENT
Start: 2018-11-14 | End: 2018-12-06 | Stop reason: HOSPADM

## 2018-11-14 RX ORDER — DOCUSATE SODIUM 100 MG/1
100 CAPSULE, LIQUID FILLED ORAL 2 TIMES DAILY
Status: DISCONTINUED | OUTPATIENT
Start: 2018-11-15 | End: 2018-11-15 | Stop reason: SDUPTHER

## 2018-11-14 RX ORDER — PROTAMINE SULFATE 10 MG/ML
INJECTION, SOLUTION INTRAVENOUS PRN
Status: DISCONTINUED | OUTPATIENT
Start: 2018-11-14 | End: 2018-11-14 | Stop reason: SDUPTHER

## 2018-11-14 RX ORDER — SODIUM CHLORIDE 0.9 % (FLUSH) 0.9 %
10 SYRINGE (ML) INJECTION EVERY 12 HOURS SCHEDULED
Status: DISCONTINUED | OUTPATIENT
Start: 2018-11-14 | End: 2018-11-14

## 2018-11-14 RX ORDER — VECURONIUM BROMIDE 1 MG/ML
INJECTION, POWDER, LYOPHILIZED, FOR SOLUTION INTRAVENOUS PRN
Status: DISCONTINUED | OUTPATIENT
Start: 2018-11-14 | End: 2018-11-14 | Stop reason: SDUPTHER

## 2018-11-14 RX ORDER — NICOTINE POLACRILEX 4 MG
15 LOZENGE BUCCAL PRN
Status: DISCONTINUED | OUTPATIENT
Start: 2018-11-14 | End: 2018-12-06 | Stop reason: HOSPADM

## 2018-11-14 RX ORDER — FAMOTIDINE 20 MG/1
20 TABLET, FILM COATED ORAL 2 TIMES DAILY
Status: DISCONTINUED | OUTPATIENT
Start: 2018-11-16 | End: 2018-11-18

## 2018-11-14 RX ORDER — OXYCODONE HYDROCHLORIDE 5 MG/1
5 TABLET ORAL EVERY 4 HOURS PRN
Status: DISCONTINUED | OUTPATIENT
Start: 2018-11-14 | End: 2018-12-06 | Stop reason: HOSPADM

## 2018-11-14 RX ORDER — AMIODARONE HYDROCHLORIDE 200 MG/1
200 TABLET ORAL ONCE
Status: COMPLETED | OUTPATIENT
Start: 2018-11-14 | End: 2018-11-14

## 2018-11-14 RX ORDER — ATORVASTATIN CALCIUM 20 MG/1
20 TABLET, FILM COATED ORAL NIGHTLY
Status: DISCONTINUED | OUTPATIENT
Start: 2018-11-15 | End: 2018-11-14

## 2018-11-14 RX ORDER — M-VIT,TX,IRON,MINS/CALC/FOLIC 27MG-0.4MG
1 TABLET ORAL
Status: DISCONTINUED | OUTPATIENT
Start: 2018-11-15 | End: 2018-12-06 | Stop reason: HOSPADM

## 2018-11-14 RX ORDER — VASOPRESSIN 20 U/ML
INJECTION PARENTERAL PRN
Status: DISCONTINUED | OUTPATIENT
Start: 2018-11-14 | End: 2018-11-14 | Stop reason: SDUPTHER

## 2018-11-14 RX ORDER — PAPAVERINE HYDROCHLORIDE 30 MG/ML
INJECTION INTRAMUSCULAR; INTRAVENOUS PRN
Status: DISCONTINUED | OUTPATIENT
Start: 2018-11-14 | End: 2018-11-14 | Stop reason: HOSPADM

## 2018-11-14 RX ORDER — POTASSIUM CHLORIDE 29.8 MG/ML
20 INJECTION INTRAVENOUS ONCE
Status: COMPLETED | OUTPATIENT
Start: 2018-11-14 | End: 2018-11-14

## 2018-11-14 RX ORDER — AMINOCAPROIC ACID 250 MG/ML
INJECTION, SOLUTION INTRAVENOUS PRN
Status: DISCONTINUED | OUTPATIENT
Start: 2018-11-14 | End: 2018-11-14 | Stop reason: SDUPTHER

## 2018-11-14 RX ORDER — OXYCODONE HYDROCHLORIDE 5 MG/1
10 TABLET ORAL EVERY 4 HOURS PRN
Status: DISCONTINUED | OUTPATIENT
Start: 2018-11-14 | End: 2018-12-06 | Stop reason: HOSPADM

## 2018-11-14 RX ORDER — SODIUM CHLORIDE 0.9 % (FLUSH) 0.9 %
10 SYRINGE (ML) INJECTION PRN
Status: DISCONTINUED | OUTPATIENT
Start: 2018-11-14 | End: 2018-11-14

## 2018-11-14 RX ORDER — FENTANYL CITRATE 50 UG/ML
INJECTION, SOLUTION INTRAMUSCULAR; INTRAVENOUS PRN
Status: DISCONTINUED | OUTPATIENT
Start: 2018-11-14 | End: 2018-11-14 | Stop reason: SDUPTHER

## 2018-11-14 RX ORDER — ATORVASTATIN CALCIUM 40 MG/1
40 TABLET, FILM COATED ORAL NIGHTLY
Status: DISCONTINUED | OUTPATIENT
Start: 2018-11-15 | End: 2018-12-06 | Stop reason: HOSPADM

## 2018-11-14 RX ORDER — CHLORHEXIDINE GLUCONATE 0.12 MG/ML
15 RINSE ORAL ONCE
Status: COMPLETED | OUTPATIENT
Start: 2018-11-14 | End: 2018-11-14

## 2018-11-14 RX ORDER — ENALAPRILAT 2.5 MG/2ML
0.62 INJECTION INTRAVENOUS
Status: DISCONTINUED | OUTPATIENT
Start: 2018-11-14 | End: 2018-12-06 | Stop reason: HOSPADM

## 2018-11-14 RX ORDER — ALBUMIN, HUMAN INJ 5% 5 %
SOLUTION INTRAVENOUS
Status: DISPENSED
Start: 2018-11-14 | End: 2018-11-15

## 2018-11-14 RX ORDER — ACETAMINOPHEN 325 MG/1
650 TABLET ORAL EVERY 4 HOURS PRN
Status: DISCONTINUED | OUTPATIENT
Start: 2018-11-14 | End: 2018-12-06 | Stop reason: HOSPADM

## 2018-11-14 RX ORDER — MORPHINE SULFATE 2 MG/ML
2 INJECTION, SOLUTION INTRAMUSCULAR; INTRAVENOUS
Status: DISCONTINUED | OUTPATIENT
Start: 2018-11-14 | End: 2018-12-06 | Stop reason: HOSPADM

## 2018-11-14 RX ORDER — ETOMIDATE 2 MG/ML
INJECTION INTRAVENOUS PRN
Status: DISCONTINUED | OUTPATIENT
Start: 2018-11-14 | End: 2018-11-14 | Stop reason: SDUPTHER

## 2018-11-14 RX ORDER — ALBUMIN, HUMAN INJ 5% 5 %
25 SOLUTION INTRAVENOUS PRN
Status: DISCONTINUED | OUTPATIENT
Start: 2018-11-14 | End: 2018-12-06 | Stop reason: HOSPADM

## 2018-11-14 RX ADMIN — EPINEPHRINE 10 MCG: 0.1 INJECTION, SOLUTION ENDOTRACHEAL; INTRACARDIAC; INTRAVENOUS at 14:37

## 2018-11-14 RX ADMIN — SODIUM CHLORIDE 5 UNITS/HR: 9 INJECTION, SOLUTION INTRAVENOUS at 09:13

## 2018-11-14 RX ADMIN — EPINEPHRINE 10 MCG: 0.1 INJECTION, SOLUTION ENDOTRACHEAL; INTRACARDIAC; INTRAVENOUS at 14:21

## 2018-11-14 RX ADMIN — ETOMIDATE 20 MG: 2 INJECTION INTRAVENOUS at 08:04

## 2018-11-14 RX ADMIN — AMINOCAPROIC ACID 5000 MG: 250 INJECTION, SOLUTION INTRAVENOUS at 08:40

## 2018-11-14 RX ADMIN — VASOPRESSIN 2 UNITS: 20 INJECTION INTRAVENOUS at 14:35

## 2018-11-14 RX ADMIN — EPINEPHRINE 10 MCG: 0.1 INJECTION, SOLUTION ENDOTRACHEAL; INTRACARDIAC; INTRAVENOUS at 08:10

## 2018-11-14 RX ADMIN — NITROGLYCERIN 60 MCG: 20 INJECTION INTRAVENOUS at 13:55

## 2018-11-14 RX ADMIN — EPINEPHRINE 2 MCG/MIN: 1 INJECTION, SOLUTION INTRAMUSCULAR; INTRAVENOUS; SUBCUTANEOUS at 08:28

## 2018-11-14 RX ADMIN — Medication 2 G: at 08:40

## 2018-11-14 RX ADMIN — VASOPRESSIN 2 UNITS: 20 INJECTION INTRAVENOUS at 13:35

## 2018-11-14 RX ADMIN — AMIODARONE HYDROCHLORIDE 200 MG: 200 TABLET ORAL at 06:54

## 2018-11-14 RX ADMIN — HEPARIN SODIUM 40000 UNITS: 1000 INJECTION, SOLUTION INTRAVENOUS; SUBCUTANEOUS at 08:57

## 2018-11-14 RX ADMIN — DEXMEDETOMIDINE 0.2 MCG/KG/HR: 100 INJECTION, SOLUTION, CONCENTRATE INTRAVENOUS at 16:55

## 2018-11-14 RX ADMIN — KETAMINE HYDROCHLORIDE 50 MG: 50 INJECTION, SOLUTION INTRAMUSCULAR; INTRAVENOUS at 08:48

## 2018-11-14 RX ADMIN — EPINEPHRINE 10 MCG: 0.1 INJECTION, SOLUTION ENDOTRACHEAL; INTRACARDIAC; INTRAVENOUS at 13:38

## 2018-11-14 RX ADMIN — ALBUMIN (HUMAN) 250 ML: 12.5 SOLUTION INTRAVENOUS at 14:34

## 2018-11-14 RX ADMIN — SODIUM CHLORIDE: 9 INJECTION, SOLUTION INTRAVENOUS at 15:45

## 2018-11-14 RX ADMIN — EPINEPHRINE 20 MCG: 0.1 INJECTION, SOLUTION ENDOTRACHEAL; INTRACARDIAC; INTRAVENOUS at 14:25

## 2018-11-14 RX ADMIN — EPINEPHRINE 10 MCG: 0.1 INJECTION, SOLUTION ENDOTRACHEAL; INTRACARDIAC; INTRAVENOUS at 13:32

## 2018-11-14 RX ADMIN — NITROGLYCERIN 60 MCG: 20 INJECTION INTRAVENOUS at 14:04

## 2018-11-14 RX ADMIN — ALBUMIN (HUMAN) 250 ML: 12.5 SOLUTION INTRAVENOUS at 14:45

## 2018-11-14 RX ADMIN — POTASSIUM CHLORIDE 20 MEQ: 29.8 INJECTION, SOLUTION INTRAVENOUS at 18:33

## 2018-11-14 RX ADMIN — METOPROLOL TARTRATE 25 MG: 25 TABLET ORAL at 06:54

## 2018-11-14 RX ADMIN — LEVOTHYROXINE SODIUM 50 MCG: 50 TABLET ORAL at 06:54

## 2018-11-14 RX ADMIN — SODIUM CHLORIDE, POTASSIUM CHLORIDE, SODIUM LACTATE AND CALCIUM CHLORIDE: 600; 310; 30; 20 INJECTION, SOLUTION INTRAVENOUS at 07:49

## 2018-11-14 RX ADMIN — Medication 2 MCG/MIN: at 08:28

## 2018-11-14 RX ADMIN — CHLORHEXIDINE GLUCONATE: 4 SOLUTION TOPICAL at 06:43

## 2018-11-14 RX ADMIN — FAMOTIDINE 20 MG: 10 INJECTION, SOLUTION INTRAVENOUS at 20:30

## 2018-11-14 RX ADMIN — PROTAMINE SULFATE 200 MG: 10 INJECTION, SOLUTION INTRAVENOUS at 13:52

## 2018-11-14 RX ADMIN — VASOPRESSIN 2 UNITS: 20 INJECTION INTRAVENOUS at 13:31

## 2018-11-14 RX ADMIN — AMINOCAPROIC ACID 5000 MG: 250 INJECTION, SOLUTION INTRAVENOUS at 13:52

## 2018-11-14 RX ADMIN — EPINEPHRINE 10 MCG: 0.1 INJECTION, SOLUTION ENDOTRACHEAL; INTRACARDIAC; INTRAVENOUS at 14:24

## 2018-11-14 RX ADMIN — ALBUMIN (HUMAN) 25 G: 12.5 INJECTION, SOLUTION INTRAVENOUS at 19:02

## 2018-11-14 RX ADMIN — Medication 15 ML: at 06:54

## 2018-11-14 RX ADMIN — EPINEPHRINE 10 MCG: 0.1 INJECTION, SOLUTION ENDOTRACHEAL; INTRACARDIAC; INTRAVENOUS at 08:59

## 2018-11-14 RX ADMIN — MORPHINE SULFATE 2 MG: 2 INJECTION, SOLUTION INTRAMUSCULAR; INTRAVENOUS at 16:27

## 2018-11-14 RX ADMIN — VASOPRESSIN 0.04 UNITS/MIN: 20 INJECTION INTRAVENOUS at 13:40

## 2018-11-14 RX ADMIN — EPINEPHRINE 10 MCG: 0.1 INJECTION, SOLUTION ENDOTRACHEAL; INTRACARDIAC; INTRAVENOUS at 14:15

## 2018-11-14 RX ADMIN — ALBUMIN (HUMAN) 250 ML: 12.5 SOLUTION INTRAVENOUS at 13:50

## 2018-11-14 RX ADMIN — ALBUMIN (HUMAN) 250 ML: 12.5 SOLUTION INTRAVENOUS at 13:58

## 2018-11-14 RX ADMIN — CALCIUM CHLORIDE 1 G: 100 INJECTION, SOLUTION INTRAVENOUS; INTRAVENTRICULAR at 13:38

## 2018-11-14 RX ADMIN — Medication 2 G: at 20:30

## 2018-11-14 RX ADMIN — EPINEPHRINE 10 MCG: 0.1 INJECTION, SOLUTION ENDOTRACHEAL; INTRACARDIAC; INTRAVENOUS at 08:30

## 2018-11-14 RX ADMIN — NITROGLYCERIN 60 MCG: 20 INJECTION INTRAVENOUS at 14:01

## 2018-11-14 RX ADMIN — VECURONIUM BROMIDE 10 MG: 10 INJECTION, POWDER, LYOPHILIZED, FOR SOLUTION INTRAVENOUS at 08:48

## 2018-11-14 RX ADMIN — ALBUMIN (HUMAN) 25 G: 12.5 INJECTION, SOLUTION INTRAVENOUS at 16:00

## 2018-11-14 RX ADMIN — LIDOCAINE HYDROCHLORIDE 100 MG: 20 INJECTION, SOLUTION INFILTRATION; PERINEURAL at 08:04

## 2018-11-14 RX ADMIN — MIDAZOLAM HYDROCHLORIDE 3 MG: 1 INJECTION, SOLUTION INTRAMUSCULAR; INTRAVENOUS at 09:18

## 2018-11-14 RX ADMIN — VANCOMYCIN HYDROCHLORIDE 1500 MG: 1 INJECTION, POWDER, LYOPHILIZED, FOR SOLUTION INTRAVENOUS at 17:33

## 2018-11-14 RX ADMIN — OXYCODONE HYDROCHLORIDE 10 MG: 5 TABLET ORAL at 20:30

## 2018-11-14 RX ADMIN — POTASSIUM CHLORIDE 20 MEQ: 29.8 INJECTION, SOLUTION INTRAVENOUS at 23:15

## 2018-11-14 RX ADMIN — NOREPINEPHRINE BITARTRATE 6.4 MCG: 1 INJECTION INTRAVENOUS at 08:30

## 2018-11-14 RX ADMIN — AMIODARONE HYDROCHLORIDE 200 MG: 200 TABLET ORAL at 20:30

## 2018-11-14 RX ADMIN — EPINEPHRINE 12 MCG/MIN: 1 INJECTION, SOLUTION, CONCENTRATE INTRAVENOUS at 20:27

## 2018-11-14 RX ADMIN — VASOPRESSIN 0.03 UNITS/MIN: 20 INJECTION INTRAVENOUS at 20:26

## 2018-11-14 RX ADMIN — VANCOMYCIN HYDROCHLORIDE 1000 MG: 1 INJECTION, POWDER, LYOPHILIZED, FOR SOLUTION INTRAVENOUS at 08:44

## 2018-11-14 RX ADMIN — AMINOCAPROIC ACID 1 G/HR: 250 INJECTION, SOLUTION INTRAVENOUS at 13:52

## 2018-11-14 RX ADMIN — ALBUMIN (HUMAN) 25 G: 12.5 INJECTION, SOLUTION INTRAVENOUS at 16:39

## 2018-11-14 RX ADMIN — EPINEPHRINE 10 MCG: 0.1 INJECTION, SOLUTION ENDOTRACHEAL; INTRACARDIAC; INTRAVENOUS at 08:20

## 2018-11-14 RX ADMIN — EPINEPHRINE 10 MCG: 0.1 INJECTION, SOLUTION ENDOTRACHEAL; INTRACARDIAC; INTRAVENOUS at 08:15

## 2018-11-14 RX ADMIN — VASOPRESSIN 2 UNITS: 20 INJECTION INTRAVENOUS at 14:15

## 2018-11-14 RX ADMIN — FENTANYL CITRATE 100 MCG: 50 INJECTION INTRAMUSCULAR; INTRAVENOUS at 08:04

## 2018-11-14 RX ADMIN — EPINEPHRINE 20 MCG: 0.1 INJECTION, SOLUTION ENDOTRACHEAL; INTRACARDIAC; INTRAVENOUS at 14:34

## 2018-11-14 RX ADMIN — Medication 10 ML: at 20:30

## 2018-11-14 RX ADMIN — NOREPINEPHRINE BITARTRATE 6.4 MCG: 1 INJECTION INTRAVENOUS at 08:10

## 2018-11-14 RX ADMIN — VASOPRESSIN 2 UNITS: 20 INJECTION INTRAVENOUS at 13:38

## 2018-11-14 RX ADMIN — VECURONIUM BROMIDE 10 MG: 10 INJECTION, POWDER, LYOPHILIZED, FOR SOLUTION INTRAVENOUS at 08:04

## 2018-11-14 RX ADMIN — Medication 2 G: at 12:39

## 2018-11-14 RX ADMIN — NOREPINEPHRINE BITARTRATE 6.4 MCG: 1 INJECTION INTRAVENOUS at 08:20

## 2018-11-14 RX ADMIN — VASOPRESSIN 2 UNITS: 20 INJECTION INTRAVENOUS at 13:40

## 2018-11-14 RX ADMIN — MIDAZOLAM HYDROCHLORIDE 2 MG: 1 INJECTION, SOLUTION INTRAMUSCULAR; INTRAVENOUS at 07:58

## 2018-11-14 RX ADMIN — Medication 2.1 UNITS/HR: at 15:55

## 2018-11-14 RX ADMIN — FENTANYL CITRATE 150 MCG: 50 INJECTION INTRAMUSCULAR; INTRAVENOUS at 08:48

## 2018-11-14 ASSESSMENT — PULMONARY FUNCTION TESTS
PIF_VALUE: 31
PIF_VALUE: 22
PIF_VALUE: 29
PIF_VALUE: 1
PIF_VALUE: 23
PIF_VALUE: 23
PIF_VALUE: 21
PIF_VALUE: 1
PIF_VALUE: 2
PIF_VALUE: 1
PIF_VALUE: 21
PIF_VALUE: 1
PIF_VALUE: 1
PIF_VALUE: 23
PIF_VALUE: 21
PIF_VALUE: 1
PIF_VALUE: 26
PIF_VALUE: 21
PIF_VALUE: 24
PIF_VALUE: 1
PIF_VALUE: 28
PIF_VALUE: 1
PIF_VALUE: 0
PIF_VALUE: 20
PIF_VALUE: 25
PIF_VALUE: 26
PIF_VALUE: 1
PIF_VALUE: 23
PIF_VALUE: 25
PIF_VALUE: 24
PIF_VALUE: 0
PIF_VALUE: 28
PIF_VALUE: 21
PIF_VALUE: 23
PIF_VALUE: 20
PIF_VALUE: 21
PIF_VALUE: 23
PIF_VALUE: 0
PIF_VALUE: 22
PIF_VALUE: 1
PIF_VALUE: 1
PIF_VALUE: 23
PIF_VALUE: 1
PIF_VALUE: 23
PIF_VALUE: 1
PIF_VALUE: 25
PIF_VALUE: 19
PIF_VALUE: 22
PIF_VALUE: 21
PIF_VALUE: 23
PIF_VALUE: 21
PIF_VALUE: 21
PIF_VALUE: 1
PIF_VALUE: 32
PIF_VALUE: 1
PIF_VALUE: 22
PIF_VALUE: 21
PIF_VALUE: 22
PIF_VALUE: 21
PIF_VALUE: 23
PIF_VALUE: 6
PIF_VALUE: 1
PIF_VALUE: 25
PIF_VALUE: 1
PIF_VALUE: 21
PIF_VALUE: 22
PIF_VALUE: 0
PIF_VALUE: 21
PIF_VALUE: 1
PIF_VALUE: 22
PIF_VALUE: 24
PIF_VALUE: 0
PIF_VALUE: 1
PIF_VALUE: 1
PIF_VALUE: 24
PIF_VALUE: 23
PIF_VALUE: 25
PIF_VALUE: 23
PIF_VALUE: 1
PIF_VALUE: 23
PIF_VALUE: 22
PIF_VALUE: 1
PIF_VALUE: 0
PIF_VALUE: 23
PIF_VALUE: 1
PIF_VALUE: 21
PIF_VALUE: 1
PIF_VALUE: 22
PIF_VALUE: 23
PIF_VALUE: 22
PIF_VALUE: 24
PIF_VALUE: 21
PIF_VALUE: 1
PIF_VALUE: 23
PIF_VALUE: 1
PIF_VALUE: 1
PIF_VALUE: 26
PIF_VALUE: 26
PIF_VALUE: 1
PIF_VALUE: 1
PIF_VALUE: 26
PIF_VALUE: 1
PIF_VALUE: 1
PIF_VALUE: 24
PIF_VALUE: 25
PIF_VALUE: 1
PIF_VALUE: 21
PIF_VALUE: 0
PIF_VALUE: 22
PIF_VALUE: 1
PIF_VALUE: 25
PIF_VALUE: 1
PIF_VALUE: 23
PIF_VALUE: 1
PIF_VALUE: 23
PIF_VALUE: 24
PIF_VALUE: 1
PIF_VALUE: 23
PIF_VALUE: 41
PIF_VALUE: 1
PIF_VALUE: 20
PIF_VALUE: 21
PIF_VALUE: 23
PIF_VALUE: 2
PIF_VALUE: 21
PIF_VALUE: 0
PIF_VALUE: 22
PIF_VALUE: 28
PIF_VALUE: 34
PIF_VALUE: 1
PIF_VALUE: 22
PIF_VALUE: 1
PIF_VALUE: 21
PIF_VALUE: 1
PIF_VALUE: 26
PIF_VALUE: 0
PIF_VALUE: 22
PIF_VALUE: 1
PIF_VALUE: 24
PIF_VALUE: 1
PIF_VALUE: 20
PIF_VALUE: 1
PIF_VALUE: 25
PIF_VALUE: 24
PIF_VALUE: 0
PIF_VALUE: 21
PIF_VALUE: 26
PIF_VALUE: 25
PIF_VALUE: 1
PIF_VALUE: 0
PIF_VALUE: 1
PIF_VALUE: 0
PIF_VALUE: 1
PIF_VALUE: 28
PIF_VALUE: 1
PIF_VALUE: 0
PIF_VALUE: 25
PIF_VALUE: 2
PIF_VALUE: 23
PIF_VALUE: 22
PIF_VALUE: 2
PIF_VALUE: 1
PIF_VALUE: 20
PIF_VALUE: 0
PIF_VALUE: 22
PIF_VALUE: 1
PIF_VALUE: 24
PIF_VALUE: 1
PIF_VALUE: 0
PIF_VALUE: 0
PIF_VALUE: 27
PIF_VALUE: 22
PIF_VALUE: 0
PIF_VALUE: 24
PIF_VALUE: 1
PIF_VALUE: 29
PIF_VALUE: 26
PIF_VALUE: 23
PIF_VALUE: 22
PIF_VALUE: 0
PIF_VALUE: 1
PIF_VALUE: 24
PIF_VALUE: 24
PIF_VALUE: 0
PIF_VALUE: 26
PIF_VALUE: 24
PIF_VALUE: 27
PIF_VALUE: 1
PIF_VALUE: 28
PIF_VALUE: 33
PIF_VALUE: 0
PIF_VALUE: 0
PIF_VALUE: 24
PIF_VALUE: 0
PIF_VALUE: 23
PIF_VALUE: 0
PIF_VALUE: 22
PIF_VALUE: 25
PIF_VALUE: 1
PIF_VALUE: 21
PIF_VALUE: 22
PIF_VALUE: 22
PIF_VALUE: 21
PIF_VALUE: 1
PIF_VALUE: 1
PIF_VALUE: 24
PIF_VALUE: 38
PIF_VALUE: 23
PIF_VALUE: 22
PIF_VALUE: 31
PIF_VALUE: 20
PIF_VALUE: 30
PIF_VALUE: 24
PIF_VALUE: 23
PIF_VALUE: 0
PIF_VALUE: 22
PIF_VALUE: 23
PIF_VALUE: 1
PIF_VALUE: 1
PIF_VALUE: 22
PIF_VALUE: 21
PIF_VALUE: 1
PIF_VALUE: 4
PIF_VALUE: 1
PIF_VALUE: 2
PIF_VALUE: 1
PIF_VALUE: 22
PIF_VALUE: 1
PIF_VALUE: 25
PIF_VALUE: 1
PIF_VALUE: 21
PIF_VALUE: 0
PIF_VALUE: 1
PIF_VALUE: 0
PIF_VALUE: 1
PIF_VALUE: 24
PIF_VALUE: 0
PIF_VALUE: 21
PIF_VALUE: 24
PIF_VALUE: 21
PIF_VALUE: 20
PIF_VALUE: 1
PIF_VALUE: 21
PIF_VALUE: 23
PIF_VALUE: 1
PIF_VALUE: 24
PIF_VALUE: 22
PIF_VALUE: 46
PIF_VALUE: 0
PIF_VALUE: 27
PIF_VALUE: 1
PIF_VALUE: 1
PIF_VALUE: 25
PIF_VALUE: 1
PIF_VALUE: 24
PIF_VALUE: 1
PIF_VALUE: 1
PIF_VALUE: 22
PIF_VALUE: 1
PIF_VALUE: 1
PIF_VALUE: 0
PIF_VALUE: 22
PIF_VALUE: 23
PIF_VALUE: 0
PIF_VALUE: 23
PIF_VALUE: 20
PIF_VALUE: 24
PIF_VALUE: 26
PIF_VALUE: 28
PIF_VALUE: 26
PIF_VALUE: 23
PIF_VALUE: 23
PIF_VALUE: 22
PIF_VALUE: 1
PIF_VALUE: 0
PIF_VALUE: 8
PIF_VALUE: 1
PIF_VALUE: 24
PIF_VALUE: 19
PIF_VALUE: 0
PIF_VALUE: 0
PIF_VALUE: 25
PIF_VALUE: 20
PIF_VALUE: 18
PIF_VALUE: 1
PIF_VALUE: 0
PIF_VALUE: 21
PIF_VALUE: 26
PIF_VALUE: 0
PIF_VALUE: 23
PIF_VALUE: 1
PIF_VALUE: 15
PIF_VALUE: 1
PIF_VALUE: 23
PIF_VALUE: 1
PIF_VALUE: 22
PIF_VALUE: 23
PIF_VALUE: 1
PIF_VALUE: 23
PIF_VALUE: 1
PIF_VALUE: 2
PIF_VALUE: 23
PIF_VALUE: 1
PIF_VALUE: 25
PIF_VALUE: 26
PIF_VALUE: 0
PIF_VALUE: 23
PIF_VALUE: 25
PIF_VALUE: 21
PIF_VALUE: 31
PIF_VALUE: 1
PIF_VALUE: 0
PIF_VALUE: 21
PIF_VALUE: 0
PIF_VALUE: 21
PIF_VALUE: 24
PIF_VALUE: 26
PIF_VALUE: 1
PIF_VALUE: 22
PIF_VALUE: 1
PIF_VALUE: 24
PIF_VALUE: 25
PIF_VALUE: 21
PIF_VALUE: 21
PIF_VALUE: 2
PIF_VALUE: 23
PIF_VALUE: 25
PIF_VALUE: 0
PIF_VALUE: 22
PIF_VALUE: 22
PIF_VALUE: 23
PIF_VALUE: 20
PIF_VALUE: 29
PIF_VALUE: 1
PIF_VALUE: 22
PIF_VALUE: 32
PIF_VALUE: 1
PIF_VALUE: 0
PIF_VALUE: 24
PIF_VALUE: 24
PIF_VALUE: 1
PIF_VALUE: 1
PIF_VALUE: 28
PIF_VALUE: 23
PIF_VALUE: 1
PIF_VALUE: 1
PIF_VALUE: 21
PIF_VALUE: 22
PIF_VALUE: 1
PIF_VALUE: 27
PIF_VALUE: 1
PIF_VALUE: 1
PIF_VALUE: 39
PIF_VALUE: 25
PIF_VALUE: 1
PIF_VALUE: 29
PIF_VALUE: 0
PIF_VALUE: 1
PIF_VALUE: 23
PIF_VALUE: 1
PIF_VALUE: 1
PIF_VALUE: 24
PIF_VALUE: 28
PIF_VALUE: 24
PIF_VALUE: 23
PIF_VALUE: 24
PIF_VALUE: 1
PIF_VALUE: 25
PIF_VALUE: 0
PIF_VALUE: 1
PIF_VALUE: 23
PIF_VALUE: 1
PIF_VALUE: 26
PIF_VALUE: 22
PIF_VALUE: 0
PIF_VALUE: 0
PIF_VALUE: 28
PIF_VALUE: 22
PIF_VALUE: 22
PIF_VALUE: 1
PIF_VALUE: 24
PIF_VALUE: 1
PIF_VALUE: 25
PIF_VALUE: 21
PIF_VALUE: 0
PIF_VALUE: 23
PIF_VALUE: 28
PIF_VALUE: 1
PIF_VALUE: 19
PIF_VALUE: 1
PIF_VALUE: 1
PIF_VALUE: 18
PIF_VALUE: 1
PIF_VALUE: 0
PIF_VALUE: 21
PIF_VALUE: 1
PIF_VALUE: 1
PIF_VALUE: 21
PIF_VALUE: 23
PIF_VALUE: 1
PIF_VALUE: 23
PIF_VALUE: 1
PIF_VALUE: 1
PIF_VALUE: 25
PIF_VALUE: 1
PIF_VALUE: 0
PIF_VALUE: 22
PIF_VALUE: 1
PIF_VALUE: 22
PIF_VALUE: 0

## 2018-11-14 ASSESSMENT — PAIN SCALES - GENERAL
PAINLEVEL_OUTOF10: 6
PAINLEVEL_OUTOF10: 0

## 2018-11-14 NOTE — PLAN OF CARE
Problem: Nutrition  Goal: Optimal nutrition therapy  Outcome: Ongoing  Nutrition Problem: Moderate malnutrition, In context of acute illness or injury  Intervention: Food and/or Nutrient Delivery:  (Diet start per dr as pt is able. Plan restart Glucerna tid when diet is FL or mroe)  Nutritional Goals: Pt. will consume 75% or more at meals during LOS.

## 2018-11-14 NOTE — PROGRESS NOTES
Findings:    · Wound Type: Surgical Wound (11/14 CABG & MVR)  · Current Nutrition Therapies:  · Oral Diet Orders: NPO   · Oral Diet intake: NPO  · Oral Nutrition Supplement (ONS) Orders: None  · ONS intake: NPO  · Anthropometric Measures:  · Ht: 6' (182.9 cm)   · Current Body Wt: 201 lb 15.1 oz (91.6 kg) (11/14 no edema)  · Admission Body Wt: 201 lb 8 oz (91.4 kg) (11/6/18 on 6K was last actual wt. )  · Usual Body Wt:  (219-220# per pt, per EMR: 10/21/18: 216# 4.3OZ, (no edema), 10/26/18): 211# 11.2oz, 11/5/18: 206# 4.8oz )  · % Weight Change: 5.1% wt. loss in last 20 days, pt. admitted with CHF, on Bumex,     · [place] Body Wt: 178 lb (80.7 kg),     · Body mass index is 27.38 kg/m². · BMI Classification: BMI 25.0 - 29.9 Overweight    Nutrition Interventions:    (Diet start per dr as pt is able. Plan restart Glucerna tid when diet is FL or mroe)  Continued Inpatient Monitoring, Education not appropriate at this time, Coordination of Care (Pt. received CHF diet education/handouts on 11/6/18 by our staff, )    Nutrition Evaluation:   · Evaluation: Goals set   · Goals: Pt. will consume 75% or more at meals during LOS.      · Monitoring: Meal Intake, Supplement Intake, Diet Tolerance, Weight, Skin Integrity, Pertinent Labs, Patient/Family Education, Monitor Hemodynamic Status, Monitor Bowel Function      Electronically signed by Shamar Zamarripa RD, LD on 11/14/18 at 3:44 PM    Contact Number: [phone]

## 2018-11-14 NOTE — OP NOTE
from 30% to 40%. CARDIOPULMONARY BYPASS TIME: 216    CROSSCLAMP TIME: 160    DESCRIPTION:  The patient was prepped and draped in sterile fashion in the supine position. A formal time-out was performed. Heparin was administered. Working in two teams, the chest was opened, while a segment of greater saphenous vein was harvested using endoscopic techniques from the right thigh. The left internal mammary artery was mobilized from the chest wall and divided at its distal bifurcation. A retractor was placed, and a pericardial well was created. The ascending aorta was palpated and found to be free of calcification. The proximal arch was cannulated with a 21 mm arterial cannula. A standard single-stage bicaval venous cannulation was performed. A retrograde cardioplegia cannula was inserted via right atrial pursestring into the coronary sinus. An antegrade cardioplegia cannula was placed in the mid distal ascending aorta. Cardiopulmonary bypass was initiated. The coronary targets were inspected. The aorta was crossclamped. Due to the aortic insufficiency, arrest was obtained with 800 ml of cold blood cardioplegia administered retrograde. An oblique aortotomy was performed. 250 ml of cold blood cardioplegia was administered via each of the coronary ostia. Thereafter, at 20 minute intervals throughout the crossclamped portion of the operation, cold blood cardioplegia was administered down the completed vein graft and the coronary sinus. Carbon dioxide gas was suffused into the operative field to minimize the entrainment of air into the left heart. The inferior wall was inspected; the PDA remnant of the chronically occluded right coronary system was miniscule, and thus too small to graft. The posterior wall was exposed.  An arteriotomy was made in the mid first obtuse marginal coronary artery, and a separate segment of reversed greater saphenous vein was anastomosed in end to side fashion using running 7-0 around the annulus. The outflow tract was irrigated copiously. The stitches were passed through the sewing ring of the valve, which seated easily. The stitches were tied down. The aortotomy was closed with running 4-0 Prolene. The adventitia was trimmed off the mid ascending aorta. A 4.4 mm punch aortotomy was made. The appropriately trimmed proximal end of the vein graft was anastomosed to the ascending aorta using running 6-0 Prolene. The left atriotomy in the interatrial groove was closed. The patient was then placed in steep Trendelenburg position with the aortic root vent turned on full and retrograde cardioplegia infusing, so as to de-air the left heart. The crossclamp was removed, and the vein graft was de-aired. The right atriotomy was closed. Atrial and ventricular pacing wires were placed. Attempts to weaned from cardiopulmonary bypass initially resulted in right ventricular dysfunction and hypotension. An intra-aortic balloon pump was placed via Seldinger technique into the left common femoral artery, with verification of catheter position under MICHELE. The patient then weaned from CPB easily on moderate inotropic support. The above noted post-CPB MICHELE findings were noted. Protamine was administered. The patient was de-cannulated. All sites were verified as hemostatic. Chest tubes were placed in the mediastinum and the pleural cavities. The chest was closed using #7 wire for the sternum, followed by the usual 3-layer soft tissue closure. The patient transferred to the ICU in stable condition.

## 2018-11-14 NOTE — DISCHARGE INSTR - COC
Continuity of Care Form    Patient Name: Caleb Hatfield   :  1945  MRN:  [id number]    Admit date:  2018  Discharge date:  ***    Code Status Order: Full Code   Advance Directives:   Advance Care Flowsheet Documentation     Date/Time Healthcare Directive Type of Healthcare Directive Copy in [address] Agent's Name Healthcare Agent's Phone Number    18 2057  No, patient does not have an advance directive for healthcare treatment -- -- -- -- --          Admitting Physician:  Devonte Hayes MD  PCP: Merril Schilder    Discharging Nurse: [facility] Unit/Room#: 4D-09/009-A  Discharging Unit Phone Number: ***    Emergency Contact:   Extended Emergency Contact Information  Primary Emergency Contact: KennedymitulSharri  Address: [address] [place] [address] Phone: [phone]  Mobile Phone: [phone]  Relation: Spouse  Secondary Emergency Contact: [facility]  Mobile Phone: [phone]  Relation: Child    Past Surgical History:  Past Surgical History:   Procedure Laterality Date    CARDIAC SURGERY      heart cath    CATARACT REMOVAL WITH IMPLANT Bilateral     COLONOSCOPY      EYE SURGERY      OR ECHO TRANSESOPHAG R-T 2D IMG ACQUISJ I&R ONLY Left 10/25/2018    TRANSESOPHAGEAL ECHOCARDIOGRAM performed by Bam Garg MD at [facility]       Immunization History: There is no immunization history on file for this patient.     Active Problems:  Patient Active Problem List   Diagnosis Code    CKD (chronic kidney disease) stage 3, GFR 30-59 ml/min ([facility]) N18.3    HTN (hypertension), benign I10    Vitamin D deficiency E55.9    MISA (acute kidney injury) ([facility] N17.9    CKD (chronic kidney disease) stage 2, GFR 60-89 ml/min N18.2    Renal cyst N28.1    ACS (acute coronary syndrome) ([facility]) I24.9    Cardiogenic shock ([facility] [address] R57.0    Aortic stenosis with mitral and aortic Dressing/Treatment Ace Wrap 2018  3:40 PM   Dressing Status Clean;Dry; Intact 2018  3:40 PM   Number of days: 0        Elimination:  Continence:   · Bowel: {YES / MN:57161}  · Bladder: {YES / LN:64173}  Urinary Catheter: {Urinary Catheter:945106014}   Colostomy/Ileostomy/Ileal Conduit: {YES / ML:78997}       Date of Last BM: ***    Intake/Output Summary (Last 24 hours) at 18  Last data filed at 18 183   Gross per 24 hour   Intake             5443 ml   Output             2441 ml   Net             3002 ml     I/O last 3 completed shifts: In: 9078 [P.O.:600; I.V.:1100; Blood:2731]  Out:  [Urine:1075;  Other:1000]    Safety Concerns:     [employer] Safety Concerns:067775449}    Impairments/Disabilities:      [employer] Impairments/Disabilities:359458267}    Nutrition Therapy:  Current Nutrition Therapy:   [employer] Diet List:085994256}    Routes of Feeding: {CHP DME Other Feedings:676460917}  Liquids: {Slp liquid thickness:84829}  Daily Fluid Restriction: {CHP DME Yes amt example:781057493}  Last Modified Barium Swallow with Video (Video Swallowing Test): {Done Not Done MDSU:945012012}    Treatments at the Time of Hospital Discharge:   Respiratory Treatments: ***  Oxygen Therapy:  {Therapy; copd oxygen:21565}  Ventilator:    { CC Vent YRSX:580668200}    Rehab Therapies: {THERAPEUTIC INTERVENTION:2226068741}  Weight Bearing Status/Restrictions: [employer]  Weight Bearin}  Other Medical Equipment (for information only, NOT a DME order):  {EQUIPMENT:844973093}  Other Treatments: ***    Patient's personal belongings (please select all that are sent with patient):  {P DME Belongings:543006796}    RN SIGNATURE:  {Esignature:054576977}    CASE MANAGEMENT/SOCIAL WORK SECTION    Inpatient Status Date: 11-    Readmission Risk Assessment Score:  Readmission Risk              Risk of Unplanned Readmission:        27           Discharging to Facility/ Agency   · Name: [facility]

## 2018-11-15 ENCOUNTER — APPOINTMENT (OUTPATIENT)
Dept: CT IMAGING | Age: 73
DRG: 219 | End: 2018-11-15
Payer: MEDICARE

## 2018-11-15 ENCOUNTER — CARE COORDINATION (OUTPATIENT)
Dept: CASE MANAGEMENT | Age: 73
End: 2018-11-15

## 2018-11-15 ENCOUNTER — APPOINTMENT (OUTPATIENT)
Dept: GENERAL RADIOLOGY | Age: 73
DRG: 219 | End: 2018-11-15
Payer: MEDICARE

## 2018-11-15 LAB
ANION GAP SERPL CALCULATED.3IONS-SCNC: 15 MEQ/L (ref 8–16)
BASE EXCESS (CALCULATED): 0.4 MMOL/L (ref -2.5–2.5)
BUN BLDV-MCNC: 27 MG/DL (ref 7–22)
CALCIUM SERPL-MCNC: 8.5 MG/DL (ref 8.5–10.5)
CHLORIDE BLD-SCNC: 104 MEQ/L (ref 98–111)
CO2: 22 MEQ/L (ref 23–33)
COLLECTED BY:: ABNORMAL
CREAT SERPL-MCNC: 1.7 MG/DL (ref 0.4–1.2)
EKG ATRIAL RATE: 67 BPM
EKG Q-T INTERVAL: 430 MS
EKG QRS DURATION: 132 MS
EKG QTC CALCULATION (BAZETT): 477 MS
EKG R AXIS: 40 DEGREES
EKG T AXIS: -9 DEGREES
EKG VENTRICULAR RATE: 74 BPM
ERYTHROCYTE [DISTWIDTH] IN BLOOD BY AUTOMATED COUNT: 16.9 % (ref 11.5–14.5)
ERYTHROCYTE [DISTWIDTH] IN BLOOD BY AUTOMATED COUNT: 56.8 FL (ref 35–45)
GFR SERPL CREATININE-BSD FRML MDRD: 40 ML/MIN/1.73M2
GLUCOSE BLD-MCNC: 115 MG/DL (ref 70–108)
GLUCOSE BLD-MCNC: 130 MG/DL (ref 70–108)
GLUCOSE, WHOLE BLOOD: 101 MG/DL (ref 70–108)
GLUCOSE, WHOLE BLOOD: 101 MG/DL (ref 70–108)
GLUCOSE, WHOLE BLOOD: 106 MG/DL (ref 70–108)
GLUCOSE, WHOLE BLOOD: 108 MG/DL (ref 70–108)
GLUCOSE, WHOLE BLOOD: 109 MG/DL (ref 70–108)
GLUCOSE, WHOLE BLOOD: 109 MG/DL (ref 70–108)
GLUCOSE, WHOLE BLOOD: 115 MG/DL (ref 70–108)
GLUCOSE, WHOLE BLOOD: 116 MG/DL (ref 70–108)
GLUCOSE, WHOLE BLOOD: 118 MG/DL (ref 70–108)
GLUCOSE, WHOLE BLOOD: 125 MG/DL (ref 70–108)
GLUCOSE, WHOLE BLOOD: 125 MG/DL (ref 70–108)
GLUCOSE, WHOLE BLOOD: 130 MG/DL (ref 70–108)
GLUCOSE, WHOLE BLOOD: 134 MG/DL (ref 70–108)
GLUCOSE, WHOLE BLOOD: 160 MG/DL (ref 70–108)
GLUCOSE, WHOLE BLOOD: 165 MG/DL (ref 70–108)
GLUCOSE, WHOLE BLOOD: 91 MG/DL (ref 70–108)
HCO3: 24 MMOL/L (ref 23–28)
HCT VFR BLD CALC: 24.8 % (ref 42–52)
HEMOGLOBIN: 8.1 GM/DL (ref 14–18)
MAGNESIUM: 2.5 MG/DL (ref 1.6–2.4)
MCH RBC QN AUTO: 30.8 PG (ref 26–33)
MCHC RBC AUTO-ENTMCNC: 32.7 GM/DL (ref 32.2–35.5)
MCV RBC AUTO: 94.3 FL (ref 80–94)
O2 SATURATION: 95 %
PCO2: 33 MMHG (ref 35–45)
PH BLOOD GAS: 7.47 (ref 7.35–7.45)
PLATELET # BLD: 107 THOU/MM3 (ref 130–400)
PMV BLD AUTO: 11.1 FL (ref 9.4–12.4)
PO2: 72 MMHG (ref 71–104)
POTASSIUM SERPL-SCNC: 4.8 MEQ/L (ref 3.5–5.2)
RBC # BLD: 2.63 MILL/MM3 (ref 4.7–6.1)
SODIUM BLD-SCNC: 141 MEQ/L (ref 135–145)
WBC # BLD: 10.9 THOU/MM3 (ref 4.8–10.8)

## 2018-11-15 PROCEDURE — 82947 ASSAY GLUCOSE BLOOD QUANT: CPT

## 2018-11-15 PROCEDURE — 71045 X-RAY EXAM CHEST 1 VIEW: CPT

## 2018-11-15 PROCEDURE — 36415 COLL VENOUS BLD VENIPUNCTURE: CPT

## 2018-11-15 PROCEDURE — 2500000003 HC RX 250 WO HCPCS: Performed by: THORACIC SURGERY (CARDIOTHORACIC VASCULAR SURGERY)

## 2018-11-15 PROCEDURE — 80048 BASIC METABOLIC PNL TOTAL CA: CPT

## 2018-11-15 PROCEDURE — 93010 ELECTROCARDIOGRAM REPORT: CPT | Performed by: INTERNAL MEDICINE

## 2018-11-15 PROCEDURE — 2709999900 HC NON-CHARGEABLE SUPPLY

## 2018-11-15 PROCEDURE — 71250 CT THORAX DX C-: CPT

## 2018-11-15 PROCEDURE — 93005 ELECTROCARDIOGRAM TRACING: CPT | Performed by: THORACIC SURGERY (CARDIOTHORACIC VASCULAR SURGERY)

## 2018-11-15 PROCEDURE — 82803 BLOOD GASES ANY COMBINATION: CPT

## 2018-11-15 PROCEDURE — 2000000000 HC ICU R&B

## 2018-11-15 PROCEDURE — 2700000000 HC OXYGEN THERAPY PER DAY

## 2018-11-15 PROCEDURE — 99232 SBSQ HOSP IP/OBS MODERATE 35: CPT | Performed by: INTERNAL MEDICINE

## 2018-11-15 PROCEDURE — 6370000000 HC RX 637 (ALT 250 FOR IP): Performed by: THORACIC SURGERY (CARDIOTHORACIC VASCULAR SURGERY)

## 2018-11-15 PROCEDURE — S0028 INJECTION, FAMOTIDINE, 20 MG: HCPCS | Performed by: THORACIC SURGERY (CARDIOTHORACIC VASCULAR SURGERY)

## 2018-11-15 PROCEDURE — 83735 ASSAY OF MAGNESIUM: CPT

## 2018-11-15 PROCEDURE — 94003 VENT MGMT INPAT SUBQ DAY: CPT

## 2018-11-15 PROCEDURE — 6360000002 HC RX W HCPCS: Performed by: THORACIC SURGERY (CARDIOTHORACIC VASCULAR SURGERY)

## 2018-11-15 PROCEDURE — 2580000003 HC RX 258: Performed by: THORACIC SURGERY (CARDIOTHORACIC VASCULAR SURGERY)

## 2018-11-15 PROCEDURE — 94770 HC ETCO2 MONITOR DAILY: CPT

## 2018-11-15 PROCEDURE — P9045 ALBUMIN (HUMAN), 5%, 250 ML: HCPCS | Performed by: THORACIC SURGERY (CARDIOTHORACIC VASCULAR SURGERY)

## 2018-11-15 PROCEDURE — 82948 REAGENT STRIP/BLOOD GLUCOSE: CPT

## 2018-11-15 PROCEDURE — 85027 COMPLETE CBC AUTOMATED: CPT

## 2018-11-15 RX ORDER — ASPIRIN 325 MG
325 TABLET ORAL DAILY
Status: DISCONTINUED | OUTPATIENT
Start: 2018-11-15 | End: 2018-12-06 | Stop reason: HOSPADM

## 2018-11-15 RX ADMIN — AMIODARONE HYDROCHLORIDE 200 MG: 200 TABLET ORAL at 22:21

## 2018-11-15 RX ADMIN — Medication 2 G: at 11:32

## 2018-11-15 RX ADMIN — ONDANSETRON 4 MG: 2 INJECTION INTRAMUSCULAR; INTRAVENOUS at 14:31

## 2018-11-15 RX ADMIN — EPINEPHRINE 6 MCG/MIN: 1 INJECTION, SOLUTION, CONCENTRATE INTRAVENOUS at 19:56

## 2018-11-15 RX ADMIN — DOCUSATE SODIUM 100 MG: 50 LIQUID ORAL at 11:35

## 2018-11-15 RX ADMIN — FAMOTIDINE 20 MG: 10 INJECTION, SOLUTION INTRAVENOUS at 21:34

## 2018-11-15 RX ADMIN — MORPHINE SULFATE 2 MG: 2 INJECTION, SOLUTION INTRAMUSCULAR; INTRAVENOUS at 14:30

## 2018-11-15 RX ADMIN — Medication 2 G: at 04:18

## 2018-11-15 RX ADMIN — EPINEPHRINE 10 MCG/MIN: 1 INJECTION, SOLUTION, CONCENTRATE INTRAVENOUS at 07:20

## 2018-11-15 RX ADMIN — Medication 10 ML: at 21:35

## 2018-11-15 RX ADMIN — VANCOMYCIN HYDROCHLORIDE 1500 MG: 1 INJECTION, POWDER, LYOPHILIZED, FOR SOLUTION INTRAVENOUS at 16:08

## 2018-11-15 RX ADMIN — DEXMEDETOMIDINE 0.6 MCG/KG/HR: 100 INJECTION, SOLUTION, CONCENTRATE INTRAVENOUS at 19:02

## 2018-11-15 RX ADMIN — Medication 2 G: at 20:41

## 2018-11-15 RX ADMIN — DOCUSATE SODIUM 100 MG: 50 LIQUID ORAL at 20:59

## 2018-11-15 RX ADMIN — ATORVASTATIN CALCIUM 40 MG: 40 TABLET, FILM COATED ORAL at 22:21

## 2018-11-15 RX ADMIN — Medication 5.9 UNITS/HR: at 19:44

## 2018-11-15 RX ADMIN — FAMOTIDINE 20 MG: 10 INJECTION, SOLUTION INTRAVENOUS at 09:41

## 2018-11-15 RX ADMIN — Medication 4.3 UNITS/HR: at 02:37

## 2018-11-15 RX ADMIN — ALBUMIN (HUMAN) 25 G: 12.5 INJECTION, SOLUTION INTRAVENOUS at 09:48

## 2018-11-15 RX ADMIN — AMIODARONE HYDROCHLORIDE 200 MG: 200 TABLET ORAL at 10:42

## 2018-11-15 RX ADMIN — SODIUM CHLORIDE: 9 INJECTION, SOLUTION INTRAVENOUS at 20:59

## 2018-11-15 RX ADMIN — ACETAMINOPHEN 650 MG: 325 TABLET ORAL at 04:44

## 2018-11-15 RX ADMIN — OXYCODONE HYDROCHLORIDE 10 MG: 5 TABLET ORAL at 16:34

## 2018-11-15 RX ADMIN — MORPHINE SULFATE 2 MG: 2 INJECTION, SOLUTION INTRAMUSCULAR; INTRAVENOUS at 11:35

## 2018-11-15 RX ADMIN — MORPHINE SULFATE 2 MG: 2 INJECTION, SOLUTION INTRAMUSCULAR; INTRAVENOUS at 21:38

## 2018-11-15 RX ADMIN — OXYCODONE HYDROCHLORIDE 10 MG: 5 TABLET ORAL at 20:58

## 2018-11-15 RX ADMIN — ENOXAPARIN SODIUM 40 MG: 40 INJECTION SUBCUTANEOUS at 09:40

## 2018-11-15 RX ADMIN — Medication 17.6 MG: at 20:59

## 2018-11-15 RX ADMIN — MULTIPLE VITAMINS W/ MINERALS TAB 1 TABLET: TAB at 10:42

## 2018-11-15 RX ADMIN — DEXMEDETOMIDINE 0.6 MCG/KG/HR: 100 INJECTION, SOLUTION, CONCENTRATE INTRAVENOUS at 11:32

## 2018-11-15 RX ADMIN — ASPIRIN 325 MG: 325 TABLET ORAL at 09:51

## 2018-11-15 RX ADMIN — POTASSIUM CHLORIDE 20 MEQ: 29.8 INJECTION, SOLUTION INTRAVENOUS at 00:17

## 2018-11-15 RX ADMIN — DEXMEDETOMIDINE 0.4 MCG/KG/HR: 100 INJECTION, SOLUTION, CONCENTRATE INTRAVENOUS at 00:41

## 2018-11-15 RX ADMIN — Medication 10 ML: at 10:56

## 2018-11-15 ASSESSMENT — PULMONARY FUNCTION TESTS
PIF_VALUE: 30
PIF_VALUE: 28
PIF_VALUE: 26
PIF_VALUE: 28
PIF_VALUE: 31
PIF_VALUE: 30

## 2018-11-15 ASSESSMENT — PAIN SCALES - GENERAL
PAINLEVEL_OUTOF10: 7
PAINLEVEL_OUTOF10: 6
PAINLEVEL_OUTOF10: 8
PAINLEVEL_OUTOF10: 6
PAINLEVEL_OUTOF10: 8

## 2018-11-15 ASSESSMENT — PAIN DESCRIPTION - PAIN TYPE
TYPE: ACUTE PAIN;SURGICAL PAIN
TYPE: ACUTE PAIN;SURGICAL PAIN

## 2018-11-15 NOTE — PROGRESS NOTES
Cardiovascular Surgery Progress Note      Alert, neurologically intact, intubated  Oxygenation somewhat marginal  Adequate hemodynamics, on epi 10, but with low filling pressures  CXR shows opacity right hemithorax  -Additional colloid   -Non-contrast CT chest to evaluate right hemithorax  -Wean epi as tolerated

## 2018-11-15 NOTE — PROGRESS NOTES
loss    Objective Information:  · Nutrition-Focused Physical Findings: Last BM 11/10; neuro intact per RN; 5 teeth extracted 11/9  · Wound Type: Surgical Wound (11/14 CABG & MVR)  · Current Nutrition Therapies:  · Oral Diet Orders: NPO   · Anthropometric Measures:  · Ht: 6' (182.9 cm)   · Current Body Wt: 218 lb (98.9 kg) (11/15 with no edema)  · Admission Body Wt: 201 lb 8 oz (91.4 kg) (11/6/18 on 6K was last actual wt. )  · Usual Body Wt:  (219-220# per pt, per EMR: 10/21/18: 216# 4.3OZ, (no edema), 10/26/18): 211# 11.2oz, 11/5/18: 206# 4.8oz )  · % Weight Change: 5.1% wt. loss in last 20 days, pt. admitted with CHF, on Bumex,     · [place] Body Wt: 178 lb (80.7 kg),   · % Ideal Body 122%  · BMI Classification: BMI 25.0 - 29.9 Overweight (29.6)    Nutrition Interventions:   Continue NPO (if unable to extubate today recommend Oxepa goal of 60ml/hour)  Continued Inpatient Monitoring, Education not appropriate at this time, Coordination of Care (Pt. received CHF diet education/handouts on 11/6/18 by our staff, )    Nutrition Evaluation:   · Evaluation: No progress toward goals   · Goals: adequate nutrient intake in 1-4 days    · Monitoring: Nutrition Progression, Wound Healing, Weight, Pertinent Labs, Constipation, Patient/Family Education      Electronically signed by Marleni Shine RD, LD on 11/15/18 at 2:37 PM    Contact Number: [phone]

## 2018-11-15 NOTE — PROGRESS NOTES
Encounters:   11/15/18 218 lb 0.6 oz (98.9 kg)   11/09/18 201 lb 8 oz (91.4 kg)   11/05/18 206 lb 4.8 oz (93.6 kg)        Physical Exam :  General Appearance:  Well developed. No distress  Mouth/Throat:  Oral mucosa moist  Neck:  Supple, no JVD  Lungs:  Breath sounds: reasonably clear  Heart[de-identified]  S1,S2 heard  Abdomen:  Soft, non - tender  Musculoskeletal:  Edema - none     Last 3 CBC  Recent Labs      11/14/18   0030   11/14/18   1232   11/14/18   1402  11/14/18   1500  11/15/18   0625   WBC  6.2   --    --    --    --   10.7  10.9*   RBC  2.93*   --    --    --    --   2.68*  2.63*   HGB  9.3*   < >  6.7*   < >  7.6*  8.4*  8.1*   HCT  29.2*   < >  19.5*   --    --   26.0*  24.8*   PLT  223   < >  133   --    --   120*  107*    < > = values in this interval not displayed. Last 3 CMP  Recent Labs      11/14/18   0030   11/14/18   1412  11/14/18   1500  11/14/18   2120  11/15/18   0625   NA  136   < >  139  142   --   141   K  4.4   < >  4.1  4.1  3.8  4.8   CL  95*   --    --   99   --   104   CO2  31   --    --   25   --   22*   BUN  33*   --    --   28*   --   27*   CREATININE  1.3*   --    --   1.4*   --   1.7*   CALCIUM  9.6   --    --   9.6   --   8.5    < > = values in this interval not displayed. Assessment / Plan   Renal - MISA , differential include cardio-renal with use of ACE Vs contrast induced Nephropathy (10/27/18)  · Renal function baseline is close to 1.0 however has been running close to 1.2 recently  · No been mild acute kidney injury status post cardiac surgery. However making decent urine would expect the renal function to improve  · Can give diuretics as needed and follow BMP     Mild hypotension - doing reasonable on epinephrine.   He was on Midodrin prior  Electrolytes - WNL  CAD with aortic stenosis -Status post surgery 11/14/18  Aute on chronic systolReasonably compensated diuretics as needed   meds reviewed and D/W patient , family  and the nursing staff     Dr. Froy Hawkins,

## 2018-11-16 ENCOUNTER — APPOINTMENT (OUTPATIENT)
Dept: GENERAL RADIOLOGY | Age: 73
DRG: 219 | End: 2018-11-16
Payer: MEDICARE

## 2018-11-16 LAB
ANION GAP SERPL CALCULATED.3IONS-SCNC: 11 MEQ/L (ref 8–16)
BACTERIA: ABNORMAL /HPF
BASE EXCESS MIXED: 1 MMOL/L (ref -2–3)
BILIRUBIN URINE: NEGATIVE
BLOOD, URINE: ABNORMAL
BUN BLDV-MCNC: 27 MG/DL (ref 7–22)
CALCIUM SERPL-MCNC: 8.2 MG/DL (ref 8.5–10.5)
CASTS UA: ABNORMAL /LPF
CHARACTER, URINE: CLEAR
CHLORIDE BLD-SCNC: 102 MEQ/L (ref 98–111)
CO2: 24 MEQ/L (ref 23–33)
COLLECTED BY:: ABNORMAL
COLOR: YELLOW
CREAT SERPL-MCNC: 1.4 MG/DL (ref 0.4–1.2)
CRYSTALS, UA: ABNORMAL
DEVICE: ABNORMAL
EKG ATRIAL RATE: 69 BPM
EKG P AXIS: -26 DEGREES
EKG P-R INTERVAL: 144 MS
EKG Q-T INTERVAL: 438 MS
EKG QRS DURATION: 138 MS
EKG QTC CALCULATION (BAZETT): 469 MS
EKG R AXIS: 53 DEGREES
EKG T AXIS: -31 DEGREES
EKG VENTRICULAR RATE: 69 BPM
EPITHELIAL CELLS, UA: ABNORMAL /HPF
ERYTHROCYTE [DISTWIDTH] IN BLOOD BY AUTOMATED COUNT: 16.8 % (ref 11.5–14.5)
ERYTHROCYTE [DISTWIDTH] IN BLOOD BY AUTOMATED COUNT: 59 FL (ref 35–45)
FIO2, MIXED VENOUS: 30
GFR SERPL CREATININE-BSD FRML MDRD: 50 ML/MIN/1.73M2
GLUCOSE BLD-MCNC: 110 MG/DL (ref 70–108)
GLUCOSE BLD-MCNC: 115 MG/DL (ref 70–108)
GLUCOSE BLD-MCNC: 116 MG/DL (ref 70–108)
GLUCOSE BLD-MCNC: 119 MG/DL (ref 70–108)
GLUCOSE BLD-MCNC: 124 MG/DL (ref 70–108)
GLUCOSE BLD-MCNC: 124 MG/DL (ref 70–108)
GLUCOSE BLD-MCNC: 128 MG/DL (ref 70–108)
GLUCOSE BLD-MCNC: 129 MG/DL (ref 70–108)
GLUCOSE BLD-MCNC: 172 MG/DL (ref 70–108)
GLUCOSE BLD-MCNC: 89 MG/DL (ref 70–108)
GLUCOSE URINE: NEGATIVE MG/DL
GLUCOSE, WHOLE BLOOD: 101 MG/DL (ref 70–108)
GLUCOSE, WHOLE BLOOD: 106 MG/DL (ref 70–108)
GLUCOSE, WHOLE BLOOD: 108 MG/DL (ref 70–108)
GLUCOSE, WHOLE BLOOD: 76 MG/DL (ref 70–108)
GLUCOSE, WHOLE BLOOD: 93 MG/DL (ref 70–108)
HCO3, MIXED: 25 MMOL/L (ref 23–28)
HCT VFR BLD CALC: 23.2 % (ref 42–52)
HEMOGLOBIN: 7.4 GM/DL (ref 14–18)
KETONES, URINE: ABNORMAL
LEUKOCYTE ESTERASE, URINE: NEGATIVE
MAGNESIUM: 2.4 MG/DL (ref 1.6–2.4)
MCH RBC QN AUTO: 31 PG (ref 26–33)
MCHC RBC AUTO-ENTMCNC: 31.9 GM/DL (ref 32.2–35.5)
MCV RBC AUTO: 97.1 FL (ref 80–94)
MODE: ABNORMAL
NITRITE, URINE: NEGATIVE
O2 SAT, MIXED: 54 %
PCO2, MIXED VENOUS: 37 MMHG (ref 41–51)
PH UA: 5
PH, MIXED: 7.44 (ref 7.31–7.41)
PLATELET # BLD: 81 THOU/MM3 (ref 130–400)
PMV BLD AUTO: 11.1 FL (ref 9.4–12.4)
PO2 MIXED: 27 MMHG (ref 25–40)
POTASSIUM SERPL-SCNC: 4.6 MEQ/L (ref 3.5–5.2)
PROTEIN UA: 30
RBC # BLD: 2.39 MILL/MM3 (ref 4.7–6.1)
RBC URINE: ABNORMAL /HPF
RENAL EPITHELIAL, UA: ABNORMAL
SET PEEP: 6 MMHG
SET PRESS SUPP: 16 CMH2O
SITE: ABNORMAL
SODIUM BLD-SCNC: 137 MEQ/L (ref 135–145)
SPECIFIC GRAVITY, URINE: >= 1.03 (ref 1–1.03)
UROBILINOGEN, URINE: 0.2 EU/DL
WBC # BLD: 11.8 THOU/MM3 (ref 4.8–10.8)
WBC UA: ABNORMAL /HPF
YEAST: ABNORMAL

## 2018-11-16 PROCEDURE — 5A1945Z RESPIRATORY VENTILATION, 24-96 CONSECUTIVE HOURS: ICD-10-PCS | Performed by: THORACIC SURGERY (CARDIOTHORACIC VASCULAR SURGERY)

## 2018-11-16 PROCEDURE — 87186 SC STD MICRODIL/AGAR DIL: CPT

## 2018-11-16 PROCEDURE — 02HV33Z INSERTION OF INFUSION DEVICE INTO SUPERIOR VENA CAVA, PERCUTANEOUS APPROACH: ICD-10-PCS | Performed by: INTERNAL MEDICINE

## 2018-11-16 PROCEDURE — 93010 ELECTROCARDIOGRAM REPORT: CPT | Performed by: INTERNAL MEDICINE

## 2018-11-16 PROCEDURE — 83735 ASSAY OF MAGNESIUM: CPT

## 2018-11-16 PROCEDURE — 94640 AIRWAY INHALATION TREATMENT: CPT

## 2018-11-16 PROCEDURE — 6370000000 HC RX 637 (ALT 250 FOR IP): Performed by: NURSE PRACTITIONER

## 2018-11-16 PROCEDURE — 87070 CULTURE OTHR SPECIMN AEROBIC: CPT

## 2018-11-16 PROCEDURE — 87205 SMEAR GRAM STAIN: CPT

## 2018-11-16 PROCEDURE — 2500000003 HC RX 250 WO HCPCS: Performed by: THORACIC SURGERY (CARDIOTHORACIC VASCULAR SURGERY)

## 2018-11-16 PROCEDURE — 81001 URINALYSIS AUTO W/SCOPE: CPT

## 2018-11-16 PROCEDURE — 99232 SBSQ HOSP IP/OBS MODERATE 35: CPT | Performed by: INTERNAL MEDICINE

## 2018-11-16 PROCEDURE — 36415 COLL VENOUS BLD VENIPUNCTURE: CPT

## 2018-11-16 PROCEDURE — C1894 INTRO/SHEATH, NON-LASER: HCPCS

## 2018-11-16 PROCEDURE — C1751 CATH, INF, PER/CENT/MIDLINE: HCPCS

## 2018-11-16 PROCEDURE — 80048 BASIC METABOLIC PNL TOTAL CA: CPT

## 2018-11-16 PROCEDURE — 99291 CRITICAL CARE FIRST HOUR: CPT | Performed by: INTERNAL MEDICINE

## 2018-11-16 PROCEDURE — 93503 INSERT/PLACE HEART CATHETER: CPT | Performed by: INTERNAL MEDICINE

## 2018-11-16 PROCEDURE — 85027 COMPLETE CBC AUTOMATED: CPT

## 2018-11-16 PROCEDURE — 6370000000 HC RX 637 (ALT 250 FOR IP): Performed by: THORACIC SURGERY (CARDIOTHORACIC VASCULAR SURGERY)

## 2018-11-16 PROCEDURE — 6360000002 HC RX W HCPCS: Performed by: THORACIC SURGERY (CARDIOTHORACIC VASCULAR SURGERY)

## 2018-11-16 PROCEDURE — 82948 REAGENT STRIP/BLOOD GLUCOSE: CPT

## 2018-11-16 PROCEDURE — 93503 INSERT/PLACE HEART CATHETER: CPT

## 2018-11-16 PROCEDURE — 99292 CRITICAL CARE ADDL 30 MIN: CPT | Performed by: INTERNAL MEDICINE

## 2018-11-16 PROCEDURE — 2700000000 HC OXYGEN THERAPY PER DAY

## 2018-11-16 PROCEDURE — 2580000003 HC RX 258: Performed by: THORACIC SURGERY (CARDIOTHORACIC VASCULAR SURGERY)

## 2018-11-16 PROCEDURE — 94003 VENT MGMT INPAT SUBQ DAY: CPT

## 2018-11-16 PROCEDURE — 2709999900 HC NON-CHARGEABLE SUPPLY

## 2018-11-16 PROCEDURE — 2000000000 HC ICU R&B

## 2018-11-16 PROCEDURE — 36556 INSERT NON-TUNNEL CV CATH: CPT | Performed by: INTERNAL MEDICINE

## 2018-11-16 PROCEDURE — 93005 ELECTROCARDIOGRAM TRACING: CPT | Performed by: THORACIC SURGERY (CARDIOTHORACIC VASCULAR SURGERY)

## 2018-11-16 PROCEDURE — 82947 ASSAY GLUCOSE BLOOD QUANT: CPT

## 2018-11-16 PROCEDURE — 87040 BLOOD CULTURE FOR BACTERIA: CPT

## 2018-11-16 PROCEDURE — S0028 INJECTION, FAMOTIDINE, 20 MG: HCPCS | Performed by: THORACIC SURGERY (CARDIOTHORACIC VASCULAR SURGERY)

## 2018-11-16 PROCEDURE — 3209999900 FLUORO FOR SURGICAL PROCEDURES

## 2018-11-16 PROCEDURE — 71045 X-RAY EXAM CHEST 1 VIEW: CPT

## 2018-11-16 RX ORDER — ALBUTEROL SULFATE 2.5 MG/3ML
2.5 SOLUTION RESPIRATORY (INHALATION)
Status: DISCONTINUED | OUTPATIENT
Start: 2018-11-16 | End: 2018-11-26

## 2018-11-16 RX ORDER — 0.9 % SODIUM CHLORIDE 0.9 %
250 INTRAVENOUS SOLUTION INTRAVENOUS ONCE
Status: COMPLETED | OUTPATIENT
Start: 2018-11-16 | End: 2018-11-16

## 2018-11-16 RX ADMIN — AMIODARONE HYDROCHLORIDE 200 MG: 200 TABLET ORAL at 21:03

## 2018-11-16 RX ADMIN — OXYCODONE HYDROCHLORIDE 10 MG: 5 TABLET ORAL at 05:42

## 2018-11-16 RX ADMIN — DEXMEDETOMIDINE 0.6 MCG/KG/HR: 100 INJECTION, SOLUTION, CONCENTRATE INTRAVENOUS at 10:17

## 2018-11-16 RX ADMIN — MORPHINE SULFATE 2 MG: 2 INJECTION, SOLUTION INTRAMUSCULAR; INTRAVENOUS at 09:37

## 2018-11-16 RX ADMIN — ACETAMINOPHEN 650 MG: 325 TABLET ORAL at 01:51

## 2018-11-16 RX ADMIN — DOCUSATE SODIUM 100 MG: 50 LIQUID ORAL at 14:18

## 2018-11-16 RX ADMIN — INSULIN LISPRO 1 UNITS: 100 INJECTION, SOLUTION INTRAVENOUS; SUBCUTANEOUS at 21:26

## 2018-11-16 RX ADMIN — FAMOTIDINE 20 MG: 10 INJECTION, SOLUTION INTRAVENOUS at 21:06

## 2018-11-16 RX ADMIN — PIPERACILLIN SODIUM AND TAZOBACTAM SODIUM 3.38 G: 3; .375 INJECTION, POWDER, LYOPHILIZED, FOR SOLUTION INTRAVENOUS at 04:11

## 2018-11-16 RX ADMIN — EPINEPHRINE 6 MCG/MIN: 1 INJECTION, SOLUTION, CONCENTRATE INTRAVENOUS at 11:46

## 2018-11-16 RX ADMIN — ASPIRIN 325 MG: 325 TABLET ORAL at 14:18

## 2018-11-16 RX ADMIN — MORPHINE SULFATE 2 MG: 2 INJECTION, SOLUTION INTRAMUSCULAR; INTRAVENOUS at 09:40

## 2018-11-16 RX ADMIN — OXYCODONE HYDROCHLORIDE 10 MG: 5 TABLET ORAL at 01:06

## 2018-11-16 RX ADMIN — Medication 10 ML: at 08:50

## 2018-11-16 RX ADMIN — AMIODARONE HYDROCHLORIDE 200 MG: 200 TABLET ORAL at 14:21

## 2018-11-16 RX ADMIN — Medication 10 ML: at 21:03

## 2018-11-16 RX ADMIN — SODIUM CHLORIDE 250 ML: 9 INJECTION, SOLUTION INTRAVENOUS at 05:43

## 2018-11-16 RX ADMIN — ATORVASTATIN CALCIUM 40 MG: 40 TABLET, FILM COATED ORAL at 21:03

## 2018-11-16 RX ADMIN — NYSTATIN 500000 UNITS: 500000 SUSPENSION ORAL at 22:15

## 2018-11-16 RX ADMIN — DEXMEDETOMIDINE 0.6 MCG/KG/HR: 100 INJECTION, SOLUTION, CONCENTRATE INTRAVENOUS at 00:37

## 2018-11-16 RX ADMIN — OXYCODONE HYDROCHLORIDE 5 MG: 5 TABLET ORAL at 20:34

## 2018-11-16 RX ADMIN — PIPERACILLIN SODIUM AND TAZOBACTAM SODIUM 3.38 G: 3; .375 INJECTION, POWDER, LYOPHILIZED, FOR SOLUTION INTRAVENOUS at 11:48

## 2018-11-16 RX ADMIN — FAMOTIDINE 20 MG: 10 INJECTION, SOLUTION INTRAVENOUS at 11:48

## 2018-11-16 RX ADMIN — PIPERACILLIN SODIUM AND TAZOBACTAM SODIUM 3.38 G: 3; .375 INJECTION, POWDER, LYOPHILIZED, FOR SOLUTION INTRAVENOUS at 21:11

## 2018-11-16 RX ADMIN — MORPHINE SULFATE 2 MG: 2 INJECTION, SOLUTION INTRAMUSCULAR; INTRAVENOUS at 02:10

## 2018-11-16 RX ADMIN — ALBUTEROL SULFATE 2.5 MG: 2.5 SOLUTION RESPIRATORY (INHALATION) at 19:15

## 2018-11-16 RX ADMIN — ENOXAPARIN SODIUM 40 MG: 40 INJECTION SUBCUTANEOUS at 14:40

## 2018-11-16 ASSESSMENT — PAIN SCALES - GENERAL
PAINLEVEL_OUTOF10: 3
PAINLEVEL_OUTOF10: 0
PAINLEVEL_OUTOF10: 2
PAINLEVEL_OUTOF10: 2
PAINLEVEL_OUTOF10: 6
PAINLEVEL_OUTOF10: 2
PAINLEVEL_OUTOF10: 6
PAINLEVEL_OUTOF10: 6

## 2018-11-16 ASSESSMENT — PAIN DESCRIPTION - PAIN TYPE: TYPE: ACUTE PAIN;SURGICAL PAIN

## 2018-11-16 ASSESSMENT — PULMONARY FUNCTION TESTS
PIF_VALUE: 25
PIF_VALUE: 24
PIF_VALUE: 29
PIF_VALUE: 29

## 2018-11-16 NOTE — CONSULTS
Sat: 98%: I/O: [id number]. Pulmonary artery systolic pressure 28. Pulmonary artery diastolic pressure 25. CVP 13. Opal 0.23. General:   Ill-appearing white male. HEENT:  normocephalic and atraumatic. No scleral icterus. Neck: supple. No JVD. Lungs: Right sided crackles. No retractions  Cardiac: RRR  Abdomen: soft. Nontender. Extremities:  No clubbing, cyanosis x 4. Upper extremity edema. Vasculature: capillary refill < 3 seconds. Skin:  warm and dry. Psych:  Awake and following commands on mechanical ventilator. Lymph:  No supraclavicular adenopathy. Neurologic:  No focal deficit. Data: (All radiographs, tracings, PFTs, and imaging are personally viewed and interpreted unless otherwise noted).  Chest x-ray shows right-sided infiltrates consistent with pneumonia.  Sodium 137, potassium 4.6, chloride 102, bicarb 24, BUN 27, creatinine 1.4, glucose 110. White blood cell count 11.8, hemoglobin 7.4, platelets 81.  Sputum culture pending. .    Case discussed with Dr. Fozia Davila. CC time 80 minutes. Electronically signed by Price Raman M.D.

## 2018-11-16 NOTE — FLOWSHEET NOTE
[place]  PHYSICAL THERAPY MISSED TREATMENT NOTE  ACUTE CARE  [facility] ICU 4D              Missed Treatment  Pt being extubated today but ballon pump in yet. Hold.  Check tomorrow

## 2018-11-16 NOTE — PROGRESS NOTES
binders.   [] Acute chest syndrome in patients with sickle cell disease   [] No indications  Inhaled Medications Goal: Improve respiratory functions in patients with airway disease and decrease WOB  Albuterol Indications  [x] Wheezing, diminished, or absent breath sounds associated with a pulmonary disorder  [] Known COPD  [] Peak flow < 80% predicted or personal best for known asthma patients  [] Documented obstructive defect on pulmonary function testing which is reversible   [] Home regimen  [] With a mucolytic to prevent bronchospasm  [] No indications  Ipratropium Bromide Indications   [] Maintenance treatment associated with COPD, including chronic bronchitis and emphysema  [] Home regimen  Other Medications  [] Home regimen   Oxygenation  Goal: Reverse hypoxemia and improve tissue oxygenation (See oxygen protocol order)  [] SpO2 < 92% (Check order for SpO2 goal)        [] Home oxygen   [] Cyanosis        [] Chest pain  [] Dyspnea      [] Altered level of consciousness        [] No indications    THERAPIES SELECTED BASED ON ALGORITHMS  Bronchial Hygiene  []Vest  []PD&P []Suction or Dos Santos cough  []Acapella [x]Metaneb []No therapy recommended  []Mechanical Insufflation exsufflation  Volume expansion  []Incentive Spirometry  []EZPAP  [x]Metaneb  []No therapy recommended  Inhaled Medication  []HHN  []MDI  []Two hour continuous  [x]Metaneb   [] No therapy recommended  Oxygenation  [x]Nasal cannula  []Mask    []CPAP  []High flow   [] No therapy recommended                  BRONCHODILATOR ASSESSMENT SCORE  Score 0 1 2 3 4   Breath Sounds []  Normal or no wheezing with diminished bases []  End expiratory wheeze or slightly diminished []  Pronounced exp. wheeze [x]  Insp. & Exp. wheeze []  Absent or near absent   Dyspnea and level of distress   []  None, respiratory rate   12-20, regular pattern [x]  Only on exertion or increased respiratory rate 21-25 []  Mild dyspnea at rest, irregular breathing pattern respiratory

## 2018-11-16 NOTE — PROGRESS NOTES
Nutrition Assessment    Type and Reason for Visit: Reassess (NPO monitor)    Nutrition Recommendations: Recommend ADA,KEON diet if passes bedside swallow evaluation and will add nutrition ileus protocol and educate as appropriate. Bowel regimen per MD.    Nutrition Assessment: Pt. s/p OHS; just extubated; plan to start po diet in a few hours; last BM 11/10 - received colace and will add nutrition ileus protocol once he is on diet; glucose 124; meds include ATB,MVI,insulin gtt; Hx DM and CKD; Pt. with moderate malnutrition due to poor po intake Hx and mild fat and muscle loss. At risk for further nutrition compromise due to increased post op needs. Will add nutrition ileus protocol once on diet,ONS as needed and will educate as appropriate. Malnutrition Assessment:  · Malnutrition Status: Meets the criteria for moderate malnutrition  · Context: Acute illness or injury  · Findings of the 6 clinical characteristics of malnutrition (Minimum of 2 out of 6 clinical characteristics is required to make the diagnosis of moderate or severe Protein Calorie Malnutrition based on AND/ASPEN Guidelines):  1. Energy Intake-Less than 75% of estimated energy requirement for greater than or equal to 3 months, greater than 7 days    2. Weight Loss-5% loss or greater,  (~ 3 weeks, however pt. a CHF pt. on Bumex)  3. Fat Loss-Mild subcutaneous fat loss, Orbital  4. Muscle Loss-Mild muscle mass loss, Clavicles (pectoralis and deltoids)    Nutrition Risk Level: High    Nutrient Needs:  · Estimated Daily Total Kcal: 1828-2285kcals (20-25kcals/kgm wt. of 91.4kgm 11/6)  · Estimated Daily Protein (g): ~82 grams protein ( 1 gram protein/kg based on IBW of 81 kg)  Increase as renal status allows   ·   Nutrition Diagnosis:   · Problem:  Moderate malnutrition, In context of acute illness or injury  · Etiology: related to Insufficient energy/nutrient consumption     Signs and symptoms:  as evidenced by Mild loss of subcutaneous fat, Mild

## 2018-11-16 NOTE — PROGRESS NOTES
Cardiovascular Surgery Progress Note      Alert, intubated  Hemodynamics and oxygenation improved  Now on minimal vent settings  Copious purulent secretions  CXR aeration improved  -Zosyn started; vancomycin only if MRSA documented  -Keep IABP for one more day  -Appreciate Dr. Emerald Collins assistance

## 2018-11-16 NOTE — PLAN OF CARE
Problem: Nutrition  Goal: Optimal nutrition therapy  Outcome: Ongoing  Nutrition Problem:  Moderate malnutrition, In context of acute illness or injury  Intervention: Food and/or Nutrient Delivery: Start oral diet (as able post extubation if passes bedside swallow eval - recommend ADA, KEON)  Nutritional Goals: adequate nutrient intake in 1-4 days

## 2018-11-16 NOTE — CARE COORDINATION
11/16/18, 3:16 PM      [address] day: 6  Location: -09/009-A Reason for admit: Chest pain [R07.9]  Chest pain [R07.9]   Procedure:   (70/65 PICC R basilic)  34/9 VQ Scan: Neg for PE  11/9 BLE Venous dopplers: Neg for DVT  11/12 Teeth extraction at [facility]  11/12 CXR: Bilateral pleural effusions left greater than right. Increased left pleural effusion. Since the previous exam. Very subtle minimal increased interstitial markings at the lower lungs  11/13 Bilateral carotid dopplers   11/15 Aortic valve replacement with a 23 mm [employer] Trifecta bioprosthesis; Mitral valve repair with 28 mm Zuleta Physio annuloplasty band; 2v CABG; IABP inserted  11/15 CT Chest: Infiltrates scattered throughout both lungs which may be on the basis of multifocal pneumonia. Aspiration should also be considered; Tiny right-sided pneumothorax with subcutaneous emphysema throughout the anterior chest wall; Large bilateral pleural effusions with associated compressive atelectasis  11/16 SGC exchanged  11/16 Extubated  11/16 CXR:   Significantly improved pulmonary edema. Satisfactory positions of the life support lines and tubes     Treatment Plan of Care: POD #2. Exchanged SGC today. Copious purulent secretions on vent. Zosyn started. Extubated this afternoon to 3L O2. Plan to keep IABP one more day. CT x4 to -20sx. Haley. SGC. IABP in 1:1. Tmax 101.3. Follows commands. CVS, Cardiology, and Nephrology following. CR/PT/OT. Dietitian consulted. Telemetry, I&O, daily weight, IS, oral care, sternal precautions, CT care, wound care, SCDs, saenz care. Precedex stopped this afternoon. Epi @ 4 mcg/min, insulin drip, amio, asa, lipitor, lovenox, pepcid, lopressor, prn IV morphine, nystatin, prn zofran, prn roxicodone, IV zosyn, Electrolyte replacement protocols. Labs: BUN 27, Creat 1.4, wbc 11.8, Hgb 7.4, Plt 81. PCP: Grazyna ELLISON  Readmission Risk Score: 33%  Discharge Plan: Home w/family assist and HH vs ECF.  SW on case.

## 2018-11-16 NOTE — PLAN OF CARE
[address] THERAPY MISSED TREATMENT NOTE  [facility] ICU 4D  4D-009-A      Date: 2018  Patient Name: Charles Anand        CSN: [id number]   : 1945  (72 y.o.)  Gender: male   Referring Practitioner: Brandi Rodriguez MD  Diagnosis: Chest Pain         REASON FOR MISSED TREATMENT:  Hold treatment per nursing request.  Plan for extubation today. Pt with a balloon pump in place as well.  Will await for that to be removed and pt extubated prior to initiating OT Eval.

## 2018-11-17 ENCOUNTER — APPOINTMENT (OUTPATIENT)
Dept: GENERAL RADIOLOGY | Age: 73
DRG: 219 | End: 2018-11-17
Payer: MEDICARE

## 2018-11-17 LAB
ABO: NORMAL
ANION GAP SERPL CALCULATED.3IONS-SCNC: 14 MEQ/L (ref 8–16)
ANION GAP SERPL CALCULATED.3IONS-SCNC: 15 MEQ/L (ref 8–16)
ANTIBODY SCREEN: NORMAL
BASOPHILIA: ABNORMAL
BASOPHILS # BLD: 0.2 %
BASOPHILS # BLD: 0.3 %
BASOPHILS ABSOLUTE: 0 THOU/MM3 (ref 0–0.1)
BASOPHILS ABSOLUTE: 0 THOU/MM3 (ref 0–0.1)
BUN BLDV-MCNC: 28 MG/DL (ref 7–22)
BUN BLDV-MCNC: 35 MG/DL (ref 7–22)
CALCIUM IONIZED: 1.05 MMOL/L (ref 1.12–1.32)
CALCIUM SERPL-MCNC: 8.1 MG/DL (ref 8.5–10.5)
CALCIUM SERPL-MCNC: 8.2 MG/DL (ref 8.5–10.5)
CHLORIDE BLD-SCNC: 102 MEQ/L (ref 98–111)
CHLORIDE BLD-SCNC: 103 MEQ/L (ref 98–111)
CO2: 22 MEQ/L (ref 23–33)
CO2: 22 MEQ/L (ref 23–33)
CREAT SERPL-MCNC: 1.3 MG/DL (ref 0.4–1.2)
CREAT SERPL-MCNC: 1.3 MG/DL (ref 0.4–1.2)
EOSINOPHIL # BLD: 0.1 %
EOSINOPHIL # BLD: 0.1 %
EOSINOPHILS ABSOLUTE: 0 THOU/MM3 (ref 0–0.4)
EOSINOPHILS ABSOLUTE: 0 THOU/MM3 (ref 0–0.4)
ERYTHROCYTE [DISTWIDTH] IN BLOOD BY AUTOMATED COUNT: 16.3 % (ref 11.5–14.5)
ERYTHROCYTE [DISTWIDTH] IN BLOOD BY AUTOMATED COUNT: 16.6 % (ref 11.5–14.5)
ERYTHROCYTE [DISTWIDTH] IN BLOOD BY AUTOMATED COUNT: 56.3 FL (ref 35–45)
ERYTHROCYTE [DISTWIDTH] IN BLOOD BY AUTOMATED COUNT: 57.8 FL (ref 35–45)
GFR SERPL CREATININE-BSD FRML MDRD: 54 ML/MIN/1.73M2
GFR SERPL CREATININE-BSD FRML MDRD: 54 ML/MIN/1.73M2
GLUCOSE BLD-MCNC: 135 MG/DL (ref 70–108)
GLUCOSE BLD-MCNC: 144 MG/DL (ref 70–108)
GLUCOSE BLD-MCNC: 148 MG/DL (ref 70–108)
GLUCOSE BLD-MCNC: 152 MG/DL (ref 70–108)
GLUCOSE BLD-MCNC: 188 MG/DL (ref 70–108)
GLUCOSE BLD-MCNC: 191 MG/DL (ref 70–108)
HCT VFR BLD CALC: 18.2 % (ref 42–52)
HCT VFR BLD CALC: 30.6 % (ref 42–52)
HCT VFR BLD CALC: 31.6 % (ref 42–52)
HEMOGLOBIN: 10.4 GM/DL (ref 14–18)
HEMOGLOBIN: 5.8 GM/DL (ref 14–18)
HEMOGLOBIN: 9.9 GM/DL (ref 14–18)
IMMATURE GRANS (ABS): 0.03 THOU/MM3 (ref 0–0.07)
IMMATURE GRANS (ABS): 0.04 THOU/MM3 (ref 0–0.07)
IMMATURE GRANULOCYTES: 0.4 %
IMMATURE GRANULOCYTES: 0.5 %
LYMPHOCYTES # BLD: 10.1 %
LYMPHOCYTES # BLD: 7.1 %
LYMPHOCYTES ABSOLUTE: 0.6 THOU/MM3 (ref 1–4.8)
LYMPHOCYTES ABSOLUTE: 0.7 THOU/MM3 (ref 1–4.8)
MAGNESIUM: 2.4 MG/DL (ref 1.6–2.4)
MCH RBC QN AUTO: 30.6 PG (ref 26–33)
MCH RBC QN AUTO: 31.2 PG (ref 26–33)
MCHC RBC AUTO-ENTMCNC: 31.9 GM/DL (ref 32.2–35.5)
MCHC RBC AUTO-ENTMCNC: 32.4 GM/DL (ref 32.2–35.5)
MCV RBC AUTO: 94.4 FL (ref 80–94)
MCV RBC AUTO: 97.8 FL (ref 80–94)
MONOCYTES # BLD: 8.1 %
MONOCYTES # BLD: 9.1 %
MONOCYTES ABSOLUTE: 0.6 THOU/MM3 (ref 0.4–1.3)
MONOCYTES ABSOLUTE: 0.7 THOU/MM3 (ref 0.4–1.3)
NUCLEATED RED BLOOD CELLS: 0 /100 WBC
NUCLEATED RED BLOOD CELLS: 0 /100 WBC
PATHOLOGIST REVIEW: ABNORMAL
PLATELET # BLD: 48 THOU/MM3 (ref 130–400)
PLATELET # BLD: 49 THOU/MM3 (ref 130–400)
PMV BLD AUTO: 11 FL (ref 9.4–12.4)
PMV BLD AUTO: 11 FL (ref 9.4–12.4)
POTASSIUM SERPL-SCNC: 4.4 MEQ/L (ref 3.5–5.2)
POTASSIUM SERPL-SCNC: 4.8 MEQ/L (ref 3.5–5.2)
RBC # BLD: 1.86 MILL/MM3 (ref 4.7–6.1)
RBC # BLD: 3.24 MILL/MM3 (ref 4.7–6.1)
RH FACTOR: NORMAL
SCAN OF BLOOD SMEAR: NORMAL
SEG NEUTROPHILS: 80 %
SEG NEUTROPHILS: 84 %
SEGMENTED NEUTROPHILS ABSOLUTE COUNT: 5.5 THOU/MM3 (ref 1.8–7.7)
SEGMENTED NEUTROPHILS ABSOLUTE COUNT: 7.3 THOU/MM3 (ref 1.8–7.7)
SODIUM BLD-SCNC: 139 MEQ/L (ref 135–145)
SODIUM BLD-SCNC: 139 MEQ/L (ref 135–145)
WBC # BLD: 6.9 THOU/MM3 (ref 4.8–10.8)
WBC # BLD: 8.7 THOU/MM3 (ref 4.8–10.8)

## 2018-11-17 PROCEDURE — 2580000003 HC RX 258: Performed by: THORACIC SURGERY (CARDIOTHORACIC VASCULAR SURGERY)

## 2018-11-17 PROCEDURE — 94640 AIRWAY INHALATION TREATMENT: CPT

## 2018-11-17 PROCEDURE — 2000000000 HC ICU R&B

## 2018-11-17 PROCEDURE — 71045 X-RAY EXAM CHEST 1 VIEW: CPT

## 2018-11-17 PROCEDURE — 99232 SBSQ HOSP IP/OBS MODERATE 35: CPT | Performed by: INTERNAL MEDICINE

## 2018-11-17 PROCEDURE — 2580000003 HC RX 258: Performed by: INTERNAL MEDICINE

## 2018-11-17 PROCEDURE — 86923 COMPATIBILITY TEST ELECTRIC: CPT

## 2018-11-17 PROCEDURE — 85018 HEMOGLOBIN: CPT

## 2018-11-17 PROCEDURE — 85025 COMPLETE CBC W/AUTO DIFF WBC: CPT

## 2018-11-17 PROCEDURE — 86850 RBC ANTIBODY SCREEN: CPT

## 2018-11-17 PROCEDURE — 6360000002 HC RX W HCPCS: Performed by: THORACIC SURGERY (CARDIOTHORACIC VASCULAR SURGERY)

## 2018-11-17 PROCEDURE — 6360000002 HC RX W HCPCS: Performed by: INTERNAL MEDICINE

## 2018-11-17 PROCEDURE — 36415 COLL VENOUS BLD VENIPUNCTURE: CPT

## 2018-11-17 PROCEDURE — 99291 CRITICAL CARE FIRST HOUR: CPT | Performed by: INTERNAL MEDICINE

## 2018-11-17 PROCEDURE — 36592 COLLECT BLOOD FROM PICC: CPT

## 2018-11-17 PROCEDURE — P9016 RBC LEUKOCYTES REDUCED: HCPCS

## 2018-11-17 PROCEDURE — 82948 REAGENT STRIP/BLOOD GLUCOSE: CPT

## 2018-11-17 PROCEDURE — 93005 ELECTROCARDIOGRAM TRACING: CPT | Performed by: THORACIC SURGERY (CARDIOTHORACIC VASCULAR SURGERY)

## 2018-11-17 PROCEDURE — 2709999900 HC NON-CHARGEABLE SUPPLY

## 2018-11-17 PROCEDURE — 85014 HEMATOCRIT: CPT

## 2018-11-17 PROCEDURE — 99233 SBSQ HOSP IP/OBS HIGH 50: CPT | Performed by: THORACIC SURGERY (CARDIOTHORACIC VASCULAR SURGERY)

## 2018-11-17 PROCEDURE — 82330 ASSAY OF CALCIUM: CPT

## 2018-11-17 PROCEDURE — 6370000000 HC RX 637 (ALT 250 FOR IP): Performed by: THORACIC SURGERY (CARDIOTHORACIC VASCULAR SURGERY)

## 2018-11-17 PROCEDURE — 80048 BASIC METABOLIC PNL TOTAL CA: CPT

## 2018-11-17 PROCEDURE — P9035 PLATELET PHERES LEUKOREDUCED: HCPCS

## 2018-11-17 PROCEDURE — 86901 BLOOD TYPING SEROLOGIC RH(D): CPT

## 2018-11-17 PROCEDURE — 94660 CPAP INITIATION&MGMT: CPT

## 2018-11-17 PROCEDURE — 2500000003 HC RX 250 WO HCPCS: Performed by: THORACIC SURGERY (CARDIOTHORACIC VASCULAR SURGERY)

## 2018-11-17 PROCEDURE — 85027 COMPLETE CBC AUTOMATED: CPT

## 2018-11-17 PROCEDURE — 86900 BLOOD TYPING SEROLOGIC ABO: CPT

## 2018-11-17 PROCEDURE — 83735 ASSAY OF MAGNESIUM: CPT

## 2018-11-17 RX ORDER — 0.9 % SODIUM CHLORIDE 0.9 %
250 INTRAVENOUS SOLUTION INTRAVENOUS ONCE
Status: DISCONTINUED | OUTPATIENT
Start: 2018-11-17 | End: 2018-11-19

## 2018-11-17 RX ORDER — METOCLOPRAMIDE HYDROCHLORIDE 5 MG/ML
10 INJECTION INTRAMUSCULAR; INTRAVENOUS ONCE
Status: COMPLETED | OUTPATIENT
Start: 2018-11-17 | End: 2018-11-17

## 2018-11-17 RX ORDER — 0.9 % SODIUM CHLORIDE 0.9 %
250 INTRAVENOUS SOLUTION INTRAVENOUS ONCE
Status: DISCONTINUED | OUTPATIENT
Start: 2018-11-17 | End: 2018-11-17

## 2018-11-17 RX ORDER — DOBUTAMINE HYDROCHLORIDE 200 MG/100ML
2.5 INJECTION INTRAVENOUS CONTINUOUS
Status: DISCONTINUED | OUTPATIENT
Start: 2018-11-17 | End: 2018-11-17

## 2018-11-17 RX ORDER — BUMETANIDE 0.25 MG/ML
2 INJECTION, SOLUTION INTRAMUSCULAR; INTRAVENOUS ONCE
Status: COMPLETED | OUTPATIENT
Start: 2018-11-17 | End: 2018-11-17

## 2018-11-17 RX ORDER — FUROSEMIDE 10 MG/ML
40 INJECTION INTRAMUSCULAR; INTRAVENOUS 2 TIMES DAILY
Status: DISCONTINUED | OUTPATIENT
Start: 2018-11-17 | End: 2018-11-18

## 2018-11-17 RX ORDER — MAGNESIUM SULFATE HEPTAHYDRATE 500 MG/ML
1 INJECTION, SOLUTION INTRAMUSCULAR; INTRAVENOUS ONCE
Status: DISCONTINUED | OUTPATIENT
Start: 2018-11-17 | End: 2018-11-17 | Stop reason: SDUPTHER

## 2018-11-17 RX ORDER — 0.9 % SODIUM CHLORIDE 0.9 %
250 INTRAVENOUS SOLUTION INTRAVENOUS ONCE
Status: COMPLETED | OUTPATIENT
Start: 2018-11-17 | End: 2018-11-17

## 2018-11-17 RX ADMIN — METOCLOPRAMIDE 10 MG: 5 INJECTION, SOLUTION INTRAMUSCULAR; INTRAVENOUS at 16:13

## 2018-11-17 RX ADMIN — Medication 10 ML: at 10:29

## 2018-11-17 RX ADMIN — SODIUM CHLORIDE 250 ML: 9 INJECTION, SOLUTION INTRAVENOUS at 05:53

## 2018-11-17 RX ADMIN — METOCLOPRAMIDE 10 MG: 5 INJECTION, SOLUTION INTRAMUSCULAR; INTRAVENOUS at 09:12

## 2018-11-17 RX ADMIN — MORPHINE SULFATE 2 MG: 2 INJECTION, SOLUTION INTRAMUSCULAR; INTRAVENOUS at 11:46

## 2018-11-17 RX ADMIN — ONDANSETRON 4 MG: 2 INJECTION INTRAMUSCULAR; INTRAVENOUS at 22:11

## 2018-11-17 RX ADMIN — Medication 10 ML: at 20:50

## 2018-11-17 RX ADMIN — SODIUM CHLORIDE 250 ML: 9 INJECTION, SOLUTION INTRAVENOUS at 09:14

## 2018-11-17 RX ADMIN — ONDANSETRON 4 MG: 2 INJECTION INTRAMUSCULAR; INTRAVENOUS at 13:37

## 2018-11-17 RX ADMIN — POTASSIUM CHLORIDE 20 MEQ: 29.8 INJECTION, SOLUTION INTRAVENOUS at 23:11

## 2018-11-17 RX ADMIN — Medication 1 UNITS: at 12:05

## 2018-11-17 RX ADMIN — BUMETANIDE 2 MG: 0.25 INJECTION INTRAMUSCULAR; INTRAVENOUS at 22:11

## 2018-11-17 RX ADMIN — INSULIN LISPRO 1 UNITS: 100 INJECTION, SOLUTION INTRAVENOUS; SUBCUTANEOUS at 20:59

## 2018-11-17 RX ADMIN — ONDANSETRON 4 MG: 2 INJECTION INTRAMUSCULAR; INTRAVENOUS at 07:36

## 2018-11-17 RX ADMIN — FUROSEMIDE 40 MG: 10 INJECTION, SOLUTION INTRAMUSCULAR; INTRAVENOUS at 20:49

## 2018-11-17 RX ADMIN — ATORVASTATIN CALCIUM 40 MG: 40 TABLET, FILM COATED ORAL at 20:49

## 2018-11-17 RX ADMIN — SODIUM CHLORIDE 250 ML: 9 INJECTION, SOLUTION INTRAVENOUS at 09:13

## 2018-11-17 RX ADMIN — MORPHINE SULFATE 2 MG: 2 INJECTION, SOLUTION INTRAMUSCULAR; INTRAVENOUS at 12:59

## 2018-11-17 RX ADMIN — AMIODARONE HYDROCHLORIDE 200 MG: 200 TABLET ORAL at 10:07

## 2018-11-17 RX ADMIN — FAMOTIDINE 20 MG: 20 TABLET ORAL at 10:01

## 2018-11-17 RX ADMIN — Medication 1 UNITS: at 16:19

## 2018-11-17 RX ADMIN — FAMOTIDINE 20 MG: 20 TABLET ORAL at 20:49

## 2018-11-17 RX ADMIN — ALBUTEROL SULFATE 2.5 MG: 2.5 SOLUTION RESPIRATORY (INHALATION) at 19:53

## 2018-11-17 RX ADMIN — ONDANSETRON 4 MG: 2 INJECTION INTRAMUSCULAR; INTRAVENOUS at 01:42

## 2018-11-17 RX ADMIN — AMIODARONE HYDROCHLORIDE 150 MG: 1.5 INJECTION, SOLUTION INTRAVENOUS at 13:00

## 2018-11-17 RX ADMIN — AMIODARONE HYDROCHLORIDE 1 MG/MIN: 1.8 INJECTION, SOLUTION INTRAVENOUS at 13:12

## 2018-11-17 RX ADMIN — MAGNESIUM SULFATE HEPTAHYDRATE 1 G: 500 INJECTION, SOLUTION INTRAMUSCULAR; INTRAVENOUS at 23:19

## 2018-11-17 RX ADMIN — CEFEPIME HYDROCHLORIDE 2 G: 2 INJECTION, POWDER, FOR SOLUTION INTRAVENOUS at 12:02

## 2018-11-17 RX ADMIN — ACETAMINOPHEN 325 MG: 325 TABLET ORAL at 10:07

## 2018-11-17 RX ADMIN — MORPHINE SULFATE 2 MG: 2 INJECTION, SOLUTION INTRAMUSCULAR; INTRAVENOUS at 19:50

## 2018-11-17 RX ADMIN — ALBUTEROL SULFATE 2.5 MG: 2.5 SOLUTION RESPIRATORY (INHALATION) at 13:50

## 2018-11-17 RX ADMIN — FUROSEMIDE 40 MG: 10 INJECTION, SOLUTION INTRAMUSCULAR; INTRAVENOUS at 09:57

## 2018-11-17 RX ADMIN — MORPHINE SULFATE 2 MG: 2 INJECTION, SOLUTION INTRAMUSCULAR; INTRAVENOUS at 16:13

## 2018-11-17 RX ADMIN — ALBUTEROL SULFATE 2.5 MG: 2.5 SOLUTION RESPIRATORY (INHALATION) at 09:18

## 2018-11-17 RX ADMIN — DOBUTAMINE HYDROCHLORIDE 2.5 MCG/KG/MIN: 200 INJECTION INTRAVENOUS at 22:11

## 2018-11-17 RX ADMIN — OXYCODONE HYDROCHLORIDE 10 MG: 5 TABLET ORAL at 00:34

## 2018-11-17 RX ADMIN — AMIODARONE HYDROCHLORIDE 0.5 MG/MIN: 1.8 INJECTION, SOLUTION INTRAVENOUS at 19:20

## 2018-11-17 RX ADMIN — PIPERACILLIN SODIUM AND TAZOBACTAM SODIUM 3.38 G: 3; .375 INJECTION, POWDER, LYOPHILIZED, FOR SOLUTION INTRAVENOUS at 04:23

## 2018-11-17 ASSESSMENT — PAIN SCALES - GENERAL
PAINLEVEL_OUTOF10: 6
PAINLEVEL_OUTOF10: 4
PAINLEVEL_OUTOF10: 5
PAINLEVEL_OUTOF10: 2
PAINLEVEL_OUTOF10: 5
PAINLEVEL_OUTOF10: 7
PAINLEVEL_OUTOF10: 10
PAINLEVEL_OUTOF10: 4
PAINLEVEL_OUTOF10: 6
PAINLEVEL_OUTOF10: 5

## 2018-11-17 ASSESSMENT — PAIN DESCRIPTION - PAIN TYPE: TYPE: ACUTE PAIN;SURGICAL PAIN

## 2018-11-17 ASSESSMENT — PAIN DESCRIPTION - LOCATION: LOCATION: CHEST

## 2018-11-17 NOTE — PROGRESS NOTES
[facility]    Renal inpatient Progress Note  11/17/2018 10:50 AM      Pt Name:   Liberty Harrell  YOB: 1945  Attending:   Jaime Hastings MD    Chief Complaint:   Liberty Harrell is a 72 y.o. male being followed by nephrology for MISA / Fluid overload     Interval History : patient seen and examined by me. Patient is looking well denies any chest pain or shortness of breath  Is extubated. Making decent urine  He is still on the balloon pump and lot of drain output as well . Started on diuretics.   He is +8 L     Scheduled Medications :   sodium chloride  250 mL Intravenous Once    furosemide  40 mg Intravenous BID    sodium chloride  250 mL Intravenous Once    sodium chloride  250 mL Intravenous Once    cefepime  2 g Intravenous Q24H    nystatin  5 mL Oral 4x Daily    albuterol  2.5 mg Nebulization Q4H While awake    insulin lispro  0-6 Units Subcutaneous TID WC    insulin lispro  0-3 Units Subcutaneous Nightly    aspirin  325 mg Oral Daily    docusate  100 mg Oral BID    sodium chloride flush  10 mL Intravenous 2 times per day    calcium replacement protocol   Other RX Placeholder    famotidine  20 mg Oral BID    famotidine (PEPCID) injection  20 mg Intravenous BID    amiodarone  200 mg Oral BID    mupirocin   Topical Once    therapeutic multivitamin-minerals  1 tablet Oral Daily with breakfast    enoxaparin  40 mg Subcutaneous Daily    atorvastatin  40 mg Oral Nightly      sodium chloride 20 mL/hr at 11/15/18 2059    dextrose      vasopressin infusion Stopped (11/15/18 0815)    EPINEPHrine infusion 2 mcg/min (11/17/18 0905)        Vitals :  BP (!) 159/77   Pulse 82   Temp 98.2 °F (36.8 °C) (Core)   Resp 30   Ht 6' (1.829 m)   Wt 215 lb 6.2 oz (97.7 kg)   SpO2 95%   BMI 29.21 kg/m²     24HR INTAKE/OUTPUT:      Intake/Output Summary (Last 24 hours) at 11/17/18 1050  Last data filed at 11/17/18 0954   Gross per 24 hour   Intake             1796 ml   Output

## 2018-11-17 NOTE — PROGRESS NOTES
I/O last 3 completed shifts: In: 1486 [P.O.:50; I.V.:1436]  Out: 1475 [Urine:875; Emesis/NG output:150; Chest Tube:450]     Date 11/17/18 0000 - 11/17/18 2359   Shift [id number] 2061-2519 24 Hour Total   I  N  T  A  K  E   P.O.  (mL/kg/hr) 0  (0)   0    I.V.  (mL/kg) 253  (2.6)   253  (2.6)    Blood  (mL/kg)  310  (3.2)  310  (3.2)    Shift Total  (mL/kg) 253  (2.6) 310  (3.2)  563  (5.8)   O  U  T  P  U  T   Urine  (mL/kg/hr) 275  (0.4)   275    Chest Tube 60   60    Shift Total  (mL/kg) 335  (3.4)   335  (3.4)   Weight (kg) 97.7 97.7 97.7 97.7     Wt Readings from Last 3 Encounters:   11/17/18 215 lb 6.2 oz (97.7 kg)   11/09/18 201 lb 8 oz (91.4 kg)   11/05/18 206 lb 4.8 oz (93.6 kg)      Body mass index is 29.21 kg/m².          Scheduled Meds:   sodium chloride  250 mL Intravenous Once    furosemide  40 mg Intravenous BID    sodium chloride  250 mL Intravenous Once    sodium chloride  250 mL Intravenous Once    piperacillin-tazobactam  3.375 g Intravenous Q8H    nystatin  5 mL Oral 4x Daily    albuterol  2.5 mg Nebulization Q4H While awake    insulin lispro  0-6 Units Subcutaneous TID     insulin lispro  0-3 Units Subcutaneous Nightly    aspirin  325 mg Oral Daily    docusate  100 mg Oral BID    sodium chloride flush  10 mL Intravenous 2 times per day    calcium replacement protocol   Other RX Placeholder    famotidine  20 mg Oral BID    famotidine (PEPCID) injection  20 mg Intravenous BID    amiodarone  200 mg Oral BID    mupirocin   Topical Once    therapeutic multivitamin-minerals  1 tablet Oral Daily with breakfast    enoxaparin  40 mg Subcutaneous Daily    atorvastatin  40 mg Oral Nightly     Continuous Infusions:   sodium chloride 20 mL/hr at 11/15/18 2059    dextrose      vasopressin infusion Stopped (11/15/18 0815)    EPINEPHrine infusion 2 mcg/min (11/17/18 0905)     PRN Meds:  sodium chloride flush 10 mL PRN   potassium chloride 20 mEq PRN   magnesium sulfate 1.5 g 97.1*  97.8*   MCH  30.8  31.0  31.2   MCHC  32.7  31.9*  31.9*   PLT  107*  81*  49*   MPV  11.1  11.1  11.0      BMP/CMP:   Recent Labs      11/15/18   0625  11/16/18 0220 11/17/18 0430   NA  141  137  139   K  4.8  4.6  4.8   CL  104  102  103   CO2  22*  24  22*   ANIONGAP  15.0  11.0  14.0   BUN  27*  27*  28*   CREATININE  1.7*  1.4*  1.3*   GLUCOSE  115*  110*  152*   CALCIUM  8.5  8.2*  8.1*      Other Electrolytes:   Recent Labs      11/14/18   1500  11/15/18   0625 11/16/18 0220 11/17/18 0430   CAION  1.20   --    --    --    MG  3.6*  2.5*  2.4  2.4     Serum Osmolality  No results for input(s): OSMOMEASER in the last 72 hours. Procalcitonin:  No results for input(s): PROCAL in the last 72 hours. Cardiac: No results for input(s): TROPONINT in the last 72 hours. Lipids: No results for input(s): CHOL, HDL in the last 72 hours. Invalid input(s): LDLCALCU  Coagulation: No results for input(s): INR in the last 72 hours. Lactic Acid: No results for input(s): LACTA in the last 72 hours. ABGs:   Lab Results   Component Value Date    PH 7.47 11/15/2018    PH 6.5 10/14/2016    PCO2 33 11/15/2018    PO2 72 11/15/2018    HCO3 24 11/15/2018    O2SAT 95 11/15/2018     Lab Results   Component Value Date    IFIO2 50 11/14/2018    MODE CPAP/PS 11/16/2018    SETTIDVOL 620 11/14/2018    SETPEEP 6.0 11/16/2018       Radiology/Imaging:  XR CHEST PORTABLE [[id number]] Resulted: 11/17/18 0434      Order Status: Completed Updated: 11/17/18 0436     Narrative:       PROCEDURE: XR CHEST PORTABLE    CLINICAL INFORMATION: Post op open heart surgery, . COMPARISON: Chest x-ray dated 11/16/2018    TECHNIQUE: AP Portable semiupright chest xray    FINDINGS:  Lungs/pleura: Emperatriz Board is slight improvement in the infiltrate at the right base with better visualization of the right hemidiaphragm. There is otherwise no change in the alveolar interstitial infiltrates throughout the right lung.  There is worsening significant pleural effusion or pneumothorax. There are bibasilar opacities which may represent atelectasis or infiltrate. Visualized portions of the upper abdomen are within normal limits. The osseous structures are intact. No acute fractures or suspicious osseous lesions.     Impression:         1. Cardiomegaly with mild pulmonary vascular congestion which is slightly improved since prior exam.  2. Bibasilar opacities which may be on the basis of atelectasis or infiltrate. **This report has been created using voice recognition software. It may contain minor errors which are inherent in voice recognition technology. **    Final report electronically signed by Dr Sunday Everett on 11/9/2018 4:59 PM       Patient Active Problem List   Diagnosis    CKD (chronic kidney disease) stage 3, GFR 30-59 ml/min ([facility])    HTN (hypertension), benign    Vitamin D deficiency    MISA (acute kidney injury) ([address]    CKD (chronic kidney disease) stage 2, GFR 60-89 ml/min    Renal cyst    ACS (acute coronary syndrome) ([address]    Cardiogenic shock ([address]    Aortic stenosis with mitral and aortic insufficiency    Streptococcus A carrier or suspected carrier    Sepsis due to Streptococcus pyogenes ([address]    CHF exacerbation ([address]    Heart failure, systolic, with acute decompensation ([facility])    Moderate malnutrition ([address]    Renal failure syndrome    Acute on chronic systolic CHF (congestive heart failure), NYHA class 3 ([facility]) EF 25-30%    Multi-vessel coronary artery stenosis    Chest pain    Moderate malnutrition ([address]    Arterial hypotension    Elevated troponin level    Anemia    Valvular disease       Assessment:    · Acute respiratory failure: Extubated on 11/17/18. · Post CABG ×2 with bioprosthetic valve repair of the aortic valve and annuloplasty band mitral valve repair 11/14/18. · Pseudomonas Pneumonia:   · Renal insufficiency: Stable. · Thrombocytopenia. 2 to IABP?   · Cardiomyopathy: Low

## 2018-11-17 NOTE — PROGRESS NOTES
[facility] RESPIRATORY ASSESSMENT PROGRAM (RAP)  30 kg and over      Patient Name: Linda Lara Room#: [id number] : 1945     Admitting diagnosis: Chest pain [R07.9]  Chest pain [R07.9]      ASSESSMENT     Vitals  Pulse: 82   Resp: 20  BP: 138/86  SpO2: 95 %  Temp: 97.9 °F (36.6 °C)  Breath Sounds:clear to clear and diminished throughout  Comment:     PEF   Predicted: 1750  Personal Best: 400     Inspiratory Capacity:   Preoperative/predictive value: 1750 ml   33% of value: 577 ml      75% of value: 1312 ml   10 ml/kg of IBW: 776 ml   Patient's Actual Inspiratory Capacity: 400 ml    CARE PLAN  All Care Plan selections must be based on the approved algorithms located on line in the Policy and Procedures under Respiratory Assessment Adult Protocol Handbook    INDICATIONS FOR THERAPY BASED ON HISTORY AND ASSESSMENT  Bronchial Hygiene Goal: Improvement in sputum mobilization in patients with ineffective airway clearance. Reverse the atelectasis. [] Atelectasis caused by mucus plugging     [] Chronic mucucillary clearance disorder (example, cystic fibrosis, bronchiectasis, chronic bronchitis)  [x] Improve cough effectiveness (example neuromuscular disease, spinal cord injury, etc.)  [] Audible secretions unable to clear on own   [] No indications  Volume Expansion Goal: Prevent atelectasis, Absence or improvement in signs of atelectasis, improve respiratory muscle performance  [] Preoperative screening of patients at risk for post-operative complications to obtain baseline flow or volume. [] Restrictive lung defect associated with a dysfunctional diaphragm or involving the respiratory musculature. Example: Patients with inspiratory capacity < 2.5L, patients with neuromuscular disease and patients with spinal cord injury. [x] Presence of pulmonary atelectasis or conditions predisposing to the development of pulmonary atelectasis.  Example: Upper/lower abdominal surgery, thoracic surgery, surgery in COPD physician  []Discontinue therapy and RAP (indications no longer present)  []Not enough information available, reassess in 24 hours  []Other: . Comments:  Continue with metaneb and Albuterol treatments every 4hours while awake.   Also encourage patient to use incentive spirometry often while using splinting techniques

## 2018-11-18 ENCOUNTER — APPOINTMENT (OUTPATIENT)
Dept: GENERAL RADIOLOGY | Age: 73
DRG: 219 | End: 2018-11-18
Payer: MEDICARE

## 2018-11-18 LAB
ANION GAP SERPL CALCULATED.3IONS-SCNC: 14 MEQ/L (ref 8–16)
BASOPHILS # BLD: 0.3 %
BASOPHILS ABSOLUTE: 0 THOU/MM3 (ref 0–0.1)
BUN BLDV-MCNC: 31 MG/DL (ref 7–22)
CALCIUM SERPL-MCNC: 7.8 MG/DL (ref 8.5–10.5)
CHLORIDE BLD-SCNC: 104 MEQ/L (ref 98–111)
CO2: 25 MEQ/L (ref 23–33)
CREAT SERPL-MCNC: 1.3 MG/DL (ref 0.4–1.2)
EKG ATRIAL RATE: 182 BPM
EKG ATRIAL RATE: 46 BPM
EKG Q-T INTERVAL: 294 MS
EKG Q-T INTERVAL: 460 MS
EKG QRS DURATION: 124 MS
EKG QRS DURATION: 150 MS
EKG QTC CALCULATION (BAZETT): 431 MS
EKG QTC CALCULATION (BAZETT): 475 MS
EKG R AXIS: 20 DEGREES
EKG R AXIS: 97 DEGREES
EKG T AXIS: -145 DEGREES
EKG T AXIS: -19 DEGREES
EKG VENTRICULAR RATE: 157 BPM
EKG VENTRICULAR RATE: 53 BPM
EOSINOPHIL # BLD: 0.3 %
EOSINOPHILS ABSOLUTE: 0 THOU/MM3 (ref 0–0.4)
ERYTHROCYTE [DISTWIDTH] IN BLOOD BY AUTOMATED COUNT: 16.9 % (ref 11.5–14.5)
ERYTHROCYTE [DISTWIDTH] IN BLOOD BY AUTOMATED COUNT: 57.2 FL (ref 35–45)
GFR SERPL CREATININE-BSD FRML MDRD: 54 ML/MIN/1.73M2
GLUCOSE BLD-MCNC: 128 MG/DL (ref 70–108)
GLUCOSE BLD-MCNC: 134 MG/DL (ref 70–108)
GLUCOSE BLD-MCNC: 142 MG/DL (ref 70–108)
GLUCOSE BLD-MCNC: 157 MG/DL (ref 70–108)
GLUCOSE BLD-MCNC: 187 MG/DL (ref 70–108)
GRAM STAIN RESULT: ABNORMAL
HCT VFR BLD CALC: 30.4 % (ref 42–52)
HCT VFR BLD CALC: 30.8 % (ref 42–52)
HEMOGLOBIN: 9.8 GM/DL (ref 14–18)
HEMOGLOBIN: 9.8 GM/DL (ref 14–18)
IMMATURE GRANS (ABS): 0.04 THOU/MM3 (ref 0–0.07)
IMMATURE GRANULOCYTES: 0.6 %
LYMPHOCYTES # BLD: 9.4 %
LYMPHOCYTES ABSOLUTE: 0.7 THOU/MM3 (ref 1–4.8)
MAGNESIUM: 2.3 MG/DL (ref 1.6–2.4)
MCH RBC QN AUTO: 30.3 PG (ref 26–33)
MCHC RBC AUTO-ENTMCNC: 32.2 GM/DL (ref 32.2–35.5)
MCV RBC AUTO: 94.1 FL (ref 80–94)
MONOCYTES # BLD: 9.6 %
MONOCYTES ABSOLUTE: 0.7 THOU/MM3 (ref 0.4–1.3)
NUCLEATED RED BLOOD CELLS: 0 /100 WBC
ORGANISM: ABNORMAL
PLATELET # BLD: 53 THOU/MM3 (ref 130–400)
PMV BLD AUTO: 11.5 FL (ref 9.4–12.4)
POTASSIUM SERPL-SCNC: 3.6 MEQ/L (ref 3.5–5.2)
POTASSIUM SERPL-SCNC: 4 MEQ/L (ref 3.5–5.2)
POTASSIUM SERPL-SCNC: 4.1 MEQ/L (ref 3.5–5.2)
POTASSIUM SERPL-SCNC: 4.3 MEQ/L (ref 3.5–5.2)
POTASSIUM SERPL-SCNC: 4.3 MEQ/L (ref 3.5–5.2)
RBC # BLD: 3.23 MILL/MM3 (ref 4.7–6.1)
RESPIRATORY CULTURE: ABNORMAL
RESPIRATORY CULTURE: ABNORMAL
SEG NEUTROPHILS: 79.8 %
SEGMENTED NEUTROPHILS ABSOLUTE COUNT: 5.7 THOU/MM3 (ref 1.8–7.7)
SODIUM BLD-SCNC: 143 MEQ/L (ref 135–145)
WBC # BLD: 7.2 THOU/MM3 (ref 4.8–10.8)

## 2018-11-18 PROCEDURE — 36592 COLLECT BLOOD FROM PICC: CPT

## 2018-11-18 PROCEDURE — 99233 SBSQ HOSP IP/OBS HIGH 50: CPT | Performed by: INTERNAL MEDICINE

## 2018-11-18 PROCEDURE — 97530 THERAPEUTIC ACTIVITIES: CPT

## 2018-11-18 PROCEDURE — 2580000003 HC RX 258: Performed by: THORACIC SURGERY (CARDIOTHORACIC VASCULAR SURGERY)

## 2018-11-18 PROCEDURE — 71045 X-RAY EXAM CHEST 1 VIEW: CPT

## 2018-11-18 PROCEDURE — 93010 ELECTROCARDIOGRAM REPORT: CPT | Performed by: INTERNAL MEDICINE

## 2018-11-18 PROCEDURE — 97110 THERAPEUTIC EXERCISES: CPT

## 2018-11-18 PROCEDURE — 2500000003 HC RX 250 WO HCPCS: Performed by: THORACIC SURGERY (CARDIOTHORACIC VASCULAR SURGERY)

## 2018-11-18 PROCEDURE — 2580000003 HC RX 258: Performed by: INTERNAL MEDICINE

## 2018-11-18 PROCEDURE — 84132 ASSAY OF SERUM POTASSIUM: CPT

## 2018-11-18 PROCEDURE — 85014 HEMATOCRIT: CPT

## 2018-11-18 PROCEDURE — S0028 INJECTION, FAMOTIDINE, 20 MG: HCPCS | Performed by: THORACIC SURGERY (CARDIOTHORACIC VASCULAR SURGERY)

## 2018-11-18 PROCEDURE — 85025 COMPLETE CBC W/AUTO DIFF WBC: CPT

## 2018-11-18 PROCEDURE — C1729 CATH, DRAINAGE: HCPCS

## 2018-11-18 PROCEDURE — 82948 REAGENT STRIP/BLOOD GLUCOSE: CPT

## 2018-11-18 PROCEDURE — 6360000002 HC RX W HCPCS: Performed by: THORACIC SURGERY (CARDIOTHORACIC VASCULAR SURGERY)

## 2018-11-18 PROCEDURE — 2709999900 HC NON-CHARGEABLE SUPPLY

## 2018-11-18 PROCEDURE — 6370000000 HC RX 637 (ALT 250 FOR IP): Performed by: THORACIC SURGERY (CARDIOTHORACIC VASCULAR SURGERY)

## 2018-11-18 PROCEDURE — 99233 SBSQ HOSP IP/OBS HIGH 50: CPT | Performed by: THORACIC SURGERY (CARDIOTHORACIC VASCULAR SURGERY)

## 2018-11-18 PROCEDURE — 6360000002 HC RX W HCPCS: Performed by: INTERNAL MEDICINE

## 2018-11-18 PROCEDURE — 94640 AIRWAY INHALATION TREATMENT: CPT

## 2018-11-18 PROCEDURE — 97168 OT RE-EVAL EST PLAN CARE: CPT

## 2018-11-18 PROCEDURE — 2700000000 HC OXYGEN THERAPY PER DAY

## 2018-11-18 PROCEDURE — 2500000003 HC RX 250 WO HCPCS

## 2018-11-18 PROCEDURE — 86022 PLATELET ANTIBODIES: CPT

## 2018-11-18 PROCEDURE — 36415 COLL VENOUS BLD VENIPUNCTURE: CPT

## 2018-11-18 PROCEDURE — 83735 ASSAY OF MAGNESIUM: CPT

## 2018-11-18 PROCEDURE — 2000000000 HC ICU R&B

## 2018-11-18 PROCEDURE — 80048 BASIC METABOLIC PNL TOTAL CA: CPT

## 2018-11-18 PROCEDURE — 85018 HEMOGLOBIN: CPT

## 2018-11-18 PROCEDURE — 99232 SBSQ HOSP IP/OBS MODERATE 35: CPT | Performed by: INTERNAL MEDICINE

## 2018-11-18 RX ORDER — POTASSIUM CHLORIDE 20 MEQ/1
20 TABLET, EXTENDED RELEASE ORAL
Status: DISCONTINUED | OUTPATIENT
Start: 2018-11-18 | End: 2018-12-04

## 2018-11-18 RX ORDER — DOCUSATE SODIUM 100 MG/1
100 CAPSULE, LIQUID FILLED ORAL 2 TIMES DAILY
Status: DISCONTINUED | OUTPATIENT
Start: 2018-11-18 | End: 2018-12-06 | Stop reason: HOSPADM

## 2018-11-18 RX ORDER — AMIODARONE HYDROCHLORIDE 200 MG/1
200 TABLET ORAL 2 TIMES DAILY
Status: DISCONTINUED | OUTPATIENT
Start: 2018-11-18 | End: 2018-11-26

## 2018-11-18 RX ORDER — BUMETANIDE 0.25 MG/ML
1 INJECTION, SOLUTION INTRAMUSCULAR; INTRAVENOUS 2 TIMES DAILY
Status: DISCONTINUED | OUTPATIENT
Start: 2018-11-18 | End: 2018-11-20 | Stop reason: CLARIF

## 2018-11-18 RX ORDER — BUMETANIDE 0.25 MG/ML
INJECTION, SOLUTION INTRAMUSCULAR; INTRAVENOUS
Status: COMPLETED
Start: 2018-11-18 | End: 2018-11-18

## 2018-11-18 RX ADMIN — POTASSIUM CHLORIDE 20 MEQ: 29.8 INJECTION, SOLUTION INTRAVENOUS at 11:09

## 2018-11-18 RX ADMIN — ALBUTEROL SULFATE 2.5 MG: 2.5 SOLUTION RESPIRATORY (INHALATION) at 21:38

## 2018-11-18 RX ADMIN — ATORVASTATIN CALCIUM 40 MG: 40 TABLET, FILM COATED ORAL at 20:14

## 2018-11-18 RX ADMIN — Medication 10 ML: at 08:35

## 2018-11-18 RX ADMIN — POTASSIUM CHLORIDE 20 MEQ: 29.8 INJECTION, SOLUTION INTRAVENOUS at 15:08

## 2018-11-18 RX ADMIN — ALBUTEROL SULFATE 2.5 MG: 2.5 SOLUTION RESPIRATORY (INHALATION) at 12:13

## 2018-11-18 RX ADMIN — POTASSIUM CHLORIDE 20 MEQ: 29.8 INJECTION, SOLUTION INTRAVENOUS at 07:26

## 2018-11-18 RX ADMIN — Medication 1 UNITS: at 12:15

## 2018-11-18 RX ADMIN — DOCUSATE SODIUM 100 MG: 100 CAPSULE, LIQUID FILLED ORAL at 11:07

## 2018-11-18 RX ADMIN — DOCUSATE SODIUM 100 MG: 100 CAPSULE, LIQUID FILLED ORAL at 20:14

## 2018-11-18 RX ADMIN — BUMETANIDE 1 MG: 0.25 INJECTION INTRAMUSCULAR; INTRAVENOUS at 20:14

## 2018-11-18 RX ADMIN — BUMETANIDE 1 MG: 0.25 INJECTION INTRAMUSCULAR; INTRAVENOUS at 08:32

## 2018-11-18 RX ADMIN — Medication 10 ML: at 20:14

## 2018-11-18 RX ADMIN — POTASSIUM CHLORIDE 20 MEQ: 20 TABLET, EXTENDED RELEASE ORAL at 17:05

## 2018-11-18 RX ADMIN — Medication 1 UNITS: at 08:43

## 2018-11-18 RX ADMIN — AMIODARONE HYDROCHLORIDE 0.5 MG/MIN: 1.8 INJECTION, SOLUTION INTRAVENOUS at 07:56

## 2018-11-18 RX ADMIN — ALBUTEROL SULFATE 2.5 MG: 2.5 SOLUTION RESPIRATORY (INHALATION) at 03:55

## 2018-11-18 RX ADMIN — MORPHINE SULFATE 2 MG: 2 INJECTION, SOLUTION INTRAMUSCULAR; INTRAVENOUS at 06:04

## 2018-11-18 RX ADMIN — AMIODARONE HYDROCHLORIDE 200 MG: 200 TABLET ORAL at 20:14

## 2018-11-18 RX ADMIN — SODIUM CHLORIDE 0.38 MCG/KG/MIN: 9 INJECTION, SOLUTION INTRAVENOUS at 01:05

## 2018-11-18 RX ADMIN — AMIODARONE HYDROCHLORIDE 200 MG: 200 TABLET ORAL at 10:17

## 2018-11-18 RX ADMIN — POTASSIUM CHLORIDE 20 MEQ: 29.8 INJECTION, SOLUTION INTRAVENOUS at 18:21

## 2018-11-18 RX ADMIN — CEFEPIME HYDROCHLORIDE 2 G: 2 INJECTION, POWDER, FOR SOLUTION INTRAVENOUS at 12:20

## 2018-11-18 RX ADMIN — FAMOTIDINE 20 MG: 10 INJECTION, SOLUTION INTRAVENOUS at 08:35

## 2018-11-18 RX ADMIN — POTASSIUM CHLORIDE 20 MEQ: 29.8 INJECTION, SOLUTION INTRAVENOUS at 06:10

## 2018-11-18 RX ADMIN — POTASSIUM CHLORIDE 20 MEQ: 20 TABLET, EXTENDED RELEASE ORAL at 11:07

## 2018-11-18 RX ADMIN — ALBUTEROL SULFATE 2.5 MG: 2.5 SOLUTION RESPIRATORY (INHALATION) at 18:11

## 2018-11-18 RX ADMIN — ALBUTEROL SULFATE 2.5 MG: 2.5 SOLUTION RESPIRATORY (INHALATION) at 08:22

## 2018-11-18 RX ADMIN — MORPHINE SULFATE 2 MG: 2 INJECTION, SOLUTION INTRAMUSCULAR; INTRAVENOUS at 01:06

## 2018-11-18 ASSESSMENT — PAIN SCALES - GENERAL
PAINLEVEL_OUTOF10: 4
PAINLEVEL_OUTOF10: 4
PAINLEVEL_OUTOF10: 3
PAINLEVEL_OUTOF10: 8
PAINLEVEL_OUTOF10: 6
PAINLEVEL_OUTOF10: 4
PAINLEVEL_OUTOF10: 6
PAINLEVEL_OUTOF10: 2
PAINLEVEL_OUTOF10: 2

## 2018-11-18 ASSESSMENT — PAIN DESCRIPTION - LOCATION
LOCATION: CHEST;INCISION
LOCATION: CHEST;INCISION

## 2018-11-18 ASSESSMENT — PAIN DESCRIPTION - PAIN TYPE
TYPE: SURGICAL PAIN
TYPE: SURGICAL PAIN

## 2018-11-18 NOTE — PROGRESS NOTES
canals, both ears   Nose: Nares normal, septum midline, mucosa normal, no drainage or sinus tenderness   Throat: Lips, mucosa, and tongue normal; teeth and gums normal   Neck: Supple, symmetrical, trachea midline, no adenopathy, thyroid: not enlarged, symmetric, no tenderness/mass/nodules, no carotid bruit or JVD   Back:   Symmetric, no curvature, ROM normal, no CVA tenderness   Lungs:   Clear to auscultation bilaterally, respirations unlabored   Chest Wall:  No tenderness or deformity   Heart:  Regular rate and rhythm, S1, S2 normal, no murmur, rub or gallop   Abdomen:   Soft, non-tender, bowel sounds active all four quadrants,  no masses, no organomegaly   Genitalia:  Normal male   Rectal:  Normal tone, normal prostate, no masses or tenderness;  guaiac negative stool   Extremities: Extremities normal, atraumatic, no cyanosis or edema   Pulses: 2+ and symmetric   Skin: Skin color, texture, turgor normal, no rashes or lesions   Lymph nodes: Cervical, supraclavicular, and axillary nodes normal   Neurologic: Normal         Cardiographics  ECG: normal pacemaker rhythm .   Echocardiogram: not done    Imaging  Chest X-Ray: infiltrates: lower lobe(s) on the right     Lab Review   Lab Results   Component Value Date     11/18/2018    K 4.1 11/18/2018    K 4.4 11/14/2018     11/18/2018    CO2 25 11/18/2018    BUN 31 11/18/2018    CREATININE 1.3 11/18/2018    GLUCOSE 157 11/18/2018    GLUCOSE 134 04/09/2018    CALCIUM 7.8 11/18/2018     Lab Results   Component Value Date    WBC 7.2 11/18/2018    HGB 9.8 11/18/2018    HCT 30.4 11/18/2018    MCV 94.1 11/18/2018    PLT 53 11/18/2018       Assessment:      none  Patient Active Problem List    Diagnosis Date Noted    Other fluid overload     Acute respiratory failure with hypoxia (HCC)     Pneumonia of both lower lobes due to Pseudomonas species (HCC)     Acquired thrombocytopenia (HCC)     Anemia     Valvular disease     Arterial hypotension     Elevated troponin level     Moderate malnutrition ([facility] 11/10/2018    Chest pain 11/09/2018    Acute on chronic systolic CHF (congestive heart failure), NYHA class 3 ([facility]) EF 25-30% 11/07/2018    Multi-vessel coronary artery stenosis 11/07/2018    CHF exacerbation ([address] 11/06/2018    Heart failure, systolic, with acute decompensation ([facility] 11/06/2018    Moderate malnutrition ([facility] 11/06/2018     Class: Acute    Renal failure syndrome     Sepsis due to Streptococcus pyogenes ([address] 10/29/2018    Streptococcus A carrier or suspected carrier 10/26/2018    Aortic stenosis with mitral and aortic insufficiency     ACS (acute coronary syndrome) ([facility] 10/21/2018    Cardiogenic shock ([facility] 10/21/2018    CKD (chronic kidney disease) stage 2, GFR 60-89 ml/min 10/26/2016    Renal cyst 10/26/2016    MISA (acute kidney injury) ([facility] 04/27/2016    Vitamin D deficiency 10/28/2015    CKD (chronic kidney disease) stage 3, GFR 30-59 ml/min ([facility] 09/23/2015    HTN (hypertension), benign 09/23/2015       Plan:     1. Discussed management with Dr Lakeisha Mathis,. Continue Bumex for now follow creatinine  Continue Cefipime for presumed RLL pneumonia  DC Hagaman hay  OOB to Chair  Continue Atrial Pacing    We are concerned about the low platelet count and will send Heparin Antibodies. No Agatroban for now.

## 2018-11-18 NOTE — PLAN OF CARE
and ci within acceptable parameters   Goal: Hemodynamic stability will improve  Hemodynamic stability will improve   Outcome: Ongoing  Stable at present     Problem: Fluid Volume - Imbalance:  Goal: Ability to achieve a balanced intake and output will improve  Ability to achieve a balanced intake and output will improve   Outcome: Ongoing  Continued diuresis ongoing .  Urine output improving   Goal: Chest tube drainage is within specified parameters  Chest tube drainage is within specified parameters   Outcome: Ongoing  Dumped total 690 when in chair - Dr aware minimal output since - chest tubes patent     Problem: Gas Exchange - Impaired:  Goal: Levels of oxygenation will improve  Levels of oxygenation will improve   Outcome: Ongoing  Weaning nasal cannula   Goal: Ability to maintain adequate ventilation will improve  Ability to maintain adequate ventilation will improve   Outcome: Ongoing  Pt does incentive with reluctance - breath sounds continued with rhonchi     Problem: Pain:  Goal: Control of acute pain  Control of acute pain   Outcome: Completed Date Met: 05/64/36  Duplicate    Goal: Control of chronic pain  Control of chronic pain   Outcome: Completed Date Met: 33/65/31  Duplicate     Problem: Tissue Perfusion - Cardiopulmonary, Altered:  Goal: Absence of angina  Absence of angina   Outcome: Ongoing  Denies at this time   Goal: Hemodynamic stability will improve  Hemodynamic stability will improve   Outcome: Completed Date Met: 14/63/59  Duplicate   Goal: Will show no evidence of cardiac arrhythmias  Will show no evidence of cardiac arrhythmias   Outcome: Ongoing  none  This shift - remains atrial paced     Problem: Tobacco Use:  Goal: Will participate in inpatient tobacco-use cessation counseling  Will participate in inpatient tobacco-use cessation counseling   Outcome: Ongoing  N/a at present     Problem: Risk for Impaired Skin Integrity  Goal: Tissue integrity - skin and mucous membranes  Structural

## 2018-11-18 NOTE — PROGRESS NOTES
Intensive Care Unit  Intensivist Progress Note    Patient: Arelis Cruz  : 1945  MRN#: [id number]  2018 12:49 PM  ADMISSION DAY:  2018  4:01 PM     Subjective:    He had a few episodes of Afib with rvr last night. No fever today or chills. No chest pain but feels some tightness. He has nausea and had nonbloody emesis earlier. No sign of bleed. Good urine output. He looks better    Patient Vitals for the past 8 hrs:   BP Temp Temp src Pulse Resp SpO2   18 1222 - - - - 22 100 %   18 1103 124/70 - - 80 17 -   18 1003 125/67 - - 80 20 -   18 0903 (!) 143/76 - - 80 18 100 %   18 0825 - - - - 20 99 %   18 0803 (!) 146/70 97.5 °F (36.4 °C) CORE 80 19 100 %   18 0707 122/63 - - 80 18 100 %   18 0602 110/81 - - 80 17 99 %   18 0502 (!) 127/58 - - 80 18 97 %       EXAM:  General: No distress. Eyes: PERRL. No sclera icterus. No conjunctival injection. ENT: No discharge. Pharynx clear. Neck: Trachea midline. Normal thyroid. Resp: No accessory muscle use. crackles. Decreased breath sounds, chest tubes in place. CV: Regular rate. Regular rhythm. Abd: Non-tender. Non-distended. No masses. No organmegaly. Normal bowel sounds. No hernia. Skin: Warm and dry. No nodule on exposed extremities. No rash on exposed extremities. Lymph: No cervical LAD. No supraclavicular LAD. M/S: No cyanosis. No joint deformity. No clubbing. Neuro: Awake. Follows commands. Positive pupils/gag/corneals. Normal pain response. Psych: Oriented to person, place, time. No anxiety or agitation. Intake/Output Summary (Last 24 hours) at 18 1249  Last data filed at 18 1234   Gross per 24 hour   Intake             1915 ml   Output             4350 ml   Net            -2435 ml     I/O last 3 completed shifts: In: 2486 [P.O.:250; I.V.:1416; Blood:820]  Out: 9727 [Urine:2860;  Chest Tube:420]     Date 18 0000 - 18 2359   Shift [id number] [[id number]] Resulted: 11/16/18 0511     Order Status: Completed Updated: 11/16/18 0513     Narrative:       PROCEDURE: XR CHEST PORTABLE    CLINICAL INFORMATION: Post op open heart surgery, . COMPARISON: Chest x-ray dated 11/15/2018    TECHNIQUE: AP Portable supine chest xray    FINDINGS:  Lungs/pleura: Reyna Shark has been significant interval improvement in the alveolar interstitial infiltrates most likely on the basis of pulmonary edema, again more severe on the right lung. There is no significant pleural effusion. No pneumothorax. Heart: There is borderline/mild cardiomegaly. There are stable postoperative changes of the heart with evidence of valve replacement and left atrial appendage closure device placement. Mediastinum/derek: No obvious mass or adenopathy. Skeleton: No significant bone or joint abnormality. Lines/tubes/devices: There is no significant change in the satisfactory positions of the life support lines and tubes.       Impression:         Significantly improved pulmonary edema. Satisfactory positions of the life support lines and tubes    **This report has been created using voice recognition software. It may contain minor errors which are inherent in voice recognition technology. **    Final report electronically signed by Dr. Celina Blum on 11/16/2018 5:11 AM     CT CHEST WO CONTRAST [[id number]] Resulted: 11/15/18 0856     Order Status: Completed Updated: 11/15/18 0858     Narrative:       PROCEDURE: CT CHEST WO CONTRAST    CLINICAL INFORMATION: 68-year-old male with widening of the mediastinum seen on previous imaging. Right-sided opacity. Evaluate for hemothorax. COMPARISON: Comparison is made to CT scan dated 10/25/2018 and x-ray dated 11/15/2018 3:42 AM.    TECHNIQUE: 5 mm noncontrast axial images were obtained through the chest. Sagittal and coronal reconstructions were obtained.     All CT scans at this facility use dose modulation, iterative reconstruction, and/or weight-based dosing when appropriate to reduce radiation dose to as low as reasonably achievable. FINDINGS:    There is subcutaneous emphysema within the anterior chest wall. There is a tiny amount of air within the anterior most portion of the right pleural space. An endotracheal tube is seen within the central airway. A nasogastric tube is coursing into the stomach. There are 4 chest tubes extending into the thorax. The heart is normal in size. There is no pericardial effusion. Atherosclerotic calcifications are seen within the aorta. There is shadowing which is thought to be artifactual throughout the descending thoracic aorta which may be on the basis of an intra-aortic balloon pump. A [place]-Rommel catheter is seen extending into the right main pulmonary outflow track. There is a left atrial appendage clip. There are large bilateral pleural effusions with associated atelectasis. There are infiltrates scattered throughout both lungs, more so on the right side, which may be on the basis of multifocal pneumonia. The osseous structures are intact. Upper abdominal structures which are visualized are within normal limits.        Impression:         1. Infiltrates scattered throughout both lungs which may be on the basis of multifocal pneumonia. Aspiration should also be considered. 2. Tiny right-sided pneumothorax with subcutaneous emphysema throughout the anterior chest wall. 3. Large bilateral pleural effusions with associated compressive atelectasis. 3. Medical devices as described above. **This report has been created using voice recognition software. It may contain minor errors which are inherent in voice recognition technology. **    Final report electronically signed by Dr Gianfranco Anthony on 11/15/2018 8:56 AM     XR CHEST PORTABLE [[id number]] Resulted: 11/15/18 0509     Order Status: Completed Updated: 11/15/18 0511     Narrative:       PROCEDURE: XR CHEST PORTABLE    CLINICAL INFORMATION: Post op open heart surgery, . COMPARISON: Chest x-ray dated 11/14/2018    TECHNIQUE: AP Portable semiupright chest xray    FINDINGS:  Lungs/pleura:  There are significantly worsened alveolar interstitial infiltrates throughout the right lung and mildly worsened alveolar interstitial infiltrates in the left lung, most pronounced in the left parahilar and lower lobe regions. There is no significant pleural effusion. No pneumothorax. Heart: There is again mild cardiomegaly. Stable postoperative changes are noted. Mediastinum/derek: No obvious mass or adenopathy. Skeleton: No significant bone or joint abnormality. Lines/tubes/devices: There is no significant change in the satisfactory positions of the life support lines and tubes.       Impression:         Worsening alveolar interstitial infiltrates probably on the basis of pulmonary edema. Satisfactory positions of the life support lines and tubes    **This report has been created using voice recognition software. It may contain minor errors which are inherent in voice recognition technology. **    Final report electronically signed by Dr. Camacho Jose on 11/15/2018 5:09 AM     XR CHEST PORTABLE [[id number]] Resulted: 11/14/18 1549     Order Status: Completed Updated: 11/14/18 1551     Narrative:       PROCEDURE: XR CHEST PORTABLE    CLINICAL INFORMATION: Post op open heart surgery,     COMPARISON: 11/12/2018    TECHNIQUE: A single mobile view of the chest was obtained.       Impression:       1. Normal heart size. A prosthetic heart valve has been inserted. An atrial appendage clip is present. Multiple metallic sternotomy sutures are present. An NG tube passes into stomach. There is an ET tube with its tip 6 cm proximal to the whitley. A right   jugular [place]-Rommel catheter is present with its tip in the right main pulmonary artery. A right arm PICC line is present with its tip in the superior vena cava.  Several large bore surgical drainage tubes limits. The osseous structures are intact. No acute fractures or suspicious osseous lesions.     Impression:         1. Cardiomegaly with mild pulmonary vascular congestion which is slightly improved since prior exam.  2. Bibasilar opacities which may be on the basis of atelectasis or infiltrate. **This report has been created using voice recognition software. It may contain minor errors which are inherent in voice recognition technology. **    Final report electronically signed by Dr Juan F Jerome on 11/9/2018 4:59 PM       Patient Active Problem List   Diagnosis    CKD (chronic kidney disease) stage 3, GFR 30-59 ml/min ([facility])    HTN (hypertension), benign    Vitamin D deficiency    MISA (acute kidney injury) ([address]    CKD (chronic kidney disease) stage 2, GFR 60-89 ml/min    Renal cyst    ACS (acute coronary syndrome) ([address]    Cardiogenic shock ([address]    Aortic stenosis with mitral and aortic insufficiency    Streptococcus A carrier or suspected carrier    Sepsis due to Streptococcus pyogenes ([address]    CHF exacerbation ([address]    Heart failure, systolic, with acute decompensation ([facility])    Moderate malnutrition ([address]    Renal failure syndrome    Acute on chronic systolic CHF (congestive heart failure), NYHA class 3 ([facility]) EF 25-30%    Multi-vessel coronary artery stenosis    Chest pain    Moderate malnutrition ([address]    Arterial hypotension    Elevated troponin level    Anemia    Valvular disease    Other fluid overload    Acute respiratory failure with hypoxia (HCC)    Pneumonia of both lower lobes due to Pseudomonas species ([address]    Acquired thrombocytopenia ([address]       Assessment:    · Acute respiratory failure: Extubated on 11/17/18. · Post CABG ×2 with bioprosthetic valve repair of the aortic valve and annuloplasty band mitral valve repair 11/14/18. · Pseudomonas Pneumonia: Pansinsitive  · Renal insufficiency: Stable. · Thrombocytopenia. 2 to IABP?   · Cardiomyopathy: Low cardiac output. · Hypothyroidism: On Synthroid. · Anemia: Continue to trend hemoglobin. May require transfusion. · Diabetes mellitus: Subcutaneous insulin. · Hyperlipidemia: Statin therapy. · Coronary artery disease: Status post CABG ×2 with valve repair ×2. Plan:    · Switched to cefepime because of thrombocytopenia,Cefepime has less effect on plt than zosyn. · Off IABP. · Off pressors. · Monitor Hb. · Monitor plt. · On Amiodarone. · Continue Insulin SQ.  · Diuresis. · Monitor I/Os.       Kamron Olivas MD

## 2018-11-19 ENCOUNTER — APPOINTMENT (OUTPATIENT)
Dept: GENERAL RADIOLOGY | Age: 73
DRG: 219 | End: 2018-11-19
Payer: MEDICARE

## 2018-11-19 LAB
ANION GAP SERPL CALCULATED.3IONS-SCNC: 16 MEQ/L (ref 8–16)
BASOPHILS # BLD: 0.4 %
BASOPHILS ABSOLUTE: 0 THOU/MM3 (ref 0–0.1)
BUN BLDV-MCNC: 30 MG/DL (ref 7–22)
CALCIUM SERPL-MCNC: 8.3 MG/DL (ref 8.5–10.5)
CHLORIDE BLD-SCNC: 107 MEQ/L (ref 98–111)
CO2: 23 MEQ/L (ref 23–33)
CREAT SERPL-MCNC: 1.1 MG/DL (ref 0.4–1.2)
EOSINOPHIL # BLD: 1.9 %
EOSINOPHILS ABSOLUTE: 0.1 THOU/MM3 (ref 0–0.4)
ERYTHROCYTE [DISTWIDTH] IN BLOOD BY AUTOMATED COUNT: 16.6 % (ref 11.5–14.5)
ERYTHROCYTE [DISTWIDTH] IN BLOOD BY AUTOMATED COUNT: 56.2 FL (ref 35–45)
GFR SERPL CREATININE-BSD FRML MDRD: 66 ML/MIN/1.73M2
GLUCOSE BLD-MCNC: 113 MG/DL (ref 70–108)
GLUCOSE BLD-MCNC: 128 MG/DL (ref 70–108)
GLUCOSE BLD-MCNC: 137 MG/DL (ref 70–108)
GLUCOSE BLD-MCNC: 145 MG/DL (ref 70–108)
GLUCOSE BLD-MCNC: 183 MG/DL (ref 70–108)
HCT VFR BLD CALC: 29.7 % (ref 42–52)
HCT VFR BLD CALC: 30.2 % (ref 42–52)
HEMOGLOBIN: 9.5 GM/DL (ref 14–18)
HEMOGLOBIN: 9.5 GM/DL (ref 14–18)
IMMATURE GRANS (ABS): 0.03 THOU/MM3 (ref 0–0.07)
IMMATURE GRANULOCYTES: 0.6 %
LYMPHOCYTES # BLD: 12.7 %
LYMPHOCYTES ABSOLUTE: 0.7 THOU/MM3 (ref 1–4.8)
MAGNESIUM: 2.3 MG/DL (ref 1.6–2.4)
MCH RBC QN AUTO: 30.4 PG (ref 26–33)
MCHC RBC AUTO-ENTMCNC: 32 GM/DL (ref 32.2–35.5)
MCV RBC AUTO: 94.9 FL (ref 80–94)
MONOCYTES # BLD: 13.7 %
MONOCYTES ABSOLUTE: 0.7 THOU/MM3 (ref 0.4–1.3)
NUCLEATED RED BLOOD CELLS: 0 /100 WBC
PLATELET # BLD: 59 THOU/MM3 (ref 130–400)
PMV BLD AUTO: 11.3 FL (ref 9.4–12.4)
POTASSIUM SERPL-SCNC: 4.2 MEQ/L (ref 3.5–5.2)
RBC # BLD: 3.13 MILL/MM3 (ref 4.7–6.1)
SEG NEUTROPHILS: 70.7 %
SEGMENTED NEUTROPHILS ABSOLUTE COUNT: 3.7 THOU/MM3 (ref 1.8–7.7)
SODIUM BLD-SCNC: 146 MEQ/L (ref 135–145)
WBC # BLD: 5.2 THOU/MM3 (ref 4.8–10.8)

## 2018-11-19 PROCEDURE — 6360000002 HC RX W HCPCS: Performed by: THORACIC SURGERY (CARDIOTHORACIC VASCULAR SURGERY)

## 2018-11-19 PROCEDURE — 6370000000 HC RX 637 (ALT 250 FOR IP): Performed by: THORACIC SURGERY (CARDIOTHORACIC VASCULAR SURGERY)

## 2018-11-19 PROCEDURE — 2500000003 HC RX 250 WO HCPCS: Performed by: THORACIC SURGERY (CARDIOTHORACIC VASCULAR SURGERY)

## 2018-11-19 PROCEDURE — 2000000000 HC ICU R&B

## 2018-11-19 PROCEDURE — 97110 THERAPEUTIC EXERCISES: CPT

## 2018-11-19 PROCEDURE — 36592 COLLECT BLOOD FROM PICC: CPT

## 2018-11-19 PROCEDURE — 93005 ELECTROCARDIOGRAM TRACING: CPT | Performed by: PHYSICIAN ASSISTANT

## 2018-11-19 PROCEDURE — 82948 REAGENT STRIP/BLOOD GLUCOSE: CPT

## 2018-11-19 PROCEDURE — 2580000003 HC RX 258: Performed by: INTERNAL MEDICINE

## 2018-11-19 PROCEDURE — 80048 BASIC METABOLIC PNL TOTAL CA: CPT

## 2018-11-19 PROCEDURE — 94640 AIRWAY INHALATION TREATMENT: CPT

## 2018-11-19 PROCEDURE — APPSS30 APP SPLIT SHARED TIME 16-30 MINUTES: Performed by: PHYSICIAN ASSISTANT

## 2018-11-19 PROCEDURE — 83735 ASSAY OF MAGNESIUM: CPT

## 2018-11-19 PROCEDURE — 2709999900 HC NON-CHARGEABLE SUPPLY

## 2018-11-19 PROCEDURE — 85018 HEMOGLOBIN: CPT

## 2018-11-19 PROCEDURE — 85025 COMPLETE CBC W/AUTO DIFF WBC: CPT

## 2018-11-19 PROCEDURE — 71045 X-RAY EXAM CHEST 1 VIEW: CPT

## 2018-11-19 PROCEDURE — 2580000003 HC RX 258: Performed by: THORACIC SURGERY (CARDIOTHORACIC VASCULAR SURGERY)

## 2018-11-19 PROCEDURE — 85014 HEMATOCRIT: CPT

## 2018-11-19 PROCEDURE — 97530 THERAPEUTIC ACTIVITIES: CPT

## 2018-11-19 PROCEDURE — 36415 COLL VENOUS BLD VENIPUNCTURE: CPT

## 2018-11-19 PROCEDURE — 99232 SBSQ HOSP IP/OBS MODERATE 35: CPT | Performed by: INTERNAL MEDICINE

## 2018-11-19 PROCEDURE — 6360000002 HC RX W HCPCS: Performed by: INTERNAL MEDICINE

## 2018-11-19 RX ADMIN — Medication 10 ML: at 20:00

## 2018-11-19 RX ADMIN — DOCUSATE SODIUM 100 MG: 100 CAPSULE, LIQUID FILLED ORAL at 08:44

## 2018-11-19 RX ADMIN — AMIODARONE HYDROCHLORIDE 200 MG: 200 TABLET ORAL at 19:59

## 2018-11-19 RX ADMIN — CEFEPIME HYDROCHLORIDE 2 G: 2 INJECTION, POWDER, FOR SOLUTION INTRAVENOUS at 12:34

## 2018-11-19 RX ADMIN — DOCUSATE SODIUM 100 MG: 100 CAPSULE, LIQUID FILLED ORAL at 19:59

## 2018-11-19 RX ADMIN — POTASSIUM CHLORIDE 20 MEQ: 20 TABLET, EXTENDED RELEASE ORAL at 08:44

## 2018-11-19 RX ADMIN — Medication 1 UNITS: at 12:28

## 2018-11-19 RX ADMIN — ATORVASTATIN CALCIUM 40 MG: 40 TABLET, FILM COATED ORAL at 19:59

## 2018-11-19 RX ADMIN — ALBUTEROL SULFATE 2.5 MG: 2.5 SOLUTION RESPIRATORY (INHALATION) at 12:17

## 2018-11-19 RX ADMIN — POTASSIUM CHLORIDE 20 MEQ: 20 TABLET, EXTENDED RELEASE ORAL at 17:58

## 2018-11-19 RX ADMIN — ALBUTEROL SULFATE 2.5 MG: 2.5 SOLUTION RESPIRATORY (INHALATION) at 08:20

## 2018-11-19 RX ADMIN — BUMETANIDE 1 MG: 0.25 INJECTION INTRAMUSCULAR; INTRAVENOUS at 08:44

## 2018-11-19 RX ADMIN — POTASSIUM CHLORIDE 20 MEQ: 20 TABLET, EXTENDED RELEASE ORAL at 12:36

## 2018-11-19 RX ADMIN — ALBUTEROL SULFATE 2.5 MG: 2.5 SOLUTION RESPIRATORY (INHALATION) at 20:12

## 2018-11-19 RX ADMIN — Medication 10 ML: at 09:43

## 2018-11-19 RX ADMIN — AMIODARONE HYDROCHLORIDE 200 MG: 200 TABLET ORAL at 09:42

## 2018-11-19 RX ADMIN — INSULIN LISPRO 1 UNITS: 100 INJECTION, SOLUTION INTRAVENOUS; SUBCUTANEOUS at 20:00

## 2018-11-19 RX ADMIN — BUMETANIDE 1 MG: 0.25 INJECTION INTRAMUSCULAR; INTRAVENOUS at 20:00

## 2018-11-19 RX ADMIN — ALBUTEROL SULFATE 2.5 MG: 2.5 SOLUTION RESPIRATORY (INHALATION) at 16:48

## 2018-11-19 ASSESSMENT — PAIN DESCRIPTION - LOCATION
LOCATION: CHEST
LOCATION: CHEST

## 2018-11-19 ASSESSMENT — PAIN DESCRIPTION - PAIN TYPE
TYPE: SURGICAL PAIN
TYPE: SURGICAL PAIN

## 2018-11-19 ASSESSMENT — PAIN SCALES - GENERAL
PAINLEVEL_OUTOF10: 0
PAINLEVEL_OUTOF10: 2
PAINLEVEL_OUTOF10: 1
PAINLEVEL_OUTOF10: 1
PAINLEVEL_OUTOF10: 0
PAINLEVEL_OUTOF10: 0

## 2018-11-19 NOTE — PROGRESS NOTES
surgery, surgery in COPD patients, prolonged bedrest, lack of pain control, presence of thoracic or abdominal binders.   [] Acute chest syndrome in patients with sickle cell disease   [] No indications  Inhaled Medications Goal: Improve respiratory functions in patients with airway disease and decrease WOB  Albuterol Indications  [] Wheezing, diminished, or absent breath sounds associated with a pulmonary disorder  [] Known COPD  [] Peak flow < 80% predicted or personal best for known asthma patients  [] Documented obstructive defect on pulmonary function testing which is reversible   [] Home regimen  [] With a mucolytic to prevent bronchospasm  [x] No indications  Ipratropium Bromide Indications   [] Maintenance treatment associated with COPD, including chronic bronchitis and emphysema  [] Home regimen  Other Medications  [] Home regimen na  Oxygenation  Goal: Reverse hypoxemia and improve tissue oxygenation (See oxygen protocol order)  [] SpO2 < 92% (Check order for SpO2 goal)        [] Home oxygen   [] Cyanosis        [] Chest pain  [] Dyspnea      [] Altered level of consciousness        [x] No indications    THERAPIES SELECTED BASED ON ALGORITHMS  Bronchial Hygiene  []Vest  []PD&P []Suction or D[place] Santos cough  []Acapella []Metaneb [x]No therapy recommended  []Mechanical Insufflation exsufflation  Volume expansion  [x]Incentive Spirometry  []EZPAP  []Metaneb  []No therapy recommended  Inhaled Medication  []HHN  []MDI  []Two hour continuous  []Metaneb   [x] No therapy recommended  Oxygenation  []Nasal cannula  []Mask    []CPAP  []High flow   [] No therapy recommended                  BRONCHODILATOR ASSESSMENT SCORE  Score 0 1 2 3 4   Breath Sounds []  Normal or no wheezing with diminished bases []  End expiratory wheeze or slightly diminished []  Pronounced exp. wheeze []  Insp. & Exp. wheeze []  Absent or near absent   Dyspnea and level of distress   []  None, respiratory rate   12-20, regular pattern []  Only on

## 2018-11-19 NOTE — PLAN OF CARE
Problem: Impaired respiratory status  Goal: Clear lung sounds  Outcome: Ongoing  Patient has clear and diminished breath sounds. Patient on scheduled therapy.

## 2018-11-19 NOTE — PROGRESS NOTES
PHASE 1 CARDIAC REHABILITATION   CABG, AVR, MVR, TVR, AMI, PCI's  Exercise Physiologists    No exercise, per RN. Patient is preparing to be discharged. Notes: Call light left within reach.     Education given: Home teach completed  Phase II Information (Given upon Discharge): [id number] [facility]

## 2018-11-19 NOTE — PROGRESS NOTES
PHASE 1 CARDIAC REHABILITATION   CABG, AVR, MVR, TVR, AMI, PCI's  Exercise Physiologists      Treatment Length (l: long, n: normal, s: short): S  Treatment Length Note: Patient had just returned to bed and completed a walk with OT less than an hour prior, per RN  Vitals Stable?: Yes. If No:     Any ECG-Rhythm Issues?: No.  If Yes:   Transfers (bc: Bed to Chair, cb: Chair to Bed): na  Strengthening/ROM Treatment Exercises: Step 2    FIDM:     Frequency: 2 x per day   Intensity: <15 RPE, <120bpm   Time: >30-60 seconds longer or >20% increase in distance each walk   Type: Bed/Chair Exercises/stationary marching or ambulation    Aerobic treatment: NA, see treatment length note above         Gait (i: Independent, s: Steady, u: Unsteady): na  Assists: 1  Patient tolerated treatment (w: well, f: fair, p: poor): f  Goals:    Short term:  Progression on each aerobic treatment. Long term: Independent ambulation and discharge. Anibal Devine is to follow sternal precautions. Will learn techniques for getting in and out of bed/chairs/car without using the arms. Using stairs will be addressed if applicable. Home activity instructions (Frequency, Intensity, Time, Type), and progression will be addressed prior to discharge (unless Anibal Devine goes to TCU or an ECF where they will follow PT/OT protocols). Notes: Call light left within reach.     Education given:   Phase II Information (Given upon Discharge):

## 2018-11-19 NOTE — PROGRESS NOTES
Physical Therapy   [address] ICU 4D - 4D-09/009-A    Time In: 1340  Time Out: 1405  Timed Code Treatment Minutes: 25 Minutes  Minutes: 25          Date: 2018  Patient Name: Chrissie Arauz,  Gender:  male        MRN: [id number]  : 1945  (72 y.o.)     Referring Practitioner: Dr. Nahomi Haskins  Diagnosis: chest pain  Additional Pertinent Hx: underwent an aortic valve replacement and mitral valve repair and CABG x 2 on 11-15 ,extubated on      Past Medical History:   Diagnosis Date    CAD (coronary artery disease)     [facility]     Chronic kidney disease     Diabetes mellitus [facility]     HTN (hypertension), benign     Hyperlipidemia     Mitral and aortic incompetence     Neuropathy     Positive blood culture     Being treated with rocephhin as outpt.     Thyroid disease      Past Surgical History:   Procedure Laterality Date    CARDIAC SURGERY      heart cath    CATARACT REMOVAL WITH IMPLANT Bilateral     COLONOSCOPY      EYE SURGERY      [place] CABG, VEIN, THREE N/A 2018    CABG CORONARY ARTERY BYPASS,  AORTIC AND MITRAL VALVE REPAIR WITH MICHELE, BALLOON PUMP INSERTION performed by Emeka Morales MD at [address] [place] [address] ECHO TRANSESOPHAG R-T 2D IMG ACQUISJ I&R ONLY Left 10/25/2018    TRANSESOPHAGEAL ECHOCARDIOGRAM performed by Alejos Goodell, MD at [facility]       Restrictions/Precautions:  General Precautions        Sternal Precautions: No Pushing, No Pulling, 5# Lifting Restrictions  Sternal Precautions: CABG x 2, aortic valve replacement and mitral valve repair 11-15  Other position/activity restrictions: O2       Prior Level of Function:  ADL Assistance: Independent  Homemaking Assistance: Needs assistance (wife completes most, able to do yardwork, has a son that helps)  Ambulation Assistance: Independent  Transfer Assistance: Independent       Subjective:  Chart Reviewed: Yes  Family / Caregiver Present:

## 2018-11-19 NOTE — PROGRESS NOTES
CT/CV Surgery Progress Note    2018 8:43 AM  Surgeon:  Dr. Sharon Green     Subjective:  Mr. Ty Mcnally is resting comfortable in bedside chair on RA, alert, and in no acute distress. Clinically stable. Hemodynamically stable. Pt denies chest pressure, SOB, fever,chills, N/V/D. Vital Signs: /77   Pulse 80   Temp 97.9 °F (36.6 °C) (Oral)   Resp 19   Ht 6' (1.829 m)   Wt 205 lb 14.6 oz (93.4 kg)   SpO2 92%   BMI 27.93 kg/m²    Temp (24hrs), Av.2 °F (36.8 °C), Min:97.9 °F (36.6 °C), Max:98.6 °F (37 °C)      PULSE OXIMETRY RANGE: SpO2  Av.8 %  Min: 91 %  Max: 100 %    Labs:   CBC:     Recent Labs      18   1327   18   0430  18   0530   WBC  6.9   --   7.2   --   5.2   HGB  9.9*   < >  9.8*  9.8*  9.5*   HCT  30.6*   < >  30.4*  30.8*  29.7*   MCV  94.4*   --   94.1*   --   94.9*   PLT  48*   --   53*   --   59*    < > = values in this interval not displayed. BMP: Recent Labs      18   0430  18   2030  18   0430   18   1800  18   0530   NA  139  139  143   --    --    --   146*   K  4.8  4.4  3.6   < >  4.3  4.3  4.2   CL  103  102  104   --    --    --   107   CO2  22*  22*  25   --    --    --   23   BUN  28*  35*  31*   --    --    --   30*   CREATININE  1.3*  1.3*  1.3*   --    --    --   1.1   MG  2.4   --   2.3   --    --    --   2.3    < > = values in this interval not displayed. Last HgA1C:    Lab Results   Component Value Date    LABA1C 6.0 2018     Imaging:  CXR: I have reviewed the CXR image. Interval removal of the right subclavian [place]-Rommel catheter. Significantly improved dense opacification of the retrocardiac left basilar region. Mildly improved right basilar infiltrate, likely pulmonary edema, with otherwise stable bilateral alveolar interstitial pulmonary edema.            Intake/Output Summary (Last 24 hours) at 18 0825  Last data filed at 18 0833   Gross per 24 hour Intake              726 ml   Output             2514 ml   Net            -1788 ml       Scheduled Meds:    bumetanide  1 mg Intravenous BID    docusate sodium  100 mg Oral BID    potassium chloride  20 mEq Oral TID     amiodarone  200 mg Oral BID    sodium chloride  250 mL Intravenous Once    sodium chloride  250 mL Intravenous Once    cefepime  2 g Intravenous Q24H    nystatin  5 mL Oral 4x Daily    albuterol  2.5 mg Nebulization Q4H While awake    insulin lispro  0-6 Units Subcutaneous TID     insulin lispro  0-3 Units Subcutaneous Nightly    aspirin  325 mg Oral Daily    sodium chloride flush  10 mL Intravenous 2 times per day    calcium replacement protocol   Other RX Placeholder    mupirocin   Topical Once    therapeutic multivitamin-minerals  1 tablet Oral Daily with breakfast    enoxaparin  40 mg Subcutaneous Daily    atorvastatin  40 mg Oral Nightly       ROS: All neg unless specifically mentioned in subjective section. Exam:  General Appearance: alert ,conversing, in no acute distress  Cardiovascular: normal rate, regular rhythm, normal S1 and S2, no murmurs, rubs, clicks, or gallops  Pulmonary/Chest: clear to auscultation bilaterally- no wheezes, rales or rhonchi, normal air movement, no respiratory distress  Neurological: alert, oriented, normal speech, no focal findings or movement disorder noted  Sternum: Incision healing appropriately and no wound dehiscence noted.      Assessment:   Patient Active Problem List   Diagnosis    CKD (chronic kidney disease) stage 3, GFR 30-59 ml/min ([facility])    HTN (hypertension), benign    Vitamin D deficiency    [facility]    CKD (chronic kidney disease) stage 2, GFR 60-89 ml/min    Renal cyst    ACS (acute coronary syndrome) [facility]    Cardiogenic shock [facility]    Aortic stenosis with mitral and aortic insufficiency    Streptococcus A carrier or suspected carrier    Sepsis due to Streptococcus pyogenes ([address]    CHF

## 2018-11-19 NOTE — PROGRESS NOTES
[facility]  [facility]PAT[facility] OCCUPATIONAL THERAPY  [facility] ICU 4D  DAILY NOTE    Time:  Time In: 46  Time Out: 0370  Timed Code Treatment Minutes: 23 Minutes  Minutes: 23          Date: 2018  Patient Name: Francine Knapp,   Gender: male      Room: [facility]  MRN: [id number]  : 1945  (72 y.o.)  Referring Practitioner: Ender Edge MD,  Dr. Kulwant Raya  Diagnosis: Chest Pain  Additional Pertinent Hx: admit with above diagnosis, NSTEMI, underwent an aortic valve replacement and mitral valve repair and CABG x 2 on 11-15 ,extubated on     Past Medical History:   Diagnosis Date    CAD (coronary artery disease)     [facility]     Chronic kidney disease     Diabetes mellitus [facility]     HTN (hypertension), benign     Hyperlipidemia     Mitral and aortic incompetence     Neuropathy     Positive blood culture     Being treated with rocephhin as outpt.     Thyroid disease      Past Surgical History:   Procedure Laterality Date    CARDIAC SURGERY      heart cath    CATARACT REMOVAL WITH IMPLANT Bilateral     COLONOSCOPY      EYE SURGERY      [place] CABG, VEIN, THREE N/A 2018    CABG CORONARY ARTERY BYPASS,  AORTIC AND MITRAL VALVE REPAIR WITH MICHELE, BALLOON PUMP INSERTION performed by Dylon Martinez MD at [address] ECHO TRANSESOPHAG R-T 2D IMG ACQUISJ I&R ONLY Left 10/25/2018    TRANSESOPHAGEAL ECHOCARDIOGRAM performed by [place] MD at [facility]       Restrictions/Precautions:  General Precautions                    Sternal Precautions: No Pushing, No Pulling, 5# Lifting Restrictions  Sternal Precautions: CABG x 2, aortic valve replacement and mitral valve repair 11-15  Other position/activity restrictions: O2       Prior Level of Function:  ADL Assistance: Independent  Homemaking Assistance: Needs assistance (wife completes most, able to do yardwork, has a son that helps)  Ambulation Assistance: Independent  Transfer Assistance: Independent progress    Patient Education:  Patient Education: increase mobility as able, sternal precatuions     Equipment Recommendations:  Equipment Needed: No    Safety:  Safety Devices in place: Yes  Type of devices: All fall risk precautions in place, Call light within reach, Left in chair, Nurse notified, Gait belt    Plan:  Times per week: 6x  Current Treatment Recommendations: Self-Care / ADL, Strengthening, Balance Training, Functional Mobility Training, Endurance Training, Patient/Caregiver Education & Training, Safety Education & Training, Home Management Training, Equipment Evaluation, Education, & procurement    Goals:  Patient goals : increase strength    Short term goals  Time Frame for Short term goals:  Two weeks  Short term goal 1: Pt to complete functional mobility to and from the BR with no > min A x 2  for inc indep for toileting routine  Short term goal 2: Pt to tolerate dynamic standing tasks for greater than 2 mins with intermittent 1UE release at CGA x 1 for inc indep with simple IADL task  Short term goal 3: Pt to complete various functional transfers at minimal assist x I and CGA x 1  with 0 cues for safety or sternal precautions  for inc ind with toileting  Short term goal 4: Pt will participate in CABG step 2 ex x 10 reps to increase strenth needed for safe mobility and ADL tasks   Long term goals  Time Frame for Long term goals : NA d/t [employer]

## 2018-11-20 ENCOUNTER — APPOINTMENT (OUTPATIENT)
Dept: GENERAL RADIOLOGY | Age: 73
DRG: 219 | End: 2018-11-20
Payer: MEDICARE

## 2018-11-20 LAB
ANION GAP SERPL CALCULATED.3IONS-SCNC: 13 MEQ/L (ref 8–16)
BASOPHILS # BLD: 0.4 %
BASOPHILS ABSOLUTE: 0 THOU/MM3 (ref 0–0.1)
BUN BLDV-MCNC: 28 MG/DL (ref 7–22)
CALCIUM SERPL-MCNC: 8.2 MG/DL (ref 8.5–10.5)
CHLORIDE BLD-SCNC: 103 MEQ/L (ref 98–111)
CO2: 25 MEQ/L (ref 23–33)
CREAT SERPL-MCNC: 1.1 MG/DL (ref 0.4–1.2)
EKG ATRIAL RATE: 70 BPM
EKG P-R INTERVAL: 158 MS
EKG Q-T INTERVAL: 496 MS
EKG QRS DURATION: 156 MS
EKG QTC CALCULATION (BAZETT): 535 MS
EKG R AXIS: 14 DEGREES
EKG T AXIS: -33 DEGREES
EKG VENTRICULAR RATE: 70 BPM
EOSINOPHIL # BLD: 2.8 %
EOSINOPHILS ABSOLUTE: 0.1 THOU/MM3 (ref 0–0.4)
ERYTHROCYTE [DISTWIDTH] IN BLOOD BY AUTOMATED COUNT: 16.5 % (ref 11.5–14.5)
ERYTHROCYTE [DISTWIDTH] IN BLOOD BY AUTOMATED COUNT: 57.3 FL (ref 35–45)
GFR SERPL CREATININE-BSD FRML MDRD: 66 ML/MIN/1.73M2
GLUCOSE BLD-MCNC: 106 MG/DL (ref 70–108)
GLUCOSE BLD-MCNC: 109 MG/DL (ref 70–108)
GLUCOSE BLD-MCNC: 112 MG/DL (ref 70–108)
GLUCOSE BLD-MCNC: 119 MG/DL (ref 70–108)
GLUCOSE BLD-MCNC: 168 MG/DL (ref 70–108)
HCT VFR BLD CALC: 30.3 % (ref 42–52)
HEMOGLOBIN: 9.6 GM/DL (ref 14–18)
IMMATURE GRANS (ABS): 0.03 THOU/MM3 (ref 0–0.07)
IMMATURE GRANULOCYTES: 0.6 %
LYMPHOCYTES # BLD: 15.5 %
LYMPHOCYTES ABSOLUTE: 0.8 THOU/MM3 (ref 1–4.8)
MAGNESIUM: 2 MG/DL (ref 1.6–2.4)
MCH RBC QN AUTO: 30.5 PG (ref 26–33)
MCHC RBC AUTO-ENTMCNC: 31.7 GM/DL (ref 32.2–35.5)
MCV RBC AUTO: 96.2 FL (ref 80–94)
MONOCYTES # BLD: 12.3 %
MONOCYTES ABSOLUTE: 0.6 THOU/MM3 (ref 0.4–1.3)
NUCLEATED RED BLOOD CELLS: 0 /100 WBC
PLATELET # BLD: 56 THOU/MM3 (ref 130–400)
PMV BLD AUTO: 10.9 FL (ref 9.4–12.4)
POTASSIUM SERPL-SCNC: 3.9 MEQ/L (ref 3.5–5.2)
RBC # BLD: 3.15 MILL/MM3 (ref 4.7–6.1)
SEG NEUTROPHILS: 68.4 %
SEGMENTED NEUTROPHILS ABSOLUTE COUNT: 3.4 THOU/MM3 (ref 1.8–7.7)
SODIUM BLD-SCNC: 141 MEQ/L (ref 135–145)
WBC # BLD: 5 THOU/MM3 (ref 4.8–10.8)

## 2018-11-20 PROCEDURE — 97530 THERAPEUTIC ACTIVITIES: CPT

## 2018-11-20 PROCEDURE — 2709999900 HC NON-CHARGEABLE SUPPLY

## 2018-11-20 PROCEDURE — 6370000000 HC RX 637 (ALT 250 FOR IP): Performed by: THORACIC SURGERY (CARDIOTHORACIC VASCULAR SURGERY)

## 2018-11-20 PROCEDURE — 85025 COMPLETE CBC W/AUTO DIFF WBC: CPT

## 2018-11-20 PROCEDURE — 2580000003 HC RX 258: Performed by: THORACIC SURGERY (CARDIOTHORACIC VASCULAR SURGERY)

## 2018-11-20 PROCEDURE — 6370000000 HC RX 637 (ALT 250 FOR IP): Performed by: NURSE PRACTITIONER

## 2018-11-20 PROCEDURE — 97116 GAIT TRAINING THERAPY: CPT

## 2018-11-20 PROCEDURE — 99233 SBSQ HOSP IP/OBS HIGH 50: CPT | Performed by: INTERNAL MEDICINE

## 2018-11-20 PROCEDURE — 83735 ASSAY OF MAGNESIUM: CPT

## 2018-11-20 PROCEDURE — APPSS30 APP SPLIT SHARED TIME 16-30 MINUTES: Performed by: PHYSICIAN ASSISTANT

## 2018-11-20 PROCEDURE — 94640 AIRWAY INHALATION TREATMENT: CPT

## 2018-11-20 PROCEDURE — 71045 X-RAY EXAM CHEST 1 VIEW: CPT

## 2018-11-20 PROCEDURE — 6360000002 HC RX W HCPCS: Performed by: THORACIC SURGERY (CARDIOTHORACIC VASCULAR SURGERY)

## 2018-11-20 PROCEDURE — 2500000003 HC RX 250 WO HCPCS: Performed by: THORACIC SURGERY (CARDIOTHORACIC VASCULAR SURGERY)

## 2018-11-20 PROCEDURE — 82948 REAGENT STRIP/BLOOD GLUCOSE: CPT

## 2018-11-20 PROCEDURE — 93010 ELECTROCARDIOGRAM REPORT: CPT | Performed by: INTERNAL MEDICINE

## 2018-11-20 PROCEDURE — 2700000000 HC OXYGEN THERAPY PER DAY

## 2018-11-20 PROCEDURE — 6360000002 HC RX W HCPCS: Performed by: INTERNAL MEDICINE

## 2018-11-20 PROCEDURE — 2060000000 HC ICU INTERMEDIATE R&B

## 2018-11-20 PROCEDURE — 36415 COLL VENOUS BLD VENIPUNCTURE: CPT

## 2018-11-20 PROCEDURE — 2580000003 HC RX 258: Performed by: INTERNAL MEDICINE

## 2018-11-20 PROCEDURE — 80048 BASIC METABOLIC PNL TOTAL CA: CPT

## 2018-11-20 PROCEDURE — 97110 THERAPEUTIC EXERCISES: CPT

## 2018-11-20 RX ORDER — FUROSEMIDE 10 MG/ML
40 INJECTION INTRAMUSCULAR; INTRAVENOUS 2 TIMES DAILY
Status: DISCONTINUED | OUTPATIENT
Start: 2018-11-20 | End: 2018-11-22

## 2018-11-20 RX ORDER — TOBRAMYCIN INHALATION SOLUTION 300 MG/5ML
300 INHALANT RESPIRATORY (INHALATION) 2 TIMES DAILY
Status: DISPENSED | OUTPATIENT
Start: 2018-11-20 | End: 2018-11-23

## 2018-11-20 RX ORDER — SODIUM PHOSPHATE, DIBASIC AND SODIUM PHOSPHATE, MONOBASIC 7; 19 G/133ML; G/133ML
1 ENEMA RECTAL
Status: ACTIVE | OUTPATIENT
Start: 2018-11-20 | End: 2018-11-20

## 2018-11-20 RX ORDER — TOBRAMYCIN INHALATION SOLUTION 300 MG/5ML
300 INHALANT RESPIRATORY (INHALATION) 2 TIMES DAILY
Status: DISCONTINUED | OUTPATIENT
Start: 2018-11-20 | End: 2018-11-20

## 2018-11-20 RX ADMIN — Medication 10 ML: at 12:18

## 2018-11-20 RX ADMIN — BUMETANIDE 1 MG: 0.25 INJECTION INTRAMUSCULAR; INTRAVENOUS at 08:51

## 2018-11-20 RX ADMIN — POTASSIUM CHLORIDE 20 MEQ: 20 TABLET, EXTENDED RELEASE ORAL at 17:43

## 2018-11-20 RX ADMIN — ALBUTEROL SULFATE 2.5 MG: 2.5 SOLUTION RESPIRATORY (INHALATION) at 17:32

## 2018-11-20 RX ADMIN — Medication 10 ML: at 13:14

## 2018-11-20 RX ADMIN — INSULIN LISPRO 2 UNITS: 100 INJECTION, SOLUTION INTRAVENOUS; SUBCUTANEOUS at 21:26

## 2018-11-20 RX ADMIN — POTASSIUM CHLORIDE 20 MEQ: 20 TABLET, EXTENDED RELEASE ORAL at 08:34

## 2018-11-20 RX ADMIN — DOCUSATE SODIUM 100 MG: 100 CAPSULE, LIQUID FILLED ORAL at 08:38

## 2018-11-20 RX ADMIN — BISACODYL 10 MG: 10 SUPPOSITORY RECTAL at 05:54

## 2018-11-20 RX ADMIN — POTASSIUM CHLORIDE 20 MEQ: 20 TABLET, EXTENDED RELEASE ORAL at 12:18

## 2018-11-20 RX ADMIN — NYSTATIN 500000 UNITS: 500000 SUSPENSION ORAL at 21:26

## 2018-11-20 RX ADMIN — Medication 10 ML: at 08:29

## 2018-11-20 RX ADMIN — AMIODARONE HYDROCHLORIDE 200 MG: 200 TABLET ORAL at 08:34

## 2018-11-20 RX ADMIN — ALBUTEROL SULFATE 2.5 MG: 2.5 SOLUTION RESPIRATORY (INHALATION) at 13:04

## 2018-11-20 RX ADMIN — AMIODARONE HYDROCHLORIDE 200 MG: 200 TABLET ORAL at 21:26

## 2018-11-20 RX ADMIN — ALBUTEROL SULFATE 2.5 MG: 2.5 SOLUTION RESPIRATORY (INHALATION) at 09:07

## 2018-11-20 RX ADMIN — ATORVASTATIN CALCIUM 40 MG: 40 TABLET, FILM COATED ORAL at 21:26

## 2018-11-20 RX ADMIN — Medication 10 ML: at 21:27

## 2018-11-20 RX ADMIN — CEFEPIME HYDROCHLORIDE 2 G: 2 INJECTION, POWDER, FOR SOLUTION INTRAVENOUS at 12:19

## 2018-11-20 RX ADMIN — DOCUSATE SODIUM 100 MG: 100 CAPSULE, LIQUID FILLED ORAL at 21:26

## 2018-11-20 RX ADMIN — ALBUTEROL SULFATE 2.5 MG: 2.5 SOLUTION RESPIRATORY (INHALATION) at 21:50

## 2018-11-20 RX ADMIN — ACETAMINOPHEN 650 MG: 325 TABLET ORAL at 08:34

## 2018-11-20 ASSESSMENT — PAIN SCALES - GENERAL
PAINLEVEL_OUTOF10: 2
PAINLEVEL_OUTOF10: 3
PAINLEVEL_OUTOF10: 2
PAINLEVEL_OUTOF10: 3
PAINLEVEL_OUTOF10: 0
PAINLEVEL_OUTOF10: 2
PAINLEVEL_OUTOF10: 2
PAINLEVEL_OUTOF10: 0
PAINLEVEL_OUTOF10: 0
PAINLEVEL_OUTOF10: 2

## 2018-11-20 ASSESSMENT — PAIN DESCRIPTION - PAIN TYPE
TYPE: SURGICAL PAIN
TYPE: SURGICAL PAIN

## 2018-11-20 ASSESSMENT — PAIN DESCRIPTION - PROGRESSION: CLINICAL_PROGRESSION: GRADUALLY WORSENING

## 2018-11-20 ASSESSMENT — PAIN DESCRIPTION - FREQUENCY: FREQUENCY: CONTINUOUS

## 2018-11-20 ASSESSMENT — PAIN DESCRIPTION - DESCRIPTORS: DESCRIPTORS: ACHING

## 2018-11-20 NOTE — PROGRESS NOTES
Ifrahangelina Richardson 62  INPATIENT OCCUPATIONAL THERAPY  [facility] ICU 4D  DAILY NOTE    Time:  Time In: 1388  Time Out: 7012  Timed Code Treatment Minutes: 23 Minutes  Minutes: 19          Date: 2018  Patient Name: Chrissie Arauz,   Gender: male      Room: [facility]  MRN: [id number]  : 1945  (72 y.o.)  Referring Practitioner: Lenore Lozoya MD,  Dr. Maame Ceron  Diagnosis: Chest Pain  Additional Pertinent Hx: admit with above diagnosis, NSTEMI, underwent an aortic valve replacement and mitral valve repair and CABG x 2 on 11-15 ,extubated on     Past Medical History:   Diagnosis Date    CAD (coronary artery disease)     [facility]est[facility] failure) [facility]     Chronic kidney disease     Diabetes mellitus ([facility]     HTN (hypertension), benign     Hyperlipidemia     Mitral and aortic incompetence     Neuropathy     Positive blood culture     Being treated with rocephhin as outpt.     Thyroid disease      Past Surgical History:   Procedure Laterality Date    CARDIAC SURGERY      heart cath    CATARACT REMOVAL WITH IMPLANT Bilateral     COLONOSCOPY      EYE SURGERY      WA CABG, VEIN, THREE N/A 2018    CABG CORONARY ARTERY BYPASS,  AORTIC AND MITRAL VALVE REPAIR WITH MICHELE, BALLOON PUMP INSERTION performed by Emeka Morales MD at [address] [place] [address] ECHO TRANSESOPHAG R-T 2D IMG ACQUISJ I&R ONLY Left 10/25/2018    TRANSESOPHAGEAL ECHOCARDIOGRAM performed by Alejos Goodell, MD at [facility]       Restrictions/Precautions:  General Precautions                    Sternal Precautions: No Pushing, No Pulling, 5# Lifting Restrictions  Sternal Precautions: CABG x 2, aortic valve replacement and mitral valve repair 11-15  Other position/activity restrictions: O2       Prior Level of Function:  ADL Assistance: Independent  Homemaking Assistance: Needs assistance (wife completes most, able to do yardwork, has a son that helps)  Ambulation Assistance: Independent  Transfer Assistance: Independent Needs: O2                                   Type of ROM/Therapeutic Exercise: AROM  Comment: pt completed CABG step II exercises seated in recliner. Pt able to complete x10 reps x1 set. pt did require RB between each exercise. Pt was able to recall 3/5 exercises. Exercises completed to increase overall endurance/activity tolerance needed for ADL completion. Activity Tolerance:  Activity Tolerance: Patient limited by fatigue  Activity Tolerance: Treatment shortened due to breakfast tray arriving.      Assessment:     Performance deficits / Impairments: Decreased functional mobility , Decreased ADL status, Decreased endurance, Decreased balance, Decreased high-level IADLs, Decreased safe awareness  Prognosis: Good    Discharge Recommendations:  Discharge Recommendations: [address]    Patient Education:  Patient Education: increase mobility as able, sternal precatuions, transfer technique    Equipment

## 2018-11-20 NOTE — PROGRESS NOTES
sodium  100 mg Oral BID    potassium chloride  20 mEq Oral TID     amiodarone  200 mg Oral BID    cefepime  2 g Intravenous Q24H    nystatin  5 mL Oral 4x Daily    albuterol  2.5 mg Nebulization Q4H While awake    insulin lispro  0-6 Units Subcutaneous TID     insulin lispro  0-3 Units Subcutaneous Nightly    aspirin  325 mg Oral Daily    sodium chloride flush  10 mL Intravenous 2 times per day    calcium replacement protocol   Other RX Placeholder    mupirocin   Topical Once    therapeutic multivitamin-minerals  1 tablet Oral Daily with breakfast    enoxaparin  40 mg Subcutaneous Daily    atorvastatin  40 mg Oral Nightly       fleet 1 enema Once PRN   sodium chloride flush 10 mL PRN   potassium chloride 20 mEq PRN   magnesium sulfate 1.5 g PRN   acetaminophen 650 mg Q4H PRN   acetaminophen 650 mg Q4H PRN   oxyCODONE 5 mg Q4H PRN   Or     oxyCODONE 10 mg Q4H PRN   morphine 2 mg Q15 Min PRN   magnesium hydroxide 30 mL Daily PRN   bisacodyl 10 mg Daily PRN   senna 10 mL Daily PRN   ondansetron 4 mg Q6H PRN   enalaprilat 0.625 mg Q1H PRN   albuterol sulfate HFA 2 puff Q6H PRN   albumin human 25 g PRN   glucose 15 g PRN   dextrose 12.5 g PRN   glucagon (rDNA) 1 mg PRN   dextrose 100 mL/hr PRN       Vital Signs: T: 98.3: P: 76: RR: 29: B/P: 122/65: FiO2: 2 L: O2 Sat: 95%: I/O: 828/2455  General:   Ill-appearing white male. HEENT:  normocephalic and atraumatic. No scleral icterus. Neck: supple. No JVD. Lungs: Right sided crackles. No retractions. At bedside, respiratory rate is 16. Cardiac: RRR  Abdomen: soft. Nontender. Extremities:  No clubbing, cyanosis x 4. Vasculature: capillary refill < 3 seconds. Skin:  warm and dry. Psych:  Awake and interactive. Appropriate with his environment. Lymph:  No supraclavicular adenopathy. Neurologic:  No focal deficit.     Data: (All radiographs, tracings, PFTs, and imaging are personally viewed and interpreted unless otherwise noted).    · Chest x-ray shows right-sided infiltrates consistent with pneumonia. · Sputum positive for pseudomonas aeruginosa. Organism is pansensitive. · White blood cell count 5, hemoglobin 9.6, platelets 56. Sodium 141, potassium 3.9, chloride 103, bicarb 25, BUN 28, creatinine 1.1, glucose 119.     Case discussed with Dr. Lita Lepe. Patient transitioning to floor. Electronically signed by Adelaida Correia M.D.

## 2018-11-20 NOTE — PROGRESS NOTES
Nutrition Assessment    Type and Reason for Visit: Reassess    Nutrition Recommendations: Continue current diet, MVI. ONS initiated. Nutrition Assessment:  Pt. With slight improvement from nutritional standpoint as tolerating diet; appetite starting to improve. Remains at risk for further nutritional compromise r/t increased nutrient needs for healing, recent tooth extraction. Will add ground meats to diet order per pt request.  Initiated Glucerna ONS TID; provided diet education. Will monitor po intake, weights and wound healing. Pt. Seen - reports appetite is \"coming\". Requesting ground meats as difficulty chewing meats. States acceptance of activia yogurt/ileus prevention protocol. Reports blood sugars well controlled pta; instructed on low sodium diet 11/6/18. Rx includes MVI, Bumex, Glucose 106 mg/dl, BUN 28, Cr 1.1. Malnutrition Assessment:  · Malnutrition Status: Meets the criteria for moderate malnutrition  · Context: Acute illness or injury  · Findings of the 6 clinical characteristics of malnutrition (Minimum of 2 out of 6 clinical characteristics is required to make the diagnosis of moderate or severe Protein Calorie Malnutrition based on AND/ASPEN Guidelines):  1. Energy Intake-Less than 75% of estimated energy requirement for greater than or equal to 3 months, greater than 7 days    2. Fat Loss-Mild subcutaneous fat loss, Orbital  3. Muscle Loss-Mild muscle mass loss, Clavicles (pectoralis and deltoids)    Nutrition Risk Level: Moderate    Nutrient Needs:  · Estimated Daily Total Kcal: 1828-2285kcals (20-25kcals/kgm wt. of 91.4kgm 11/6)  · Estimated Daily Protein (g): 81-97 gm (1-1.2 gm/kgm IBW of 81 kgm)    Nutrition Diagnosis:   · Problem:  Moderate malnutrition, In context of acute illness or injury  · Etiology: related to Insufficient energy/nutrient consumption     Signs and symptoms:  as evidenced by Mild loss of subcutaneous fat, Mild muscle loss    Objective

## 2018-11-20 NOTE — PROGRESS NOTES
CT/CV Surgery Progress Note    2018 8:35 AM     Surgeon:  Dr. Garnell Lefort     Procedure: 18  1) Aortic valve replacement with a 23 mm [employer] Trifecta bioprosthesis  2) Mitral valve repair with 28 mm Zuleta Physio annuloplasty band  3) Coronary artery bypass grafting x 2, with the left internal mammary artery grafted to the left anterior descending artery, a reversed greater saphenous aortocoronary vein graft to the first obtuse marginal artery    POD #6    Subjective:  Mr. Litzy Dutta is resting comfortable in bed on 2L O2 NC, alert, and in no acute distress. Pt denies chest pressure, SOB, fever,chills, N/V/D. Vital Signs: /65   Pulse 76   Temp 98 °F (36.7 °C) (Oral)   Resp 29   Ht 6' (1.829 m)   Wt 199 lb 4.8 oz (90.4 kg)   SpO2 95%   BMI 27.03 kg/m²    Temp (24hrs), Av.3 °F (36.8 °C), Min:97.8 °F (36.6 °C), Max:98.9 °F (37.2 °C)      PULSE OXIMETRY RANGE: SpO2  Av.3 %  Min: 90 %  Max: 99 %    Labs:   CBC:     Recent Labs      18   WBC  7.2   --   5.2   --   5.0   HGB  9.8*   < >  9.5*  9.5*  9.6*   HCT  30.4*   < >  29.7*  30.2*  30.3*   MCV  94.1*   --   94.9*   --   96.2*   PLT  53*   --   59*   --   56*    < > = values in this interval not displayed. BMP: Recent Labs      18   NA  143   --    --   146*  141   K  3.6   < >  4.3  4.2  3.9   CL  104   --    --   107  103   CO2  25   --    --   23  25   BUN  31*   --    --   30*  28*   CREATININE  1.3*   --    --   1.1  1.1   MG  2.3   --    --   2.3  2.0    < > = values in this interval not displayed. Imaging:  CXR:   There is persistent diffuse patchy atelectasis and infiltrate of the right lung with postoperative sternotomy changes. Small basilar effusion at the left lower lobe cannot be excluded. The skeletal structures remain stable. Supportive devices again are    noted.          Intake/Output Summary (Last 24 hours) at 11/20/18 0835  Last data filed at 11/20/18 0656   Gross per 24 hour   Intake              828 ml   Output             2325 ml   Net            -1497 ml       Scheduled Meds:    bumetanide  1 mg Intravenous BID    docusate sodium  100 mg Oral BID    potassium chloride  20 mEq Oral TID     amiodarone  200 mg Oral BID    cefepime  2 g Intravenous Q24H    nystatin  5 mL Oral 4x Daily    albuterol  2.5 mg Nebulization Q4H While awake    insulin lispro  0-6 Units Subcutaneous TID     insulin lispro  0-3 Units Subcutaneous Nightly    aspirin  325 mg Oral Daily    sodium chloride flush  10 mL Intravenous 2 times per day    calcium replacement protocol   Other RX Placeholder    mupirocin   Topical Once    therapeutic multivitamin-minerals  1 tablet Oral Daily with breakfast    enoxaparin  40 mg Subcutaneous Daily    atorvastatin  40 mg Oral Nightly       ROS: All neg unless specifically mentioned in subjective section. Exam:  General Appearance: alert ,conversing, in no acute distress  Cardiovascular: normal rate, regular rhythm, normal S1 and S2, no murmurs, rubs, clicks, or gallops  Pulmonary/Chest: clear to auscultation bilaterally- no wheezes, rales or rhonchi, normal air movement, no respiratory distress  Neurological: alert, oriented, normal speech, no focal findings or movement disorder noted  Sternum: Incision healing appropriately and no wound dehiscence noted.      Assessment:   Patient Active Problem List   Diagnosis    CKD (chronic kidney disease) stage 3, GFR 30-59 ml/min ([facility])    HTN (hypertension), benign    Vitamin D deficiency    MISA ([facility] kidney injury) [facility] [address]    CKD (chronic kidney disease) stage 2, GFR 60-89 ml/min    Renal cyst    ACS (acute coronary syndrome) ([facility] [address] [facility]    Cardiogenic shock ([facility] [address] [facility]    Aortic stenosis with mitral and aortic insufficiency    Streptococcus A carrier or suspected carrier    Sepsis due to Streptococcus pyogenes ([facility] [address] [facility]    CHF exacerbation (HCC)    Heart failure, systolic, with acute decompensation (HCC)    Moderate malnutrition (HCC)    Renal failure syndrome    Acute on chronic systolic CHF (congestive heart failure), NYHA class 3 (HCC) EF 25-30%    Multi-vessel coronary artery stenosis    Chest pain    Moderate malnutrition (HCC)    Arterial hypotension    Elevated troponin level    Anemia    Valvular disease    Other fluid overload    Acute respiratory failure with hypoxia (HCC)    Pneumonia of both lower lobes due to Pseudomonas species ([facility] [address]    Acquired thrombocytopenia ([facility] [address]       Plan: 11/20/18  1. CXR reviewed- Continue daily CXR's   2.  Transfer to Step Down    The plan of care was discussed in detail with Dr. Rick Cabrales, PANELLIE

## 2018-11-20 NOTE — PROGRESS NOTES
agreeable for therapy, did remove chest tubes from suction to walk in halls pt on 2 L O2 during session with min SOB with activity    Pain:   .  Pain Assessment  Pain Level: 2 (jonel LEs )       Social/Functional:  Lives With: Spouse  Type of Home: House  Home Layout: One level  Home Access: Stairs to enter with rails  Entrance Stairs - Number of Steps: 4  Entrance Stairs - Rails: Both  Home Equipment: Rolling walker, Cane (cane intermittently for longer trips ie: grocery shopping)     Objective:  Sit to Supine: Moderate assistance (with HOB elevated cues to scoot hips over in bed before trying to sit as once he sat he needed mod assist to scoot to edge of bed )    Transfers  Sit to Stand: Moderate Assistance (from bed and took 2 attempts to come to standing )  Stand to sit: Minimal Assistance (cues to control descend)       Ambulation 1  Device: Rolling Walker  Assistance: Contact guard assistance  Quality of Gait: slow gabrielle and flexed posture wide base of support and ER at jonel LEs with decreased step length   Distance: 85x1         Balance  Comments: sitting edge of bed with SBA cues for posture, standing balance with no UE at support static standing with min assist with min trunk sway only able to reach in base of support when he would reach out of base he requried one UE at support with CGA to occ min assist he was able to stand for 3 min x 2 trials before needing a rest break           Exercises:  Exercises  Comments: pt completed ankle pumps, glut sets, quad sets hip IR rotations with stretch due to tightness, shoulder punches, shoulder horz abd/add, hip abd/add, short arc quads, straight leg riases, seated long arc quads, hip flexion upper trunk rotations x 10-12 reps each                    Activity Tolerance:  Activity Tolerance: Patient limited by fatigue;Patient limited by endurance    Assessment:   Body structures, Functions, Activity limitations: Decreased functional mobility , Decreased strength,

## 2018-11-21 ENCOUNTER — APPOINTMENT (OUTPATIENT)
Dept: GENERAL RADIOLOGY | Age: 73
DRG: 219 | End: 2018-11-21
Payer: MEDICARE

## 2018-11-21 LAB
ANION GAP SERPL CALCULATED.3IONS-SCNC: 15 MEQ/L (ref 8–16)
BASOPHILS # BLD: 0.4 %
BASOPHILS ABSOLUTE: 0 THOU/MM3 (ref 0–0.1)
BLOOD CULTURE, ROUTINE: NORMAL
BUN BLDV-MCNC: 29 MG/DL (ref 7–22)
CALCIUM SERPL-MCNC: 8.9 MG/DL (ref 8.5–10.5)
CHLORIDE BLD-SCNC: 103 MEQ/L (ref 98–111)
CO2: 23 MEQ/L (ref 23–33)
CREAT SERPL-MCNC: 1.2 MG/DL (ref 0.4–1.2)
EOSINOPHIL # BLD: 2.8 %
EOSINOPHILS ABSOLUTE: 0.1 THOU/MM3 (ref 0–0.4)
ERYTHROCYTE [DISTWIDTH] IN BLOOD BY AUTOMATED COUNT: 16.3 % (ref 11.5–14.5)
ERYTHROCYTE [DISTWIDTH] IN BLOOD BY AUTOMATED COUNT: 56.9 FL (ref 35–45)
GFR SERPL CREATININE-BSD FRML MDRD: 60 ML/MIN/1.73M2
GLUCOSE BLD-MCNC: 119 MG/DL (ref 70–108)
GLUCOSE BLD-MCNC: 122 MG/DL (ref 70–108)
GLUCOSE BLD-MCNC: 185 MG/DL (ref 70–108)
GLUCOSE BLD-MCNC: 217 MG/DL (ref 70–108)
GLUCOSE BLD-MCNC: 95 MG/DL (ref 70–108)
HCT VFR BLD CALC: 34.1 % (ref 42–52)
HEMOGLOBIN: 10.6 GM/DL (ref 14–18)
IMMATURE GRANS (ABS): 0.03 THOU/MM3 (ref 0–0.07)
IMMATURE GRANULOCYTES: 0.6 %
LYMPHOCYTES # BLD: 16.5 %
LYMPHOCYTES ABSOLUTE: 0.8 THOU/MM3 (ref 1–4.8)
MAGNESIUM: 1.9 MG/DL (ref 1.6–2.4)
MCH RBC QN AUTO: 30.5 PG (ref 26–33)
MCHC RBC AUTO-ENTMCNC: 31.1 GM/DL (ref 32.2–35.5)
MCV RBC AUTO: 98 FL (ref 80–94)
MONOCYTES # BLD: 12.4 %
MONOCYTES ABSOLUTE: 0.6 THOU/MM3 (ref 0.4–1.3)
NUCLEATED RED BLOOD CELLS: 0 /100 WBC
PLATELET # BLD: 46 THOU/MM3 (ref 130–400)
PMV BLD AUTO: 10.7 FL (ref 9.4–12.4)
POTASSIUM SERPL-SCNC: 3.9 MEQ/L (ref 3.5–5.2)
RBC # BLD: 3.48 MILL/MM3 (ref 4.7–6.1)
SEG NEUTROPHILS: 67.3 %
SEGMENTED NEUTROPHILS ABSOLUTE COUNT: 3.4 THOU/MM3 (ref 1.8–7.7)
SODIUM BLD-SCNC: 141 MEQ/L (ref 135–145)
WBC # BLD: 5 THOU/MM3 (ref 4.8–10.8)

## 2018-11-21 PROCEDURE — 2580000003 HC RX 258: Performed by: THORACIC SURGERY (CARDIOTHORACIC VASCULAR SURGERY)

## 2018-11-21 PROCEDURE — 2060000000 HC ICU INTERMEDIATE R&B

## 2018-11-21 PROCEDURE — 80048 BASIC METABOLIC PNL TOTAL CA: CPT

## 2018-11-21 PROCEDURE — 2709999900 HC NON-CHARGEABLE SUPPLY

## 2018-11-21 PROCEDURE — 83735 ASSAY OF MAGNESIUM: CPT

## 2018-11-21 PROCEDURE — 97535 SELF CARE MNGMENT TRAINING: CPT

## 2018-11-21 PROCEDURE — 97530 THERAPEUTIC ACTIVITIES: CPT

## 2018-11-21 PROCEDURE — 6360000002 HC RX W HCPCS: Performed by: INTERNAL MEDICINE

## 2018-11-21 PROCEDURE — 6370000000 HC RX 637 (ALT 250 FOR IP): Performed by: PHYSICIAN ASSISTANT

## 2018-11-21 PROCEDURE — 97116 GAIT TRAINING THERAPY: CPT

## 2018-11-21 PROCEDURE — 6370000000 HC RX 637 (ALT 250 FOR IP): Performed by: THORACIC SURGERY (CARDIOTHORACIC VASCULAR SURGERY)

## 2018-11-21 PROCEDURE — 71045 X-RAY EXAM CHEST 1 VIEW: CPT

## 2018-11-21 PROCEDURE — APPSS30 APP SPLIT SHARED TIME 16-30 MINUTES: Performed by: PHYSICIAN ASSISTANT

## 2018-11-21 PROCEDURE — 94640 AIRWAY INHALATION TREATMENT: CPT

## 2018-11-21 PROCEDURE — 6360000002 HC RX W HCPCS: Performed by: THORACIC SURGERY (CARDIOTHORACIC VASCULAR SURGERY)

## 2018-11-21 PROCEDURE — 97110 THERAPEUTIC EXERCISES: CPT

## 2018-11-21 PROCEDURE — 82948 REAGENT STRIP/BLOOD GLUCOSE: CPT

## 2018-11-21 PROCEDURE — 2580000003 HC RX 258: Performed by: INTERNAL MEDICINE

## 2018-11-21 PROCEDURE — 85025 COMPLETE CBC W/AUTO DIFF WBC: CPT

## 2018-11-21 PROCEDURE — 36415 COLL VENOUS BLD VENIPUNCTURE: CPT

## 2018-11-21 RX ADMIN — INSULIN LISPRO 3 UNITS: 100 INJECTION, SOLUTION INTRAVENOUS; SUBCUTANEOUS at 12:15

## 2018-11-21 RX ADMIN — POTASSIUM CHLORIDE 20 MEQ: 20 TABLET, EXTENDED RELEASE ORAL at 17:40

## 2018-11-21 RX ADMIN — Medication 10 ML: at 22:03

## 2018-11-21 RX ADMIN — ALBUTEROL SULFATE 2.5 MG: 2.5 SOLUTION RESPIRATORY (INHALATION) at 22:36

## 2018-11-21 RX ADMIN — METOPROLOL TARTRATE 12.5 MG: 25 TABLET ORAL at 21:46

## 2018-11-21 RX ADMIN — METOPROLOL TARTRATE 12.5 MG: 25 TABLET ORAL at 08:45

## 2018-11-21 RX ADMIN — AMIODARONE HYDROCHLORIDE 200 MG: 200 TABLET ORAL at 08:46

## 2018-11-21 RX ADMIN — Medication 10 ML: at 08:42

## 2018-11-21 RX ADMIN — FUROSEMIDE 40 MG: 10 INJECTION, SOLUTION INTRAMUSCULAR; INTRAVENOUS at 17:40

## 2018-11-21 RX ADMIN — AMIODARONE HYDROCHLORIDE 200 MG: 200 TABLET ORAL at 21:47

## 2018-11-21 RX ADMIN — FUROSEMIDE 40 MG: 10 INJECTION, SOLUTION INTRAMUSCULAR; INTRAVENOUS at 08:42

## 2018-11-21 RX ADMIN — ALBUTEROL SULFATE 2.5 MG: 2.5 SOLUTION RESPIRATORY (INHALATION) at 17:17

## 2018-11-21 RX ADMIN — INSULIN LISPRO 3 UNITS: 100 INJECTION, SOLUTION INTRAVENOUS; SUBCUTANEOUS at 21:47

## 2018-11-21 RX ADMIN — ACETAMINOPHEN 650 MG: 325 TABLET ORAL at 07:32

## 2018-11-21 RX ADMIN — Medication 300 MG: at 22:37

## 2018-11-21 RX ADMIN — POTASSIUM CHLORIDE 20 MEQ: 20 TABLET, EXTENDED RELEASE ORAL at 08:46

## 2018-11-21 RX ADMIN — Medication 300 MG: at 12:51

## 2018-11-21 RX ADMIN — POTASSIUM CHLORIDE 20 MEQ: 20 TABLET, EXTENDED RELEASE ORAL at 12:15

## 2018-11-21 RX ADMIN — ATORVASTATIN CALCIUM 40 MG: 40 TABLET, FILM COATED ORAL at 21:47

## 2018-11-21 RX ADMIN — FUROSEMIDE 40 MG: 10 INJECTION, SOLUTION INTRAMUSCULAR; INTRAVENOUS at 00:38

## 2018-11-21 RX ADMIN — CEFEPIME HYDROCHLORIDE 2 G: 2 INJECTION, POWDER, FOR SOLUTION INTRAVENOUS at 12:14

## 2018-11-21 RX ADMIN — DOCUSATE SODIUM 100 MG: 100 CAPSULE, LIQUID FILLED ORAL at 21:46

## 2018-11-21 RX ADMIN — ALBUTEROL SULFATE 2.5 MG: 2.5 SOLUTION RESPIRATORY (INHALATION) at 12:39

## 2018-11-21 RX ADMIN — DOCUSATE SODIUM 100 MG: 100 CAPSULE, LIQUID FILLED ORAL at 08:46

## 2018-11-21 ASSESSMENT — PAIN DESCRIPTION - PROGRESSION: CLINICAL_PROGRESSION: NOT CHANGED

## 2018-11-21 ASSESSMENT — PAIN SCALES - GENERAL
PAINLEVEL_OUTOF10: 2
PAINLEVEL_OUTOF10: 2
PAINLEVEL_OUTOF10: 3
PAINLEVEL_OUTOF10: 2
PAINLEVEL_OUTOF10: 2
PAINLEVEL_OUTOF10: 4
PAINLEVEL_OUTOF10: 2
PAINLEVEL_OUTOF10: 4
PAINLEVEL_OUTOF10: 0
PAINLEVEL_OUTOF10: 2
PAINLEVEL_OUTOF10: 3
PAINLEVEL_OUTOF10: 2

## 2018-11-21 ASSESSMENT — PAIN DESCRIPTION - DESCRIPTORS
DESCRIPTORS: ACHING;DISCOMFORT
DESCRIPTORS: ACHING

## 2018-11-21 ASSESSMENT — PAIN DESCRIPTION - ORIENTATION: ORIENTATION: MID

## 2018-11-21 ASSESSMENT — PAIN DESCRIPTION - LOCATION
LOCATION: CHEST
LOCATION: CHEST

## 2018-11-21 ASSESSMENT — PAIN DESCRIPTION - PAIN TYPE
TYPE: SURGICAL PAIN
TYPE: SURGICAL PAIN

## 2018-11-21 ASSESSMENT — PAIN DESCRIPTION - ONSET: ONSET: ON-GOING

## 2018-11-21 ASSESSMENT — PAIN DESCRIPTION - FREQUENCY: FREQUENCY: CONTINUOUS

## 2018-11-21 NOTE — PROGRESS NOTES
ADL, Strengthening, Balance Training, Functional Mobility Training, Endurance Training, Patient/Caregiver Education & Training, Safety Education & Training, Home Management Training, Equipment Evaluation, Education, & procurement    Goals:  Patient goals : increase strength    Short term goals  Time Frame for Short term goals:  Two weeks  Short term goal 1: Pt to complete functional mobility to and from the BR with no > min A x 2  for inc indep for toileting routine  Short term goal 2: Pt to tolerate dynamic standing tasks for greater than 2 mins with intermittent 1UE release at CGA x 1 for inc indep with simple IADL task  Short term goal 3: Pt to complete various functional transfers at minimal assist x I and CGA x 1  with 0 cues for safety or sternal precautions  for inc ind with toileting  Short term goal 4: Pt will participate in CABG step 2 ex x 10 reps to increase strenth needed for safe mobility and ADL tasks   Long term goals  Time Frame for Long term goals : NA d/t [employer]

## 2018-11-21 NOTE — CONSULTS
to sit: Minimal Assistance (cues to control descend)  Bed to Chair: Moderate assistance (x 2 assist; verbal cues for upright posture throughout, as well as for safety)    Ambulation 1  Surface: level tile  Device: Rolling Walker  Assistance: Contact guard assistance  Quality of Gait: slow gabrielle with severe ER at jonel LEs with decreased step length and no heel strike since he of the ER,   Distance: 105 feet   Comments: gait speed . 58 feet per sec and normal for his age is 4.36 feet per sec and less than 3.28 feet per sec is increased fall risk     OT:  ADL  Feeding: Setup (required assist to open yogurt, pop can)  Grooming: Contact guard assistance (washing face )  LE Dressing: Minimal assistance (donning underwear)  Toileting: Dependent/Total  Additional Comments: Pt completed all other ADLs prior to OT coming     Bed mobility  Supine to Sit: Modified independent  Scooting: Minimal assistance (scooting forward in chair; increased time and cues to complete)    Transfers  Sit to stand:  Moderate assistance (Mod A x1, Min Ax1 from bedside chair)  Stand to sit: Minimal assistance  Transfer Comments: cues for arm placement     Balance  Sitting Balance: Stand by assistance (supported )  Standing Balance: Contact guard assistance (x1 at bedside chair)     Functional History:  Prior Function  Lives With: Spouse  ADL Assistance: Independent  Homemaking Assistance: Needs assistance (wife completes most, able to do yardwork, has a son that helps)  Ambulation Assistance: Independent  Transfer Assistance: Independent  IADL History:  Lift/Transfer Equipment Utilized: None    Past Medical, Surgical, and Family History:    Medical:   Past Medical History:   Diagnosis Date    Acute on chronic systolic CHF (congestive heart failure), NYHA class 3 ([facility]) EF 25-30% 11/7/2018    CAD (coronary artery disease)     CKD (chronic kidney disease) stage 3, GFR 30-59 ml/min ([facility] [address] 9/23/2015    Diabetes mellitus ([facility] [address]     HTN (hypertension), pulses; Edema: 1+    Relevant Studies:     Diagnostics:  Recent Results (from the past 24 hour(s))   Basic Metabolic Panel    Collection Time: 11/22/18  4:34 AM   Result Value Ref Range    Sodium 139 135 - 145 meq/L    Potassium 4.1 3.5 - 5.2 meq/L    Chloride 103 98 - 111 meq/L    CO2 23 23 - 33 meq/L    Glucose 119 (H) 70 - 108 mg/dL    BUN 31 (H) 7 - 22 mg/dL    CREATININE 1.1 0.4 - 1.2 mg/dL    Calcium 8.5 8.5 - 10.5 mg/dL   Magnesium    Collection Time: 11/22/18  4:34 AM   Result Value Ref Range    Magnesium 1.8 1.6 - 2.4 mg/dL   CBC auto differential    Collection Time: 11/22/18  4:34 AM   Result Value Ref Range    WBC 4.6 (L) 4.8 - 10.8 thou/mm3    RBC 3.16 (L) 4.70 - 6.10 mill/mm3    Hemoglobin 9.6 (L) 14.0 - 18.0 gm/dl    Hematocrit 30.0 (L) 42.0 - 52.0 %    MCV 94.9 (H) 80.0 - 94.0 fL    MCH 30.4 26.0 - 33.0 pg    MCHC 32.0 (L) 32.2 - 35.5 gm/dl    RDW-CV 15.9 (H) 11.5 - 14.5 %    RDW-SD 55.0 (H) 35.0 - 45.0 fL    Platelets 41 (L) 424 - 400 thou/mm3    MPV 11.6 9.4 - 12.4 fL    Seg Neutrophils 62.3 %    Lymphocytes 20.0 %    Monocytes 13.4 %    Eosinophils 2.9 %    Basophils 0.7 %    Immature Granulocytes 0.7 %    Segs Absolute 2.9 1.8 - 7.7 thou/mm3    Lymphocytes # 0.9 (L) 1.0 - 4.8 thou/mm3    Monocytes # 0.6 0.4 - 1.3 thou/mm3    Eosinophils # 0.1 0.0 - 0.4 thou/mm3    Basophils # 0.0 0.0 - 0.1 thou/mm3    Immature Grans (Abs) 0.03 0.00 - 0.07 thou/mm3    nRBC 0 /100 wbc   Anion Gap    Collection Time: 11/22/18  4:34 AM   Result Value Ref Range    Anion Gap 13.0 8.0 - 16.0 meq/L   Glomerular Filtration Rate, Estimated    Collection Time: 11/22/18  4:34 AM   Result Value Ref Range    Est, Glom Filt Rate 66 (A) ml/min/1.73m2   POCT glucose    Collection Time: 11/22/18  6:38 AM   Result Value Ref Range    POC Glucose 145 (H) 70 - 108 mg/dl   POCT glucose    Collection Time: 11/22/18 11:43 AM   Result Value Ref Range    POC Glucose 185 (H) 70 - 108 mg/dl   APTT    Collection Time: 11/22/18  3:20 PM lesions are present. Images done with the patient's life the vast in place EKG leads overlie the chest and a peripherally inserted central catheter is present, tip in the superior vena cava     Bilateral pleural effusions left greater than right. Increased left pleural effusion. Since the previous exam. Very subtle minimal increased interstitial markings at the lower lungs **This report has been created using voice recognition software. It may contain minor errors which are inherent in voice recognition technology. ** Final report electronically signed by Dr. Liss Jarvis on 11/12/2018 9:42 AM    Xr Chest Standard (2 Vw)    Result Date: 11/9/2018  PROCEDURE: XR CHEST (2 VW) CLINICAL INFORMATION: 53-year-old male with chest pain and fatigue. COMPARISON: No prior study. TECHNIQUE: PA and lateral views the chest. FINDINGS: Multiple wires and monitor his are overlying the thorax. There is stable cardiomegaly. There is persistence of pulmonary vascular congestion which is slightly improved compared to the prior exam. There is no significant pleural effusion or pneumothorax. There are bibasilar opacities which may represent atelectasis or infiltrate. Visualized portions of the upper abdomen are within normal limits. The osseous structures are intact. No acute fractures or suspicious osseous lesions. 1. Cardiomegaly with mild pulmonary vascular congestion which is slightly improved since prior exam. 2. Bibasilar opacities which may be on the basis of atelectasis or infiltrate. **This report has been created using voice recognition software. It may contain minor errors which are inherent in voice recognition technology. ** Final report electronically signed by Dr Linus Narayan on 11/9/2018 4:59 PM    Xr Chest Standard (2 Vw)    Result Date: 11/5/2018  PROCEDURE: XR CHEST (2 VW) CLINICAL INFORMATION: Shortness of breath and chest pain, .  COMPARISON: October 27, 2018 TECHNIQUE: PA and lateral views the chest. FINDINGS: spondylosis scattered in the thoracic spine. Moderate degenerative changes lower cervical spine. 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 Mild atelectasis/pneumonia along the gravity dependent portions of both mid and lower lung fields. Tiny pleural effusion right side. Note that there is some soft tissue stranding along the right side of the lower trachea, likely representing mucous there is a small 3 mm noncalcified nodule right lung base laterally, likely a poorly calcified granuloma. This was present on the prior study. 1. Fibroemphysematous lungs. Mild bibasilar atelectasis/pneumonia. Small effusion right side. 2. There is moderate calcification of the aortic valve but only mild calcification of the aortic root. Extensive coronary artery calcifications are seen. **This report has been created using voice recognition software. It may contain minor errors which are inherent in voice recognition technology. ** Final report electronically signed by Dr. Argelia Suggs on 10/26/2018 8:38 AM    Nm Lung Vent/perfusion (vq)    Result Date: 11/9/2018  PROCEDURE: NM LUNG VENT/PERFUSION (VQ) CLINICAL INFORMATION: Shortness of breath [employer]: Ventilation scan: 7.8 mCi xenon-133 gas by inhalation. Perfusion scan: 3.1 mCi technetium 99m MAA, intravenously. TECHNIQUE: Ventilation scan was acquired in anterior and posterior projections during breath hold, equilibrium and washout phases. Perfusion scan was acquired in 8 standard projections. COMPARISON: None. Correlation: Mobile AP chest radiograph 11/8/2018 FINDINGS: Chest radiograph: Minimal reticular opacities are seen at the bilateral lung bases. Ventilation scan: Breath hold images demonstrate slightly decreased activity at the bilateral lung apices. There is no significant radiotracer retention on washout images. Perfusion scan: Slightly decreased activity at the bilateral lung apices matches abnormalities seen on the ventilation study.  No mismatched segmental perfusion defects are identified. Pulmonary embolism absent (very low probability lung scan). Final report electronically signed by Dr. Hemal Ortez on 11/9/2018 10:18 AM    Xr Chest Portable    Result Date: 11/21/2018  PROCEDURE: XR CHEST PORTABLE CLINICAL INFORMATION: Post op open heart surgery, . COMPARISON: November 20, 2018 TECHNIQUE: AP upright view of the chest. FINDINGS: Postoperative sternotomy changes with cardiac enlargement and visualized prosthetic heart valve again noted. The mediastinal and chest drains are stable. There is persistent pulmonary venous congestion with mild improvement of aeration of the right lung. Patchy atelectasis and or infiltrate persists at the lung bases. Right upper cavity PICC line remains within the superior vena cava. There is no change of the skeleton. 1. Improving right lung aeration. 2. Residual patchy basilar atelectasis and or infiltrate. 3. Stable pulmonary venous congestion. **This report has been created using voice recognition software. It may contain minor errors which are inherent in voice recognition technology. ** Final report electronically signed by Dr. Chino Perales on 11/21/2018 5:06 AM    Xr Chest Portable    Result Date: 11/20/2018  PROCEDURE: XR CHEST PORTABLE CLINICAL INFORMATION: Post op open heart surgery, . COMPARISON: November 19, 2018 TECHNIQUE: AP upright view of the chest. FINDINGS: There is persistent diffuse patchy atelectasis and infiltrate of the right lung with postoperative sternotomy changes. Small basilar effusion at the left lower lobe cannot be excluded. The skeletal structures remain stable. Supportive devices again are noted. 1. Overall stable appearing chest compared to prior study. **This report has been created using voice recognition software. It may contain minor errors which are inherent in voice recognition technology. ** Final report electronically signed by Dr. Chino Perales on 11/20/2018

## 2018-11-21 NOTE — PROGRESS NOTES
[facility] ICU STEPDOWN TELEMETRY 4K - 4K-13/013-A    Time In: 0505  Time Out: 1209  Timed Code Treatment Minutes: 37 Minutes  Minutes: 43          Date: 2018  Patient Name: Oralia Alvarez,  Gender:  male        MRN: [id number]  : 1945  (72 y.o.)     Referring Practitioner: Dr. Keven Inman  Diagnosis: chest pain  Additional Pertinent Hx: underwent an aortic valve replacement and mitral valve repair and CABG x 2 on 11-15 ,extubated on      Past Medical History:   Diagnosis Date    Acute on chronic systolic CHF (congestive heart failure), NYHA class 3 ([facility] [address] EF 25-30% 2018    CAD (coronary artery disease)     Chronic kidney disease     CKD (chronic kidney disease) stage 3, GFR 30-59 ml/min ([facility] [address] 2015    Diabetes mellitus ([facility] [address]     HTN (hypertension), benign     Hyperlipidemia     Hypothyroidism     Mitral and aortic incompetence     Neuropathy     Positive blood culture     Being treated with rocephhin as outpt.     S/P CABG x 2 11/15/2018     Past Surgical History:   Procedure Laterality Date    CARDIAC SURGERY      heart cath    CATARACT REMOVAL WITH IMPLANT Bilateral     COLONOSCOPY      EYE SURGERY      GA CABG, VEIN, THREE N/A 2018    CABG CORONARY ARTERY BYPASS,  AORTIC AND MITRAL VALVE REPAIR WITH MICHELE, BALLOON PUMP INSERTION performed by Darnell Kaiser MD at [address] ECHO TRANSESOPHAG R-T 2D IMG ACQUISJ I&R ONLY Left 10/25/2018    TRANSESOPHAGEAL ECHOCARDIOGRAM performed by Angel Yung MD at [facility]       Restrictions/Precautions:  General Precautions                    Sternal Precautions: No Pushing, No Pulling, 5# Lifting Restrictions  Sternal Precautions: CABG x 2, aortic valve replacement and mitral valve repair 11-15  Other position/activity restrictions: O2       Prior Level of Function:  ADL Assistance: Independent  Homemaking Assistance: Needs assistance (wife completes most, able to

## 2018-11-21 NOTE — CARE COORDINATION
11/21/18, 3:09 PM      [address] day: 11  Location: [facility]13/013-A Reason for admit: Chest pain [R07.9]  Chest pain [R07.9]   Procedure:   (44/82 PICC R basilic)  33/1 VQ Scan: Neg for PE  11/9 BLE Venous dopplers: Neg for DVT  11/12 Teeth extraction at [facility]  11/12 CXR: Bilateral pleural effusions left greater than right. Increased left pleural effusion. Since the previous exam. Very subtle minimal increased interstitial markings at the lower lungs  11/13 Bilateral carotid dopplers   11/15 Aortic valve replacement with a 23 mm [employer] Trifecta bioprosthesis; Mitral valve repair with 28 mm Zuleta Physio annuloplasty band; 2v CABG; IABP inserted  11/15 CT Chest: Infiltrates scattered throughout both lungs which may be on the basis of multifocal pneumonia. Aspiration should also be considered; Tiny right-sided pneumothorax with subcutaneous emphysema throughout the anterior chest wall; Large bilateral pleural effusions with associated compressive atelectasis  11/16 SGC exchanged  11/16 Extubated  11/17 IABP removed  11/21 Chest tubes removed  11/21 CXR:   1. Improving right lung aeration. 2. Residual patchy basilar atelectasis and or infiltrate     Treatment Plan of Care: POD #7. Chest tubes removed today. +BM. Walked 80' with RW CGA with PT today. Physiatry consulted. CVS and Nephrology following. CR/PT/OT. Dietitian consulted. Dietary ileus prevention protocol. Pneumonia. Respiratory culture +pseudomonas aeruginosa. Telemetry, I&O, daily weight, IS, sternal precautions,  wound care, SCDs. Nebs, amio, asa, lipitor, IV cefepime, lovenox, IV lasix 40mg bid, SSI ACHS, lopressor, nystatin, prn roxicodone, K+, tobramycin neb, Electrolyte replacement protocols. Hgb 10.6, Plt 46. PCP: Reva ELLISON  Readmission Risk Score: 34%  Discharge Plan: Plan for IP Rehab on Friday if accepted; otherwise Nathalia, IP Rehab/TCU admissions coordinator, states she will have bed on TCU Friday. SW on case.

## 2018-11-22 ENCOUNTER — APPOINTMENT (OUTPATIENT)
Dept: GENERAL RADIOLOGY | Age: 73
DRG: 219 | End: 2018-11-22
Payer: MEDICARE

## 2018-11-22 LAB
ANION GAP SERPL CALCULATED.3IONS-SCNC: 13 MEQ/L (ref 8–16)
APTT: 60.8 SECONDS (ref 22–38)
APTT: 72.8 SECONDS (ref 22–38)
BASOPHILS # BLD: 0.7 %
BASOPHILS ABSOLUTE: 0 THOU/MM3 (ref 0–0.1)
BUN BLDV-MCNC: 31 MG/DL (ref 7–22)
CALCIUM SERPL-MCNC: 8.5 MG/DL (ref 8.5–10.5)
CHLORIDE BLD-SCNC: 103 MEQ/L (ref 98–111)
CO2: 23 MEQ/L (ref 23–33)
CREAT SERPL-MCNC: 1.1 MG/DL (ref 0.4–1.2)
EOSINOPHIL # BLD: 2.9 %
EOSINOPHILS ABSOLUTE: 0.1 THOU/MM3 (ref 0–0.4)
ERYTHROCYTE [DISTWIDTH] IN BLOOD BY AUTOMATED COUNT: 15.9 % (ref 11.5–14.5)
ERYTHROCYTE [DISTWIDTH] IN BLOOD BY AUTOMATED COUNT: 55 FL (ref 35–45)
GFR SERPL CREATININE-BSD FRML MDRD: 66 ML/MIN/1.73M2
GLUCOSE BLD-MCNC: 119 MG/DL (ref 70–108)
GLUCOSE BLD-MCNC: 138 MG/DL (ref 70–108)
GLUCOSE BLD-MCNC: 145 MG/DL (ref 70–108)
GLUCOSE BLD-MCNC: 150 MG/DL (ref 70–108)
GLUCOSE BLD-MCNC: 185 MG/DL (ref 70–108)
HCT VFR BLD CALC: 30 % (ref 42–52)
HEMOGLOBIN: 9.6 GM/DL (ref 14–18)
HEPARIN ASSOCIATED AB DETECTION: NORMAL
IMMATURE GRANS (ABS): 0.03 THOU/MM3 (ref 0–0.07)
IMMATURE GRANULOCYTES: 0.7 %
LYMPHOCYTES # BLD: 20 %
LYMPHOCYTES ABSOLUTE: 0.9 THOU/MM3 (ref 1–4.8)
MAGNESIUM: 1.8 MG/DL (ref 1.6–2.4)
MCH RBC QN AUTO: 30.4 PG (ref 26–33)
MCHC RBC AUTO-ENTMCNC: 32 GM/DL (ref 32.2–35.5)
MCV RBC AUTO: 94.9 FL (ref 80–94)
MONOCYTES # BLD: 13.4 %
MONOCYTES ABSOLUTE: 0.6 THOU/MM3 (ref 0.4–1.3)
NUCLEATED RED BLOOD CELLS: 0 /100 WBC
PLATELET # BLD: 41 THOU/MM3 (ref 130–400)
PMV BLD AUTO: 11.6 FL (ref 9.4–12.4)
POTASSIUM SERPL-SCNC: 4.1 MEQ/L (ref 3.5–5.2)
RBC # BLD: 3.16 MILL/MM3 (ref 4.7–6.1)
SEG NEUTROPHILS: 62.3 %
SEGMENTED NEUTROPHILS ABSOLUTE COUNT: 2.9 THOU/MM3 (ref 1.8–7.7)
SEROTONIN RELEASING ASSAY: NORMAL
SODIUM BLD-SCNC: 139 MEQ/L (ref 135–145)
WBC # BLD: 4.6 THOU/MM3 (ref 4.8–10.8)

## 2018-11-22 PROCEDURE — 36415 COLL VENOUS BLD VENIPUNCTURE: CPT

## 2018-11-22 PROCEDURE — 6370000000 HC RX 637 (ALT 250 FOR IP): Performed by: THORACIC SURGERY (CARDIOTHORACIC VASCULAR SURGERY)

## 2018-11-22 PROCEDURE — 80048 BASIC METABOLIC PNL TOTAL CA: CPT

## 2018-11-22 PROCEDURE — 86022 PLATELET ANTIBODIES: CPT

## 2018-11-22 PROCEDURE — 6360000002 HC RX W HCPCS: Performed by: THORACIC SURGERY (CARDIOTHORACIC VASCULAR SURGERY)

## 2018-11-22 PROCEDURE — 99233 SBSQ HOSP IP/OBS HIGH 50: CPT | Performed by: THORACIC SURGERY (CARDIOTHORACIC VASCULAR SURGERY)

## 2018-11-22 PROCEDURE — 2580000003 HC RX 258: Performed by: INTERNAL MEDICINE

## 2018-11-22 PROCEDURE — 6360000002 HC RX W HCPCS: Performed by: INTERNAL MEDICINE

## 2018-11-22 PROCEDURE — 6370000000 HC RX 637 (ALT 250 FOR IP): Performed by: PHYSICIAN ASSISTANT

## 2018-11-22 PROCEDURE — 85025 COMPLETE CBC W/AUTO DIFF WBC: CPT

## 2018-11-22 PROCEDURE — 83735 ASSAY OF MAGNESIUM: CPT

## 2018-11-22 PROCEDURE — 2580000003 HC RX 258: Performed by: THORACIC SURGERY (CARDIOTHORACIC VASCULAR SURGERY)

## 2018-11-22 PROCEDURE — 2709999900 HC NON-CHARGEABLE SUPPLY

## 2018-11-22 PROCEDURE — 82948 REAGENT STRIP/BLOOD GLUCOSE: CPT

## 2018-11-22 PROCEDURE — 6370000000 HC RX 637 (ALT 250 FOR IP): Performed by: NURSE PRACTITIONER

## 2018-11-22 PROCEDURE — 71045 X-RAY EXAM CHEST 1 VIEW: CPT

## 2018-11-22 PROCEDURE — 94640 AIRWAY INHALATION TREATMENT: CPT

## 2018-11-22 PROCEDURE — 85730 THROMBOPLASTIN TIME PARTIAL: CPT

## 2018-11-22 PROCEDURE — 2060000000 HC ICU INTERMEDIATE R&B

## 2018-11-22 RX ORDER — FUROSEMIDE 40 MG/1
40 TABLET ORAL 2 TIMES DAILY
Status: DISCONTINUED | OUTPATIENT
Start: 2018-11-22 | End: 2018-11-24

## 2018-11-22 RX ORDER — FUROSEMIDE 40 MG/1
40 TABLET ORAL ONCE
Status: COMPLETED | OUTPATIENT
Start: 2018-11-22 | End: 2018-11-22

## 2018-11-22 RX ADMIN — ALTEPLASE 1 MG: 2.2 INJECTION, POWDER, LYOPHILIZED, FOR SOLUTION INTRAVENOUS at 11:58

## 2018-11-22 RX ADMIN — Medication 10 ML: at 22:08

## 2018-11-22 RX ADMIN — AMIODARONE HYDROCHLORIDE 200 MG: 200 TABLET ORAL at 22:06

## 2018-11-22 RX ADMIN — NYSTATIN 500000 UNITS: 500000 SUSPENSION ORAL at 08:17

## 2018-11-22 RX ADMIN — INSULIN LISPRO 3 UNITS: 100 INJECTION, SOLUTION INTRAVENOUS; SUBCUTANEOUS at 11:52

## 2018-11-22 RX ADMIN — FUROSEMIDE 40 MG: 40 TABLET ORAL at 18:06

## 2018-11-22 RX ADMIN — INSULIN LISPRO 3 UNITS: 100 INJECTION, SOLUTION INTRAVENOUS; SUBCUTANEOUS at 08:18

## 2018-11-22 RX ADMIN — ALTEPLASE 1 MG: 2.2 INJECTION, POWDER, LYOPHILIZED, FOR SOLUTION INTRAVENOUS at 11:57

## 2018-11-22 RX ADMIN — ALBUTEROL SULFATE 2.5 MG: 2.5 SOLUTION RESPIRATORY (INHALATION) at 08:34

## 2018-11-22 RX ADMIN — FUROSEMIDE 40 MG: 40 TABLET ORAL at 11:50

## 2018-11-22 RX ADMIN — CEFEPIME HYDROCHLORIDE 2 G: 2 INJECTION, POWDER, FOR SOLUTION INTRAVENOUS at 13:18

## 2018-11-22 RX ADMIN — ASPIRIN 325 MG: 325 TABLET ORAL at 08:17

## 2018-11-22 RX ADMIN — POTASSIUM CHLORIDE 20 MEQ: 20 TABLET, EXTENDED RELEASE ORAL at 18:06

## 2018-11-22 RX ADMIN — ATORVASTATIN CALCIUM 40 MG: 40 TABLET, FILM COATED ORAL at 22:06

## 2018-11-22 RX ADMIN — MULTIPLE VITAMINS W/ MINERALS TAB 1 TABLET: TAB at 08:17

## 2018-11-22 RX ADMIN — DEXTROSE MONOHYDRATE 2 MCG/KG/MIN: 50 INJECTION, SOLUTION INTRAVENOUS at 13:18

## 2018-11-22 RX ADMIN — METOPROLOL TARTRATE 12.5 MG: 25 TABLET ORAL at 22:06

## 2018-11-22 RX ADMIN — Medication 300 MG: at 08:47

## 2018-11-22 RX ADMIN — ACETAMINOPHEN 650 MG: 325 TABLET ORAL at 15:34

## 2018-11-22 RX ADMIN — ALBUTEROL SULFATE 2.5 MG: 2.5 SOLUTION RESPIRATORY (INHALATION) at 20:14

## 2018-11-22 RX ADMIN — NYSTATIN 500000 UNITS: 500000 SUSPENSION ORAL at 12:53

## 2018-11-22 RX ADMIN — POTASSIUM CHLORIDE 20 MEQ: 20 TABLET, EXTENDED RELEASE ORAL at 08:17

## 2018-11-22 RX ADMIN — Medication 300 MG: at 20:15

## 2018-11-22 RX ADMIN — INSULIN LISPRO 2 UNITS: 100 INJECTION, SOLUTION INTRAVENOUS; SUBCUTANEOUS at 22:09

## 2018-11-22 RX ADMIN — POTASSIUM CHLORIDE 20 MEQ: 20 TABLET, EXTENDED RELEASE ORAL at 12:53

## 2018-11-22 RX ADMIN — AMIODARONE HYDROCHLORIDE 200 MG: 200 TABLET ORAL at 08:17

## 2018-11-22 RX ADMIN — ACETAMINOPHEN 650 MG: 325 TABLET ORAL at 07:51

## 2018-11-22 RX ADMIN — ENOXAPARIN SODIUM 40 MG: 40 INJECTION SUBCUTANEOUS at 08:21

## 2018-11-22 RX ADMIN — METOPROLOL TARTRATE 12.5 MG: 25 TABLET ORAL at 08:16

## 2018-11-22 RX ADMIN — DOCUSATE SODIUM 100 MG: 100 CAPSULE, LIQUID FILLED ORAL at 22:12

## 2018-11-22 ASSESSMENT — PAIN SCALES - GENERAL
PAINLEVEL_OUTOF10: 2
PAINLEVEL_OUTOF10: 2
PAINLEVEL_OUTOF10: 6
PAINLEVEL_OUTOF10: 0
PAINLEVEL_OUTOF10: 2
PAINLEVEL_OUTOF10: 5
PAINLEVEL_OUTOF10: 0
PAINLEVEL_OUTOF10: 3

## 2018-11-22 ASSESSMENT — PAIN DESCRIPTION - PROGRESSION: CLINICAL_PROGRESSION: NOT CHANGED

## 2018-11-22 NOTE — PLAN OF CARE
Problem: Pain:  Goal: Pain level will decrease  Pain level will decrease   Outcome: Ongoing  Pain Assessment: 0-10  Pain Level: 5   Pain goal:  4  Is pain goal met at this time? Yes  Pain Intervention(s): Medication (see eMar)  Additional interventions to be implemented: medications tylenol and position change  Goal: Control of acute pain  Control of acute pain   Outcome: Completed Date Met: 11/22/18      Problem: Falls - Risk of:  Goal: Will remain free from falls  Will remain free from falls   Outcome: Ongoing  On fall precautions. Alert and orient and uses call light appropriately  Goal: Absence of physical injury  Absence of physical injury   Outcome: Completed Date Met: 11/22/18      Problem: Discharge Planning:  Goal: Discharged to appropriate level of care  Discharged to appropriate level of care   Outcome: Ongoing  Patient plans to go to IP Rehab on Friday, if accepted. Will continue to assess for additional needs. Problem: Nutrition  Goal: Optimal nutrition therapy  Outcome: Met This Shift      Problem: Musculor/Skeletal Functional Status  Goal: Highest potential functional level  Outcome: Ongoing  Patient ambulates one assist to the bathroom and to ambulate in room. Patient requires one assist to complete ADLs. Problem: HEMODYNAMIC STATUS  Goal: Patient has stable vital signs and fluid balance  Outcome: Met This Shift      Problem: OXYGENATION/RESPIRATORY FUNCTION  Goal: Patient will maintain patent airway  Outcome: Met This Shift    Goal: Patient will achieve/maintain normal respiratory rate/effort  Respiratory rate and effort will be within normal limits for the patient   Outcome: Met This Shift      Problem: Risk for Impaired Skin Integrity  Goal: Tissue integrity - skin and mucous membranes  Structural intactness and normal physiological function of skin and  mucous membranes. Outcome: Ongoing  Patient turns self independently.  Patient taught to change position frequently to prevent

## 2018-11-22 NOTE — PLAN OF CARE
Activity Intolerance:  Goal: Able to perform prescribed physical activity  Able to perform prescribed physical activity   Outcome: Ongoing  Patient ambulates in room and to bathroom one assist and requires one assist to complete ADLs. Will continue to monitor. Problem: Cardiac Output - Decreased:  Goal: Hemodynamic stability will improve  Hemodynamic stability will improve   Outcome: Ongoing  Blood pressure within normal limits this shift. Patient displays no bleeding abnormalities. Monitoring labs frequently for any abnormalities. Capillary refill less than 3 seconds. Skin turgor less than 3 seconds. Pulses palpable. Problem: Fluid Volume - Imbalance:  Goal: Ability to achieve a balanced intake and output will improve  Ability to achieve a balanced intake and output will improve   Outcome: Ongoing  Patient free of signs and symptoms of imbalanced fluid throughout shift. Intake and output completed every 8 hours. Problem: Gas Exchange - Impaired:  Goal: Levels of oxygenation will improve  Levels of oxygenation will improve   Outcome: Ongoing  Patient is on room air at this time. Patients oxygen saturation maintains above 92% at this time. Pt has displayed no signs or symptoms of hypoxia throughout shift. Will continue to monitor. Problem: Tissue Perfusion - Cardiopulmonary, Altered:  Goal: Will show no evidence of cardiac arrhythmias  Will show no evidence of cardiac arrhythmias   Outcome: Ongoing  Patient remains in sinus rhythm. PO Amiodarone on. Will continue to monitor. Problem: Risk for Impaired Skin Integrity  Goal: Tissue integrity - skin and mucous membranes  Structural intactness and normal physiological function of skin and  mucous membranes. Outcome: Ongoing  Patient turns self independently. Patient taught to change position frequently to prevent breakdown. No redness noted on pressure points such as elbows, back of head, heels, shoulder blades, or coccyx at this time.  Will continue to monitor. Comments: Care plan reviewed with patient. Patient verbalizes understanding of the plan of care and contributes to goal setting.

## 2018-11-23 ENCOUNTER — APPOINTMENT (OUTPATIENT)
Dept: GENERAL RADIOLOGY | Age: 73
DRG: 219 | End: 2018-11-23
Payer: MEDICARE

## 2018-11-23 LAB
ANION GAP SERPL CALCULATED.3IONS-SCNC: 12 MEQ/L (ref 8–16)
APTT: 69.5 SECONDS (ref 22–38)
APTT: 78.2 SECONDS (ref 22–38)
BASOPHILS # BLD: 0.5 %
BASOPHILS ABSOLUTE: 0 THOU/MM3 (ref 0–0.1)
BUN BLDV-MCNC: 31 MG/DL (ref 7–22)
CALCIUM SERPL-MCNC: 8.5 MG/DL (ref 8.5–10.5)
CHLORIDE BLD-SCNC: 105 MEQ/L (ref 98–111)
CO2: 24 MEQ/L (ref 23–33)
CREAT SERPL-MCNC: 1.3 MG/DL (ref 0.4–1.2)
EOSINOPHIL # BLD: 2.7 %
EOSINOPHILS ABSOLUTE: 0.1 THOU/MM3 (ref 0–0.4)
ERYTHROCYTE [DISTWIDTH] IN BLOOD BY AUTOMATED COUNT: 15.9 % (ref 11.5–14.5)
ERYTHROCYTE [DISTWIDTH] IN BLOOD BY AUTOMATED COUNT: 56.3 FL (ref 35–45)
GFR SERPL CREATININE-BSD FRML MDRD: 54 ML/MIN/1.73M2
GLUCOSE BLD-MCNC: 137 MG/DL (ref 70–108)
GLUCOSE BLD-MCNC: 141 MG/DL (ref 70–108)
GLUCOSE BLD-MCNC: 248 MG/DL (ref 70–108)
GLUCOSE BLD-MCNC: 284 MG/DL (ref 70–108)
GLUCOSE BLD-MCNC: 87 MG/DL (ref 70–108)
HCT VFR BLD CALC: 28.3 % (ref 42–52)
HEMOGLOBIN: 8.7 GM/DL (ref 14–18)
IMMATURE GRANS (ABS): 0.03 THOU/MM3 (ref 0–0.07)
IMMATURE GRANULOCYTES: 0.8 %
LYMPHOCYTES # BLD: 22.4 %
LYMPHOCYTES ABSOLUTE: 0.8 THOU/MM3 (ref 1–4.8)
MAGNESIUM: 2 MG/DL (ref 1.6–2.4)
MCH RBC QN AUTO: 29.8 PG (ref 26–33)
MCHC RBC AUTO-ENTMCNC: 30.7 GM/DL (ref 32.2–35.5)
MCV RBC AUTO: 96.9 FL (ref 80–94)
MONOCYTES # BLD: 11.6 %
MONOCYTES ABSOLUTE: 0.4 THOU/MM3 (ref 0.4–1.3)
NUCLEATED RED BLOOD CELLS: 0 /100 WBC
PLATELET # BLD: 38 THOU/MM3 (ref 130–400)
PMV BLD AUTO: 11.9 FL (ref 9.4–12.4)
POTASSIUM SERPL-SCNC: 4 MEQ/L (ref 3.5–5.2)
RBC # BLD: 2.92 MILL/MM3 (ref 4.7–6.1)
SEG NEUTROPHILS: 62 %
SEGMENTED NEUTROPHILS ABSOLUTE COUNT: 2.3 THOU/MM3 (ref 1.8–7.7)
SODIUM BLD-SCNC: 141 MEQ/L (ref 135–145)
WBC # BLD: 3.7 THOU/MM3 (ref 4.8–10.8)

## 2018-11-23 PROCEDURE — 83735 ASSAY OF MAGNESIUM: CPT

## 2018-11-23 PROCEDURE — 2060000000 HC ICU INTERMEDIATE R&B

## 2018-11-23 PROCEDURE — 6370000000 HC RX 637 (ALT 250 FOR IP): Performed by: PHYSICIAN ASSISTANT

## 2018-11-23 PROCEDURE — 80048 BASIC METABOLIC PNL TOTAL CA: CPT

## 2018-11-23 PROCEDURE — 6360000002 HC RX W HCPCS: Performed by: INTERNAL MEDICINE

## 2018-11-23 PROCEDURE — APPSS30 APP SPLIT SHARED TIME 16-30 MINUTES: Performed by: PHYSICIAN ASSISTANT

## 2018-11-23 PROCEDURE — 2580000003 HC RX 258: Performed by: INTERNAL MEDICINE

## 2018-11-23 PROCEDURE — 6360000002 HC RX W HCPCS: Performed by: THORACIC SURGERY (CARDIOTHORACIC VASCULAR SURGERY)

## 2018-11-23 PROCEDURE — 2709999900 HC NON-CHARGEABLE SUPPLY

## 2018-11-23 PROCEDURE — 97110 THERAPEUTIC EXERCISES: CPT

## 2018-11-23 PROCEDURE — 36415 COLL VENOUS BLD VENIPUNCTURE: CPT

## 2018-11-23 PROCEDURE — 94640 AIRWAY INHALATION TREATMENT: CPT

## 2018-11-23 PROCEDURE — 97116 GAIT TRAINING THERAPY: CPT

## 2018-11-23 PROCEDURE — 6370000000 HC RX 637 (ALT 250 FOR IP): Performed by: THORACIC SURGERY (CARDIOTHORACIC VASCULAR SURGERY)

## 2018-11-23 PROCEDURE — 85730 THROMBOPLASTIN TIME PARTIAL: CPT

## 2018-11-23 PROCEDURE — 97535 SELF CARE MNGMENT TRAINING: CPT

## 2018-11-23 PROCEDURE — 36592 COLLECT BLOOD FROM PICC: CPT

## 2018-11-23 PROCEDURE — 82948 REAGENT STRIP/BLOOD GLUCOSE: CPT

## 2018-11-23 PROCEDURE — 85025 COMPLETE CBC W/AUTO DIFF WBC: CPT

## 2018-11-23 PROCEDURE — 2580000003 HC RX 258: Performed by: THORACIC SURGERY (CARDIOTHORACIC VASCULAR SURGERY)

## 2018-11-23 PROCEDURE — 71046 X-RAY EXAM CHEST 2 VIEWS: CPT

## 2018-11-23 RX ADMIN — DOCUSATE SODIUM 100 MG: 100 CAPSULE, LIQUID FILLED ORAL at 09:22

## 2018-11-23 RX ADMIN — METOPROLOL TARTRATE 12.5 MG: 25 TABLET ORAL at 19:41

## 2018-11-23 RX ADMIN — FUROSEMIDE 40 MG: 40 TABLET ORAL at 16:59

## 2018-11-23 RX ADMIN — INSULIN LISPRO 3 UNITS: 100 INJECTION, SOLUTION INTRAVENOUS; SUBCUTANEOUS at 19:41

## 2018-11-23 RX ADMIN — ATORVASTATIN CALCIUM 40 MG: 40 TABLET, FILM COATED ORAL at 19:41

## 2018-11-23 RX ADMIN — Medication 10 ML: at 20:22

## 2018-11-23 RX ADMIN — ALBUTEROL SULFATE 2.5 MG: 2.5 SOLUTION RESPIRATORY (INHALATION) at 17:43

## 2018-11-23 RX ADMIN — DEXTROSE MONOHYDRATE 2 MCG/KG/MIN: 50 INJECTION, SOLUTION INTRAVENOUS at 12:40

## 2018-11-23 RX ADMIN — ACETAMINOPHEN 650 MG: 325 TABLET ORAL at 01:54

## 2018-11-23 RX ADMIN — AMIODARONE HYDROCHLORIDE 200 MG: 200 TABLET ORAL at 09:22

## 2018-11-23 RX ADMIN — ALBUTEROL SULFATE 2.5 MG: 2.5 SOLUTION RESPIRATORY (INHALATION) at 22:07

## 2018-11-23 RX ADMIN — ALBUTEROL SULFATE 2.5 MG: 2.5 SOLUTION RESPIRATORY (INHALATION) at 14:02

## 2018-11-23 RX ADMIN — METOPROLOL TARTRATE 12.5 MG: 25 TABLET ORAL at 09:23

## 2018-11-23 RX ADMIN — POTASSIUM CHLORIDE 20 MEQ: 20 TABLET, EXTENDED RELEASE ORAL at 16:58

## 2018-11-23 RX ADMIN — CEFEPIME HYDROCHLORIDE 2 G: 2 INJECTION, POWDER, FOR SOLUTION INTRAVENOUS at 11:40

## 2018-11-23 RX ADMIN — MULTIPLE VITAMINS W/ MINERALS TAB 1 TABLET: TAB at 09:22

## 2018-11-23 RX ADMIN — ACETAMINOPHEN 650 MG: 325 TABLET ORAL at 09:26

## 2018-11-23 RX ADMIN — POTASSIUM CHLORIDE 20 MEQ: 20 TABLET, EXTENDED RELEASE ORAL at 09:26

## 2018-11-23 RX ADMIN — AMIODARONE HYDROCHLORIDE 200 MG: 200 TABLET ORAL at 19:40

## 2018-11-23 RX ADMIN — INSULIN LISPRO 9 UNITS: 100 INJECTION, SOLUTION INTRAVENOUS; SUBCUTANEOUS at 12:19

## 2018-11-23 RX ADMIN — POTASSIUM CHLORIDE 20 MEQ: 20 TABLET, EXTENDED RELEASE ORAL at 12:18

## 2018-11-23 RX ADMIN — ACETAMINOPHEN 650 MG: 325 TABLET ORAL at 13:57

## 2018-11-23 RX ADMIN — ALBUTEROL SULFATE 2.5 MG: 2.5 SOLUTION RESPIRATORY (INHALATION) at 10:07

## 2018-11-23 RX ADMIN — FUROSEMIDE 40 MG: 40 TABLET ORAL at 09:23

## 2018-11-23 RX ADMIN — ACETAMINOPHEN 650 MG: 325 TABLET ORAL at 18:25

## 2018-11-23 ASSESSMENT — PAIN DESCRIPTION - ONSET
ONSET: ON-GOING
ONSET: ON-GOING

## 2018-11-23 ASSESSMENT — PAIN DESCRIPTION - DESCRIPTORS
DESCRIPTORS: ACHING

## 2018-11-23 ASSESSMENT — PAIN SCALES - GENERAL
PAINLEVEL_OUTOF10: 5
PAINLEVEL_OUTOF10: 3
PAINLEVEL_OUTOF10: 5
PAINLEVEL_OUTOF10: 5
PAINLEVEL_OUTOF10: 4
PAINLEVEL_OUTOF10: 2
PAINLEVEL_OUTOF10: 5
PAINLEVEL_OUTOF10: 4
PAINLEVEL_OUTOF10: 5

## 2018-11-23 ASSESSMENT — PAIN DESCRIPTION - PROGRESSION
CLINICAL_PROGRESSION: NOT CHANGED

## 2018-11-23 ASSESSMENT — PAIN DESCRIPTION - ORIENTATION
ORIENTATION: MID

## 2018-11-23 ASSESSMENT — PAIN DESCRIPTION - FREQUENCY
FREQUENCY: INTERMITTENT

## 2018-11-23 ASSESSMENT — PAIN DESCRIPTION - PAIN TYPE
TYPE: SURGICAL PAIN

## 2018-11-23 ASSESSMENT — PAIN DESCRIPTION - LOCATION
LOCATION: STERNUM
LOCATION: STERNUM;INCISION

## 2018-11-23 NOTE — PROGRESS NOTES
lower lobes due to Pseudomonas species ([facility] [address]    Acquired thrombocytopenia ([facility] [address]       Plan: 11/23/18  1. CXR reviewed- Continue daily CXR's. Stable appearance. 2. Continue argatroban and then transition to coumadin. Plt count down from 41K to 38K. 3. Cr up to 1.3-Continue diuresis. The plan of care was discussed in detail with Dr. Milka Mcelroy.      Julissa Robledo PA-C

## 2018-11-23 NOTE — PROGRESS NOTES
pending progress anticipate pt will need an inpatient therapy stay prior to home )    Patient Education:  Patient Education: Bed mobility. Sternal precautions.  POC    Equipment Recommendations:  Equipment Needed: No  Other: continue to assess    Safety:  Type of devices: Call light within reach, Left in bed    Plan:  Times per week: 6x CABG  Times per day: Daily  Specific instructions for Next Treatment: therex and mobility  Current Treatment Recommendations: Strengthening, Functional Mobility Training, Transfer Training, Balance Training, Endurance Training, Gait Training, Stair training, Patient/Caregiver Education & Training, Safety Education & Training, Home Exercise Program, Equipment Evaluation, Education, & procurement    Goals:  Patient goals : Get stronger    Short term goals  Time Frame for Short term goals: 2 weeks  Short term goal 1: bed mobility with Min A to get in/out of bed  Short term goal 2: transfer with CGA to get in/out of chairs  Short term goal 3: amb 48' with/without AD and CGA to walk safely in home  Short term goal 4: negotiate 4 steps with HRs and MOD I to enter home safely    Long term goals  Time Frame for Long term goals : no LTGs set secondary to short ELOS

## 2018-11-24 ENCOUNTER — APPOINTMENT (OUTPATIENT)
Dept: GENERAL RADIOLOGY | Age: 73
DRG: 219 | End: 2018-11-24
Payer: MEDICARE

## 2018-11-24 LAB
ANION GAP SERPL CALCULATED.3IONS-SCNC: 13 MEQ/L (ref 8–16)
APTT: 61.4 SECONDS (ref 22–38)
APTT: 68.5 SECONDS (ref 22–38)
BASOPHILS # BLD: 0.8 %
BASOPHILS ABSOLUTE: 0 THOU/MM3 (ref 0–0.1)
BUN BLDV-MCNC: 29 MG/DL (ref 7–22)
CALCIUM SERPL-MCNC: 8.7 MG/DL (ref 8.5–10.5)
CHLORIDE BLD-SCNC: 106 MEQ/L (ref 98–111)
CO2: 23 MEQ/L (ref 23–33)
CREAT SERPL-MCNC: 1.1 MG/DL (ref 0.4–1.2)
EOSINOPHIL # BLD: 2.7 %
EOSINOPHILS ABSOLUTE: 0.1 THOU/MM3 (ref 0–0.4)
ERYTHROCYTE [DISTWIDTH] IN BLOOD BY AUTOMATED COUNT: 15.9 % (ref 11.5–14.5)
ERYTHROCYTE [DISTWIDTH] IN BLOOD BY AUTOMATED COUNT: 55 FL (ref 35–45)
GFR SERPL CREATININE-BSD FRML MDRD: 66 ML/MIN/1.73M2
GLUCOSE BLD-MCNC: 117 MG/DL (ref 70–108)
GLUCOSE BLD-MCNC: 126 MG/DL (ref 70–108)
GLUCOSE BLD-MCNC: 137 MG/DL (ref 70–108)
GLUCOSE BLD-MCNC: 138 MG/DL (ref 70–108)
GLUCOSE BLD-MCNC: 144 MG/DL (ref 70–108)
GLUCOSE BLD-MCNC: 161 MG/DL (ref 70–108)
HCT VFR BLD CALC: 32.9 % (ref 42–52)
HEMOGLOBIN: 10.3 GM/DL (ref 14–18)
IMMATURE GRANS (ABS): 0.01 THOU/MM3 (ref 0–0.07)
IMMATURE GRANULOCYTES: 0.3 %
LYMPHOCYTES # BLD: 21.5 %
LYMPHOCYTES ABSOLUTE: 0.8 THOU/MM3 (ref 1–4.8)
MAGNESIUM: 2 MG/DL (ref 1.6–2.4)
MCH RBC QN AUTO: 29.9 PG (ref 26–33)
MCHC RBC AUTO-ENTMCNC: 31.3 GM/DL (ref 32.2–35.5)
MCV RBC AUTO: 95.6 FL (ref 80–94)
MONOCYTES # BLD: 9.3 %
MONOCYTES ABSOLUTE: 0.3 THOU/MM3 (ref 0.4–1.3)
NUCLEATED RED BLOOD CELLS: 0 /100 WBC
PLATELET # BLD: 45 THOU/MM3 (ref 130–400)
PMV BLD AUTO: 11.1 FL (ref 9.4–12.4)
POTASSIUM SERPL-SCNC: 4.5 MEQ/L (ref 3.5–5.2)
RBC # BLD: 3.44 MILL/MM3 (ref 4.7–6.1)
SEG NEUTROPHILS: 65.4 %
SEGMENTED NEUTROPHILS ABSOLUTE COUNT: 2.4 THOU/MM3 (ref 1.8–7.7)
SODIUM BLD-SCNC: 142 MEQ/L (ref 135–145)
WBC # BLD: 3.7 THOU/MM3 (ref 4.8–10.8)

## 2018-11-24 PROCEDURE — 6370000000 HC RX 637 (ALT 250 FOR IP): Performed by: THORACIC SURGERY (CARDIOTHORACIC VASCULAR SURGERY)

## 2018-11-24 PROCEDURE — 2060000000 HC ICU INTERMEDIATE R&B

## 2018-11-24 PROCEDURE — 99232 SBSQ HOSP IP/OBS MODERATE 35: CPT | Performed by: THORACIC SURGERY (CARDIOTHORACIC VASCULAR SURGERY)

## 2018-11-24 PROCEDURE — 6360000002 HC RX W HCPCS: Performed by: THORACIC SURGERY (CARDIOTHORACIC VASCULAR SURGERY)

## 2018-11-24 PROCEDURE — 85730 THROMBOPLASTIN TIME PARTIAL: CPT

## 2018-11-24 PROCEDURE — 6370000000 HC RX 637 (ALT 250 FOR IP): Performed by: PHYSICIAN ASSISTANT

## 2018-11-24 PROCEDURE — 94640 AIRWAY INHALATION TREATMENT: CPT

## 2018-11-24 PROCEDURE — 2580000003 HC RX 258: Performed by: THORACIC SURGERY (CARDIOTHORACIC VASCULAR SURGERY)

## 2018-11-24 PROCEDURE — 97110 THERAPEUTIC EXERCISES: CPT

## 2018-11-24 PROCEDURE — 2709999900 HC NON-CHARGEABLE SUPPLY

## 2018-11-24 PROCEDURE — 94760 N-INVAS EAR/PLS OXIMETRY 1: CPT

## 2018-11-24 PROCEDURE — 85025 COMPLETE CBC W/AUTO DIFF WBC: CPT

## 2018-11-24 PROCEDURE — 83735 ASSAY OF MAGNESIUM: CPT

## 2018-11-24 PROCEDURE — 97116 GAIT TRAINING THERAPY: CPT

## 2018-11-24 PROCEDURE — 80048 BASIC METABOLIC PNL TOTAL CA: CPT

## 2018-11-24 PROCEDURE — 6360000002 HC RX W HCPCS: Performed by: INTERNAL MEDICINE

## 2018-11-24 PROCEDURE — 71046 X-RAY EXAM CHEST 2 VIEWS: CPT

## 2018-11-24 PROCEDURE — 36415 COLL VENOUS BLD VENIPUNCTURE: CPT

## 2018-11-24 PROCEDURE — 2580000003 HC RX 258: Performed by: INTERNAL MEDICINE

## 2018-11-24 PROCEDURE — 36592 COLLECT BLOOD FROM PICC: CPT

## 2018-11-24 PROCEDURE — 82948 REAGENT STRIP/BLOOD GLUCOSE: CPT

## 2018-11-24 RX ORDER — FUROSEMIDE 20 MG/1
20 TABLET ORAL 2 TIMES DAILY
Status: DISCONTINUED | OUTPATIENT
Start: 2018-11-24 | End: 2018-11-26

## 2018-11-24 RX ADMIN — METOPROLOL TARTRATE 12.5 MG: 25 TABLET ORAL at 19:30

## 2018-11-24 RX ADMIN — POTASSIUM CHLORIDE 20 MEQ: 20 TABLET, EXTENDED RELEASE ORAL at 09:00

## 2018-11-24 RX ADMIN — AMIODARONE HYDROCHLORIDE 200 MG: 200 TABLET ORAL at 09:00

## 2018-11-24 RX ADMIN — ALBUTEROL SULFATE 2.5 MG: 2.5 SOLUTION RESPIRATORY (INHALATION) at 13:07

## 2018-11-24 RX ADMIN — CEFEPIME HYDROCHLORIDE 2 G: 2 INJECTION, POWDER, FOR SOLUTION INTRAVENOUS at 11:13

## 2018-11-24 RX ADMIN — AMIODARONE HYDROCHLORIDE 200 MG: 200 TABLET ORAL at 19:29

## 2018-11-24 RX ADMIN — FUROSEMIDE 20 MG: 20 TABLET ORAL at 16:10

## 2018-11-24 RX ADMIN — ACETAMINOPHEN 650 MG: 325 TABLET ORAL at 04:04

## 2018-11-24 RX ADMIN — POTASSIUM CHLORIDE 20 MEQ: 20 TABLET, EXTENDED RELEASE ORAL at 16:10

## 2018-11-24 RX ADMIN — ACETAMINOPHEN 650 MG: 325 TABLET ORAL at 19:30

## 2018-11-24 RX ADMIN — Medication 10 ML: at 19:30

## 2018-11-24 RX ADMIN — METOPROLOL TARTRATE 12.5 MG: 25 TABLET ORAL at 09:00

## 2018-11-24 RX ADMIN — POTASSIUM CHLORIDE 20 MEQ: 20 TABLET, EXTENDED RELEASE ORAL at 11:13

## 2018-11-24 RX ADMIN — ACETAMINOPHEN 650 MG: 325 TABLET ORAL at 09:19

## 2018-11-24 RX ADMIN — ATORVASTATIN CALCIUM 40 MG: 40 TABLET, FILM COATED ORAL at 19:29

## 2018-11-24 RX ADMIN — ALBUTEROL SULFATE 2.5 MG: 2.5 SOLUTION RESPIRATORY (INHALATION) at 16:55

## 2018-11-24 RX ADMIN — ALBUTEROL SULFATE 2.5 MG: 2.5 SOLUTION RESPIRATORY (INHALATION) at 09:55

## 2018-11-24 RX ADMIN — DEXTROSE MONOHYDRATE 2 MCG/KG/MIN: 50 INJECTION, SOLUTION INTRAVENOUS at 13:25

## 2018-11-24 RX ADMIN — DOCUSATE SODIUM 100 MG: 100 CAPSULE, LIQUID FILLED ORAL at 08:59

## 2018-11-24 RX ADMIN — Medication 10 ML: at 09:02

## 2018-11-24 RX ADMIN — ALBUTEROL SULFATE 2.5 MG: 2.5 SOLUTION RESPIRATORY (INHALATION) at 21:31

## 2018-11-24 RX ADMIN — INSULIN LISPRO 3 UNITS: 100 INJECTION, SOLUTION INTRAVENOUS; SUBCUTANEOUS at 11:14

## 2018-11-24 RX ADMIN — FUROSEMIDE 40 MG: 40 TABLET ORAL at 09:00

## 2018-11-24 RX ADMIN — MULTIPLE VITAMINS W/ MINERALS TAB 1 TABLET: TAB at 09:00

## 2018-11-24 ASSESSMENT — PAIN SCALES - GENERAL
PAINLEVEL_OUTOF10: 4
PAINLEVEL_OUTOF10: 3

## 2018-11-24 ASSESSMENT — PAIN DESCRIPTION - ONSET: ONSET: ON-GOING

## 2018-11-24 ASSESSMENT — PAIN DESCRIPTION - DESCRIPTORS: DESCRIPTORS: ACHING

## 2018-11-24 ASSESSMENT — PAIN DESCRIPTION - PROGRESSION
CLINICAL_PROGRESSION: NOT CHANGED
CLINICAL_PROGRESSION: NOT CHANGED

## 2018-11-24 ASSESSMENT — PAIN DESCRIPTION - PAIN TYPE: TYPE: SURGICAL PAIN

## 2018-11-24 ASSESSMENT — PAIN DESCRIPTION - FREQUENCY: FREQUENCY: INTERMITTENT

## 2018-11-24 ASSESSMENT — PAIN DESCRIPTION - LOCATION: LOCATION: STERNUM

## 2018-11-24 ASSESSMENT — PAIN DESCRIPTION - ORIENTATION: ORIENTATION: MID

## 2018-11-24 NOTE — PLAN OF CARE
Problem: Pain:  Goal: Pain level will decrease  Pain level will decrease   Outcome: Met This Shift  Patient states pain is very manageable, giving it a 1-2 on a 0-10 scale. Patient refusing pain medication and states that just repositioning made him comfortable. Will monitor pain with hourly rounding. Problem: Falls - Risk of:  Goal: Will remain free from falls  Will remain free from falls   Outcome: Met This Shift  No falls noted this shift. Bed in lowest position with wheels locked in lowest height. Call light in reach. Patient oriented to own ability and calls for assistance before ambulating. Patient up with 1 assist/walker/gait belt. Patient gait slow, but steady. Bed and chair alarm on for patient safety. Problem: Discharge Planning:  Goal: Discharged to appropriate level of care  Discharged to appropriate level of care   Outcome: Met This Shift  Plan is Inpatient rehab vs home with home health and wife. Waiting for patient's platelet levels to increase before discharge per the physician discretion. Problem: Nutrition  Goal: Optimal nutrition therapy  Outcome: Met This Shift  Patient eating appropriately. States he does not have much of an appetite. Patient encouraged to drink Glucerna shakes. Problem: HEMODYNAMIC STATUS  Goal: Patient has stable vital signs and fluid balance  Outcome: Met This Shift  Vitals assessed q4 hours and PRN. Patient on continuous telemetry, NSR on monitor. Problem: Activity Intolerance:  Goal: Able to perform prescribed physical activity  Able to perform prescribed physical activity   Outcome: Met This Shift  Patient up and walking with therapy this AM and so far has walked once with this RN. Patient refused to walk the first two times I asked him, wife encouraged him to and walked approx 25 feet with me. Will attempt another walk this evening.      Problem: Fluid Volume - Imbalance:  Goal: Chest tube drainage is within specified parameters  Chest tube drainage is

## 2018-11-24 NOTE — PROGRESS NOTES
[address] ICU STEPDOWN TELEMETRY 4K - 4K-13/013-A    Time In: 0740  Time Out: 0805  Timed Code Treatment Minutes: 25 Minutes  Minutes: 25          Date: 2018  Patient Name: Bushra Mckeon,  Gender:  male        MRN: [id number]  : 1945  (72 y.o.)     Referring Practitioner: Dr. Lemuel Gan  Diagnosis: chest pain  Additional Pertinent Hx: underwent an aortic valve replacement and mitral valve repair and CABG x 2 on 11-15 ,extubated on      Past Medical History:   Diagnosis Date    Acute on chronic systolic CHF (congestive heart failure), NYHA class 3 ([facility] EF 25-30% 2018    CAD (coronary artery disease)     CKD (chronic kidney disease) stage 3, GFR 30-59 ml/min ([facility] 2015    Diabetes mellitus ([facility]     HTN (hypertension), benign     Hyperlipidemia     Hypothyroidism     Mitral and aortic incompetence     Neuropathy     Positive blood culture     Being treated with rocephhin as outpt.     S/P CABG x 2 11/15/2018     Past Surgical History:   Procedure Laterality Date    CARDIAC SURGERY      heart cath    CATARACT REMOVAL WITH IMPLANT Bilateral     COLONOSCOPY      EYE SURGERY      [place] CABG, VEIN, THREE N/A 2018    CABG CORONARY ARTERY BYPASS,  AORTIC AND MITRAL VALVE REPAIR WITH MICHELE, BALLOON PUMP INSERTION performed by Tory Hansen MD at [address] ECHO TRANSESOPHAG R-T 2D IMG ACQUISJ I&R ONLY Left 10/25/2018    TRANSESOPHAGEAL ECHOCARDIOGRAM performed by Zurdo Dumont MD at [facility]       Restrictions/Precautions:  General Precautions                    Sternal Precautions: No Pushing, No Pulling, 5# Lifting Restrictions  Sternal Precautions: CABG x 2, aortic valve replacement and mitral valve repair 11-15  Other position/activity restrictions: O2       Prior Level of Function:  ADL Assistance: Independent  Homemaking Assistance: Needs assistance (wife completes most, able to do yardwork, has a son that helps)  Ambulation Assistance: Independent  Transfer Assistance: Independent       Subjective:     Subjective: Pt pleasant and cooeprative. Motivated     Pain:   .      only with coughing     Social/Functional:  Lives With: Spouse  Type of Home: House  Home Layout: One level  Home Access: Stairs to enter with rails  Entrance Stairs - Number of Steps: 4  Entrance Stairs - Rails: Both  Home Equipment: Rolling walker, Cane (cane intermittently for longer trips ie: grocery shopping)     Objective:       Transfers  Sit to Stand: Minimal Assistance  Stand to sit: Contact guard assistance       Ambulation 1  Surface: level tile  Device: Rolling Walker  Assistance: Contact guard assistance  Quality of Gait: slow but steady; cues for foot clearance, pt fatigues quickly ; heavily ext roatated feet pt reports has always been that way ; lacking TKE   Distance: approx 95 fet x 1        Exercises:  Exercises  Comments: seated PHase 2 CABG therex AP, LAQ, marches, UTR, shoulder rolls and shoulder ABD/ADD all x 10 reps each for improved ROM and bloodlfow with healing, pt located pts HEP HO and put in in reach of pt, reviewed it and added LAQ to bottom, urged pt to perfrom on own 3-5 times per day                Activity Tolerance:  Activity Tolerance: Patient Tolerated treatment well    Assessment: Body structures, Functions, Activity limitations: Decreased functional mobility , Decreased strength, Decreased endurance, Decreased balance  Assessment: rec cont skilled therapy for improved functioanl endurance and mobility   Prognosis: Excellent     REQUIRES PT FOLLOW UP: Yes    Discharge Recommendations:  Discharge Recommendations:  (recommend physiatry consult, pending progress anticipate pt will need an inpatient therapy stay prior to home )    Patient Education:  Patient Education: HEP     Equipment Recommendations:  Equipment Needed: No  Other: continue to assess    Safety:  Type of devices:  All fall risk precautions in place, Left in chair, Call light within reach, Chair alarm in place    Plan:  Times per week: 6x CABG  Times per day: Daily  Specific instructions for Next Treatment: therex and mobility  Current Treatment Recommendations: Strengthening, Functional Mobility Training, Transfer Training, Balance Training, Endurance Training, Gait Training, Stair training, Patient/Caregiver Education & Training, Safety Education & Training, Home Exercise Program, Equipment Evaluation, Education, & procurement    Goals:  Patient goals : Get stronger    Short term goals  Time Frame for Short term goals: 2 weeks  Short term goal 1: bed mobility with Min A to get in/out of bed  Short term goal 2: transfer with CGA to get in/out of chairs  Short term goal 3: amb 48' with/without AD and CGA to walk safely in home  Short term goal 4: negotiate 4 steps with HRs and MOD I to enter home safely    Long term goals  Time Frame for Long term goals : no LTGs set secondary to short ELOS

## 2018-11-24 NOTE — PLAN OF CARE
weakness, unsteady gait, surgical pain, IV medications. Comments: Care plan reviewed with patient and wife. Patient and wife verbalize understanding of the plan of care and contribute to goal setting.

## 2018-11-24 NOTE — PROGRESS NOTES
Patient and wife expressing frustration that they don't understand what is going on. Wife has asked multiple times what Argatroban is for. Wife and patient redirected to medication handout that I have provided for them earlier in the day. Explained HIT to both multiple times but unable to find handout on Samuel, so HIT discharge instruction handout provided.

## 2018-11-25 ENCOUNTER — APPOINTMENT (OUTPATIENT)
Dept: GENERAL RADIOLOGY | Age: 73
DRG: 219 | End: 2018-11-25
Payer: MEDICARE

## 2018-11-25 LAB
ANION GAP SERPL CALCULATED.3IONS-SCNC: 12 MEQ/L (ref 8–16)
APTT: 57.7 SECONDS (ref 22–38)
APTT: 64.2 SECONDS (ref 22–38)
APTT: 72.2 SECONDS (ref 22–38)
BASOPHILS # BLD: 0.6 %
BASOPHILS ABSOLUTE: 0 THOU/MM3 (ref 0–0.1)
BUN BLDV-MCNC: 27 MG/DL (ref 7–22)
CALCIUM SERPL-MCNC: 9 MG/DL (ref 8.5–10.5)
CHLORIDE BLD-SCNC: 104 MEQ/L (ref 98–111)
CO2: 24 MEQ/L (ref 23–33)
CREAT SERPL-MCNC: 1.1 MG/DL (ref 0.4–1.2)
EOSINOPHIL # BLD: 2.1 %
EOSINOPHILS ABSOLUTE: 0.1 THOU/MM3 (ref 0–0.4)
ERYTHROCYTE [DISTWIDTH] IN BLOOD BY AUTOMATED COUNT: 16 % (ref 11.5–14.5)
ERYTHROCYTE [DISTWIDTH] IN BLOOD BY AUTOMATED COUNT: 55 FL (ref 35–45)
GFR SERPL CREATININE-BSD FRML MDRD: 66 ML/MIN/1.73M2
GLUCOSE BLD-MCNC: 119 MG/DL (ref 70–108)
GLUCOSE BLD-MCNC: 126 MG/DL (ref 70–108)
GLUCOSE BLD-MCNC: 136 MG/DL (ref 70–108)
GLUCOSE BLD-MCNC: 174 MG/DL (ref 70–108)
GLUCOSE BLD-MCNC: 189 MG/DL (ref 70–108)
HCT VFR BLD CALC: 29.5 % (ref 42–52)
HEMOGLOBIN: 9.2 GM/DL (ref 14–18)
IMMATURE GRANS (ABS): 0.02 THOU/MM3 (ref 0–0.07)
IMMATURE GRANULOCYTES: 0.4 %
LYMPHOCYTES # BLD: 17.9 %
LYMPHOCYTES ABSOLUTE: 0.9 THOU/MM3 (ref 1–4.8)
MAGNESIUM: 2 MG/DL (ref 1.6–2.4)
MCH RBC QN AUTO: 29.7 PG (ref 26–33)
MCHC RBC AUTO-ENTMCNC: 31.2 GM/DL (ref 32.2–35.5)
MCV RBC AUTO: 95.2 FL (ref 80–94)
MONOCYTES # BLD: 9.7 %
MONOCYTES ABSOLUTE: 0.5 THOU/MM3 (ref 0.4–1.3)
NUCLEATED RED BLOOD CELLS: 0 /100 WBC
PLATELET # BLD: 76 THOU/MM3 (ref 130–400)
PMV BLD AUTO: 10.6 FL (ref 9.4–12.4)
POTASSIUM SERPL-SCNC: 4.6 MEQ/L (ref 3.5–5.2)
RBC # BLD: 3.1 MILL/MM3 (ref 4.7–6.1)
SEG NEUTROPHILS: 69.3 %
SEGMENTED NEUTROPHILS ABSOLUTE COUNT: 3.3 THOU/MM3 (ref 1.8–7.7)
SODIUM BLD-SCNC: 140 MEQ/L (ref 135–145)
WBC # BLD: 4.8 THOU/MM3 (ref 4.8–10.8)

## 2018-11-25 PROCEDURE — 36592 COLLECT BLOOD FROM PICC: CPT

## 2018-11-25 PROCEDURE — 82948 REAGENT STRIP/BLOOD GLUCOSE: CPT

## 2018-11-25 PROCEDURE — 99232 SBSQ HOSP IP/OBS MODERATE 35: CPT | Performed by: THORACIC SURGERY (CARDIOTHORACIC VASCULAR SURGERY)

## 2018-11-25 PROCEDURE — 85025 COMPLETE CBC W/AUTO DIFF WBC: CPT

## 2018-11-25 PROCEDURE — 2580000003 HC RX 258: Performed by: THORACIC SURGERY (CARDIOTHORACIC VASCULAR SURGERY)

## 2018-11-25 PROCEDURE — 2060000000 HC ICU INTERMEDIATE R&B

## 2018-11-25 PROCEDURE — 36415 COLL VENOUS BLD VENIPUNCTURE: CPT

## 2018-11-25 PROCEDURE — 2580000003 HC RX 258: Performed by: INTERNAL MEDICINE

## 2018-11-25 PROCEDURE — 71046 X-RAY EXAM CHEST 2 VIEWS: CPT

## 2018-11-25 PROCEDURE — 94640 AIRWAY INHALATION TREATMENT: CPT

## 2018-11-25 PROCEDURE — 6370000000 HC RX 637 (ALT 250 FOR IP): Performed by: THORACIC SURGERY (CARDIOTHORACIC VASCULAR SURGERY)

## 2018-11-25 PROCEDURE — 83735 ASSAY OF MAGNESIUM: CPT

## 2018-11-25 PROCEDURE — 6370000000 HC RX 637 (ALT 250 FOR IP): Performed by: PHYSICIAN ASSISTANT

## 2018-11-25 PROCEDURE — 97535 SELF CARE MNGMENT TRAINING: CPT

## 2018-11-25 PROCEDURE — 6360000002 HC RX W HCPCS: Performed by: THORACIC SURGERY (CARDIOTHORACIC VASCULAR SURGERY)

## 2018-11-25 PROCEDURE — 85730 THROMBOPLASTIN TIME PARTIAL: CPT

## 2018-11-25 PROCEDURE — 97110 THERAPEUTIC EXERCISES: CPT

## 2018-11-25 PROCEDURE — 6360000002 HC RX W HCPCS: Performed by: INTERNAL MEDICINE

## 2018-11-25 PROCEDURE — 80048 BASIC METABOLIC PNL TOTAL CA: CPT

## 2018-11-25 RX ADMIN — ALBUTEROL SULFATE 2.5 MG: 2.5 SOLUTION RESPIRATORY (INHALATION) at 12:03

## 2018-11-25 RX ADMIN — ATORVASTATIN CALCIUM 40 MG: 40 TABLET, FILM COATED ORAL at 20:16

## 2018-11-25 RX ADMIN — INSULIN LISPRO 2 UNITS: 100 INJECTION, SOLUTION INTRAVENOUS; SUBCUTANEOUS at 20:08

## 2018-11-25 RX ADMIN — DOCUSATE SODIUM 100 MG: 100 CAPSULE, LIQUID FILLED ORAL at 20:15

## 2018-11-25 RX ADMIN — INSULIN LISPRO 3 UNITS: 100 INJECTION, SOLUTION INTRAVENOUS; SUBCUTANEOUS at 12:46

## 2018-11-25 RX ADMIN — POTASSIUM CHLORIDE 20 MEQ: 20 TABLET, EXTENDED RELEASE ORAL at 11:10

## 2018-11-25 RX ADMIN — ALBUTEROL SULFATE 2.5 MG: 2.5 SOLUTION RESPIRATORY (INHALATION) at 16:15

## 2018-11-25 RX ADMIN — CEFEPIME HYDROCHLORIDE 2 G: 2 INJECTION, POWDER, FOR SOLUTION INTRAVENOUS at 11:08

## 2018-11-25 RX ADMIN — POTASSIUM CHLORIDE 20 MEQ: 20 TABLET, EXTENDED RELEASE ORAL at 07:51

## 2018-11-25 RX ADMIN — ACETAMINOPHEN 650 MG: 325 TABLET ORAL at 07:51

## 2018-11-25 RX ADMIN — AMIODARONE HYDROCHLORIDE 200 MG: 200 TABLET ORAL at 07:48

## 2018-11-25 RX ADMIN — Medication 10 ML: at 20:16

## 2018-11-25 RX ADMIN — FUROSEMIDE 20 MG: 20 TABLET ORAL at 17:14

## 2018-11-25 RX ADMIN — FUROSEMIDE 20 MG: 20 TABLET ORAL at 07:47

## 2018-11-25 RX ADMIN — METOPROLOL TARTRATE 12.5 MG: 25 TABLET ORAL at 20:16

## 2018-11-25 RX ADMIN — ACETAMINOPHEN 650 MG: 325 TABLET ORAL at 23:15

## 2018-11-25 RX ADMIN — METOPROLOL TARTRATE 12.5 MG: 25 TABLET ORAL at 07:47

## 2018-11-25 RX ADMIN — Medication 10 ML: at 07:48

## 2018-11-25 RX ADMIN — ALBUTEROL SULFATE 2.5 MG: 2.5 SOLUTION RESPIRATORY (INHALATION) at 20:42

## 2018-11-25 RX ADMIN — MULTIPLE VITAMINS W/ MINERALS TAB 1 TABLET: TAB at 07:48

## 2018-11-25 RX ADMIN — AMIODARONE HYDROCHLORIDE 200 MG: 200 TABLET ORAL at 20:16

## 2018-11-25 RX ADMIN — ALBUTEROL SULFATE 2.5 MG: 2.5 SOLUTION RESPIRATORY (INHALATION) at 08:51

## 2018-11-25 RX ADMIN — POTASSIUM CHLORIDE 20 MEQ: 20 TABLET, EXTENDED RELEASE ORAL at 17:14

## 2018-11-25 RX ADMIN — ACETAMINOPHEN 650 MG: 325 TABLET ORAL at 18:20

## 2018-11-25 RX ADMIN — DOCUSATE SODIUM 100 MG: 100 CAPSULE, LIQUID FILLED ORAL at 07:51

## 2018-11-25 RX ADMIN — ACETAMINOPHEN 650 MG: 325 TABLET ORAL at 01:49

## 2018-11-25 ASSESSMENT — PAIN DESCRIPTION - PAIN TYPE
TYPE: NEUROPATHIC PAIN
TYPE: SURGICAL PAIN
TYPE: SURGICAL PAIN

## 2018-11-25 ASSESSMENT — PAIN DESCRIPTION - ONSET
ONSET: ON-GOING
ONSET: ON-GOING

## 2018-11-25 ASSESSMENT — PAIN DESCRIPTION - FREQUENCY
FREQUENCY: INTERMITTENT
FREQUENCY: INTERMITTENT

## 2018-11-25 ASSESSMENT — PAIN DESCRIPTION - PROGRESSION
CLINICAL_PROGRESSION: NOT CHANGED
CLINICAL_PROGRESSION: NOT CHANGED

## 2018-11-25 ASSESSMENT — PAIN DESCRIPTION - DESCRIPTORS
DESCRIPTORS: ACHING
DESCRIPTORS: ACHING

## 2018-11-25 ASSESSMENT — PAIN SCALES - GENERAL
PAINLEVEL_OUTOF10: 5
PAINLEVEL_OUTOF10: 3
PAINLEVEL_OUTOF10: 3
PAINLEVEL_OUTOF10: 0
PAINLEVEL_OUTOF10: 5

## 2018-11-25 ASSESSMENT — PAIN DESCRIPTION - ORIENTATION
ORIENTATION: MID
ORIENTATION: MID

## 2018-11-25 ASSESSMENT — PAIN DESCRIPTION - LOCATION
LOCATION: STERNUM
LOCATION: STERNUM
LOCATION: LEG

## 2018-11-25 NOTE — PROGRESS NOTES
[address]  INPATIENT OCCUPATIONAL THERAPY  [facility] ICU STEPDOWN TELEMETRY 4K  DAILY NOTE    Time:  Time In: 705  Time Out: 730  Timed Code Treatment Minutes: 25 Minutes  Minutes: 25          Date: 2018  Patient Name: Galo Interiano,   Gender: male      [facility]  MRN: [id number]  : 1945  (72 y.o.)  Referring Practitioner: Brittnee Irby MD,  Dr. Coty Burns  Diagnosis: Chest Pain  Additional Pertinent Hx: admit with above diagnosis, NSTEMI, underwent an aortic valve replacement and mitral valve repair and CABG x 2 on 11-15 ,extubated on     Past Medical History:   Diagnosis Date    Acute on chronic systolic CHF (congestive heart failure), NYHA class 3 ([facility] EF 25-30% 2018    CAD (coronary artery disease)     CKD (chronic kidney disease) stage 3, GFR 30-59 ml/min ([facility] 2015    Diabetes mellitus ([facility]     HTN (hypertension), benign     Hyperlipidemia     Hypothyroidism     Mitral and aortic incompetence     Neuropathy     Positive blood culture     Being treated with rocephhin as outpt.     S/P CABG x 2 11/15/2018     Past Surgical History:   Procedure Laterality Date    CARDIAC SURGERY      heart cath    CATARACT REMOVAL WITH IMPLANT Bilateral     COLONOSCOPY      EYE SURGERY      [place] CABG, VEIN, THREE N/A 2018    CABG CORONARY ARTERY BYPASS,  AORTIC AND MITRAL VALVE REPAIR WITH MICHELE, BALLOON PUMP INSERTION performed by Tracy Lezama MD at [address] ECHO TRANSESOPHAG R-T 2D IMG ACQUISJ I&R ONLY Left 10/25/2018    TRANSESOPHAGEAL ECHOCARDIOGRAM performed by Anell Frankel, MD at [facility]       Restrictions/Precautions:  General Precautions                    Sternal Precautions: No Pushing, No Pulling, 5# Lifting Restrictions  Sternal Precautions: CABG x 2, aortic valve replacement and mitral valve repair 11-15  Other position/activity restrictions: O2       Prior Level of Function:  ADL Assistance: Independent  Homemaking Assistance: Needs high-level IADLs, Decreased safe awareness  Prognosis: Good    Discharge Recommendations:  Discharge Recommendations: Subacute/Skilled Nursing Facility    Patient Education:  Patient Education: safety with transfers while maintaining sternal precautions, safety with mobility keeping walker to self, CABG exercises    Equipment Recommendations:  Equipment Needed: No  Other: May need RTS    Safety:  Safety Devices in place: Yes  Type of devices: Call light within reach, Chair alarm in place, Nurse notified, Left in chair    Plan:  Times per week: 6x  Current Treatment Recommendations: Self-Care / ADL, Strengthening, Balance Training, Functional Mobility Training, Endurance Training, Patient/Caregiver Education & Training, Safety Education & Training, Home Management Training, Equipment Evaluation, Education, & procurement    Goals:  Patient goals : increase strength    Short term goals  Time Frame for Short term goals:  Two weeks  Short term goal 1: Pt to complete functional mobility to and from the BR with no > min A x 2  for inc indep for toileting routine  Short term goal 2: Pt to tolerate dynamic standing tasks for greater than 2 mins with intermittent 1UE release at CGA x 1 for inc indep with simple IADL task  Short term goal 3: Pt to complete various functional transfers at minimal assist x I and CGA x 1  with 0 cues for safety or sternal precautions  for inc ind with toileting  Short term goal 4: Pt will participate in CABG step 2 ex x 10 reps to increase strenth needed for safe mobility and ADL tasks   Long term goals  Time Frame for Long term goals : NA d/t AUTUMN

## 2018-11-25 NOTE — PROGRESS NOTES
mucosa, and tongue normal; teeth and gums normal   Neck: Supple, symmetrical, trachea midline, no adenopathy, thyroid: not enlarged, symmetric, no tenderness/mass/nodules, no carotid bruit or JVD   Back:   Symmetric, no curvature, ROM normal, no CVA tenderness   Lungs:   Clear to auscultation bilaterally, respirations unlabored   Chest Wall:  No tenderness or deformity   Heart:  Regular rate and rhythm, S1, S2 normal, no murmur, rub or gallop   Abdomen:   Soft, non-tender, bowel sounds active all four quadrants,  no masses, no organomegaly   Genitalia:  Normal male   Rectal:  Normal tone, normal prostate, no masses or tenderness;  guaiac negative stool   Extremities: Extremities normal, atraumatic, no cyanosis or edema   Pulses: 2+ and symmetric   Skin: Skin color, texture, turgor normal, no rashes or lesions   Lymph nodes: Cervical, supraclavicular, and axillary nodes normal   Neurologic: Normal         Cardiographics  ECG: normal sinus rhythm, no blocks or conduction defects, no ischemic changes .   Echocardiogram: not done    Imaging  Chest X-Ray: normal     Lab Review   Lab Results   Component Value Date     11/25/2018    K 4.6 11/25/2018    K 4.4 11/14/2018     11/25/2018    CO2 24 11/25/2018    BUN 27 11/25/2018    CREATININE 1.1 11/25/2018    GLUCOSE 126 11/25/2018    GLUCOSE 134 04/09/2018    CALCIUM 9.0 11/25/2018     Lab Results   Component Value Date    WBC 4.8 11/25/2018    HGB 9.2 11/25/2018    HCT 29.5 11/25/2018    MCV 95.2 11/25/2018    PLT 76 11/25/2018       Assessment:      none  Patient Active Problem List    Diagnosis Date Noted    Other fluid overload     Acute respiratory failure with hypoxia (HCC)     Pneumonia of both lower lobes due to Pseudomonas species (HCC)     Acquired thrombocytopenia (HCC)     Anemia     Valvular disease     Arterial hypotension     Elevated troponin level     Moderate malnutrition ([facility] [address] 11/10/2018    Chest pain 11/09/2018    Acute on chronic

## 2018-11-26 ENCOUNTER — APPOINTMENT (OUTPATIENT)
Dept: GENERAL RADIOLOGY | Age: 73
DRG: 219 | End: 2018-11-26
Payer: MEDICARE

## 2018-11-26 LAB
ANION GAP SERPL CALCULATED.3IONS-SCNC: 13 MEQ/L (ref 8–16)
APTT: 66.8 SECONDS (ref 22–38)
APTT: 72.4 SECONDS (ref 22–38)
APTT: 82.2 SECONDS (ref 22–38)
BASOPHILS # BLD: 0.9 %
BASOPHILS ABSOLUTE: 0 THOU/MM3 (ref 0–0.1)
BUN BLDV-MCNC: 29 MG/DL (ref 7–22)
CALCIUM SERPL-MCNC: 9 MG/DL (ref 8.5–10.5)
CHLORIDE BLD-SCNC: 102 MEQ/L (ref 98–111)
CO2: 24 MEQ/L (ref 23–33)
CREAT SERPL-MCNC: 1.1 MG/DL (ref 0.4–1.2)
EOSINOPHIL # BLD: 2.3 %
EOSINOPHILS ABSOLUTE: 0.1 THOU/MM3 (ref 0–0.4)
ERYTHROCYTE [DISTWIDTH] IN BLOOD BY AUTOMATED COUNT: 15.9 % (ref 11.5–14.5)
ERYTHROCYTE [DISTWIDTH] IN BLOOD BY AUTOMATED COUNT: 54.4 FL (ref 35–45)
GFR SERPL CREATININE-BSD FRML MDRD: 66 ML/MIN/1.73M2
GLUCOSE BLD-MCNC: 107 MG/DL (ref 70–108)
GLUCOSE BLD-MCNC: 124 MG/DL (ref 70–108)
GLUCOSE BLD-MCNC: 130 MG/DL (ref 70–108)
GLUCOSE BLD-MCNC: 188 MG/DL (ref 70–108)
GLUCOSE BLD-MCNC: 207 MG/DL (ref 70–108)
HCT VFR BLD CALC: 28.6 % (ref 42–52)
HEMOGLOBIN: 8.8 GM/DL (ref 14–18)
IMMATURE GRANS (ABS): 0.01 THOU/MM3 (ref 0–0.07)
IMMATURE GRANULOCYTES: 0.2 %
LYMPHOCYTES # BLD: 21.3 %
LYMPHOCYTES ABSOLUTE: 0.9 THOU/MM3 (ref 1–4.8)
MAGNESIUM: 1.9 MG/DL (ref 1.6–2.4)
MCH RBC QN AUTO: 29.1 PG (ref 26–33)
MCHC RBC AUTO-ENTMCNC: 30.8 GM/DL (ref 32.2–35.5)
MCV RBC AUTO: 94.7 FL (ref 80–94)
MONOCYTES # BLD: 10.6 %
MONOCYTES ABSOLUTE: 0.5 THOU/MM3 (ref 0.4–1.3)
NUCLEATED RED BLOOD CELLS: 0 /100 WBC
PLATELET # BLD: 83 THOU/MM3 (ref 130–400)
PMV BLD AUTO: 10.5 FL (ref 9.4–12.4)
POTASSIUM SERPL-SCNC: 4.4 MEQ/L (ref 3.5–5.2)
RBC # BLD: 3.02 MILL/MM3 (ref 4.7–6.1)
SCAN OF BLOOD SMEAR: NORMAL
SEG NEUTROPHILS: 64.7 %
SEGMENTED NEUTROPHILS ABSOLUTE COUNT: 2.8 THOU/MM3 (ref 1.8–7.7)
SODIUM BLD-SCNC: 139 MEQ/L (ref 135–145)
WBC # BLD: 4.3 THOU/MM3 (ref 4.8–10.8)

## 2018-11-26 PROCEDURE — 2060000000 HC ICU INTERMEDIATE R&B

## 2018-11-26 PROCEDURE — 97530 THERAPEUTIC ACTIVITIES: CPT

## 2018-11-26 PROCEDURE — 83735 ASSAY OF MAGNESIUM: CPT

## 2018-11-26 PROCEDURE — 36415 COLL VENOUS BLD VENIPUNCTURE: CPT

## 2018-11-26 PROCEDURE — 6370000000 HC RX 637 (ALT 250 FOR IP): Performed by: THORACIC SURGERY (CARDIOTHORACIC VASCULAR SURGERY)

## 2018-11-26 PROCEDURE — 36592 COLLECT BLOOD FROM PICC: CPT

## 2018-11-26 PROCEDURE — 2580000003 HC RX 258: Performed by: THORACIC SURGERY (CARDIOTHORACIC VASCULAR SURGERY)

## 2018-11-26 PROCEDURE — 6360000002 HC RX W HCPCS: Performed by: THORACIC SURGERY (CARDIOTHORACIC VASCULAR SURGERY)

## 2018-11-26 PROCEDURE — 71046 X-RAY EXAM CHEST 2 VIEWS: CPT

## 2018-11-26 PROCEDURE — 80048 BASIC METABOLIC PNL TOTAL CA: CPT

## 2018-11-26 PROCEDURE — 2709999900 HC NON-CHARGEABLE SUPPLY

## 2018-11-26 PROCEDURE — 82948 REAGENT STRIP/BLOOD GLUCOSE: CPT

## 2018-11-26 PROCEDURE — 94640 AIRWAY INHALATION TREATMENT: CPT

## 2018-11-26 PROCEDURE — 97110 THERAPEUTIC EXERCISES: CPT

## 2018-11-26 PROCEDURE — 97116 GAIT TRAINING THERAPY: CPT

## 2018-11-26 PROCEDURE — 85025 COMPLETE CBC W/AUTO DIFF WBC: CPT

## 2018-11-26 PROCEDURE — 85730 THROMBOPLASTIN TIME PARTIAL: CPT

## 2018-11-26 PROCEDURE — 6370000000 HC RX 637 (ALT 250 FOR IP): Performed by: PHYSICIAN ASSISTANT

## 2018-11-26 RX ORDER — ALBUTEROL SULFATE 2.5 MG/3ML
2.5 SOLUTION RESPIRATORY (INHALATION)
Status: DISCONTINUED | OUTPATIENT
Start: 2018-11-27 | End: 2018-11-29

## 2018-11-26 RX ORDER — AMIODARONE HYDROCHLORIDE 200 MG/1
200 TABLET ORAL DAILY
Status: DISCONTINUED | OUTPATIENT
Start: 2018-11-26 | End: 2018-12-06 | Stop reason: HOSPADM

## 2018-11-26 RX ORDER — FUROSEMIDE 10 MG/ML
40 INJECTION INTRAMUSCULAR; INTRAVENOUS 2 TIMES DAILY
Status: DISCONTINUED | OUTPATIENT
Start: 2018-11-26 | End: 2018-12-04

## 2018-11-26 RX ADMIN — Medication 10 ML: at 17:39

## 2018-11-26 RX ADMIN — POTASSIUM CHLORIDE 20 MEQ: 20 TABLET, EXTENDED RELEASE ORAL at 17:39

## 2018-11-26 RX ADMIN — Medication 10 ML: at 08:45

## 2018-11-26 RX ADMIN — METOPROLOL TARTRATE 12.5 MG: 25 TABLET ORAL at 08:44

## 2018-11-26 RX ADMIN — INSULIN LISPRO 3 UNITS: 100 INJECTION, SOLUTION INTRAVENOUS; SUBCUTANEOUS at 12:02

## 2018-11-26 RX ADMIN — DEXTROSE MONOHYDRATE 2.5 MCG/KG/MIN: 50 INJECTION, SOLUTION INTRAVENOUS at 10:36

## 2018-11-26 RX ADMIN — POTASSIUM CHLORIDE 20 MEQ: 20 TABLET, EXTENDED RELEASE ORAL at 08:43

## 2018-11-26 RX ADMIN — ACETAMINOPHEN 650 MG: 325 TABLET ORAL at 22:56

## 2018-11-26 RX ADMIN — ALBUTEROL SULFATE 2.5 MG: 2.5 SOLUTION RESPIRATORY (INHALATION) at 20:28

## 2018-11-26 RX ADMIN — ATORVASTATIN CALCIUM 40 MG: 40 TABLET, FILM COATED ORAL at 19:50

## 2018-11-26 RX ADMIN — ACETAMINOPHEN 650 MG: 325 TABLET ORAL at 08:43

## 2018-11-26 RX ADMIN — METOPROLOL TARTRATE 12.5 MG: 25 TABLET ORAL at 19:50

## 2018-11-26 RX ADMIN — ALBUTEROL SULFATE 2.5 MG: 2.5 SOLUTION RESPIRATORY (INHALATION) at 09:22

## 2018-11-26 RX ADMIN — ALBUTEROL SULFATE 2.5 MG: 2.5 SOLUTION RESPIRATORY (INHALATION) at 16:47

## 2018-11-26 RX ADMIN — DOCUSATE SODIUM 100 MG: 100 CAPSULE, LIQUID FILLED ORAL at 08:44

## 2018-11-26 RX ADMIN — FUROSEMIDE 40 MG: 10 INJECTION, SOLUTION INTRAMUSCULAR; INTRAVENOUS at 17:39

## 2018-11-26 RX ADMIN — INSULIN LISPRO 3 UNITS: 100 INJECTION, SOLUTION INTRAVENOUS; SUBCUTANEOUS at 19:55

## 2018-11-26 RX ADMIN — ACETAMINOPHEN 650 MG: 325 TABLET ORAL at 03:34

## 2018-11-26 RX ADMIN — FUROSEMIDE 40 MG: 10 INJECTION, SOLUTION INTRAMUSCULAR; INTRAVENOUS at 08:44

## 2018-11-26 RX ADMIN — POTASSIUM CHLORIDE 20 MEQ: 20 TABLET, EXTENDED RELEASE ORAL at 12:05

## 2018-11-26 RX ADMIN — ACETAMINOPHEN 650 MG: 325 TABLET ORAL at 15:47

## 2018-11-26 RX ADMIN — MULTIPLE VITAMINS W/ MINERALS TAB 1 TABLET: TAB at 08:45

## 2018-11-26 RX ADMIN — AMIODARONE HYDROCHLORIDE 200 MG: 200 TABLET ORAL at 08:44

## 2018-11-26 RX ADMIN — Medication 10 ML: at 19:51

## 2018-11-26 RX ADMIN — DOCUSATE SODIUM 100 MG: 100 CAPSULE, LIQUID FILLED ORAL at 19:50

## 2018-11-26 ASSESSMENT — PAIN SCALES - GENERAL
PAINLEVEL_OUTOF10: 5
PAINLEVEL_OUTOF10: 5
PAINLEVEL_OUTOF10: 0
PAINLEVEL_OUTOF10: 4
PAINLEVEL_OUTOF10: 0
PAINLEVEL_OUTOF10: 3
PAINLEVEL_OUTOF10: 4
PAINLEVEL_OUTOF10: 0
PAINLEVEL_OUTOF10: 2
PAINLEVEL_OUTOF10: 3

## 2018-11-26 ASSESSMENT — PAIN DESCRIPTION - PAIN TYPE
TYPE: CHRONIC PAIN
TYPE: SURGICAL PAIN
TYPE: CHRONIC PAIN

## 2018-11-26 ASSESSMENT — PAIN DESCRIPTION - PROGRESSION
CLINICAL_PROGRESSION: NOT CHANGED

## 2018-11-26 ASSESSMENT — PAIN DESCRIPTION - DESCRIPTORS
DESCRIPTORS: BURNING
DESCRIPTORS: ACHING
DESCRIPTORS: BURNING

## 2018-11-26 ASSESSMENT — PAIN DESCRIPTION - LOCATION
LOCATION: LEG
LOCATION: LEG
LOCATION: CHEST

## 2018-11-26 ASSESSMENT — PAIN DESCRIPTION - FREQUENCY
FREQUENCY: INTERMITTENT
FREQUENCY: INTERMITTENT
FREQUENCY: CONTINUOUS

## 2018-11-26 ASSESSMENT — PAIN DESCRIPTION - ORIENTATION
ORIENTATION: RIGHT;LEFT
ORIENTATION: RIGHT;LEFT

## 2018-11-26 ASSESSMENT — PAIN DESCRIPTION - ONSET
ONSET: ON-GOING
ONSET: ON-GOING

## 2018-11-26 NOTE — PROGRESS NOTES
Nutrition Assessment    Type and Reason for Visit: Reassess    Nutrition Recommendations: Continue current diet, ONS and MVI. Nutrition Assessment: Pt. With slight improvement from a nutritional standpoint as evidenced by improving appetite. Remains at risk for further nutritional compromise r/t increased nutrient needs for healing. Will continue ONS TID; monitor po intake, weights and wound healing. Pt. Seen - reports appetite is \"better\". States acceptance of Glucerna ONS. Reports tolerating current diet textures. No longer wants to receive Activia. States bowels are on schedule now. Rx includes MVI. Pt. Voices understanding of diet education (11/6/18, 11/20/18). Malnutrition Assessment:  · Malnutrition Status: Meets the criteria for moderate malnutrition  · Context: Acute illness or injury  · Findings of the 6 clinical characteristics of malnutrition (Minimum of 2 out of 6 clinical characteristics is required to make the diagnosis of moderate or severe Protein Calorie Malnutrition based on AND/ASPEN Guidelines):  1. Energy Intake-Less than 75% of estimated energy requirement for greater than or equal to 3 months, greater than 7 days    2. Fat Loss-Mild subcutaneous fat loss, Orbital  3. Muscle Loss-Mild muscle mass loss, Clavicles (pectoralis and deltoids)    Nutrition Risk Level: Moderate    Nutrient Needs:  · Estimated Daily Total Kcal: 1828-2285kcals (20-25kcals/kgm wt. of 91.4kgm 11/6)  · Estimated Daily Protein (g): 81-97 gm (1-1.2 gm/kgm IBW of 81 kgm)    Nutrition Diagnosis:   · Problem:  Moderate malnutrition, In context of acute illness or injury  · Etiology: related to Insufficient energy/nutrient consumption     Signs and symptoms:  as evidenced by Mild loss of subcutaneous fat, Mild muscle loss    Objective Information:  · Nutrition-Focused Physical Findings: 1 BM noted past 24 hours; teeth extracted 11/9; tolerating current diet textures  · Wound Type: Surgical Wound (11/14 CABG &

## 2018-11-26 NOTE — PLAN OF CARE
Problem: Pain:  Goal: Pain level will decrease  Pain level will decrease   Outcome: Ongoing  Pain medication given prn for surgical discomfort     Problem: Falls - Risk of:  Goal: Will remain free from falls  Will remain free from falls   Outcome: Ongoing  Patient has chronic unsteady gait, surgical pain, SOB on exertion, lower extremity weakness bilateral, lower extremity swelling    Problem: Discharge Planning:  Goal: Discharged to appropriate level of care  Discharged to appropriate level of care   Outcome: Ongoing  Discharge home vs TCU/Rehab    Problem: Nutrition  Goal: Optimal nutrition therapy  Outcome: Ongoing  Patient diabetic, encourage protein for wound healing, dietitian consulted, monitor I&Os, he normally only eats 1 meal per day, educated on importance of 3 small meals per day for surgical healing and increase activity level    Problem: Activity Intolerance:  Goal: Able to perform prescribed physical activity  Able to perform prescribed physical activity   Outcome: Ongoing  Patient encourage to work with therapist and ambulate in hallway 3-5 times per day    Problem: Fluid Volume - Imbalance:  Goal: Ability to achieve a balanced intake and output will improve  Ability to achieve a balanced intake and output will improve   Outcome: Ongoing  Monitor daily weight, I&Os, receiving diuretics, lower extremity swelling, abnormal lungs sounds and chest x-ray    Problem: Tissue Perfusion - Cardiopulmonary, Altered:  Goal: Will show no evidence of cardiac arrhythmias  Will show no evidence of cardiac arrhythmias   Outcome: Ongoing  Patient NSR with BBB    Problem: Risk for Impaired Skin Integrity  Goal: Tissue integrity - skin and mucous membranes  Structural intactness and normal physiological function of skin and  mucous membranes.    Outcome: Ongoing  Incision care teaching done with patient, continue to monitor Platlet count, remains on Argatroban gtt, scattered bruising, monitor for bleeding, hematoma, and

## 2018-11-26 NOTE — CARE COORDINATION
11/26/18, 3:06 PM      [address] day: 16  Location: -13/013-A Reason for admit: Chest pain [R07.9]  Chest pain [R07.9]   Procedure:   (40/77 PICC R basilic)  18/8 VQ Scan: Neg for PE  11/9 BLE Venous dopplers: Neg for DVT  11/12 [facility] extraction at [facility]  11/12 CXR: Bilateral pleural effusions left greater than right. Increased left pleural effusion. Since the previous exam. Very subtle minimal increased interstitial markings at the lower lungs  11/13 Bilateral carotid dopplers   11/15 Aortic valve replacement with a 23 mm [employer] Trifecta bioprosthesis; Mitral valve repair with 28 mm Zuleta Physio annuloplasty band; 2v CABG; IABP inserted  11/15 CT Chest: Infiltrates scattered throughout both lungs which may be on the basis of multifocal pneumonia. Aspiration should also be considered; Tiny right-sided pneumothorax with subcutaneous emphysema throughout the anterior chest wall; Large bilateral pleural effusions with associated compressive atelectasis  11/16 SGC exchanged  11/16 Extubated  11/17 IABP removed  11/21 Chest tubes removed  11/22 Argatroban started    Treatment Plan of Care: POD #12. Antibiotics stopped today. Continues on Argatroban drip. Plt up to 83 - will need to be 100 x 3 days before transition to coumadin. Afebrile. On room air. CVS, Nephrology, and Physiatry following. CR/PT/OT. Dietitian consulted. Dietary ileus prevention protocol. Pneumonia. Respiratory culture +pseudomonas aeruginosa. Metaneb. Telemetry, I&O, daily weight, IS, sternal precautions, wound care, SCDs. Argatroban drip, Nebs, amio, asa, lipitor, IV lasix 40mg bid, SSI ACHS, lopressor, nystatin, K+, Electrolyte replacement protocols. WBC 4.3, Hgb 8.8, Plt 83. PCP: Flaco ELLISON  Readmission Risk Score: 35%  Discharge Plan: Plan for IP Rehab. SW on case.

## 2018-11-26 NOTE — PROGRESS NOTES
improved gait and gait speed still wtih hands on assist he would benefit from cont skilled therapy, will practice steps next visit  Prognosis: Excellent     REQUIRES PT FOLLOW UP: Yes    Discharge Recommendations:  Discharge Recommendations: Continue to assess pending progress    Patient Education:  Patient Education: HEP , sternal precautions, deep breathing    Equipment Recommendations:  Equipment Needed: No  Other: continue to assess    Safety:  Type of devices:  All fall risk precautions in place, Left in chair, Call light within reach, Chair alarm in place    Plan:  Times per week: 6x CABG  Times per day: Daily  Specific instructions for Next Treatment: therex and mobility  Current Treatment Recommendations: Strengthening, Functional Mobility Training, Transfer Training, Balance Training, Endurance Training, Gait Training, Stair training, Patient/Caregiver Education & Training, Safety Education & Training, Home Exercise Program, Equipment Evaluation, Education, & procurement    Goals:  Patient goals : Get stronger    Short term goals  Time Frame for Short term goals: 2 weeks  Short term goal 1: bed mobility with Min A to get in/out of bed  Short term goal 2: transfer with CGA to get in/out of chairs  Short term goal 3: amb 48' with/without AD and CGA to walk safely in home  Short term goal 4: negotiate 4 steps with HRs and MOD I to enter home safely    Long term goals  Time Frame for Long term goals : no LTGs set secondary to short ELOS

## 2018-11-27 LAB
ANION GAP SERPL CALCULATED.3IONS-SCNC: 12 MEQ/L (ref 8–16)
APTT: 68.3 SECONDS (ref 22–38)
APTT: 77.8 SECONDS (ref 22–38)
BASOPHILS # BLD: 0.8 %
BASOPHILS ABSOLUTE: 0 THOU/MM3 (ref 0–0.1)
BUN BLDV-MCNC: 26 MG/DL (ref 7–22)
CALCIUM SERPL-MCNC: 8.9 MG/DL (ref 8.5–10.5)
CHLORIDE BLD-SCNC: 104 MEQ/L (ref 98–111)
CO2: 24 MEQ/L (ref 23–33)
CREAT SERPL-MCNC: 1.1 MG/DL (ref 0.4–1.2)
EOSINOPHIL # BLD: 2.8 %
EOSINOPHILS ABSOLUTE: 0.1 THOU/MM3 (ref 0–0.4)
ERYTHROCYTE [DISTWIDTH] IN BLOOD BY AUTOMATED COUNT: 15.9 % (ref 11.5–14.5)
ERYTHROCYTE [DISTWIDTH] IN BLOOD BY AUTOMATED COUNT: 54.4 FL (ref 35–45)
GFR SERPL CREATININE-BSD FRML MDRD: 66 ML/MIN/1.73M2
GLUCOSE BLD-MCNC: 122 MG/DL (ref 70–108)
GLUCOSE BLD-MCNC: 125 MG/DL (ref 70–108)
GLUCOSE BLD-MCNC: 128 MG/DL (ref 70–108)
GLUCOSE BLD-MCNC: 130 MG/DL (ref 70–108)
GLUCOSE BLD-MCNC: 202 MG/DL (ref 70–108)
HCT VFR BLD CALC: 26.5 % (ref 42–52)
HEMOGLOBIN: 8.3 GM/DL (ref 14–18)
IMMATURE GRANS (ABS): 0.01 THOU/MM3 (ref 0–0.07)
IMMATURE GRANULOCYTES: 0.3 %
LYMPHOCYTES # BLD: 27.6 %
LYMPHOCYTES ABSOLUTE: 1.1 THOU/MM3 (ref 1–4.8)
MAGNESIUM: 1.9 MG/DL (ref 1.6–2.4)
MCH RBC QN AUTO: 29.4 PG (ref 26–33)
MCHC RBC AUTO-ENTMCNC: 31.3 GM/DL (ref 32.2–35.5)
MCV RBC AUTO: 94 FL (ref 80–94)
MONOCYTES # BLD: 11.8 %
MONOCYTES ABSOLUTE: 0.5 THOU/MM3 (ref 0.4–1.3)
NUCLEATED RED BLOOD CELLS: 0 /100 WBC
PLATELET # BLD: 104 THOU/MM3 (ref 130–400)
PMV BLD AUTO: 10 FL (ref 9.4–12.4)
POTASSIUM SERPL-SCNC: 3.9 MEQ/L (ref 3.5–5.2)
RBC # BLD: 2.82 MILL/MM3 (ref 4.7–6.1)
SEG NEUTROPHILS: 56.7 %
SEGMENTED NEUTROPHILS ABSOLUTE COUNT: 2.3 THOU/MM3 (ref 1.8–7.7)
SEROTONIN RELEASING ASSAY: NORMAL
SODIUM BLD-SCNC: 140 MEQ/L (ref 135–145)
WBC # BLD: 4 THOU/MM3 (ref 4.8–10.8)

## 2018-11-27 PROCEDURE — 97110 THERAPEUTIC EXERCISES: CPT

## 2018-11-27 PROCEDURE — 2580000003 HC RX 258: Performed by: THORACIC SURGERY (CARDIOTHORACIC VASCULAR SURGERY)

## 2018-11-27 PROCEDURE — 2060000000 HC ICU INTERMEDIATE R&B

## 2018-11-27 PROCEDURE — 6370000000 HC RX 637 (ALT 250 FOR IP): Performed by: PHYSICIAN ASSISTANT

## 2018-11-27 PROCEDURE — 6370000000 HC RX 637 (ALT 250 FOR IP): Performed by: THORACIC SURGERY (CARDIOTHORACIC VASCULAR SURGERY)

## 2018-11-27 PROCEDURE — 36415 COLL VENOUS BLD VENIPUNCTURE: CPT

## 2018-11-27 PROCEDURE — 85730 THROMBOPLASTIN TIME PARTIAL: CPT

## 2018-11-27 PROCEDURE — 94760 N-INVAS EAR/PLS OXIMETRY 1: CPT

## 2018-11-27 PROCEDURE — 2709999900 HC NON-CHARGEABLE SUPPLY

## 2018-11-27 PROCEDURE — 6360000002 HC RX W HCPCS: Performed by: THORACIC SURGERY (CARDIOTHORACIC VASCULAR SURGERY)

## 2018-11-27 PROCEDURE — APPSS30 APP SPLIT SHARED TIME 16-30 MINUTES: Performed by: PHYSICIAN ASSISTANT

## 2018-11-27 PROCEDURE — 36592 COLLECT BLOOD FROM PICC: CPT

## 2018-11-27 PROCEDURE — 80048 BASIC METABOLIC PNL TOTAL CA: CPT

## 2018-11-27 PROCEDURE — 97530 THERAPEUTIC ACTIVITIES: CPT

## 2018-11-27 PROCEDURE — 82948 REAGENT STRIP/BLOOD GLUCOSE: CPT

## 2018-11-27 PROCEDURE — 97116 GAIT TRAINING THERAPY: CPT

## 2018-11-27 PROCEDURE — 94640 AIRWAY INHALATION TREATMENT: CPT

## 2018-11-27 PROCEDURE — 83735 ASSAY OF MAGNESIUM: CPT

## 2018-11-27 PROCEDURE — 85025 COMPLETE CBC W/AUTO DIFF WBC: CPT

## 2018-11-27 PROCEDURE — 96365 THER/PROPH/DIAG IV INF INIT: CPT

## 2018-11-27 RX ORDER — GABAPENTIN 400 MG/1
400 CAPSULE ORAL 2 TIMES DAILY
Status: DISCONTINUED | OUTPATIENT
Start: 2018-11-27 | End: 2018-12-06 | Stop reason: HOSPADM

## 2018-11-27 RX ADMIN — GABAPENTIN 400 MG: 400 CAPSULE ORAL at 11:33

## 2018-11-27 RX ADMIN — DEXTROSE MONOHYDRATE 2.5 MCG/KG/MIN: 50 INJECTION, SOLUTION INTRAVENOUS at 23:23

## 2018-11-27 RX ADMIN — ALBUTEROL SULFATE 2.5 MG: 2.5 SOLUTION RESPIRATORY (INHALATION) at 09:05

## 2018-11-27 RX ADMIN — METOPROLOL TARTRATE 12.5 MG: 25 TABLET ORAL at 09:23

## 2018-11-27 RX ADMIN — MULTIPLE VITAMINS W/ MINERALS TAB 1 TABLET: TAB at 09:23

## 2018-11-27 RX ADMIN — METOPROLOL TARTRATE 12.5 MG: 25 TABLET ORAL at 20:16

## 2018-11-27 RX ADMIN — FUROSEMIDE 40 MG: 10 INJECTION, SOLUTION INTRAMUSCULAR; INTRAVENOUS at 09:24

## 2018-11-27 RX ADMIN — DEXTROSE MONOHYDRATE 2.5 MCG/KG/MIN: 50 INJECTION, SOLUTION INTRAVENOUS at 03:41

## 2018-11-27 RX ADMIN — GABAPENTIN 400 MG: 400 CAPSULE ORAL at 20:16

## 2018-11-27 RX ADMIN — POTASSIUM CHLORIDE 20 MEQ: 20 TABLET, EXTENDED RELEASE ORAL at 09:24

## 2018-11-27 RX ADMIN — DOCUSATE SODIUM 100 MG: 100 CAPSULE, LIQUID FILLED ORAL at 20:16

## 2018-11-27 RX ADMIN — Medication 10 ML: at 20:17

## 2018-11-27 RX ADMIN — POTASSIUM CHLORIDE 20 MEQ: 20 TABLET, EXTENDED RELEASE ORAL at 17:03

## 2018-11-27 RX ADMIN — FUROSEMIDE 40 MG: 10 INJECTION, SOLUTION INTRAMUSCULAR; INTRAVENOUS at 17:04

## 2018-11-27 RX ADMIN — ALBUTEROL SULFATE 2.5 MG: 2.5 SOLUTION RESPIRATORY (INHALATION) at 19:57

## 2018-11-27 RX ADMIN — DOCUSATE SODIUM 100 MG: 100 CAPSULE, LIQUID FILLED ORAL at 09:23

## 2018-11-27 RX ADMIN — ACETAMINOPHEN 650 MG: 325 TABLET ORAL at 11:33

## 2018-11-27 RX ADMIN — AMIODARONE HYDROCHLORIDE 200 MG: 200 TABLET ORAL at 09:23

## 2018-11-27 RX ADMIN — Medication 10 ML: at 17:05

## 2018-11-27 RX ADMIN — INSULIN LISPRO 3 UNITS: 100 INJECTION, SOLUTION INTRAVENOUS; SUBCUTANEOUS at 20:16

## 2018-11-27 RX ADMIN — ACETAMINOPHEN 650 MG: 325 TABLET ORAL at 03:40

## 2018-11-27 RX ADMIN — POTASSIUM CHLORIDE 20 MEQ: 20 TABLET, EXTENDED RELEASE ORAL at 11:33

## 2018-11-27 RX ADMIN — ATORVASTATIN CALCIUM 40 MG: 40 TABLET, FILM COATED ORAL at 20:16

## 2018-11-27 RX ADMIN — ASPIRIN 325 MG: 325 TABLET ORAL at 09:27

## 2018-11-27 RX ADMIN — Medication 10 ML: at 09:25

## 2018-11-27 ASSESSMENT — PAIN SCALES - GENERAL
PAINLEVEL_OUTOF10: 4
PAINLEVEL_OUTOF10: 0
PAINLEVEL_OUTOF10: 5
PAINLEVEL_OUTOF10: 0
PAINLEVEL_OUTOF10: 2

## 2018-11-27 NOTE — PROGRESS NOTES
Spoke with Herman Munroe and he feels patient is now more appropriate to go home with home health. Yoanna ALVARADO made.

## 2018-11-27 NOTE — PROGRESS NOTES
Independent  Transfer Assistance: Independent       Subjective:     Subjective: pt up in chair on arrival agreeable for therapy, he did want to go to bed following activity     Pain:   .  Pain Assessment  Pain Level:  (he denied pain but c/o of sorenss in LEs )       Social/Functional:  Lives With: Spouse  Type of Home: House  Home Layout: One level  Home Access: Stairs to enter with rails  Entrance Stairs - Number of Steps: 4  Entrance Stairs - Rails: Both  Home Equipment: Rolling walker, Cane (cane intermittently for longer trips ie: grocery shopping)     Objective:  Sit to Supine: Stand by assistance (however once in bed he doesnt tolerate being flat and gets SOB he needed assist to position shoulders and raise HOB pt reported that he may sleep in recliner for some time till breathing gets better )    Transfers  Sit to Stand: Minimal Assistance (several times from chair cues for wt shfiting forward he does rock forward and count to 3 prior to standing )  Stand to sit: Stand by assistance       Ambulation 1  Device: Rolling Walker  Assistance: Contact guard assistance  Quality of Gait: slow gabrielle wtih ER at jonel LEs however this is his normal gait pattern, pt reported that he feels the walker helps with balance and will cont to use he is checking if he has one at home   Distance: 290x1  Comments: gait speed 1.48 feet per sec still improving , and normal for his age is 4.36 feet per sec     Stairs  # Steps : 3  Rails: Right ascending (used jonel UEs on one rail)  Assistance: Contact guard assistance  Comment: pt required cues for 5 sec pause between steps for cardiac precautions, due to IV line only able to do 3 in a row            Exercises:  Exercises  Comments: pt completed phase 2 CABG ex upper trunk rotations, shoulder rolls with cues for proper technique, shoulder horz abd/add, long arc quads, heel raises, toe raises, hip flexion x 15-20 rpes cues for proper technique and to slow down and for deep breathing as

## 2018-11-28 LAB
ANION GAP SERPL CALCULATED.3IONS-SCNC: 12 MEQ/L (ref 8–16)
APTT: 66.1 SECONDS (ref 22–38)
APTT: 68.3 SECONDS (ref 22–38)
BASOPHILS # BLD: 1 %
BASOPHILS ABSOLUTE: 0 THOU/MM3 (ref 0–0.1)
BUN BLDV-MCNC: 25 MG/DL (ref 7–22)
CALCIUM SERPL-MCNC: 8.6 MG/DL (ref 8.5–10.5)
CHLORIDE BLD-SCNC: 103 MEQ/L (ref 98–111)
CO2: 25 MEQ/L (ref 23–33)
CREAT SERPL-MCNC: 1.2 MG/DL (ref 0.4–1.2)
EOSINOPHIL # BLD: 2.8 %
EOSINOPHILS ABSOLUTE: 0.1 THOU/MM3 (ref 0–0.4)
ERYTHROCYTE [DISTWIDTH] IN BLOOD BY AUTOMATED COUNT: 16 % (ref 11.5–14.5)
ERYTHROCYTE [DISTWIDTH] IN BLOOD BY AUTOMATED COUNT: 55.7 FL (ref 35–45)
GFR SERPL CREATININE-BSD FRML MDRD: 60 ML/MIN/1.73M2
GLUCOSE BLD-MCNC: 119 MG/DL (ref 70–108)
GLUCOSE BLD-MCNC: 128 MG/DL (ref 70–108)
GLUCOSE BLD-MCNC: 144 MG/DL (ref 70–108)
GLUCOSE BLD-MCNC: 191 MG/DL (ref 70–108)
GLUCOSE BLD-MCNC: 99 MG/DL (ref 70–108)
HCT VFR BLD CALC: 26.5 % (ref 42–52)
HEMOGLOBIN: 8.1 GM/DL (ref 14–18)
IMMATURE GRANS (ABS): 0 THOU/MM3 (ref 0–0.07)
IMMATURE GRANULOCYTES: 0 %
LYMPHOCYTES # BLD: 28.8 %
LYMPHOCYTES ABSOLUTE: 1.1 THOU/MM3 (ref 1–4.8)
MAGNESIUM: 1.8 MG/DL (ref 1.6–2.4)
MCH RBC QN AUTO: 29.1 PG (ref 26–33)
MCHC RBC AUTO-ENTMCNC: 30.6 GM/DL (ref 32.2–35.5)
MCV RBC AUTO: 95.3 FL (ref 80–94)
MONOCYTES # BLD: 13.7 %
MONOCYTES ABSOLUTE: 0.5 THOU/MM3 (ref 0.4–1.3)
NUCLEATED RED BLOOD CELLS: 0 /100 WBC
PLATELET # BLD: 132 THOU/MM3 (ref 130–400)
PMV BLD AUTO: 10 FL (ref 9.4–12.4)
POTASSIUM SERPL-SCNC: 4.2 MEQ/L (ref 3.5–5.2)
RBC # BLD: 2.78 MILL/MM3 (ref 4.7–6.1)
SEG NEUTROPHILS: 53.7 %
SEGMENTED NEUTROPHILS ABSOLUTE COUNT: 2.1 THOU/MM3 (ref 1.8–7.7)
SODIUM BLD-SCNC: 140 MEQ/L (ref 135–145)
WBC # BLD: 3.9 THOU/MM3 (ref 4.8–10.8)

## 2018-11-28 PROCEDURE — 2580000003 HC RX 258: Performed by: THORACIC SURGERY (CARDIOTHORACIC VASCULAR SURGERY)

## 2018-11-28 PROCEDURE — 6360000002 HC RX W HCPCS: Performed by: THORACIC SURGERY (CARDIOTHORACIC VASCULAR SURGERY)

## 2018-11-28 PROCEDURE — 2060000000 HC ICU INTERMEDIATE R&B

## 2018-11-28 PROCEDURE — 82948 REAGENT STRIP/BLOOD GLUCOSE: CPT

## 2018-11-28 PROCEDURE — 94640 AIRWAY INHALATION TREATMENT: CPT

## 2018-11-28 PROCEDURE — 36592 COLLECT BLOOD FROM PICC: CPT

## 2018-11-28 PROCEDURE — 80048 BASIC METABOLIC PNL TOTAL CA: CPT

## 2018-11-28 PROCEDURE — 97530 THERAPEUTIC ACTIVITIES: CPT

## 2018-11-28 PROCEDURE — 36415 COLL VENOUS BLD VENIPUNCTURE: CPT

## 2018-11-28 PROCEDURE — 97535 SELF CARE MNGMENT TRAINING: CPT

## 2018-11-28 PROCEDURE — 85730 THROMBOPLASTIN TIME PARTIAL: CPT

## 2018-11-28 PROCEDURE — 83735 ASSAY OF MAGNESIUM: CPT

## 2018-11-28 PROCEDURE — 6370000000 HC RX 637 (ALT 250 FOR IP): Performed by: PHYSICIAN ASSISTANT

## 2018-11-28 PROCEDURE — 85025 COMPLETE CBC W/AUTO DIFF WBC: CPT

## 2018-11-28 PROCEDURE — 94760 N-INVAS EAR/PLS OXIMETRY 1: CPT

## 2018-11-28 PROCEDURE — 6370000000 HC RX 637 (ALT 250 FOR IP): Performed by: THORACIC SURGERY (CARDIOTHORACIC VASCULAR SURGERY)

## 2018-11-28 PROCEDURE — 97110 THERAPEUTIC EXERCISES: CPT

## 2018-11-28 PROCEDURE — 97116 GAIT TRAINING THERAPY: CPT

## 2018-11-28 PROCEDURE — APPSS30 APP SPLIT SHARED TIME 16-30 MINUTES: Performed by: PHYSICIAN ASSISTANT

## 2018-11-28 RX ADMIN — FUROSEMIDE 40 MG: 10 INJECTION, SOLUTION INTRAMUSCULAR; INTRAVENOUS at 09:47

## 2018-11-28 RX ADMIN — POTASSIUM CHLORIDE 20 MEQ: 20 TABLET, EXTENDED RELEASE ORAL at 09:47

## 2018-11-28 RX ADMIN — METOPROLOL TARTRATE 12.5 MG: 25 TABLET ORAL at 19:44

## 2018-11-28 RX ADMIN — ATORVASTATIN CALCIUM 40 MG: 40 TABLET, FILM COATED ORAL at 19:44

## 2018-11-28 RX ADMIN — DOCUSATE SODIUM 100 MG: 100 CAPSULE, LIQUID FILLED ORAL at 09:47

## 2018-11-28 RX ADMIN — GABAPENTIN 400 MG: 400 CAPSULE ORAL at 19:44

## 2018-11-28 RX ADMIN — ACETAMINOPHEN 650 MG: 325 TABLET ORAL at 19:44

## 2018-11-28 RX ADMIN — INSULIN LISPRO 2 UNITS: 100 INJECTION, SOLUTION INTRAVENOUS; SUBCUTANEOUS at 20:55

## 2018-11-28 RX ADMIN — DEXTROSE MONOHYDRATE 2.5 MCG/KG/MIN: 50 INJECTION, SOLUTION INTRAVENOUS at 20:24

## 2018-11-28 RX ADMIN — GABAPENTIN 400 MG: 400 CAPSULE ORAL at 09:47

## 2018-11-28 RX ADMIN — Medication 10 ML: at 19:45

## 2018-11-28 RX ADMIN — ALBUTEROL SULFATE 2.5 MG: 2.5 SOLUTION RESPIRATORY (INHALATION) at 19:29

## 2018-11-28 RX ADMIN — DOCUSATE SODIUM 100 MG: 100 CAPSULE, LIQUID FILLED ORAL at 19:44

## 2018-11-28 RX ADMIN — POTASSIUM CHLORIDE 20 MEQ: 20 TABLET, EXTENDED RELEASE ORAL at 13:30

## 2018-11-28 RX ADMIN — INSULIN LISPRO 3 UNITS: 100 INJECTION, SOLUTION INTRAVENOUS; SUBCUTANEOUS at 13:28

## 2018-11-28 RX ADMIN — AMIODARONE HYDROCHLORIDE 200 MG: 200 TABLET ORAL at 09:46

## 2018-11-28 RX ADMIN — Medication 10 ML: at 17:14

## 2018-11-28 RX ADMIN — METOPROLOL TARTRATE 12.5 MG: 25 TABLET ORAL at 09:46

## 2018-11-28 RX ADMIN — FUROSEMIDE 40 MG: 10 INJECTION, SOLUTION INTRAMUSCULAR; INTRAVENOUS at 17:14

## 2018-11-28 RX ADMIN — ALBUTEROL SULFATE 2.5 MG: 2.5 SOLUTION RESPIRATORY (INHALATION) at 13:36

## 2018-11-28 RX ADMIN — POTASSIUM CHLORIDE 20 MEQ: 20 TABLET, EXTENDED RELEASE ORAL at 17:14

## 2018-11-28 RX ADMIN — Medication 10 ML: at 09:47

## 2018-11-28 RX ADMIN — MULTIPLE VITAMINS W/ MINERALS TAB 1 TABLET: TAB at 09:47

## 2018-11-28 RX ADMIN — ASPIRIN 325 MG: 325 TABLET ORAL at 09:46

## 2018-11-28 RX ADMIN — ALBUTEROL SULFATE 2.5 MG: 2.5 SOLUTION RESPIRATORY (INHALATION) at 09:50

## 2018-11-28 ASSESSMENT — PAIN SCALES - GENERAL
PAINLEVEL_OUTOF10: 0
PAINLEVEL_OUTOF10: 5
PAINLEVEL_OUTOF10: 0

## 2018-11-28 NOTE — PROGRESS NOTES
replacement protocol   Other RX Placeholder    mupirocin   Topical Once    therapeutic multivitamin-minerals  1 tablet Oral Daily with breakfast    atorvastatin  40 mg Oral Nightly       ROS: All neg unless specifically mentioned in subjective section. Exam:  General Appearance: alert ,conversing, in no acute distress  Cardiovascular: normal rate, regular rhythm, normal S1 and S2, no murmurs, rubs, clicks, or gallops  Pulmonary/Chest: clear to auscultation bilaterally- no wheezes, rales or rhonchi, normal air movement, no respiratory distress  Neurological: alert, oriented, normal speech, no focal findings or movement disorder noted  Sternum: Incision healing appropriately and no wound dehiscence noted. Assessment:   Patient Active Problem List   Diagnosis    CKD (chronic kidney disease) stage 3, GFR 30-59 ml/min ([facility])    HTN (hypertension), benign    Vitamin D deficiency    MISA (acute kidney injury) ([address]    Renal cyst    ACS (acute coronary syndrome) [facility]    Cardiogenic shock ([facility]    Aortic stenosis with mitral and aortic insufficiency    Streptococcus A carrier or suspected carrier    Sepsis due to Streptococcus pyogenes ([address]    CHF exacerbation ([address]    Heart failure, systolic, with acute decompensation ([facility])    Moderate malnutrition ([facility])    Renal failure syndrome    Acute on chronic systolic CHF (congestive heart failure), NYHA class 3 ([facility]) EF 25-30%    Multi-vessel coronary artery stenosis    Chest pain    Moderate malnutrition ([address]    Arterial hypotension    Elevated troponin level    Anemia    Valvular disease    Other fluid overload    Acute respiratory failure with hypoxia ([facility])    Pneumonia of both lower lobes due to Pseudomonas species ([address]    Acquired thrombocytopenia ([address]       Plan: 11/27/18  1. Plt count up to 132K- Day 2 of 3 for plt count >100K  2.  Continue argatroban for HITS for 3 days after PLT >= 100k    The plan of care was discussed in detail with

## 2018-11-28 NOTE — PROGRESS NOTES
Joaquina Richardson 62  INPATIENT OCCUPATIONAL THERAPY  [facility]Z ICU STEPDOWN TELEMETRY 4K  DAILY NOTE    Time:  Time In: 816  Time Out:   Timed Code Treatment Minutes: 29 Minutes  Minutes: 28          Date: 2018  Patient Name: Valeria Pollard,   Gender: male      Room: [facility]013-A  MRN: [id number]  : 1945  (72 y.o.)  Referring Practitioner: Jessica Colbert MD,  Dr. Nely Frye  Diagnosis: Chest Pain  Additional Pertinent Hx: admit with above diagnosis, NSTEMI, underwent an aortic valve replacement and mitral valve repair and CABG x 2 on 11-15 ,extubated on     Past Medical History:   Diagnosis Date    Acute on chronic systolic CHF (congestive heart failure), NYHA class 3 ([facility] [address] EF 25-30% 2018    CAD (coronary artery disease)     CKD (chronic kidney disease) stage 3, GFR 30-59 ml/min ([facility] [address] 2015    Diabetes mellitus ([facility] [address]     HTN (hypertension), benign     Hyperlipidemia     Hypothyroidism     Mitral and aortic incompetence     Neuropathy     Positive blood culture     Being treated with rocephhin as outpt.     S/P CABG x 2 11/15/2018     Past Surgical History:   Procedure Laterality Date    CARDIAC SURGERY      heart cath    CATARACT REMOVAL WITH IMPLANT Bilateral     COLONOSCOPY      EYE SURGERY      [place] CABG, VEIN, THREE N/A 2018    CABG CORONARY ARTERY BYPASS,  AORTIC AND MITRAL VALVE REPAIR WITH MICHELE, BALLOON PUMP INSERTION performed by Manuelito Garsia MD at [address] [place] [address] ECHO TRANSESOPHAG R-T 2D IMG ACQUISJ I&R ONLY Left 10/25/2018    TRANSESOPHAGEAL ECHOCARDIOGRAM performed by Mariangel Tan MD at [facility]       Restrictions/Precautions:  General Precautions                    Sternal Precautions: No Pushing, No Pulling, 5# Lifting Restrictions  Sternal Precautions: CABG x 2, aortic valve replacement and mitral valve repair 11-15       Prior Level of Function:  ADL Assistance: Independent  Homemaking Assistance: Needs assistance (wife completes most, able to do yardwork, has a son that helps)  Ambulation Assistance: Independent  Transfer Assistance: Independent       Subjective       Subjective: Pt. seated in recliner upon arrival, agreeable to  OT treatment  Comments: RN ok'd session this date.                Pain:  Pain Assessment  Patient Currently in Pain: Denies       Objective  Overall Cognitive Status: WFL          ADL  Grooming: Stand by assistance  UE Bathing: Stand by assistance (standing at sink)  LE Bathing: Stand by assistance  UE Dressing: Minimal assistance (assist with gown)  LE Dressing: Stand by assistance (to don shorts)               Transfers  Sit to stand: Minimal assistance (Min-CGA)  Stand to sit: Contact guard assistance       Balance  Sitting Balance: Supervision  Standing Balance: Stand by assistance     Time: x8 minutes at sink  Activity: ADLs  Comment: review transfer tech     Functional Mobility  Functional - Mobility Device: Rolling Walker  Assist Level: Stand by assistance  Functional Mobility Comments: Pt. ambulated around nursing unit with SBAno LOB                           Activity Tolerance:  Activity Tolerance: Patient Tolerated treatment well    Assessment:     Performance deficits / Impairments: Decreased functional mobility , Decreased ADL status, Decreased endurance, Decreased balance, Decreased high-level IADLs, Decreased safe awareness  Prognosis: Good    Discharge Recommendations:  Discharge Recommendations: Subacute/Skilled Nursing Facility    Patient Education:  Patient Education: transfer tech, steranl precautions    Equipment Recommendations:  Equipment Needed: No  Other: already has ETS, tub transfer bench, RW    Safety:  Safety Devices in place: Yes  Type of devices: Call light within reach, Chair alarm in place, Nurse notified, Left in chair, Gait belt    Plan:  Times per week: 6x  Current Treatment Recommendations: Self-Care / ADL, Strengthening, Balance Training, Functional Mobility Training, Endurance Training, Patient/Caregiver Education & Training, Safety Education & Training, Home Management Training, Equipment Evaluation, Education, & procurement    Goals:  Patient goals : increase strength    Short term goals  Time Frame for Short term goals: Two weeks  Short term goal 1: Pt will complete functional mobility to/from bathroom and household distances with SBA and <2 cues for safety to increase activity tolerance for ADLs. Short term goal 2: Pt to tolerate dynamic standing tasks for greater than 2 mins with intermittent 1UE release at CGA x 1 for inc indep with simple IADL task  Short term goal 3: Pt will complete all functional transfers with CGA and <2 cues for sternal precautions in prep for toilet/shower transfers. Short term goal 4: Pt will participate in CABG step 2 ex x 10 reps to increase strenth needed for safe mobility and ADL tasks   Short term goal 5: Pt will complete LB ADL tasks with minimal assistance using LHAE prn to increase independence with self care tasks.    Long term goals  Time Frame for Long term goals : NA d/t AUTUMN

## 2018-11-28 NOTE — PROGRESS NOTES
HEP , sternal precautions, deep breathing, initiated steps and discussed RPE scale how to check pulse    Equipment Recommendations:  Equipment Needed: No  Other: continue to assess    Safety:  Type of devices: All fall risk precautions in place, Left in chair, Call light within reach, Chair alarm in place    Plan:  Times per week: 6x CABG  Times per day: Daily  Specific instructions for Next Treatment: therex and mobility  Current Treatment Recommendations: Strengthening, Functional Mobility Training, Transfer Training, Balance Training, Endurance Training, Gait Training, Stair training, Patient/Caregiver Education & Training, Safety Education & Training, Home Exercise Program, Equipment Evaluation, Education, & procurement    Goals:  Patient goals : Get stronger    Short term goals  Time Frame for Short term goals: 2 weeks  Short term goal 1: bed mobility with Min A to get in/out of bed  Short term goal 2: transfer with CGA to get in/out of chairs  Short term goal 3: amb 48' with/without AD and CGA to walk safely in home - goal met   Short term goal 4: negotiate 4 steps with HRs and MOD I to enter home safely    Revised Short-Term Goals:    Short term goals  Time Frame for Short term goals: 2 weeks  Short term goal 1: bed mobility with Min A to get in/out of bed  Short term goal 2: transfer with CGA to get in/out of chairs  Short term goal 3: pt will ambulate 400 ' with a RW and mod I to walk safely in home  Short term goal 4: negotiate 4 steps with HRs and MOD I to enter home safely          Long term goals  Time Frame for Long term goals : no LTGs set secondary to short ELOS      [address] [facility] PTA 9025    Goals updated by PT per review.   Eyal Orellana, PT, DPT

## 2018-11-28 NOTE — PLAN OF CARE
Problem: Pain:  Goal: Pain level will decrease  Pain level will decrease   Outcome: Ongoing  Pain medication offered prn, neuropathy medication restarted by day shift RN.      Problem: Falls - Risk of:  Goal: Will remain free from falls  Will remain free from falls   Outcome: Ongoing  Unsteady gait,surgical pain, SOB on exertion,IV infusing, lower extremity weakness, needing walker and gait belt,PT/OT and Rehab working with patient. Neuropathy and pain medication side effects.      Problem: Discharge Planning:  Goal: Discharged to appropriate level of care  Discharged to appropriate level of care   Outcome: Ongoing  Discharge plan home with wife and home health      Problem: Nutrition  Goal: Optimal nutrition therapy  Outcome: Ongoing  Patient diabetic, educated on wound healing and eating 3 meals per day. Monitor I&Os.    Problem: OXYGENATION/RESPIRATORY FUNCTION  Goal: Patient will maintain patent airway  Outcome: Ongoing  Patient remains on room air, abnormal lung sounds and chest x-ray. Encourage IS every hour while awake.     Problem: Cardiac Output - Decreased:  Goal: Hemodynamic stability will improve  Hemodynamic stability will improve   Outcome: Ongoing  Monitor labs, vital signs, EKG showing BBB, low platelet count, remains on argatroban gtt with platelets increasing.      Problem: Fluid Volume - Imbalance:  Goal: Ability to achieve a balanced intake and output will improve  Ability to achieve a balanced intake and output will improve   Outcome: Ongoing  Patient receiving diuretics, lower extremity swelling and abnormal lung sounds,monitor I&Os and daily weights     Problem: Risk for Impaired Skin Integrity  Goal: Tissue integrity - skin and mucous membranes  Structural intactness and normal physiological function of skin and  mucous membranes. Outcome: Ongoing  Monitor for wound healing, Patient has lower platelet count, remains on argatroban.      Comments: Care plan reviewed with patient.   Patient verbalize understanding of the plan of care and contribute to goal setting.

## 2018-11-29 LAB
ANION GAP SERPL CALCULATED.3IONS-SCNC: 12 MEQ/L (ref 8–16)
APTT: 64.9 SECONDS (ref 22–38)
APTT: 67.8 SECONDS (ref 22–38)
BASOPHILS # BLD: 1.1 %
BASOPHILS ABSOLUTE: 0 THOU/MM3 (ref 0–0.1)
BUN BLDV-MCNC: 27 MG/DL (ref 7–22)
CALCIUM SERPL-MCNC: 8.8 MG/DL (ref 8.5–10.5)
CHLORIDE BLD-SCNC: 102 MEQ/L (ref 98–111)
CO2: 25 MEQ/L (ref 23–33)
CREAT SERPL-MCNC: 1.2 MG/DL (ref 0.4–1.2)
EOSINOPHIL # BLD: 3.4 %
EOSINOPHILS ABSOLUTE: 0.1 THOU/MM3 (ref 0–0.4)
ERYTHROCYTE [DISTWIDTH] IN BLOOD BY AUTOMATED COUNT: 15.9 % (ref 11.5–14.5)
ERYTHROCYTE [DISTWIDTH] IN BLOOD BY AUTOMATED COUNT: 55.6 FL (ref 35–45)
GFR SERPL CREATININE-BSD FRML MDRD: 60 ML/MIN/1.73M2
GLUCOSE BLD-MCNC: 125 MG/DL (ref 70–108)
GLUCOSE BLD-MCNC: 130 MG/DL (ref 70–108)
GLUCOSE BLD-MCNC: 138 MG/DL (ref 70–108)
GLUCOSE BLD-MCNC: 184 MG/DL (ref 70–108)
GLUCOSE BLD-MCNC: 203 MG/DL (ref 70–108)
HCT VFR BLD CALC: 26.2 % (ref 42–52)
HEMOGLOBIN: 8.1 GM/DL (ref 14–18)
IMMATURE GRANS (ABS): 0.01 THOU/MM3 (ref 0–0.07)
IMMATURE GRANULOCYTES: 0.3 %
INR BLD: 1.92 (ref 0.85–1.13)
LYMPHOCYTES # BLD: 23.7 %
LYMPHOCYTES ABSOLUTE: 0.9 THOU/MM3 (ref 1–4.8)
MAGNESIUM: 1.8 MG/DL (ref 1.6–2.4)
MCH RBC QN AUTO: 29.6 PG (ref 26–33)
MCHC RBC AUTO-ENTMCNC: 30.9 GM/DL (ref 32.2–35.5)
MCV RBC AUTO: 95.6 FL (ref 80–94)
MONOCYTES # BLD: 17 %
MONOCYTES ABSOLUTE: 0.6 THOU/MM3 (ref 0.4–1.3)
NUCLEATED RED BLOOD CELLS: 0 /100 WBC
PLATELET # BLD: 153 THOU/MM3 (ref 130–400)
PMV BLD AUTO: 9.9 FL (ref 9.4–12.4)
POTASSIUM SERPL-SCNC: 4.4 MEQ/L (ref 3.5–5.2)
RBC # BLD: 2.74 MILL/MM3 (ref 4.7–6.1)
SEG NEUTROPHILS: 54.5 %
SEGMENTED NEUTROPHILS ABSOLUTE COUNT: 2 THOU/MM3 (ref 1.8–7.7)
SODIUM BLD-SCNC: 139 MEQ/L (ref 135–145)
WBC # BLD: 3.6 THOU/MM3 (ref 4.8–10.8)

## 2018-11-29 PROCEDURE — 2580000003 HC RX 258: Performed by: THORACIC SURGERY (CARDIOTHORACIC VASCULAR SURGERY)

## 2018-11-29 PROCEDURE — 6360000002 HC RX W HCPCS: Performed by: THORACIC SURGERY (CARDIOTHORACIC VASCULAR SURGERY)

## 2018-11-29 PROCEDURE — 97116 GAIT TRAINING THERAPY: CPT

## 2018-11-29 PROCEDURE — 97530 THERAPEUTIC ACTIVITIES: CPT

## 2018-11-29 PROCEDURE — 85730 THROMBOPLASTIN TIME PARTIAL: CPT

## 2018-11-29 PROCEDURE — 94640 AIRWAY INHALATION TREATMENT: CPT

## 2018-11-29 PROCEDURE — 85025 COMPLETE CBC W/AUTO DIFF WBC: CPT

## 2018-11-29 PROCEDURE — 97110 THERAPEUTIC EXERCISES: CPT

## 2018-11-29 PROCEDURE — 6370000000 HC RX 637 (ALT 250 FOR IP): Performed by: THORACIC SURGERY (CARDIOTHORACIC VASCULAR SURGERY)

## 2018-11-29 PROCEDURE — 82948 REAGENT STRIP/BLOOD GLUCOSE: CPT

## 2018-11-29 PROCEDURE — 6370000000 HC RX 637 (ALT 250 FOR IP): Performed by: PHYSICIAN ASSISTANT

## 2018-11-29 PROCEDURE — APPSS30 APP SPLIT SHARED TIME 16-30 MINUTES: Performed by: PHYSICIAN ASSISTANT

## 2018-11-29 PROCEDURE — 2060000000 HC ICU INTERMEDIATE R&B

## 2018-11-29 PROCEDURE — 6370000000 HC RX 637 (ALT 250 FOR IP): Performed by: PHARMACIST

## 2018-11-29 PROCEDURE — 85610 PROTHROMBIN TIME: CPT

## 2018-11-29 PROCEDURE — 36592 COLLECT BLOOD FROM PICC: CPT

## 2018-11-29 PROCEDURE — 83735 ASSAY OF MAGNESIUM: CPT

## 2018-11-29 PROCEDURE — 2709999900 HC NON-CHARGEABLE SUPPLY

## 2018-11-29 PROCEDURE — 36415 COLL VENOUS BLD VENIPUNCTURE: CPT

## 2018-11-29 PROCEDURE — 80048 BASIC METABOLIC PNL TOTAL CA: CPT

## 2018-11-29 RX ORDER — WARFARIN SODIUM 5 MG/1
5 TABLET ORAL DAILY
Status: DISCONTINUED | OUTPATIENT
Start: 2018-11-29 | End: 2018-11-29 | Stop reason: SDUPTHER

## 2018-11-29 RX ORDER — WARFARIN SODIUM 2.5 MG/1
2.5 TABLET ORAL ONCE
Status: COMPLETED | OUTPATIENT
Start: 2018-11-29 | End: 2018-11-29

## 2018-11-29 RX ORDER — ALBUTEROL SULFATE 2.5 MG/3ML
2.5 SOLUTION RESPIRATORY (INHALATION) EVERY 4 HOURS PRN
Status: DISCONTINUED | OUTPATIENT
Start: 2018-11-29 | End: 2018-12-06 | Stop reason: HOSPADM

## 2018-11-29 RX ADMIN — POTASSIUM CHLORIDE 20 MEQ: 20 TABLET, EXTENDED RELEASE ORAL at 17:43

## 2018-11-29 RX ADMIN — INSULIN LISPRO 3 UNITS: 100 INJECTION, SOLUTION INTRAVENOUS; SUBCUTANEOUS at 19:53

## 2018-11-29 RX ADMIN — POTASSIUM CHLORIDE 20 MEQ: 20 TABLET, EXTENDED RELEASE ORAL at 12:44

## 2018-11-29 RX ADMIN — ATORVASTATIN CALCIUM 40 MG: 40 TABLET, FILM COATED ORAL at 19:47

## 2018-11-29 RX ADMIN — AMIODARONE HYDROCHLORIDE 200 MG: 200 TABLET ORAL at 09:10

## 2018-11-29 RX ADMIN — Medication 10 ML: at 19:47

## 2018-11-29 RX ADMIN — DOCUSATE SODIUM 100 MG: 100 CAPSULE, LIQUID FILLED ORAL at 19:47

## 2018-11-29 RX ADMIN — DEXTROSE MONOHYDRATE 2.5 MCG/KG/MIN: 50 INJECTION, SOLUTION INTRAVENOUS at 15:38

## 2018-11-29 RX ADMIN — FUROSEMIDE 40 MG: 10 INJECTION, SOLUTION INTRAMUSCULAR; INTRAVENOUS at 17:43

## 2018-11-29 RX ADMIN — ALBUTEROL SULFATE 2.5 MG: 2.5 SOLUTION RESPIRATORY (INHALATION) at 08:47

## 2018-11-29 RX ADMIN — POTASSIUM CHLORIDE 20 MEQ: 20 TABLET, EXTENDED RELEASE ORAL at 09:10

## 2018-11-29 RX ADMIN — ACETAMINOPHEN 650 MG: 325 TABLET ORAL at 19:47

## 2018-11-29 RX ADMIN — GABAPENTIN 400 MG: 400 CAPSULE ORAL at 09:10

## 2018-11-29 RX ADMIN — Medication 10 ML: at 17:44

## 2018-11-29 RX ADMIN — MULTIPLE VITAMINS W/ MINERALS TAB 1 TABLET: TAB at 09:10

## 2018-11-29 RX ADMIN — ASPIRIN 325 MG: 325 TABLET ORAL at 09:10

## 2018-11-29 RX ADMIN — METOPROLOL TARTRATE 12.5 MG: 25 TABLET ORAL at 09:12

## 2018-11-29 RX ADMIN — FUROSEMIDE 40 MG: 10 INJECTION, SOLUTION INTRAMUSCULAR; INTRAVENOUS at 09:11

## 2018-11-29 RX ADMIN — Medication 10 ML: at 09:11

## 2018-11-29 RX ADMIN — WARFARIN SODIUM 2.5 MG: 2.5 TABLET ORAL at 17:43

## 2018-11-29 RX ADMIN — GABAPENTIN 400 MG: 400 CAPSULE ORAL at 19:47

## 2018-11-29 RX ADMIN — INSULIN LISPRO 3 UNITS: 100 INJECTION, SOLUTION INTRAVENOUS; SUBCUTANEOUS at 12:44

## 2018-11-29 RX ADMIN — DOCUSATE SODIUM 100 MG: 100 CAPSULE, LIQUID FILLED ORAL at 09:10

## 2018-11-29 ASSESSMENT — PAIN SCALES - GENERAL
PAINLEVEL_OUTOF10: 0
PAINLEVEL_OUTOF10: 0
PAINLEVEL_OUTOF10: 4

## 2018-11-29 NOTE — PROGRESS NOTES
Argatroban Consult  Lab Results   Component Value Date    APTT 68.3 11/28/2018     Lab Results   Component Value Date    HGB 8.1 11/28/2018    HCT 26.5 11/28/2018     11/28/2018    INR 1.18 11/10/2018       Current Rate: 2.5 mcg/kg/min    Plan:  Rate: Continue at 2.5 mcg/kg/min  Next aPTT: 0600 11/29/18 as previously ordered.     Kenyon Donnelly [facility]  11/28/2018  9:39 PM

## 2018-11-29 NOTE — PROGRESS NOTES
PHASE 1 CARDIAC REHABILITATION   CABG, AVR, MVR, TVR, AMI, PCI's  Exercise Physiologists      Treatment Length (l: long, n: normal, s: short): n  Treatment Length Note:   Vitals Stable?: Yes. Any ECG-Rhythm Issues?: No.    Transfers (bc: Bed to Chair, cb: Chair to Bed): na  Strengthening/ROM Treatment Exercises: Step 2    FIDM:     Frequency: 2 x per day   Intensity: <15 RPE, <120bpm   Time: >30-60 seconds longer or >20% increase in distance each walk   Type: Bed/Chair Exercises/stationary marching or ambulation    Aerobic treatment: Pt ambulated approx 200+ ft with RW, and did 4 steps up and down with min assistance        Gait (i: Independent, s: Steady, u: Unsteady): s  Assists: 1  Patient tolerated treatment (w: well, f: fair, p: poor): w  Goals:    Short term:  Progression on each aerobic treatment. Long term: Independent ambulation and discharge. Ady Rivera is to follow sternal precautions. Will learn techniques for getting in and out of bed/chairs/car without using the arms. Using stairs will be addressed if applicable. Home activity instructions (Frequency, Intensity, Time, Type), and progression will be addressed prior to discharge (unless Ady Rivera goes to TCU or an ECF where they will follow PT/OT protocols). Notes: Call light left within reach.     Education given:   Phase II Information (Given upon Discharge):

## 2018-11-29 NOTE — PROGRESS NOTES
endurance    Assessment: Body structures, Functions, Activity limitations: Decreased functional mobility , Decreased strength, Decreased endurance, Decreased balance  Assessment: pt tolerated session fair, he cont to have difficulty with transfers however noted improved gait and gait speed still wtih hands on assist he would benefit from cont skilled therapy  Prognosis: Excellent     REQUIRES PT FOLLOW UP: Yes    Discharge Recommendations:  Discharge Recommendations:  (plan home with wife and home health to follow )    Patient Education:  Patient Education: HEP , sternal precautions, deep breathing, initiated steps and discussed RPE scale how to check pulse    Equipment Recommendations:  Equipment Needed: No  Other: continue to assess    Safety:  Type of devices:  All fall risk precautions in place, Left in chair, Call light within reach, Chair alarm in place    Plan:  Times per week: 6x CABG  Times per day: Daily  Specific instructions for Next Treatment: therex and mobility  Current Treatment Recommendations: Strengthening, Functional Mobility Training, Transfer Training, Balance Training, Endurance Training, Gait Training, Stair training, Patient/Caregiver Education & Training, Safety Education & Training, Home Exercise Program, Equipment Evaluation, Education, & procurement    Goals:  Patient goals : Get stronger    Short term goals  Time Frame for Short term goals: 2 weeks  Short term goal 1: bed mobility with Min A to get in/out of bed  Short term goal 2: transfer with CGA to get in/out of chairs  Short term goal 3: pt will ambulate 400' with a RW and mod I to walk safely in home  Short term goal 4: negotiate 4 steps with HRs and MOD I to enter home safely    Long term goals  Time Frame for Long term goals : no LTGs set secondary to short ELOS

## 2018-11-30 LAB
ANION GAP SERPL CALCULATED.3IONS-SCNC: 11 MEQ/L (ref 8–16)
APTT: 78.4 SECONDS (ref 22–38)
APTT: 88.8 SECONDS (ref 22–38)
BASOPHILS # BLD: 0.7 %
BASOPHILS ABSOLUTE: 0 THOU/MM3 (ref 0–0.1)
BUN BLDV-MCNC: 27 MG/DL (ref 7–22)
CALCIUM SERPL-MCNC: 8.7 MG/DL (ref 8.5–10.5)
CHLORIDE BLD-SCNC: 103 MEQ/L (ref 98–111)
CO2: 25 MEQ/L (ref 23–33)
CREAT SERPL-MCNC: 1.2 MG/DL (ref 0.4–1.2)
EOSINOPHIL # BLD: 4.1 %
EOSINOPHILS ABSOLUTE: 0.1 THOU/MM3 (ref 0–0.4)
ERYTHROCYTE [DISTWIDTH] IN BLOOD BY AUTOMATED COUNT: 16 % (ref 11.5–14.5)
ERYTHROCYTE [DISTWIDTH] IN BLOOD BY AUTOMATED COUNT: 55.4 FL (ref 35–45)
GFR SERPL CREATININE-BSD FRML MDRD: 60 ML/MIN/1.73M2
GLUCOSE BLD-MCNC: 126 MG/DL (ref 70–108)
GLUCOSE BLD-MCNC: 135 MG/DL (ref 70–108)
GLUCOSE BLD-MCNC: 84 MG/DL (ref 70–108)
HCT VFR BLD CALC: 24.1 % (ref 42–52)
HEMOGLOBIN: 7.3 GM/DL (ref 14–18)
IMMATURE GRANS (ABS): 0.01 THOU/MM3 (ref 0–0.07)
IMMATURE GRANULOCYTES: 0.4 %
INR BLD: 2.35 (ref 0.85–1.13)
LYMPHOCYTES # BLD: 34.8 %
LYMPHOCYTES ABSOLUTE: 0.9 THOU/MM3 (ref 1–4.8)
MAGNESIUM: 1.9 MG/DL (ref 1.6–2.4)
MCH RBC QN AUTO: 28.7 PG (ref 26–33)
MCHC RBC AUTO-ENTMCNC: 30.3 GM/DL (ref 32.2–35.5)
MCV RBC AUTO: 94.9 FL (ref 80–94)
MONOCYTES # BLD: 20.6 %
MONOCYTES ABSOLUTE: 0.6 THOU/MM3 (ref 0.4–1.3)
NUCLEATED RED BLOOD CELLS: 0 /100 WBC
PLATELET # BLD: 164 THOU/MM3 (ref 130–400)
PMV BLD AUTO: 9.8 FL (ref 9.4–12.4)
POTASSIUM SERPL-SCNC: 4.2 MEQ/L (ref 3.5–5.2)
RBC # BLD: 2.54 MILL/MM3 (ref 4.7–6.1)
SEG NEUTROPHILS: 39.4 %
SEGMENTED NEUTROPHILS ABSOLUTE COUNT: 1.1 THOU/MM3 (ref 1.8–7.7)
SODIUM BLD-SCNC: 139 MEQ/L (ref 135–145)
WBC # BLD: 2.7 THOU/MM3 (ref 4.8–10.8)

## 2018-11-30 PROCEDURE — 82948 REAGENT STRIP/BLOOD GLUCOSE: CPT

## 2018-11-30 PROCEDURE — 6360000002 HC RX W HCPCS: Performed by: THORACIC SURGERY (CARDIOTHORACIC VASCULAR SURGERY)

## 2018-11-30 PROCEDURE — APPSS30 APP SPLIT SHARED TIME 16-30 MINUTES: Performed by: PHYSICIAN ASSISTANT

## 2018-11-30 PROCEDURE — 2709999900 HC NON-CHARGEABLE SUPPLY

## 2018-11-30 PROCEDURE — 85025 COMPLETE CBC W/AUTO DIFF WBC: CPT

## 2018-11-30 PROCEDURE — 97110 THERAPEUTIC EXERCISES: CPT

## 2018-11-30 PROCEDURE — 6370000000 HC RX 637 (ALT 250 FOR IP): Performed by: THORACIC SURGERY (CARDIOTHORACIC VASCULAR SURGERY)

## 2018-11-30 PROCEDURE — 97116 GAIT TRAINING THERAPY: CPT

## 2018-11-30 PROCEDURE — 85610 PROTHROMBIN TIME: CPT

## 2018-11-30 PROCEDURE — 2060000000 HC ICU INTERMEDIATE R&B

## 2018-11-30 PROCEDURE — 2580000003 HC RX 258: Performed by: THORACIC SURGERY (CARDIOTHORACIC VASCULAR SURGERY)

## 2018-11-30 PROCEDURE — 80048 BASIC METABOLIC PNL TOTAL CA: CPT

## 2018-11-30 PROCEDURE — 36415 COLL VENOUS BLD VENIPUNCTURE: CPT

## 2018-11-30 PROCEDURE — 6370000000 HC RX 637 (ALT 250 FOR IP): Performed by: NURSE PRACTITIONER

## 2018-11-30 PROCEDURE — 6370000000 HC RX 637 (ALT 250 FOR IP): Performed by: PHYSICIAN ASSISTANT

## 2018-11-30 PROCEDURE — 97530 THERAPEUTIC ACTIVITIES: CPT

## 2018-11-30 PROCEDURE — 83735 ASSAY OF MAGNESIUM: CPT

## 2018-11-30 PROCEDURE — 85730 THROMBOPLASTIN TIME PARTIAL: CPT

## 2018-11-30 RX ORDER — GLIMEPIRIDE 2 MG/1
2 TABLET ORAL
Status: DISCONTINUED | OUTPATIENT
Start: 2018-11-30 | End: 2018-12-06 | Stop reason: HOSPADM

## 2018-11-30 RX ORDER — WARFARIN SODIUM 2.5 MG/1
2.5 TABLET ORAL
Status: COMPLETED | OUTPATIENT
Start: 2018-11-30 | End: 2018-11-30

## 2018-11-30 RX ADMIN — ACETAMINOPHEN 650 MG: 325 TABLET ORAL at 15:39

## 2018-11-30 RX ADMIN — GABAPENTIN 400 MG: 400 CAPSULE ORAL at 08:36

## 2018-11-30 RX ADMIN — GABAPENTIN 400 MG: 400 CAPSULE ORAL at 21:17

## 2018-11-30 RX ADMIN — DOCUSATE SODIUM 100 MG: 100 CAPSULE, LIQUID FILLED ORAL at 21:17

## 2018-11-30 RX ADMIN — METOPROLOL TARTRATE 12.5 MG: 25 TABLET ORAL at 21:17

## 2018-11-30 RX ADMIN — FUROSEMIDE 40 MG: 10 INJECTION, SOLUTION INTRAMUSCULAR; INTRAVENOUS at 17:07

## 2018-11-30 RX ADMIN — NYSTATIN 500000 UNITS: 500000 SUSPENSION ORAL at 21:18

## 2018-11-30 RX ADMIN — WARFARIN SODIUM 2.5 MG: 2.5 TABLET ORAL at 17:06

## 2018-11-30 RX ADMIN — DEXTROSE MONOHYDRATE 2.5 MCG/KG/MIN: 50 INJECTION, SOLUTION INTRAVENOUS at 12:40

## 2018-11-30 RX ADMIN — POTASSIUM CHLORIDE 20 MEQ: 20 TABLET, EXTENDED RELEASE ORAL at 08:35

## 2018-11-30 RX ADMIN — POTASSIUM CHLORIDE 20 MEQ: 20 TABLET, EXTENDED RELEASE ORAL at 17:06

## 2018-11-30 RX ADMIN — ATORVASTATIN CALCIUM 40 MG: 40 TABLET, FILM COATED ORAL at 21:17

## 2018-11-30 RX ADMIN — MULTIPLE VITAMINS W/ MINERALS TAB 1 TABLET: TAB at 08:35

## 2018-11-30 RX ADMIN — METOPROLOL TARTRATE 12.5 MG: 25 TABLET ORAL at 08:36

## 2018-11-30 RX ADMIN — POTASSIUM CHLORIDE 20 MEQ: 20 TABLET, EXTENDED RELEASE ORAL at 12:18

## 2018-11-30 RX ADMIN — FUROSEMIDE 40 MG: 10 INJECTION, SOLUTION INTRAMUSCULAR; INTRAVENOUS at 08:36

## 2018-11-30 RX ADMIN — GLIMEPIRIDE 2 MG: 2 TABLET ORAL at 12:18

## 2018-11-30 RX ADMIN — DOCUSATE SODIUM 100 MG: 100 CAPSULE, LIQUID FILLED ORAL at 08:35

## 2018-11-30 RX ADMIN — Medication 10 ML: at 08:35

## 2018-11-30 RX ADMIN — AMIODARONE HYDROCHLORIDE 200 MG: 200 TABLET ORAL at 08:36

## 2018-11-30 RX ADMIN — Medication 10 ML: at 21:18

## 2018-11-30 RX ADMIN — ACETAMINOPHEN 650 MG: 325 TABLET ORAL at 03:32

## 2018-11-30 RX ADMIN — ASPIRIN 325 MG: 325 TABLET ORAL at 08:35

## 2018-11-30 ASSESSMENT — PAIN SCALES - GENERAL
PAINLEVEL_OUTOF10: 0
PAINLEVEL_OUTOF10: 3
PAINLEVEL_OUTOF10: 4
PAINLEVEL_OUTOF10: 0
PAINLEVEL_OUTOF10: 0

## 2018-11-30 NOTE — PROGRESS NOTES
Argatroban Consult  Lab Results   Component Value Date    APTT 78.4 11/30/2018     Lab Results   Component Value Date    HGB 7.3 11/30/2018    HCT 24.1 11/30/2018     11/30/2018    INR 2.35 11/30/2018       Current Rate: 2.5 mcg/kg/min    Plan:  Continue current Rate: 2.5 mcg/kg/min  Next aPTT: 11/30/18 @ Gurjit Norton PharmD, [address]  11/30/2018  9:52 AM

## 2018-11-30 NOTE — PLAN OF CARE
Problem: Nutrition  Goal: Optimal nutrition therapy  Outcome: Ongoing  Nutrition Problem: Moderate malnutrition, In context of acute illness or injury  Intervention: Food and/or Nutrient Delivery: Continue current diet, Continue current ONS  Nutritional Goals: Pt. will consume 75% or more of meals to promote healing during LOS.

## 2018-11-30 NOTE — PLAN OF CARE
Problem: Pain:  Goal: Pain level will decrease  Pain level will decrease   Outcome: Met This Shift  Patient states he is in 0/10 ain. Available prn pain medications are available if needed. Will continue to hourly round to assess pain. Problem: Falls - Risk of:  Goal: Will remain free from falls  Will remain free from falls   Outcome: Ongoing  Sternal precautions in place. Bed/chair alarm on. Non slip foot wear on. Patient oriented to room and to call light. Problem: Discharge Planning:  Goal: Discharged to appropriate level of care  Discharged to appropriate level of care   Outcome: Ongoing  Patient plans to return home with wife. Problem: Nutrition  Goal: Optimal nutrition therapy  Outcome: Ongoing  Patient eating and drinking WNL. Patient on carb control diet. Sliding scale and achs sugar checks in place. glimiperide restarted today. Problem: Musculor/Skeletal Functional Status  Goal: Highest potential functional level  Outcome: Ongoing  Working with therapy post open heart. Problem: HEMODYNAMIC STATUS  Goal: Patient has stable vital signs and fluid balance  Outcome: Met This Shift      Problem: OXYGENATION/RESPIRATORY FUNCTION  Goal: Patient will maintain patent airway  Outcome: Met This Shift  On room air    Problem: Activity Intolerance:  Goal: Able to perform prescribed physical activity  Able to perform prescribed physical activity   Outcome: Ongoing  Continuing therapy daily to regain strength and range of motion. Problem: Cardiac Output - Decreased:  Goal: Cardiac output within specified parameters  Cardiac output within specified parameters   Outcome: Met This Shift    Goal: Hemodynamic stability will improve  Hemodynamic stability will improve   Outcome: Ongoing  Continuing to get closer to INR of 4. On argatroban drip and bridging with coumadin.     Problem: Fluid Volume - Imbalance:  Goal: Ability to achieve a balanced intake and output will improve  Ability to achieve a balanced

## 2018-11-30 NOTE — CARE COORDINATION
11/30/18, 3:05 PM    Anticipated discharge home w/wife and new [facility] . Discharge plan discussed by  and . Discharge plan reviewed with patient/ family. Patient/ family verbalize understanding of discharge plan and are in agreement with plan. Understanding was demonstrated using the teach back method.        IMM Letter  IMM Letter given to Patient/Family/Significant other/Guardian/POA/by[de-identified]   IMM Letter date given[de-identified] 11/30/18  IMM Letter time given[de-identified] 6539

## 2018-11-30 NOTE — PROGRESS NOTES
Joaquina Richardson 62  INPATIENT OCCUPATIONAL THERAPY  STRZ ICU STEPDOWN TELEMETRY 4K  DAILY NOTE    Time:  Time In: 1242  Time Out: 0806  Timed Code Treatment Minutes: 23 Minutes  Minutes: 23          Date: 2018  Patient Name: Francine Knapp,   Gender: male      [facility]  MRN: [id number]  : 1945  (72 y.o.)  Referring Practitioner: Ender Edge MD,  Dr. Kulwant Raya  Diagnosis: Chest Pain  Additional Pertinent Hx: admit with above diagnosis, NSTEMI, underwent an aortic valve replacement and mitral valve repair and CABG x 2 on 11-15 ,extubated on     Past Medical History:   Diagnosis Date    Acute on chronic systolic CHF (congestive heart failure), NYHA class 3 ([facility] [address] EF 25-30% 2018    CAD (coronary artery disease)     CKD (chronic kidney disease) stage 3, GFR 30-59 ml/min ([facility] [address] 2015    Diabetes mellitus ([facility] [address]     HTN (hypertension), benign     Hyperlipidemia     Hypothyroidism     Mitral and aortic incompetence     Neuropathy     Positive blood culture     Being treated with rocephhin as outpt.     S/P CABG x 2 11/15/2018     Past Surgical History:   Procedure Laterality Date    CARDIAC SURGERY      heart cath    CATARACT REMOVAL WITH IMPLANT Bilateral     COLONOSCOPY      EYE SURGERY      [place] CABG, VEIN, THREE N/A 2018    CABG CORONARY ARTERY BYPASS,  AORTIC AND MITRAL VALVE REPAIR WITH MICHELE, BALLOON PUMP INSERTION performed by Dylon Martinez MD at [address] ECHO TRANSESOPHAG R-T 2D IMG ACQUISJ I&R ONLY Left 10/25/2018    TRANSESOPHAGEAL ECHOCARDIOGRAM performed by [place] MD at [facility]       Restrictions/Precautions:  General Precautions                    Sternal Precautions: No Pushing, No Pulling, 5# Lifting Restrictions  Sternal Precautions: CABG x 2, aortic valve replacement and mitral valve repair 11-15       Prior Level of Function:  ADL Assistance: Independent  Homemaking Assistance: Needs assistance (wife completes most, able to do yardwork, has a son that helps)  Ambulation Assistance: Independent  Transfer Assistance: Independent       Subjective       Subjective: pt seated in recliner when arrived. Pt agreeable to OT session   Comments: RN ok'd session this date. Pain:  Pain Assessment  Patient Currently in Pain: Denies       Objective  Overall Cognitive Status: WFL     Transfers  Sit to stand: Contact guard assistance  Stand to sit: Stand by assistance       Balance  Sitting Balance: Supervision  Standing Balance: Stand by assistance     Time: in prep to ambulate     Functional Mobility  Functional - Mobility Device: Rolling Walker  Assist Level: Stand by assistance  Functional Mobility Comments: Pt. ambulated around nursing unit with SBA, no LOB                Comment: Completed CABG step II exercises x20 reps x1 set seated in chair. Required brief rest break after each exercise. Completed to increase activity tolerance required for ADL routine             Activity Tolerance:  Activity Tolerance: Patient Tolerated treatment well    Assessment:     Performance deficits / Impairments: Decreased functional mobility , Decreased ADL status, Decreased endurance, Decreased balance, Decreased high-level IADLs, Decreased safe awareness  Prognosis: Good    Discharge Recommendations:  Discharge Recommendations: Patient would benefit from continued therapy after discharge, Subacute/Skilled Nursing Facility    Patient Education:  Patient Education: transfer tech, kevin precautions    Equipment Recommendations:  Equipment Needed: No  Other: already has ETS, tub transfer bench, RW    Safety:  Safety Devices in place: Yes  Type of devices:  All fall risk precautions in place, Call light within reach, Gait belt, Left in chair, Chair alarm in place, Nurse notified    Plan:  Times per week: 6x  Current Treatment Recommendations: Self-Care / ADL, Strengthening, Balance Training, Functional Mobility Training, Endurance Training,

## 2018-11-30 NOTE — CARE COORDINATION
11/30/18, 9:47 AM    Discharge plan discussed by  and . Discharge plan reviewed with patient/ family. Patient/ family verbalize understanding of discharge plan and are in agreement with plan. Understanding was demonstrated using the teach back method. IMM Letter  IMM Letter given to Patient/Family/Significant other/Guardian/POA/by[de-identified]   IMM Letter date given[de-identified] 11/23/18  IMM Letter time given[de-identified] 0820     Planning discharge over the weekend, [facility] to follow, nurse to fax Discharge Summary to [phone] and notify Roberto Lopez of discharge, phone is [phone].

## 2018-11-30 NOTE — PROGRESS NOTES
Nutrition Assessment    Type and Reason for Visit: Reassess    Nutrition Recommendations: Continue current diet, ONS and MVI. Nutrition Assessment: Pt. Improving from a nutrition standpoint as evidenced by improving appetite, intake and acceptance of ONS. Remains at risk for further nutritional compromise r/t increased nutrient needs for wound healing. Will continue ONS TID. Monitor po intake, weights and diet education needs. Pt. Seen while ambulating with therapy. Reports eating fairly well. States good acceptance of Glucerna ONS. Rx includes MVI, Amaryl. Diet education reinforced 11/6/18 and 11/20/18. Malnutrition Assessment:  · Malnutrition Status: Meets the criteria for moderate malnutrition  · Context: Acute illness or injury  · Findings of the 6 clinical characteristics of malnutrition (Minimum of 2 out of 6 clinical characteristics is required to make the diagnosis of moderate or severe Protein Calorie Malnutrition based on AND/ASPEN Guidelines):  1. Energy Intake-Less than 75% of estimated energy requirement for greater than 7 days  2. Fat Loss-Mild subcutaneous fat loss, Orbital  3. Muscle Loss-Mild muscle mass loss, Clavicles (pectoralis and deltoids)    Nutrition Risk Level: Moderate    Nutrient Needs:  · Estimated Daily Total Kcal: 1828-2285kcals (20-25kcals/kgm wt. of 91.4kgm 11/6)  · Estimated Daily Protein (g): 81-97 gm (1-1.2 gm/kgm IBW of 81 kgm)    Nutrition Diagnosis:   · Problem:  Moderate malnutrition, In context of acute illness or injury  · Etiology: related to Insufficient energy/nutrient consumption     Signs and symptoms:  as evidenced by Mild loss of subcutaneous fat, Mild muscle loss    Objective Information:  · Nutrition-Focused Physical Findings: 1 BM noted past 24 hours; teeth extraced 11/9; tolerating current diet textures  · Wound Type: Surgical Wound (11/14 CABG & MVR)  · Current Nutrition Therapies:  · Oral Diet Orders: Carb Control 4 Carbs/Meal, 2gm Sodium · Oral Diet intake: 51-75%, %  · Oral Nutrition Supplement (ONS) Orders: Diabetic Oral Supplement (Activia and hot beverate TID)  · ONS intake:  (reports acceptance)  · Anthropometric Measures:  · Ht: 6' (182.9 cm)   · Current Body Wt: 207 lb 3.2 oz (94 kg) (11/30, trace, +1 edema)  · Admission Body Wt: 201 lb 8 oz (91.4 kg) (11/6/18 on 6K was last actual wt. )  · Usual Body Wt:  (219-220# per pt, per EMR: 10/21/18: 216# 4.3OZ, (no edema), 10/26/18): 211# 11.2oz, 11/5/18: 206# 4.8oz )  · % Weight Change: 5.1% wt. loss in last 20 days, pt. admitted with CHF, on Bumex,     · [place] Body Wt: 178 lb (80.7 kg),   · BMI Classification: BMI 25.0 - 29.9 Overweight    Nutrition Interventions:   Continue current diet, Continue current ONS  Continued Inpatient Monitoring, Education Completed, Coordination of Care (11/20 Encouraged good nutrition at best efforts. Reviewed cardiac, low sodium, diabetic diet, lipid panel. Provided written information, RD name and number. 11/30 Encouraged good nutrition for healing.)    Nutrition Evaluation:   · Evaluation: Progressing toward goals   · Goals: Pt. will consume 75% or more of meals to promote healing during LOS.     · Monitoring: Meal Intake, Supplement Intake, Diet Tolerance, Skin Integrity, Weight, Pertinent Labs, Chewing/Swallowing, Patient/Family Education, Monitor Hemodynamic Status, Monitor Bowel Function      Electronically signed by Rick Basilio RD, LD on 11/30/18 at 11:46 AM    Contact Number: [phone]

## 2018-12-01 LAB
ANION GAP SERPL CALCULATED.3IONS-SCNC: 12 MEQ/L (ref 8–16)
APTT: 79.8 SECONDS (ref 22–38)
APTT: 86.7 SECONDS (ref 22–38)
BASOPHILS # BLD: 1.5 %
BASOPHILS ABSOLUTE: 0 THOU/MM3 (ref 0–0.1)
BUN BLDV-MCNC: 30 MG/DL (ref 7–22)
CALCIUM SERPL-MCNC: 8.4 MG/DL (ref 8.5–10.5)
CHLORIDE BLD-SCNC: 101 MEQ/L (ref 98–111)
CO2: 25 MEQ/L (ref 23–33)
CREAT SERPL-MCNC: 1 MG/DL (ref 0.4–1.2)
EOSINOPHIL # BLD: 5 %
EOSINOPHILS ABSOLUTE: 0.1 THOU/MM3 (ref 0–0.4)
ERYTHROCYTE [DISTWIDTH] IN BLOOD BY AUTOMATED COUNT: 15.9 % (ref 11.5–14.5)
ERYTHROCYTE [DISTWIDTH] IN BLOOD BY AUTOMATED COUNT: 54.6 FL (ref 35–45)
GFR SERPL CREATININE-BSD FRML MDRD: 74 ML/MIN/1.73M2
GLUCOSE BLD-MCNC: 102 MG/DL (ref 70–108)
GLUCOSE BLD-MCNC: 161 MG/DL (ref 70–108)
GLUCOSE BLD-MCNC: 222 MG/DL (ref 70–108)
GLUCOSE BLD-MCNC: 72 MG/DL (ref 70–108)
GLUCOSE BLD-MCNC: 83 MG/DL (ref 70–108)
HCT VFR BLD CALC: 24.6 % (ref 42–52)
HEMOGLOBIN: 7.5 GM/DL (ref 14–18)
IMMATURE GRANS (ABS): 0 THOU/MM3 (ref 0–0.07)
IMMATURE GRANULOCYTES: 0 %
INR BLD: 2.47 (ref 0.85–1.13)
LYMPHOCYTES # BLD: 47.5 %
LYMPHOCYTES ABSOLUTE: 1 THOU/MM3 (ref 1–4.8)
MAGNESIUM: 2.1 MG/DL (ref 1.6–2.4)
MCH RBC QN AUTO: 28.6 PG (ref 26–33)
MCHC RBC AUTO-ENTMCNC: 30.5 GM/DL (ref 32.2–35.5)
MCV RBC AUTO: 93.9 FL (ref 80–94)
MONOCYTES # BLD: 23 %
MONOCYTES ABSOLUTE: 0.5 THOU/MM3 (ref 0.4–1.3)
NUCLEATED RED BLOOD CELLS: 0 /100 WBC
PLATELET # BLD: 187 THOU/MM3 (ref 130–400)
PMV BLD AUTO: 9.3 FL (ref 9.4–12.4)
POTASSIUM SERPL-SCNC: 4.4 MEQ/L (ref 3.5–5.2)
RBC # BLD: 2.62 MILL/MM3 (ref 4.7–6.1)
SEG NEUTROPHILS: 23 %
SEGMENTED NEUTROPHILS ABSOLUTE COUNT: 0.5 THOU/MM3 (ref 1.8–7.7)
SODIUM BLD-SCNC: 138 MEQ/L (ref 135–145)
WBC # BLD: 2 THOU/MM3 (ref 4.8–10.8)

## 2018-12-01 PROCEDURE — 97110 THERAPEUTIC EXERCISES: CPT

## 2018-12-01 PROCEDURE — 6360000002 HC RX W HCPCS: Performed by: THORACIC SURGERY (CARDIOTHORACIC VASCULAR SURGERY)

## 2018-12-01 PROCEDURE — 85025 COMPLETE CBC W/AUTO DIFF WBC: CPT

## 2018-12-01 PROCEDURE — 6370000000 HC RX 637 (ALT 250 FOR IP): Performed by: THORACIC SURGERY (CARDIOTHORACIC VASCULAR SURGERY)

## 2018-12-01 PROCEDURE — 36415 COLL VENOUS BLD VENIPUNCTURE: CPT

## 2018-12-01 PROCEDURE — 83735 ASSAY OF MAGNESIUM: CPT

## 2018-12-01 PROCEDURE — 6370000000 HC RX 637 (ALT 250 FOR IP): Performed by: NURSE PRACTITIONER

## 2018-12-01 PROCEDURE — 82948 REAGENT STRIP/BLOOD GLUCOSE: CPT

## 2018-12-01 PROCEDURE — 2580000003 HC RX 258: Performed by: THORACIC SURGERY (CARDIOTHORACIC VASCULAR SURGERY)

## 2018-12-01 PROCEDURE — 36592 COLLECT BLOOD FROM PICC: CPT

## 2018-12-01 PROCEDURE — 85730 THROMBOPLASTIN TIME PARTIAL: CPT

## 2018-12-01 PROCEDURE — 6370000000 HC RX 637 (ALT 250 FOR IP): Performed by: PHYSICIAN ASSISTANT

## 2018-12-01 PROCEDURE — 2060000000 HC ICU INTERMEDIATE R&B

## 2018-12-01 PROCEDURE — 2709999900 HC NON-CHARGEABLE SUPPLY

## 2018-12-01 PROCEDURE — 85610 PROTHROMBIN TIME: CPT

## 2018-12-01 PROCEDURE — 80048 BASIC METABOLIC PNL TOTAL CA: CPT

## 2018-12-01 PROCEDURE — 97116 GAIT TRAINING THERAPY: CPT

## 2018-12-01 RX ORDER — WARFARIN SODIUM 5 MG/1
5 TABLET ORAL
Status: COMPLETED | OUTPATIENT
Start: 2018-12-01 | End: 2018-12-01

## 2018-12-01 RX ORDER — WARFARIN SODIUM 2.5 MG/1
2.5 TABLET ORAL ONCE
Status: DISCONTINUED | OUTPATIENT
Start: 2018-12-01 | End: 2018-12-01 | Stop reason: ALTCHOICE

## 2018-12-01 RX ADMIN — FUROSEMIDE 40 MG: 10 INJECTION, SOLUTION INTRAMUSCULAR; INTRAVENOUS at 09:30

## 2018-12-01 RX ADMIN — ASPIRIN 325 MG: 325 TABLET ORAL at 09:30

## 2018-12-01 RX ADMIN — POTASSIUM CHLORIDE 20 MEQ: 20 TABLET, EXTENDED RELEASE ORAL at 18:03

## 2018-12-01 RX ADMIN — ACETAMINOPHEN 650 MG: 325 TABLET ORAL at 06:22

## 2018-12-01 RX ADMIN — AMIODARONE HYDROCHLORIDE 200 MG: 200 TABLET ORAL at 09:30

## 2018-12-01 RX ADMIN — WARFARIN SODIUM 5 MG: 5 TABLET ORAL at 18:03

## 2018-12-01 RX ADMIN — Medication 10 ML: at 09:31

## 2018-12-01 RX ADMIN — GLIMEPIRIDE 2 MG: 2 TABLET ORAL at 09:30

## 2018-12-01 RX ADMIN — NYSTATIN 500000 UNITS: 500000 SUSPENSION ORAL at 20:38

## 2018-12-01 RX ADMIN — POTASSIUM CHLORIDE 20 MEQ: 20 TABLET, EXTENDED RELEASE ORAL at 12:35

## 2018-12-01 RX ADMIN — ACETAMINOPHEN 650 MG: 325 TABLET ORAL at 20:38

## 2018-12-01 RX ADMIN — ATORVASTATIN CALCIUM 40 MG: 40 TABLET, FILM COATED ORAL at 20:38

## 2018-12-01 RX ADMIN — DOCUSATE SODIUM 100 MG: 100 CAPSULE, LIQUID FILLED ORAL at 20:38

## 2018-12-01 RX ADMIN — GABAPENTIN 400 MG: 400 CAPSULE ORAL at 09:30

## 2018-12-01 RX ADMIN — FUROSEMIDE 40 MG: 10 INJECTION, SOLUTION INTRAMUSCULAR; INTRAVENOUS at 18:03

## 2018-12-01 RX ADMIN — METOPROLOL TARTRATE 12.5 MG: 25 TABLET ORAL at 20:38

## 2018-12-01 RX ADMIN — DOCUSATE SODIUM 100 MG: 100 CAPSULE, LIQUID FILLED ORAL at 09:31

## 2018-12-01 RX ADMIN — MULTIPLE VITAMINS W/ MINERALS TAB 1 TABLET: TAB at 09:31

## 2018-12-01 RX ADMIN — INSULIN LISPRO 3 UNITS: 100 INJECTION, SOLUTION INTRAVENOUS; SUBCUTANEOUS at 12:36

## 2018-12-01 RX ADMIN — Medication 10 ML: at 20:38

## 2018-12-01 RX ADMIN — INSULIN LISPRO 3 UNITS: 100 INJECTION, SOLUTION INTRAVENOUS; SUBCUTANEOUS at 22:36

## 2018-12-01 RX ADMIN — Medication 10 ML: at 18:03

## 2018-12-01 RX ADMIN — METOPROLOL TARTRATE 12.5 MG: 25 TABLET ORAL at 09:30

## 2018-12-01 RX ADMIN — GABAPENTIN 400 MG: 400 CAPSULE ORAL at 20:38

## 2018-12-01 RX ADMIN — POTASSIUM CHLORIDE 20 MEQ: 20 TABLET, EXTENDED RELEASE ORAL at 09:31

## 2018-12-01 ASSESSMENT — PAIN SCALES - GENERAL
PAINLEVEL_OUTOF10: 0
PAINLEVEL_OUTOF10: 0
PAINLEVEL_OUTOF10: 1

## 2018-12-01 NOTE — PROGRESS NOTES
Warfarin Dose     11/29/2018 1.92 2.5 mg   11/30/2018 2.35 2.5 mg   12/1/2018 2.47 5 mg                                     Dr. Addie Dickens is requesting Coumadin 5 mg instead of 2.5 mg ordered by pharmacy for 12/1/2018

## 2018-12-01 NOTE — PROGRESS NOTES
[facility] ICU STEPDOWN TELEMETRY 4K - 4K-13/013-A    Time In: 830  Time Out:   Timed Code Treatment Minutes: 25 Minutes  Minutes: 25          Date: 2018  Patient Name: Chrissie Arauz,  Gender:  male        MRN: [id number]  : 1945  (72 y.o.)     Referring Practitioner: Dr. Nahomi Haskins  Diagnosis: chest pain  Additional Pertinent Hx: underwent an aortic valve replacement and mitral valve repair and CABG x 2 on 11-15 ,extubated on      Past Medical History:   Diagnosis Date    Acute on chronic systolic CHF (congestive heart failure), NYHA class 3 ([facility] EF 25-30% 2018    CAD (coronary artery disease)     CKD (chronic kidney disease) stage 3, GFR 30-59 ml/min ([facility] 2015    Diabetes mellitus ([facility] [facility]     HTN (hypertension), benign     Hyperlipidemia     Hypothyroidism     Mitral and aortic incompetence     Neuropathy     Positive blood culture     Being treated with rocephhin as outpt.     S/P CABG x 2 11/15/2018     Past Surgical History:   Procedure Laterality Date    CARDIAC SURGERY      heart cath    CATARACT REMOVAL WITH IMPLANT Bilateral     COLONOSCOPY      EYE SURGERY      [place] CABG, VEIN, THREE N/A 2018    CABG CORONARY ARTERY BYPASS,  AORTIC AND MITRAL VALVE REPAIR WITH MICHELE, BALLOON PUMP INSERTION performed by Emeka Morales MD at [address] [place] [address] ECHO TRANSESOPHAG R-T 2D IMG ACQUISJ I&R ONLY Left 10/25/2018    TRANSESOPHAGEAL ECHOCARDIOGRAM performed by Alejos Goodell, MD at [facility]       Restrictions/Precautions:  General Precautions                    Sternal Precautions: No Pushing, No Pulling, 5# Lifting Restrictions  Sternal Precautions: CABG x 2, aortic valve replacement and mitral valve repair 11-15       Prior Level of Function:  ADL Assistance: Independent  Homemaking Assistance: Needs assistance (wife completes most, able to do yardwork, has a son that helps)  Ambulation Assistance:

## 2018-12-01 NOTE — PROGRESS NOTES
Argatroban Consult  Lab Results   Component Value Date    APTT 88.8 11/30/2018     Lab Results   Component Value Date    HGB 7.3 11/30/2018    HCT 24.1 11/30/2018     11/30/2018    INR 2.35 11/30/2018       Current Rate: 2.5 mcg/kg/min    Plan:  Rate: continue at 2.5 mcg/kg/min  Next aPTT: 0445 12/1 (already ordered).     Reva Villegas RP, BCPS, BCGP  11/30/2018     7:21 PM

## 2018-12-02 LAB
ANION GAP SERPL CALCULATED.3IONS-SCNC: 13 MEQ/L (ref 8–16)
APTT: 82.1 SECONDS (ref 22–38)
APTT: 83.3 SECONDS (ref 22–38)
BASOPHILIA: ABNORMAL
BASOPHILS # BLD: 1.4 %
BASOPHILS ABSOLUTE: 0 THOU/MM3 (ref 0–0.1)
BUN BLDV-MCNC: 27 MG/DL (ref 7–22)
CALCIUM SERPL-MCNC: 8.6 MG/DL (ref 8.5–10.5)
CHLORIDE BLD-SCNC: 103 MEQ/L (ref 98–111)
CO2: 25 MEQ/L (ref 23–33)
CREAT SERPL-MCNC: 1.1 MG/DL (ref 0.4–1.2)
EOSINOPHIL # BLD: 5.5 %
EOSINOPHILS ABSOLUTE: 0.1 THOU/MM3 (ref 0–0.4)
ERYTHROCYTE [DISTWIDTH] IN BLOOD BY AUTOMATED COUNT: 15.8 % (ref 11.5–14.5)
ERYTHROCYTE [DISTWIDTH] IN BLOOD BY AUTOMATED COUNT: 54.5 FL (ref 35–45)
GFR SERPL CREATININE-BSD FRML MDRD: 66 ML/MIN/1.73M2
GLUCOSE BLD-MCNC: 106 MG/DL (ref 70–108)
GLUCOSE BLD-MCNC: 162 MG/DL (ref 70–108)
GLUCOSE BLD-MCNC: 175 MG/DL (ref 70–108)
GLUCOSE BLD-MCNC: 180 MG/DL (ref 70–108)
GLUCOSE BLD-MCNC: 58 MG/DL (ref 70–108)
HCT VFR BLD CALC: 24.9 % (ref 42–52)
HEMOGLOBIN: 7.5 GM/DL (ref 14–18)
IMMATURE GRANS (ABS): 0.02 THOU/MM3 (ref 0–0.07)
IMMATURE GRANULOCYTES: 1.4 %
INR BLD: 2.34 (ref 0.85–1.13)
LYMPHOCYTES # BLD: 53.4 %
LYMPHOCYTES ABSOLUTE: 0.8 THOU/MM3 (ref 1–4.8)
MAGNESIUM: 2.3 MG/DL (ref 1.6–2.4)
MCH RBC QN AUTO: 28.6 PG (ref 26–33)
MCHC RBC AUTO-ENTMCNC: 30.1 GM/DL (ref 32.2–35.5)
MCV RBC AUTO: 95 FL (ref 80–94)
MONOCYTES # BLD: 28.8 %
MONOCYTES ABSOLUTE: 0.4 THOU/MM3 (ref 0.4–1.3)
NUCLEATED RED BLOOD CELLS: 0 /100 WBC
PLATELET # BLD: 212 THOU/MM3 (ref 130–400)
PMV BLD AUTO: 9.2 FL (ref 9.4–12.4)
POTASSIUM SERPL-SCNC: 4.4 MEQ/L (ref 3.5–5.2)
RBC # BLD: 2.62 MILL/MM3 (ref 4.7–6.1)
SCAN OF BLOOD SMEAR: NORMAL
SEG NEUTROPHILS: 9.5 %
SEGMENTED NEUTROPHILS ABSOLUTE COUNT: 0.1 THOU/MM3 (ref 1.8–7.7)
SODIUM BLD-SCNC: 141 MEQ/L (ref 135–145)
WBC # BLD: 1.5 THOU/MM3 (ref 4.8–10.8)

## 2018-12-02 PROCEDURE — 2709999900 HC NON-CHARGEABLE SUPPLY

## 2018-12-02 PROCEDURE — 2580000003 HC RX 258: Performed by: THORACIC SURGERY (CARDIOTHORACIC VASCULAR SURGERY)

## 2018-12-02 PROCEDURE — 97530 THERAPEUTIC ACTIVITIES: CPT

## 2018-12-02 PROCEDURE — 85025 COMPLETE CBC W/AUTO DIFF WBC: CPT

## 2018-12-02 PROCEDURE — 6370000000 HC RX 637 (ALT 250 FOR IP): Performed by: NURSE PRACTITIONER

## 2018-12-02 PROCEDURE — 6370000000 HC RX 637 (ALT 250 FOR IP): Performed by: PHYSICIAN ASSISTANT

## 2018-12-02 PROCEDURE — 80048 BASIC METABOLIC PNL TOTAL CA: CPT

## 2018-12-02 PROCEDURE — 36415 COLL VENOUS BLD VENIPUNCTURE: CPT

## 2018-12-02 PROCEDURE — 97110 THERAPEUTIC EXERCISES: CPT

## 2018-12-02 PROCEDURE — 6360000002 HC RX W HCPCS: Performed by: THORACIC SURGERY (CARDIOTHORACIC VASCULAR SURGERY)

## 2018-12-02 PROCEDURE — 82948 REAGENT STRIP/BLOOD GLUCOSE: CPT

## 2018-12-02 PROCEDURE — 2060000000 HC ICU INTERMEDIATE R&B

## 2018-12-02 PROCEDURE — 85610 PROTHROMBIN TIME: CPT

## 2018-12-02 PROCEDURE — 6370000000 HC RX 637 (ALT 250 FOR IP): Performed by: THORACIC SURGERY (CARDIOTHORACIC VASCULAR SURGERY)

## 2018-12-02 PROCEDURE — 83735 ASSAY OF MAGNESIUM: CPT

## 2018-12-02 PROCEDURE — 85730 THROMBOPLASTIN TIME PARTIAL: CPT

## 2018-12-02 PROCEDURE — 6370000000 HC RX 637 (ALT 250 FOR IP)

## 2018-12-02 PROCEDURE — 36592 COLLECT BLOOD FROM PICC: CPT

## 2018-12-02 RX ORDER — WARFARIN SODIUM 5 MG/1
5 TABLET ORAL ONCE
Status: COMPLETED | OUTPATIENT
Start: 2018-12-02 | End: 2018-12-02

## 2018-12-02 RX ADMIN — DOCUSATE SODIUM 100 MG: 100 CAPSULE, LIQUID FILLED ORAL at 20:07

## 2018-12-02 RX ADMIN — DEXTROSE MONOHYDRATE 2.5 MCG/KG/MIN: 50 INJECTION, SOLUTION INTRAVENOUS at 18:03

## 2018-12-02 RX ADMIN — ACETAMINOPHEN 650 MG: 325 TABLET ORAL at 16:15

## 2018-12-02 RX ADMIN — WARFARIN SODIUM 5 MG: 5 TABLET ORAL at 17:46

## 2018-12-02 RX ADMIN — ASPIRIN 325 MG: 325 TABLET ORAL at 08:22

## 2018-12-02 RX ADMIN — GABAPENTIN 400 MG: 400 CAPSULE ORAL at 08:24

## 2018-12-02 RX ADMIN — ATORVASTATIN CALCIUM 40 MG: 40 TABLET, FILM COATED ORAL at 20:07

## 2018-12-02 RX ADMIN — ACETAMINOPHEN 650 MG: 325 TABLET ORAL at 08:22

## 2018-12-02 RX ADMIN — FUROSEMIDE 40 MG: 10 INJECTION, SOLUTION INTRAMUSCULAR; INTRAVENOUS at 17:47

## 2018-12-02 RX ADMIN — GABAPENTIN 400 MG: 400 CAPSULE ORAL at 20:07

## 2018-12-02 RX ADMIN — Medication 10 ML: at 20:07

## 2018-12-02 RX ADMIN — AMIODARONE HYDROCHLORIDE 200 MG: 200 TABLET ORAL at 08:25

## 2018-12-02 RX ADMIN — GLIMEPIRIDE 2 MG: 2 TABLET ORAL at 12:18

## 2018-12-02 RX ADMIN — POTASSIUM CHLORIDE 20 MEQ: 20 TABLET, EXTENDED RELEASE ORAL at 17:47

## 2018-12-02 RX ADMIN — POTASSIUM CHLORIDE 20 MEQ: 20 TABLET, EXTENDED RELEASE ORAL at 08:22

## 2018-12-02 RX ADMIN — NYSTATIN 500000 UNITS: 500000 SUSPENSION ORAL at 20:08

## 2018-12-02 RX ADMIN — ACETAMINOPHEN 650 MG: 325 TABLET ORAL at 21:25

## 2018-12-02 RX ADMIN — Medication 10 ML: at 08:26

## 2018-12-02 RX ADMIN — DOCUSATE SODIUM 100 MG: 100 CAPSULE, LIQUID FILLED ORAL at 08:22

## 2018-12-02 RX ADMIN — METOPROLOL TARTRATE 12.5 MG: 25 TABLET ORAL at 20:08

## 2018-12-02 RX ADMIN — FUROSEMIDE 40 MG: 10 INJECTION, SOLUTION INTRAMUSCULAR; INTRAVENOUS at 08:26

## 2018-12-02 RX ADMIN — INSULIN LISPRO 3 UNITS: 100 INJECTION, SOLUTION INTRAVENOUS; SUBCUTANEOUS at 12:19

## 2018-12-02 RX ADMIN — INSULIN LISPRO 3 UNITS: 100 INJECTION, SOLUTION INTRAVENOUS; SUBCUTANEOUS at 17:16

## 2018-12-02 RX ADMIN — POTASSIUM CHLORIDE 20 MEQ: 20 TABLET, EXTENDED RELEASE ORAL at 12:22

## 2018-12-02 RX ADMIN — INSULIN LISPRO 2 UNITS: 100 INJECTION, SOLUTION INTRAVENOUS; SUBCUTANEOUS at 20:11

## 2018-12-02 RX ADMIN — METOPROLOL TARTRATE 12.5 MG: 25 TABLET ORAL at 08:24

## 2018-12-02 ASSESSMENT — PAIN DESCRIPTION - ONSET
ONSET: ON-GOING
ONSET: ON-GOING

## 2018-12-02 ASSESSMENT — PAIN SCALES - GENERAL
PAINLEVEL_OUTOF10: 4
PAINLEVEL_OUTOF10: 0
PAINLEVEL_OUTOF10: 4
PAINLEVEL_OUTOF10: 2
PAINLEVEL_OUTOF10: 0
PAINLEVEL_OUTOF10: 4
PAINLEVEL_OUTOF10: 0
PAINLEVEL_OUTOF10: 3

## 2018-12-02 ASSESSMENT — PAIN DESCRIPTION - PROGRESSION
CLINICAL_PROGRESSION: NOT CHANGED
CLINICAL_PROGRESSION: NOT CHANGED

## 2018-12-02 ASSESSMENT — PAIN DESCRIPTION - LOCATION
LOCATION: STERNUM

## 2018-12-02 ASSESSMENT — PAIN DESCRIPTION - FREQUENCY
FREQUENCY: INTERMITTENT
FREQUENCY: CONTINUOUS
FREQUENCY: INTERMITTENT

## 2018-12-02 ASSESSMENT — PAIN DESCRIPTION - ORIENTATION
ORIENTATION: MID

## 2018-12-02 ASSESSMENT — PAIN DESCRIPTION - DESCRIPTORS
DESCRIPTORS: DISCOMFORT

## 2018-12-02 ASSESSMENT — PAIN DESCRIPTION - PAIN TYPE
TYPE: SURGICAL PAIN

## 2018-12-02 NOTE — PROGRESS NOTES
Clinical Pharmacy Note    Warfarin consult follow-up    Recent Labs      12/02/18   0430   INR  2.34*     Recent Labs      11/30/18   0520  12/01/18   0400  12/02/18   0430   HGB  7.3*  7.5*  7.5*   HCT  24.1*  24.6*  24.9*   PLT  164  187  212       Significant Drug-Drug Interactions:  New warfarin drug-drug interactions: none  Discontinued drug-drug interactions: none  Current warfarin drug-drug interactions: aspirin, argatroban drip, amiodarone     Date INR Warfarin Dose   11/29/2018 1.92 2.5 mg   11/30/2018 2.35 2.5 mg   12/1/2018 2.47 5 mg   12/2/2018 2.34 5 mg                               Notes:                     Daily PT/INR until stable within therapeutic range.

## 2018-12-03 LAB
ANION GAP SERPL CALCULATED.3IONS-SCNC: 12 MEQ/L (ref 8–16)
APTT: 108.4 SECONDS (ref 22–38)
APTT: 93.8 SECONDS (ref 22–38)
APTT: 98.4 SECONDS (ref 22–38)
BASOPHILS # BLD: 1.1 %
BASOPHILS ABSOLUTE: 0 THOU/MM3 (ref 0–0.1)
BUN BLDV-MCNC: 28 MG/DL (ref 7–22)
CALCIUM SERPL-MCNC: 8.7 MG/DL (ref 8.5–10.5)
CHLORIDE BLD-SCNC: 102 MEQ/L (ref 98–111)
CO2: 24 MEQ/L (ref 23–33)
CREAT SERPL-MCNC: 1.1 MG/DL (ref 0.4–1.2)
EOSINOPHIL # BLD: 4.4 %
EOSINOPHILS ABSOLUTE: 0.1 THOU/MM3 (ref 0–0.4)
ERYTHROCYTE [DISTWIDTH] IN BLOOD BY AUTOMATED COUNT: 15.9 % (ref 11.5–14.5)
ERYTHROCYTE [DISTWIDTH] IN BLOOD BY AUTOMATED COUNT: 55.5 FL (ref 35–45)
GFR SERPL CREATININE-BSD FRML MDRD: 66 ML/MIN/1.73M2
GLUCOSE BLD-MCNC: 112 MG/DL (ref 70–108)
GLUCOSE BLD-MCNC: 127 MG/DL (ref 70–108)
GLUCOSE BLD-MCNC: 129 MG/DL (ref 70–108)
GLUCOSE BLD-MCNC: 144 MG/DL (ref 70–108)
GLUCOSE BLD-MCNC: 207 MG/DL (ref 70–108)
HCT VFR BLD CALC: 24.8 % (ref 42–52)
HEMOGLOBIN: 7.5 GM/DL (ref 14–18)
IMMATURE GRANS (ABS): 0 THOU/MM3 (ref 0–0.07)
IMMATURE GRANULOCYTES: 0 %
INR BLD: 2.58 (ref 0.85–1.13)
LYMPHOCYTES # BLD: 52.5 %
LYMPHOCYTES ABSOLUTE: 0.9 THOU/MM3 (ref 1–4.8)
MAGNESIUM: 2.3 MG/DL (ref 1.6–2.4)
MCH RBC QN AUTO: 28.6 PG (ref 26–33)
MCHC RBC AUTO-ENTMCNC: 30.2 GM/DL (ref 32.2–35.5)
MCV RBC AUTO: 94.7 FL (ref 80–94)
MONOCYTES # BLD: 32.6 %
MONOCYTES ABSOLUTE: 0.6 THOU/MM3 (ref 0.4–1.3)
NUCLEATED RED BLOOD CELLS: 0 /100 WBC
PLATELET # BLD: 231 THOU/MM3 (ref 130–400)
PLATELET ESTIMATE: ADEQUATE
PMV BLD AUTO: 9.2 FL (ref 9.4–12.4)
POTASSIUM SERPL-SCNC: 5.3 MEQ/L (ref 3.5–5.2)
RBC # BLD: 2.62 MILL/MM3 (ref 4.7–6.1)
SCAN OF BLOOD SMEAR: NORMAL
SEG NEUTROPHILS: 9.4 %
SEGMENTED NEUTROPHILS ABSOLUTE COUNT: 0.2 THOU/MM3 (ref 1.8–7.7)
SODIUM BLD-SCNC: 138 MEQ/L (ref 135–145)
WBC # BLD: 1.8 THOU/MM3 (ref 4.8–10.8)

## 2018-12-03 PROCEDURE — 85610 PROTHROMBIN TIME: CPT

## 2018-12-03 PROCEDURE — 36415 COLL VENOUS BLD VENIPUNCTURE: CPT

## 2018-12-03 PROCEDURE — 6360000002 HC RX W HCPCS: Performed by: THORACIC SURGERY (CARDIOTHORACIC VASCULAR SURGERY)

## 2018-12-03 PROCEDURE — 85730 THROMBOPLASTIN TIME PARTIAL: CPT

## 2018-12-03 PROCEDURE — 36592 COLLECT BLOOD FROM PICC: CPT

## 2018-12-03 PROCEDURE — 97116 GAIT TRAINING THERAPY: CPT

## 2018-12-03 PROCEDURE — 97110 THERAPEUTIC EXERCISES: CPT

## 2018-12-03 PROCEDURE — APPSS30 APP SPLIT SHARED TIME 16-30 MINUTES: Performed by: PHYSICIAN ASSISTANT

## 2018-12-03 PROCEDURE — 2580000003 HC RX 258: Performed by: THORACIC SURGERY (CARDIOTHORACIC VASCULAR SURGERY)

## 2018-12-03 PROCEDURE — 85025 COMPLETE CBC W/AUTO DIFF WBC: CPT

## 2018-12-03 PROCEDURE — 2709999900 HC NON-CHARGEABLE SUPPLY

## 2018-12-03 PROCEDURE — 82948 REAGENT STRIP/BLOOD GLUCOSE: CPT

## 2018-12-03 PROCEDURE — 83735 ASSAY OF MAGNESIUM: CPT

## 2018-12-03 PROCEDURE — 2060000000 HC ICU INTERMEDIATE R&B

## 2018-12-03 PROCEDURE — 80048 BASIC METABOLIC PNL TOTAL CA: CPT

## 2018-12-03 PROCEDURE — 6370000000 HC RX 637 (ALT 250 FOR IP): Performed by: PHYSICIAN ASSISTANT

## 2018-12-03 PROCEDURE — 6370000000 HC RX 637 (ALT 250 FOR IP): Performed by: THORACIC SURGERY (CARDIOTHORACIC VASCULAR SURGERY)

## 2018-12-03 RX ORDER — WARFARIN SODIUM 7.5 MG/1
7.5 TABLET ORAL
Status: COMPLETED | OUTPATIENT
Start: 2018-12-03 | End: 2018-12-03

## 2018-12-03 RX ADMIN — DEXTROSE MONOHYDRATE 2.5 MCG/KG/MIN: 50 INJECTION, SOLUTION INTRAVENOUS at 12:23

## 2018-12-03 RX ADMIN — GLIMEPIRIDE 2 MG: 2 TABLET ORAL at 08:59

## 2018-12-03 RX ADMIN — ACETAMINOPHEN 650 MG: 325 TABLET ORAL at 14:51

## 2018-12-03 RX ADMIN — AMIODARONE HYDROCHLORIDE 200 MG: 200 TABLET ORAL at 08:59

## 2018-12-03 RX ADMIN — METOPROLOL TARTRATE 12.5 MG: 25 TABLET ORAL at 19:47

## 2018-12-03 RX ADMIN — FUROSEMIDE 40 MG: 10 INJECTION, SOLUTION INTRAMUSCULAR; INTRAVENOUS at 17:15

## 2018-12-03 RX ADMIN — Medication 10 ML: at 21:05

## 2018-12-03 RX ADMIN — ACETAMINOPHEN 650 MG: 325 TABLET ORAL at 21:13

## 2018-12-03 RX ADMIN — MULTIPLE VITAMINS W/ MINERALS TAB 1 TABLET: TAB at 08:59

## 2018-12-03 RX ADMIN — WARFARIN SODIUM 7.5 MG: 7.5 TABLET ORAL at 17:15

## 2018-12-03 RX ADMIN — DOCUSATE SODIUM 100 MG: 100 CAPSULE, LIQUID FILLED ORAL at 08:59

## 2018-12-03 RX ADMIN — ACETAMINOPHEN 650 MG: 325 TABLET ORAL at 02:51

## 2018-12-03 RX ADMIN — DOCUSATE SODIUM 100 MG: 100 CAPSULE, LIQUID FILLED ORAL at 19:47

## 2018-12-03 RX ADMIN — Medication 10 ML: at 09:01

## 2018-12-03 RX ADMIN — GABAPENTIN 400 MG: 400 CAPSULE ORAL at 19:46

## 2018-12-03 RX ADMIN — ASPIRIN 325 MG: 325 TABLET ORAL at 08:59

## 2018-12-03 RX ADMIN — INSULIN LISPRO 6 UNITS: 100 INJECTION, SOLUTION INTRAVENOUS; SUBCUTANEOUS at 17:16

## 2018-12-03 RX ADMIN — FUROSEMIDE 40 MG: 10 INJECTION, SOLUTION INTRAMUSCULAR; INTRAVENOUS at 09:00

## 2018-12-03 RX ADMIN — ATORVASTATIN CALCIUM 40 MG: 40 TABLET, FILM COATED ORAL at 19:47

## 2018-12-03 RX ADMIN — GABAPENTIN 400 MG: 400 CAPSULE ORAL at 08:59

## 2018-12-03 RX ADMIN — METOPROLOL TARTRATE 12.5 MG: 25 TABLET ORAL at 08:59

## 2018-12-03 RX ADMIN — ACETAMINOPHEN 650 MG: 325 TABLET ORAL at 08:59

## 2018-12-03 ASSESSMENT — PAIN DESCRIPTION - PAIN TYPE
TYPE: CHRONIC PAIN
TYPE: CHRONIC PAIN

## 2018-12-03 ASSESSMENT — PAIN SCALES - GENERAL
PAINLEVEL_OUTOF10: 2
PAINLEVEL_OUTOF10: 2
PAINLEVEL_OUTOF10: 3

## 2018-12-03 ASSESSMENT — PAIN DESCRIPTION - DESCRIPTORS
DESCRIPTORS: TINGLING;NUMBNESS
DESCRIPTORS: NUMBNESS;TINGLING

## 2018-12-03 ASSESSMENT — PAIN DESCRIPTION - ORIENTATION
ORIENTATION: RIGHT;LEFT
ORIENTATION: RIGHT;LEFT

## 2018-12-03 ASSESSMENT — PAIN DESCRIPTION - LOCATION
LOCATION: FOOT
LOCATION: FOOT

## 2018-12-03 ASSESSMENT — PAIN DESCRIPTION - PROGRESSION: CLINICAL_PROGRESSION: NOT CHANGED

## 2018-12-03 NOTE — PROGRESS NOTES
do yardwork, has a son that helps)  Ambulation Assistance: Independent  Transfer Assistance: Independent       Subjective  Chart Reviewed: Yes  Patient assessed for rehabilitation services?: Yes  Response to previous treatment: Patient with no complaints from previous session  Family / Caregiver Present: No    Subjective: Pleasant and cooperative  Comments: RN approved session. Pt agreed with doing some exercises and going for a walk. He reported some chest pain after he had a coughing spell following the session. He otherwise said that he had neuropathic pain in his feet which is chronic. His nurse was bringing him pain medication after the session. Overall Orientation Status: Within Normal Limits         Pain:  Pain Assessment  Patient Currently in Pain: Yes  Pain Assessment: 0-10  Pain Level: 3  Pain Type: Chronic pain  Pain Location: Foot  Pain Orientation: Right;Left  Pain Descriptors: Tingling;Numbness  Clinical Progression: Not changed  Patient's Stated Pain Goal: 3  Pain Intervention(s): Repositioned  Response to Pain Intervention: Patient Satisfied  Multiple Pain Sites: Yes (Chest pain at 4/10 after the coughing spell.  )       Objective  Overall Cognitive Status: [facility]                                                         Coordination  Fine Motor: No difficulty noted with being able to feed himself. ADL  Feeding: Setup (helped pt with opening milk cartons)  Additional Comments: No other ADLs completed at this time. Bed mobility  Supine to Sit: Unable to assess  Scooting: Unable to assess    Transfers  Sit to stand: Minimal assistance (rocking method)  Stand to sit: Contact guard assistance  Transfer Comments: Cueing provided to maintain sternal precautions during transfers.        Balance  Sitting Balance: Supervision  Standing Balance: Stand by assistance (Preparing to walk in the hallway)     Time: 30 seconds  Activity: preparing to go for a walk     Functional

## 2018-12-03 NOTE — PLAN OF CARE
Problem: Falls - Risk of:  Goal: Will remain free from falls  Will remain free from falls   Outcome: Met This Shift  No falls    Problem: Discharge Planning:  Goal: Discharged to appropriate level of care  Discharged to appropriate level of care   Outcome: Ongoing  INR 2.58 today, pharmacy dosing coumadin    Problem: HEMODYNAMIC STATUS  Goal: Patient has stable vital signs and fluid balance  Outcome: Ongoing  Vitals stable    Problem: Activity Intolerance:  Goal: Able to perform prescribed physical activity  Able to perform prescribed physical activity   Outcome: Ongoing  Ambulating well with walker and gait belt with minimal assistance. Problem: Cardiac Output - Decreased:  Goal: Cardiac output within specified parameters  Cardiac output within specified parameters   Outcome: Ongoing  Vitals stable    Problem: Gas Exchange - Impaired:  Goal: Levels of oxygenation will improve  Levels of oxygenation will improve   Outcome: Ongoing  Doing well on room air    Comments: Care plan reviewed with patient and patient verbalizes understanding of the plan of care and contribute to goal setting.

## 2018-12-03 NOTE — PROGRESS NOTES
Clinical Pharmacy Note    Warfarin consult follow-up    Recent Labs      12/03/18   0300   INR  2.58*     Recent Labs      12/01/18   0400  12/02/18   0430  12/03/18   0300   HGB  7.5*  7.5*  7.5*   HCT  24.6*  24.9*  24.8*   PLT  187  212  231       Significant Drug-Drug Interactions:  New warfarin drug-drug interactions: none  Discontinued drug-drug interactions: none  Current warfarin drug-drug interactions: aspirin, argatroban drip, amiodarone     Date INR Warfarin Dose   11/29/2018 1.92 2.5 mg   11/30/2018 2.35 2.5 mg   12/1/2018 2.47 5 mg   12/2/2018 2.34 5 mg    12/3/2018  2.58  7.5 mg per                          Notes:                     Daily PT/INR until stable within therapeutic range.      Daniel BlancoD, BCPS   12/3/2018  9:05 AM

## 2018-12-04 LAB
ANION GAP SERPL CALCULATED.3IONS-SCNC: 12 MEQ/L (ref 8–16)
APTT: 105.3 SECONDS (ref 22–38)
APTT: 84.8 SECONDS (ref 22–38)
APTT: 89.5 SECONDS (ref 22–38)
BASOPHILS # BLD: 1.5 %
BASOPHILS ABSOLUTE: 0 THOU/MM3 (ref 0–0.1)
BUN BLDV-MCNC: 31 MG/DL (ref 7–22)
CALCIUM SERPL-MCNC: 8.5 MG/DL (ref 8.5–10.5)
CHLORIDE BLD-SCNC: 103 MEQ/L (ref 98–111)
CO2: 26 MEQ/L (ref 23–33)
CREAT SERPL-MCNC: 1.3 MG/DL (ref 0.4–1.2)
EOSINOPHIL # BLD: 3.6 %
EOSINOPHILS ABSOLUTE: 0.1 THOU/MM3 (ref 0–0.4)
ERYTHROCYTE [DISTWIDTH] IN BLOOD BY AUTOMATED COUNT: 15.9 % (ref 11.5–14.5)
ERYTHROCYTE [DISTWIDTH] IN BLOOD BY AUTOMATED COUNT: 55.9 FL (ref 35–45)
GFR SERPL CREATININE-BSD FRML MDRD: 54 ML/MIN/1.73M2
GLUCOSE BLD-MCNC: 169 MG/DL (ref 70–108)
GLUCOSE BLD-MCNC: 171 MG/DL (ref 70–108)
GLUCOSE BLD-MCNC: 191 MG/DL (ref 70–108)
GLUCOSE BLD-MCNC: 76 MG/DL (ref 70–108)
GLUCOSE BLD-MCNC: 84 MG/DL (ref 70–108)
HCT VFR BLD CALC: 23.7 % (ref 42–52)
HEMOGLOBIN: 7.1 GM/DL (ref 14–18)
IMMATURE GRANS (ABS): 0 THOU/MM3 (ref 0–0.07)
IMMATURE GRANULOCYTES: 0 %
INR BLD: 2.85 (ref 0.85–1.13)
LYMPHOCYTES # BLD: 46.7 %
LYMPHOCYTES ABSOLUTE: 0.9 THOU/MM3 (ref 1–4.8)
MAGNESIUM: 2.3 MG/DL (ref 1.6–2.4)
MCH RBC QN AUTO: 28.6 PG (ref 26–33)
MCHC RBC AUTO-ENTMCNC: 30 GM/DL (ref 32.2–35.5)
MCV RBC AUTO: 95.6 FL (ref 80–94)
MONOCYTES # BLD: 35.5 %
MONOCYTES ABSOLUTE: 0.7 THOU/MM3 (ref 0.4–1.3)
NUCLEATED RED BLOOD CELLS: 0 /100 WBC
PLATELET # BLD: 220 THOU/MM3 (ref 130–400)
PLATELET ESTIMATE: ADEQUATE
PMV BLD AUTO: 9 FL (ref 9.4–12.4)
POTASSIUM SERPL-SCNC: 4.2 MEQ/L (ref 3.5–5.2)
RBC # BLD: 2.48 MILL/MM3 (ref 4.7–6.1)
SCAN OF BLOOD SMEAR: NORMAL
SEG NEUTROPHILS: 12.7 %
SEGMENTED NEUTROPHILS ABSOLUTE COUNT: 0.3 THOU/MM3 (ref 1.8–7.7)
SODIUM BLD-SCNC: 141 MEQ/L (ref 135–145)
WBC # BLD: 2 THOU/MM3 (ref 4.8–10.8)

## 2018-12-04 PROCEDURE — 6370000000 HC RX 637 (ALT 250 FOR IP): Performed by: PHYSICIAN ASSISTANT

## 2018-12-04 PROCEDURE — 6370000000 HC RX 637 (ALT 250 FOR IP): Performed by: NURSE PRACTITIONER

## 2018-12-04 PROCEDURE — 36415 COLL VENOUS BLD VENIPUNCTURE: CPT

## 2018-12-04 PROCEDURE — 85610 PROTHROMBIN TIME: CPT

## 2018-12-04 PROCEDURE — 85025 COMPLETE CBC W/AUTO DIFF WBC: CPT

## 2018-12-04 PROCEDURE — 2060000000 HC ICU INTERMEDIATE R&B

## 2018-12-04 PROCEDURE — 6370000000 HC RX 637 (ALT 250 FOR IP): Performed by: THORACIC SURGERY (CARDIOTHORACIC VASCULAR SURGERY)

## 2018-12-04 PROCEDURE — 85730 THROMBOPLASTIN TIME PARTIAL: CPT

## 2018-12-04 PROCEDURE — 97530 THERAPEUTIC ACTIVITIES: CPT

## 2018-12-04 PROCEDURE — 97110 THERAPEUTIC EXERCISES: CPT

## 2018-12-04 PROCEDURE — 2580000003 HC RX 258: Performed by: THORACIC SURGERY (CARDIOTHORACIC VASCULAR SURGERY)

## 2018-12-04 PROCEDURE — 36592 COLLECT BLOOD FROM PICC: CPT

## 2018-12-04 PROCEDURE — APPSS30 APP SPLIT SHARED TIME 16-30 MINUTES: Performed by: PHYSICIAN ASSISTANT

## 2018-12-04 PROCEDURE — 82948 REAGENT STRIP/BLOOD GLUCOSE: CPT

## 2018-12-04 PROCEDURE — 6360000002 HC RX W HCPCS: Performed by: THORACIC SURGERY (CARDIOTHORACIC VASCULAR SURGERY)

## 2018-12-04 PROCEDURE — 80048 BASIC METABOLIC PNL TOTAL CA: CPT

## 2018-12-04 PROCEDURE — 83735 ASSAY OF MAGNESIUM: CPT

## 2018-12-04 PROCEDURE — 2709999900 HC NON-CHARGEABLE SUPPLY

## 2018-12-04 PROCEDURE — 97116 GAIT TRAINING THERAPY: CPT

## 2018-12-04 RX ORDER — WARFARIN SODIUM 10 MG/1
10 TABLET ORAL
Status: COMPLETED | OUTPATIENT
Start: 2018-12-04 | End: 2018-12-04

## 2018-12-04 RX ORDER — POTASSIUM CHLORIDE 20 MEQ/1
20 TABLET, EXTENDED RELEASE ORAL
Status: DISCONTINUED | OUTPATIENT
Start: 2018-12-04 | End: 2018-12-06 | Stop reason: HOSPADM

## 2018-12-04 RX ORDER — FUROSEMIDE 20 MG/1
20 TABLET ORAL DAILY
Status: DISCONTINUED | OUTPATIENT
Start: 2018-12-04 | End: 2018-12-06 | Stop reason: HOSPADM

## 2018-12-04 RX ORDER — FERROUS SULFATE 325(65) MG
325 TABLET ORAL 2 TIMES DAILY WITH MEALS
Status: DISCONTINUED | OUTPATIENT
Start: 2018-12-04 | End: 2018-12-06 | Stop reason: HOSPADM

## 2018-12-04 RX ADMIN — DEXTROSE MONOHYDRATE 1.5 MCG/KG/MIN: 50 INJECTION, SOLUTION INTRAVENOUS at 10:18

## 2018-12-04 RX ADMIN — Medication 10 ML: at 20:51

## 2018-12-04 RX ADMIN — METOPROLOL TARTRATE 12.5 MG: 25 TABLET ORAL at 20:50

## 2018-12-04 RX ADMIN — DOCUSATE SODIUM 100 MG: 100 CAPSULE, LIQUID FILLED ORAL at 07:59

## 2018-12-04 RX ADMIN — GLIMEPIRIDE 2 MG: 2 TABLET ORAL at 08:00

## 2018-12-04 RX ADMIN — ASPIRIN 325 MG: 325 TABLET ORAL at 07:59

## 2018-12-04 RX ADMIN — WARFARIN SODIUM 10 MG: 10 TABLET ORAL at 17:02

## 2018-12-04 RX ADMIN — INSULIN LISPRO 2 UNITS: 100 INJECTION, SOLUTION INTRAVENOUS; SUBCUTANEOUS at 20:55

## 2018-12-04 RX ADMIN — AMIODARONE HYDROCHLORIDE 200 MG: 200 TABLET ORAL at 08:00

## 2018-12-04 RX ADMIN — INSULIN LISPRO 3 UNITS: 100 INJECTION, SOLUTION INTRAVENOUS; SUBCUTANEOUS at 12:05

## 2018-12-04 RX ADMIN — MULTIPLE VITAMINS W/ MINERALS TAB 1 TABLET: TAB at 07:59

## 2018-12-04 RX ADMIN — FERROUS SULFATE TAB 325 MG (65 MG ELEMENTAL FE) 325 MG: 325 (65 FE) TAB at 12:05

## 2018-12-04 RX ADMIN — METOPROLOL TARTRATE 12.5 MG: 25 TABLET ORAL at 08:00

## 2018-12-04 RX ADMIN — FERROUS SULFATE TAB 325 MG (65 MG ELEMENTAL FE) 325 MG: 325 (65 FE) TAB at 17:02

## 2018-12-04 RX ADMIN — Medication 10 ML: at 08:10

## 2018-12-04 RX ADMIN — GABAPENTIN 400 MG: 400 CAPSULE ORAL at 20:50

## 2018-12-04 RX ADMIN — ACETAMINOPHEN 650 MG: 325 TABLET ORAL at 20:27

## 2018-12-04 RX ADMIN — DOCUSATE SODIUM 100 MG: 100 CAPSULE, LIQUID FILLED ORAL at 20:50

## 2018-12-04 RX ADMIN — POTASSIUM CHLORIDE 20 MEQ: 20 TABLET, EXTENDED RELEASE ORAL at 07:59

## 2018-12-04 RX ADMIN — GABAPENTIN 400 MG: 400 CAPSULE ORAL at 07:59

## 2018-12-04 RX ADMIN — FUROSEMIDE 20 MG: 20 TABLET ORAL at 10:18

## 2018-12-04 RX ADMIN — NYSTATIN 500000 UNITS: 500000 SUSPENSION ORAL at 20:51

## 2018-12-04 RX ADMIN — ATORVASTATIN CALCIUM 40 MG: 40 TABLET, FILM COATED ORAL at 20:50

## 2018-12-04 ASSESSMENT — PAIN SCALES - GENERAL
PAINLEVEL_OUTOF10: 0
PAINLEVEL_OUTOF10: 3

## 2018-12-04 NOTE — PROGRESS NOTES
Argatroban Consult  Lab Results   Component Value Date    APTT 84.8 12/04/2018     Lab Results   Component Value Date    HGB 7.1 12/04/2018    HCT 23.7 12/04/2018     12/04/2018    INR 2.85 12/04/2018       Current Rate: 1.5 mcg/kg/min    Plan:  Rate: Continue current rate of 1.5 mcg/kg/min  Next aPTT: 12/04/18 @ 2000      Carolyn Smith PharmD, BCPS   12/4/2018  11:50 AM

## 2018-12-04 NOTE — PROGRESS NOTES
PHASE 1 CARDIAC REHABILITATION   CABG, AVR, MVR, TVR, AMI, PCI's  Exercise Physiologists      Treatment Length (l: long, n: normal, s: short): n  Treatment Length Note:   Vitals Stable?: Yes. Any ECG-Rhythm Issues?: No.    Transfers (bc: Bed to Chair, cb: Chair to Bed): na  Strengthening/ROM Treatment Exercises: Step na    FIDM:     Frequency: 2 x per day   Intensity: <15 RPE, <120bpm   Time: >30-60 seconds longer or >20% increase in distance each walk   Type: Bed/Chair Exercises/stationary marching or ambulation    Aerobic treatment: Pt ambulated 360-400 ft with rw        Gait (i: Independent, s: Steady, u: Unsteady): s  Assists: 1  Patient tolerated treatment (w: well, f: fair, p: poor): w  Goals:    Short term:  Progression on each aerobic treatment. Long term: Independent ambulation and discharge. [place] Senior is to follow sternal precautions. Will learn techniques for getting in and out of bed/chairs/car without using the arms. Using stairs will be addressed if applicable. Home activity instructions (Frequency, Intensity, Time, Type), and progression will be addressed prior to discharge (unless [place] Senior goes to TCU or an ECF where they will follow PT/OT protocols). Notes: Call light left within reach.     Education given:   Phase II Information (Given upon Discharge):

## 2018-12-04 NOTE — PROGRESS NOTES
CT/CV Surgery Progress Note    2018 7:52 AM  Surgeon:  Dr. Apple Route     Subjective:  Mr. Drew Randhawa is resting comfortable in bed on RA, alert, and in no acute distress. Pt denies chest pressure, SOB, fever,chills, N/V/D. Vital Signs: BP (!) 118/56   Pulse 66   Temp 98 °F (36.7 °C) (Oral)   Resp 16   Ht 6' (1.829 m)   Wt 213 lb 3.2 oz (96.7 kg)   SpO2 97%   BMI 28.92 kg/m²    Temp (24hrs), Av.2 °F (36.8 °C), Min:97.7 °F (36.5 °C), Max:98.9 °F (37.2 °C)      PULSE OXIMETRY RANGE: SpO2  Av.3 %  Min: 93 %  Max: 100 %    Labs:   CBC:  Recent Labs      18   0430  18   0300  18   WBC  1.5*  1.8*  2.0*   HGB  7.5*  7.5*  7.1*   HCT  24.9*  24.8*  23.7*   MCV  95.0*  94.7*  95.6*   PLT  212  231  220     BMP:   Recent Labs      18   0430  18   0300  18   0313   NA  141  138  141   K  4.4  5.3*  4.2   CL  103  102  103   CO2  25  24  26   BUN  27*  28*  31*   CREATININE  1.1  1.1  1.3*   MG  2.3  2.3  2.3     Last HgA1C:    Lab Results   Component Value Date    LABA1C 6.0 2018     PT/INR:  Recent Labs      18   0430  18   0300  18   0313   INR  2.34*  2.58*  2.85*     APTT:   Recent Labs      18   1738  18   APTT  108.4*  98.4*  89.5*           Intake/Output Summary (Last 24 hours) at 18 0752  Last data filed at 18 0326   Gross per 24 hour   Intake           581.69 ml   Output             1250 ml   Net          -668. 31 ml       Scheduled Meds:    glimepiride  2 mg Oral Daily with breakfast    warfarin (COUMADIN) daily dosing (placeholder)   Other RX Placeholder    gabapentin  400 mg Oral BID    amiodarone  200 mg Oral Daily    furosemide  40 mg Intravenous BID    metoprolol tartrate  12.5 mg Oral BID    insulin lispro  0-18 Units Subcutaneous TID WC    insulin lispro  0-9 Units Subcutaneous Nightly    potassium (CARDIAC) replacement protocol   Other RX Placeholder    docusate sodium

## 2018-12-04 NOTE — PLAN OF CARE
Problem: Pain:  Goal: Pain level will decrease  Pain level will decrease   Outcome: Met This Shift  Patient denies any pain this shift. States his pain is 0/10, even after ambulation. Patient also refusing heating pad, ice packs, or other non-pharmacological methods. Patient states he takes PRN Tylenol as needed, and uses it at night to help him sleep. Will continue to monitor with hourly rounding. Problem: Falls - Risk of:  Goal: Will remain free from falls  Will remain free from falls   Outcome: Met This Shift  No falls noted this shift. Bed in lowest position with wheels locked in lowest height. Call light in reach. Patient oriented to own ability and calls for assistance before ambulating. Patient up with walker and gait belt. PT/OT also working with patient. Problem: Discharge Planning:  Goal: Discharged to appropriate level of care  Discharged to appropriate level of care   Outcome: Ongoing  Plan is for patient to go straight home from this after INR>4, per surgeon.  and  updated. Will continue to monitor for further needs. Problem: Nutrition  Goal: Optimal nutrition therapy  Outcome: Met This Shift  Patient eating and drinking appropriately, no nausea or other complaints. Problem: HEMODYNAMIC STATUS  Goal: Patient has stable vital signs and fluid balance  Outcome: Met This Shift  Vitals assessed q4 hours and PRN. Patient on continuous monitor with HR noted to be junctional. Physician is aware. Will continue to monitor. Problem: Skin Integrity:  Goal: Demonstration of wound healing without infection will improve  Demonstration of wound healing without infection will improve   Outcome: Met This Shift  Wounds assessed and incision care discussed with patient. While cleaning incision patient verbalized understanding of how to clean and how often. Midsternal, chest tube sites, and right leg incisions all scabbed, no drainage or other issues noted.  Will continue to

## 2018-12-05 LAB
ABO: NORMAL
ANION GAP SERPL CALCULATED.3IONS-SCNC: 11 MEQ/L (ref 8–16)
ANTIBODY SCREEN: NORMAL
APTT: 81.1 SECONDS (ref 22–38)
APTT: 83 SECONDS (ref 22–38)
APTT: 98.5 SECONDS (ref 22–38)
BASOPHILS # BLD: 1.5 %
BASOPHILS ABSOLUTE: 0 THOU/MM3 (ref 0–0.1)
BUN BLDV-MCNC: 31 MG/DL (ref 7–22)
CALCIUM SERPL-MCNC: 8.4 MG/DL (ref 8.5–10.5)
CHLORIDE BLD-SCNC: 104 MEQ/L (ref 98–111)
CO2: 27 MEQ/L (ref 23–33)
CREAT SERPL-MCNC: 1.1 MG/DL (ref 0.4–1.2)
EOSINOPHIL # BLD: 2.9 %
EOSINOPHILS ABSOLUTE: 0.1 THOU/MM3 (ref 0–0.4)
ERYTHROCYTE [DISTWIDTH] IN BLOOD BY AUTOMATED COUNT: 16.1 % (ref 11.5–14.5)
ERYTHROCYTE [DISTWIDTH] IN BLOOD BY AUTOMATED COUNT: 55.8 FL (ref 35–45)
GFR SERPL CREATININE-BSD FRML MDRD: 66 ML/MIN/1.73M2
GLUCOSE BLD-MCNC: 110 MG/DL (ref 70–108)
GLUCOSE BLD-MCNC: 183 MG/DL (ref 70–108)
GLUCOSE BLD-MCNC: 207 MG/DL (ref 70–108)
GLUCOSE BLD-MCNC: 67 MG/DL (ref 70–108)
GLUCOSE BLD-MCNC: 90 MG/DL (ref 70–108)
GLUCOSE BLD-MCNC: 97 MG/DL (ref 70–108)
HCT VFR BLD CALC: 23.2 % (ref 42–52)
HEMOGLOBIN: 7 GM/DL (ref 14–18)
IMMATURE GRANS (ABS): 0.01 THOU/MM3 (ref 0–0.07)
IMMATURE GRANULOCYTES: 0.5 %
INR BLD: 3.8 (ref 0.85–1.13)
LYMPHOCYTES # BLD: 45.6 %
LYMPHOCYTES ABSOLUTE: 1 THOU/MM3 (ref 1–4.8)
MAGNESIUM: 2.6 MG/DL (ref 1.6–2.4)
MCH RBC QN AUTO: 28.6 PG (ref 26–33)
MCHC RBC AUTO-ENTMCNC: 30.2 GM/DL (ref 32.2–35.5)
MCV RBC AUTO: 94.7 FL (ref 80–94)
MONOCYTES # BLD: 33 %
MONOCYTES ABSOLUTE: 0.7 THOU/MM3 (ref 0.4–1.3)
NUCLEATED RED BLOOD CELLS: 0 /100 WBC
PLATELET # BLD: 234 THOU/MM3 (ref 130–400)
PMV BLD AUTO: 9.2 FL (ref 9.4–12.4)
POTASSIUM SERPL-SCNC: 4.5 MEQ/L (ref 3.5–5.2)
RBC # BLD: 2.45 MILL/MM3 (ref 4.7–6.1)
RH FACTOR: NORMAL
SEG NEUTROPHILS: 16.5 %
SEGMENTED NEUTROPHILS ABSOLUTE COUNT: 0.3 THOU/MM3 (ref 1.8–7.7)
SODIUM BLD-SCNC: 142 MEQ/L (ref 135–145)
WBC # BLD: 2.1 THOU/MM3 (ref 4.8–10.8)

## 2018-12-05 PROCEDURE — 2060000000 HC ICU INTERMEDIATE R&B

## 2018-12-05 PROCEDURE — 6370000000 HC RX 637 (ALT 250 FOR IP): Performed by: PHYSICIAN ASSISTANT

## 2018-12-05 PROCEDURE — 2709999900 HC NON-CHARGEABLE SUPPLY

## 2018-12-05 PROCEDURE — 97116 GAIT TRAINING THERAPY: CPT

## 2018-12-05 PROCEDURE — 85610 PROTHROMBIN TIME: CPT

## 2018-12-05 PROCEDURE — 6370000000 HC RX 637 (ALT 250 FOR IP): Performed by: PHARMACIST

## 2018-12-05 PROCEDURE — 86923 COMPATIBILITY TEST ELECTRIC: CPT

## 2018-12-05 PROCEDURE — 86900 BLOOD TYPING SEROLOGIC ABO: CPT

## 2018-12-05 PROCEDURE — 6370000000 HC RX 637 (ALT 250 FOR IP): Performed by: THORACIC SURGERY (CARDIOTHORACIC VASCULAR SURGERY)

## 2018-12-05 PROCEDURE — 82948 REAGENT STRIP/BLOOD GLUCOSE: CPT

## 2018-12-05 PROCEDURE — 85025 COMPLETE CBC W/AUTO DIFF WBC: CPT

## 2018-12-05 PROCEDURE — P9016 RBC LEUKOCYTES REDUCED: HCPCS

## 2018-12-05 PROCEDURE — 83735 ASSAY OF MAGNESIUM: CPT

## 2018-12-05 PROCEDURE — 80048 BASIC METABOLIC PNL TOTAL CA: CPT

## 2018-12-05 PROCEDURE — 97110 THERAPEUTIC EXERCISES: CPT

## 2018-12-05 PROCEDURE — 36415 COLL VENOUS BLD VENIPUNCTURE: CPT

## 2018-12-05 PROCEDURE — APPSS30 APP SPLIT SHARED TIME 16-30 MINUTES: Performed by: PHYSICIAN ASSISTANT

## 2018-12-05 PROCEDURE — 86850 RBC ANTIBODY SCREEN: CPT

## 2018-12-05 PROCEDURE — 85730 THROMBOPLASTIN TIME PARTIAL: CPT

## 2018-12-05 PROCEDURE — 36592 COLLECT BLOOD FROM PICC: CPT

## 2018-12-05 PROCEDURE — 2580000003 HC RX 258: Performed by: THORACIC SURGERY (CARDIOTHORACIC VASCULAR SURGERY)

## 2018-12-05 PROCEDURE — 36430 TRANSFUSION BLD/BLD COMPNT: CPT

## 2018-12-05 PROCEDURE — 86901 BLOOD TYPING SEROLOGIC RH(D): CPT

## 2018-12-05 RX ORDER — WARFARIN SODIUM 5 MG/1
5 TABLET ORAL ONCE
Status: COMPLETED | OUTPATIENT
Start: 2018-12-05 | End: 2018-12-05

## 2018-12-05 RX ORDER — 0.9 % SODIUM CHLORIDE 0.9 %
250 INTRAVENOUS SOLUTION INTRAVENOUS ONCE
Status: COMPLETED | OUTPATIENT
Start: 2018-12-05 | End: 2018-12-05

## 2018-12-05 RX ADMIN — INSULIN LISPRO 3 UNITS: 100 INJECTION, SOLUTION INTRAVENOUS; SUBCUTANEOUS at 13:11

## 2018-12-05 RX ADMIN — SODIUM CHLORIDE 250 ML: 9 INJECTION, SOLUTION INTRAVENOUS at 08:42

## 2018-12-05 RX ADMIN — METOPROLOL TARTRATE 12.5 MG: 25 TABLET ORAL at 08:40

## 2018-12-05 RX ADMIN — DOCUSATE SODIUM 100 MG: 100 CAPSULE, LIQUID FILLED ORAL at 20:49

## 2018-12-05 RX ADMIN — GABAPENTIN 400 MG: 400 CAPSULE ORAL at 20:49

## 2018-12-05 RX ADMIN — GABAPENTIN 400 MG: 400 CAPSULE ORAL at 08:39

## 2018-12-05 RX ADMIN — ACETAMINOPHEN 650 MG: 325 TABLET ORAL at 08:39

## 2018-12-05 RX ADMIN — AMIODARONE HYDROCHLORIDE 200 MG: 200 TABLET ORAL at 08:39

## 2018-12-05 RX ADMIN — FERROUS SULFATE TAB 325 MG (65 MG ELEMENTAL FE) 325 MG: 325 (65 FE) TAB at 08:39

## 2018-12-05 RX ADMIN — WARFARIN SODIUM 5 MG: 5 TABLET ORAL at 17:08

## 2018-12-05 RX ADMIN — INSULIN LISPRO 3 UNITS: 100 INJECTION, SOLUTION INTRAVENOUS; SUBCUTANEOUS at 20:54

## 2018-12-05 RX ADMIN — FERROUS SULFATE TAB 325 MG (65 MG ELEMENTAL FE) 325 MG: 325 (65 FE) TAB at 17:08

## 2018-12-05 RX ADMIN — FUROSEMIDE 20 MG: 20 TABLET ORAL at 08:40

## 2018-12-05 RX ADMIN — ASPIRIN 325 MG: 325 TABLET ORAL at 08:39

## 2018-12-05 RX ADMIN — DOCUSATE SODIUM 100 MG: 100 CAPSULE, LIQUID FILLED ORAL at 08:39

## 2018-12-05 RX ADMIN — ACETAMINOPHEN 650 MG: 325 TABLET ORAL at 18:11

## 2018-12-05 RX ADMIN — METOPROLOL TARTRATE 12.5 MG: 25 TABLET ORAL at 20:50

## 2018-12-05 RX ADMIN — Medication 10 ML: at 08:43

## 2018-12-05 RX ADMIN — MULTIPLE VITAMINS W/ MINERALS TAB 1 TABLET: TAB at 08:39

## 2018-12-05 RX ADMIN — Medication 10 ML: at 20:49

## 2018-12-05 RX ADMIN — GLIMEPIRIDE 2 MG: 2 TABLET ORAL at 08:39

## 2018-12-05 RX ADMIN — POTASSIUM CHLORIDE 20 MEQ: 20 TABLET, EXTENDED RELEASE ORAL at 08:39

## 2018-12-05 RX ADMIN — ATORVASTATIN CALCIUM 40 MG: 40 TABLET, FILM COATED ORAL at 20:49

## 2018-12-05 ASSESSMENT — PAIN DESCRIPTION - DESCRIPTORS
DESCRIPTORS: DISCOMFORT
DESCRIPTORS: DISCOMFORT

## 2018-12-05 ASSESSMENT — PAIN DESCRIPTION - PROGRESSION

## 2018-12-05 ASSESSMENT — PAIN SCALES - GENERAL
PAINLEVEL_OUTOF10: 0
PAINLEVEL_OUTOF10: 0
PAINLEVEL_OUTOF10: 2
PAINLEVEL_OUTOF10: 4
PAINLEVEL_OUTOF10: 0
PAINLEVEL_OUTOF10: 3
PAINLEVEL_OUTOF10: 0
PAINLEVEL_OUTOF10: 2
PAINLEVEL_OUTOF10: 3
PAINLEVEL_OUTOF10: 3

## 2018-12-05 ASSESSMENT — PAIN DESCRIPTION - ORIENTATION
ORIENTATION: MID
ORIENTATION: MID

## 2018-12-05 ASSESSMENT — PAIN DESCRIPTION - ONSET
ONSET: ON-GOING
ONSET: ON-GOING

## 2018-12-05 ASSESSMENT — PAIN DESCRIPTION - LOCATION
LOCATION: STERNUM;INCISION
LOCATION: STERNUM;INCISION

## 2018-12-05 ASSESSMENT — PAIN DESCRIPTION - PAIN TYPE
TYPE: SURGICAL PAIN
TYPE: SURGICAL PAIN

## 2018-12-05 ASSESSMENT — PAIN DESCRIPTION - FREQUENCY
FREQUENCY: CONTINUOUS
FREQUENCY: CONTINUOUS

## 2018-12-05 NOTE — PROGRESS NOTES
do yardwork, has a son that helps)  Ambulation Assistance: Independent  Transfer Assistance: Independent       Subjective       Subjective: cooperative         Pain:  Pain Assessment  Patient Currently in Pain: No       Objective  Overall Cognitive Status: WFL            ADL  Additional Comments: Completed prior to OT session. Therapist reviewed education on sternal precautions during ADLs. Pt demo ability to cross BLE up to adjust slipper socks       Transfers  Sit to stand: Contact guard assistance  Stand to sit: Contact guard assistance       Balance  Standing Balance: Contact guard assistance           Functional Mobility  Functional - Mobility Device: Rolling Walker  Activity: Other (around nursing unit)  Assist Level: Stand by assistance (close)             Comment: Completed step 2 cardiac exercises x 12 reps while seated in recliner. Pt tolerated well without RBs between exercises.                                                                                                                                                                                                                                                                                                                                                                                                                                                                                                                                                                                                                                                                                                                                                                                                                                                                                                                                                                                              Activity Tolerance:  Activity Tolerance: Patient Tolerated treatment strenth needed for safe mobility and ADL tasks   Short term goal 5: Pt will complete LB ADL tasks with minimal assistance using LHAE prn to increase independence with self care tasks.    Long term goals  Time Frame for Long term goals : NA d/t ESTELITAOS

## 2018-12-05 NOTE — CARE COORDINATION
12/5/18, 11:47 AM    DISCHARGE BARRIERS    SW met with patient to confirm home possibly tomorrow with wife and [facility] [place], Patient confirmed plan and is anxious to be home.

## 2018-12-05 NOTE — PROGRESS NOTES
[address] ICU STEPDOWN TELEMETRY 4K - 4K-13/013-A    Time In: 0338  Time Out: 1300  Timed Code Treatment Minutes: 25 Minutes  Minutes: 25          Date: 2018  Patient Name: Bridgette Nava,  Gender:  male        MRN: [id number]  : 1945  (72 y.o.)     Referring Practitioner: Dr. Teddy Mills  Diagnosis: chest pain  Additional Pertinent Hx: underwent an aortic valve replacement and mitral valve repair and CABG x 2 on 11-15 ,extubated on      Past Medical History:   Diagnosis Date    Acute on chronic systolic CHF (congestive heart failure), NYHA class 3 ([facility] EF 25-30% 2018    CAD (coronary artery disease)     CKD (chronic kidney disease) stage 3, GFR 30-59 ml/min ([facility] [facility] 2015    Diabetes mellitus ([facility]     HTN (hypertension), benign     Hyperlipidemia     Hypothyroidism     Mitral and aortic incompetence     Neuropathy     Positive blood culture     Being treated with rocephhin as outpt.     S/P CABG x 2 11/15/2018     Past Surgical History:   Procedure Laterality Date    CARDIAC SURGERY      heart cath    CATARACT REMOVAL WITH IMPLANT Bilateral     COLONOSCOPY      EYE SURGERY      [place] CABG, VEIN, THREE N/A 2018    CABG CORONARY ARTERY BYPASS,  AORTIC AND MITRAL VALVE REPAIR WITH MICHELE, BALLOON PUMP INSERTION performed by Shilpi Calderon MD at [address] [place] [address] ECHO TRANSESOPHAG R-T 2D IMG ACQUISJ I&R ONLY Left 10/25/2018    TRANSESOPHAGEAL ECHOCARDIOGRAM performed by Ammon Díaz MD at [facility]       Restrictions/Precautions:  General Precautions                    Sternal Precautions: No Pushing, No Pulling, 5# Lifting Restrictions  Sternal Precautions: CABG x 2, aortic valve replacement and mitral valve repair 11-15       Prior Level of Function:  ADL Assistance: Independent  Homemaking Assistance: Needs assistance (wife completes most, able to do yardwork, has a son that helps)  Ambulation Assistance:

## 2018-12-05 NOTE — PROGRESS NOTES
Argatroban Consult  Lab Results   Component Value Date    APTT 105.3 12/04/2018     Lab Results   Component Value Date    HGB 7.1 12/04/2018    HCT 23.7 12/04/2018     12/04/2018    INR 2.85 12/04/2018       Current Rate: 1.5 mcg/kg/min    Plan:  Rate: Decrease to 1 mcg/kg/min  Next aPTT: 0100 12/5/18    Isela Lawrence  12/4/2018  9:57 PM

## 2018-12-06 VITALS
OXYGEN SATURATION: 94 % | WEIGHT: 217 LBS | TEMPERATURE: 97.8 F | BODY MASS INDEX: 29.39 KG/M2 | HEIGHT: 72 IN | DIASTOLIC BLOOD PRESSURE: 58 MMHG | SYSTOLIC BLOOD PRESSURE: 113 MMHG | HEART RATE: 63 BPM | RESPIRATION RATE: 18 BRPM

## 2018-12-06 LAB
ANION GAP SERPL CALCULATED.3IONS-SCNC: 12 MEQ/L (ref 8–16)
APTT: 82.3 SECONDS (ref 22–38)
APTT: 87 SECONDS (ref 22–38)
BASOPHILS # BLD: 1.4 %
BASOPHILS ABSOLUTE: 0 THOU/MM3 (ref 0–0.1)
BUN BLDV-MCNC: 29 MG/DL (ref 7–22)
CALCIUM SERPL-MCNC: 8.5 MG/DL (ref 8.5–10.5)
CHLORIDE BLD-SCNC: 104 MEQ/L (ref 98–111)
CO2: 26 MEQ/L (ref 23–33)
CREAT SERPL-MCNC: 1 MG/DL (ref 0.4–1.2)
EOSINOPHIL # BLD: 3.7 %
EOSINOPHILS ABSOLUTE: 0.1 THOU/MM3 (ref 0–0.4)
ERYTHROCYTE [DISTWIDTH] IN BLOOD BY AUTOMATED COUNT: 16.6 % (ref 11.5–14.5)
ERYTHROCYTE [DISTWIDTH] IN BLOOD BY AUTOMATED COUNT: 56 FL (ref 35–45)
GFR SERPL CREATININE-BSD FRML MDRD: 74 ML/MIN/1.73M2
GLUCOSE BLD-MCNC: 131 MG/DL (ref 70–108)
GLUCOSE BLD-MCNC: 150 MG/DL (ref 70–108)
GLUCOSE BLD-MCNC: 166 MG/DL (ref 70–108)
GLUCOSE BLD-MCNC: 224 MG/DL (ref 70–108)
HCT VFR BLD CALC: 26.1 % (ref 42–52)
HEMOGLOBIN: 8.1 GM/DL (ref 14–18)
IMMATURE GRANS (ABS): 0.01 THOU/MM3 (ref 0–0.07)
IMMATURE GRANULOCYTES: 0.5 %
INR BLD: 4.54 (ref 0.85–1.13)
INR BLD: 5.43 (ref 0.85–1.13)
LYMPHOCYTES # BLD: 43.7 %
LYMPHOCYTES ABSOLUTE: 1 THOU/MM3 (ref 1–4.8)
MAGNESIUM: 2.5 MG/DL (ref 1.6–2.4)
MCH RBC QN AUTO: 28.8 PG (ref 26–33)
MCHC RBC AUTO-ENTMCNC: 31 GM/DL (ref 32.2–35.5)
MCV RBC AUTO: 92.9 FL (ref 80–94)
MONOCYTES # BLD: 26.5 %
MONOCYTES ABSOLUTE: 0.6 THOU/MM3 (ref 0.4–1.3)
NUCLEATED RED BLOOD CELLS: 0 /100 WBC
PLATELET # BLD: 244 THOU/MM3 (ref 130–400)
PMV BLD AUTO: 9 FL (ref 9.4–12.4)
POTASSIUM SERPL-SCNC: 4.5 MEQ/L (ref 3.5–5.2)
RBC # BLD: 2.81 MILL/MM3 (ref 4.7–6.1)
SEG NEUTROPHILS: 24.2 %
SEGMENTED NEUTROPHILS ABSOLUTE COUNT: 0.5 THOU/MM3 (ref 1.8–7.7)
SODIUM BLD-SCNC: 142 MEQ/L (ref 135–145)
WBC # BLD: 2.2 THOU/MM3 (ref 4.8–10.8)

## 2018-12-06 PROCEDURE — 82948 REAGENT STRIP/BLOOD GLUCOSE: CPT

## 2018-12-06 PROCEDURE — 85730 THROMBOPLASTIN TIME PARTIAL: CPT

## 2018-12-06 PROCEDURE — 6360000002 HC RX W HCPCS: Performed by: THORACIC SURGERY (CARDIOTHORACIC VASCULAR SURGERY)

## 2018-12-06 PROCEDURE — 97116 GAIT TRAINING THERAPY: CPT

## 2018-12-06 PROCEDURE — 80048 BASIC METABOLIC PNL TOTAL CA: CPT

## 2018-12-06 PROCEDURE — 6370000000 HC RX 637 (ALT 250 FOR IP): Performed by: THORACIC SURGERY (CARDIOTHORACIC VASCULAR SURGERY)

## 2018-12-06 PROCEDURE — 6370000000 HC RX 637 (ALT 250 FOR IP): Performed by: PHYSICIAN ASSISTANT

## 2018-12-06 PROCEDURE — 36415 COLL VENOUS BLD VENIPUNCTURE: CPT

## 2018-12-06 PROCEDURE — 2580000003 HC RX 258: Performed by: THORACIC SURGERY (CARDIOTHORACIC VASCULAR SURGERY)

## 2018-12-06 PROCEDURE — 83735 ASSAY OF MAGNESIUM: CPT

## 2018-12-06 PROCEDURE — 97530 THERAPEUTIC ACTIVITIES: CPT

## 2018-12-06 PROCEDURE — 85610 PROTHROMBIN TIME: CPT

## 2018-12-06 PROCEDURE — 2709999900 HC NON-CHARGEABLE SUPPLY

## 2018-12-06 PROCEDURE — 85025 COMPLETE CBC W/AUTO DIFF WBC: CPT

## 2018-12-06 PROCEDURE — 36592 COLLECT BLOOD FROM PICC: CPT

## 2018-12-06 PROCEDURE — APPSS30 APP SPLIT SHARED TIME 16-30 MINUTES: Performed by: PHYSICIAN ASSISTANT

## 2018-12-06 PROCEDURE — 97110 THERAPEUTIC EXERCISES: CPT

## 2018-12-06 RX ORDER — ASPIRIN 325 MG
325 TABLET ORAL DAILY
Qty: 30 TABLET | Refills: 3 | Status: SHIPPED | OUTPATIENT
Start: 2018-12-07 | End: 2019-04-24

## 2018-12-06 RX ORDER — M-VIT,TX,IRON,MINS/CALC/FOLIC 27MG-0.4MG
1 TABLET ORAL
Qty: 30 TABLET | Refills: 0 | Status: SHIPPED | OUTPATIENT
Start: 2018-12-07 | End: 2019-04-01 | Stop reason: ALTCHOICE

## 2018-12-06 RX ORDER — WARFARIN SODIUM 5 MG/1
5 TABLET ORAL NIGHTLY
Qty: 30 TABLET | Refills: 0 | Status: SHIPPED | OUTPATIENT
Start: 2018-12-06 | End: 2019-04-18 | Stop reason: ALTCHOICE

## 2018-12-06 RX ORDER — POTASSIUM CHLORIDE 20 MEQ/1
20 TABLET, EXTENDED RELEASE ORAL
Qty: 30 TABLET | Refills: 0 | Status: SHIPPED | OUTPATIENT
Start: 2018-12-07 | End: 2018-12-19 | Stop reason: SDUPTHER

## 2018-12-06 RX ORDER — ACETAMINOPHEN 650 MG/1
650 SUPPOSITORY RECTAL EVERY 4 HOURS PRN
Qty: 60 SUPPOSITORY | Refills: 0 | Status: SHIPPED | OUTPATIENT
Start: 2018-12-06 | End: 2018-12-31 | Stop reason: ALTCHOICE

## 2018-12-06 RX ORDER — GLIMEPIRIDE 2 MG/1
2 TABLET ORAL
Qty: 30 TABLET | Refills: 3 | Status: SHIPPED | OUTPATIENT
Start: 2018-12-07 | End: 2019-04-24

## 2018-12-06 RX ORDER — LOSARTAN POTASSIUM 25 MG/1
25 TABLET ORAL DAILY
Qty: 30 TABLET | Refills: 3 | Status: SHIPPED | OUTPATIENT
Start: 2018-12-06 | End: 2018-12-31 | Stop reason: SDUPTHER

## 2018-12-06 RX ORDER — LOSARTAN POTASSIUM 25 MG/1
25 TABLET ORAL DAILY
Status: DISCONTINUED | OUTPATIENT
Start: 2018-12-06 | End: 2018-12-06 | Stop reason: HOSPADM

## 2018-12-06 RX ORDER — FUROSEMIDE 20 MG/1
20 TABLET ORAL DAILY
Qty: 30 TABLET | Refills: 0 | Status: SHIPPED | OUTPATIENT
Start: 2018-12-07 | End: 2018-12-19 | Stop reason: SDUPTHER

## 2018-12-06 RX ORDER — FERROUS SULFATE 325(65) MG
325 TABLET ORAL 2 TIMES DAILY WITH MEALS
Qty: 30 TABLET | Refills: 3 | Status: SHIPPED | OUTPATIENT
Start: 2018-12-06 | End: 2021-12-13

## 2018-12-06 RX ORDER — AMIODARONE HYDROCHLORIDE 200 MG/1
200 TABLET ORAL DAILY
Qty: 30 TABLET | Refills: 0 | Status: SHIPPED | OUTPATIENT
Start: 2018-12-07 | End: 2018-12-19 | Stop reason: ALTCHOICE

## 2018-12-06 RX ORDER — ACETAMINOPHEN 325 MG/1
650 TABLET ORAL EVERY 4 HOURS PRN
Qty: 120 TABLET | Refills: 0 | Status: SHIPPED | OUTPATIENT
Start: 2018-12-06 | End: 2019-04-24

## 2018-12-06 RX ADMIN — LOSARTAN POTASSIUM 25 MG: 25 TABLET, FILM COATED ORAL at 16:21

## 2018-12-06 RX ADMIN — DEXTROSE MONOHYDRATE 0.5 MCG/KG/MIN: 50 INJECTION, SOLUTION INTRAVENOUS at 04:21

## 2018-12-06 RX ADMIN — POTASSIUM CHLORIDE 20 MEQ: 20 TABLET, EXTENDED RELEASE ORAL at 08:10

## 2018-12-06 RX ADMIN — GLIMEPIRIDE 2 MG: 2 TABLET ORAL at 08:11

## 2018-12-06 RX ADMIN — MULTIPLE VITAMINS W/ MINERALS TAB 1 TABLET: TAB at 08:11

## 2018-12-06 RX ADMIN — GABAPENTIN 400 MG: 400 CAPSULE ORAL at 08:11

## 2018-12-06 RX ADMIN — AMIODARONE HYDROCHLORIDE 200 MG: 200 TABLET ORAL at 08:11

## 2018-12-06 RX ADMIN — ACETAMINOPHEN 650 MG: 325 TABLET ORAL at 08:10

## 2018-12-06 RX ADMIN — DOCUSATE SODIUM 100 MG: 100 CAPSULE, LIQUID FILLED ORAL at 08:10

## 2018-12-06 RX ADMIN — FERROUS SULFATE TAB 325 MG (65 MG ELEMENTAL FE) 325 MG: 325 (65 FE) TAB at 08:11

## 2018-12-06 RX ADMIN — Medication 10 ML: at 08:12

## 2018-12-06 RX ADMIN — ASPIRIN 325 MG: 325 TABLET ORAL at 08:10

## 2018-12-06 RX ADMIN — METOPROLOL TARTRATE 12.5 MG: 25 TABLET ORAL at 08:12

## 2018-12-06 RX ADMIN — FUROSEMIDE 20 MG: 20 TABLET ORAL at 08:11

## 2018-12-06 ASSESSMENT — PAIN SCALES - GENERAL
PAINLEVEL_OUTOF10: 0
PAINLEVEL_OUTOF10: 3
PAINLEVEL_OUTOF10: 0
PAINLEVEL_OUTOF10: 3

## 2018-12-06 ASSESSMENT — PAIN DESCRIPTION - FREQUENCY
FREQUENCY: CONTINUOUS
FREQUENCY: CONTINUOUS

## 2018-12-06 ASSESSMENT — PAIN DESCRIPTION - LOCATION
LOCATION: CHEST
LOCATION: STERNUM;INCISION

## 2018-12-06 ASSESSMENT — PAIN DESCRIPTION - ORIENTATION: ORIENTATION: MID

## 2018-12-06 ASSESSMENT — PAIN DESCRIPTION - PAIN TYPE
TYPE: SURGICAL PAIN
TYPE: SURGICAL PAIN

## 2018-12-06 ASSESSMENT — PAIN DESCRIPTION - DESCRIPTORS
DESCRIPTORS: ACHING;DISCOMFORT
DESCRIPTORS: DISCOMFORT

## 2018-12-06 ASSESSMENT — PAIN DESCRIPTION - PROGRESSION
CLINICAL_PROGRESSION: NOT CHANGED
CLINICAL_PROGRESSION: NOT CHANGED

## 2018-12-06 ASSESSMENT — PAIN DESCRIPTION - ONSET: ONSET: ON-GOING

## 2018-12-06 NOTE — PROGRESS NOTES
transfers with CGA and <2 cues for sternal precautions in prep for toilet/shower transfers. Short term goal 4: Pt will participate in CABG step 3 ex x 10 reps to increase strenth needed for safe mobility and ADL tasks   Short term goal 5: Pt will complete LB ADL tasks with minimal assistance using LHAE prn to increase independence with self care tasks.    Long term goals  Time Frame for Long term goals : NA d/t ESTELITAOS

## 2018-12-06 NOTE — PROGRESS NOTES
CT/CV Surgery Progress Note    2018 11:03 AM  Surgeon:  Dr. Theresa Kelly     Procedure: 18  1) Aortic valve replacement with a 23 mm [employer] Trifecta bioprosthesis  2) Mitral valve repair with 28 mm Zuleta Physio annuloplasty band  3) Coronary artery bypass grafting x 2, with the left internal mammary artery grafted to the left anterior descending artery, and a reversed greater saphenous aortocoronary vein graft to the first obtuse marginal artery  4) Endoscopic greater saphenous vein harvest  5) Insertion of left common femoral intra-aortic balloon pump    Subjective:  Mr. Ifeanyi Townsend is resting comfortable in bed on RA, alert, and in no acute distress. Pt denies chest pressure, SOB, fever,chills, N/V/D.      Vital Signs: /60   Pulse 70   Temp 97.7 °F (36.5 °C) (Oral)   Resp 18   Ht 6' (1.829 m)   Wt 217 lb (98.4 kg)   SpO2 96%   BMI 29.43 kg/m²    Temp (24hrs), Av.8 °F (36.6 °C), Min:97.5 °F (36.4 °C), Max:98.6 °F (37 °C)      PULSE OXIMETRY RANGE: SpO2  Av.3 %  Min: 94 %  Max: 98 %    Labs:   CBC:  Recent Labs      18   0430  18   0400   WBC  2.0*  2.1*  2.2*   HGB  7.1*  7.0*  8.1*   HCT  23.7*  23.2*  26.1*   MCV  95.6*  94.7*  92.9   PLT  220  234  244     BMP:   Recent Labs      18   0430  18   0400   NA  141  142  142   K  4.2  4.5  4.5   CL  103  104  104   CO2  26  27  26   BUN  31*  31*  29*   CREATININE  1.3*  1.1  1.0   MG  2.3  2.6*  2.5*     Last HgA1C:    Lab Results   Component Value Date    LABA1C 6.0 2018     PT/INR:  Recent Labs      18   0430  18   0400   INR  2.85*  3.80*  5.43*     APTT:   Recent Labs      18   1659  18   2300  18   0400   APTT  83.0*  87.0*  82.3*           Intake/Output Summary (Last 24 hours) at 18 1103  Last data filed at 18 0914   Gross per 24 hour   Intake          1865.07 ml   Output              477 ml   Net          1388.07 ml       Scheduled Meds:    furosemide  20 mg Oral Daily    potassium chloride  20 mEq Oral Daily with breakfast    ferrous sulfate  325 mg Oral BID WC    glimepiride  2 mg Oral Daily with breakfast    warfarin (COUMADIN) daily dosing (placeholder)   Other RX Placeholder    gabapentin  400 mg Oral BID    amiodarone  200 mg Oral Daily    metoprolol tartrate  12.5 mg Oral BID    insulin lispro  0-18 Units Subcutaneous TID WC    insulin lispro  0-9 Units Subcutaneous Nightly    potassium (CARDIAC) replacement protocol   Other RX Placeholder    docusate sodium  100 mg Oral BID    nystatin  5 mL Oral 4x Daily    aspirin  325 mg Oral Daily    sodium chloride flush  10 mL Intravenous 2 times per day    calcium replacement protocol   Other RX Placeholder    mupirocin   Topical Once    therapeutic multivitamin-minerals  1 tablet Oral Daily with breakfast    atorvastatin  40 mg Oral Nightly       ROS: All neg unless specifically mentioned in subjective section. Exam:  General Appearance: alert ,conversing, in no acute distress  Cardiovascular: normal rate, regular rhythm, normal S1 and S2, no murmurs, rubs, clicks, or gallops  Pulmonary/Chest: clear to auscultation bilaterally- no wheezes, rales or rhonchi, normal air movement, no respiratory distress  Neurological: alert, oriented, normal speech, no focal findings or movement disorder noted  Sternum: Incision healing appropriately and no wound dehiscence noted.      Assessment:   Patient Active Problem List   Diagnosis    CKD (chronic kidney disease) stage 3, GFR 30-59 ml/min [facility])    HTN (hypertension), benign    Vitamin D deficiency    [facility]) [facility]    Renal cyst    ACS (acute coronary syndrome) ([facility]    Cardiogenic shock ([facility] [facility]    Aortic stenosis with mitral and aortic insufficiency    Streptococcus A carrier or suspected carrier    Sepsis due to Streptococcus pyogenes ([facility]    CHF exacerbation ([facility]    Heart failure,

## 2018-12-06 NOTE — PLAN OF CARE
Problem: Pain:  Goal: Pain level will decrease  Pain level will decrease   Outcome: Ongoing  Pain Assessment: 0-10  Pain Level: 3   Pain goal:  3  Is pain goal met at this time? Yes  Pain Intervention(s): Repositioned  Additional interventions to be implemented: medications tylenol        Problem: Falls - Risk of:  Goal: Will remain free from falls  Will remain free from falls   Outcome: Ongoing  Fall risk wrist band and fall sign in place. Bed alarm on. Patient demonstrates proper use of the call light at this time.     Problem: Discharge Planning:  Goal: Discharged to appropriate level of care  Discharged to appropriate level of care   Outcome: Ongoing        Problem: Nutrition  Goal: Optimal nutrition therapy  Outcome: Met This Shift  Adequate nutritional intake noted. No c/o nausea          Problem: Musculor/Skeletal Functional Status  Goal: Highest potential functional level  Outcome: Ongoing  Patient able to ambulate and move without issue. Patient back to baseline.     Problem: Cardiac Output - Decreased:  Goal: Cardiac output within specified parameters  Cardiac output within specified parameters   Outcome: Ongoing  Patient's vitals stable at this time.     Problem: Risk for Impaired Skin Integrity  Goal: Tissue integrity - skin and mucous membranes  Structural intactness and normal physiological function of skin and  mucous membranes. Outcome: Ongoing  Patient able to turn and reposition self without issue. No new skin breakdown noted.     Problem: Serum Glucose Level - Abnormal:  Goal: Ability to maintain appropriate glucose levels has stabilized  Ability to maintain appropriate glucose levels has stabilized  Outcome: Ongoing  Insulin give as ordered. Chem stick achs.     Comments: Care plan reviewed with patient.   Patient verbalizes understanding of the plan of care and contributes to goal setting.

## 2018-12-06 NOTE — DISCHARGE SUMMARY
Zuleta Physio annuloplasty band  3) Coronary artery bypass grafting x 2, with the left internal mammary artery grafted to the left anterior descending artery, and a reversed greater saphenous aortocoronary vein graft to the first obtuse marginal artery  4) Endoscopic greater saphenous vein harvest  5) Insertion of left common femoral intra-aortic balloon pump    Reason for Admission:    The patient is a 72y.o. year-old male who presents with biventricular failure secondary to mixed aortic stenosis and insufficiency and mitral regurgitation in the context of an ischemic cardiomyopathy associated with triple-vessel coronary disease. Hospital Course:  He underwent AVR, MVR, CABG x 2 on 11/14/18. He required IABP and + inotropic medication for first several days and also transfusion with RBCs and PLTs. On 11/22/18 he was started on argatroban for HITS and then was started on Coumadin. He was monitored on step-down until his INR level allowed the argatroban to be stopped and today he will be sent home. At time of discharge, Magdy Malcolm was alert, tolerating a regular diet, having bowel movements, ambulating on his own accord and had adequate analgesia on oral pain medications, and had no signs of any complications. Discharge Vitals:  height is 6' (1.829 m) and weight is 217 lb (98.4 kg). His oral temperature is 97.7 °F (36.5 °C). His blood pressure is 131/60 and his pulse is 70. His respiration is 18 and oxygen saturation is 96%.      DISCHARGE INSTRUCTIONS:  Discharge Medications:         Medication List      ASK your doctor about these medications    aspirin 81 MG chewable tablet  Take 1 tablet by mouth daily     atorvastatin 40 MG tablet  Commonly known as:  LIPITOR     bumetanide 0.5 MG tablet  Commonly known as:  BUMEX  Take 1 tablet by mouth 2 times daily     gabapentin 800 MG tablet  Commonly known as:  NEURONTIN     glimepiride 1 MG tablet  Commonly known as:  AMARYL     levothyroxine 50 MCG

## 2018-12-06 NOTE — PROGRESS NOTES
[facility] ICU STEPDOWN TELEMETRY 4K - 4K-13/013-A    Time In: 1350  Time Out: 1413  Timed Code Treatment Minutes: 23 Minutes  Minutes: 23          Date: 2018  Patient Name: Rob Baxter,  Gender:  male        MRN: [id number]  : 1945  (72 y.o.)     Referring Practitioner: Dr. Latasha Ocampo  Diagnosis: chest pain  Additional Pertinent Hx: underwent an aortic valve replacement and mitral valve repair and CABG x 2 on 11-15 ,extubated on      Past Medical History:   Diagnosis Date    Acute on chronic systolic CHF (congestive heart failure), NYHA class 3 ([facility] [address] EF 25-30% 2018    CAD (coronary artery disease)     CKD (chronic kidney disease) stage 3, GFR 30-59 ml/min ([facility] [address] 2015    Diabetes mellitus ([facility] [address] [facility]     HTN (hypertension), benign     Hyperlipidemia     Hypothyroidism     Mitral and aortic incompetence     Neuropathy     Positive blood culture     Being treated with rocephhin as outpt.     S/P CABG x 2 11/15/2018     Past Surgical History:   Procedure Laterality Date    CARDIAC SURGERY      heart cath    CATARACT REMOVAL WITH IMPLANT Bilateral     COLONOSCOPY      EYE SURGERY      OH CABG, VEIN, THREE N/A 2018    CABG CORONARY ARTERY BYPASS,  AORTIC AND MITRAL VALVE REPAIR WITH MICHELE, BALLOON PUMP INSERTION performed by Kelly Blankenship MD at [address] [place] [address] ECHO TRANSESOPHAG R-T 2D IMG ACQUISJ I&R ONLY Left 10/25/2018    TRANSESOPHAGEAL ECHOCARDIOGRAM performed by Francisco Alvarez MD at [facility]       Restrictions/Precautions:  General Precautions                    Sternal Precautions: No Pushing, No Pulling, 5# Lifting Restrictions  Sternal Precautions: CABG x 2, aortic valve replacement and mitral valve repair 11-15       Prior Level of Function:  ADL Assistance: Independent  Homemaking Assistance: Needs assistance (wife completes most, able to do yardwork, has a son that helps)  Ambulation Assistance:

## 2018-12-06 NOTE — PROGRESS NOTES
Argatroban Consult  Lab Results   Component Value Date    APTT 83.0 12/05/2018     Lab Results   Component Value Date    HGB 7.0 12/05/2018    HCT 23.2 12/05/2018     12/05/2018    INR 3.80 12/05/2018       Current Rate: 0.5 mcg/kg/min    Plan:  Rate: Continue at 0.5 mcg/kg/min  Next aPTT: 2300 12/5/18    Maren Cosme RPh  12/5/2018  7:12 PM

## 2018-12-07 ENCOUNTER — CARE COORDINATION (OUTPATIENT)
Dept: CASE MANAGEMENT | Age: 73
End: 2018-12-07

## 2018-12-11 ENCOUNTER — TELEPHONE (OUTPATIENT)
Dept: CARDIOLOGY CLINIC | Age: 73
End: 2018-12-11

## 2018-12-12 ENCOUNTER — TELEPHONE (OUTPATIENT)
Dept: NEPHROLOGY | Age: 73
End: 2018-12-12

## 2018-12-12 ENCOUNTER — TELEPHONE (OUTPATIENT)
Dept: CARDIOLOGY CLINIC | Age: 73
End: 2018-12-12

## 2018-12-12 DIAGNOSIS — I50.23 ACUTE ON CHRONIC SYSTOLIC CHF (CONGESTIVE HEART FAILURE), NYHA CLASS 3 (HCC): ICD-10-CM

## 2018-12-12 DIAGNOSIS — N18.30 CKD (CHRONIC KIDNEY DISEASE), STAGE III (HCC): Primary | ICD-10-CM

## 2018-12-12 DIAGNOSIS — I50.23 HEART FAILURE, SYSTOLIC, WITH ACUTE DECOMPENSATION (HCC): Primary | ICD-10-CM

## 2018-12-13 ENCOUNTER — TELEPHONE (OUTPATIENT)
Dept: CARDIOTHORACIC SURGERY | Age: 73
End: 2018-12-13

## 2018-12-17 LAB
ANION GAP SERPL CALCULATED.3IONS-SCNC: 12 MMOL/L (ref 4–12)
BUN BLDV-MCNC: 16 MG/DL (ref 7–20)
CALCIUM SERPL-MCNC: 8.7 MG/DL (ref 8.8–10.5)
CHLORIDE BLD-SCNC: 107 MEQ/L (ref 101–111)
CO2: 26 MEQ/L (ref 21–32)
CREAT SERPL-MCNC: 1.27 MG/DL (ref 0.7–1.3)
CREATININE CLEARANCE: 56
GLUCOSE: 110 MG/DL (ref 70–110)
POTASSIUM SERPL-SCNC: 4.4 MEQ/L (ref 3.6–5)
SODIUM BLD-SCNC: 145 MEQ/L (ref 135–145)

## 2018-12-17 NOTE — PLAN OF CARE
Aerobic endurance goal to be measured in minutes. Start endurance training per patient tolerance at 1-8 minutes per exercise type, progressing to a total of 31-60 minutes using various modes of training (see Exercise Prescription). Progression:    [x] Weekly 5-10% intensity progression, as tolerated, during cardiac rehab sessions. [x] 13-17 Rate of Perceived Exertion on the Viet Scale (6-20). Measurable Muscular Strength Goal:  Starting at 1-5 lbs x 8 reps, progressing to 5-25 lbs x 15 reps per patient tolerance.       Electronically signed by Delford Bumpers on 12/17/2018 at 10:42 AM    Exercise Physiologist/Date/Time

## 2018-12-20 ENCOUNTER — TELEPHONE (OUTPATIENT)
Dept: CARDIOTHORACIC SURGERY | Age: 73
End: 2018-12-20

## 2018-12-27 ENCOUNTER — HOSPITAL ENCOUNTER (OUTPATIENT)
Dept: CARDIAC REHAB | Age: 73
Setting detail: THERAPIES SERIES
Discharge: HOME | End: 2018-12-27
Payer: MEDICARE

## 2018-12-27 VITALS — HEIGHT: 72 IN | WEIGHT: 206 LBS | BODY MASS INDEX: 27.9 KG/M2 | OXYGEN SATURATION: 95 % | HEART RATE: 67 BPM

## 2018-12-27 PROCEDURE — G0422 INTENS CARDIAC REHAB W/EXERC: HCPCS

## 2018-12-27 PROCEDURE — G0423 INTENS CARDIAC REHAB NO EXER: HCPCS

## 2018-12-27 NOTE — PLAN OF CARE
Intensity:  Max MET Level = **  RPE = 12-15 Intensity:  Max MET Level = ** RPE = 12-15   Progression: increase aerobic activity up to 25-30 min over next 4 weeks by increasing time 2-3 min/week. Progression:   Progression:   Progression: Progression:  Increase time/intensity when RPE <13, and HR is in [facility]   [x] Yes      [] No  Upper and Lower body strength training 2x/wk    Wt: 2#       Reps:  8-15    *Increase wt. after completing 15 reps with an RPE of <12/13. [] Yes      [] No  Upper and Lower body strength training 2x/wk    Wt: **#       Reps:  8-15    *Increase wt. after completing 15 reps with an RPE of <12/13. [] Yes      [] No  Upper and Lower body strength training 2x/wk    Wt: **#       Reps:  8-15    *Increase wt. after completing 15 reps with an RPE of <12/13. [] Yes      [] No  Upper and Lower body strength training 2x/wk    Wt: **#       Reps:  8-15    *Increase wt. after completing 15 reps with an RPE of <12/13. Continue Strength Training at home   [] Exercise Log & Strength training handout given     Wt: **#       Reps:  8-15    *Increase wt. after completing 15 reps with an RPE of <12/13. Flexibility Flexibility Flexibility Flexibility Flexibility   [x] Yes      [] No  25 min session of Core Strength & Flexibility 1x/per week  Attends Core Strength & Flexibility   [] Yes      [] No Attends Core Strength & Flexibility   [] Yes      [] No Attends Core Strength & Flexibility   [] Yes      [] No Continue Core Strength & Flexibility at home   Home Exercise  *Janie Ortiz verbalizes planning to walk 3-4 days/week for 5 min on days not in rehab. Home Exercise  *Myles verbalizes planning to ** ** days/week for ** min on days not in rehab. Home Exercise  *Myles verbalizes planning to ** ** days/week for ** min on days not in rehab.  Home Exercise  *Myles verbalizes planning to ** ** days/week for ** min on days not in Completed Videos Completed Videos Recommended Educational Videos Completed    [] Yes      [] No    **If not completed, Why? **          Smoking Cessation/Relaspe Prevention Intervention needed? [] Yes      [x] No   Smoking Cessation/Relapse Prevention Scheduled? [] Yes      [] No  Date:  ** Smoking Cessation/Relapse Prevention completed? [] Yes      [] No  Date: **    Smoking Cessation Counseling attended  [] Yes      [] No  **If not completed, Why? **   Professional Referrals:  *Please check if needed  [] Diabetes Clinic  [] Lipid Clinic   [] Other:     Professional Referrals:  *Please check if needed  [] Diabetes Clinic  [] Lipid Clinic   [] Other:   Preventative Medication Preventative Medication Preventative Medication Preventative Medication Preventative Medication   Aspirin  [x] Yes    [] No  Blood Thinner: Clopidogrel/Effient/Brillinta/ coumadin  [x] Yes    [] No  Beta Blocker  [x] Yes    [] No  Ace Inhibitor  [] Yes    [x] No  Statin/Lipid Lowering  [x] Yes    [] No Medication Changes? [] Yes    [] No Medication Changes? [] Yes    [] No Medication Changes? [] Yes    [] No Medication Changes? [] Yes    [] No   *Education* *Education* *Education* *Education* *Education*   Does Mulugeta Coyer require any additional education? [] Yes    [x] No   Does Mulugeta Coyer require any additional education? [] Yes    [] No Does Mulugeta Coyer require any additional education? [] Yes    [] No Does Mulugeta Coyer require any additional education? [] Yes    [] No Does Mulugeta Coyer require any additional education?   [] Yes    [] No   Recommended Additional Educational Videos    Medical  [] Hypertension & Heart Disease  [] Decoding Lab Results  [] Aging Enhancing Your QoL  [] Sleep Disorders  Exercise  [] Body Composition  [] Improve Performance  [] Exercise Action Plan  [] Intro to Yoga  Behavioral  [] How Our Thoughts Can Heal Our Hearts  [] Smoking Cessation  Nutrition  [] Planning Your Eating Strategy  [] Lable Reading- Part 2  [] Dining Out - Part

## 2018-12-31 ENCOUNTER — OFFICE VISIT (OUTPATIENT)
Dept: CARDIOLOGY CLINIC | Age: 73
End: 2018-12-31
Payer: MEDICARE

## 2018-12-31 VITALS
HEIGHT: 72 IN | DIASTOLIC BLOOD PRESSURE: 84 MMHG | HEART RATE: 60 BPM | SYSTOLIC BLOOD PRESSURE: 122 MMHG | WEIGHT: 212 LBS | BODY MASS INDEX: 28.71 KG/M2

## 2018-12-31 DIAGNOSIS — D75.829 HIT (HEPARIN-INDUCED THROMBOCYTOPENIA): Primary | ICD-10-CM

## 2018-12-31 DIAGNOSIS — J90 PLEURAL EFFUSION: ICD-10-CM

## 2018-12-31 DIAGNOSIS — N18.30 CKD (CHRONIC KIDNEY DISEASE) STAGE 3, GFR 30-59 ML/MIN (HCC): ICD-10-CM

## 2018-12-31 DIAGNOSIS — Z98.890 HISTORY OF MITRAL VALVE REPAIR: ICD-10-CM

## 2018-12-31 DIAGNOSIS — Z95.1 S/P CABG X 2: ICD-10-CM

## 2018-12-31 DIAGNOSIS — Z95.2 S/P AVR: ICD-10-CM

## 2018-12-31 LAB — INR BLD: 2.02 (ref 0.85–1.13)

## 2018-12-31 PROCEDURE — 1111F DSCHRG MED/CURRENT MED MERGE: CPT | Performed by: PHYSICIAN ASSISTANT

## 2018-12-31 PROCEDURE — 4040F PNEUMOC VAC/ADMIN/RCVD: CPT | Performed by: PHYSICIAN ASSISTANT

## 2018-12-31 PROCEDURE — G8419 CALC BMI OUT NRM PARAM NOF/U: HCPCS | Performed by: PHYSICIAN ASSISTANT

## 2018-12-31 PROCEDURE — 1101F PT FALLS ASSESS-DOCD LE1/YR: CPT | Performed by: PHYSICIAN ASSISTANT

## 2018-12-31 PROCEDURE — G8598 ASA/ANTIPLAT THER USED: HCPCS | Performed by: PHYSICIAN ASSISTANT

## 2018-12-31 PROCEDURE — G8484 FLU IMMUNIZE NO ADMIN: HCPCS | Performed by: PHYSICIAN ASSISTANT

## 2018-12-31 PROCEDURE — 1036F TOBACCO NON-USER: CPT | Performed by: PHYSICIAN ASSISTANT

## 2018-12-31 PROCEDURE — 1123F ACP DISCUSS/DSCN MKR DOCD: CPT | Performed by: PHYSICIAN ASSISTANT

## 2018-12-31 PROCEDURE — 99213 OFFICE O/P EST LOW 20 MIN: CPT | Performed by: PHYSICIAN ASSISTANT

## 2018-12-31 PROCEDURE — G8428 CUR MEDS NOT DOCUMENT: HCPCS | Performed by: PHYSICIAN ASSISTANT

## 2018-12-31 PROCEDURE — 3017F COLORECTAL CA SCREEN DOC REV: CPT | Performed by: PHYSICIAN ASSISTANT

## 2018-12-31 RX ORDER — LOSARTAN POTASSIUM 25 MG/1
25 TABLET ORAL DAILY
Qty: 90 TABLET | Refills: 3 | Status: SHIPPED | OUTPATIENT
Start: 2018-12-31 | End: 2019-10-28 | Stop reason: ALTCHOICE

## 2018-12-31 NOTE — PROGRESS NOTES
S/p CABG 11/14/2018. Patient is going to have a thoracentesis with CT/CV Surgeons on 1/4/19 and needs to know if okay to stop Coumadin. He has some SOB with exertion and MIKE. He denies having any dizziness, lightheadedness or palpitations. Due to start cardiac rehab on 1/3/2019. [facility] PROFESSIONAL [facility]  [facility] [address]   [address] [address]   Dept: [phone]   Dept Fax: [phone]   Loc: [phone]      Chief Complaint   Patient presents with    Follow-up    Coronary Artery Disease     cabg and AVR 11/14/2018     Patient presents for follow-up after recent  prosthetic  aortic valve replacement, mitral valve annuloplasty and 2 vessel bypass. Patient's postoperative course was complicated with heparin-induced thrombocytopenia. He was suddenly started on Coumadin and remains on it. He has issue with pleural effusion and needs a thoracentesis. There  Seeing us today to decide whether to okay to stop Coumadin. He states overall he has some shortness of breath. He denies any chest pain. Cardiologist:  Dr. Buckley Narrow:   No fever, no chills, No fatigue or weight loss  Pulmonary:    intermittent shortness of breath  Cardiac:    Denies recent chest pain   GI:     No nausea or vomiting, no abdominal pain  Neuro:    No dizziness or light headedness  Musculoskeletal:  No recent active issues  Extremities:   No edema, good peripheral pulses      Past Medical History:   Diagnosis Date    Acute on chronic systolic CHF (congestive heart failure), NYHA class 3 ([facility]) EF 25-30% 11/7/2018    CAD (coronary artery disease)     CKD (chronic kidney disease) stage 3, GFR 30-59 ml/min ([facility] [address] 9/23/2015    Diabetes mellitus ([facility] [address] [facility]     HTN (hypertension), benign     Hyperlipidemia     Hypothyroidism     Mitral and aortic incompetence     Neuropathy     Positive blood culture     Being treated with rocephhin as outpt.     S/P CABG x 2 Specialty:   Hematology and Oncology     Number of Visits Requested:   1     Continue Dr Morales Spikes current treatment plan    I will give him a referral to hematology for continued care of his history of heparin-induced thrombocytopenia. I discussed with the surgical team,  Stanton Heller PA-C about holding the Coumadin, and he states his attending surgeon said it was okay to hold. Continue same current medications and with constant vigilance to changes in symptoms and also any potential side effects. Return for care if become worse or seek medical attention immediately. The patient is educated on symptoms of heart disease that include chest pain and passing out, dizziness, etc and to report them if there is any change or go to emergency room.        Follow up with Dr Cuba Sees as scheduled or sooner if needed  (Please note that portions of this note were completed with a voice recognition program.  Efforts were made to edit the dictation but occasionally words are mis-transcribed.)      Roshan Mccain PA-C

## 2019-01-02 ENCOUNTER — HOSPITAL ENCOUNTER (OUTPATIENT)
Dept: CARDIAC REHAB | Age: 74
Setting detail: THERAPIES SERIES
Discharge: HOME | End: 2019-01-02
Payer: MEDICARE

## 2019-01-02 ENCOUNTER — HOSPITAL ENCOUNTER (OUTPATIENT)
Dept: CARDIAC REHAB | Age: 74
Setting detail: THERAPIES SERIES
End: 2019-01-02
Payer: MEDICARE

## 2019-01-02 PROCEDURE — G0423 INTENS CARDIAC REHAB NO EXER: HCPCS

## 2019-01-02 PROCEDURE — G0422 INTENS CARDIAC REHAB W/EXERC: HCPCS

## 2019-01-04 ENCOUNTER — CARE COORDINATION (OUTPATIENT)
Dept: CASE MANAGEMENT | Age: 74
End: 2019-01-04

## 2019-01-04 ENCOUNTER — HOSPITAL ENCOUNTER (OUTPATIENT)
Dept: GENERAL RADIOLOGY | Age: 74
Discharge: HOME | End: 2019-01-04
Payer: MEDICARE

## 2019-01-04 ENCOUNTER — HOSPITAL ENCOUNTER (OUTPATIENT)
Dept: CARDIAC REHAB | Age: 74
Setting detail: THERAPIES SERIES
End: 2019-01-04
Payer: MEDICARE

## 2019-01-04 ENCOUNTER — HOSPITAL ENCOUNTER (OUTPATIENT)
Dept: ULTRASOUND IMAGING | Age: 74
Discharge: HOME | End: 2019-01-04
Payer: MEDICARE

## 2019-01-04 ENCOUNTER — TELEPHONE (OUTPATIENT)
Dept: CARDIOTHORACIC SURGERY | Age: 74
End: 2019-01-04

## 2019-01-04 ENCOUNTER — APPOINTMENT (OUTPATIENT)
Dept: CARDIAC REHAB | Age: 74
End: 2019-01-04
Payer: MEDICARE

## 2019-01-04 DIAGNOSIS — J90 PLEURAL EFFUSION: ICD-10-CM

## 2019-01-04 PROCEDURE — 32555 ASPIRATE PLEURA W/ IMAGING: CPT

## 2019-01-04 PROCEDURE — 71045 X-RAY EXAM CHEST 1 VIEW: CPT

## 2019-01-05 LAB
ANION GAP SERPL CALCULATED.3IONS-SCNC: 14 MEQ/L (ref 10–19)
BUN BLDV-MCNC: 18 MG/DL (ref 8–23)
CALCIUM SERPL-MCNC: 9.6 MG/DL (ref 8.5–10.5)
CHLORIDE BLD-SCNC: 104 MEQ/L (ref 95–107)
CO2: 24 MEQ/L (ref 19–31)
CREAT SERPL-MCNC: 1.2 MG/DL (ref 0.8–1.4)
EGFR AFRICAN AMERICAN: 70.1 ML/MIN/1.73 M2
EGFR IF NONAFRICAN AMERICAN: 60.5 ML/MIN/1.73 M2
GLUCOSE: 109 MG/DL (ref 70–99)
POTASSIUM SERPL-SCNC: 4.8 MEQ/L (ref 3.5–5.4)
SODIUM BLD-SCNC: 142 MEQ/L (ref 135–146)

## 2019-01-07 ENCOUNTER — HOSPITAL ENCOUNTER (OUTPATIENT)
Dept: CARDIAC REHAB | Age: 74
Setting detail: THERAPIES SERIES
Discharge: HOME | End: 2019-01-07
Payer: MEDICARE

## 2019-01-07 ENCOUNTER — APPOINTMENT (OUTPATIENT)
Dept: CARDIAC REHAB | Age: 74
End: 2019-01-07
Payer: MEDICARE

## 2019-01-07 PROCEDURE — G0423 INTENS CARDIAC REHAB NO EXER: HCPCS

## 2019-01-07 PROCEDURE — G0422 INTENS CARDIAC REHAB W/EXERC: HCPCS

## 2019-01-09 ENCOUNTER — APPOINTMENT (OUTPATIENT)
Dept: CARDIAC REHAB | Age: 74
End: 2019-01-09
Payer: MEDICARE

## 2019-01-09 ENCOUNTER — HOSPITAL ENCOUNTER (OUTPATIENT)
Dept: CARDIAC REHAB | Age: 74
Setting detail: THERAPIES SERIES
Discharge: HOME | End: 2019-01-09
Payer: MEDICARE

## 2019-01-09 ENCOUNTER — HOSPITAL ENCOUNTER (OUTPATIENT)
Dept: GENERAL RADIOLOGY | Age: 74
Discharge: HOME | End: 2019-01-09
Payer: MEDICARE

## 2019-01-09 ENCOUNTER — HOSPITAL ENCOUNTER (OUTPATIENT)
Age: 74
Discharge: HOME | End: 2019-01-09
Payer: MEDICARE

## 2019-01-09 ENCOUNTER — CARE COORDINATION (OUTPATIENT)
Dept: CASE MANAGEMENT | Age: 74
End: 2019-01-09

## 2019-01-09 DIAGNOSIS — J90 PLEURAL EFFUSION: ICD-10-CM

## 2019-01-09 PROCEDURE — G0423 INTENS CARDIAC REHAB NO EXER: HCPCS

## 2019-01-09 PROCEDURE — 71046 X-RAY EXAM CHEST 2 VIEWS: CPT

## 2019-01-09 PROCEDURE — G0422 INTENS CARDIAC REHAB W/EXERC: HCPCS

## 2019-01-10 ENCOUNTER — TELEPHONE (OUTPATIENT)
Dept: CARDIOTHORACIC SURGERY | Age: 74
End: 2019-01-10

## 2019-01-11 ENCOUNTER — HOSPITAL ENCOUNTER (OUTPATIENT)
Dept: CARDIAC REHAB | Age: 74
Setting detail: THERAPIES SERIES
Discharge: HOME | End: 2019-01-11
Payer: MEDICARE

## 2019-01-11 PROCEDURE — G0422 INTENS CARDIAC REHAB W/EXERC: HCPCS

## 2019-01-11 PROCEDURE — G0423 INTENS CARDIAC REHAB NO EXER: HCPCS

## 2019-01-14 ENCOUNTER — HOSPITAL ENCOUNTER (OUTPATIENT)
Dept: CARDIAC REHAB | Age: 74
Setting detail: THERAPIES SERIES
Discharge: HOME | End: 2019-01-14
Payer: MEDICARE

## 2019-01-14 ENCOUNTER — APPOINTMENT (OUTPATIENT)
Dept: CARDIAC REHAB | Age: 74
End: 2019-01-14
Payer: MEDICARE

## 2019-01-14 PROCEDURE — G0422 INTENS CARDIAC REHAB W/EXERC: HCPCS

## 2019-01-14 PROCEDURE — G0423 INTENS CARDIAC REHAB NO EXER: HCPCS

## 2019-01-16 ENCOUNTER — HOSPITAL ENCOUNTER (OUTPATIENT)
Dept: CARDIAC REHAB | Age: 74
Setting detail: THERAPIES SERIES
Discharge: HOME | End: 2019-01-16
Payer: MEDICARE

## 2019-01-16 ENCOUNTER — APPOINTMENT (OUTPATIENT)
Dept: CARDIAC REHAB | Age: 74
End: 2019-01-16
Payer: MEDICARE

## 2019-01-16 PROCEDURE — G0423 INTENS CARDIAC REHAB NO EXER: HCPCS

## 2019-01-16 PROCEDURE — G0422 INTENS CARDIAC REHAB W/EXERC: HCPCS

## 2019-01-18 ENCOUNTER — HOSPITAL ENCOUNTER (OUTPATIENT)
Dept: CARDIAC REHAB | Age: 74
Setting detail: THERAPIES SERIES
Discharge: HOME | End: 2019-01-18
Payer: MEDICARE

## 2019-01-18 PROCEDURE — G0423 INTENS CARDIAC REHAB NO EXER: HCPCS

## 2019-01-18 PROCEDURE — G0422 INTENS CARDIAC REHAB W/EXERC: HCPCS

## 2019-01-21 ENCOUNTER — HOSPITAL ENCOUNTER (OUTPATIENT)
Dept: CARDIAC REHAB | Age: 74
Setting detail: THERAPIES SERIES
Discharge: HOME | End: 2019-01-21
Payer: MEDICARE

## 2019-01-21 ENCOUNTER — APPOINTMENT (OUTPATIENT)
Dept: CARDIAC REHAB | Age: 74
End: 2019-01-21
Payer: MEDICARE

## 2019-01-23 ENCOUNTER — APPOINTMENT (OUTPATIENT)
Dept: CARDIAC REHAB | Age: 74
End: 2019-01-23
Payer: MEDICARE

## 2019-01-23 ENCOUNTER — HOSPITAL ENCOUNTER (OUTPATIENT)
Dept: CARDIAC REHAB | Age: 74
Setting detail: THERAPIES SERIES
Discharge: HOME | End: 2019-01-23
Payer: MEDICARE

## 2019-01-23 PROCEDURE — G0422 INTENS CARDIAC REHAB W/EXERC: HCPCS

## 2019-01-23 PROCEDURE — G0423 INTENS CARDIAC REHAB NO EXER: HCPCS

## 2019-01-25 ENCOUNTER — HOSPITAL ENCOUNTER (OUTPATIENT)
Dept: CARDIAC REHAB | Age: 74
Setting detail: THERAPIES SERIES
Discharge: HOME | End: 2019-01-25
Payer: MEDICARE

## 2019-01-25 PROCEDURE — G0423 INTENS CARDIAC REHAB NO EXER: HCPCS

## 2019-01-25 PROCEDURE — G0422 INTENS CARDIAC REHAB W/EXERC: HCPCS

## 2019-01-28 ENCOUNTER — APPOINTMENT (OUTPATIENT)
Dept: CARDIAC REHAB | Age: 74
End: 2019-01-28
Payer: MEDICARE

## 2019-01-28 ENCOUNTER — HOSPITAL ENCOUNTER (OUTPATIENT)
Dept: CARDIAC REHAB | Age: 74
Setting detail: THERAPIES SERIES
Discharge: HOME | End: 2019-01-28
Payer: MEDICARE

## 2019-01-28 PROCEDURE — G0423 INTENS CARDIAC REHAB NO EXER: HCPCS

## 2019-01-28 PROCEDURE — G0422 INTENS CARDIAC REHAB W/EXERC: HCPCS

## 2019-01-30 ENCOUNTER — HOSPITAL ENCOUNTER (OUTPATIENT)
Dept: CARDIAC REHAB | Age: 74
Setting detail: THERAPIES SERIES
End: 2019-01-30
Payer: MEDICARE

## 2019-01-30 ENCOUNTER — APPOINTMENT (OUTPATIENT)
Dept: CARDIAC REHAB | Age: 74
End: 2019-01-30
Payer: MEDICARE

## 2019-02-01 ENCOUNTER — HOSPITAL ENCOUNTER (OUTPATIENT)
Dept: CARDIAC REHAB | Age: 74
Setting detail: THERAPIES SERIES
End: 2019-02-01
Payer: MEDICARE

## 2019-02-01 ENCOUNTER — APPOINTMENT (OUTPATIENT)
Dept: CARDIAC REHAB | Age: 74
End: 2019-02-01
Payer: MEDICARE

## 2019-02-01 ENCOUNTER — TELEPHONE (OUTPATIENT)
Dept: CARDIOTHORACIC SURGERY | Age: 74
End: 2019-02-01

## 2019-02-01 DIAGNOSIS — J90 PLEURAL EFFUSION: Primary | ICD-10-CM

## 2019-02-04 ENCOUNTER — TELEPHONE (OUTPATIENT)
Dept: CARDIOTHORACIC SURGERY | Age: 74
End: 2019-02-04

## 2019-02-04 ENCOUNTER — APPOINTMENT (OUTPATIENT)
Dept: CARDIAC REHAB | Age: 74
End: 2019-02-04
Payer: MEDICARE

## 2019-02-04 ENCOUNTER — HOSPITAL ENCOUNTER (OUTPATIENT)
Dept: CARDIAC REHAB | Age: 74
Setting detail: THERAPIES SERIES
End: 2019-02-04
Payer: MEDICARE

## 2019-02-06 ENCOUNTER — HOSPITAL ENCOUNTER (OUTPATIENT)
Dept: CARDIAC REHAB | Age: 74
Setting detail: THERAPIES SERIES
Discharge: HOME | End: 2019-02-06
Payer: MEDICARE

## 2019-02-06 ENCOUNTER — HOSPITAL ENCOUNTER (OUTPATIENT)
Dept: GENERAL RADIOLOGY | Age: 74
Discharge: HOME | End: 2019-02-06
Payer: MEDICARE

## 2019-02-06 ENCOUNTER — APPOINTMENT (OUTPATIENT)
Dept: CARDIAC REHAB | Age: 74
End: 2019-02-06
Payer: MEDICARE

## 2019-02-06 ENCOUNTER — OFFICE VISIT (OUTPATIENT)
Dept: CARDIOTHORACIC SURGERY | Age: 74
End: 2019-02-06

## 2019-02-06 ENCOUNTER — HOSPITAL ENCOUNTER (OUTPATIENT)
Age: 74
Discharge: HOME | End: 2019-02-06
Payer: MEDICARE

## 2019-02-06 VITALS
BODY MASS INDEX: 28.01 KG/M2 | SYSTOLIC BLOOD PRESSURE: 148 MMHG | DIASTOLIC BLOOD PRESSURE: 83 MMHG | HEART RATE: 68 BPM | HEIGHT: 72 IN | WEIGHT: 206.8 LBS

## 2019-02-06 DIAGNOSIS — J90 PLEURAL EFFUSION: Primary | ICD-10-CM

## 2019-02-06 DIAGNOSIS — J90 PLEURAL EFFUSION: ICD-10-CM

## 2019-02-06 DIAGNOSIS — R35.89 DIURESIS: ICD-10-CM

## 2019-02-06 PROCEDURE — G0423 INTENS CARDIAC REHAB NO EXER: HCPCS

## 2019-02-06 PROCEDURE — 71046 X-RAY EXAM CHEST 2 VIEWS: CPT

## 2019-02-06 PROCEDURE — 99024 POSTOP FOLLOW-UP VISIT: CPT | Performed by: PHYSICIAN ASSISTANT

## 2019-02-06 PROCEDURE — APPSS30 APP SPLIT SHARED TIME 16-30 MINUTES: Performed by: PHYSICIAN ASSISTANT

## 2019-02-06 PROCEDURE — G0422 INTENS CARDIAC REHAB W/EXERC: HCPCS

## 2019-02-08 ENCOUNTER — HOSPITAL ENCOUNTER (OUTPATIENT)
Dept: CARDIAC REHAB | Age: 74
Setting detail: THERAPIES SERIES
Discharge: HOME | End: 2019-02-08
Payer: MEDICARE

## 2019-02-08 PROCEDURE — G0423 INTENS CARDIAC REHAB NO EXER: HCPCS

## 2019-02-08 PROCEDURE — G0422 INTENS CARDIAC REHAB W/EXERC: HCPCS

## 2019-02-11 ENCOUNTER — HOSPITAL ENCOUNTER (OUTPATIENT)
Dept: CARDIAC REHAB | Age: 74
Setting detail: THERAPIES SERIES
Discharge: HOME | End: 2019-02-11
Payer: MEDICARE

## 2019-02-11 ENCOUNTER — APPOINTMENT (OUTPATIENT)
Dept: CARDIAC REHAB | Age: 74
End: 2019-02-11
Payer: MEDICARE

## 2019-02-11 PROCEDURE — G0422 INTENS CARDIAC REHAB W/EXERC: HCPCS

## 2019-02-11 PROCEDURE — G0423 INTENS CARDIAC REHAB NO EXER: HCPCS

## 2019-02-13 ENCOUNTER — HOSPITAL ENCOUNTER (OUTPATIENT)
Dept: CARDIAC REHAB | Age: 74
Setting detail: THERAPIES SERIES
Discharge: HOME | End: 2019-02-13
Payer: MEDICARE

## 2019-02-13 ENCOUNTER — APPOINTMENT (OUTPATIENT)
Dept: CARDIAC REHAB | Age: 74
End: 2019-02-13
Payer: MEDICARE

## 2019-02-13 PROCEDURE — G0423 INTENS CARDIAC REHAB NO EXER: HCPCS

## 2019-02-13 PROCEDURE — G0422 INTENS CARDIAC REHAB W/EXERC: HCPCS

## 2019-02-15 ENCOUNTER — HOSPITAL ENCOUNTER (OUTPATIENT)
Dept: CARDIAC REHAB | Age: 74
Setting detail: THERAPIES SERIES
Discharge: HOME | End: 2019-02-15
Payer: MEDICARE

## 2019-02-15 PROCEDURE — G0423 INTENS CARDIAC REHAB NO EXER: HCPCS

## 2019-02-15 PROCEDURE — G0422 INTENS CARDIAC REHAB W/EXERC: HCPCS

## 2019-02-18 ENCOUNTER — HOSPITAL ENCOUNTER (OUTPATIENT)
Age: 74
Discharge: HOME | End: 2019-02-18
Payer: MEDICARE

## 2019-02-18 ENCOUNTER — APPOINTMENT (OUTPATIENT)
Dept: CARDIAC REHAB | Age: 74
End: 2019-02-18
Payer: MEDICARE

## 2019-02-18 ENCOUNTER — HOSPITAL ENCOUNTER (OUTPATIENT)
Dept: CARDIAC REHAB | Age: 74
Setting detail: THERAPIES SERIES
Discharge: HOME | End: 2019-02-18
Payer: MEDICARE

## 2019-02-18 ENCOUNTER — HOSPITAL ENCOUNTER (OUTPATIENT)
Dept: GENERAL RADIOLOGY | Age: 74
Discharge: HOME | End: 2019-02-18
Payer: MEDICARE

## 2019-02-18 DIAGNOSIS — R35.89 DIURESIS: ICD-10-CM

## 2019-02-18 DIAGNOSIS — J90 PLEURAL EFFUSION: ICD-10-CM

## 2019-02-18 LAB
ANION GAP SERPL CALCULATED.3IONS-SCNC: 18 MEQ/L (ref 8–16)
BUN BLDV-MCNC: 29 MG/DL (ref 7–22)
CALCIUM SERPL-MCNC: 9.2 MG/DL (ref 8.5–10.5)
CHLORIDE BLD-SCNC: 107 MEQ/L (ref 98–111)
CO2: 21 MEQ/L (ref 23–33)
CREAT SERPL-MCNC: 1.7 MG/DL (ref 0.4–1.2)
GFR SERPL CREATININE-BSD FRML MDRD: 40 ML/MIN/1.73M2
GLUCOSE BLD-MCNC: 85 MG/DL (ref 70–108)
POTASSIUM SERPL-SCNC: 5.2 MEQ/L (ref 3.5–5.2)
SODIUM BLD-SCNC: 146 MEQ/L (ref 135–145)

## 2019-02-18 PROCEDURE — G0423 INTENS CARDIAC REHAB NO EXER: HCPCS

## 2019-02-18 PROCEDURE — 71046 X-RAY EXAM CHEST 2 VIEWS: CPT

## 2019-02-18 PROCEDURE — 36415 COLL VENOUS BLD VENIPUNCTURE: CPT

## 2019-02-18 PROCEDURE — G0422 INTENS CARDIAC REHAB W/EXERC: HCPCS

## 2019-02-18 PROCEDURE — 80048 BASIC METABOLIC PNL TOTAL CA: CPT

## 2019-02-20 ENCOUNTER — APPOINTMENT (OUTPATIENT)
Dept: CARDIAC REHAB | Age: 74
End: 2019-02-20
Payer: MEDICARE

## 2019-02-20 ENCOUNTER — OFFICE VISIT (OUTPATIENT)
Dept: CARDIOTHORACIC SURGERY | Age: 74
End: 2019-02-20

## 2019-02-20 ENCOUNTER — HOSPITAL ENCOUNTER (OUTPATIENT)
Dept: CARDIAC REHAB | Age: 74
Setting detail: THERAPIES SERIES
Discharge: HOME | End: 2019-02-20
Payer: MEDICARE

## 2019-02-20 VITALS
BODY MASS INDEX: 28.01 KG/M2 | SYSTOLIC BLOOD PRESSURE: 144 MMHG | WEIGHT: 206.8 LBS | HEART RATE: 73 BPM | DIASTOLIC BLOOD PRESSURE: 72 MMHG | HEIGHT: 72 IN

## 2019-02-20 DIAGNOSIS — I50.23 HEART FAILURE, SYSTOLIC, WITH ACUTE DECOMPENSATION (HCC): Primary | ICD-10-CM

## 2019-02-20 PROCEDURE — G0422 INTENS CARDIAC REHAB W/EXERC: HCPCS

## 2019-02-20 PROCEDURE — 99024 POSTOP FOLLOW-UP VISIT: CPT | Performed by: PHYSICIAN ASSISTANT

## 2019-02-20 PROCEDURE — G0423 INTENS CARDIAC REHAB NO EXER: HCPCS

## 2019-02-20 PROCEDURE — APPSS30 APP SPLIT SHARED TIME 16-30 MINUTES: Performed by: PHYSICIAN ASSISTANT

## 2019-02-20 RX ORDER — FUROSEMIDE 20 MG/1
20 TABLET ORAL DAILY
Qty: 30 TABLET | Refills: 2 | Status: SHIPPED | OUTPATIENT
Start: 2019-02-20 | End: 2019-02-26

## 2019-02-22 ENCOUNTER — HOSPITAL ENCOUNTER (OUTPATIENT)
Dept: CARDIAC REHAB | Age: 74
Setting detail: THERAPIES SERIES
Discharge: HOME | End: 2019-02-22
Payer: MEDICARE

## 2019-02-22 PROCEDURE — G0423 INTENS CARDIAC REHAB NO EXER: HCPCS

## 2019-02-22 PROCEDURE — G0422 INTENS CARDIAC REHAB W/EXERC: HCPCS

## 2019-02-25 ENCOUNTER — APPOINTMENT (OUTPATIENT)
Dept: CARDIAC REHAB | Age: 74
End: 2019-02-25
Payer: MEDICARE

## 2019-02-25 ENCOUNTER — HOSPITAL ENCOUNTER (OUTPATIENT)
Dept: CARDIAC REHAB | Age: 74
Setting detail: THERAPIES SERIES
End: 2019-02-25
Payer: MEDICARE

## 2019-02-25 ENCOUNTER — HOSPITAL ENCOUNTER (OUTPATIENT)
Dept: CARDIAC REHAB | Age: 74
Setting detail: THERAPIES SERIES
Discharge: HOME | End: 2019-02-25
Payer: MEDICARE

## 2019-02-26 ENCOUNTER — OFFICE VISIT (OUTPATIENT)
Dept: NEPHROLOGY | Age: 74
End: 2019-02-26
Payer: MEDICARE

## 2019-02-26 VITALS
DIASTOLIC BLOOD PRESSURE: 68 MMHG | HEART RATE: 64 BPM | SYSTOLIC BLOOD PRESSURE: 140 MMHG | OXYGEN SATURATION: 98 % | WEIGHT: 207.6 LBS | BODY MASS INDEX: 28.16 KG/M2

## 2019-02-26 DIAGNOSIS — N17.9 AKI (ACUTE KIDNEY INJURY) (HCC): ICD-10-CM

## 2019-02-26 DIAGNOSIS — N18.30 CKD (CHRONIC KIDNEY DISEASE) STAGE 3, GFR 30-59 ML/MIN (HCC): Primary | ICD-10-CM

## 2019-02-26 DIAGNOSIS — I10 HTN (HYPERTENSION), BENIGN: ICD-10-CM

## 2019-02-26 DIAGNOSIS — N28.1 RENAL CYST: ICD-10-CM

## 2019-02-26 PROCEDURE — G8428 CUR MEDS NOT DOCUMENT: HCPCS | Performed by: INTERNAL MEDICINE

## 2019-02-26 PROCEDURE — 3017F COLORECTAL CA SCREEN DOC REV: CPT | Performed by: INTERNAL MEDICINE

## 2019-02-26 PROCEDURE — 4040F PNEUMOC VAC/ADMIN/RCVD: CPT | Performed by: INTERNAL MEDICINE

## 2019-02-26 PROCEDURE — G8419 CALC BMI OUT NRM PARAM NOF/U: HCPCS | Performed by: INTERNAL MEDICINE

## 2019-02-26 PROCEDURE — G8484 FLU IMMUNIZE NO ADMIN: HCPCS | Performed by: INTERNAL MEDICINE

## 2019-02-26 PROCEDURE — 99213 OFFICE O/P EST LOW 20 MIN: CPT | Performed by: INTERNAL MEDICINE

## 2019-02-26 PROCEDURE — 1036F TOBACCO NON-USER: CPT | Performed by: INTERNAL MEDICINE

## 2019-02-26 PROCEDURE — 1101F PT FALLS ASSESS-DOCD LE1/YR: CPT | Performed by: INTERNAL MEDICINE

## 2019-02-26 PROCEDURE — G8598 ASA/ANTIPLAT THER USED: HCPCS | Performed by: INTERNAL MEDICINE

## 2019-02-26 PROCEDURE — 1123F ACP DISCUSS/DSCN MKR DOCD: CPT | Performed by: INTERNAL MEDICINE

## 2019-02-27 ENCOUNTER — HOSPITAL ENCOUNTER (OUTPATIENT)
Dept: CARDIAC REHAB | Age: 74
Setting detail: THERAPIES SERIES
Discharge: HOME | End: 2019-02-27
Payer: MEDICARE

## 2019-02-27 ENCOUNTER — APPOINTMENT (OUTPATIENT)
Dept: CARDIAC REHAB | Age: 74
End: 2019-02-27
Payer: MEDICARE

## 2019-02-27 PROCEDURE — G0423 INTENS CARDIAC REHAB NO EXER: HCPCS

## 2019-02-27 PROCEDURE — G0422 INTENS CARDIAC REHAB W/EXERC: HCPCS

## 2019-03-01 ENCOUNTER — HOSPITAL ENCOUNTER (OUTPATIENT)
Dept: CARDIAC REHAB | Age: 74
Setting detail: THERAPIES SERIES
Discharge: HOME | End: 2019-03-01
Payer: MEDICARE

## 2019-03-01 PROCEDURE — G0423 INTENS CARDIAC REHAB NO EXER: HCPCS

## 2019-03-01 PROCEDURE — G0422 INTENS CARDIAC REHAB W/EXERC: HCPCS

## 2019-03-04 ENCOUNTER — APPOINTMENT (OUTPATIENT)
Dept: CARDIAC REHAB | Age: 74
End: 2019-03-04
Payer: MEDICARE

## 2019-03-04 ENCOUNTER — TELEPHONE (OUTPATIENT)
Dept: NEPHROLOGY | Age: 74
End: 2019-03-04

## 2019-03-04 ENCOUNTER — HOSPITAL ENCOUNTER (OUTPATIENT)
Age: 74
Discharge: HOME | End: 2019-03-04
Payer: MEDICARE

## 2019-03-04 ENCOUNTER — HOSPITAL ENCOUNTER (OUTPATIENT)
Dept: CARDIAC REHAB | Age: 74
Setting detail: THERAPIES SERIES
Discharge: HOME | End: 2019-03-04
Payer: MEDICARE

## 2019-03-04 DIAGNOSIS — N28.1 RENAL CYST: ICD-10-CM

## 2019-03-04 DIAGNOSIS — I10 HTN (HYPERTENSION), BENIGN: ICD-10-CM

## 2019-03-04 DIAGNOSIS — N18.30 CKD (CHRONIC KIDNEY DISEASE) STAGE 3, GFR 30-59 ML/MIN (HCC): ICD-10-CM

## 2019-03-04 DIAGNOSIS — N17.9 AKI (ACUTE KIDNEY INJURY) (HCC): ICD-10-CM

## 2019-03-04 LAB
ANION GAP SERPL CALCULATED.3IONS-SCNC: 13 MEQ/L (ref 8–16)
BUN BLDV-MCNC: 21 MG/DL (ref 7–22)
CALCIUM SERPL-MCNC: 9.7 MG/DL (ref 8.5–10.5)
CHLORIDE BLD-SCNC: 105 MEQ/L (ref 98–111)
CO2: 25 MEQ/L (ref 23–33)
CREAT SERPL-MCNC: 1.2 MG/DL (ref 0.4–1.2)
GFR SERPL CREATININE-BSD FRML MDRD: 60 ML/MIN/1.73M2
GLUCOSE BLD-MCNC: 146 MG/DL (ref 70–108)
POTASSIUM SERPL-SCNC: 4.8 MEQ/L (ref 3.5–5.2)
SODIUM BLD-SCNC: 143 MEQ/L (ref 135–145)

## 2019-03-04 PROCEDURE — 36415 COLL VENOUS BLD VENIPUNCTURE: CPT

## 2019-03-04 PROCEDURE — G0423 INTENS CARDIAC REHAB NO EXER: HCPCS

## 2019-03-04 PROCEDURE — 80048 BASIC METABOLIC PNL TOTAL CA: CPT

## 2019-03-04 PROCEDURE — G0422 INTENS CARDIAC REHAB W/EXERC: HCPCS

## 2019-03-06 ENCOUNTER — HOSPITAL ENCOUNTER (OUTPATIENT)
Dept: CARDIAC REHAB | Age: 74
Setting detail: THERAPIES SERIES
Discharge: HOME | End: 2019-03-06
Payer: MEDICARE

## 2019-03-06 ENCOUNTER — APPOINTMENT (OUTPATIENT)
Dept: CARDIAC REHAB | Age: 74
End: 2019-03-06
Payer: MEDICARE

## 2019-03-06 LAB
ANION GAP SERPL CALCULATED.3IONS-SCNC: 15 MEQ/L (ref 10–19)
BUN BLDV-MCNC: 21 MG/DL (ref 8–23)
CALCIUM SERPL-MCNC: 9.5 MG/DL (ref 8.5–10.5)
CHLORIDE BLD-SCNC: 101 MEQ/L (ref 95–107)
CO2: 24 MEQ/L (ref 19–31)
CREAT SERPL-MCNC: 1.4 MG/DL (ref 0.8–1.4)
EGFR AFRICAN AMERICAN: 58.2 ML/MIN/1.73 M2
EGFR IF NONAFRICAN AMERICAN: 50.2 ML/MIN/1.73 M2
GLUCOSE: 95 MG/DL (ref 70–99)
POTASSIUM SERPL-SCNC: 5 MEQ/L (ref 3.5–5.4)
SODIUM BLD-SCNC: 140 MEQ/L (ref 135–146)

## 2019-03-06 PROCEDURE — G0422 INTENS CARDIAC REHAB W/EXERC: HCPCS

## 2019-03-06 PROCEDURE — G0423 INTENS CARDIAC REHAB NO EXER: HCPCS

## 2019-03-08 ENCOUNTER — HOSPITAL ENCOUNTER (OUTPATIENT)
Dept: CARDIAC REHAB | Age: 74
Setting detail: THERAPIES SERIES
Discharge: HOME | End: 2019-03-08
Payer: MEDICARE

## 2019-03-08 PROCEDURE — G0422 INTENS CARDIAC REHAB W/EXERC: HCPCS

## 2019-03-08 PROCEDURE — G0423 INTENS CARDIAC REHAB NO EXER: HCPCS

## 2019-03-11 ENCOUNTER — HOSPITAL ENCOUNTER (OUTPATIENT)
Dept: CARDIAC REHAB | Age: 74
Setting detail: THERAPIES SERIES
Discharge: HOME | End: 2019-03-11
Payer: MEDICARE

## 2019-03-11 ENCOUNTER — APPOINTMENT (OUTPATIENT)
Dept: CARDIAC REHAB | Age: 74
End: 2019-03-11
Payer: MEDICARE

## 2019-03-11 PROCEDURE — G0423 INTENS CARDIAC REHAB NO EXER: HCPCS

## 2019-03-11 PROCEDURE — G0422 INTENS CARDIAC REHAB W/EXERC: HCPCS

## 2019-03-13 ENCOUNTER — APPOINTMENT (OUTPATIENT)
Dept: CARDIAC REHAB | Age: 74
End: 2019-03-13
Payer: MEDICARE

## 2019-03-13 ENCOUNTER — HOSPITAL ENCOUNTER (OUTPATIENT)
Dept: CARDIAC REHAB | Age: 74
Setting detail: THERAPIES SERIES
Discharge: HOME | End: 2019-03-13
Payer: MEDICARE

## 2019-03-13 PROCEDURE — G0422 INTENS CARDIAC REHAB W/EXERC: HCPCS

## 2019-03-13 PROCEDURE — G0423 INTENS CARDIAC REHAB NO EXER: HCPCS

## 2019-03-15 ENCOUNTER — HOSPITAL ENCOUNTER (OUTPATIENT)
Dept: CARDIAC REHAB | Age: 74
Setting detail: THERAPIES SERIES
Discharge: HOME | End: 2019-03-15
Payer: MEDICARE

## 2019-03-15 PROCEDURE — G0423 INTENS CARDIAC REHAB NO EXER: HCPCS

## 2019-03-15 PROCEDURE — G0422 INTENS CARDIAC REHAB W/EXERC: HCPCS

## 2019-03-18 ENCOUNTER — APPOINTMENT (OUTPATIENT)
Dept: CARDIAC REHAB | Age: 74
End: 2019-03-18
Payer: MEDICARE

## 2019-03-18 ENCOUNTER — HOSPITAL ENCOUNTER (OUTPATIENT)
Dept: CARDIAC REHAB | Age: 74
Setting detail: THERAPIES SERIES
Discharge: HOME | End: 2019-03-18
Payer: MEDICARE

## 2019-03-18 PROCEDURE — G0423 INTENS CARDIAC REHAB NO EXER: HCPCS

## 2019-03-18 PROCEDURE — G0422 INTENS CARDIAC REHAB W/EXERC: HCPCS

## 2019-03-20 ENCOUNTER — HOSPITAL ENCOUNTER (OUTPATIENT)
Dept: CARDIAC REHAB | Age: 74
Setting detail: THERAPIES SERIES
Discharge: HOME | End: 2019-03-20
Payer: MEDICARE

## 2019-03-20 ENCOUNTER — APPOINTMENT (OUTPATIENT)
Dept: CARDIAC REHAB | Age: 74
End: 2019-03-20
Payer: MEDICARE

## 2019-03-20 PROCEDURE — G0423 INTENS CARDIAC REHAB NO EXER: HCPCS

## 2019-03-20 PROCEDURE — G0422 INTENS CARDIAC REHAB W/EXERC: HCPCS

## 2019-03-22 ENCOUNTER — HOSPITAL ENCOUNTER (OUTPATIENT)
Dept: CARDIAC REHAB | Age: 74
Setting detail: THERAPIES SERIES
Discharge: HOME | End: 2019-03-22
Payer: MEDICARE

## 2019-03-22 PROCEDURE — G0422 INTENS CARDIAC REHAB W/EXERC: HCPCS

## 2019-03-22 PROCEDURE — G0423 INTENS CARDIAC REHAB NO EXER: HCPCS

## 2019-03-25 ENCOUNTER — HOSPITAL ENCOUNTER (OUTPATIENT)
Dept: CARDIAC REHAB | Age: 74
Setting detail: THERAPIES SERIES
End: 2019-03-25
Payer: MEDICARE

## 2019-03-25 ENCOUNTER — APPOINTMENT (OUTPATIENT)
Dept: CARDIAC REHAB | Age: 74
End: 2019-03-25
Payer: MEDICARE

## 2019-03-27 ENCOUNTER — HOSPITAL ENCOUNTER (OUTPATIENT)
Dept: CARDIAC REHAB | Age: 74
Setting detail: THERAPIES SERIES
Discharge: HOME | End: 2019-03-27
Payer: MEDICARE

## 2019-03-27 PROCEDURE — G0423 INTENS CARDIAC REHAB NO EXER: HCPCS

## 2019-03-27 PROCEDURE — G0422 INTENS CARDIAC REHAB W/EXERC: HCPCS

## 2019-03-29 ENCOUNTER — HOSPITAL ENCOUNTER (OUTPATIENT)
Dept: CARDIAC REHAB | Age: 74
Setting detail: THERAPIES SERIES
Discharge: HOME | End: 2019-03-29
Payer: MEDICARE

## 2019-03-29 PROCEDURE — G0422 INTENS CARDIAC REHAB W/EXERC: HCPCS

## 2019-03-29 PROCEDURE — G0423 INTENS CARDIAC REHAB NO EXER: HCPCS

## 2019-04-01 ENCOUNTER — OFFICE VISIT (OUTPATIENT)
Dept: CARDIOLOGY CLINIC | Age: 74
End: 2019-04-01
Payer: MEDICARE

## 2019-04-01 ENCOUNTER — TELEPHONE (OUTPATIENT)
Dept: CARDIOLOGY CLINIC | Age: 74
End: 2019-04-01

## 2019-04-01 VITALS
SYSTOLIC BLOOD PRESSURE: 110 MMHG | HEIGHT: 72 IN | BODY MASS INDEX: 28.17 KG/M2 | WEIGHT: 208 LBS | HEART RATE: 60 BPM | DIASTOLIC BLOOD PRESSURE: 62 MMHG

## 2019-04-01 DIAGNOSIS — Z98.890 H/O MITRAL VALVE REPAIR: ICD-10-CM

## 2019-04-01 DIAGNOSIS — Z95.1 HX OF CABG: ICD-10-CM

## 2019-04-01 DIAGNOSIS — Z95.2 S/P AVR: Primary | ICD-10-CM

## 2019-04-01 PROCEDURE — 1123F ACP DISCUSS/DSCN MKR DOCD: CPT | Performed by: INTERNAL MEDICINE

## 2019-04-01 PROCEDURE — G8427 DOCREV CUR MEDS BY ELIG CLIN: HCPCS | Performed by: INTERNAL MEDICINE

## 2019-04-01 PROCEDURE — G8598 ASA/ANTIPLAT THER USED: HCPCS | Performed by: INTERNAL MEDICINE

## 2019-04-01 PROCEDURE — 99213 OFFICE O/P EST LOW 20 MIN: CPT | Performed by: INTERNAL MEDICINE

## 2019-04-01 PROCEDURE — 4040F PNEUMOC VAC/ADMIN/RCVD: CPT | Performed by: INTERNAL MEDICINE

## 2019-04-01 PROCEDURE — 1036F TOBACCO NON-USER: CPT | Performed by: INTERNAL MEDICINE

## 2019-04-01 PROCEDURE — 3017F COLORECTAL CA SCREEN DOC REV: CPT | Performed by: INTERNAL MEDICINE

## 2019-04-01 PROCEDURE — G8419 CALC BMI OUT NRM PARAM NOF/U: HCPCS | Performed by: INTERNAL MEDICINE

## 2019-04-01 RX ORDER — DOCUSATE SODIUM 100 MG/1
100 CAPSULE, LIQUID FILLED ORAL PRN
COMMUNITY

## 2019-04-01 RX ORDER — FUROSEMIDE 20 MG/1
20 TABLET ORAL 2 TIMES DAILY
COMMUNITY
End: 2019-04-24

## 2019-04-01 RX ORDER — POTASSIUM CHLORIDE 1.5 G/1.77G
20 POWDER, FOR SOLUTION ORAL 2 TIMES DAILY
COMMUNITY
End: 2019-04-24

## 2019-04-01 NOTE — TELEPHONE ENCOUNTER
Note faxed to Dr. Reba Beaver office stating Coumadin can be stopped from cardiology standpoint to see if if can be stopped from hematology standpoint. Please follow.

## 2019-04-03 ENCOUNTER — HOSPITAL ENCOUNTER (OUTPATIENT)
Dept: CARDIAC REHAB | Age: 74
Setting detail: THERAPIES SERIES
Discharge: HOME | End: 2019-04-03
Payer: MEDICARE

## 2019-04-03 PROCEDURE — G0423 INTENS CARDIAC REHAB NO EXER: HCPCS

## 2019-04-03 PROCEDURE — G0422 INTENS CARDIAC REHAB W/EXERC: HCPCS

## 2019-04-03 NOTE — PROGRESS NOTES
Arcelia SAUCEDO.:  1945    Acct Number: [de-identified]   MRN:  [id number]                             [facility] NUTRITION WORKSHOP             Date: 4/3/2019        Session # _______    [facility] class covered:    Gualberto Mantilla)  Label Reading  ()  Menus  ()  Targeting Nutrition Priorities  ()  Fueling a Healthy Body      Sol Lucia was in the Workshop with the Dietitian for 45 minutes. The content was presented via Powerpoint, lecture, and patient participation based format.       Electronically signed by Atilano Hammans, RD, LD on 4/3/2019 at 10:49 AM

## 2019-04-05 ENCOUNTER — HOSPITAL ENCOUNTER (OUTPATIENT)
Dept: NON INVASIVE DIAGNOSTICS | Age: 74
Discharge: HOME | End: 2019-04-05
Payer: MEDICARE

## 2019-04-05 ENCOUNTER — HOSPITAL ENCOUNTER (OUTPATIENT)
Dept: CARDIAC REHAB | Age: 74
Setting detail: THERAPIES SERIES
Discharge: HOME | End: 2019-04-05
Payer: MEDICARE

## 2019-04-05 DIAGNOSIS — Z95.2 S/P AVR: ICD-10-CM

## 2019-04-05 DIAGNOSIS — Z95.1 HX OF CABG: ICD-10-CM

## 2019-04-05 DIAGNOSIS — Z98.890 H/O MITRAL VALVE REPAIR: ICD-10-CM

## 2019-04-05 LAB
LV EF: 23 %
LVEF MODALITY: NORMAL

## 2019-04-05 PROCEDURE — G0423 INTENS CARDIAC REHAB NO EXER: HCPCS

## 2019-04-05 PROCEDURE — 93306 TTE W/DOPPLER COMPLETE: CPT

## 2019-04-05 PROCEDURE — G0422 INTENS CARDIAC REHAB W/EXERC: HCPCS

## 2019-04-08 ENCOUNTER — HOSPITAL ENCOUNTER (OUTPATIENT)
Dept: CARDIAC REHAB | Age: 74
Setting detail: THERAPIES SERIES
Discharge: HOME | End: 2019-04-08
Payer: MEDICARE

## 2019-04-08 PROCEDURE — G0423 INTENS CARDIAC REHAB NO EXER: HCPCS

## 2019-04-08 PROCEDURE — G0422 INTENS CARDIAC REHAB W/EXERC: HCPCS

## 2019-04-08 NOTE — PROGRESS NOTES
Hospital Facility-Based Program  Phase 2 Cardiac Rehab Weekly Progress Report      Patient prescribed exercise:  9:00 class. 3 times per week in rehab, 1-4 times per week at home for the amount of sessions/weeks specified by insurance. Current Levels: Treadmill: 2. 1mph/1% for 15 minutes, Schwinn Airdyne: Level 0.8 for 15 minutes,UBE: Level 0.6 for 5 minutes. Progression Discussion: Maintain/Increase Aerobic exercise 15 minutes to work on endurance. Attempt to increase intensity by 5-20% for each modality this week. Try to increase intensities until Zabrina Guerrero rates the exercises a 13-17 on Viet RPE.

## 2019-04-08 NOTE — PROGRESS NOTES
Video Education Report - ICR/CR    Name:  Gail Atkinson     Date:  4/8/2019  MRN: [id number]     Session #:  28  Session Length: 40 min    Recommended Videos        []01 [employer] - Program Overview   34:22    []02 Overview of [employer] Eating Plan   34:10    [x]03 Becoming a Shelva Alex   33:08     []04 Diseases of Our Time - Part 1   34:22    []05 Calorie Density     33:39   []06 Label Reading - Part 1    32:15   []07 Move it      32.54   []08 Healthy Minds, Bodies, Hearts   32:14   []09 Dining Out - Part 1    32:28   []10 Heart Disease Risk Reduction   65:53   []29 Metabolic Syndrome and Belly Fat  31:52   []12 Facts on Fat     35:29   []13 Diseases of Our Time - Part 2   33:07   []14 Biology of Weight Control   32:36   []15 Biomechanical Limitations   35:20   []16 Nutrition Action Plan    34:23      Comments:  Video completed.

## 2019-04-09 ENCOUNTER — TELEPHONE (OUTPATIENT)
Dept: CARDIOLOGY CLINIC | Age: 74
End: 2019-04-09

## 2019-04-09 NOTE — TELEPHONE ENCOUNTER
Please see ECHO results. EF down from last ECHO 10/21/18 (25-30%). Next appt 10/2019. Summary   Technically difficult examination.   Ejection fraction was estimated at 20-25%.   There was severe global hypokinesis.   s/p AVR.   Transaortic velocity was within the normal range with no evidence of   aortic stenosis (mean gradient 6-8 mmHg). There was no evidence of aortic   regurgitation.   S/p MVR.   The transmitral velocity was within the normal range with no evidence for   mitral stenosis (mean graident 4 mmHg).  There was trace mitral   regurgitation.      Signature      ----------------------------------------------------------------   Electronically signed by Mara Adams MD (Interpreting   [id number]) on 04/05/2019 at 11:51 AM

## 2019-04-10 ENCOUNTER — HOSPITAL ENCOUNTER (OUTPATIENT)
Dept: CARDIAC REHAB | Age: 74
Setting detail: THERAPIES SERIES
Discharge: HOME | End: 2019-04-10
Payer: MEDICARE

## 2019-04-10 PROCEDURE — G0422 INTENS CARDIAC REHAB W/EXERC: HCPCS

## 2019-04-10 PROCEDURE — G0423 INTENS CARDIAC REHAB NO EXER: HCPCS

## 2019-04-10 NOTE — PLAN OF CARE
[address] [place] [address] Facility-Based Program  Individualized Cardiac Treatment Plan    Patient Name:  Gail Atkinson  :  1945  Age:  69 y.o. MRN:  [id number]  Diagnosis: CABG: LIMA-LAD, SVG- OM1, AVR, MVR  Date of Event: 18   Physician:  Boris Chambers  Next Office Visit:  / 18  Date Entered Program: 18  Risk Stratifications: [] Low [] Intermediate [x] High  Allergies:    Allergies   Allergen Reactions    Heparin Other (See Comments)       Individual Cardiac Treatment Plan -EXERCISE  INITIAL 30 DAY 60 DAY 90 DAY FINAL DAY   EXERCISE  ASSESSMENT/PLAN EXERCISE  REASSESSMENT EXERCISE   REASSESSMENT EXERCISE   REASSESSMENT EXERCISE  DISCHARGE/FOLLOW-UP   Date: 18 Date: 19 Date: Date:  3/20/19 Date: 4/10/19   Session #1 Session # ** Session # ** Session # /60 Session # 67  Last session completed on 4/10/19   Stages of Change Stages of Change Stages of Change Stages of Change Stages of Change   [x] Pre Contemplation  [] Contemplation  [] Preparation  [] Action  [] Maintenance  [] Relapse [] Pre Contemplation  [] Contemplation  [] Preparation  [x] Action  [] Maintenance  [] Relapse [] Pre Contemplation  [] Contemplation  [] Preparation  [] Action  [] Maintenance  [] Relapse [] Pre Contemplation  [] Contemplation  [] Preparation  [x] Action  [] Maintenance  [] Relapse [] Pre Contemplation  [] Contemplation  [] Preparation  [x] Action  [] Maintenance  [] Relapse   EXERCISE ASSESSMENT EXERCISE ASSESSMENT EXERCISE ASSESSMENT EXERCISE ASSESSMENT EXERCISE ASSESSMENT   6 Min Walk Test  Distance walked:   0.05 miles  264 ft.  1.4 METs  Max HR:73 BPM      RPE:  15    THR:  100-116  Rhythm:  NSR with BBB    6 Min Walk Test  Distance walked:   0.22miles  1161.6 ft  2.5 METs  Max HR:103 BPM      RPE:  13  %Change ft= 300%    Rhythm:  NSR with BBB   DASI: 4.1 METS DASI: 5.0METS DASI: ** METS DASI: 4.4 METS DASI: 5.6 METS   Return to Work  [employer] on returning to work? [] Yes              [] No   [] Disabled     [x] Retired                 Orthopedic Limitations/  [x] Yes    [] No  If yes please list:  nueropathy- both feet     Orthopedic Limitations  *If patient has orthopedic issue:   Actions/  accomodations needed to make Aviva Lundberg successful : slow progression  D/t neuropathy Orthopedic Limitations   Orthopedic Limitations  slow progression  D/t neuropathy Orthopedic Limitations  Slow progression     Fall Risk  Fall risk assessed? [x] Yes      [] No    Balance Issues? [x] Yes      [] No     [] Walker [x] [employer]    [x] Safety issues reviewed      Fall Risk  *If patient is a fall risk, action needed to accommodate:  Uses cane Fall Risk Fall Risk  Walks with a cane Fall Risk  Uses cane for balance at times   Home Exercise  [] Yes    [x] No   Home Exercise  [x] Yes    [] No  Type: walk  Frequency:3 x per week  Duration: 5-10 min Home Exercise  [] Yes    [] No  Type: **  Frequency: **  Duration: ** Home Exercise  [x] Yes    [] No  Type: walking  Frequency: daily  Duration: 20 min Home Exercise  [x] Yes    [] No  Type: walking  Frequency: daily  Duration: 20 min   Angina with Activity? [] Yes    [x] No  Angina Management: na Angina with Activity? [] Yes    [x] No  Angina Management: na Angina with Activity? [] Yes    [] No  Angina Management: ** Angina with Activity? [] Yes    [x] No  Angina Management: ** Angina with Activity?   [] Yes    [x] No  Angina Management: na   EXERCISE PLAN EXERCISE PLAN EXERCISE PLAN EXERCISE PLAN EXERCISE PLAN   *Interventions* *Interventions* *Interventions* *Interventions* *Interventions*   Exercise Prescription  (per physician & CR staff) Exercise Prescription  (per physician & CR staff) Exercise Prescription  (per physician & CR staff) Exercise Prescription  (per physician & CR staff) Exercise Prescription  (per physician & CR staff)   Cardiovascular Cardiovascular Cardiovascular Cardiovascular Cardiovascular   Mode:    [x] Treadmill (TM)  [x] Schwinn Airdyne (AD)  [x] Arms Ergometer (AE)  [x] NuStep  [] Elliptical (E) MODE:    [x] Treadmill (TM)  [x] Schwinn Airdyne (AD)  [x] Arms Ergometer (AE)  [] NuStep  [] Elliptical (E) MODE:    [] Treadmill (TM)  [] Schwinn Airdyne (AD)  [] Arms Ergometer (AE)  [] NuStep  [] Elliptical (E) MODE:    [x] Treadmill (TM)  [x] Schwinn Airdyne (AD)  [x] Arms Ergometer (AE)  [] NuStep  [] Elliptical (E) MODE:    [x] Treadmill (TM)  [x] Schwinn Airdyne (AD)  [x] Arms Ergometer (AE)  [] NuStep  [] Elliptical (E)   Initial Workloads  TM: [email or web address] 1.6 METs  AD: 0.3 level = 1.4 METs  NS: 10 Schrader= 1.6 METs  AE: 0.1 level = 1.2 METs Current Workloads  TM: 1.0 @ 0%= 1.8 METs  AD: 0.5 level = 1.8 METs    AE: 0.3 level =  METs Current Workloads  TM:  @ %=  METs  AD:  level =  METs  NS:   Schrader=  METs  AE:  level =  METs Current Workloads  TM: 2.0 @ 0%= 2.8 METs  AD: 0.7 level = 2.1 METs  AE: 0.6 level = 1.9 METs Current Workloads  TM: 2.1 @1 %= 2.9 METs  AD:0.8  level = 2.2 METs    AE: 0.6 level = 1.9 METs     Frequency:    ICR: 3x/week  Home: 2-3x/wk Frequency:   ICR: 3x/week  Home: 3x/wk Frequency:  ICR: 3x/week  Home: 3-4x/wk Frequency:  ICR: 3x/week  Home: 3-4x/wk Frequency:  Bea Amas will continue exercise at  5-7 days/week   Duration:   Total aerobic exercise = 10-15 min    4-5 min/mode Duration:  Total aerobic exercises = 25 min     10min/mode Duration:  Total aerobic exercises = ** min     **min/mode Duration:  Total aerobic exercises = 30-45 min     15 min/mode Duration:  Total erobic exercise =  60-90 min   Intensity:   MET Level = 1.4-1.6  RPE = 12-15 Intensity:  Max MET Level = 1.8  RPE = 12-15 Intensity:  Max MET Level = **  RPE = 12-15 Intensity:  Max MET Level = 2.8  RPE = 12-15 Intensity:  Max MET Level =  2.9 RPE = 12-15   Progression: increase aerobic activity up to 25-30 min over next 4 weeks by increasing time 2-3 min/week.  Progression: Increase time to 12-15 min per mode   Progression: Progression:  Patient progressing slowly due to neuropathy in legs Progression:  Increase time/intensity when RPE <13, and HR is in [facility]   [x] Yes      [] No  Upper and Lower body strength training 2x/wk    Wt: 2#       Reps:  8-15    *Increase wt. after completing 15 reps with an RPE of <12/13. [x] Yes      [] No  Upper and Lower body strength training 2x/wk    Wt: 5#       Reps:  8-15    *Increase wt. after completing 15 reps with an RPE of <12/13. [] Yes      [] No  Upper and Lower body strength training 2x/wk    Wt: **#       Reps:  8-15    *Increase wt. after completing 15 reps with an RPE of <12/13. [x] Yes      [] No  Upper and Lower body strength training 2x/wk    Wt: 3#       Reps:  8-15    *Increase wt. after completing 15 reps with an RPE of <12/13. Continue Strength Training at home   [x] Exercise Log & Strength training handout given     Wt: 4 #       Reps:  8-15    *Increase wt. after completing 15 reps with an RPE of <12/13. Flexibility Flexibility Flexibility Flexibility Flexibility   [x] Yes      [] No  25 min session of Core Strength & Flexibility 1x/per week  Attends Core Strength & Flexibility   [x] Yes      [] No Attends Core Strength & Flexibility   [] Yes      [] No Attends Core Strength & Flexibility   [x] Yes      [] No Continue Core Strength & Flexibility at home   Home Exercise  *George Ayala verbalizes planning to walk 3-4 days/week for 5 min on days not in rehab. Home Exercise  *Myles verbalizes planning to walk 3-4 days/week for 20 min on days not in rehab. Home Exercise  *Myles verbalizes planning to ** ** days/week for ** min on days not in rehab.  Home Exercise  *Myles verbalizes planning to continue current home exercising and increasing time as tolerated Home Exercise  *George Ayala verbalizes his plan to walk 5-7 days/week for 30-45 min @ home   *Education* *Education* *Education* *Education* *Education*   RPE Scale  [x] Yes      [] No  Exercise Safety  [x] Yes      [] No  Equipment Orientation  [x] Yes      [] No  S/S to Report  [x] Yes      [] No  Warm Up/Cool Down  [x] Yes      [] No  Home Exercise  [x] Yes      [] No    All Exercise Education Completed  [x] Yes      [] No   *Goals* *Goals* *Goals* *Goals* *Goals*   Initial Exercise Goals Exercise Goals  Exercise Goals   Exercise Goals  Exercise Goals   Myles plans to:  [x] Attend exercise sessions 3x/wk  [x] initiate home exercise 2-3x/wk for 10-20 min  [x] Increase 6 min walk distance by 10%  [] Patric Marino plans to:  [x] Attend exercise sessions 3x/wk  [x] continue home exercise 2-3x/wk for 20-30 min  [] ** Myles's plans to:   Myles's plans to:  [x] Attend exercise sessions 3x/wk  [x] continue home exercise 3-4x/wk for 30-45 min  [x] Determine plan of exercise following rehab Dani Rivera achieved exercise goals?     [x]  Yes    [] No  [x] Increased 6 min walk distance by 10%  [x] Currently exercising 30-60 min/day, 5-7days/wk   [x] Plans to continue exercise on own  [] Plans to join a local fitness center to continue exercise  [] Does not plan to continue to exercise after rehab   Return to ADL or Hobbies:  Dani Rivera would like to improve strength and endurance so he is able to return to mowing lawns, walking better Return to ADL or Hobbies:  Dani Rivera would like to improve strength and endurance so he is able to return to walk better Return to ADL or Hobbies:  Dani Rivera would like to improve strength and endurance so he/she is able to return to ** Return to ADL or Hobbies:  Dani Rivera would like to improve strength and endurance so he is able to return to walk better Return to ADL or Hobbies:  Dani Rivera would like to improve strength and endurance so he is able to return to all previous activities   *MET level required for above goal:  3-4 METs MET level Achieved:  1.8 METS MET level Achieved:  **METS MET level Achieved:  2.8 METS MET level Achieved:  2.9 METS     Individual Referral  [] Other:  Professional Referral  Please check if needed. [] Dietician Consult   [] Wt. Management Referral  [] Other: Professional Referral  Please check if needed. [] Dietician Consult   [] Wt. Management Referral  [] Other: Professional Referral  Please check if needed. [] Dietician Consult   [] Wt. Management Referral  [] Other: Professional Referral  Please check if needed. [] Dietician Consult   [] Wt. Management Referral  [] Other:   *Education* *Education* *Education* *Education* *Education*   Nutritional Education Recommended    [x] 1:1 Registered Dietitian    Workshops  [x] Label Reading   [x] Menu  [x] Targeting Nutrition Priorities  [x] Fueling a Healthy Body   Nutritional Education Attended/Date    1/9/19   Label Reading      Nutritional Education Attended/Date Nutritional Education Attended/Date    2/13/19  1:1 Registered Dietitian    2/20/19  Fueling a Healthy Body    3/13/19  Targeting Nutrition Priorities All Sessions Completed?     [x] Yes  [] No    4/10/19 Menu video   [address]    [x] 11 sessions recommended     *Adding Flavor  *Fast & Healthy     Breakfasts  *Salads & Dressings  *Soups & Simple     Sauces  *Simple Sides  *Appetizers &     Snacks  *Delicious Desserts  *Plant Proteins  *Fast Evening Meals  * Weekend Breakfasts  *Cook once, Eat       twice [address]  Sessions Completed    0/60/56*  Delicious Desserts    3/50/65  Plant Proteins Cooking School  Sessions Completed Cooking School  Sessions Completed    1/25/19  Fast Evening Meals    2/8/19  Cook once, Eat       twice    2/15/19  Meat Alternatives    2/22/19  Adding Flavor    3/1/19  Fast & Healthy     Breakfasts    3/8/19  Salads & Dressings    3/15/19  Soups & Simple     Sauces Cooking School    # of sessions completed:  12  3/22/19  Simple Sides    3/29/419  Appetizers &     Snacks    6/3/40  Delicious Desserts   *Goals* *Goals* *Goals* *Goals* *Goals*   Myles's nutritional goals are as follows:  Complete and return diet survey Myles's nutritional goals are as follows:  [x] Attend Nutrition Workshops  [x] Attend 1:1   [x] Attend Cooking Classes  [] ** Myles's nutritional goals are as follows:  [] Attend Nutrition Workshops  [] Attend 1:1   [] Attend Cooking Classes  [] Complete and return diet survey  [] ** Myles's nutritional goals are as follows:  [x] Attend Nutrition Workshops  [] Attend 1:1   [x] Attend Cooking Classes  [] ** Myles achieved nutritional goals   [x] Yes    [] No  If no, why? Use knowledge gained to continue Pritikin eating plan at home       Individual Cardiac Treatment Plan - Psychosocial  PSYCHOSOCIAL  ASSESSMENT/PLAN PSYCHOSOCIAL  REASSESSMENT PSYCHOSOCIAL   REASSESSMENT PSYCHOSOCIAL   REASSESSMENT PSYCHOSOCIAL  DISCHARGE/FOLLOW-UP   Stages of Change Stages of Change Stages of Change Stages of Change Stages of Change   [] Pre Contemplation  [x] Contemplation  [] Preparation  [] Action  [] Maintenance  [] Relapse [] Pre Contemplation  [] Contemplation  [x] Preparation  [] Action  [] Maintenance  [] Relapse [] Pre Contemplation  [] Contemplation  [] Preparation  [] Action  [] Maintenance  [] Relapse [] Pre Contemplation  [] Contemplation  [] Preparation  [x] Action  [] Maintenance  [] Relapse [] Pre Contemplation  [] Contemplation  [] Preparation  [] Action  [x] Maintenance  [] Relapse   PSYCHOSOCIAL ASSESSMENT PSYCHOSOCIAL ASSESSMENT PSYCHOSOCIAL ASSESSMENT PSYCHOSOCIAL ASSESSMENT PSYCHOSOCIAL ASSESSMENT   Behavioral Outcomes Behavioral Outcomes Behavioral Outcomes Behavioral Outcomes Behavioral Outcomes   Tool Used:  Dilan & Sammi, Quality of Life Index, Cardiac Version IV  *Given to patient to complete.  Tool Used:    Dilan Chapa, Quality of Life Index, Cardiac Version IV     QOL Index Score: **  HF:**  S&E:**  P&S: **  Family: **   Tool Used:     Dilan Chapa, Quality of Life Index, Cardiac Version IV    QOL Index Score: **  HF:**  S&E:**  P&S: **  Family: **   PHQ-9 score PSYCHOSOCIAL PLAN PSYCHOSOCIAL PLAN PSYCHOSOCIAL PLAN PSYCHOSOCIAL PLAN   *Interventions* *Interventions* *Interventions* *Interventions* *Interventions*   *Please check if needed  [] Psych Consult  [] Physician Referral  [x] Stress Management Skills *Please check if needed  [] Psych Consult  [] Physician Referral  [] Stress Management Skills *Please check if needed  [] Psych Consult  [] Physician Referral  [] Stress Management Skills *Please check if needed  [] Psych Consult  [] Physician Referral  [] Stress Management Skills *Please check if needed  [] Psych Consult  [] Physician Referral  [x] Stress Management Skills   Is patient currently taking anti-depressant or anti-anxiety medications? [] Yes      [x] No   Change in anti-depressant or anti-anxiety medications? [] Yes      [x] No   Change in anti-depressant or anti-anxiety medications? [] Yes      [] No   Change in anti-depressant or anti-anxiety medications? [] Yes      [x] No   Change in anti-depressant or anti-anxiety medications?   [] Yes      [x] No     *Education* *Education* *Education* *Education* *Education*   Healthy Mind-Set Workshops Recommended  [x] Stress & Health  [x] Taking Charge of Stress  [x] New Thoughts, New Behaviors  [x] Managing Moods & Relationships Healthy Mind-Set Workshops Attended/Date  1/16/19  Taking Charge of Stress Healthy Mind-Set Workshops Attended/Date Healthy Mind-Set Workshops Attended/Date Healthy Mind-Set Workshops  Completed  [x] Yes      [] No  3/20/19  Stress & Health    2/6/19   New Thoughts, New Behaviors   *Goals* *Goals* *Goals* *Goals* *Goals*   Eamons psychosocial goals are as follows:  Complete HADS & Dilan & Sammi, Quality of Life Index, Cardiac Version IV Eamons psychosocial goals are as follows:  [x] Attend Healthy Mind-Set Workshops  [x] Reduce depression symptom severity by 1 level  [] ** Myles's psychosocial goals are as follows:  [] Attend Healthy Mind-Set Workshops  [] Reduce depression symptom severity by 1 level  [] ** Myles's psychosocial goals are as follows:  [x] Attend Healthy Mind-Set Workshops  [] Reduce depression symptom severity by 1 level  [] Nasrin Sommers achieved psychosocial goals?   [x] Yes    [] No    [x] Use methods learned to continue to reduce depression symptom severity by 1 level  [] **     Individual Cardiac Treatment Plan - Other:  Risk Factor/Education  RISK FACTOR  ASSESSMENT/PLAN RISK FACTOR  REASSESSMENT  RISK FACTOR  REASSESSMENT RISK FACTOR  REASSESSMENT RISK FACTOR   DISCHARGE/FOLLOW-UP   Stages of Change Stages of Change Stages of Change Stages of Change Stages of Change   [x] Pre Contemplation  [] Contemplation  [] Preparation  [] Action  [] Maintenance  [] Relapse [] Pre Contemplation  [x] Contemplation  [] Preparation  [] Action  [] Maintenance  [] Relapse [] Pre Contemplation  [] Contemplation  [] Preparation  [] Action  [] Maintenance  [] Relapse [] Pre Contemplation  [] Contemplation  [] Preparation  [x] Action  [] Maintenance  [] Relapse [] Pre Contemplation  [] Contemplation  [x] Preparation  [x] Action  [] Maintenance  [] Relapse   RISK FACTOR/EDUCATION ASSESSMENT RISK FACTOR/EDUCATION ASSESSMENT RISK FACTOR/EDUCATION ASSESSMENT RISK FACTOR/EDUCATION ASSESSMENT RISK FACTOR /EDUCATION ASSESSMENT   Hypertension  [x] Yes      [] No    Resting BP: 94/52  Peak Ex BP:138/62  Medication:losartan, metoprolol   Hypertension  Resting BP: 136/68  Peak Ex BP:146/84  Medication Changes:  [] Yes      [x] No Hypertension  Resting BP: **  Peak Ex BP:**  Medication Changes:  [] Yes      [] No Hypertension  Resting BP: 138/67  Peak Ex BP:124/80  Medication Changes:  [] Yes      [x] No Hypertension  Resting BP: 118/62  Peak Ex BP:142/64  Medication Changes:  [] Yes      [x] No   Lipids  HLD/DLD  [x] Yes      [] No  TOTAL CHOL: 131  HDL:  27  LDL:  70  TRI  Medication: atorvastatin Lipids  Medication Changes:  [] Yes      [x] No     Lipids  Medication Changes:  [] Yes      [] No Lipids  Medication Changes:  [] Yes      [x] No     Lipids      Medication Changes:  [] Yes      [x] No   Diabetes  [x] Yes      [] No  FBS: 120           HbA1C: 6.0        Monitor BS @ home:   [x] Yes      [] No  Frequency: 1 x daily  Medication: glimepiride Diabetes  Most Recent BS:  BS have been in range  [x] Yes      [] No  Medication Changes  [] Yes      [x] No   Diabetes  Most Recent BS:  BS have been in range  [] Yes      [] No  Medication Changes  [] Yes      [] No     Diabetes  Most Recent BS:115  BS have been in range  [x] Yes      [] No  Medication Changes  [] Yes      [x] No     Diabetes  Most Recent BS:  BS have been in range  [x] Yes      [] No  Medication Changes  [] Yes      [x] No       Tobacco Use  [] Current  [x] Former  [] Never    Years smoked: 4:    Date Quit: 1980    # cigarettes smoked/day: 1 pk  Smokeless Tobacco use:   [] Yes      [x] No   Tobacco Use  Change in smoking status   [] Yes      [x] No                 Learning Barriers  Please select one:  [] Speech  [] Literacy  [] Hearing  [] Cognitive  [] Vision  [x] Ready to Learn Learning Barriers Addressed:   [x] Yes      [] No   Learning Barriers Addressed:   [] Yes      [] No   Learning Barriers Addressed:  Ready to learn Learning Barriers Addressed:  [x] Yes      [] No     RISK FACTOR/EDUCATION PLAN RISK FACTOR/EDUCATION PLAN RISK FACTOR/EDUCATION PLAN RISK FACTOR/EDUCATION PLAN RISK FACTOR/EDUCATION PLAN   *Interventions* *Interventions* *Interventions* *Interventions* *Interventions*   Recommended Educational Videos    [x] Overview of The Pritikin Eating Plan  [x] Becoming A Pritikin   [x] Diseases of Our Time-Part 1  [x] Calorie Density  [x] Label Reading-Part 1  [x] Move It!   [x] Healthy Minds, Bodies, Hearts  [x] Dining Out-Part 1  [x] Heart Disease Risk Reduction  [x] Metabolic Syndrome & Belly Fat  [x] Facts on Fat  [x] Diseases of Our Times-Part 2  [x] Biology of Weight Control  [x] Biomechanical Limitations  [x] Nurtition Action Plan   Completed Videos      12/28/18  Overview of The Pritikin Eating Plan    1/2/19   Diseases of Our Time-Part 1    1/7/19   Calorie Density    1/14/19   Label Reading-Part 1    1/23/19  Move It! Completed Videos Completed Videos      1/28/19  Healthy Minds, Bodies, Hearts    2/11/19  Dining Out-Part 1    2/18/19  Heart Disease Risk Reduction    8/84/12  Metabolic Syndrome & Belly Fat    3/4/19  Facts on Fat    3/6/19  Diseases of Our Times-Part 2    3/11/19   Biology of Weight Control    3/18/19  Biomechanical Limitations Recommended Educational Videos Completed    [x] Yes      [] No    4/8/19  Becoming A Pritikin     3/27/19  Nurtition Action Plan          Smoking Cessation/Relaspe Prevention Intervention needed? [] Yes      [x] No    Smoking Cessation/Relapse Prevention completed? [] Yes      [] No  Date: **       Professional Referrals:  *Please check if needed  [] Diabetes Clinic  [] Lipid Clinic   [] Other:     Professional Referrals:  *Please check if needed  [] Diabetes Clinic  [] Lipid Clinic   [] Other:   Preventative Medication Preventative Medication Preventative Medication Preventative Medication Preventative Medication   Aspirin  [x] Yes    [] No  Blood Thinner: Clopidogrel/Effient/Brillinta/ coumadin  [x] Yes    [] No  Beta Blocker  [x] Yes    [] No  Ace Inhibitor  [] Yes    [x] No  Statin/Lipid Lowering  [x] Yes    [] No Medication Changes? [] Yes    [x] No Medication Changes? [] Yes    [] No Medication Changes? [] Yes    [x] No Medication Changes? [] Yes    [x] No   *Education* *Education* *Education* *Education* *Education*   Does Bobbe Silvius require any additional education? [] Yes    [x] No   Does Bobbe Silvius require any additional education? [] Yes    [x] No Does Bobbe Silvius require any additional education? [] Yes    [] No Does Bobbe Silvius require any additional education? [] Yes    [x] No Does Bobbe Silvius require any additional education?   [] Yes    [x] No   Recommended Additional Educational Videos    Medical  [] Hypertension & Heart Disease  [] Decoding Lab Results  [] Aging Enhancing Your QoL  [] Sleep Disorders  Exercise  [] Body Composition  [] Improve Performance  [] Exercise Action Plan  [] Intro to Yoga  Behavioral  [] How Our Thoughts Can Heal Our Hearts  [] Smoking Cessation  Nutrition  [] Planning Your Eating Strategy  [] Lable Reading- Part 2  [] Dining Out - Part 2  [] Targeting Your Nutrition Priorities  [] Fueling a Healthy Body  [] Menu Workshop  [employer] [] Breakfast & Snacks  [] [employer] Salads & Dressing  [] [address] [place] [address]  [] Soups & Desserts   Additional Educational Videos Completed Additional Educational Videos Completed Additional Educational Videos Completed Additional Educational Videos Completed    [] Yes    [] No   *Goals* *Goals* *Goals* *Goals* *Goals*   Myles's risk factor/education goals are as follows:    [x] Optimal BP <140/90  [x] Blood Sugar <120  [x] Attend recommended video education sessions  [x] Takes medications as prescribed 100% of the time   [] ** Myles's risk factor/education goals are as follows:    [x] Optimal BP <140/90  [x] Blood Sugar <120  [x] Attend recommended video education sessions  [x] Takes medications as prescribed 100% of the time   [] ** Myles's risk factor/education goals are as follows:    [x] Optimal BP <140/90  [] Blood Sugar <120  [x] Attend recommended video education sessions  [x] Takes medications as prescribed 100% of the time   [] ** Myles's risk factor/education goals are as follows:    [x] Optimal BP <140/90  [x] Blood Sugar <120  [x] Attend recommended video education sessions  [x] Takes medications as prescribed 100% of the time   [] Cheko Mckinley achieved risk factor goals?   [x] Yes    [] No       Monitored telemetry has revealed NSR with BBB    [] associated symptoms fatigue, SOB Monitored telemetry has revealed NSR with occ PVC's   Monitored telemetry has revealed  [] documented arrhythmia at increasing workloads  [] associated symptoms ** Monitored telemetry has revealed NSR  [] documented arrhythmia at increasing workloads  [] associated symptoms ** Monitored telemetry has revealed NSR with BBB     Physician Response    [x] Cardiac rehab is reasonably and medically necessary for continuous cardiac monitoring surveillance  of patient's cardiac activity  [x] Initiate continuous telemerty monitoring and notify me with any concerns  [] Other   Physician Response    [x] Cardiac rehab is reasonably and medically necessary for continuous cardiac monitoring surveillance  of patient's cardiac activity  [x] Continue continuous telemerty monitoring and notify me with any concerns  [] Other     Physician Response      [x] Cardiac rehab is reasonably and medically necessary for continuous cardiac monitoring surveillance  of patient's cardiac activity  [x] Continue continuous telemerty monitoring and notify me with any concerns   [] Other     Physician Response    [x] Cardiac rehab is reasonably and medically necessary for continuous cardiac monitoring surveillance  of patient's cardiac activity  [x] Continue continuous telemerty monitoring and notify me with any concerns   [] Other          Cosigned by: Argelia Alves MD at 1/2/2019  7:49 AM   Revision History      Date/Time User Provider Type Action   1/2/2019  7:49 AM Argelia Alves MD Physician Cosign   12/27/2018 11:18 AM Tasha Arreguin by: Argelia Alves MD at 1/29/2019  7:22 AM   Revision History      Date/Time User Provider Type Action   1/29/2019  7:22 AM Argelia Alves MD Physician Cosign   1/23/2019 12:34 PM Zack Ye Exercise Physiologist Sign     Cosigned by: Argelia Alves MD at 3/27/2019  7:53 AM   Revision History      Date/Time User Provider Type Action   3/27/2019  7:53 AM Argelia Alves MD Physician Cosign   3/20/2019  9:54 AM Willi Whitt Exercise Physiologist Sign

## 2019-04-10 NOTE — PROGRESS NOTES
Video Education Report - ICR/CR    Name:  Morena Thakkar     Date:  4/10/2019  MRN: [id number]     Session #:  39  Session Length: 40 min      Additional Videos         []17 Hypertension & Heart Disease   32:39        []18 Cooking Breakfasts and Snacks  32:00   []19 Planning Your Eating Strategy   33:30   []20 Label Reading - Part 2    32:36  []21 Cooking Soups and Desserts   31:41  []22 How Our Thoughts Can Heal Our Hearts 33:05  []23 Targeting Your Nutrition Priorities  33:58  []24 Healthy Salads & Dressings   35:32  []25 Dining Out - Part 2    32:35  []26 Cooking Dinner and Sides   35:06  []27 Sleep Disorders     33:14  [x]28 Menu Workshop     32:06  []29 Decoding Lab Results    32:42     []30 Vitamins and Minerals    32:54  []31 Exercise Action Plan    32:26  []32 Body Composition    34:03  []33 Improving Performance    32:13  []34 Fueling a Healthy Body    31:32  []35 Introduction to Yoga    33:47  []36 Aging-Enhancing the Quality of Your Life 33:22  []37 Smoking Cessation    36:19    Comments:  Video completed, no questions

## 2019-04-10 NOTE — PLAN OF CARE
[address] [place] [address] Facility-Based Program  Individualized Cardiac Treatment Plan    Patient Name:  Alecia Cabrera  :  1945  Age:  69 y.o. MRN:  [id number]  Diagnosis: CABG: LIMA-LAD, SVG- OM1, AVR, MVR  Date of Event: 18   Physician:  Yamel Choi  Next Office Visit:  / 18  Date Entered Program: 18  Risk Stratifications: [] Low [] Intermediate [x] High  Allergies:    Allergies   Allergen Reactions    Heparin Other (See Comments)       Individual Cardiac Treatment Plan -EXERCISE  INITIAL 30 DAY 60 DAY 90 DAY FINAL DAY   EXERCISE  ASSESSMENT/PLAN EXERCISE  REASSESSMENT EXERCISE   REASSESSMENT EXERCISE   REASSESSMENT EXERCISE  DISCHARGE/FOLLOW-UP   Date: 18 Date: 19 Date: Date:  3/20/19 Date: 4/10/19   Session #1 Session # ** Session # ** Session # /60 Session # 67  Last session completed on 4/10/19   Stages of Change Stages of Change Stages of Change Stages of Change Stages of Change   [x] Pre Contemplation  [] Contemplation  [] Preparation  [] Action  [] Maintenance  [] Relapse [] Pre Contemplation  [] Contemplation  [] Preparation  [x] Action  [] Maintenance  [] Relapse [] Pre Contemplation  [] Contemplation  [] Preparation  [] Action  [] Maintenance  [] Relapse [] Pre Contemplation  [] Contemplation  [] Preparation  [x] Action  [] Maintenance  [] Relapse [] Pre Contemplation  [] Contemplation  [] Preparation  [x] Action  [] Maintenance  [] Relapse   EXERCISE ASSESSMENT EXERCISE ASSESSMENT EXERCISE ASSESSMENT EXERCISE ASSESSMENT EXERCISE ASSESSMENT   6 Min Walk Test  Distance walked:   0.05 miles  264 ft.  1.4 METs  Max HR:73 BPM      RPE:  15    THR:  100-116  Rhythm:  NSR with BBB    6 Min Walk Test  Distance walked:   0.22miles  1161.6 ft  2.5 METs  Max HR:103 BPM      RPE:  13  %Change ft= 300%    Rhythm:  NSR with BBB   DASI: 4.1 METS DASI: 5.0METS DASI: ** METS DASI: 4.4 METS DASI: 5.6 METS   Return to Work  [employer] on returning to work? [] Yes              [] No   [] Disabled     [x] Retired                 Orthopedic Limitations/  [x] Yes    [] No  If yes please list:  nueropathy- both feet     Orthopedic Limitations  *If patient has orthopedic issue:   Actions/  accomodations needed to make Aviva Lundberg successful : slow progression  D/t neuropathy Orthopedic Limitations   Orthopedic Limitations  slow progression  D/t neuropathy Orthopedic Limitations  Slow progression     Fall Risk  Fall risk assessed? [x] Yes      [] No    Balance Issues? [x] Yes      [] No     [] Walker [x] [employer]    [x] Safety issues reviewed      Fall Risk  *If patient is a fall risk, action needed to accommodate:  Uses cane Fall Risk Fall Risk  Walks with a cane Fall Risk  Uses cane for balance at times   Home Exercise  [] Yes    [x] No   Home Exercise  [x] Yes    [] No  Type: walk  Frequency:3 x per week  Duration: 5-10 min Home Exercise  [] Yes    [] No  Type: **  Frequency: **  Duration: ** Home Exercise  [x] Yes    [] No  Type: walking  Frequency: daily  Duration: 20 min Home Exercise  [x] Yes    [] No  Type: walking  Frequency: daily  Duration: 20 min   Angina with Activity? [] Yes    [x] No  Angina Management: na Angina with Activity? [] Yes    [x] No  Angina Management: na Angina with Activity? [] Yes    [] No  Angina Management: ** Angina with Activity? [] Yes    [x] No  Angina Management: ** Angina with Activity?   [] Yes    [x] No  Angina Management: na   EXERCISE PLAN EXERCISE PLAN EXERCISE PLAN EXERCISE PLAN EXERCISE PLAN   *Interventions* *Interventions* *Interventions* *Interventions* *Interventions*   Exercise Prescription  (per physician & CR staff) Exercise Prescription  (per physician & CR staff) Exercise Prescription  (per physician & CR staff) Exercise Prescription  (per physician & CR staff) Exercise Prescription  (per physician & CR staff)   Cardiovascular Cardiovascular Cardiovascular Cardiovascular Cardiovascular   Mode:    [x] Progression:  Patient progressing slowly due to neuropathy in legs Progression:  Increase time/intensity when RPE <13, and HR is in [facility]   [x] Yes      [] No  Upper and Lower body strength training 2x/wk    Wt: 2#       Reps:  8-15    *Increase wt. after completing 15 reps with an RPE of <12/13. [x] Yes      [] No  Upper and Lower body strength training 2x/wk    Wt: 5#       Reps:  8-15    *Increase wt. after completing 15 reps with an RPE of <12/13. [] Yes      [] No  Upper and Lower body strength training 2x/wk    Wt: **#       Reps:  8-15    *Increase wt. after completing 15 reps with an RPE of <12/13. [x] Yes      [] No  Upper and Lower body strength training 2x/wk    Wt: 3#       Reps:  8-15    *Increase wt. after completing 15 reps with an RPE of <12/13. Continue Strength Training at home   [x] Exercise Log & Strength training handout given     Wt: 4 #       Reps:  8-15    *Increase wt. after completing 15 reps with an RPE of <12/13. Flexibility Flexibility Flexibility Flexibility Flexibility   [x] Yes      [] No  25 min session of Core Strength & Flexibility 1x/per week  Attends Core Strength & Flexibility   [x] Yes      [] No Attends Core Strength & Flexibility   [] Yes      [] No Attends Core Strength & Flexibility   [x] Yes      [] No Continue Core Strength & Flexibility at home   Home Exercise  *[address] verbalizes planning to walk 3-4 days/week for 5 min on days not in rehab. Home Exercise  *Myles verbalizes planning to walk 3-4 days/week for 20 min on days not in rehab. Home Exercise  *Myles verbalizes planning to ** ** days/week for ** min on days not in rehab.  Home Exercise  *Myles verbalizes planning to continue current home exercising and increasing time as tolerated Home Exercise  *[address] verbalizes his plan to walk 5-7 days/week for 30-45 min @ home   *Education* *Education* *Education* *Education* *Education*   RPE Scale  [x] Yes      [] No  Exercise Safety  [x] Yes      [] No  Equipment Orientation  [x] Yes      [] No  S/S to Report  [x] Yes      [] No  Warm Up/Cool Down  [x] Yes      [] No  Home Exercise  [x] Yes      [] No    All Exercise Education Completed  [x] Yes      [] No   *Goals* *Goals* *Goals* *Goals* *Goals*   Initial Exercise Goals Exercise Goals  Exercise Goals   Exercise Goals  Exercise Goals   Myles plans to:  [x] Attend exercise sessions 3x/wk  [x] initiate home exercise 2-3x/wk for 10-20 min  [x] Increase 6 min walk distance by 10%  [] Rudy Dong plans to:  [x] Attend exercise sessions 3x/wk  [x] continue home exercise 2-3x/wk for 20-30 min  [] ** Myles's plans to:   Myles's plans to:  [x] Attend exercise sessions 3x/wk  [x] continue home exercise 3-4x/wk for 30-45 min  [x] Determine plan of exercise following rehab Kajal Viet achieved exercise goals?     [x]  Yes    [] No  [x] Increased 6 min walk distance by 10%  [x] Currently exercising 30-60 min/day, 5-7days/wk   [x] Plans to continue exercise on own  [] Plans to join a local fitness center to continue exercise  [] Does not plan to continue to exercise after rehab   Return to ADL or Hobbies:  Kajal Viet would like to improve strength and endurance so he is able to return to mowing lawns, walking better Return to ADL or Hobbies:  Kajal Viet would like to improve strength and endurance so he is able to return to walk better Return to ADL or Hobbies:  Kajal Viet would like to improve strength and endurance so he/she is able to return to ** Return to ADL or Hobbies:  Kajal Viet would like to improve strength and endurance so he is able to return to walk better Return to ADL or Hobbies:  Kajal Viet would like to improve strength and endurance so he is able to return to all previous activities   *MET level required for above goal:  3-4 METs MET level Achieved:  1.8 METS MET level Achieved:  **METS MET level Achieved:  2.8 METS MET level Achieved:  2.9 METS     Individual Cardiac Treatment Plan - Nutrition  NUTRITION  ASSESSMENT/PLAN NUTRITION  REASSESSMENT NUTRITION   REASSESSMENT NUTRITION   REASSESSMENT NUTRITION  DISCHARGE/FOLLOW-UP   Stages of Change Stages of Change Stages of Change Stages of Change Stages of Change   [] Pre Contemplation  [x] Contemplation  [] Preparation  [] Action  [] Maintenance  [] Relapse [] Pre Contemplation  [] Contemplation  [] Preparation  [x] Action  [] Maintenance  [] Relapse [] Pre Contemplation  [] Contemplation  [] Preparation  [] Action  [] Maintenance  [] Relapse [] Pre Contemplation  [] Contemplation  [] Preparation  [x] Action  [] Maintenance  [] Relapse [] Pre Contemplation  [] Contemplation  [] Preparation  [x] Action  [] Maintenance  [] Relapse   NUTRITION ASSESSMENT NUTRITION ASSESSMENT NUTRITION ASSESSMENT NUTRITION ASSESSMENT NUTRITION ASSESSMENT   Weight Management  Weight: 208.2        Height: 72   BMI: 28.5  Weight Management  Weight: 208.4                 Weight Management  Weight: **                  Weight Management  Weight: 209.2 Weight Management  Weight: 211.8                    BMI: 29   Eating Plan  Current eating habits: low sugar, low salt Eating Plan  Changes: no salt Eating Plan  Changes: Eating Plan  Changes: no salt, less red meat Eating Plan Improvements: less sodium calories and fats   Alcohol Use  [] none          [x] daily  [] weekly      [] special   Type: beer  Amount: 2        Diet Assessment Tool:  RATE YOUR PLATE  *Given to patient to complete and return. Diet Assessment Tool:    Score: /69       Diet Assessment Tool: RATE YOUR PLATE  Score: 38/44   NUTRITION PLAN NUTRITION PLAN NUTRITION PLAN NUTRITION PLAN NUTRITION PLAN   *Interventions* *Interventions* *Interventions* *Interventions* *Interventions*   Initial Survey given Goal Setting Discussion:   [] Yes      [x] No       Follow Up Survey Reviewed & Goals Updated:     Professional Referral  Please check if needed. [] Dietician Consult   [] Wt.  Management return diet survey Myles's nutritional goals are as follows:  [x] Attend Nutrition Workshops  [x] Attend 1:1   [x] Attend Cooking Classes  [] ** Myles's nutritional goals are as follows:  [] Attend Nutrition Workshops  [] Attend 1:1   [] Attend Cooking Classes  [] Complete and return diet survey  [] ** Myles's nutritional goals are as follows:  [x] Attend Nutrition Workshops  [] Attend 1:1   [x] Attend Cooking Classes  [] ** Myles achieved nutritional goals   [x] Yes    [] No  If no, why? Use knowledge gained to continue Pritikin eating plan at home       Individual Cardiac Treatment Plan - Psychosocial  PSYCHOSOCIAL  ASSESSMENT/PLAN PSYCHOSOCIAL  REASSESSMENT PSYCHOSOCIAL   REASSESSMENT PSYCHOSOCIAL   REASSESSMENT PSYCHOSOCIAL  DISCHARGE/FOLLOW-UP   Stages of Change Stages of Change Stages of Change Stages of Change Stages of Change   [] Pre Contemplation  [x] Contemplation  [] Preparation  [] Action  [] Maintenance  [] Relapse [] Pre Contemplation  [] Contemplation  [x] Preparation  [] Action  [] Maintenance  [] Relapse [] Pre Contemplation  [] Contemplation  [] Preparation  [] Action  [] Maintenance  [] Relapse [] Pre Contemplation  [] Contemplation  [] Preparation  [x] Action  [] Maintenance  [] Relapse [] Pre Contemplation  [] Contemplation  [] Preparation  [] Action  [x] Maintenance  [] Relapse   PSYCHOSOCIAL ASSESSMENT PSYCHOSOCIAL ASSESSMENT PSYCHOSOCIAL ASSESSMENT PSYCHOSOCIAL ASSESSMENT PSYCHOSOCIAL ASSESSMENT   Behavioral Outcomes Behavioral Outcomes Behavioral Outcomes Behavioral Outcomes Behavioral Outcomes   Tool Used:  Dilan Chapa, Quality of Life Index, Cardiac Version IV  *Given to patient to complete.  Tool Used:    Dilan Chapa, Quality of Life Index, Cardiac Version IV     QOL Index Score: **  HF:**  S&E:**  P&S: **  Family: **   Tool Used:     Dilan Chapa, Quality of Life Index, Cardiac Version IV    QOL Index Score: 29.82  HF:29.6  S&E:30  P&S: 30  Family: 30   PHQ-9 score 0  Depression Severity  []Minimal  []Mild   []Moderate  []Moderately Severe  []Severe    PHQ-9 score 0  Depression Severity  []Minimal  []Mild   []Moderate  []Moderately Severe []Severe   Does patient have Family Support? [x] Yes      [] No  No signs of marital/family distress       Within the Past Month:  *Have you wished you were dead or wished you could go to sleep and not wake up? [] Yes      [x] No  *Have you had any thoughts of killing yourself? [] Yes      [x] No         Using a scale of 0-10, 0=none, 10=very:   Rate your depression: 0  Rate your anxiety:  0  Using a scale of 0-10, 0=none, 10=very:   Rate your depression: 0  Rate your anxiety:  0 Using a scale of 0-10, 0=none, 10=very:   Rate your depression: **  Rate your anxiety:  ** Using a scale of 0-10, 0=none, 10=very:   Rate your depression: 0  Rate your anxiety:  0 Using a scale of 0-10, 0=none, 10=very:   Rate your depression: 0  Rate your anxiety:  0   Signs and Symptoms of Depression Present? [x] Yes      [] No  If yes, please explain:  Seems low but does not admit to it Signs and Symptoms of Depression Present? [x] Yes      [] No  If yes, please explain:  Mostly flat expression Signs and Symptoms of Depression Present? [] Yes      [] No  If yes, please explain:  ** Signs and Symptoms of Depression Present? [] Yes      [x] No   Signs and Symptoms of Depression Present? [] Yes      [x] No     Signs and Symptoms of Anxiety Present? [] Yes      [x] No   Signs and Symptoms of Anxiety Present? [] Yes      [x] No   Signs and Symptoms of Anxiety Present? [] Yes      [] No  If yes, please explain:  ** Signs and Symptoms of Anxiety Present? [] Yes      [x] No   Signs and Symptoms of Anxiety Present? [] Yes      [x] No     [x] Patient has poor eye contact   [x] Flat affect present. [] Signs of anxiety, anger or hostility    [] Signs social isolation present.    []  Signs of alcohol or substance abuse       PSYCHOSOCIAL PLAN PSYCHOSOCIAL PLAN PSYCHOSOCIAL PLAN PSYCHOSOCIAL PLAN PSYCHOSOCIAL PLAN   *Interventions* *Interventions* *Interventions* *Interventions* *Interventions*   *Please check if needed  [] Psych Consult  [] Physician Referral  [x] Stress Management Skills *Please check if needed  [] Psych Consult  [] Physician Referral  [] Stress Management Skills *Please check if needed  [] Psych Consult  [] Physician Referral  [] Stress Management Skills *Please check if needed  [] Psych Consult  [] Physician Referral  [] Stress Management Skills *Please check if needed  [] Psych Consult  [] Physician Referral  [x] Stress Management Skills   Is patient currently taking anti-depressant or anti-anxiety medications? [] Yes      [x] No   Change in anti-depressant or anti-anxiety medications? [] Yes      [x] No   Change in anti-depressant or anti-anxiety medications? [] Yes      [] No   Change in anti-depressant or anti-anxiety medications? [] Yes      [x] No   Change in anti-depressant or anti-anxiety medications?   [] Yes      [x] No     *Education* *Education* *Education* *Education* *Education*   Healthy Mind-Set Workshops Recommended  [x] Stress & Health  [x] Taking Charge of Stress  [x] New Thoughts, New Behaviors  [x] Managing Moods & Relationships Healthy Mind-Set Workshops Attended/Date  1/16/19  Taking Charge of Stress Healthy Mind-Set Workshops Attended/Date Healthy Mind-Set Workshops Attended/Date Healthy Mind-Set Workshops  Completed  [x] Yes      [] No  3/20/19  Stress & Health    2/6/19   New Thoughts, New Behaviors   *Goals* *Goals* *Goals* *Goals* *Goals*   [facility] psychosocial goals are as follows:  Complete HADS & Dilan & Sammi, Quality of Life Index, Cardiac Version IV [facility] psychosocial goals are as follows:  [x] Attend Healthy Mind-Set Workshops  [x] Reduce depression symptom severity by 1 level  [] ** Myles's psychosocial goals are as follows:  [] Attend Healthy Mind-Set Workshops  [] Reduce depression symptom severity by 1 level  [] ** Myles's psychosocial goals are as follows:  [x] Attend Healthy Mind-Set Workshops  [] Reduce depression symptom severity by 1 level  [] Theadelina Escalante achieved psychosocial goals?   [x] Yes    [] No    [x] Use methods learned to continue to reduce depression symptom severity by 1 level  [] **     Individual Cardiac Treatment Plan - Other:  Risk Factor/Education  RISK FACTOR  ASSESSMENT/PLAN RISK FACTOR  REASSESSMENT  RISK FACTOR  REASSESSMENT RISK FACTOR  REASSESSMENT RISK FACTOR   DISCHARGE/FOLLOW-UP   Stages of Change Stages of Change Stages of Change Stages of Change Stages of Change   [x] Pre Contemplation  [] Contemplation  [] Preparation  [] Action  [] Maintenance  [] Relapse [] Pre Contemplation  [x] Contemplation  [] Preparation  [] Action  [] Maintenance  [] Relapse [] Pre Contemplation  [] Contemplation  [] Preparation  [] Action  [] Maintenance  [] Relapse [] Pre Contemplation  [] Contemplation  [] Preparation  [x] Action  [] Maintenance  [] Relapse [] Pre Contemplation  [] Contemplation  [x] Preparation  [x] Action  [] Maintenance  [] Relapse   RISK FACTOR/EDUCATION ASSESSMENT RISK FACTOR/EDUCATION ASSESSMENT RISK FACTOR/EDUCATION ASSESSMENT RISK FACTOR/EDUCATION ASSESSMENT RISK FACTOR /EDUCATION ASSESSMENT   Hypertension  [x] Yes      [] No    Resting BP: 94/52  Peak Ex BP:138/62  Medication:losartan, metoprolol   Hypertension  Resting BP: 136/68  Peak Ex BP:146/84  Medication Changes:  [] Yes      [x] No Hypertension  Resting BP: **  Peak Ex BP:**  Medication Changes:  [] Yes      [] No Hypertension  Resting BP: 138/67  Peak Ex BP:124/80  Medication Changes:  [] Yes      [x] No Hypertension  Resting BP: 118/62  Peak Ex BP:142/64  Medication Changes:  [] Yes      [x] No   Lipids  HLD/DLD  [x] Yes      [] No  TOTAL CHOL: 131  HDL:  27  LDL:  70  TRI  Medication: atorvastatin Lipids  Medication Changes:  [] Yes      [x] No     Lipids  Medication Changes:  [] Yes      [] No Lipids  Medication Changes:  [] Yes      [x] No     Lipids      Medication Changes:  [] Yes      [x] No   Diabetes  [x] Yes      [] No  FBS: 120           HbA1C: 6.0        Monitor BS @ home:   [x] Yes      [] No  Frequency: 1 x daily  Medication: glimepiride Diabetes  Most Recent BS:  BS have been in range  [x] Yes      [] No  Medication Changes  [] Yes      [x] No   Diabetes  Most Recent BS:  BS have been in range  [] Yes      [] No  Medication Changes  [] Yes      [] No     Diabetes  Most Recent BS:115  BS have been in range  [x] Yes      [] No  Medication Changes  [] Yes      [x] No     Diabetes  Most Recent BS:  BS have been in range  [x] Yes      [] No  Medication Changes  [] Yes      [x] No       Tobacco Use  [] Current  [x] Former  [] Never    Years smoked: 4:    Date Quit: 1980    # cigarettes smoked/day: 1 pk  Smokeless Tobacco use:   [] Yes      [x] No   Tobacco Use  Change in smoking status   [] Yes      [x] No                 Learning Barriers  Please select one:  [] Speech  [] Literacy  [] Hearing  [] Cognitive  [] Vision  [x] Ready to Learn Learning Barriers Addressed:   [x] Yes      [] No   Learning Barriers Addressed:   [] Yes      [] No   Learning Barriers Addressed:  Ready to learn Learning Barriers Addressed:  [x] Yes      [] No     RISK FACTOR/EDUCATION PLAN RISK FACTOR/EDUCATION PLAN RISK FACTOR/EDUCATION PLAN RISK FACTOR/EDUCATION PLAN RISK FACTOR/EDUCATION PLAN   *Interventions* *Interventions* *Interventions* *Interventions* *Interventions*   Recommended Educational Videos    [x] Overview of The Pritikin Eating Plan  [x] Becoming A Pritikin   [x] Diseases of Our Time-Part 1  [x] Calorie Density  [x] Label Reading-Part 1  [x] Move It!   [x] Healthy Minds, Bodies, Hearts  [x] Dining Out-Part 1  [x] Heart Disease Risk Reduction  [x] Metabolic Syndrome & Belly Fat  [x] Facts on Fat  [x] Diseases of Our Times-Part 2  [x] Biology of Weight Control  [x] Biomechanical Limitations  [x] Nurtition Action Plan   Completed Videos      12/28/18  Overview of The Pritikin Eating Plan    1/2/19   Diseases of Our Time-Part 1    1/7/19   Calorie Density    1/14/19   Label Reading-Part 1    1/23/19  Move It! Completed Videos Completed Videos      1/28/19  Healthy Minds, Bodies, Hearts    2/11/19  Dining Out-Part 1    2/18/19  Heart Disease Risk Reduction    5/82/18  Metabolic Syndrome & Belly Fat    3/4/19  Facts on Fat    3/6/19  Diseases of Our Times-Part 2    3/11/19   Biology of Weight Control    3/18/19  Biomechanical Limitations Recommended Educational Videos Completed    [x] Yes      [] No    4/8/19  Becoming A Pritikin     3/27/19  Nurtition Action Plan          Smoking Cessation/Relaspe Prevention Intervention needed? [] Yes      [x] No    Smoking Cessation/Relapse Prevention completed? [] Yes      [] No  Date: **       Professional Referrals:  *Please check if needed  [] Diabetes Clinic  [] Lipid Clinic   [] Other:     Professional Referrals:  *Please check if needed  [] Diabetes Clinic  [] Lipid Clinic   [] Other:   Preventative Medication Preventative Medication Preventative Medication Preventative Medication Preventative Medication   Aspirin  [x] Yes    [] No  Blood Thinner: Clopidogrel/Effient/Brillinta/ coumadin  [x] Yes    [] No  Beta Blocker  [x] Yes    [] No  Ace Inhibitor  [] Yes    [x] No  Statin/Lipid Lowering  [x] Yes    [] No Medication Changes? [] Yes    [x] No Medication Changes? [] Yes    [] No Medication Changes? [] Yes    [x] No Medication Changes? [] Yes    [x] No   *Education* *Education* *Education* *Education* *Education*   Does Alexsandra Crosszoni require any additional education? [] Yes    [x] No   Does Alexsandra Mazzoni require any additional education? [] Yes    [x] No Does Alexsandra Mazzoni require any additional education? [] Yes    [] No Does Alexsandra Mazzoni require any additional education? [] Yes    [x] No Does Alexsandra Mazzoni require any additional education?   [] Yes    [x] No   Recommended Additional Educational Videos    Medical  [] Hypertension & Heart Disease  [] Decoding Lab Results  [] Aging Enhancing Your QoL  [] Sleep Disorders  Exercise  [] Body Composition  [] Improve Performance  [] Exercise Action Plan  [] Intro to Yoga  Behavioral  [] How Our Thoughts Can Heal Our Hearts  [] Smoking Cessation  Nutrition  [] Planning Your Eating Strategy  [] Lable Reading- Part 2  [] Dining Out - Part 2  [] Targeting Your Nutrition Priorities  [] Fueling a Healthy Body  [] Menu Workshop  [employer] [] Breakfast & Snacks  [] [employer] Salads & Dressing  [] [address] [place] [address]  [] Soups & Desserts   Additional Educational Videos Completed Additional Educational Videos Completed Additional Educational Videos Completed Additional Educational Videos Completed    [] Yes    [] No   *Goals* *Goals* *Goals* *Goals* *Goals*   Myles's risk factor/education goals are as follows:    [x] Optimal BP <140/90  [x] Blood Sugar <120  [x] Attend recommended video education sessions  [x] Takes medications as prescribed 100% of the time   [] ** Myles's risk factor/education goals are as follows:    [x] Optimal BP <140/90  [x] Blood Sugar <120  [x] Attend recommended video education sessions  [x] Takes medications as prescribed 100% of the time   [] ** Myles's risk factor/education goals are as follows:    [x] Optimal BP <140/90  [] Blood Sugar <120  [x] Attend recommended video education sessions  [x] Takes medications as prescribed 100% of the time   [] ** Myles's risk factor/education goals are as follows:    [x] Optimal BP <140/90  [x] Blood Sugar <120  [x] Attend recommended video education sessions  [x] Takes medications as prescribed 100% of the time   [] Shaheen  achieved risk factor goals?   [x] Yes    [] No       Monitored telemetry has revealed NSR with BBB    [] associated symptoms fatigue, SOB Monitored telemetry has revealed NSR with occ PVC's   Monitored telemetry has revealed  [] documented arrhythmia at increasing workloads  [] Date/Time User Provider Type Action   4/10/2019  9:48 AM Charla García MD Physician Cosign   4/10/2019  9:46 AM Gato Castellano Exercise Physiologist Sign

## 2019-04-15 NOTE — TELEPHONE ENCOUNTER
FYI- Patient called back and he is no longer wearing his life vest.  He sent it back to the company 2 months ago. He refuses to wear it because it is too bulky. He understands the risk of not wearing it. Appt made to see Dr. Giselle Godfrey 4/17/19 at [phone]. Appt date and time confirmed.

## 2019-04-15 NOTE — TELEPHONE ENCOUNTER
Lisha Brennan from coumadin clinic states patient cancelled his appointment with Dr. Dread Aviles on 4-4-19. States he doesn't need anymore bills. Patient has stopped his Coumadin without seeing Dr. Dread Aviles.      Call Lisha Bernnan back at [phone] to let her know if Dr. Giselle Godfrey is ok with patient stopping coumadin with Dr. Collier Ast approval.

## 2019-04-16 ENCOUNTER — TELEPHONE (OUTPATIENT)
Dept: CARDIOLOGY CLINIC | Age: 74
End: 2019-04-16

## 2019-04-16 LAB
ANION GAP SERPL CALCULATED.3IONS-SCNC: 8 MMOL/L (ref 4–12)
BUN BLDV-MCNC: 19 MG/DL (ref 5–27)
CALCIUM SERPL-MCNC: 9.8 MG/DL (ref 8.5–10.5)
CHLORIDE BLD-SCNC: 106 MMOL/L (ref 98–109)
CO2: 26 MMOL/L (ref 22–32)
CREAT SERPL-MCNC: 1.1 MG/DL (ref 0.6–1.3)
CREATINE, URINE: 79 MG/DL
EGFR AFRICAN AMERICAN: >60 ML/MIN/1.73SQ.M
EGFR IF NONAFRICAN AMERICAN: >60 ML/MIN/1.73SQ.M
GLUCOSE: 97 MG/DL (ref 65–99)
POTASSIUM SERPL-SCNC: 4.4 MMOL/L (ref 3.5–5)
PROTEIN, URINE: 180 MG/L
PROTEIN/CREAT RATIO: 0.23
SODIUM BLD-SCNC: 140 MMOL/L (ref 134–146)

## 2019-04-16 NOTE — TELEPHONE ENCOUNTER
No coumadin needed from my end. He is on Coumadin for history of LORENA. To be cleared for discontinuation by Hematology.

## 2019-04-18 ENCOUNTER — OFFICE VISIT (OUTPATIENT)
Dept: CARDIOLOGY CLINIC | Age: 74
End: 2019-04-18
Payer: MEDICARE

## 2019-04-18 VITALS
WEIGHT: 210 LBS | HEIGHT: 72 IN | BODY MASS INDEX: 28.44 KG/M2 | SYSTOLIC BLOOD PRESSURE: 118 MMHG | DIASTOLIC BLOOD PRESSURE: 80 MMHG | HEART RATE: 64 BPM

## 2019-04-18 DIAGNOSIS — Z98.890 H/O MITRAL VALVE REPAIR: ICD-10-CM

## 2019-04-18 DIAGNOSIS — I50.22 CHRONIC SYSTOLIC CONGESTIVE HEART FAILURE (HCC): Primary | ICD-10-CM

## 2019-04-18 DIAGNOSIS — Z95.2 S/P AVR: ICD-10-CM

## 2019-04-18 DIAGNOSIS — Z95.1 HX OF CABG: ICD-10-CM

## 2019-04-18 PROCEDURE — 3017F COLORECTAL CA SCREEN DOC REV: CPT | Performed by: INTERNAL MEDICINE

## 2019-04-18 PROCEDURE — 4040F PNEUMOC VAC/ADMIN/RCVD: CPT | Performed by: INTERNAL MEDICINE

## 2019-04-18 PROCEDURE — 99213 OFFICE O/P EST LOW 20 MIN: CPT | Performed by: INTERNAL MEDICINE

## 2019-04-18 PROCEDURE — G8598 ASA/ANTIPLAT THER USED: HCPCS | Performed by: INTERNAL MEDICINE

## 2019-04-18 PROCEDURE — G8419 CALC BMI OUT NRM PARAM NOF/U: HCPCS | Performed by: INTERNAL MEDICINE

## 2019-04-18 PROCEDURE — 1036F TOBACCO NON-USER: CPT | Performed by: INTERNAL MEDICINE

## 2019-04-18 PROCEDURE — 1123F ACP DISCUSS/DSCN MKR DOCD: CPT | Performed by: INTERNAL MEDICINE

## 2019-04-18 PROCEDURE — G8427 DOCREV CUR MEDS BY ELIG CLIN: HCPCS | Performed by: INTERNAL MEDICINE

## 2019-04-18 NOTE — PROGRESS NOTES
Follow-up, discuss echo. Patient hasn't been wearing life vest.   He denies having any chest pain, SOB, dizziness, lightheadedness, palpitations or MIKE. Patient also hasn't been taking Coumadin.

## 2019-04-24 ENCOUNTER — OFFICE VISIT (OUTPATIENT)
Dept: NEPHROLOGY | Age: 74
End: 2019-04-24
Payer: MEDICARE

## 2019-04-24 VITALS
HEART RATE: 58 BPM | OXYGEN SATURATION: 97 % | WEIGHT: 219.3 LBS | BODY MASS INDEX: 29.74 KG/M2 | SYSTOLIC BLOOD PRESSURE: 126 MMHG | DIASTOLIC BLOOD PRESSURE: 68 MMHG

## 2019-04-24 DIAGNOSIS — N28.1 RENAL CYST: ICD-10-CM

## 2019-04-24 DIAGNOSIS — I10 HTN (HYPERTENSION), BENIGN: ICD-10-CM

## 2019-04-24 DIAGNOSIS — N18.30 CKD (CHRONIC KIDNEY DISEASE) STAGE 3, GFR 30-59 ML/MIN (HCC): Primary | ICD-10-CM

## 2019-04-24 PROCEDURE — G8419 CALC BMI OUT NRM PARAM NOF/U: HCPCS | Performed by: INTERNAL MEDICINE

## 2019-04-24 PROCEDURE — 4040F PNEUMOC VAC/ADMIN/RCVD: CPT | Performed by: INTERNAL MEDICINE

## 2019-04-24 PROCEDURE — 3017F COLORECTAL CA SCREEN DOC REV: CPT | Performed by: INTERNAL MEDICINE

## 2019-04-24 PROCEDURE — G8598 ASA/ANTIPLAT THER USED: HCPCS | Performed by: INTERNAL MEDICINE

## 2019-04-24 PROCEDURE — 99213 OFFICE O/P EST LOW 20 MIN: CPT | Performed by: INTERNAL MEDICINE

## 2019-04-24 PROCEDURE — 1036F TOBACCO NON-USER: CPT | Performed by: INTERNAL MEDICINE

## 2019-04-24 PROCEDURE — G8427 DOCREV CUR MEDS BY ELIG CLIN: HCPCS | Performed by: INTERNAL MEDICINE

## 2019-04-24 PROCEDURE — 1123F ACP DISCUSS/DSCN MKR DOCD: CPT | Performed by: INTERNAL MEDICINE

## 2019-04-24 RX ORDER — GABAPENTIN 400 MG/1
400 CAPSULE ORAL 2 TIMES DAILY
COMMUNITY

## 2019-04-24 RX ORDER — GLIMEPIRIDE 1 MG/1
0.5 TABLET ORAL
COMMUNITY

## 2019-04-24 NOTE — PROGRESS NOTES
[facility]          Outpatient Follow-Up note         4/24/2019 9:57 AM    Patient Name:   Gail Atkinson  YOB: 1945  Primary Care Physician:  Ge Granado     Chief Complaint / Reason for follow-up : Follow Up of chronic kidney disease and hypertension      Interval History :  Patient seen and examined by me. Feels well. Denies any complaints. No chest pain and no shortness of breath. No hospitalizations and taking bumex only as needed     Past History :  Past Medical History:   Diagnosis Date    Acute on chronic systolic CHF (congestive heart failure), NYHA class 3 ([facility]) EF 25-30% 11/7/2018    CAD (coronary artery disease)     CKD (chronic kidney disease) stage 3, GFR 30-59 ml/min ([facility] [address] [address] 9/23/2015    Diabetes mellitus ([facility] [address]     HTN (hypertension), benign     Hyperlipidemia     Hypothyroidism     Mitral and aortic incompetence     Neuropathy     Positive blood culture     Being treated with rocephhin as outpt.  S/P CABG x 2 11/15/2018     Past Surgical History:   Procedure Laterality Date    CARDIAC SURGERY      heart cath    CATARACT REMOVAL WITH IMPLANT Bilateral     COLONOSCOPY      CORONARY ARTERY BYPASS GRAFT  11/14/2018    DIAGNOSTIC CARDIAC CATH LAB PROCEDURE      EYE SURGERY      [place] CABG, VEIN, THREE N/A 11/14/2018    CABG CORONARY ARTERY BYPASS,  AORTIC AND MITRAL VALVE REPAIR WITH MICHELE, BALLOON PUMP INSERTION performed by Renetta Johansen MD at [address] ECHO TRANSESOPHAG R-T 2D IMG ACQUISJ I&R ONLY Left 10/25/2018    TRANSESOPHAGEAL ECHOCARDIOGRAM performed by Neville Veliz MD at [address] Endoscopy        Medications :     Outpatient Medications Marked as Taking for the 4/24/19 encounter (Office Visit) with Blayne Verduzco MD   Medication Sig Dispense Refill    aspirin 81 MG tablet Take 81 mg by mouth daily      gabapentin (NEURONTIN) 400 MG capsule Take 400 mg by mouth 2 times daily.       glimepiride (AMARYL) 1 MG tablet Take 1 mg by Creatinine is back to baseline         -    Prn lasix   2. Essential Hypertension -- running  well. Continue current meds. 3. Diabetes mellitus with neuropathy and retinopathy apparently reasonably controlled. 4. Renal cyst significant in size 4.1 cm. Repeat US scan next visit  5. Avoid Nephrotoxins, Nonsteroidal anti-inflammatories and IV Contrast Dye  6. H/O CHF - Prn lasix   7. Follow up in 12 months      Tests and orders placed this Encounter     Orders Placed This Encounter   Procedures    Basic Metabolic Panel    Microalbumin / Creatinine Urine Ratio    Protein / creatinine ratio, urine       Ilia Edouard M.D  [facility].

## 2019-04-27 NOTE — PROGRESS NOTES
[facility] CARDIOLOGY  [address] [address]  Dept: [phone]  Dept Fax: [phone]  Loc: [phone]    Visit Date: 4/18/2019    Mr. Amy Castellanos is a 69 y.o. male  who presented for:  Chief Complaint   Patient presents with    Follow-up    Results     echo       HPI:   HPI   70 yo M s/p AVR and MV repair with LIMA-LAD/SVG-OM 11/2018, Hx of LORENA now off Coumadin who presents for follow-up. He is on OMT and his TTE shows EF has not improved as of yet despite compliance. No chest pain, angina, LIRA, orthopnea, PND, sob at rest, palpitations, LE edema, or syncope. EF 20-25% on most recent TTE 4/2019. No neurologic issues. Current Outpatient Medications:     docusate sodium (COLACE) 100 MG capsule, Take 100 mg by mouth 2 times daily, Disp: , Rfl:     losartan (COZAAR) 25 MG tablet, Take 1 tablet by mouth daily, Disp: 90 tablet, Rfl: 3    metoprolol tartrate (LOPRESSOR) 25 MG tablet, Take 0.5 tablets by mouth 2 times daily, Disp: 90 tablet, Rfl: 3    ferrous sulfate 325 (65 Fe) MG tablet, Take 1 tablet by mouth 2 times daily (with meals), Disp: 30 tablet, Rfl: 3    nitroGLYCERIN (NITROSTAT) 0.4 MG SL tablet, up to max of 3 total doses. If no relief after 1 dose, call 911., Disp: 25 tablet, Rfl: 3    atorvastatin (LIPITOR) 40 MG tablet, Take 40 mg by mouth daily , Disp: , Rfl:     levothyroxine (SYNTHROID) 50 MCG tablet, Take 50 mcg by mouth Daily, Disp: , Rfl:     ONE TOUCH ULTRA TEST strip, as needed, Disp: , Rfl: 0    aspirin 81 MG tablet, Take 81 mg by mouth daily, Disp: , Rfl:     gabapentin (NEURONTIN) 400 MG capsule, Take 400 mg by mouth 2 times daily. , Disp: , Rfl:     glimepiride (AMARYL) 1 MG tablet, Take 1 mg by mouth every morning (before breakfast), Disp: , Rfl:     Past Medical History  Bal Vela  has a past medical history of Acute on chronic systolic CHF (congestive heart failure), NYHA class 3 (HCC) EF 25-30%, CAD (coronary artery disease), CKD (chronic kidney disease) stage 3, GFR 30-59 ml/min ([facility]), Diabetes mellitus ([address] HTN (hypertension), benign, Hyperlipidemia, Hypothyroidism, Mitral and aortic incompetence, Neuropathy, Positive blood culture, and S/P CABG x 2. Social History  Chung Slater  reports that he is a non-smoker but has been exposed to tobacco smoke. He has been exposed to 1.00 pack per day for the past 3.00 years. He has never used smokeless tobacco. He reports that he drinks about 8.4 oz of alcohol per week. He reports that he does not use drugs. Family History  Chung Slater family history includes Cancer (age of onset: 28) in his brother; Cancer (age of onset: 46) in his brother; Diabetes in his mother; Other in his mother. There is no family history of bicuspid aortic valve, aneurysms, heart transplant, pacemakers, defibrillators, or sudden cardiac death. Past Surgical History   Past Surgical History:   Procedure Laterality Date    CARDIAC SURGERY      heart cath    CATARACT REMOVAL WITH IMPLANT Bilateral     COLONOSCOPY      CORONARY ARTERY BYPASS GRAFT  11/14/2018    DIAGNOSTIC CARDIAC CATH LAB PROCEDURE      EYE SURGERY      [place] CABG, VEIN, THREE N/A 11/14/2018    CABG CORONARY ARTERY BYPASS,  AORTIC AND MITRAL VALVE REPAIR WITH MICHELE, BALLOON PUMP INSERTION performed by Hakeem Potter MD at [address] ECHO TRANSESOPHAG R-T 2D IMG ACQUISJ I&R ONLY Left 10/25/2018    TRANSESOPHAGEAL ECHOCARDIOGRAM performed by Mei Rebollar MD at [facility]       Review of Systems   Constitutional: Negative for chills and fever  HENT: Negative for congestion, sinus pressure, sneezing and sore throat. Eyes: Negative for pain, discharge, redness and itching. Respiratory: Negative for apnea, cough  Gastrointestinal: Negative for blood in stool, constipation, diarrhea   Endocrine: Negative for cold intolerance, heat intolerance, polydipsia.   Genitourinary: Negative for dysuria, enuresis, flank pain and CREATININE 1.10 04/15/2019    CALCIUM 9.8 04/15/2019    LABGLOM 60 03/04/2019    GLUCOSE 97 04/15/2019       Lab Results   Component Value Date    ALKPHOS 85 11/10/2018    ALT 19 11/10/2018    AST 26 11/10/2018    PROT 7.4 11/10/2018    BILITOT 0.8 11/10/2018    BILITOT NEGATIVE 10/14/2016    BILIDIR <0.2 11/05/2018    LABALBU 3.5 11/10/2018       Lab Results   Component Value Date    MG 2.5 12/06/2018       Lab Results   Component Value Date    INR 2.02 (H) 12/27/2018    INR 4.54 (H) 12/06/2018    INR 5.43 (HH) 12/06/2018         Lab Results   Component Value Date    LABA1C 6.0 11/07/2018       Lab Results   Component Value Date    TRIG 172 11/10/2018    HDL 27 11/10/2018    LDLCALC 70 11/10/2018       Lab Results   Component Value Date    TSH 2.820 10/23/2018         Testing Reviewed:      I have individually reviewed the cardiac test below:    ECHO:   Results for orders placed during the hospital encounter of 04/05/19   Echo 2D w doppler w color complete    Narrative Transthoracic Echocardiography Report (TTE)     Demographics      Patient Name     KHUSHI Lopezign Gender                Male      MR #             [id number]    Race                                                      Ethnicity      Account #        [de-identified]    Room Number      Accession Number [id number]    Date of Study         04/05/2019      Date of Birth    1945   Referring Physician   Augustine Garcia MD      Age              69 year(s)   Thiago Pena MD                                 Physician     Procedure    Type of Study      TTE procedure:ECHOCARDIOGRAM COMPLETE 2D W DOPPLER W COLOR. Procedure Date  Date: 04/05/2019 Start: 07:58 AM    Study Location: Echo Lab  Technical Quality: Limited visualization due to poor acoustical window. Indications: Aortic valve replacement and Mitral valve replacement. Additional Medical History:Chronic kidney disease, Hypertension, Acute  coronary syndrome, Cardiogenic shock, Chest pain, [employer] Manuel aortic valve  replacement, Mitral valve replacement, History of endocarditis, CABG x3    Patient Status: Routine    Height: 72 inches Weight: 208 pounds BSA: 2.17 m^2 BMI: 28.21 kg/m^2    BP: 110/62 mmHg    Allergies    - No Known Allergies. Conclusions      Summary   Technically difficult examination. Ejection fraction was estimated at 20-25%. There was severe global hypokinesis. s/p AVR. Transaortic velocity was within the normal range with no evidence of   aortic stenosis (mean gradient 6-8 mmHg). There was no evidence of aortic   regurgitation. S/p MVR. The transmitral velocity was within the normal range with no evidence for   mitral stenosis (mean graident 4 mmHg). There was trace mitral   regurgitation. Signature      ----------------------------------------------------------------   Electronically signed by Shavon Davis MD (Interpreting   physician) on 04/05/2019 at 11:51 AM   ----------------------------------------------------------------      Findings      Mitral Valve   S/p MVR. DOPPLER: The transmitral velocity was within the normal range   with no evidence for mitral stenosis (mean graident 4 mmHg). There was   trace mitral regurgitation. Aortic Valve   s/p AVR. DOPPLER: Transaortic velocity was within the normal range with no   evidence of aortic stenosis (mean gradient 6-8 mmHg). There was no   evidence of aortic regurgitation. Tricuspid Valve   The tricuspid valve structure was normal with normal leaflet separation. DOPPLER: There was no evidence of tricuspid stenosis. There was trace to   mild tricuspid regurgitation. Pulmonic Valve   The pulmonic valve leaflets were not well seen. DOPPLER: The transpulmonic   velocity was within the normal range with no evidence for regurgitation. Left Atrium   Left atrial size was normal.      Left Ventricle   Normal left ventricle size and severely reduced systolic function. Ejection fraction was estimated at 20-25%. There was severe global   hypokinesis. The wall thickness was within normal limits. Right Atrium   Right atrial size was normal.      Right Ventricle   The right ventricular size was normal with normal systolic function and   wall thickness. Pericardial Effusion   The pericardium was normal in appearance with no evidence of a pericardial   effusion. Pleural Effusion   No evidence of pleural effusion. Aorta / Great Vessels   IVC size is within normal limits with normal respiratory phasic changes.      M-Mode/2D Measurements & Calculations      LV Diastolic      LV Systolic Dimension: 4.6 cm    LA Dimension: 4.7 cmAO   Dimension: 5.1 cm LV Volume Diastolic: 987.0 ml    Root Dimension: 3.2 cm   LV FS:9.8 %       LV Volume Systolic: 087.0 ml   LV PW Diastolic:  LV EDV/LV EDV Index: 191.3 ml/88   1.1 cm            m^2LV ESV/LV ESV Index: 957.5   Septum Diastolic: YV/42 m^2                        RV Diastolic Dimension:   1 cm              EF Calculated: 24.7 %            3.5 cm                        LV Length: 9.6 cm                LA/Aorta: 1.47      LV Area   Diastolic: 18.5   cm^2   LV Area Systolic:   63.5 cm^2     Doppler Measurements & Calculations      MV Peak E-Wave: 134  AV Peak Velocity: 151 LVOT Peak Velocity: 103 cm/s   cm/s                 cm/s                  LVOT Mean Velocity: 71.7 cm/s   MV Peak A-Wave: 87.5 AV Peak Gradient:     LVOT Peak Gradient: 5 mmHgLVOT   cm/s                 9.12 mmHg             Mean Gradient: 2 mmHg   MV E/A Ratio: 1.53   AV Mean Velocity: 111   MV Peak Gradient:    cm/s   7.18 mmHg            AV Mean Gradient: 7   MV Mean Gradient: 4  mmHg                  TR Velocity:262 cm/s   mmHg                 AV VTI: 39.3 cm       TR Gradient:27.46 mmHg   MV Mean Velocity: PV Peak Velocity: 99 cm/s   97.7 cm/s                                  PV Peak Gradient: 3.92 mmHg   MV Deceleration      LVOT VTI: 24.4 cm   Time: 391 msec   MV P1/2t: 131 msec   MVA by PHT:1.68 cm^2                        AV DVI (VTI): 0.62AV                        DVI (Vmax):0.68   MV E' Lateral   Velocity: 12.4 cm/s   MV A' Lateral   Velocity: 5.9 cm/s   E/E' lateral: 10.81   MR Velocity: 448   cm/s     [id number]        Assessment/Plan   Cardiomyopathy, combined ischemic/valvular heart disease  S/p AVR  S/p MV repair  CABG  EF 20-25%, NYHA II  LBBB 156 ms  Hx of LORENA - was on Coumadin, now off. Heparin is listed as allergy. Patient is on OMT. Will have CHF clinic review for Entresto cost, etc.  He did not want to wear LifeVest but will consider CRT-D. He understands risk regarding not wearing LifeVest.  BP and HR well controlled. Discussed diet/exercise/BP/weight loss/health lifestyle choices/lipids; the patient understands the goals and will try to comply.       Disposition:  6 months         Electronically signed by Kera Terrell MD   4/27/2019 at 7:16 PM

## 2019-05-03 ENCOUNTER — TELEPHONE (OUTPATIENT)
Dept: CARDIOLOGY CLINIC | Age: 74
End: 2019-05-03

## 2019-05-03 DIAGNOSIS — I50.9 CONGESTIVE HEART FAILURE, NYHA CLASS 2, UNSPECIFIED CONGESTIVE HEART FAILURE TYPE (HCC): Primary | ICD-10-CM

## 2019-05-03 NOTE — TELEPHONE ENCOUNTER
LM for patient to call our office back. Dr. Geronimo Ridley last office note-  Will have CHF clinic review for [facility] cost, etc.  He did not want to wear LifeVest but will consider CRT-D. He understands risk regarding not wearing LifeVest.      Referral placed for CHF clinic for Entresto start. Has patient decided on whether or not he wants ICD?

## 2019-05-06 NOTE — TELEPHONE ENCOUNTER
He is requesting to come in and talk to Dr. Charly Rios again and is wanting to bring in family. Appt made for 5/13/2019 at 1330. Appt date and time confirmed.

## 2019-05-07 ENCOUNTER — TELEPHONE (OUTPATIENT)
Dept: CARDIOLOGY CLINIC | Age: 74
End: 2019-05-07

## 2019-05-13 ENCOUNTER — OFFICE VISIT (OUTPATIENT)
Dept: CARDIOLOGY CLINIC | Age: 74
End: 2019-05-13
Payer: MEDICARE

## 2019-05-13 ENCOUNTER — TELEPHONE (OUTPATIENT)
Dept: CARDIOLOGY CLINIC | Age: 74
End: 2019-05-13

## 2019-05-13 VITALS
HEIGHT: 72 IN | WEIGHT: 212.5 LBS | HEART RATE: 61 BPM | BODY MASS INDEX: 28.78 KG/M2 | SYSTOLIC BLOOD PRESSURE: 143 MMHG | DIASTOLIC BLOOD PRESSURE: 67 MMHG

## 2019-05-13 DIAGNOSIS — I50.22 CHRONIC SYSTOLIC HEART FAILURE (HCC): ICD-10-CM

## 2019-05-13 DIAGNOSIS — Z98.890 HISTORY OF MITRAL VALVE REPAIR: ICD-10-CM

## 2019-05-13 DIAGNOSIS — I25.5 ISCHEMIC CARDIOMYOPATHY: ICD-10-CM

## 2019-05-13 DIAGNOSIS — Z95.1 HX OF CABG: Primary | ICD-10-CM

## 2019-05-13 DIAGNOSIS — Z95.2 S/P AVR: ICD-10-CM

## 2019-05-13 PROCEDURE — 3017F COLORECTAL CA SCREEN DOC REV: CPT | Performed by: INTERNAL MEDICINE

## 2019-05-13 PROCEDURE — 1036F TOBACCO NON-USER: CPT | Performed by: INTERNAL MEDICINE

## 2019-05-13 PROCEDURE — 4040F PNEUMOC VAC/ADMIN/RCVD: CPT | Performed by: INTERNAL MEDICINE

## 2019-05-13 PROCEDURE — 1123F ACP DISCUSS/DSCN MKR DOCD: CPT | Performed by: INTERNAL MEDICINE

## 2019-05-13 PROCEDURE — G8419 CALC BMI OUT NRM PARAM NOF/U: HCPCS | Performed by: INTERNAL MEDICINE

## 2019-05-13 PROCEDURE — G8427 DOCREV CUR MEDS BY ELIG CLIN: HCPCS | Performed by: INTERNAL MEDICINE

## 2019-05-13 PROCEDURE — 99213 OFFICE O/P EST LOW 20 MIN: CPT | Performed by: INTERNAL MEDICINE

## 2019-05-13 PROCEDURE — G8598 ASA/ANTIPLAT THER USED: HCPCS | Performed by: INTERNAL MEDICINE

## 2019-05-13 NOTE — TELEPHONE ENCOUNTER
Patients daughter Bossman Chavez (on hippa) calling in. She came in with him to his appointment today with Dr Sarah Lombardi and she is asking if she can have note for work. She works at [employer], fax # [phone], Cecelia Kidd. Please advise.

## 2019-05-13 NOTE — PROGRESS NOTES
[facility] CARDIOLOGY  [address] [address]  Dept: [phone]  Dept Fax: [phone]  Loc: [phone]    Visit Date: 5/13/2019    Mr. Teddy Thomas is a 69 y.o. male  who presented for:  Chief Complaint   Patient presents with    Check-Up    Congestive Heart Failure       HPI:   HPI   70 yo M hx of I/NICM, AVR/MVR, CABG due to endocarditis who presents for follow-up to discuss ICD. He is still thinking about the decision. He has been on OMT without improvement. In the midst of trying Entresto. He has chronic LIRA. No chest pain, angina, orthopnea, PND, sob at rest, palpitations, LE edema, or syncope. Current Outpatient Medications:     aspirin 81 MG tablet, Take 81 mg by mouth daily, Disp: , Rfl:     gabapentin (NEURONTIN) 400 MG capsule, Take 400 mg by mouth 2 times daily. , Disp: , Rfl:     glimepiride (AMARYL) 1 MG tablet, Take 1 mg by mouth every morning (before breakfast), Disp: , Rfl:     docusate sodium (COLACE) 100 MG capsule, Take 100 mg by mouth 2 times daily, Disp: , Rfl:     losartan (COZAAR) 25 MG tablet, Take 1 tablet by mouth daily, Disp: 90 tablet, Rfl: 3    metoprolol tartrate (LOPRESSOR) 25 MG tablet, Take 0.5 tablets by mouth 2 times daily, Disp: 90 tablet, Rfl: 3    ferrous sulfate 325 (65 Fe) MG tablet, Take 1 tablet by mouth 2 times daily (with meals), Disp: 30 tablet, Rfl: 3    nitroGLYCERIN (NITROSTAT) 0.4 MG SL tablet, up to max of 3 total doses.  If no relief after 1 dose, call 911., Disp: 25 tablet, Rfl: 3    atorvastatin (LIPITOR) 40 MG tablet, Take 40 mg by mouth daily , Disp: , Rfl:     levothyroxine (SYNTHROID) 50 MCG tablet, Take 50 mcg by mouth Daily, Disp: , Rfl:     ONE TOUCH ULTRA TEST strip, as needed, Disp: , Rfl: 0    Past Medical History  Dortheluis Brooks  has a past medical history of Acute on chronic systolic CHF (congestive heart failure), NYHA class 3 ([facility]) EF 25-30%, CAD (coronary artery Musculoskeletal: Negative for arthralgias, joint swelling and neck pain. Neurological: Negative for numbness and headaches. Psychiatric/Behavioral: Negative for agitation, confusion, decreased concentration and dysphoric mood. Objective:     BP (!) 143/75   Pulse 63   Ht 6' (1.829 m)   Wt 212 lb 8 oz (96.4 kg)   BMI 28.82 kg/m²     Wt Readings from Last 3 Encounters:   05/13/19 212 lb 8 oz (96.4 kg)   04/24/19 219 lb 4.8 oz (99.5 kg)   04/18/19 210 lb (95.3 kg)     BP Readings from Last 3 Encounters:   05/13/19 (!) 143/75   04/24/19 126/68   04/18/19 118/80       Nursing note and vitals reviewed. Physical Exam   Constitutional: Oriented to person, place, and time. Appears well-developed and well-nourished. HENT:   Head: Normocephalic and atraumatic. Eyes: EOM are normal. Pupils are equal, round, and reactive to light. Neck: Normal range of motion. Neck supple. No JVD present. Cardiovascular: Normal rate, regular rhythm, normal heart sounds and intact distal pulses. No murmur heard. Pulmonary/Chest: Effort normal and breath sounds normal. No respiratory distress. No wheezes. No rales. Abdominal: Soft. Bowel sounds are normal. No distension. There is no tenderness. Musculoskeletal: Normal range of motion. No edema. Neurological: Alert and oriented to person, place, and time. No cranial nerve deficit. Coordination normal.   Skin: Skin is warm and dry. Psychiatric: Normal mood and affect.        No results found for: CKTOTAL, CKMB, CKMBINDEX    Lab Results   Component Value Date    WBC 2.2 12/06/2018    RBC 2.81 12/06/2018    RBC 0-5 10/14/2016    HGB 8.1 12/06/2018    HCT 26.1 12/06/2018    MCV 92.9 12/06/2018    MCH 28.8 12/06/2018    MCHC 31.0 12/06/2018     12/06/2018    MPV 9.0 12/06/2018       Lab Results   Component Value Date     04/15/2019    K 4.4 04/15/2019    K 4.4 11/14/2018     04/15/2019    CO2 26 04/15/2019    BUN 19 04/15/2019    LABALBU 3.5 window. Indications: Aortic valve replacement and Mitral valve replacement. Additional Medical History:Chronic kidney disease, Hypertension, Acute  coronary syndrome, Cardiogenic shock, Chest pain, [employer] Luis Manuel aortic valve  replacement, Mitral valve replacement, History of endocarditis, CABG x3    Patient Status: Routine    Height: 72 inches Weight: 208 pounds BSA: 2.17 m^2 BMI: 28.21 kg/m^2    BP: 110/62 mmHg    Allergies    - No Known Allergies. Conclusions      Summary   Technically difficult examination. Ejection fraction was estimated at 20-25%. There was severe global hypokinesis. s/p AVR. Transaortic velocity was within the normal range with no evidence of   aortic stenosis (mean gradient 6-8 mmHg). There was no evidence of aortic   regurgitation. S/p MVR. The transmitral velocity was within the normal range with no evidence for   mitral stenosis (mean graident 4 mmHg). There was trace mitral   regurgitation. Signature      ----------------------------------------------------------------   Electronically signed by Desiree Davis MD (Interpreting   physician) on 04/05/2019 at 11:51 AM   ----------------------------------------------------------------      Findings      Mitral Valve   S/p MVR. DOPPLER: The transmitral velocity was within the normal range   with no evidence for mitral stenosis (mean graident 4 mmHg). There was   trace mitral regurgitation. Aortic Valve   s/p AVR. DOPPLER: Transaortic velocity was within the normal range with no   evidence of aortic stenosis (mean gradient 6-8 mmHg). There was no   evidence of aortic regurgitation. Tricuspid Valve   The tricuspid valve structure was normal with normal leaflet separation. DOPPLER: There was no evidence of tricuspid stenosis. There was trace to   mild tricuspid regurgitation. Pulmonic Valve   The pulmonic valve leaflets were not well seen.  DOPPLER: The transpulmonic   velocity was within the normal range with no evidence for regurgitation. Left Atrium   Left atrial size was normal.      Left Ventricle   Normal left ventricle size and severely reduced systolic function. Ejection fraction was estimated at 20-25%. There was severe global   hypokinesis. The wall thickness was within normal limits. Right Atrium   Right atrial size was normal.      Right Ventricle   The right ventricular size was normal with normal systolic function and   wall thickness. Pericardial Effusion   The pericardium was normal in appearance with no evidence of a pericardial   effusion. Pleural Effusion   No evidence of pleural effusion. Aorta / Great Vessels   IVC size is within normal limits with normal respiratory phasic changes.      M-Mode/2D Measurements & Calculations      LV Diastolic      LV Systolic Dimension: 4.6 cm    LA Dimension: 4.7 cmAO   Dimension: 5.1 cm LV Volume Diastolic: 333.5 ml    Root Dimension: 3.2 cm   LV FS:9.8 %       LV Volume Systolic: 847.2 ml   LV PW Diastolic:  LV EDV/LV EDV Index: 191.3 ml/88   1.1 cm            m^2LV ESV/LV ESV Index: 273.7   Septum Diastolic: TZ/90 m^2                        RV Diastolic Dimension:   1 cm              EF Calculated: 24.7 %            3.5 cm                        LV Length: 9.6 cm                LA/Aorta: 1.47      LV Area   Diastolic: 26.9   cm^2   LV Area Systolic:   51.4 cm^2     Doppler Measurements & Calculations      MV Peak E-Wave: 134  AV Peak Velocity: 151 LVOT Peak Velocity: 103 cm/s   cm/s                 cm/s                  LVOT Mean Velocity: 71.7 cm/s   MV Peak A-Wave: 87.5 AV Peak Gradient:     LVOT Peak Gradient: 5 mmHgLVOT   cm/s                 9.12 mmHg             Mean Gradient: 2 mmHg   MV E/A Ratio: 1.53   AV Mean Velocity: 111   MV Peak Gradient:    cm/s   7.18 mmHg            AV Mean Gradient: 7   MV Mean Gradient: 4  mmHg                  TR Velocity:262 cm/s   mmHg                 AV VTI: 39.3 cm       TR Gradient:27.46 mmHg

## 2019-05-13 NOTE — LETTER
[facility]  [address] [place] [address] [place] HO[address]ON[place]O II.[facility] [address]  Phone: [phone]  Fax: [phone]    Sarah Sarabia MD        May 13, 2019     Patient: Jaime Bran   YOB: 1945   Date of Visit: 5/13/2019       To Whom it May Concern:    King Sara was seen in my clinic on 5/13/2019, he was accompanied by his daughter Jose Martin Ramirez. If you have any questions or concerns, please don't hesitate to call.     Sincerely,         Sarah Sarabia MD

## 2019-06-02 NOTE — PROGRESS NOTES
Heart Failure Clinic       Visit Date: 6/3/2019  Cardiologist:  Dr. Lamb Every  Primary Care Physician: Dr. Estella Ly is a 69 y.o. male who presents today for:  No chief complaint on file. HPI:   Gail Atkinson is a 69 y.o. male who presents to the office for a new patient visit in the heart failure clinic. Accompanied by No one  Lives w/ wife. Cause HF:  ICM/NICM (valvular)   HX: Systolic (EF 67-84%), s/p AVR & MV repair & 2V CABG (11/20018), Hx LORENA (no Coumadin), CKD (baseline 1.1-1.4,  Muddada), DM, HTN, HLD    Referred from Dr Lamb Every to start [facility] (if able). Been on OMT w/out improvement. Chronic LIRA. Considering ICD. Hospitalizations r/t HF:  11/2018 (prior to [facility])    Concerns today: None -overall feeling pretty good. Notes some mild fatigue, dyspnea w/ extensive walking. Denies orthopnea, PND, or swelling. Activity: Can complete ADLs, can walk 100+yards w/out rest.  Can do stairs. Walking long periods makes tired. 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 Diet: Avoids salt, wife cooks. Not go out to eat. Patient has:  Chest Pain: No  SOB: No  Difficulty sleeping: No  Orthopnea/PND: No  Edema: No  Fatigue: Good  Abdominal bloating: No  Cough: No  Appetite: Good  Any extra diuretic use: No - last took Lasix 2 months ago  Weight gain: No  Home weight: stable-  w/in 2#  Home blood pressure: 160/70s before pills    Past Medical History:   Diagnosis Date    Acute on chronic systolic CHF (congestive heart failure), NYHA class 3 ([facility]) EF 25-30% 11/7/2018    CAD (coronary artery disease)     CKD (chronic kidney disease) stage 3, GFR 30-59 ml/min ([facility] [address] 9/23/2015    Diabetes mellitus ([facility] [address]     HTN (hypertension), benign     Hyperlipidemia     Hypothyroidism     Mitral and aortic incompetence     Neuropathy     Positive blood culture     Being treated with rocephhin as outpt.     S/P CABG x 2 11/15/2018     Past Surgical (LIPITOR) 40 MG tablet Take 40 mg by mouth daily       levothyroxine (SYNTHROID) 50 MCG tablet Take 50 mcg by mouth Daily      ONE TOUCH ULTRA TEST strip as needed  0     No current facility-administered medications for this visit. Allergies   Allergen Reactions    Heparin Other (See Comments)       SUBJECTIVE:   Review of Systems   Constitutional: Positive for fatigue (mild). Negative for activity change and appetite change. Respiratory: Positive for shortness of breath (LIRA). Negative for cough. Cardiovascular: Negative for chest pain, palpitations and leg swelling. Gastrointestinal: Negative for abdominal distention. Musculoskeletal: Negative for gait problem. Neurological: Negative for weakness, light-headedness and headaches. Hematological: Negative for adenopathy. Psychiatric/Behavioral: Negative for sleep disturbance. OBJECTIVE:   Today's Vitals:  /80   Pulse 64   Ht 6' (1.829 m)   Wt 215 lb (97.5 kg)   SpO2 95%   BMI 29.16 kg/m²     Physical Exam   Constitutional: He is oriented to person, place, and time. He appears well-developed and well-nourished. No distress. HENT:   Head: Normocephalic and atraumatic. Eyes: Conjunctivae are normal.   Neck: Normal range of motion. Neck supple. No JVD   Cardiovascular: Normal rate and regular rhythm. Murmur heard. Pulmonary/Chest: Effort normal and breath sounds normal. No respiratory distress. He has no wheezes. He has no rales. Abdominal: Soft. Bowel sounds are normal. He exhibits no distension. There is no tenderness. Musculoskeletal: Normal range of motion. He exhibits no edema. Neurological: He is alert and oriented to person, place, and time. Coordination normal.   Skin: Skin is warm and dry. He is not diaphoretic. Psychiatric: He has a normal mood and affect. His behavior is normal.   Vitals reviewed.       Wt Readings from Last 3 Encounters:   06/03/19 215 lb (97.5 kg)   05/13/19 212 lb 8 oz (96.4 kg) 11/10/2018       ASSESSMENT AND PLAN:   The patient's condition/symptoms are Stable: No clinical evidence of fluid overload today. Continue current medical regimen without changes at present time. Diagnosis Orders   1. CHF (congestive heart failure), NYHA class II, chronic, systolic (HCC)  Basic Metabolic Panel       Continue:  GDMT:   ACE/ARB/ARNI - Cozaar 25/day    BB - Lopressor 12.5 BID   Diuretic - Lasix PRN   Hydralazine/Isos. - None   Aldosterone- None  Continue Current medications  Check coverage of Entresto - will start low dose BID if covered. Will fax Rx to [place]. Pt to call back w/ phone number (goes to [address])  Check BMP 3 weeks after starting (mild CKD, watch closely)  Discussed ICD again - does not want at this time. Aware of risks of SCA. Greater than 40 minutes has been spent on education of HF symptoms, management, and medication, as well as reviewing chart: labs, ECHO, radiology reports, etc.    New HF Pamphlet given and ZONE sheet reviewed, patient voices understanding. Questions answered. · Daily weights  · Fluid restriction of 2 Liters per day  · Limit sodium in diet to around 4148-2063 mg/day  · Monitor BP  · Activity as tolerated     Patient was instructed to call the [address] for any changes in symptoms as noted in AVS.      Return in about 2 months (around 8/3/2019). or sooner if needed     Patient given educational materials - see patient instructions. We discussed the importance of weighing oneself and recording daily. We also discussed the importance of a low sodium diet, higher sodium foods to avoid and better low sodium food options. Patient verbalizes understanding of plan of care using teach back method, and is agreeable to the treatment plan.        Electronically signed by SRIRAM Hatch CNP on 6/3/2019 at 10:59 AM

## 2019-06-03 ENCOUNTER — OFFICE VISIT (OUTPATIENT)
Dept: CARDIOLOGY CLINIC | Age: 74
End: 2019-06-03
Payer: MEDICARE

## 2019-06-03 VITALS
DIASTOLIC BLOOD PRESSURE: 80 MMHG | WEIGHT: 215 LBS | OXYGEN SATURATION: 95 % | HEART RATE: 64 BPM | HEIGHT: 72 IN | BODY MASS INDEX: 29.12 KG/M2 | SYSTOLIC BLOOD PRESSURE: 136 MMHG

## 2019-06-03 DIAGNOSIS — I50.22 CHF (CONGESTIVE HEART FAILURE), NYHA CLASS II, CHRONIC, SYSTOLIC (HCC): Primary | ICD-10-CM

## 2019-06-03 PROBLEM — D75.829 HIT (HEPARIN-INDUCED THROMBOCYTOPENIA): Status: ACTIVE | Noted: 2018-12-07

## 2019-06-03 PROBLEM — Z95.2 HISTORY OF HEART VALVE REPLACEMENT: Status: ACTIVE | Noted: 2018-12-19

## 2019-06-03 PROCEDURE — G8419 CALC BMI OUT NRM PARAM NOF/U: HCPCS | Performed by: NURSE PRACTITIONER

## 2019-06-03 PROCEDURE — 4040F PNEUMOC VAC/ADMIN/RCVD: CPT | Performed by: NURSE PRACTITIONER

## 2019-06-03 PROCEDURE — 1123F ACP DISCUSS/DSCN MKR DOCD: CPT | Performed by: NURSE PRACTITIONER

## 2019-06-03 PROCEDURE — 3017F COLORECTAL CA SCREEN DOC REV: CPT | Performed by: NURSE PRACTITIONER

## 2019-06-03 PROCEDURE — G8598 ASA/ANTIPLAT THER USED: HCPCS | Performed by: NURSE PRACTITIONER

## 2019-06-03 PROCEDURE — G8427 DOCREV CUR MEDS BY ELIG CLIN: HCPCS | Performed by: NURSE PRACTITIONER

## 2019-06-03 PROCEDURE — 99214 OFFICE O/P EST MOD 30 MIN: CPT | Performed by: NURSE PRACTITIONER

## 2019-06-03 PROCEDURE — 1036F TOBACCO NON-USER: CPT | Performed by: NURSE PRACTITIONER

## 2019-06-03 ASSESSMENT — ENCOUNTER SYMPTOMS
COUGH: 0
SHORTNESS OF BREATH: 1
ABDOMINAL DISTENTION: 0

## 2019-07-11 ENCOUNTER — TELEPHONE (OUTPATIENT)
Dept: CARDIOLOGY CLINIC | Age: 74
End: 2019-07-11

## 2019-07-11 LAB
ANION GAP SERPL CALCULATED.3IONS-SCNC: 12 MMOL/L (ref 4–12)
BUN BLDV-MCNC: 22 MG/DL (ref 5–27)
CALCIUM SERPL-MCNC: 9.4 MG/DL (ref 8.5–10.5)
CHLORIDE BLD-SCNC: 104 MMOL/L (ref 98–109)
CO2: 25 MMOL/L (ref 22–32)
CREAT SERPL-MCNC: 1.28 MG/DL (ref 0.6–1.3)
EGFR AFRICAN AMERICAN: >60 ML/MIN/1.73SQ.M
EGFR IF NONAFRICAN AMERICAN: 55 ML/MIN/1.73SQ.M
GLUCOSE: 123 MG/DL (ref 65–99)
POTASSIUM SERPL-SCNC: 4.5 MMOL/L (ref 3.5–5)
SODIUM BLD-SCNC: 141 MMOL/L (ref 134–146)

## 2019-07-15 NOTE — TELEPHONE ENCOUNTER
Spoke with patient   He never got [place] [facility] pharmacy   Rx has to go through his [facility] provider  Called to patient to find out who his [facility] provider is and phone #   No answer and voicemail not set up

## 2019-08-15 ENCOUNTER — OFFICE VISIT (OUTPATIENT)
Dept: CARDIOLOGY CLINIC | Age: 74
End: 2019-08-15
Payer: MEDICARE

## 2019-08-15 VITALS
SYSTOLIC BLOOD PRESSURE: 136 MMHG | HEART RATE: 54 BPM | DIASTOLIC BLOOD PRESSURE: 78 MMHG | WEIGHT: 220 LBS | HEIGHT: 72 IN | OXYGEN SATURATION: 93 % | BODY MASS INDEX: 29.8 KG/M2

## 2019-08-15 DIAGNOSIS — I50.22 CHF (CONGESTIVE HEART FAILURE), NYHA CLASS II, CHRONIC, SYSTOLIC (HCC): Primary | ICD-10-CM

## 2019-08-15 PROCEDURE — 1123F ACP DISCUSS/DSCN MKR DOCD: CPT | Performed by: NURSE PRACTITIONER

## 2019-08-15 PROCEDURE — G8598 ASA/ANTIPLAT THER USED: HCPCS | Performed by: NURSE PRACTITIONER

## 2019-08-15 PROCEDURE — 4040F PNEUMOC VAC/ADMIN/RCVD: CPT | Performed by: NURSE PRACTITIONER

## 2019-08-15 PROCEDURE — G8427 DOCREV CUR MEDS BY ELIG CLIN: HCPCS | Performed by: NURSE PRACTITIONER

## 2019-08-15 PROCEDURE — G8419 CALC BMI OUT NRM PARAM NOF/U: HCPCS | Performed by: NURSE PRACTITIONER

## 2019-08-15 PROCEDURE — 3017F COLORECTAL CA SCREEN DOC REV: CPT | Performed by: NURSE PRACTITIONER

## 2019-08-15 PROCEDURE — 99213 OFFICE O/P EST LOW 20 MIN: CPT | Performed by: NURSE PRACTITIONER

## 2019-08-15 PROCEDURE — 1036F TOBACCO NON-USER: CPT | Performed by: NURSE PRACTITIONER

## 2019-08-15 ASSESSMENT — ENCOUNTER SYMPTOMS
COUGH: 0
ABDOMINAL DISTENTION: 0
SHORTNESS OF BREATH: 1

## 2019-08-15 NOTE — PROGRESS NOTES
Heart Failure Clinic       Visit Date: 8/15/2019  Cardiologist:  Dr. ZHANG [facility]  Primary Care Physician: Dr. Mavis Strong is a 69 y.o. male who presents today for:  Chief Complaint   Patient presents with    Congestive Heart Failure       HPI:   Magda Medina is a 69 y.o. male who presents to the office for a follow up patient visit in the heart failure clinic. Accompanied by no one  Cause HF:  ICM/NICM (valvular)   HX: Systolic (EF 13-42%), s/p AVR & MV repair & 2V CABG (11/20018), Hx LORENA (no Coumadin), CKD (baseline 1.1-1.4,  Muddada), DM, HTN, HLD    Referred from Dr ZHANG [facility] to start [facility] (if able). Been on OMT w/out improvement. Chronic LIRA. Considering ICD. Hospitalizations r/t HF:  11/2018 (prior to [facility])    Concerns today: overall feeling about same to better in past 4 months, few incidences of SOB, stopped, rest, recovered = out mowing. Does not feel too limited, notes cannot weed-eat anymore  BPs high - PCP increased Lopressor 2 weeks ago  Labs 7/10 - creat 1.28, K 4.5  Activity: Walked in from parking lot, no break.   Can complete ADLs - does not have stairs, thinks he could do w/out SOB  Diet: Watching closer - \"less salt\", \"getting used to it\"    Patient has:  Chest Pain: No  SOB: only if \"really exert self\"  Difficulty sleeping: no - 2 pillows   Orthopnea/PND: No  Edema: No  Fatigue: Same  Abdominal bloating: No  Cough: No  Appetite: Good  Any extra diuretic use: No  Weight gain: No  Home weight: 216-219#  Home blood pressure: 130-160s (prior to pills)    Past Medical History:   Diagnosis Date    Acute on chronic systolic CHF (congestive heart failure), NYHA class 3 (HCC) EF 25-30% 11/7/2018    CAD (coronary artery disease)     CKD (chronic kidney disease) stage 3, GFR 30-59 ml/min ([facility] [address] 9/23/2015    Diabetes mellitus ([facility] [address]     HTN (hypertension), benign     Hyperlipidemia     Hypothyroidism     Mitral and aortic incompetence     Neuropathy     Positive blood culture Being treated with rocephhin as outpt.  S/P CABG x 2 11/15/2018     Past Surgical History:   Procedure Laterality Date    CARDIAC SURGERY      heart cath    CATARACT REMOVAL WITH IMPLANT Bilateral     COLONOSCOPY      CORONARY ARTERY BYPASS GRAFT  11/14/2018    DIAGNOSTIC CARDIAC CATH LAB PROCEDURE      EYE SURGERY      [place] CABG, VEIN, THREE N/A 11/14/2018    CABG CORONARY ARTERY BYPASS,  AORTIC AND MITRAL VALVE REPAIR WITH MICHELE, BALLOON PUMP INSERTION performed by Ramiro Roland MD at [address] ECHO TRANSESOPHAG R-T 2D IMG ACQUISJ I&R ONLY Left 10/25/2018    TRANSESOPHAGEAL ECHOCARDIOGRAM performed by Clifford Lange MD at [facility]     Family History   Problem Relation Age of Onset    Diabetes Mother     Other Mother         chf    Cancer Brother 28        renal    Cancer Brother 46        renal     Social History     Tobacco Use    Smoking status: Passive Smoke Exposure - Never Smoker    Smokeless tobacco: Never Used    Tobacco comment: smoked for 4 years while in the [employer] >40 years ago   Substance Use Topics    Alcohol use: Yes     Alcohol/week: 14.0 standard drinks     Types: 14 Cans of beer per week     Current Outpatient Medications   Medication Sig Dispense Refill    metoprolol tartrate (LOPRESSOR) 25 MG tablet Take 25 mg by mouth 2 times daily      aspirin 81 MG tablet Take 81 mg by mouth daily      gabapentin (NEURONTIN) 400 MG capsule Take 400 mg by mouth 2 times daily.  glimepiride (AMARYL) 1 MG tablet Take 0.5 mg by mouth every morning (before breakfast)       docusate sodium (COLACE) 100 MG capsule Take 100 mg by mouth 2 times daily      losartan (COZAAR) 25 MG tablet Take 1 tablet by mouth daily 90 tablet 3    ferrous sulfate 325 (65 Fe) MG tablet Take 1 tablet by mouth 2 times daily (with meals) 30 tablet 3    nitroGLYCERIN (NITROSTAT) 0.4 MG SL tablet up to max of 3 total doses.  If no relief after 1 dose, call 911. 25 tablet 3    atorvastatin (LIPITOR) 40 MG

## 2019-10-10 ENCOUNTER — TELEPHONE (OUTPATIENT)
Dept: CARDIOLOGY CLINIC | Age: 74
End: 2019-10-10

## 2019-10-28 ENCOUNTER — OFFICE VISIT (OUTPATIENT)
Dept: CARDIOLOGY CLINIC | Age: 74
End: 2019-10-28
Payer: MEDICARE

## 2019-10-28 VITALS
WEIGHT: 210 LBS | HEART RATE: 56 BPM | SYSTOLIC BLOOD PRESSURE: 130 MMHG | OXYGEN SATURATION: 96 % | DIASTOLIC BLOOD PRESSURE: 70 MMHG | HEIGHT: 72 IN | BODY MASS INDEX: 28.44 KG/M2

## 2019-10-28 DIAGNOSIS — I50.22 CHF (CONGESTIVE HEART FAILURE), NYHA CLASS II, CHRONIC, SYSTOLIC (HCC): Primary | ICD-10-CM

## 2019-10-28 LAB
ANION GAP SERPL CALCULATED.3IONS-SCNC: 21 MEQ/L (ref 8–16)
BUN BLDV-MCNC: 18 MG/DL (ref 7–22)
CALCIUM SERPL-MCNC: 9.5 MG/DL (ref 8.5–10.5)
CHLORIDE BLD-SCNC: 102 MEQ/L (ref 98–111)
CO2: 22 MEQ/L (ref 23–33)
CREAT SERPL-MCNC: 1.2 MG/DL (ref 0.4–1.2)
GFR SERPL CREATININE-BSD FRML MDRD: 59 ML/MIN/1.73M2
GLUCOSE BLD-MCNC: 115 MG/DL (ref 70–108)
POTASSIUM SERPL-SCNC: 4.7 MEQ/L (ref 3.5–5.2)
SODIUM BLD-SCNC: 145 MEQ/L (ref 135–145)

## 2019-10-28 PROCEDURE — 4040F PNEUMOC VAC/ADMIN/RCVD: CPT | Performed by: NURSE PRACTITIONER

## 2019-10-28 PROCEDURE — 1036F TOBACCO NON-USER: CPT | Performed by: NURSE PRACTITIONER

## 2019-10-28 PROCEDURE — 99213 OFFICE O/P EST LOW 20 MIN: CPT | Performed by: NURSE PRACTITIONER

## 2019-10-28 PROCEDURE — G8417 CALC BMI ABV UP PARAM F/U: HCPCS | Performed by: NURSE PRACTITIONER

## 2019-10-28 PROCEDURE — 36415 COLL VENOUS BLD VENIPUNCTURE: CPT | Performed by: NURSE PRACTITIONER

## 2019-10-28 PROCEDURE — G8428 CUR MEDS NOT DOCUMENT: HCPCS | Performed by: NURSE PRACTITIONER

## 2019-10-28 PROCEDURE — 1123F ACP DISCUSS/DSCN MKR DOCD: CPT | Performed by: NURSE PRACTITIONER

## 2019-10-28 PROCEDURE — 3017F COLORECTAL CA SCREEN DOC REV: CPT | Performed by: NURSE PRACTITIONER

## 2019-10-28 PROCEDURE — G8484 FLU IMMUNIZE NO ADMIN: HCPCS | Performed by: NURSE PRACTITIONER

## 2019-10-28 PROCEDURE — G8598 ASA/ANTIPLAT THER USED: HCPCS | Performed by: NURSE PRACTITIONER

## 2019-10-28 ASSESSMENT — ENCOUNTER SYMPTOMS
ABDOMINAL DISTENTION: 0
SHORTNESS OF BREATH: 1
COUGH: 0

## 2019-12-03 LAB
ANION GAP SERPL CALCULATED.3IONS-SCNC: 11 MMOL/L (ref 4–12)
BUN BLDV-MCNC: 16 MG/DL (ref 5–27)
CALCIUM SERPL-MCNC: 9.4 MG/DL (ref 8.5–10.5)
CHLORIDE BLD-SCNC: 103 MMOL/L (ref 98–109)
CO2: 26 MMOL/L (ref 22–32)
CREAT SERPL-MCNC: 1.05 MG/DL (ref 0.6–1.3)
EGFR AFRICAN AMERICAN: >60 ML/MIN/1.73SQ.M
EGFR IF NONAFRICAN AMERICAN: >60 ML/MIN/1.73SQ.M
GLUCOSE: 91 MG/DL (ref 65–99)
POTASSIUM SERPL-SCNC: 4.8 MMOL/L (ref 3.5–5)
SODIUM BLD-SCNC: 140 MMOL/L (ref 134–146)

## 2020-01-20 ENCOUNTER — OFFICE VISIT (OUTPATIENT)
Dept: CARDIOLOGY CLINIC | Age: 75
End: 2020-01-20
Payer: MEDICARE

## 2020-01-20 VITALS
OXYGEN SATURATION: 95 % | HEART RATE: 60 BPM | HEIGHT: 72 IN | BODY MASS INDEX: 28.25 KG/M2 | SYSTOLIC BLOOD PRESSURE: 116 MMHG | DIASTOLIC BLOOD PRESSURE: 68 MMHG | WEIGHT: 208.6 LBS

## 2020-01-20 PROCEDURE — G8417 CALC BMI ABV UP PARAM F/U: HCPCS | Performed by: NURSE PRACTITIONER

## 2020-01-20 PROCEDURE — 4040F PNEUMOC VAC/ADMIN/RCVD: CPT | Performed by: NURSE PRACTITIONER

## 2020-01-20 PROCEDURE — G8484 FLU IMMUNIZE NO ADMIN: HCPCS | Performed by: NURSE PRACTITIONER

## 2020-01-20 PROCEDURE — 99213 OFFICE O/P EST LOW 20 MIN: CPT | Performed by: NURSE PRACTITIONER

## 2020-01-20 PROCEDURE — 1123F ACP DISCUSS/DSCN MKR DOCD: CPT | Performed by: NURSE PRACTITIONER

## 2020-01-20 PROCEDURE — 1036F TOBACCO NON-USER: CPT | Performed by: NURSE PRACTITIONER

## 2020-01-20 PROCEDURE — 3017F COLORECTAL CA SCREEN DOC REV: CPT | Performed by: NURSE PRACTITIONER

## 2020-01-20 PROCEDURE — G8427 DOCREV CUR MEDS BY ELIG CLIN: HCPCS | Performed by: NURSE PRACTITIONER

## 2020-01-20 ASSESSMENT — ENCOUNTER SYMPTOMS
ABDOMINAL DISTENTION: 0
COUGH: 0
SHORTNESS OF BREATH: 1

## 2020-01-20 NOTE — PROGRESS NOTES
Heart Failure Clinic       Visit Date: 1/20/2020  Cardiologist:  Dr. Quan Peterson  Primary Care Physician: Dr. Yumiko Underwood is a 69 y.o. male who presents today for:  Chief Complaint   Patient presents with    Congestive Heart Failure       HPI:   Yobany Hodgson is a 69 y.o. male who presents to the office for a follow up patient visit in the heart failure clinic. Accompanied by no one  Lives w/ wife  Cause HF:  ICM/NICM (valvular)   HX: Systolic (EF 88-58%), s/p AVR & MV repair & 2V CABG (11/20018), Hx LORENA (no Coumadin), CKD (baseline 1.1-1.4,  Muddada), DM, HTN, HLD    **Referred from Dr Quan Peterson to start [facility] (if able).  Been on OMT w/out improvement. Chronic LIRA.  Not want ICD  Saw Dr Horatio Hamman at [facility] for clearance for [facility]  Has F/U w/ him next month. Hospitalization r/t Heart Failure:   44/6879 (prior to [facility])    Concerns today: Overall feeling better since last OV in Oct.    Not feel fluid overloaded. Energy level somewhat improved. Breathing slightly improved.    Last ECHO 4/2019 - discussed repeat - he states he still does not want ICD regardless of EF or recommendation   Activity: Walked from back parking lot - no break  Diet: Avoids salt    Patient has:  Chest Pain: No  SOB: No -improving  Difficulty sleeping: bed w/ 2-3 pillows  Orthopnea/PND: No  Edema: No  Fatigue: Improving  Abdominal bloating: No  Cough: No  Appetite: Good  Any extra diuretic use: No  Weight gain: No  Home weight: 207-209#  Home blood pressure: average 130s - few 106, few 160s    Past Medical History:   Diagnosis Date    Acute on chronic systolic CHF (congestive heart failure), NYHA class 3 ([facility]) EF 25-30% 11/7/2018    CAD (coronary artery disease)     CKD (chronic kidney disease) stage 3, GFR 30-59 ml/min ([facility] [address] 9/23/2015    Diabetes mellitus ([facility] [address] [facility]     HTN (hypertension), benign     Hyperlipidemia     Hypothyroidism     Mitral and aortic incompetence     Neuropathy     Positive blood culture Being treated with rocephhin as outpt.  S/P CABG x 2 11/15/2018     Past Surgical History:   Procedure Laterality Date    CARDIAC SURGERY      heart cath    CATARACT REMOVAL WITH IMPLANT Bilateral     COLONOSCOPY      CORONARY ARTERY BYPASS GRAFT  11/14/2018    DIAGNOSTIC CARDIAC CATH LAB PROCEDURE      EYE SURGERY      [place] CABG, VEIN, THREE N/A 11/14/2018    CABG CORONARY ARTERY BYPASS,  AORTIC AND MITRAL VALVE REPAIR WITH MICHELE, BALLOON PUMP INSERTION performed by Trena Chauhan MD at [address] ECHO TRANSESOPHAG R-T 2D IMG ACQUISJ I&R ONLY Left 10/25/2018    TRANSESOPHAGEAL ECHOCARDIOGRAM performed by Hilda Butler MD at [facility]     Family History   Problem Relation Age of Onset    Diabetes Mother     Other Mother         chf    Cancer Brother 28        renal    Cancer Brother 46        renal     Social History     Tobacco Use    Smoking status: Passive Smoke Exposure - Never Smoker    Smokeless tobacco: Never Used    Tobacco comment: smoked for 4 years while in the [employer] >40 years ago   Substance Use Topics    Alcohol use: Yes     Alcohol/week: 14.0 standard drinks     Types: 14 Cans of beer per week     Current Outpatient Medications   Medication Sig Dispense Refill    sacubitril-valsartan (ENTRESTO)  MG per tablet Take 1 tablet by mouth 2 times daily 180 tablet 3    metoprolol tartrate (LOPRESSOR) 25 MG tablet Take 25 mg by mouth 2 times daily      aspirin 81 MG tablet Take 81 mg by mouth daily      gabapentin (NEURONTIN) 400 MG capsule Take 400 mg by mouth 2 times daily.  glimepiride (AMARYL) 1 MG tablet Take 0.5 mg by mouth every morning (before breakfast)       docusate sodium (COLACE) 100 MG capsule Take 100 mg by mouth 2 times daily      ferrous sulfate 325 (65 Fe) MG tablet Take 1 tablet by mouth 2 times daily (with meals) 30 tablet 3    nitroGLYCERIN (NITROSTAT) 0.4 MG SL tablet up to max of 3 total doses.  If no relief after 1 dose, call [phone]. 25 tablet 3 11/10/2018    PROT 7.4 11/10/2018    BILITOT 0.8 11/10/2018    BILITOT NEGATIVE 10/14/2016    BILIDIR <0.2 11/05/2018    LABALBU 3.5 11/10/2018     Magnesium:    Lab Results   Component Value Date    MG 2.5 12/06/2018     PT/INR:    Lab Results   Component Value Date    INR 2.02 12/27/2018     Lipids:    Lab Results   Component Value Date    TRIG 172 11/10/2018    HDL 27 11/10/2018    LDLCALC 70 11/10/2018       ASSESSMENT AND PLAN:   The patient's condition/symptoms are Stable: No clinical evidence of fluid overload today. Continue current medical regimen without changes at present time. Diagnosis Orders   1. CHF (congestive heart failure), NYHA class II, chronic, systolic (HCC)  Basic Metabolic Panel     Continue:  GDMT:   ACE/ARB/ARNI - Entresto 49/51 BID (been on this dose since Oct/2019)   BB - Lopressor 25 BID   Diuretic - None   Hydralazine/Isos. - None   Aldosterone- None  Continue Current medications   Stable. Appears Euvolemic  Labs 12/2 - stable. Recommend increasing Entresto to 97/103 BID  Repeat labs 3 weeks later. Monitor BP closely - if running low call clinic (consider decreasing Lopressor if so)  Does not want ICD thus see no reason for repeat ECHO at this time, will continue to optimize meds. F/U [facility] Sever in May  PCP -  [facility] and Dr Geraldo Burnett  F/U [place] [facility] Dr. Geneva Yañez on 2/4 - (Rx for [facility] sent w/ Flaco Lawson)    · Daily weights  · Fluid restriction of 2 Liters per day  · Limit sodium in diet to around 9697-3746 mg/day  · Monitor BP  · Activity as tolerated     Patient was instructed to call the [address] for any changes in symptoms as noted in AVS.      Return in about 6 months (around 7/20/2020). or sooner if needed     Patient given educational materials - see patient instructions. We discussed the importance of weighing oneself and recording daily. We also discussed the importance of a low sodium diet, higher sodium foods to avoid and better low sodium food options. Patient verbalizes understanding of plan of care using teach back method, and is agreeable to the treatment plan.        Electronically signed by SRIRAM Reyes CNP on 1/20/2020 at 11:24 AM

## 2020-02-07 NOTE — PROGRESS NOTES
Progress note: Infectious diseases    Patient - Valeria Pollard,  Age - 72 y.o.    - 1945      Room Number - 3B-36/036-A   N -  [id number]   Acct # - [de-identified]  Date of Admission -  10/21/2018  4:57 PM    SUBJECTIVE:   No new issues. MICHELE was negative for vegetation. Repeat Blood cx is negative. OBJECTIVE   VITALS    height is 6' (1.829 m) and weight is 211 lb 11.2 oz (96 kg). His oral temperature is 98.4 °F (36.9 °C). His blood pressure is 100/57 (abnormal) and his pulse is 109. His respiration is 19 and oxygen saturation is 94%.        Wt Readings from Last 3 Encounters:   10/26/18 211 lb 11.2 oz (96 kg)   17 215 lb (97.5 kg)   10/26/16 217 lb 9.6 oz (98.7 kg)       I/O (24 Hours)    Intake/Output Summary (Last 24 hours) at 10/26/18 5773  Last data filed at 10/26/18 5716   Gross per 24 hour   Intake              770 ml   Output              410 ml   Net              360 ml       General Appearance  Awake, alert, oriented,     HEENT - normocephalic, atraumatic, pink conjunctiva,  anicteric sclera, extensive dental caries  Neck - Supple, no mass  Lungs -  Bilateral   air entry, no rhonchi, no wheeze  Cardiovascular - Heart sounds are normal. Systolic murmur at the base and regurgitant murmur at the apex  Abdomen - soft, not distended, nontender,   Neurologic -oriented  Skin - No bruising or bleeding  Extremities - No edema, no cyanosis, clubbing     MEDICATIONS:      lisinopril  10 mg Oral Daily    clopidogrel  75 mg Oral Daily    lidocaine 1 % injection  5 mL Intradermal Once    sodium chloride flush  10 mL Intravenous 2 times per day    cefTRIAXone (ROCEPHIN) IV  1 g Intravenous Q12H    enoxaparin  1 mg/kg Subcutaneous Q12H    chlorhexidine  15 mL Mouth/Throat BID    insulin lispro  0-12 Units Subcutaneous TID WC    insulin lispro  0-6 Units Subcutaneous Nightly    aspirin  81 mg Oral Daily    vitamin hours.    Invalid input(s): CRYSTALS  Micro:   Lab Results   Component Value Date    BC No growth-preliminary 10/23/2018         IMAGING:         Problem list of patient:     Patient Active Problem List   Diagnosis Code    CKD (chronic kidney disease) stage 3, GFR 30-59 ml/min ([facility]) N18.3    HTN (hypertension), benign I10    Vitamin D deficiency E55.9    MISA (acute kidney injury) ([facility] [address] N17.9    CKD (chronic kidney disease) stage 2, GFR 60-89 ml/min N18.2    Renal cyst N28.1    ACS (acute coronary syndrome) ([facility]) I24.9    Cardiogenic shock ([facility] [address] R57.0    Aortic stenosis with mitral and aortic insufficiency I08.0         ASSESSMENT/PLAN   Sepsis due to Streptotoccus  CKD  ACS with low EF: will need cardiac catheterization  [facility] planning for surgery on  Tuesday  MICHELE negative for t endocardites  Continue iv antibiotic  Need dental extraction if possible prior to surgery due to extensive dental caries and subsequent risk of infection    Jorge Nicolas MD, FACP 10/26/2018 8:12 AM family/DAUGHTER, [place]

## 2020-03-03 ENCOUNTER — TELEPHONE (OUTPATIENT)
Dept: CARDIOLOGY CLINIC | Age: 75
End: 2020-03-03

## 2020-03-03 LAB
ANION GAP SERPL CALCULATED.3IONS-SCNC: 11 MMOL/L (ref 5–15)
BUN BLDV-MCNC: 28 MG/DL (ref 5–27)
CALCIUM SERPL-MCNC: 8.8 MG/DL (ref 8.5–10.5)
CHLORIDE BLD-SCNC: 105 MMOL/L (ref 98–109)
CO2: 25 MMOL/L (ref 22–32)
CREAT SERPL-MCNC: 1.32 MG/DL (ref 0.6–1.3)
EGFR AFRICAN AMERICAN: >60 ML/MIN/1.73SQ.M
EGFR IF NONAFRICAN AMERICAN: 53 ML/MIN/1.73SQ.M
GLUCOSE: 109 MG/DL (ref 65–99)
POTASSIUM SERPL-SCNC: 4.6 MMOL/L (ref 3.5–5)
SODIUM BLD-SCNC: 141 MMOL/L (ref 134–146)

## 2020-03-03 NOTE — TELEPHONE ENCOUNTER
BPs look good - ensure drinking enough fluids t/o day. Lets continue Entresto and recheck BMP in 2 weeks.

## 2020-03-03 NOTE — TELEPHONE ENCOUNTER
----- Message from SRIRAM Sims CNP sent at 3/3/2020  8:22 AM EST -----  Apolonia Stabs was increased in Jan - call and check how BPs are - how hes feeling on increased dose. Creatinine bumped slightly = does he have any lower dose Entresto?

## 2020-03-24 LAB
ANION GAP SERPL CALCULATED.3IONS-SCNC: 9 MMOL/L (ref 5–15)
BUN BLDV-MCNC: 21 MG/DL (ref 5–27)
CALCIUM SERPL-MCNC: 9.3 MG/DL (ref 8.5–10.5)
CHLORIDE BLD-SCNC: 103 MMOL/L (ref 98–109)
CO2: 27 MMOL/L (ref 22–32)
CREAT SERPL-MCNC: 1.31 MG/DL (ref 0.6–1.3)
EGFR AFRICAN AMERICAN: >60 ML/MIN/1.73SQ.M
EGFR IF NONAFRICAN AMERICAN: 54 ML/MIN/1.73SQ.M
GLUCOSE: 108 MG/DL (ref 65–99)
POTASSIUM SERPL-SCNC: 4.8 MMOL/L (ref 3.5–5)
SODIUM BLD-SCNC: 139 MMOL/L (ref 134–146)

## 2020-03-25 PROBLEM — E44.0 MODERATE MALNUTRITION (HCC): Status: RESOLVED | Noted: 2018-11-06 | Resolved: 2020-03-24

## 2020-05-27 LAB
BUN BLDV-MCNC: 19 MG/DL
CALCIUM SERPL-MCNC: 9.1 MG/DL
CHLORIDE BLD-SCNC: 106 MMOL/L
CO2: 22 MMOL/L
CREAT SERPL-MCNC: 1.2 MG/DL
GFR CALCULATED: 59
GLUCOSE BLD-MCNC: 113 MG/DL
POTASSIUM SERPL-SCNC: 5.1 MMOL/L
SODIUM BLD-SCNC: 139 MMOL/L

## 2020-06-03 ENCOUNTER — OFFICE VISIT (OUTPATIENT)
Dept: NEPHROLOGY | Age: 75
End: 2020-06-03
Payer: MEDICARE

## 2020-06-03 VITALS
BODY MASS INDEX: 29.24 KG/M2 | SYSTOLIC BLOOD PRESSURE: 130 MMHG | DIASTOLIC BLOOD PRESSURE: 72 MMHG | HEART RATE: 54 BPM | OXYGEN SATURATION: 98 % | WEIGHT: 215.6 LBS

## 2020-06-03 PROCEDURE — 1123F ACP DISCUSS/DSCN MKR DOCD: CPT | Performed by: INTERNAL MEDICINE

## 2020-06-03 PROCEDURE — 4040F PNEUMOC VAC/ADMIN/RCVD: CPT | Performed by: INTERNAL MEDICINE

## 2020-06-03 PROCEDURE — 3017F COLORECTAL CA SCREEN DOC REV: CPT | Performed by: INTERNAL MEDICINE

## 2020-06-03 PROCEDURE — G8417 CALC BMI ABV UP PARAM F/U: HCPCS | Performed by: INTERNAL MEDICINE

## 2020-06-03 PROCEDURE — 99213 OFFICE O/P EST LOW 20 MIN: CPT | Performed by: INTERNAL MEDICINE

## 2020-06-03 PROCEDURE — G8427 DOCREV CUR MEDS BY ELIG CLIN: HCPCS | Performed by: INTERNAL MEDICINE

## 2020-06-03 PROCEDURE — 1036F TOBACCO NON-USER: CPT | Performed by: INTERNAL MEDICINE

## 2020-06-03 NOTE — PROGRESS NOTES
aspirin 81 MG tablet Take 81 mg by mouth daily      gabapentin (NEURONTIN) 400 MG capsule Take 400 mg by mouth 2 times daily.  glimepiride (AMARYL) 1 MG tablet Take 0.5 mg by mouth every morning (before breakfast)       docusate sodium (COLACE) 100 MG capsule Take 100 mg by mouth 2 times daily      ferrous sulfate 325 (65 Fe) MG tablet Take 1 tablet by mouth 2 times daily (with meals) 30 tablet 3    nitroGLYCERIN (NITROSTAT) 0.4 MG SL tablet up to max of 3 total doses. If no relief after 1 dose, call 911. 25 tablet 3    atorvastatin (LIPITOR) 40 MG tablet Take 40 mg by mouth daily       levothyroxine (SYNTHROID) 50 MCG tablet Take 50 mcg by mouth Daily      ONE TOUCH ULTRA TEST strip as needed  0       Vitals     /72 (Site: Left Upper Arm, Position: Sitting, Cuff Size: Medium Adult)   Pulse 54   Wt 215 lb 9.6 oz (97.8 kg)   SpO2 98%   BMI 29.24 kg/m²  Wt Readings from Last 3 Encounters:   06/03/20 215 lb 9.6 oz (97.8 kg)   01/20/20 208 lb 9.6 oz (94.6 kg)   10/28/19 210 lb (95.3 kg)        Physical Exam     General -- well developed and well nourishes  Lungs -- clear  Heart -- S1, S2 heard, JVD - no  Abdomen - soft, non-tender  Extremities -- no edema  CNS - awake and alert    Labs, Radiology and Tests    Labs -    4/16 10/16 4/17 4/18 2/19 4/19 5/20     Potassium 4 4.0 4.4 4.6 5.2 4.4 5.1     BUN 24 13 15 20 29 19 19     Creatinine 1.3 0.9 1.0 1.0 1.7 1.10 1.20     eGFR 55 84 74 75 40 > 60 59                 UPCR  <100  163  250 250     [facility]                          Renal Ultrasound Scan -- 10/2015  Right kidney 11.6 cm and left kidney 11.6 cm   4.1 cm cyst on the right kidney, bilateral renal cysts      Assessment      1. Renal  -- patient renal function significantly improved from previous visit. As per MDRD his chronic kidney disease stage III.      - Overall creatinine appears to be at baseline      2. Essential Hypertension -- running  well. Continue current meds.   3. Diabetes mellitus with neuropathy and retinopathy apparently reasonably controlled. 4. Renal cyst significant in size 4.1 cm. Repeat US scan next visit. Does not want to have it done now  5. Avoid Nephrotoxins, Nonsteroidal anti-inflammatories and IV Contrast Dye  6. H/O CHF -stable on on diuretics   7. Follow up in 12 months      Tests and orders placed this Encounter     Orders Placed This Encounter   Procedures    Basic Metabolic Panel    Microalbumin / Creatinine Urine Ratio    Protein / creatinine ratio, urine    Urinalysis with Microscopic       Misty Bacon M.D  [facility].

## 2020-07-09 ENCOUNTER — OFFICE VISIT (OUTPATIENT)
Dept: CARDIOLOGY CLINIC | Age: 75
End: 2020-07-09
Payer: MEDICARE

## 2020-07-09 VITALS
HEART RATE: 51 BPM | DIASTOLIC BLOOD PRESSURE: 65 MMHG | BODY MASS INDEX: 29.12 KG/M2 | WEIGHT: 215 LBS | HEIGHT: 72 IN | SYSTOLIC BLOOD PRESSURE: 139 MMHG

## 2020-07-09 PROCEDURE — 93000 ELECTROCARDIOGRAM COMPLETE: CPT | Performed by: NURSE PRACTITIONER

## 2020-07-09 PROCEDURE — 99213 OFFICE O/P EST LOW 20 MIN: CPT | Performed by: NURSE PRACTITIONER

## 2020-07-09 PROCEDURE — G8417 CALC BMI ABV UP PARAM F/U: HCPCS | Performed by: NURSE PRACTITIONER

## 2020-07-09 PROCEDURE — 3017F COLORECTAL CA SCREEN DOC REV: CPT | Performed by: NURSE PRACTITIONER

## 2020-07-09 PROCEDURE — G8427 DOCREV CUR MEDS BY ELIG CLIN: HCPCS | Performed by: NURSE PRACTITIONER

## 2020-07-09 PROCEDURE — 4040F PNEUMOC VAC/ADMIN/RCVD: CPT | Performed by: NURSE PRACTITIONER

## 2020-07-09 PROCEDURE — 1036F TOBACCO NON-USER: CPT | Performed by: NURSE PRACTITIONER

## 2020-07-09 PROCEDURE — 1123F ACP DISCUSS/DSCN MKR DOCD: CPT | Performed by: NURSE PRACTITIONER

## 2020-07-09 NOTE — PROGRESS NOTES
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 Reports doing well. Denies chest pain, palpitations, MIKE, lightheadedness, dizziness or syncope. Denies orthopnea or PND, sleeping as usual. Has minimal sob with exertion that is at his baseline. Weight has been stable. Following fluid and sodium restriction. Cardiologist:  Dr. Kate Burch:   No fever, no chills, No fatigue or weight loss  Pulmonary:    No dyspnea, no wheezing  Cardiac:    Denies recent chest pain   GI:     No nausea or vomiting, no abdominal pain  Neuro:    No dizziness or light headedness  Musculoskeletal:  No recent active issues  Extremities:   No edema, good peripheral pulses      Past Medical History:   Diagnosis Date    Acute on chronic systolic CHF (congestive heart failure), NYHA class 3 ([facility]) EF 25-30% 11/7/2018    CAD (coronary artery disease)     CKD (chronic kidney disease) stage 3, GFR 30-59 ml/min ([facility] [address] 9/23/2015    Diabetes mellitus ([facility] [address]     HTN (hypertension), benign     Hyperlipidemia     Hypothyroidism     Mitral and aortic incompetence     Neuropathy     Positive blood culture     Being treated with rocephhin as outpt.     S/P CABG x 2 11/15/2018       Allergies   Allergen Reactions    Heparin Other (See Comments)       Current Outpatient Medications   Medication Sig Dispense Refill    sacubitril-valsartan (ENTRESTO)  MG per tablet Take 1 tablet by mouth 2 times daily 180 tablet 3    metoprolol tartrate (LOPRESSOR) 25 MG tablet Take 12.5 mg by mouth 2 times daily       aspirin 81 MG tablet Take 81 mg by mouth daily      gabapentin (NEURONTIN) 400 MG capsule VA  Continue with CHF recommendations and f/u as scheduled     Discussed use, benefit, and side effects of prescribed medications. All patient questions answered. Pt voiced understanding. Instructed to continue current medications, diet and exercise. Continue risk factor modification and medical management. Patient agreed with treatment plan. Follow up as directed.     Continue Dr Rosalva Peter current treatment plan  Follow up with Dr Claudio Borden in one year as scheduled or sooner if needed

## 2020-07-27 ENCOUNTER — OFFICE VISIT (OUTPATIENT)
Dept: CARDIOLOGY CLINIC | Age: 75
End: 2020-07-27
Payer: MEDICARE

## 2020-07-27 VITALS
OXYGEN SATURATION: 97 % | DIASTOLIC BLOOD PRESSURE: 77 MMHG | WEIGHT: 216 LBS | BODY MASS INDEX: 29.26 KG/M2 | HEIGHT: 72 IN | HEART RATE: 53 BPM | SYSTOLIC BLOOD PRESSURE: 151 MMHG

## 2020-07-27 PROCEDURE — 3017F COLORECTAL CA SCREEN DOC REV: CPT | Performed by: NURSE PRACTITIONER

## 2020-07-27 PROCEDURE — 1036F TOBACCO NON-USER: CPT | Performed by: NURSE PRACTITIONER

## 2020-07-27 PROCEDURE — 99213 OFFICE O/P EST LOW 20 MIN: CPT | Performed by: NURSE PRACTITIONER

## 2020-07-27 PROCEDURE — 1123F ACP DISCUSS/DSCN MKR DOCD: CPT | Performed by: NURSE PRACTITIONER

## 2020-07-27 PROCEDURE — G8427 DOCREV CUR MEDS BY ELIG CLIN: HCPCS | Performed by: NURSE PRACTITIONER

## 2020-07-27 PROCEDURE — 4040F PNEUMOC VAC/ADMIN/RCVD: CPT | Performed by: NURSE PRACTITIONER

## 2020-07-27 PROCEDURE — G8417 CALC BMI ABV UP PARAM F/U: HCPCS | Performed by: NURSE PRACTITIONER

## 2020-07-27 ASSESSMENT — ENCOUNTER SYMPTOMS
COUGH: 0
SHORTNESS OF BREATH: 1
ABDOMINAL DISTENTION: 0

## 2020-07-27 NOTE — PROGRESS NOTES
Heart Failure Clinic       Visit Date: 7/27/2020  Cardiologist:  Dr. Valarie Jacob  Primary Care Physician: Dr. Brianna Tovar is a 70 y.o. male who presents today for:  Chief Complaint   Patient presents with    Congestive Heart Failure     check up       HPI:   Clara Atkins is a 70 y.o. male who presents to the office for a follow up patient visit in the heart failure clinic. Accompanied by no one  Lives w/ wife    Cause HF: Systolic (EF 26-11%), ICM/NICM (valvular)   Device:  No - pt refused  HX:  s/p AVR & MV repair & 2V CABG (11/20018), Hx LORENA (no Coumadin), CKD (baseline 1.1-1.4,  LGFVRHU), DM, HTN, HLD    Hospitalization: none for CHF  Referred from Valarie Jacob 6/2019 for [facility] = med management. Saw Dr Karen Calles last week - no med changes - told to lose few pounds, HDL too low    Concerns today: feeling good overall, no worse/no better. Does not feel \"too\" limited. Does not feel fluid overloaded. Can complete ADLs, can walk moderate distances, grocery shops w/out issues. Walked from parking lot today = little winded, hot/humid. BP slight elevated today - states runs 130-140 at home  Diet: watches salt and fluid    Patient has:  Chest Pain: No  SOB: mild LIRA, strenuous  Orthopnea/PND: No BENTON: no  Edema: No  Fatigue: No  Abdominal bloating: No  Cough: No  Appetite: Good  Any extra diuretic use: No  Weight gain: up few #  Home weight: up 8# in 6 months  Home blood pressure: at home 130-140s, HR 50s    Past Medical History:   Diagnosis Date    Acute on chronic systolic CHF (congestive heart failure), NYHA class 3 ([facility]) EF 25-30% 11/7/2018    CAD (coronary artery disease)     CKD (chronic kidney disease) stage 3, GFR 30-59 ml/min ([facility] [address] 9/23/2015    Diabetes mellitus ([facility] [address]     HTN (hypertension), benign     Hyperlipidemia     Hypothyroidism     Mitral and aortic incompetence     Neuropathy     Positive blood culture     Being treated with rocephhin as outpt.     S/P CABG x 2 11/15/2018     Past Surgical History:   Procedure Laterality Date    CARDIAC SURGERY      heart cath    CATARACT REMOVAL WITH IMPLANT Bilateral     COLONOSCOPY      CORONARY ARTERY BYPASS GRAFT  11/14/2018    DIAGNOSTIC CARDIAC CATH LAB PROCEDURE      EYE SURGERY      IN CABG, VEIN, THREE N/A 11/14/2018    CABG CORONARY ARTERY BYPASS,  AORTIC AND MITRAL VALVE REPAIR WITH MICHELE, BALLOON PUMP INSERTION performed by Angelo García MD at [address] ECHO TRANSESOPHAG R-T 2D IMG ACQUISJ I&R ONLY Left 10/25/2018    TRANSESOPHAGEAL ECHOCARDIOGRAM performed by Lit Funes MD at [facility]     Family History   Problem Relation Age of Onset    Diabetes Mother     Other Mother         chf    Cancer Brother 28        renal    Cancer Brother 46        renal     Social History     Tobacco Use    Smoking status: Passive Smoke Exposure - Never Smoker    Smokeless tobacco: Never Used    Tobacco comment: smoked for 4 years while in the [employer] >40 years ago   Substance Use Topics    Alcohol use: Yes     Alcohol/week: 14.0 standard drinks     Types: 14 Cans of beer per week     Current Outpatient Medications   Medication Sig Dispense Refill    sacubitril-valsartan (ENTRESTO)  MG per tablet Take 1 tablet by mouth 2 times daily 180 tablet 3    metoprolol tartrate (LOPRESSOR) 25 MG tablet Take 12.5 mg by mouth 2 times daily       aspirin 81 MG tablet Take 81 mg by mouth daily      gabapentin (NEURONTIN) 400 MG capsule Take 400 mg by mouth 2 times daily.  glimepiride (AMARYL) 1 MG tablet Take 0.5 mg by mouth every morning (before breakfast)       docusate sodium (COLACE) 100 MG capsule Take 100 mg by mouth as needed       ferrous sulfate 325 (65 Fe) MG tablet Take 1 tablet by mouth 2 times daily (with meals) 30 tablet 3    nitroGLYCERIN (NITROSTAT) 0.4 MG SL tablet up to max of 3 total doses.  If no relief after 1 dose, call 911. 25 tablet 3    atorvastatin (LIPITOR) 40 MG tablet Take 40 mg by mouth daily       levothyroxine (SYNTHROID) 50 MCG tablet Take 50 mcg by mouth Daily      ONE TOUCH ULTRA TEST strip as needed  0     No current facility-administered medications for this visit. Allergies   Allergen Reactions    Heparin Other (See Comments)       SUBJECTIVE:   Review of Systems   Constitutional: Negative for activity change, appetite change and fatigue. Respiratory: Positive for shortness of breath (LIRA at times). Negative for cough. Cardiovascular: Negative for chest pain, palpitations and leg swelling. Gastrointestinal: Negative for abdominal distention. Musculoskeletal: Positive for gait problem (Cane). Neurological: Negative for weakness, light-headedness and headaches. Hematological: Negative for adenopathy. Psychiatric/Behavioral: Negative for sleep disturbance. OBJECTIVE:   Today's Vitals:  BP (!) 151/77   Pulse 53   Ht 6' (1.829 m)   Wt 216 lb (98 kg)   SpO2 97%   BMI 29.29 kg/m²     Physical Exam  Vitals signs reviewed. Constitutional:       General: He is not in acute distress. Appearance: Normal appearance. He is well-developed. He is not diaphoretic. HENT:      Head: Normocephalic and atraumatic. Eyes:      Conjunctiva/sclera: Conjunctivae normal.   Neck:      Musculoskeletal: Normal range of motion and neck supple. Comments: No JVD  Cardiovascular:      Rate and Rhythm: Normal rate and regular rhythm. Heart sounds: Normal heart sounds. No murmur. Pulmonary:      Effort: Pulmonary effort is normal. No respiratory distress. Breath sounds: Normal breath sounds. No wheezing or rales. Abdominal:      General: Bowel sounds are normal. There is no distension. Palpations: Abdomen is soft. Tenderness: There is no abdominal tenderness. Musculoskeletal: Normal range of motion. Right lower leg: No edema. Left lower leg: No edema. Skin:     General: Skin is warm and dry.       Capillary Refill: Capillary refill takes less than 2 seconds. Neurological:      Mental Status: He is alert and oriented to person, place, and time. Coordination: Coordination normal.   Psychiatric:         Behavior: Behavior normal.         Wt Readings from Last 3 Encounters:   07/27/20 216 lb (98 kg)   07/09/20 215 lb (97.5 kg)   06/03/20 215 lb 9.6 oz (97.8 kg)     BP Readings from Last 3 Encounters:   07/27/20 (!) 151/77   07/09/20 139/65   06/03/20 130/72     Pulse Readings from Last 3 Encounters:   07/27/20 53   07/09/20 51   06/03/20 54     Body mass index is 29.29 kg/m². ECHO:       Summary   Technically difficult examination.   Ejection fraction was estimated at 20-25%.   There was severe global hypokinesis.   s/p AVR.   Transaortic velocity was within the normal range with no evidence of   aortic stenosis (mean gradient 6-8 mmHg). There was no evidence of aortic   regurgitation.   S/p MVR.   The transmitral velocity was within the normal range with no evidence for   mitral stenosis (mean graident 4 mmHg).  There was trace mitral   regurgitation.      Signature      ----------------------------------------------------------------   Electronically signed by Paola Acosta MD (Interpreting   [email or web address]) on 04/05/2019 at 11:51 AM   ----------------------------------------------------------------    CATH/STRESS:   [facility] 11/2018 - none since      Results reviewed:  BNP: No results found for: BNP  CBC:   Lab Results   Component Value Date    WBC 2.2 12/06/2018    RBC 2.81 12/06/2018    RBC 0-5 10/14/2016    HGB 8.1 12/06/2018    HCT 26.1 12/06/2018     12/06/2018     CMP:    Lab Results   Component Value Date     05/27/2020    K 5.1 05/27/2020    K 4.4 11/14/2018     05/27/2020    CO2 22 05/27/2020    BUN 19 05/27/2020    CREATININE 1.20 05/27/2020    LABGLOM 59 05/27/2020    LABGLOM 59 10/28/2019    GLUCOSE 113 05/27/2020    GLUCOSE 108 03/23/2020    CALCIUM 9.1 05/27/2020     Hepatic Function Panel:    Lab Results   Component Value Date    ALKPHOS 85 11/10/2018    ALT 19 11/10/2018    AST 26 11/10/2018    PROT 7.4 11/10/2018    BILITOT 0.8 11/10/2018    BILITOT NEGATIVE 10/14/2016    BILIDIR <0.2 11/05/2018    LABALBU 3.5 11/10/2018     Magnesium:    Lab Results   Component Value Date    MG 2.5 12/06/2018     PT/INR:    Lab Results   Component Value Date    INR 2.02 12/27/2018     Lipids:    Lab Results   Component Value Date    TRIG 172 11/10/2018    HDL 27 11/10/2018    LDLCALC 70 11/10/2018       ASSESSMENT AND PLAN:   The patient's condition/symptoms are Stable: No clinical evidence of fluid overload today. Continue current medical regimen without changes at present time. Diagnosis Orders   1. CHF (congestive heart failure), NYHA class II, chronic, systolic (HCC)  Basic Metabolic Panel   2. HTN (hypertension), benign     3. Coronary artery disease involving native coronary artery of native heart without angina pectoris       Continue:  GDMT:   ACE/ARB/ARNI - Entresto 97/103 BID   BB - Lopressor 12.5 BID - consider changing Toprol, [facility] [place] [facility] [employer]. Diuretic - None   Hydralazine/Isos. - None   Aldosterone- None  Continue Current medications  Stable. Appears Euvolemic. Labs stable  Repeat BMP in Nov - routine. No med changes today  Increase activity as able. Recommend wt loss. BP elevated today - runs 130-140 at home. Monitor, call if trending>140. Continues to refuse ICD - understands risks. F/U in 6 months  F/U [facility] next July    · Daily weights  · Fluid restriction of 2 Liters per day  · Limit sodium in diet to around 3379-8998 mg/day  · Monitor BP  · Activity as tolerated     Patient was instructed to call the [address] for any changes in symptoms as noted in AVS.      Return in about 6 months (around 1/27/2021). or sooner if needed     Patient given educational materials - see patient instructions. We discussed the importance of weighing oneself and recording daily.  We also discussed the importance of a low sodium diet, higher sodium foods to avoid and better low sodium food options. Patient verbalizes understanding of plan of care using teach back method, and is agreeable to the treatment plan.        Electronically signed by SRIRAM Mast CNP on 7/27/2020 at 11:30 AM

## 2020-07-27 NOTE — PATIENT INSTRUCTIONS
You may receive a survey regarding the care you received during your visit. Your input is valuable to us. We encourage you to complete and return your survey. We hope you will choose us in the future for your healthcare needs.     Continue:  · Continue current medications  · Daily weights and record  · Fluid restriction of 2 Liters per day  · Limit sodium in diet to around 9680-2843 mg/day  · Monitor BP  · Activity as tolerated     Call the Heart Failure Clinic for any of the following symptoms: [phone]   Weight gain of 2-3 pounds in 1 day or 5 pounds in 1 week   Increased shortness of breath   Shortness of breath while laying down   Cough   Chest pain   Swelling in feet, ankles or legs   Tenderness or bloating in the abdomen   Fatigue    Decreased appetite or nausea    Confusion         Get blood work in 5 months - November sometime

## 2020-11-12 ENCOUNTER — TELEPHONE (OUTPATIENT)
Dept: CARDIOLOGY CLINIC | Age: 75
End: 2020-11-12

## 2020-11-12 LAB
ANION GAP SERPL CALCULATED.3IONS-SCNC: 14 MMOL/L (ref 5–15)
BUN BLDV-MCNC: 17 MG/DL (ref 5–27)
CALCIUM SERPL-MCNC: 9 MG/DL (ref 8.5–10.5)
CHLORIDE BLD-SCNC: 101 MMOL/L (ref 98–109)
CO2: 21 MMOL/L (ref 22–32)
CREAT SERPL-MCNC: 1.32 MG/DL (ref 0.6–1.3)
EGFR AFRICAN AMERICAN: >60 ML/MIN/1.73SQ.M
EGFR IF NONAFRICAN AMERICAN: 53 ML/MIN/1.73SQ.M
GLUCOSE: 128 MG/DL (ref 65–99)
POTASSIUM SERPL-SCNC: 4.3 MMOL/L (ref 3.5–5)
SODIUM BLD-SCNC: 136 MMOL/L (ref 134–146)

## 2020-11-12 NOTE — TELEPHONE ENCOUNTER
----- Message from SRIRAM Hess CNP sent at 11/12/2020  2:21 PM EST -----  Let him know labs look good. No med changes.   thanks

## 2021-01-11 ENCOUNTER — OFFICE VISIT (OUTPATIENT)
Dept: CARDIOLOGY CLINIC | Age: 76
End: 2021-01-11
Payer: MEDICARE

## 2021-01-11 VITALS
SYSTOLIC BLOOD PRESSURE: 140 MMHG | BODY MASS INDEX: 29.17 KG/M2 | WEIGHT: 215.4 LBS | DIASTOLIC BLOOD PRESSURE: 70 MMHG | HEIGHT: 72 IN | HEART RATE: 64 BPM | OXYGEN SATURATION: 94 %

## 2021-01-11 DIAGNOSIS — I25.10 CORONARY ARTERY DISEASE INVOLVING NATIVE CORONARY ARTERY OF NATIVE HEART WITHOUT ANGINA PECTORIS: ICD-10-CM

## 2021-01-11 DIAGNOSIS — I50.22 CHF (CONGESTIVE HEART FAILURE), NYHA CLASS II, CHRONIC, SYSTOLIC (HCC): Primary | ICD-10-CM

## 2021-01-11 DIAGNOSIS — I10 HTN (HYPERTENSION), BENIGN: ICD-10-CM

## 2021-01-11 PROBLEM — E11.9 DIABETES MELLITUS TYPE 2, UNCOMPLICATED (HCC): Status: ACTIVE | Noted: 2021-01-11

## 2021-01-11 PROCEDURE — 1123F ACP DISCUSS/DSCN MKR DOCD: CPT | Performed by: NURSE PRACTITIONER

## 2021-01-11 PROCEDURE — G8427 DOCREV CUR MEDS BY ELIG CLIN: HCPCS | Performed by: NURSE PRACTITIONER

## 2021-01-11 PROCEDURE — 4040F PNEUMOC VAC/ADMIN/RCVD: CPT | Performed by: NURSE PRACTITIONER

## 2021-01-11 PROCEDURE — G8417 CALC BMI ABV UP PARAM F/U: HCPCS | Performed by: NURSE PRACTITIONER

## 2021-01-11 PROCEDURE — 1036F TOBACCO NON-USER: CPT | Performed by: NURSE PRACTITIONER

## 2021-01-11 PROCEDURE — 99214 OFFICE O/P EST MOD 30 MIN: CPT | Performed by: NURSE PRACTITIONER

## 2021-01-11 PROCEDURE — G8484 FLU IMMUNIZE NO ADMIN: HCPCS | Performed by: NURSE PRACTITIONER

## 2021-01-11 PROCEDURE — 3017F COLORECTAL CA SCREEN DOC REV: CPT | Performed by: NURSE PRACTITIONER

## 2021-01-11 RX ORDER — METOPROLOL SUCCINATE 50 MG/1
50 TABLET, EXTENDED RELEASE ORAL DAILY
Qty: 90 TABLET | Refills: 3 | Status: SHIPPED | OUTPATIENT
Start: 2021-01-11

## 2021-01-11 RX ORDER — FUROSEMIDE 20 MG/1
20 TABLET ORAL DAILY PRN
Qty: 30 TABLET | Refills: 1 | Status: SHIPPED | OUTPATIENT
Start: 2021-01-11 | End: 2022-02-08 | Stop reason: SDUPTHER

## 2021-01-11 ASSESSMENT — ENCOUNTER SYMPTOMS
ABDOMINAL DISTENTION: 0
SHORTNESS OF BREATH: 1
COUGH: 0

## 2021-01-11 NOTE — PROGRESS NOTES
Heart Failure Clinic       Visit Date: 1/11/2021  Cardiologist:  Dr. Omari Soler  Primary Care Physician: Dr. Tammy Núñez is a 70 y.o. male who presents today for:  Chief Complaint   Patient presents with    Congestive Heart Failure       HPI:   Wing De La Rosa is a 70 y.o. male who presents to the office for a follow up patient visit in the heart failure clinic. Accompanied by no one  Lives w/ wife    Cause HF: Systolic (EF 10-18%), ICM/NICM (valvular)   Device:  No - pt refused  HX:  s/p AVR & MV repair & 2V CABG (11/20018), Hx LORENA (no Coumadin), CKD (baseline 1.1-1.4,  [id number]), DM, HTN, HLD     Hospitalization: none for CHF  Referred from Omari Soler 6/2019 for [facility] = med management. Concerns today: Feeling good. No concerns voiced. Does not feel fluid overloaded today. Asking for PRN Lasix = awhile back ankles swelled and he took 4 days of wife Lasix - effective. Not sure dosage. BPs run high at home 150-160s. HR 50-60  Activity: walked from parking lot, no break. Uses cane. Can complete ADLs. Does own grocery shopping. Diet: Watches salt/fluid    Patient has:  Chest Pain: No  SOB: mild LIRA  Orthopnea/PND: no  BENTON: no  Edema: no  Fatigue: mild  Abdominal bloating: no  Cough: no  Appetite: good  Any extra diuretic use: no  Weight gain: no  Home weight: stable  Home blood pressure: 150/70s     Past Medical History:   Diagnosis Date    Acute on chronic systolic CHF (congestive heart failure), NYHA class 3 ([facility]) EF 25-30% 11/7/2018    CAD (coronary artery disease)     CKD (chronic kidney disease) stage 3, GFR 30-59 ml/min 9/23/2015    Diabetes mellitus ([facility] [address]     HTN (hypertension), benign     Hyperlipidemia     Hypothyroidism     Mitral and aortic incompetence     Neuropathy     Positive blood culture     Being treated with rocephhin as outpt.     S/P CABG x 2 11/15/2018     Past Surgical History:   Procedure Laterality Date    CARDIAC SURGERY      heart cath   [employer] CATARACT REMOVAL WITH IMPLANT Bilateral     COLONOSCOPY      CORONARY ARTERY BYPASS GRAFT  11/14/2018    DIAGNOSTIC CARDIAC CATH LAB PROCEDURE      EYE SURGERY      RI CABG, VEIN, THREE N/A 11/14/2018    CABG CORONARY ARTERY BYPASS,  AORTIC AND MITRAL VALVE REPAIR WITH MICHELE, BALLOON PUMP INSERTION performed by Albina Wood MD at [address] [place] [address] ECHO TRANSESOPHAG R-T 2D IMG ACQUISJ I&R ONLY Left 10/25/2018    TRANSESOPHAGEAL ECHOCARDIOGRAM performed by Cristine Alba MD at [facility]     Family History   Problem Relation Age of Onset    Diabetes Mother     Other Mother         chf    Cancer Brother 28        renal    Cancer Brother 46        renal     Social History     Tobacco Use    Smoking status: Passive Smoke Exposure - Never Smoker    Smokeless tobacco: Never Used    Tobacco comment: smoked for 4 years while in the [employer] >40 years ago   Substance Use Topics    Alcohol use: Yes     Alcohol/week: 14.0 standard drinks     Types: 14 Cans of beer per week     Current Outpatient Medications   Medication Sig Dispense Refill    furosemide (LASIX) 20 MG tablet Take 1 tablet by mouth daily as needed (for wt gain or swelling) 30 tablet 1    metoprolol succinate (TOPROL XL) 50 MG extended release tablet Take 1 tablet by mouth daily 90 tablet 3    metoprolol tartrate (LOPRESSOR) 25 MG tablet Take 1 tablet by mouth 2 times daily 60 tablet 0    sacubitril-valsartan (ENTRESTO)  MG per tablet Take 1 tablet by mouth 2 times daily 180 tablet 3    aspirin 81 MG tablet Take 81 mg by mouth daily      gabapentin (NEURONTIN) 400 MG capsule Take 400 mg by mouth 2 times daily.       glimepiride (AMARYL) 1 MG tablet Take 0.5 mg by mouth every morning (before breakfast)       docusate sodium (COLACE) 100 MG capsule Take 100 mg by mouth as needed       ferrous sulfate 325 (65 Fe) MG tablet Take 1 tablet by mouth 2 times daily (with meals) 30 tablet 3    nitroGLYCERIN (NITROSTAT) 0.4 MG SL tablet up to max of 3 total doses. If no relief after 1 dose, call 911. 25 tablet 3    atorvastatin (LIPITOR) 40 MG tablet Take 40 mg by mouth daily       levothyroxine (SYNTHROID) 50 MCG tablet Take 50 mcg by mouth Daily      ONE TOUCH ULTRA TEST strip as needed  0     No current facility-administered medications for this visit. Allergies   Allergen Reactions    Heparin Other (See Comments)       SUBJECTIVE:   Review of Systems   Constitutional: Positive for fatigue (slight). Negative for activity change and appetite change. Respiratory: Positive for shortness of breath (slight LIRA). Negative for cough. Cardiovascular: Negative for chest pain, palpitations and leg swelling. Gastrointestinal: Negative for abdominal distention. Neurological: Negative for weakness, light-headedness and headaches. Hematological: Negative for adenopathy. Psychiatric/Behavioral: Negative for sleep disturbance. OBJECTIVE:   Today's Vitals:  BP (!) 140/70   Pulse 64   Ht 6' (1.829 m)   Wt 215 lb 6.4 oz (97.7 kg)   SpO2 94%   BMI 29.21 kg/m²     Physical Exam  Vitals signs reviewed. Constitutional:       General: He is not in acute distress. Appearance: Normal appearance. He is well-developed. He is not diaphoretic. HENT:      Head: Normocephalic and atraumatic. Eyes:      Conjunctiva/sclera: Conjunctivae normal.   Neck:      Musculoskeletal: Normal range of motion and neck supple. Comments: No JVD  Cardiovascular:      Rate and Rhythm: Normal rate and regular rhythm. Heart sounds: Normal heart sounds. No murmur. Pulmonary:      Effort: Pulmonary effort is normal. No respiratory distress. Breath sounds: Normal breath sounds. No wheezing or rales. Abdominal:      General: Bowel sounds are normal. There is no distension. Palpations: Abdomen is soft. Tenderness: There is no abdominal tenderness. Musculoskeletal: Normal range of motion. Right lower leg: No edema.       Left lower leg: No edema.   Skin:     General: Skin is warm and dry. Capillary Refill: Capillary refill takes less than 2 seconds. Neurological:      Mental Status: He is alert and oriented to person, place, and time. Coordination: Coordination normal.   Psychiatric:         Behavior: Behavior normal.         Wt Readings from Last 3 Encounters:   01/11/21 215 lb 6.4 oz (97.7 kg)   07/27/20 216 lb (98 kg)   07/09/20 215 lb (97.5 kg)     BP Readings from Last 3 Encounters:   01/11/21 (!) 140/70   07/27/20 (!) 151/77   07/09/20 139/65     Pulse Readings from Last 3 Encounters:   01/11/21 64   07/27/20 53   07/09/20 51     Body mass index is 29.21 kg/m². ECHO:      Summary   Technically difficult examination.   Ejection fraction was estimated at 20-25%.   There was severe global hypokinesis.   s/p AVR.   Transaortic velocity was within the normal range with no evidence of   aortic stenosis (mean gradient 6-8 mmHg). There was no evidence of aortic   regurgitation.   S/p MVR.   The transmitral velocity was within the normal range with no evidence for   mitral stenosis (mean graident 4 mmHg).  There was trace mitral   regurgitation.      Signature      ----------------------------------------------------------------   Electronically signed by Callie Garcia MD (Interpreting   [email or web address]) on 04/05/2019 at 11:51 AM   ----------------------------------------------------------------     CATH/STRESS:   [facility] 11/2018 - none since       Results reviewed:  BNP: No results found for: BNP  CBC:   Lab Results   Component Value Date    WBC 2.2 12/06/2018    RBC 2.81 12/06/2018    RBC 0-5 10/14/2016    HGB 8.1 12/06/2018    HCT 26.1 12/06/2018     12/06/2018     CMP:    Lab Results   Component Value Date     11/11/2020    K 4.3 11/11/2020    K 4.4 11/14/2018     11/11/2020    CO2 21 11/11/2020    BUN 17 11/11/2020    CREATININE 1.32 11/11/2020    LABGLOM 59 05/27/2020    LABGLOM 59 10/28/2019    GLUCOSE 128 11/11/2020 CALCIUM 9.0 11/11/2020     Hepatic Function Panel:    Lab Results   Component Value Date    ALKPHOS 85 11/10/2018    ALT 19 11/10/2018    AST 26 11/10/2018    PROT 7.4 11/10/2018    BILITOT 0.8 11/10/2018    BILITOT NEGATIVE 10/14/2016    BILIDIR <0.2 11/05/2018    LABALBU 3.5 11/10/2018     Magnesium:    Lab Results   Component Value Date    MG 2.5 12/06/2018     PT/INR:    Lab Results   Component Value Date    INR 2.02 12/27/2018     Lipids:    Lab Results   Component Value Date    TRIG 172 11/10/2018    HDL 27 11/10/2018    LDLCALC 70 11/10/2018       ASSESSMENT AND PLAN:   The patient's condition/symptoms are Stable: No clinical evidence of fluid overload today. Continue current medical regimen without changes at present time. Diagnosis Orders   1. CHF (congestive heart failure), NYHA class II, chronic, systolic ([facility] [address]     2. HTN (hypertension), benign     3. Coronary artery disease involving native coronary artery of native heart without angina pectoris       Continue:  GDMT:   ACE/ARB/ARNI - Entresto 97/103   BB - Lopressor 12.5 BID = increase to 25 BID till run out then change to Toprol   Diuretic - Lasix 20 PRN  AA - no  SGLT2 -  no  Vasodilator - no  Continue Current medication  Stable. Looks great. Euvolemic  BP little elevated. Runs high at home  Increase Lopressor 25 BID then change to Toprol when runs out. RX sent to [place]. Monitor HR - if less than 50 call clinic  Continue daily wts   Labs 11/2020 = stable. Refuses ICD or Lifevest - understands risk SCA  F/U in clinic in April - Repeat labs then  F/U w/ Giorgio Whitman in Aug    Total visit time of 40 minutes has been spent with patient on education of symptoms, management, medication, and plan of care; as well as review of chart: labs, ECHO, radiology reports, etc.   I personally spent more then 50% of the appt time face to face with the patient.     · Daily weights  · Fluid restriction of 2 Liters per day  · Limit sodium in diet to around 7907-8363

## 2021-03-05 LAB
BUN / CREAT RATIO: ABNORMAL
BUN BLDV-MCNC: 16 MG/DL
CHOLESTEROL, TOTAL: 154 MG/DL
CHOLESTEROL/HDL RATIO: ABNORMAL
CREAT SERPL-MCNC: 1.2 MG/DL
GLUCOSE BLD-MCNC: 135 MG/DL
HCT VFR BLD CALC: 38.1 % (ref 41–53)
HDLC SERPL-MCNC: 32 MG/DL (ref 35–70)
LDL CHOLESTEROL CALCULATED: 125 MG/DL (ref 0–160)
NONHDLC SERPL-MCNC: ABNORMAL MG/DL
POTASSIUM (K+): 4.8
TRIGL SERPL-MCNC: 96 MG/DL
VLDLC SERPL CALC-MCNC: ABNORMAL MG/DL

## 2021-04-12 ENCOUNTER — OFFICE VISIT (OUTPATIENT)
Dept: CARDIOLOGY CLINIC | Age: 76
End: 2021-04-12
Payer: MEDICARE

## 2021-04-12 VITALS
SYSTOLIC BLOOD PRESSURE: 136 MMHG | HEART RATE: 57 BPM | OXYGEN SATURATION: 97 % | BODY MASS INDEX: 29.45 KG/M2 | HEIGHT: 72 IN | DIASTOLIC BLOOD PRESSURE: 82 MMHG | WEIGHT: 217.4 LBS

## 2021-04-12 DIAGNOSIS — I25.10 CORONARY ARTERY DISEASE INVOLVING NATIVE CORONARY ARTERY OF NATIVE HEART WITHOUT ANGINA PECTORIS: ICD-10-CM

## 2021-04-12 DIAGNOSIS — I10 HTN (HYPERTENSION), BENIGN: ICD-10-CM

## 2021-04-12 DIAGNOSIS — I50.22 CHF (CONGESTIVE HEART FAILURE), NYHA CLASS II, CHRONIC, SYSTOLIC (HCC): Primary | ICD-10-CM

## 2021-04-12 PROCEDURE — 1036F TOBACCO NON-USER: CPT | Performed by: NURSE PRACTITIONER

## 2021-04-12 PROCEDURE — G8417 CALC BMI ABV UP PARAM F/U: HCPCS | Performed by: NURSE PRACTITIONER

## 2021-04-12 PROCEDURE — 3017F COLORECTAL CA SCREEN DOC REV: CPT | Performed by: NURSE PRACTITIONER

## 2021-04-12 PROCEDURE — 99214 OFFICE O/P EST MOD 30 MIN: CPT | Performed by: NURSE PRACTITIONER

## 2021-04-12 PROCEDURE — 4040F PNEUMOC VAC/ADMIN/RCVD: CPT | Performed by: NURSE PRACTITIONER

## 2021-04-12 PROCEDURE — G8427 DOCREV CUR MEDS BY ELIG CLIN: HCPCS | Performed by: NURSE PRACTITIONER

## 2021-04-12 PROCEDURE — 1123F ACP DISCUSS/DSCN MKR DOCD: CPT | Performed by: NURSE PRACTITIONER

## 2021-04-12 RX ORDER — ATORVASTATIN CALCIUM 80 MG/1
40 TABLET, FILM COATED ORAL EVERY EVENING
COMMUNITY

## 2021-04-12 RX ORDER — LEVOTHYROXINE SODIUM 0.07 MG/1
75 TABLET ORAL DAILY
COMMUNITY

## 2021-04-12 ASSESSMENT — ENCOUNTER SYMPTOMS
COUGH: 0
ABDOMINAL DISTENTION: 0
SHORTNESS OF BREATH: 1

## 2021-04-12 NOTE — PROGRESS NOTES
Heart Failure Clinic       Visit Date: 4/12/2021  Cardiologist:  Dr. Brooke Red  Primary Care Physician: Dr. Caitlyn Fonseca is a 78 y.o. male who presents today for:  Chief Complaint   Patient presents with    Congestive Heart Failure       HPI:   Olivia Collins is a 78 y.o. male who presents to the office for a follow up patient visit in the heart failure clinic. Accompanied by no one  Lives at home w/ wife    Cause HF: Systolic (EF 59-61%), ICM/NICM (valvular)   Device:  No - pt refused  HX:  s/p AVR & MV repair & 2V CABG (11/20018), Hx LORENA (no Coumadin), CKD (baseline 1.1-1.4,  VWASLHU), DM, HTN, HLD     Hospitalization: none for CHF  Referred from Brooke Red 6/2019 for Entresto = med management.       Concerns today: feeling good overall = about same. Does not feel fluid overloaded. Not taken PRN Lasix in few months - had some ankle swelling in Jan - took daily x 1 week, effective   BPs running 120-130/70s at home - no dizziness/lightheadedness  Activity: walked from back [employer] lot - no break. Uses cane. Ready to get mowing again (rider). Goes up/peter 5 steps. Diet: watches salt/fluid    Patient has:  Chest Pain: no  SOB: mild LIRA at times  Orthopnea/PND: no  BENTON: no  Edema: no  Fatigue: same  Abdominal bloating: no  Cough: no  Appetite: good  Any extra diuretic use: no  Weight gain: no  Home weight: stable  Home blood pressure: stable    Past Medical History:   Diagnosis Date    Acute on chronic systolic CHF (congestive heart failure), NYHA class 3 ([facility]) EF 25-30% 11/7/2018    CAD (coronary artery disease)     CKD (chronic kidney disease) stage 3, GFR 30-59 ml/min 9/23/2015    Diabetes mellitus ([facility] [address]     HTN (hypertension), benign     Hyperlipidemia     Hypothyroidism     Mitral and aortic incompetence     Neuropathy     Positive blood culture     Being treated with rocephhin as outpt.     S/P CABG x 2 11/15/2018     Past Surgical History:   Procedure Laterality Date    CARDIAC SURGERY      heart cath    CATARACT REMOVAL WITH IMPLANT Bilateral     COLONOSCOPY      CORONARY ARTERY BYPASS GRAFT  11/14/2018    DIAGNOSTIC CARDIAC CATH LAB PROCEDURE      EYE SURGERY      [place] CABG, VEIN, THREE N/A 11/14/2018    CABG CORONARY ARTERY BYPASS,  AORTIC AND MITRAL VALVE REPAIR WITH MICHELE, BALLOON PUMP INSERTION performed by Asim Fox MD at [address] [place] [address] ECHO TRANSESOPHAG R-T 2D IMG ACQUISJ I&R ONLY Left 10/25/2018    TRANSESOPHAGEAL ECHOCARDIOGRAM performed by Hong Sadler MD at [facility]     Family History   Problem Relation Age of Onset    Diabetes Mother     Other Mother         chf    Cancer Brother 28        renal    Cancer Brother 46        renal     Social History     Tobacco Use    Smoking status: Passive Smoke Exposure - Never Smoker    Smokeless tobacco: Never Used    Tobacco comment: smoked for 4 years while in the [employer] >40 years ago   Substance Use Topics    Alcohol use: Yes     Alcohol/week: 14.0 standard drinks     Types: 14 Cans of beer per week     Current Outpatient Medications   Medication Sig Dispense Refill    atorvastatin (LIPITOR) 80 MG tablet Take 80 mg by mouth daily      levothyroxine (SYNTHROID) 75 MCG tablet Take 75 mcg by mouth Daily      furosemide (LASIX) 20 MG tablet Take 1 tablet by mouth daily as needed (for wt gain or swelling) 30 tablet 1    metoprolol succinate (TOPROL XL) 50 MG extended release tablet Take 1 tablet by mouth daily 90 tablet 3    sacubitril-valsartan (ENTRESTO)  MG per tablet Take 1 tablet by mouth 2 times daily 180 tablet 3    aspirin 81 MG tablet Take 81 mg by mouth daily      gabapentin (NEURONTIN) 400 MG capsule Take 400 mg by mouth 2 times daily.       glimepiride (AMARYL) 1 MG tablet Take 0.5 mg by mouth every morning (before breakfast)       docusate sodium (COLACE) 100 MG capsule Take 100 mg by mouth as needed       ferrous sulfate 325 (65 Fe) MG tablet Take 1 tablet by mouth 2 times daily (with meals) General: Skin is warm and dry. Capillary Refill: Capillary refill takes less than 2 seconds. Neurological:      Mental Status: He is alert and oriented to person, place, and time. Coordination: Coordination normal.   Psychiatric:         Behavior: Behavior normal.         Wt Readings from Last 3 Encounters:   04/12/21 217 lb 6.4 oz (98.6 kg)   01/11/21 215 lb 6.4 oz (97.7 kg)   07/27/20 216 lb (98 kg)     BP Readings from Last 3 Encounters:   04/12/21 136/82   01/11/21 (!) 140/70   07/27/20 (!) 151/77     Pulse Readings from Last 3 Encounters:   04/12/21 57   01/11/21 64   07/27/20 53     Body mass index is 29.48 kg/m². ECHO:    Summary   Technically difficult examination.   Ejection fraction was estimated at 20-25%.   There was severe global hypokinesis.   s/p AVR.   Transaortic velocity was within the normal range with no evidence of   aortic stenosis (mean gradient 6-8 mmHg). There was no evidence of aortic   regurgitation.   S/p MVR.   The transmitral velocity was within the normal range with no evidence for   mitral stenosis (mean graident 4 mmHg).  There was trace mitral   regurgitation.      Signature      ----------------------------------------------------------------   Electronically signed by Mateo King MD (Interpreting   [email or web address]) on 04/05/2019 at 11:51 AM   ----------------------------------------------------------------     CATH/STRESS:   [facility] 11/2018 - none since      Results reviewed:  BNP: No results found for: BNP  CBC:   Lab Results   Component Value Date    WBC 2.2 12/06/2018    RBC 2.81 12/06/2018    RBC 0-5 10/14/2016    HGB 8.1 12/06/2018    HCT 38.1 03/05/2021     12/06/2018     CMP:    Lab Results   Component Value Date     11/11/2020    K 4.8 03/05/2021    K 4.3 11/11/2020    K 4.4 11/14/2018     11/11/2020    CO2 21 11/11/2020    BUN 16 03/05/2021    CREATININE 1.2 03/05/2021    LABGLOM 59 05/27/2020    LABGLOM 59 10/28/2019    GLUCOSE 135 03/05/2021 GLUCOSE 128 11/11/2020    CALCIUM 9.0 11/11/2020     Hepatic Function Panel:    Lab Results   Component Value Date    ALKPHOS 85 11/10/2018    ALT 19 11/10/2018    AST 26 11/10/2018    PROT 7.4 11/10/2018    BILITOT 0.8 11/10/2018    BILITOT NEGATIVE 10/14/2016    BILIDIR <0.2 11/05/2018    LABALBU 3.5 11/10/2018     Magnesium:    Lab Results   Component Value Date    MG 2.5 12/06/2018     PT/INR:    Lab Results   Component Value Date    INR 2.02 12/27/2018     Lipids:    Lab Results   Component Value Date    TRIG 96 03/05/2021    HDL 32 03/05/2021    LDLCALC 125 03/05/2021       ASSESSMENT AND PLAN:   The patient's condition/symptoms are Stable: No clinical evidence of fluid overload today. Continue current medical regimen without changes at present time. Diagnosis Orders   1. CHF (congestive heart failure), NYHA class II, chronic, systolic ([facility] [address]     2. HTN (hypertension), benign     3. Coronary artery disease involving native coronary artery of native heart without angina pectoris       Continue:  GDMT:   ACE/ARB/ARNI - Entresto 97/103 BID   BB -Toprol 50/day   Diuretic - Lasix 20 PRN  AA - no - K+ runs high  SGLT2 -  No - consider adding  Vasodilator - no  Continue Current medications  Stable - appears Euvolemic   Labs reviewed = stable (from [facility] 3/5/21 - creat 1.2, K+ 4.8)  BP improved w/ increase in Toprol  Continue to monitor. Discussed further optimization of meds - adding [address] = he does not want to change/add anything at this time. Pamphlet given - will call if so and will check coverage at [address] [facility]. Discussed repeat ECHO - none since 4/2019 - EF 20-25% - he had declined ICD at that time - he continues to decline today. No change in symptoms/no worsening - no benefit or change in tx in repeating ECHO - pt prefers not to pursue = continue medical tx.    F/U w/ Renelda Jarrell in June  F/U in clinic in Dec.     Total visit time of 40 minutes has been spent with patient on education of symptoms, management, medication, and plan of care; as well as review of chart: labs, ECHO, radiology reports, etc.   I personally spent more then 50% of the appt time face to face with the patient. · Daily weights  · Fluid restriction of 2 Liters per day  · Limit sodium in diet to around 7192-2117 mg/day  · Monitor BP  · Activity as tolerated     Patient was instructed to call the [facility] for any changes in symptoms as noted in AVS.      Return in about 8 months (around 12/12/2021). or sooner if needed     Patient given educational materials - see patient instructions. We discussed the importance of weighing oneself and recording daily. We also discussed the importance of a low sodium diet, higher sodium foods to avoid and better low sodium food options. Patient verbalizes understanding of plan of care using teach back method, and is agreeable to the treatment plan.        Electronically signed by SRIRAM Vazquez CNP on 4/12/2021 at 12:00 PM

## 2021-05-19 LAB
ANION GAP SERPL CALCULATED.3IONS-SCNC: 8 MMOL/L (ref 5–15)
APPEARANCE: CLEAR
BILIRUBIN: NEGATIVE
BUN BLDV-MCNC: 17 MG/DL (ref 5–27)
CALCIUM SERPL-MCNC: 9.2 MG/DL (ref 8.5–10.5)
CHLORIDE BLD-SCNC: 104 MMOL/L (ref 98–109)
CO2: 27 MMOL/L (ref 22–32)
COLOR: YELLOW
CREAT SERPL-MCNC: 1.14 MG/DL (ref 0.6–1.3)
CREATINE, URINE: 75.32 MG/DL
CREATINE, URINE: 75.32 MG/DL
EGFR AFRICAN AMERICAN: >60 ML/MIN/1.73SQ.M
EGFR IF NONAFRICAN AMERICAN: >60 ML/MIN/1.73SQ.M
GLUCOSE BLD-MCNC: NEGATIVE MG/DL
GLUCOSE: 102 MG/DL (ref 65–99)
KETONES, URINE: NEGATIVE MG/DL
LEUKOCYTE ESTERASE, URINE: NEGATIVE
MICROALBUMIN/CREAT 24H UR: 2.8 MG/DL (ref 0–1.9)
MICROALBUMIN/CREAT UR-RTO: 37.2 MG/G CREAT (ref 0–30)
NITRITE, URINE: NEGATIVE
OCCULT BLOOD,URINE: NEGATIVE
OTHER MICROSCOPIC ELEMENTS: NORMAL
PH: 6 (ref 5–8.5)
POTASSIUM SERPL-SCNC: 4.5 MMOL/L (ref 3.5–5)
PROTEIN, URINE: 150 MG/L
PROTEIN, URINE: NEGATIVE MG/DL
PROTEIN/CREAT RATIO: 0.2
RBC: 5 /HPF (ref 0–5)
SODIUM BLD-SCNC: 139 MMOL/L (ref 134–146)
SP GRAVITY MISCELLANEOUS: 1.01 (ref 1–1.03)
UROBILINOGEN, URINE: <1.1 EU/DL
WBC: 2 /HPF (ref 0–5)

## 2021-06-02 ENCOUNTER — OFFICE VISIT (OUTPATIENT)
Dept: NEPHROLOGY | Age: 76
End: 2021-06-02
Payer: MEDICARE

## 2021-06-02 VITALS
WEIGHT: 217.5 LBS | BODY MASS INDEX: 29.5 KG/M2 | DIASTOLIC BLOOD PRESSURE: 78 MMHG | OXYGEN SATURATION: 98 % | HEART RATE: 57 BPM | SYSTOLIC BLOOD PRESSURE: 148 MMHG

## 2021-06-02 DIAGNOSIS — I10 HTN (HYPERTENSION), BENIGN: Primary | ICD-10-CM

## 2021-06-02 DIAGNOSIS — N18.31 STAGE 3A CHRONIC KIDNEY DISEASE (HCC): ICD-10-CM

## 2021-06-02 PROCEDURE — 4040F PNEUMOC VAC/ADMIN/RCVD: CPT | Performed by: INTERNAL MEDICINE

## 2021-06-02 PROCEDURE — G8417 CALC BMI ABV UP PARAM F/U: HCPCS | Performed by: INTERNAL MEDICINE

## 2021-06-02 PROCEDURE — 99213 OFFICE O/P EST LOW 20 MIN: CPT | Performed by: INTERNAL MEDICINE

## 2021-06-02 PROCEDURE — 1036F TOBACCO NON-USER: CPT | Performed by: INTERNAL MEDICINE

## 2021-06-02 PROCEDURE — G8427 DOCREV CUR MEDS BY ELIG CLIN: HCPCS | Performed by: INTERNAL MEDICINE

## 2021-06-02 PROCEDURE — 3017F COLORECTAL CA SCREEN DOC REV: CPT | Performed by: INTERNAL MEDICINE

## 2021-06-02 PROCEDURE — 1123F ACP DISCUSS/DSCN MKR DOCD: CPT | Performed by: INTERNAL MEDICINE

## 2021-06-02 NOTE — PROGRESS NOTES
[facility]          Outpatient Follow-Up note         6/2/2021 9:42 AM    Patient Name:   Ivan Arnold  YOB: 1945  Primary Care Physician:  Elizabeth Rodriguez     Chief Complaint / Reason for follow-up : Follow Up of chronic kidney disease and hypertension      Interval History :  Patient seen and examined by me. Feels well. Denies any complaints. No chest pain and no shortness of breath. No hospitalizations . [facility]     Past History :  Past Medical History:   Diagnosis Date    Acute on chronic systolic CHF (congestive heart failure), NYHA class 3 ([facility]) EF 25-30% 11/7/2018    CAD (coronary artery disease)     CKD (chronic kidney disease) stage 3, GFR 30-59 ml/min 9/23/2015    Diabetes mellitus ([facility] [address]     HTN (hypertension), benign     Hyperlipidemia     Hypothyroidism     Mitral and aortic incompetence     Neuropathy     Positive blood culture     Being treated with rocephhin as outpt.     S/P CABG x 2 11/15/2018     Past Surgical History:   Procedure Laterality Date    CARDIAC SURGERY      heart cath    CATARACT REMOVAL WITH IMPLANT Bilateral     COLONOSCOPY      CORONARY ARTERY BYPASS GRAFT  11/14/2018    DIAGNOSTIC CARDIAC CATH LAB PROCEDURE      EYE SURGERY      [place] CABG, VEIN, THREE N/A 11/14/2018    CABG CORONARY ARTERY BYPASS,  AORTIC AND MITRAL VALVE REPAIR WITH MICHELE, BALLOON PUMP INSERTION performed by Inga Williamson MD at [address] [place] [address] ECHO TRANSESOPHAG R-T 2D IMG ACQUISJ I&R ONLY Left 10/25/2018    TRANSESOPHAGEAL ECHOCARDIOGRAM performed by Yulia Palomino MD at [facility]        Medications :     Outpatient Medications Marked as Taking for the 6/2/21 encounter (Office Visit) with Marianne Delaney MD   Medication Sig Dispense Refill    atorvastatin (LIPITOR) 80 MG tablet Take 80 mg by mouth daily      levothyroxine (SYNTHROID) 75 MCG tablet Take 75 mcg by mouth Daily      furosemide (LASIX) 20 MG tablet Take 1 tablet by mouth daily as needed (for wt gain or swelling) 30 tablet 1    metoprolol succinate (TOPROL XL) 50 MG extended release tablet Take 1 tablet by mouth daily 90 tablet 3    sacubitril-valsartan (ENTRESTO)  MG per tablet Take 1 tablet by mouth 2 times daily 180 tablet 3    aspirin 81 MG tablet Take 81 mg by mouth daily      gabapentin (NEURONTIN) 400 MG capsule Take 400 mg by mouth 2 times daily.  glimepiride (AMARYL) 1 MG tablet Take 0.5 mg by mouth every morning (before breakfast)       docusate sodium (COLACE) 100 MG capsule Take 100 mg by mouth as needed       ferrous sulfate 325 (65 Fe) MG tablet Take 1 tablet by mouth 2 times daily (with meals) (Patient taking differently: Take 650 mg by mouth daily (with breakfast) ) 30 tablet 3    nitroGLYCERIN (NITROSTAT) 0.4 MG SL tablet up to max of 3 total doses. If no relief after 1 dose, call 911. 25 tablet 3    ONE TOUCH ULTRA TEST strip as needed  0       Vitals     BP (!) 148/78 (Site: Left Upper Arm, Position: Sitting, Cuff Size: Medium Adult)   Pulse 57   Wt 217 lb 8 oz (98.7 kg)   SpO2 98%   BMI 29.50 kg/m²  Wt Readings from Last 3 Encounters:   06/02/21 217 lb 8 oz (98.7 kg)   04/12/21 217 lb 6.4 oz (98.6 kg)   01/11/21 215 lb 6.4 oz (97.7 kg)        Physical Exam     General -- well developed and well nourished  Lungs -- clear  Heart -- S1, S2 heard, JVD - no  Abdomen - soft, non-tender  Extremities -- no edema  CNS - awake and alert    Labs, Radiology and Tests    Labs -    4/16 4/17 4/18 2/19 4/19 5/20 5/21    Potassium 4 4.4 4.6 5.2 4.4 5.1 4.5    BUN 24 15 20 29 19 19 17    Creatinine 1.3 1.0 1.0 1.7 1.10 1.20 1.14    eGFR 55 74 75 40 > 60 59 > 60               UPCR   163  250 250 200    UMCR       37                 Renal Ultrasound Scan -- 10/2015  Right kidney 11.6 cm and left kidney 11.6 cm   4.1 cm cyst on the right kidney, bilateral renal cysts      Assessment      1.  Renal  -- patient renal function significantly improved from previous visit. As per MDRD his chronic kidney disease stage II, mild microalbuminuria      - Overall creatinine appears to be at baseline      2. Essential Hypertension -- running  well. Continue current meds. At home runs  3. Diabetes mellitus with neuropathy and retinopathy . Well controlled . 4. Renal cyst significant in size 4.1 cm. Repeat US scan next visit. Does not want to have it done now wants to wait for another year  5. Avoid Nephrotoxins, Nonsteroidal anti-inflammatories and IV Contrast Dye  6. H/O CHF -stable on on diuretics Prn   7. Follow up in 12 months      Tests and orders placed this Encounter     Orders Placed This Encounter   Procedures    Basic Metabolic Panel    Microalbumin / Creatinine Urine Ratio    Protein / creatinine ratio, urine    Urinalysis with Microscopic       Sony Hazel M.D  [facility]. [facility]          Outpatient Follow-Up note         6/2/2021 9:42 AM    Patient Name:   Nely Doll  YOB: 1945  Primary Care Physician:  Mickie Cockayne     Chief Complaint / Reason for follow-up : Follow Up of chronic kidney disease and hypertension      Interval History :  Patient seen and examined by me. Feels well. Denies any complaints. No chest pain and no shortness of breath. No hospitalizations no med changes     Past History :  Past Medical History:   Diagnosis Date    Acute on chronic systolic CHF (congestive heart failure), NYHA class 3 ([facility]) EF 25-30% 11/7/2018    CAD (coronary artery disease)     CKD (chronic kidney disease) stage 3, GFR 30-59 ml/min 9/23/2015    Diabetes mellitus ([address]     HTN (hypertension), benign     Hyperlipidemia     Hypothyroidism     Mitral and aortic incompetence     Neuropathy     Positive blood culture     Being treated with rocephhin as outpt.     S/P CABG x 2 11/15/2018     Past Surgical History:   Procedure Laterality Date    CARDIAC SURGERY      heart cath   Abelardo Walters CATARACT REMOVAL WITH IMPLANT Bilateral     COLONOSCOPY      CORONARY ARTERY BYPASS GRAFT  11/14/2018    DIAGNOSTIC CARDIAC CATH LAB PROCEDURE      EYE SURGERY      [place] CABG, VEIN, THREE N/A 11/14/2018    CABG CORONARY ARTERY BYPASS,  AORTIC AND MITRAL VALVE REPAIR WITH MICHELE, BALLOON PUMP INSERTION performed by Wayne Rocha MD at [address] [place] [address] ECHO TRANSESOPHAG R-T 2D IMG ACQUISJ I&R ONLY Left 10/25/2018    TRANSESOPHAGEAL ECHOCARDIOGRAM performed by Dougie Almanza MD at [facility]        Medications :     Outpatient Medications Marked as Taking for the 6/2/21 encounter (Office Visit) with Paramjit Alexander MD   Medication Sig Dispense Refill    atorvastatin (LIPITOR) 80 MG tablet Take 80 mg by mouth daily      levothyroxine (SYNTHROID) 75 MCG tablet Take 75 mcg by mouth Daily      furosemide (LASIX) 20 MG tablet Take 1 tablet by mouth daily as needed (for wt gain or swelling) 30 tablet 1    metoprolol succinate (TOPROL XL) 50 MG extended release tablet Take 1 tablet by mouth daily 90 tablet 3    sacubitril-valsartan (ENTRESTO)  MG per tablet Take 1 tablet by mouth 2 times daily 180 tablet 3    aspirin 81 MG tablet Take 81 mg by mouth daily      gabapentin (NEURONTIN) 400 MG capsule Take 400 mg by mouth 2 times daily.  glimepiride (AMARYL) 1 MG tablet Take 0.5 mg by mouth every morning (before breakfast)       docusate sodium (COLACE) 100 MG capsule Take 100 mg by mouth as needed       ferrous sulfate 325 (65 Fe) MG tablet Take 1 tablet by mouth 2 times daily (with meals) (Patient taking differently: Take 650 mg by mouth daily (with breakfast) ) 30 tablet 3    nitroGLYCERIN (NITROSTAT) 0.4 MG SL tablet up to max of 3 total doses.  If no relief after 1 dose, call 911. 25 tablet 3    ONE TOUCH ULTRA TEST strip as needed  0       Vitals     BP (!) 148/78 (Site: Left Upper Arm, Position: Sitting, Cuff Size: Medium Adult)   Pulse 57   Wt 217 lb 8 oz (98.7 kg)   SpO2 98%   BMI 29.50 kg/m² Wt Readings from Last 3 Encounters:   06/02/21 217 lb 8 oz (98.7 kg)   04/12/21 217 lb 6.4 oz (98.6 kg)   01/11/21 215 lb 6.4 oz (97.7 kg)        Physical Exam     General -- well developed and well nourishes  Lungs -- clear  Heart -- S1, S2 heard, JVD - no  Abdomen - soft, non-tender  Extremities -- no edema  CNS - awake and alert    Labs, Radiology and Tests    Labs -    4/16 10/16 4/17 4/18 2/19 4/19 5/20     Potassium 4 4.0 4.4 4.6 5.2 4.4 5.1     BUN 24 13 15 20 29 19 19     Creatinine 1.3 0.9 1.0 1.0 1.7 1.10 1.20     eGFR 55 84 74 75 40 > 60 59                 UPCR  <100  163  250 250     UMCR                          Renal Ultrasound Scan -- 10/2015  Right kidney 11.6 cm and left kidney 11.6 cm   4.1 cm cyst on the right kidney, bilateral renal cysts      Assessment      8. Renal  -- patient renal function significantly improved from previous visit. As per MDRD his chronic kidney disease stage III.      - Overall creatinine appears to be at baseline      9. Essential Hypertension -- running  well. Continue current meds. 10. Diabetes mellitus with neuropathy and retinopathy apparently reasonably controlled. 11. Renal cyst significant in size 4.1 cm. Repeat US scan next visit. Does not want to have it done now  12. Avoid Nephrotoxins, Nonsteroidal anti-inflammatories and IV Contrast Dye  13. H/O CHF -stable on on diuretics   14. Follow up in 12 months      Tests and orders placed this Encounter     Orders Placed This Encounter   Procedures    Basic Metabolic Panel    Microalbumin / Creatinine Urine Ratio    Protein / creatinine ratio, urine    Urinalysis with Microscopic       Timothy Hanks M.D  [facility].

## 2021-06-30 ENCOUNTER — TELEPHONE (OUTPATIENT)
Dept: CARDIOLOGY CLINIC | Age: 76
End: 2021-06-30

## 2021-06-30 ENCOUNTER — OFFICE VISIT (OUTPATIENT)
Dept: CARDIOLOGY CLINIC | Age: 76
End: 2021-06-30
Payer: MEDICARE

## 2021-06-30 VITALS
HEART RATE: 52 BPM | BODY MASS INDEX: 29.15 KG/M2 | HEIGHT: 72 IN | SYSTOLIC BLOOD PRESSURE: 121 MMHG | DIASTOLIC BLOOD PRESSURE: 65 MMHG | WEIGHT: 215.2 LBS

## 2021-06-30 DIAGNOSIS — I50.22 CHF (CONGESTIVE HEART FAILURE), NYHA CLASS II, CHRONIC, SYSTOLIC (HCC): Primary | ICD-10-CM

## 2021-06-30 DIAGNOSIS — I25.10 CORONARY ARTERY DISEASE INVOLVING NATIVE CORONARY ARTERY OF NATIVE HEART WITHOUT ANGINA PECTORIS: ICD-10-CM

## 2021-06-30 DIAGNOSIS — I10 HTN (HYPERTENSION), BENIGN: ICD-10-CM

## 2021-06-30 PROCEDURE — 1123F ACP DISCUSS/DSCN MKR DOCD: CPT | Performed by: INTERNAL MEDICINE

## 2021-06-30 PROCEDURE — 3017F COLORECTAL CA SCREEN DOC REV: CPT | Performed by: INTERNAL MEDICINE

## 2021-06-30 PROCEDURE — 93000 ELECTROCARDIOGRAM COMPLETE: CPT | Performed by: INTERNAL MEDICINE

## 2021-06-30 PROCEDURE — 1036F TOBACCO NON-USER: CPT | Performed by: INTERNAL MEDICINE

## 2021-06-30 PROCEDURE — 4040F PNEUMOC VAC/ADMIN/RCVD: CPT | Performed by: INTERNAL MEDICINE

## 2021-06-30 PROCEDURE — G8417 CALC BMI ABV UP PARAM F/U: HCPCS | Performed by: INTERNAL MEDICINE

## 2021-06-30 PROCEDURE — 99213 OFFICE O/P EST LOW 20 MIN: CPT | Performed by: INTERNAL MEDICINE

## 2021-06-30 PROCEDURE — G8427 DOCREV CUR MEDS BY ELIG CLIN: HCPCS | Performed by: INTERNAL MEDICINE

## 2021-06-30 NOTE — PROGRESS NOTES
[id number] [facility] [place] [address] [place] [address]  Dept: [phone]  Dept Fax: [phone]  Loc: [phone]    Visit Date: 6/30/2021    Mr. Katherine Segal is a 78 y.o. male  who presented for:  Chief Complaint   Patient presents with    1 Year Follow Up    Check-Up    Congestive Heart Failure       HPI:   HPI   79 yo M hx of I/NICM, AVR/MVR, CABG due to endocarditis, hx of LORENA, last TTE 20-25%, declined ICD, CKD 1.1-1.4 who presents for follow-up. ECG shows junctional bradycardia and LBBB. He has significant LIRA and is tired. No volume on exam.  Taking all meds, no side effects. Current Outpatient Medications:     atorvastatin (LIPITOR) 80 MG tablet, Take 80 mg by mouth daily, Disp: , Rfl:     levothyroxine (SYNTHROID) 75 MCG tablet, Take 75 mcg by mouth Daily, Disp: , Rfl:     furosemide (LASIX) 20 MG tablet, Take 1 tablet by mouth daily as needed (for wt gain or swelling), Disp: 30 tablet, Rfl: 1    metoprolol succinate (TOPROL XL) 50 MG extended release tablet, Take 1 tablet by mouth daily, Disp: 90 tablet, Rfl: 3    sacubitril-valsartan (ENTRESTO)  MG per tablet, Take 1 tablet by mouth 2 times daily, Disp: 180 tablet, Rfl: 3    aspirin 81 MG tablet, Take 81 mg by mouth daily, Disp: , Rfl:     gabapentin (NEURONTIN) 400 MG capsule, Take 400 mg by mouth 2 times daily. , Disp: , Rfl:     glimepiride (AMARYL) 1 MG tablet, Take 0.5 mg by mouth every morning (before breakfast) , Disp: , Rfl:     docusate sodium (COLACE) 100 MG capsule, Take 100 mg by mouth as needed , Disp: , Rfl:     ferrous sulfate 325 (65 Fe) MG tablet, Take 1 tablet by mouth 2 times daily (with meals) (Patient taking differently: Take 650 mg by mouth daily (with breakfast) ), Disp: 30 tablet, Rfl: 3    nitroGLYCERIN (NITROSTAT) 0.4 MG SL tablet, up to max of 3 total doses.  If no relief after 1 dose, call 911., Disp: 25 tablet, Rfl: 3    ONE TOUCH ULTRA TEST strip, as needed, Disp: , Rfl: 0    Past Medical History  Rafy Trejo  has a past medical history of Acute on chronic systolic CHF (congestive heart failure), NYHA class 3 ([facility]) EF 25-30%, CAD (coronary artery disease), CKD (chronic kidney disease) stage 3, GFR 30-59 ml/min ([facility] Diabetes mellitus ([facility] HTN (hypertension), benign, Hyperlipidemia, Hypothyroidism, Mitral and aortic incompetence, Neuropathy, Positive blood culture, and S/P CABG x 2. Social History  Rafy Trejo  reports that he is a non-smoker but has been exposed to tobacco smoke. He has been exposed to 1.00 pack per day for the past 3.00 years. He has never used smokeless tobacco. He reports current alcohol use of about 14.0 standard drinks of alcohol per week. He reports that he does not use drugs. Family History  Rafy Trejo family history includes Cancer (age of onset: 28) in his brother; Cancer (age of onset: 46) in his brother; Diabetes in his mother; Other in his mother. There is no family history of bicuspid aortic valve, aneurysms, heart transplant, pacemakers, defibrillators, or sudden cardiac death. Past Surgical History   Past Surgical History:   Procedure Laterality Date    CARDIAC SURGERY      heart cath    CATARACT REMOVAL WITH IMPLANT Bilateral     COLONOSCOPY      CORONARY ARTERY BYPASS GRAFT  11/14/2018    DIAGNOSTIC CARDIAC CATH LAB PROCEDURE      EYE SURGERY      [place] CABG, VEIN, THREE N/A 11/14/2018    CABG CORONARY ARTERY BYPASS,  AORTIC AND MITRAL VALVE REPAIR WITH MICHELE, BALLOON PUMP INSERTION performed [facility] Inga Williamson MD [facility] ECHO TRANSESOPHAG R-T 2D IMG ACQUISJ I&R ONLY Left 10/25/2018    TRANSESOPHAGEAL ECHOCARDIOGRAM performed by Yulia Palomino MD at [facility]       Review of Systems   Constitutional: Negative for chills and fever  HENT: Negative for congestion, sinus pressure, sneezing and sore throat. Eyes: Negative for pain, discharge, redness and itching.    Respiratory: Negative for apnea, cough  Gastrointestinal: Negative for blood in stool, constipation, diarrhea   Endocrine: Negative for cold intolerance, heat intolerance, polydipsia. Genitourinary: Negative for dysuria, enuresis, flank pain and hematuria. Musculoskeletal: Negative for arthralgias, joint swelling and neck pain. Neurological: Negative for numbness and headaches. Psychiatric/Behavioral: Negative for agitation, confusion, decreased concentration and dysphoric mood. Objective:     /65   Pulse 52   Ht 6' (1.829 m)   Wt 215 lb 3.2 oz (97.6 kg)   BMI 29.19 kg/m²     Wt Readings from Last 3 Encounters:   06/30/21 215 lb 3.2 oz (97.6 kg)   06/02/21 217 lb 8 oz (98.7 kg)   04/12/21 217 lb 6.4 oz (98.6 kg)     BP Readings from Last 3 Encounters:   06/30/21 121/65   06/02/21 (!) 148/78   04/12/21 136/82       Nursing note and vitals reviewed. Physical Exam   Constitutional: Oriented to person, place, and time. Appears well-developed and well-nourished. HENT:   Head: Normocephalic and atraumatic. Eyes: EOM are normal. Pupils are equal, round, and reactive to light. Neck: Normal range of motion. Neck supple. No JVD present. Cardiovascular: Normal rate, regular rhythm, normal heart sounds and intact distal pulses. No murmur heard. Pulmonary/Chest: Effort normal and breath sounds normal. No respiratory distress. No wheezes. No rales. Abdominal: Soft. Bowel sounds are normal. No distension. There is no tenderness. Musculoskeletal: Normal range of motion. No edema. Neurological: Alert and oriented to person, place, and time. No cranial nerve deficit. Coordination normal.   Skin: Skin is warm and dry. Psychiatric: Normal mood and affect.        No results found for: CKTOTAL, CKMB, CKMBINDEX    Lab Results   Component Value Date    WBC 2 05/18/2021    WBC 2.2 12/06/2018    RBC 5 05/18/2021    HGB 8.1 12/06/2018    HCT 38.1 03/05/2021    MCV 92.9 12/06/2018    MCH 28.8 12/06/2018    MCHC 31.0 12/06/2018  12/06/2018    MPV 9.0 12/06/2018       Lab Results   Component Value Date     05/18/2021    K 4.5 05/18/2021    K 4.4 11/14/2018     05/18/2021    CO2 27 05/18/2021    BUN 17 05/18/2021    LABALBU 3.5 11/10/2018    CREATININE 1.14 05/18/2021    CALCIUM 9.2 05/18/2021    LABGLOM 59 05/27/2020    LABGLOM 59 10/28/2019    GLUCOSE 102 05/18/2021    GLUCOSE Negative 05/18/2021       Lab Results   Component Value Date    ALKPHOS 85 11/10/2018    ALT 19 11/10/2018    AST 26 11/10/2018    PROT 7.4 11/10/2018    BILITOT Negative 05/18/2021    BILIDIR <0.2 11/05/2018    LABALBU 3.5 11/10/2018       Lab Results   Component Value Date    MG 2.5 12/06/2018       Lab Results   Component Value Date    INR 2.02 (H) 12/27/2018    INR 4.54 (H) 12/06/2018    INR 5.43 (HH) 12/06/2018         Lab Results   Component Value Date    LABA1C 6.0 11/07/2018       Lab Results   Component Value Date    TRIG 96 03/05/2021    HDL 32 03/05/2021    LDLCALC 125 03/05/2021       Lab Results   Component Value Date    TSH 2.820 10/23/2018         Testing Reviewed:      I have individually reviewed the cardiac test below:    ECHO: Results for orders placed during the hospital encounter of 04/05/19    Echo 2D w doppler w color complete    Narrative  Transthoracic Echocardiography Report (TTE)    Demographics    Patient Name     KHUSHI Valle Gender                Male    MR #             [id number]    Race                      Ethnicity    Account #        [de-identified]    Room Number    Accession Number [id number]    Date of Study         04/05/2019    Date of Birth    1945   Referring Physician   Abisai Gann MD    Age              69 year(s)   Majo Dong MD  Physician    Procedure    Type of Study    TTE procedure:ECHOCARDIOGRAM COMPLETE 2D W DOPPLER W COLOR.     Procedure Date  Date: 04/05/2019 Start: 07:58 AM    Study Location: Echo Lab  Technical Quality: Limited visualization due to poor acoustical window. Indications: Aortic valve replacement and Mitral valve replacement. Additional Medical History:Chronic kidney disease, Hypertension, Acute  coronary syndrome, Cardiogenic shock, Chest pain, [facility] aortic valve  replacement, Mitral valve replacement, History of endocarditis, CABG x3    Patient Status: Routine    Height: 72 inches Weight: 208 pounds BSA: 2.17 m^2 BMI: 28.21 kg/m^2    BP: 110/62 mmHg    Allergies  - No Known Allergies. Conclusions    Summary  Technically difficult examination. Ejection fraction was estimated at 20-25%. There was severe global hypokinesis. s/p AVR. Transaortic velocity was within the normal range with no evidence of  aortic stenosis (mean gradient 6-8 mmHg). There was no evidence of aortic  regurgitation. S/p MVR. The transmitral velocity was within the normal range with no evidence for  mitral stenosis (mean graident 4 mmHg). There was trace mitral  regurgitation. Signature    ----------------------------------------------------------------  Electronically signed by Kwabena Mooney MD (Interpreting  physician) on 04/05/2019 at 11:51 AM  ----------------------------------------------------------------    Findings    Mitral Valve  S/p MVR. DOPPLER: The transmitral velocity was within the normal range  with no evidence for mitral stenosis (mean graident 4 mmHg). There was  trace mitral regurgitation. Aortic Valve  s/p AVR. DOPPLER: Transaortic velocity was within the normal range with no  evidence of aortic stenosis (mean gradient 6-8 mmHg). There was no  evidence of aortic regurgitation. Tricuspid Valve  The tricuspid valve structure was normal with normal leaflet separation. DOPPLER: There was no evidence of tricuspid stenosis. There was trace to  mild tricuspid regurgitation. Pulmonic Valve  The pulmonic valve leaflets were not well seen.  DOPPLER: The transpulmonic  velocity was within the normal range with no evidence for regurgitation. Left Atrium  Left atrial size was normal.    Left Ventricle  Normal left ventricle size and severely reduced systolic function. Ejection fraction was estimated at 20-25%. There was severe global  hypokinesis. The wall thickness was within normal limits. Right Atrium  Right atrial size was normal.    Right Ventricle  The right ventricular size was normal with normal systolic function and  wall thickness. Pericardial Effusion  The pericardium was normal in appearance with no evidence of a pericardial  effusion. Pleural Effusion  No evidence of pleural effusion. Aorta / Great Vessels  IVC size is within normal limits with normal respiratory phasic changes.     M-Mode/2D Measurements & Calculations    LV Diastolic      LV Systolic Dimension: 4.6 cm    LA Dimension: 4.7 cmAO  Dimension: 5.1 cm LV Volume Diastolic: 924.9 ml    Root Dimension: 3.2 cm  LV FS:9.8 %       LV Volume Systolic: 981.0 ml  LV PW Diastolic:  LV EDV/LV EDV Index: 191.3 ml/88  1.1 cm            m^2LV ESV/LV ESV Index: 428.4  Septum Diastolic: IE/58 m^2                        RV Diastolic Dimension:  1 cm              EF Calculated: 24.7 %            3.5 cm    LV Length: 9.6 cm                LA/Aorta: 1.47    LV Area  Diastolic: 91.6  cm^2  LV Area Systolic:  80.5 cm^2    Doppler Measurements & Calculations    MV Peak E-Wave: 134  AV Peak Velocity: 151 LVOT Peak Velocity: 103 cm/s  cm/s                 cm/s                  LVOT Mean Velocity: 71.7 cm/s  MV Peak A-Wave: 87.5 AV Peak Gradient:     LVOT Peak Gradient: 5 mmHgLVOT  cm/s                 9.12 mmHg             Mean Gradient: 2 mmHg  MV E/A Ratio: 1.53   AV Mean Velocity: 111  MV Peak Gradient:    cm/s  7.18 mmHg            AV Mean Gradient: 7  MV Mean Gradient: 4  mmHg                  TR Velocity:262 cm/s  mmHg                 AV VTI: 39.3 cm       TR Gradient:27.46 mmHg  MV Mean Velocity: PV Peak Velocity: 99 cm/s  97.7 cm/s                                  PV Peak Gradient: 3.92 mmHg  MV Deceleration      LVOT VTI: 24.4 cm  Time: 391 msec  MV P1/2t: 131 msec  MVA by PHT:1.68 cm^2  AV DVI (VTI): 0.62AV  DVI (Vmax):0.68  MV E' Lateral  Velocity: 12.4 cm/s  MV A' Lateral  Velocity: 5.9 cm/s  E/E' lateral: 10.81  MR Velocity: 448  cm/s    [email or web address] [id number]       Assessment/Plan   Cardiomyopathy, combined ischemic/valvular heart disease  S/p AVR  S/p MV repair  CABG  EF 20-25%, NYHA II  LBBB  LIRA   CKD  Hx of LORENA  Re-dicussed BiV-ICD, as it may help with his LIRA and fatigue, he is on OMT, BP and HR well controlled, no volume on exam.  He will let us know. Discussed diet/exercise/BP/weight loss/health lifestyle choices/lipids; the patient understands the goals and will try to comply.     Disposition:  1 year         Electronically signed by Rea Cary MD   6/30/2021 at 12:11 PM EDT

## 2021-06-30 NOTE — TELEPHONE ENCOUNTER
Patient deciding on ICD. If he calls back and wants to proceed per Dr. Mary Han patient will need referred to Dr. Salvatore Triplett.

## 2021-09-13 PROBLEM — C34.00 LUNG CANCER, MAIN BRONCHUS (HCC): Status: ACTIVE | Noted: 2021-09-13

## 2021-09-14 NOTE — TELEPHONE ENCOUNTER
Patient wife called in. Patient is out of Entresto  mg and is asking for samples d/t it taking the pharmacy a week to get this medicine. We only stock 49-51 mg samples. Okay to do two tablets BID for samples until patient can receive his regular dose from pharmacy?

## 2021-12-12 NOTE — PROGRESS NOTES
Heart Failure Clinic       Visit Date: 12/13/2021  Cardiologist:  Chayo Schulz  Primary Care Physician: Dr. Caity Rios is a 78 y.o. male who presents today for:  Chief Complaint   Patient presents with    Congestive Heart Failure       HPI:   Aislinn Montes is a 78 y.o. male who presents to the office for a follow up patient visit in the heart failure clinic. Accompanied by wife, Mililani Emperor    Cause HF: Systolic (EF 23-61%), ICM/NICM (valvular)   Device:  No - pt refused  HX:  s/p AVR & MV repair & 2V CABG (11/20018), Hx LORENA (no Coumadin), CKD (baseline 1.1-1.4,  JBWAQJN), DM, HTN, HLD     Hospitalization: none for CHF  Referred from [facility] 6/2019 for Entresto = med management.   OV CHF 4/2021 - declined further optimization w/ meds (SGLT2) - again declined ICD. OV [place] 6/2021 - re-discussed BiV ICD     Today: feeling about the same - maybe little worsening LIRA. States always worse in winter d/t not doing as much. Some mild rare chest pain - rest helps  Activity: walked from entrance, no break. Tolerates ADLs  Diet: watching    Patient has:  Chest Pain: mild - rare  SOB: some LIRA  Orthopnea/PND: no  BENTON: no  Edema: no  Fatigue: no  Abdominal bloating: no  Cough: no  Appetite: good  Home weight: stable  Home blood pressure: elevated at times (prior to meds)    Past Medical History:   Diagnosis Date    Acute on chronic systolic CHF (congestive heart failure), NYHA class 3 ([facility]) EF 25-30% 11/7/2018    CAD (coronary artery disease)     CKD (chronic kidney disease) stage 3, GFR 30-59 ml/min ([facility] 9/23/2015    Diabetes mellitus ([facility]     HTN (hypertension), benign     Hyperlipidemia     Hypothyroidism     Mitral and aortic incompetence     Neuropathy     Positive blood culture     Being treated with rocephhin as outpt.     S/P CABG x 2 11/15/2018     Past Surgical History:   Procedure Laterality Date    CARDIAC SURGERY      heart cath    CATARACT REMOVAL WITH IMPLANT Bilateral     COLONOSCOPY      CORONARY ARTERY BYPASS GRAFT  11/14/2018    DIAGNOSTIC CARDIAC CATH LAB PROCEDURE      EYE SURGERY      [place] CABG, VEIN, THREE N/A 11/14/2018    CABG CORONARY ARTERY BYPASS,  AORTIC AND MITRAL VALVE REPAIR WITH MICHELE, BALLOON PUMP INSERTION performed by Diana Roberson MD at [address] [facility] ECHO TRANSESOPHAG R-T 2D IMG ACQUISJ I&R ONLY Left 10/25/2018    TRANSESOPHAGEAL ECHOCARDIOGRAM performed by Monroe oSto MD at [address] [facility]     Family History   Problem Relation Age of Onset    Diabetes Mother     Other Mother         chf    Cancer Brother 28        renal    Cancer Brother 46        renal     Social History     Tobacco Use    Smoking status: Passive Smoke Exposure - Never Smoker    Smokeless tobacco: Never Used    Tobacco comment: smoked for 4 years while in the [employer] >40 years ago   Substance Use Topics    Alcohol use: Yes     Alcohol/week: 14.0 standard drinks     Types: 14 Cans of beer per week     Current Outpatient Medications   Medication Sig Dispense Refill    ferrous sulfate (IRON 325) 325 (65 Fe) MG tablet Take 650 mg by mouth daily (with breakfast)      nitroGLYCERIN (NITROSTAT) 0.4 MG SL tablet up to max of 3 total doses. If no relief after 1 dose, call 911. 25 tablet 3    atorvastatin (LIPITOR) 80 MG tablet Take 80 mg by mouth daily      levothyroxine (SYNTHROID) 75 MCG tablet Take 75 mcg by mouth Daily      furosemide (LASIX) 20 MG tablet Take 1 tablet by mouth daily as needed (for wt gain or swelling) 30 tablet 1    metoprolol succinate (TOPROL XL) 50 MG extended release tablet Take 1 tablet by mouth daily 90 tablet 3    sacubitril-valsartan (ENTRESTO)  MG per tablet Take 1 tablet by mouth 2 times daily 180 tablet 3    aspirin 81 MG tablet Take 81 mg by mouth daily      gabapentin (NEURONTIN) 400 MG capsule Take 400 mg by mouth 2 times daily.       glimepiride (AMARYL) 1 MG tablet Take 0.5 mg by mouth every morning (before breakfast)       docusate sodium (COLACE) 100 MG capsule Take 100 mg by mouth as needed       ONE TOUCH ULTRA TEST strip as needed  0     No current facility-administered medications for this visit. Allergies   Allergen Reactions    Heparin Other (See Comments)       SUBJECTIVE:   Review of Systems   Constitutional: Negative for activity change, appetite change and fatigue. Respiratory: Positive for shortness of breath (some LIRA). Negative for cough. Cardiovascular: Negative for chest pain, palpitations and leg swelling. Gastrointestinal: Negative for abdominal distention. Neurological: Negative for weakness, light-headedness and headaches. Hematological: Negative for adenopathy. Psychiatric/Behavioral: Negative for sleep disturbance. OBJECTIVE:   Today's Vitals:  /72   Pulse 61   Ht 6' (1.829 m)   Wt 222 lb (100.7 kg)   SpO2 96%   BMI 30.11 kg/m²     Physical Exam  Vitals reviewed. Constitutional:       General: He is not in acute distress. Appearance: Normal appearance. He is well-developed. He is not diaphoretic. HENT:      Head: Normocephalic and atraumatic. Eyes:      Conjunctiva/sclera: Conjunctivae normal.   Neck:      Comments: No JVD  Cardiovascular:      Rate and Rhythm: Normal rate and regular rhythm. Heart sounds: Normal heart sounds. No murmur heard. Pulmonary:      Effort: Pulmonary effort is normal. No respiratory distress. Breath sounds: Normal breath sounds. No wheezing or rales. Abdominal:      General: Bowel sounds are normal. There is no distension. Palpations: Abdomen is soft. Tenderness: There is no abdominal tenderness. Musculoskeletal:         General: Normal range of motion. Cervical back: Normal range of motion and neck supple. Right lower leg: No edema. Left lower leg: No edema. Skin:     General: Skin is warm and dry. Capillary Refill: Capillary refill takes less than 2 seconds.    Neurological:      Mental Status: He is alert and oriented to person, place, and time. Coordination: Coordination normal.   Psychiatric:         Behavior: Behavior normal.         Wt Readings from Last 3 Encounters:   12/13/21 222 lb (100.7 kg)   06/30/21 215 lb 3.2 oz (97.6 kg)   06/02/21 217 lb 8 oz (98.7 kg)     BP Readings from Last 3 Encounters:   12/13/21 116/72   06/30/21 121/65   06/02/21 (!) 148/78     Pulse Readings from Last 3 Encounters:   12/13/21 61   06/30/21 52   06/02/21 57     Body mass index is 30.11 kg/m². ECHO:    Summary   Technically difficult examination.   Ejection fraction was estimated at 20-25%.   There was severe global hypokinesis.   s/p AVR.   Transaortic velocity was within the normal range with no evidence of   aortic stenosis (mean gradient 6-8 mmHg). There was no evidence of aortic   regurgitation.   S/p MVR.   The transmitral velocity was within the normal range with no evidence for   mitral stenosis (mean graident 4 mmHg).  There was trace mitral   regurgitation.      Signature      ----------------------------------------------------------------   Electronically signed by Julian Gomez MD (Ernie WEEMSLIZBET) on 04/05/2019 at 11:51 AM   ----------------------------------------------------------------     CATH/STRESS:   [facility] 11/2018 - none since    Results reviewed:  BNP: No results found for: BNP  CBC:   Lab Results   Component Value Date    WBC 2 05/18/2021    WBC 2.2 12/06/2018    RBC 5 05/18/2021    HGB 8.1 12/06/2018    HCT 38.1 03/05/2021     12/06/2018     CMP:    Lab Results   Component Value Date     05/18/2021    K 4.5 05/18/2021    K 4.4 11/14/2018     05/18/2021    CO2 27 05/18/2021    BUN 17 05/18/2021    CREATININE 1.14 05/18/2021    LABGLOM 59 05/27/2020    LABGLOM 59 10/28/2019    GLUCOSE 102 05/18/2021    GLUCOSE Negative 05/18/2021    CALCIUM 9.2 05/18/2021     Hepatic Function Panel:    Lab Results   Component Value Date    ALKPHOS 85 11/10/2018    ALT 19 11/10/2018    AST 26 11/10/2018    PROT 7.4 11/10/2018    BILITOT Negative 05/18/2021    BILIDIR <0.2 11/05/2018    LABALBU 3.5 11/10/2018     Magnesium:    Lab Results   Component Value Date    MG 2.5 12/06/2018     PT/INR:    Lab Results   Component Value Date    INR 2.02 12/27/2018     Lipids:    Lab Results   Component Value Date    TRIG 96 03/05/2021    HDL 32 03/05/2021    LDLCALC 125 03/05/2021       ASSESSMENT AND PLAN:   The patient's condition/symptoms are Stable: No clinical evidence of fluid overload today. Continue current medical regimen without changes at present time. Diagnosis Orders   1. CHF (congestive heart failure), NYHA class II, chronic, systolic (HCC)  Echo 2D w doppler w color complete   2. Ischemic cardiomyopathy     3. Coronary artery disease involving native coronary artery of native heart without angina pectoris     4. S/P AVR     5. S/P MVR (mitral valve repair)     6. S/P CABG x 2       Continue:  GDMT:              ACE/ARB/ARNI - Entresto 97/103 BID              BB -Toprol 50/day              Diuretic - Lasix 20 PRN  AA - no - K+ runs high  SGLT2 -  No - declined previously  Vasodilator - no  Continue Current medications  Stable, appears Euvolemic - no fluid on exam.   Some LIRA, he thinks improves little if takes Lasix - increase Lasix to MWF - call w/ update in week/so. Lab reviewed - stable  Repeat blood work in 6 mth - w/ PCP  BP/HR stable   No med changes today   Continue diet/fluid adherence  Continue daily wts. Discussed ICD briefly - pt prefers to wait till appt w/ Jean Marsh in June to discuss further. No ECHO since 4/2019 - will get repeat ECHO prior to appt. F/U w/ Cardiology  F/U in clinic in 1 year    Tolerating above noted HF meds, no ill side effects noted. Will continue to monitor kidney function and electrolytes. Will optimize as tolerated. Pt is compliant w/ medications.     Total visit time of 30 minutes has been spent with patient on education of symptoms, management, medication, and plan of care; as well as review of chart: labs, ECHO, radiology reports, etc.   I personally spent more then 50% of the appt time face to face with the patient. · Daily weights  · Fluid restriction of 2 Liters per day  · Limit sodium in diet to around 5916-1726 mg/day  · Monitor BP  · Activity as tolerated     Patient was instructed to call the [address] for any changes in symptoms as noted in AVS.      No follow-ups on file. or sooner if needed     Patient given educational materials - see patient instructions. We discussed the importance of weighing oneself and recording daily. We also discussed the importance of a low sodium diet, higher sodium foods to avoid and better low sodium food options. Patient verbalizes understanding of plan of care using teach back method, and is agreeable to the treatment plan.        Electronically signed by SRIRAM Simpson CNP on 12/13/2021 at 10:56 AM

## 2021-12-13 ENCOUNTER — OFFICE VISIT (OUTPATIENT)
Dept: CARDIOLOGY CLINIC | Age: 76
End: 2021-12-13
Payer: MEDICARE

## 2021-12-13 VITALS
HEART RATE: 61 BPM | BODY MASS INDEX: 30.07 KG/M2 | HEIGHT: 72 IN | SYSTOLIC BLOOD PRESSURE: 116 MMHG | OXYGEN SATURATION: 96 % | WEIGHT: 222 LBS | DIASTOLIC BLOOD PRESSURE: 72 MMHG

## 2021-12-13 DIAGNOSIS — Z98.890 S/P MVR (MITRAL VALVE REPAIR): ICD-10-CM

## 2021-12-13 DIAGNOSIS — I25.5 ISCHEMIC CARDIOMYOPATHY: ICD-10-CM

## 2021-12-13 DIAGNOSIS — I25.10 CORONARY ARTERY DISEASE INVOLVING NATIVE CORONARY ARTERY OF NATIVE HEART WITHOUT ANGINA PECTORIS: ICD-10-CM

## 2021-12-13 DIAGNOSIS — Z95.1 S/P CABG X 2: ICD-10-CM

## 2021-12-13 DIAGNOSIS — I50.22 CHF (CONGESTIVE HEART FAILURE), NYHA CLASS II, CHRONIC, SYSTOLIC (HCC): Primary | ICD-10-CM

## 2021-12-13 DIAGNOSIS — Z95.2 S/P AVR: ICD-10-CM

## 2021-12-13 PROCEDURE — 3017F COLORECTAL CA SCREEN DOC REV: CPT | Performed by: NURSE PRACTITIONER

## 2021-12-13 PROCEDURE — 4040F PNEUMOC VAC/ADMIN/RCVD: CPT | Performed by: NURSE PRACTITIONER

## 2021-12-13 PROCEDURE — 1036F TOBACCO NON-USER: CPT | Performed by: NURSE PRACTITIONER

## 2021-12-13 PROCEDURE — G8427 DOCREV CUR MEDS BY ELIG CLIN: HCPCS | Performed by: NURSE PRACTITIONER

## 2021-12-13 PROCEDURE — 1123F ACP DISCUSS/DSCN MKR DOCD: CPT | Performed by: NURSE PRACTITIONER

## 2021-12-13 PROCEDURE — G8484 FLU IMMUNIZE NO ADMIN: HCPCS | Performed by: NURSE PRACTITIONER

## 2021-12-13 PROCEDURE — G8417 CALC BMI ABV UP PARAM F/U: HCPCS | Performed by: NURSE PRACTITIONER

## 2021-12-13 PROCEDURE — 99214 OFFICE O/P EST MOD 30 MIN: CPT | Performed by: NURSE PRACTITIONER

## 2021-12-13 RX ORDER — FERROUS SULFATE 325(65) MG
650 TABLET ORAL
COMMUNITY

## 2021-12-13 RX ORDER — NITROGLYCERIN 0.4 MG/1
TABLET SUBLINGUAL
Qty: 25 TABLET | Refills: 3 | Status: SHIPPED | OUTPATIENT
Start: 2021-12-13

## 2021-12-13 ASSESSMENT — ENCOUNTER SYMPTOMS
ABDOMINAL DISTENTION: 0
SHORTNESS OF BREATH: 1
COUGH: 0

## 2021-12-13 NOTE — PATIENT INSTRUCTIONS
You may receive a survey regarding the care you received during your visit. Your input is valuable to us. We encourage you to complete and return your survey. We hope you will choose us in the future for your healthcare needs.     Continue:  · Continue current medications  · Daily weights and record  · Fluid restriction of 2 Liters per day  · Limit sodium in diet to around 1359-3810 mg/day  · Monitor BP  · Activity as tolerated     Call the [facility] for any of the following symptoms: [phone]   Weight gain of 2-3 pounds in 1 day or 5 pounds in 1 week   Increased shortness of breath   Shortness of breath while laying down   Cough   Chest pain   Swelling in feet, ankles or legs   Tenderness or bloating in the abdomen   Fatigue    Decreased appetite or nausea    Confusion       Trial taking Lasix every Mon, Wed, Friday for week or so - if seems to help breathing ok to continue and let us know, if not go back to as needed (will get bloodwork in Feb/March if we continue routine Lasix)

## 2022-02-08 RX ORDER — FUROSEMIDE 20 MG/1
20 TABLET ORAL
Qty: 60 TABLET | Refills: 1 | Status: SHIPPED | OUTPATIENT
Start: 2022-02-09

## 2022-05-20 ENCOUNTER — TELEPHONE (OUTPATIENT)
Dept: NEPHROLOGY | Age: 77
End: 2022-05-20

## 2022-05-20 LAB
ANION GAP SERPL CALCULATED.3IONS-SCNC: 12 MMOL/L (ref 5–15)
APPEARANCE: ABNORMAL
BILIRUBIN: NEGATIVE
BUN BLDV-MCNC: 18 MG/DL (ref 5–27)
CALCIUM SERPL-MCNC: 9.3 MG/DL (ref 8.5–10.5)
CHLORIDE BLD-SCNC: 105 MMOL/L (ref 98–109)
CO2: 24 MMOL/L (ref 22–32)
COLOR: YELLOW
CREAT SERPL-MCNC: 1.26 MG/DL (ref 0.6–1.3)
CREATINE, URINE: 88.76 MG/DL
CREATINE, URINE: 88.76 MG/DL
EGFR AFRICAN AMERICAN: >60 ML/MIN/1.73SQ.M
EGFR IF NONAFRICAN AMERICAN: 56 ML/MIN/1.73SQ.M
GLUCOSE BLD-MCNC: NEGATIVE MG/DL
GLUCOSE: 108 MG/DL (ref 65–99)
KETONES, URINE: NEGATIVE MG/DL
LEUKOCYTE ESTERASE, URINE: ABNORMAL
MICROALBUMIN/CREAT 24H UR: 26.7 MG/DL (ref 0–1.9)
MICROALBUMIN/CREAT UR-RTO: 300.8 MG/G CREAT (ref 0–30)
NITRITE, URINE: NEGATIVE
OCCULT BLOOD,URINE: ABNORMAL
OTHER MICROSCOPIC ELEMENTS: ABNORMAL
PH: 6.5 (ref 5–8.5)
POTASSIUM SERPL-SCNC: 4.8 MMOL/L (ref 3.5–5)
PROTEIN, URINE: 100 MG/DL
PROTEIN, URINE: 1060 MG/L
PROTEIN/CREAT RATIO: 1.19
RBC: 45 /HPF (ref 0–5)
SODIUM BLD-SCNC: 141 MMOL/L (ref 134–146)
SP GRAVITY MISCELLANEOUS: 1.01 (ref 1–1.03)
UROBILINOGEN, URINE: <1.1 EU/DL
WBC: >720 /HPF (ref 0–5)

## 2022-05-20 NOTE — TELEPHONE ENCOUNTER
----- Message from Emi Juan DO sent at 5/20/2022  2:25 PM EDT -----  Any UTI symptoms?  ----- Message -----  From: Jacinto Buitrago In [facility]  Sent: 5/20/2022  12:26 AM EDT  To: Noah Cuellar MD

## 2022-05-23 RX ORDER — CIPROFLOXACIN 500 MG/1
500 TABLET, FILM COATED ORAL 2 TIMES DAILY
Qty: 10 TABLET | Refills: 0 | Status: SHIPPED | OUTPATIENT
Start: 2022-05-23 | End: 2022-05-28

## 2022-05-23 NOTE — TELEPHONE ENCOUNTER
Pt states he is having some burning w/urination and pressure in the abdomen. He states this has been going on for about a week. What do you want to do?

## 2022-05-25 ENCOUNTER — HOSPITAL ENCOUNTER (OUTPATIENT)
Dept: NON INVASIVE DIAGNOSTICS | Age: 77
Discharge: HOME | End: 2022-05-25
Payer: MEDICARE

## 2022-05-25 DIAGNOSIS — I50.22 CHF (CONGESTIVE HEART FAILURE), NYHA CLASS II, CHRONIC, SYSTOLIC (HCC): ICD-10-CM

## 2022-05-25 LAB
LV EF: 43 %
LVEF MODALITY: NORMAL

## 2022-05-25 PROCEDURE — 93306 TTE W/DOPPLER COMPLETE: CPT

## 2022-05-27 ENCOUNTER — TELEPHONE (OUTPATIENT)
Dept: CARDIOLOGY CLINIC | Age: 77
End: 2022-05-27

## 2022-05-27 NOTE — TELEPHONE ENCOUNTER
----- Message from SRIRAM Khan - CNP sent at 5/26/2022 10:47 PM EDT -----  Routine ECHO done (none done since 2019) - let Franki Isaac know EF improved from 20-25% to 40-45%. No indication for ICD w/ this improvement. Valves look good. Has appt w/ Maame León next month, can discuss further.

## 2022-06-01 ENCOUNTER — OFFICE VISIT (OUTPATIENT)
Dept: NEPHROLOGY | Age: 77
End: 2022-06-01
Payer: MEDICARE

## 2022-06-01 VITALS
DIASTOLIC BLOOD PRESSURE: 64 MMHG | SYSTOLIC BLOOD PRESSURE: 138 MMHG | BODY MASS INDEX: 30.24 KG/M2 | WEIGHT: 223 LBS | OXYGEN SATURATION: 98 % | HEART RATE: 48 BPM

## 2022-06-01 DIAGNOSIS — N18.31 STAGE 3A CHRONIC KIDNEY DISEASE (HCC): ICD-10-CM

## 2022-06-01 DIAGNOSIS — N28.1 RENAL CYST: Primary | ICD-10-CM

## 2022-06-01 DIAGNOSIS — I10 HTN (HYPERTENSION), BENIGN: ICD-10-CM

## 2022-06-01 PROCEDURE — 1123F ACP DISCUSS/DSCN MKR DOCD: CPT | Performed by: INTERNAL MEDICINE

## 2022-06-01 PROCEDURE — 3017F COLORECTAL CA SCREEN DOC REV: CPT | Performed by: INTERNAL MEDICINE

## 2022-06-01 PROCEDURE — 99213 OFFICE O/P EST LOW 20 MIN: CPT | Performed by: INTERNAL MEDICINE

## 2022-06-01 PROCEDURE — G8417 CALC BMI ABV UP PARAM F/U: HCPCS | Performed by: INTERNAL MEDICINE

## 2022-06-01 PROCEDURE — G8427 DOCREV CUR MEDS BY ELIG CLIN: HCPCS | Performed by: INTERNAL MEDICINE

## 2022-06-01 PROCEDURE — 1036F TOBACCO NON-USER: CPT | Performed by: INTERNAL MEDICINE

## 2022-06-01 RX ORDER — CLONIDINE HYDROCHLORIDE 0.1 MG/1
0.1 TABLET ORAL 2 TIMES DAILY
Status: ON HOLD | COMMUNITY
End: 2022-10-27 | Stop reason: HOSPADM

## 2022-06-01 NOTE — PROGRESS NOTES
[facility]          Outpatient Follow-Up note         6/1/2022 9:25 AM    Patient Name:   Raven Huffman  YOB: 1945  Primary Care Physician:  Minerva Guevara     Chief Complaint / Reason for follow-up : Follow Up of chronic kidney disease and hypertension      Interval History :  Patient seen and examined by me. Feels well. Denies any complaints. No chest pain and no shortness of breath. Status posttreatment for urinary tract infection     Past History :  Past Medical History:   Diagnosis Date    Acute on chronic systolic CHF (congestive heart failure), NYHA class 3 ([facility]) EF 25-30% 11/7/2018    CAD (coronary artery disease)     CKD (chronic kidney disease) stage 3, GFR 30-59 ml/min ([facility] [address] 9/23/2015    Diabetes mellitus ([facility] [address]     HTN (hypertension), benign     Hyperlipidemia     Hypothyroidism     Mitral and aortic incompetence     Neuropathy     Positive blood culture     Being treated with rocephhin as outpt.     S/P CABG x 2 11/15/2018     Past Surgical History:   Procedure Laterality Date    CARDIAC SURGERY      heart cath    CATARACT REMOVAL WITH IMPLANT Bilateral     COLONOSCOPY      CORONARY ARTERY BYPASS GRAFT  11/14/2018    DIAGNOSTIC CARDIAC CATH LAB PROCEDURE      EYE SURGERY      IN CABG, VEIN, THREE N/A 11/14/2018    CABG CORONARY ARTERY BYPASS,  AORTIC AND MITRAL VALVE REPAIR WITH MICHELE, BALLOON PUMP INSERTION performed by Rebecca Del Rio MD at [address] ECHO TRANSESOPHAG R-T 2D IMG ACQUISJ I&R ONLY Left 10/25/2018    TRANSESOPHAGEAL ECHOCARDIOGRAM performed by Emelia Loaiza MD at [address] Endoscopy        Medications :     Outpatient Medications Marked as Taking for the 6/1/22 encounter (Office Visit) with Alisha Johansen MD   Medication Sig Dispense Refill    cloNIDine (CATAPRES) 0.1 MG tablet Take 0.1 mg by mouth 2 times daily      furosemide (LASIX) 20 MG tablet Take 1 tablet by mouth three times a week And as directed by CHF clinic (Patient taking differently: Take 20 mg by mouth as needed And as directed by CHF clinic) 60 tablet 1    ferrous sulfate (IRON 325) 325 (65 Fe) MG tablet Take 650 mg by mouth daily (with breakfast)      nitroGLYCERIN (NITROSTAT) 0.4 MG SL tablet up to max of 3 total doses. If no relief after 1 dose, call 911. 25 tablet 3    atorvastatin (LIPITOR) 80 MG tablet Take 40 mg by mouth every evening       levothyroxine (SYNTHROID) 75 MCG tablet Take 75 mcg by mouth Daily      metoprolol succinate (TOPROL XL) 50 MG extended release tablet Take 1 tablet by mouth daily 90 tablet 3    sacubitril-valsartan (ENTRESTO)  MG per tablet Take 1 tablet by mouth 2 times daily 180 tablet 3    aspirin 81 MG tablet Take 81 mg by mouth daily      gabapentin (NEURONTIN) 400 MG capsule Take 400 mg by mouth 2 times daily.       glimepiride (AMARYL) 1 MG tablet Take 0.5 mg by mouth every morning (before breakfast)       docusate sodium (COLACE) 100 MG capsule Take 100 mg by mouth as needed       ONE TOUCH ULTRA TEST strip as needed  0       Vitals     /64 (Site: Right Upper Arm, Position: Sitting, Cuff Size: Medium Adult)   Pulse (!) 48   Wt 223 lb (101.2 kg)   SpO2 98%   BMI 30.24 kg/m²  Wt Readings from Last 3 Encounters:   06/01/22 223 lb (101.2 kg)   12/13/21 222 lb (100.7 kg)   06/30/21 215 lb 3.2 oz (97.6 kg)        Physical Exam     General -- well developed and well nourished  Lungs -- clear  Heart -- S1, S2 heard, JVD - no  Abdomen - soft, non-tender  Extremities -- no edema  CNS - awake and alert    Labs, Radiology and Tests    Labs -    4/16 4/17 4/18 2/19 4/19 5/20 5/21    Potassium 4 4.4 4.6 5.2 4.4 5.1 4.5    BUN 24 15 20 29 19 19 17    Creatinine 1.3 1.0 1.0 1.7 1.10 1.20 1.14    eGFR 55 74 75 40 > 60 59 > 60               UPCR   163  250 250 200    UMCR       37                 Renal Ultrasound Scan -- 10/2015  Right kidney 11.6 cm and left kidney 11.6 cm   4.1 cm cyst on the right kidney, bilateral renal cysts      Assessment      1. Renal  -- patient renal function appears to be stable at baseline as per MDRD his chronic kidney disease stage IIIa, mild microalbuminuria      - Overall creatinine appears to be at baseline      2. Essential Hypertension -- running  well. Continue current meds. 3. Diabetes mellitus with neuropathy and retinopathy . Well controlled . On ACE inhibitor so we always wanted to do the scan of your kidney and we have been postponing   4. Proteinuria slightly worse most likely due to recent urinary tract infection will reevaluate in the next visit  5. Renal cyst significant in size 4.1 cm. Will repeat a renal US scan finally agreed  6. Avoid Nephrotoxins, Nonsteroidal anti-inflammatories and IV Contrast Dye  7. H/O CHF -stable on on diuretics Prn   8. Follow up in 12 months      Tests and orders placed this Encounter     No orders of the defined types were placed in this encounter. Elsie Tovar M.D  [facility]. [facility]          Outpatient Follow-Up note         6/1/2022 9:25 AM    Patient Name:   Renetta Begum  YOB: 1945  Primary Care Physician:  Wyatt Stallings     Chief Complaint / Reason for follow-up : Follow Up of chronic kidney disease and hypertension      Interval History :  Patient seen and examined by me. Feels well. Denies any complaints. No chest pain and no shortness of breath.    No hospitalizations no med changes     Past History :  Past Medical History:   Diagnosis Date    Acute on chronic systolic CHF (congestive heart failure), NYHA class 3 ([facility]) EF 25-30% 11/7/2018    CAD (coronary artery disease)     CKD (chronic kidney disease) stage 3, GFR 30-59 ml/min ([facility] [address] 9/23/2015    Diabetes mellitus ([facility] [address]     HTN (hypertension), benign     Hyperlipidemia     Hypothyroidism     Mitral and aortic incompetence     Neuropathy     Positive blood culture     Being treated with rocephhin as outpt.  S/P CABG x 2 11/15/2018     Past Surgical History:   Procedure Laterality Date    CARDIAC SURGERY      heart cath    CATARACT REMOVAL WITH IMPLANT Bilateral     COLONOSCOPY      CORONARY ARTERY BYPASS GRAFT  11/14/2018    DIAGNOSTIC CARDIAC CATH LAB PROCEDURE      EYE SURGERY      [place] CABG, VEIN, THREE N/A 11/14/2018    CABG CORONARY ARTERY BYPASS,  AORTIC AND MITRAL VALVE REPAIR WITH MICHELE, BALLOON PUMP INSERTION performed by Jasper Bush MD at [address] [place] [address] ECHO TRANSESOPHAG R-T 2D IMG ACQUISJ I&R ONLY Left 10/25/2018    TRANSESOPHAGEAL ECHOCARDIOGRAM performed by Genesis Emerson MD at [facility]        Medications :     Outpatient Medications Marked as Taking for the 6/1/22 encounter (Office Visit) with Peg Membreno MD   Medication Sig Dispense Refill    cloNIDine (CATAPRES) 0.1 MG tablet Take 0.1 mg by mouth 2 times daily      furosemide (LASIX) 20 MG tablet Take 1 tablet by mouth three times a week And as directed by CHF clinic (Patient taking differently: Take 20 mg by mouth as needed And as directed by CHF clinic) 60 tablet 1    ferrous sulfate (IRON 325) 325 (65 Fe) MG tablet Take 650 mg by mouth daily (with breakfast)      nitroGLYCERIN (NITROSTAT) 0.4 MG SL tablet up to max of 3 total doses. If no relief after 1 dose, call 911. 25 tablet 3    atorvastatin (LIPITOR) 80 MG tablet Take 40 mg by mouth every evening       levothyroxine (SYNTHROID) 75 MCG tablet Take 75 mcg by mouth Daily      metoprolol succinate (TOPROL XL) 50 MG extended release tablet Take 1 tablet by mouth daily 90 tablet 3    sacubitril-valsartan (ENTRESTO)  MG per tablet Take 1 tablet by mouth 2 times daily 180 tablet 3    aspirin 81 MG tablet Take 81 mg by mouth daily      gabapentin (NEURONTIN) 400 MG capsule Take 400 mg by mouth 2 times daily.       glimepiride (AMARYL) 1 MG tablet Take 0.5 mg by mouth every morning (before breakfast)       docusate sodium (COLACE) 100 MG capsule Take 100 mg by mouth as needed       ONE TOUCH ULTRA TEST strip as needed  0       Vitals     /64 (Site: Right Upper Arm, Position: Sitting, Cuff Size: Medium Adult)   Pulse (!) 48   Wt 223 lb (101.2 kg)   SpO2 98%   BMI 30.24 kg/m²  Wt Readings from Last 3 Encounters:   06/01/22 223 lb (101.2 kg)   12/13/21 222 lb (100.7 kg)   06/30/21 215 lb 3.2 oz (97.6 kg)        Physical Exam     General -- well developed and well nourishes  Lungs -- clear  Heart -- S1, S2 heard, JVD - no  Abdomen - soft, non-tender  Extremities -- no edema  CNS - awake and alert    Labs, Radiology and Tests    Labs -    4/16 10/16 4/17 4/18 2/19 4/19 5/20 5/22    Potassium 4 4.0 4.4 4.6 5.2 4.4 5.1 4.8    BUN 24 13 15 20 29 19 19 18    Creatinine 1.3 0.9 1.0 1.0 1.7 1.10 1.20 1.26    eGFR 55 84 74 75 40 > 60 59 56                UPCR  <100  163  250 250 300    UMCR        1.19                  Renal Ultrasound Scan -- 10/2015  Right kidney 11.6 cm and left kidney 11.6 cm   4.1 cm cyst on the right kidney, bilateral renal cysts      Assessment      9. Renal  -- patient renal function significantly improved from previous visit. As per MDRD his chronic kidney disease stage III.      - Overall creatinine appears to be at baseline      10. Essential Hypertension -- running  well. Continue current meds. 11. Diabetes mellitus with neuropathy and retinopathy apparently reasonably controlled. 12. Renal cyst significant in size 4.1 cm. Repeat US scan next visit. Does not want to have it done now  13. Avoid Nephrotoxins, Nonsteroidal anti-inflammatories and IV Contrast Dye  14. H/O CHF -stable on on diuretics   15. Follow up in 12 months      Tests and orders placed this Encounter     No orders of the defined types were placed in this encounter. Florian Ngael M.D  [facility].

## 2022-06-09 ENCOUNTER — HOSPITAL ENCOUNTER (OUTPATIENT)
Dept: ULTRASOUND IMAGING | Age: 77
Discharge: HOME | End: 2022-06-09
Payer: MEDICARE

## 2022-06-09 DIAGNOSIS — N28.1 RENAL CYST: ICD-10-CM

## 2022-06-09 PROCEDURE — 76770 US EXAM ABDO BACK WALL COMP: CPT

## 2022-06-30 ENCOUNTER — OFFICE VISIT (OUTPATIENT)
Dept: CARDIOLOGY CLINIC | Age: 77
End: 2022-06-30
Payer: MEDICARE

## 2022-06-30 VITALS
HEIGHT: 72 IN | HEART RATE: 50 BPM | BODY MASS INDEX: 30.5 KG/M2 | SYSTOLIC BLOOD PRESSURE: 130 MMHG | WEIGHT: 225.2 LBS | DIASTOLIC BLOOD PRESSURE: 64 MMHG

## 2022-06-30 DIAGNOSIS — Z98.890 S/P MVR (MITRAL VALVE REPAIR): ICD-10-CM

## 2022-06-30 DIAGNOSIS — Z95.1 S/P CABG X 2: ICD-10-CM

## 2022-06-30 DIAGNOSIS — Z95.2 S/P AVR: ICD-10-CM

## 2022-06-30 DIAGNOSIS — I25.5 ISCHEMIC CARDIOMYOPATHY: ICD-10-CM

## 2022-06-30 DIAGNOSIS — I50.22 CHF (CONGESTIVE HEART FAILURE), NYHA CLASS II, CHRONIC, SYSTOLIC (HCC): Primary | ICD-10-CM

## 2022-06-30 PROCEDURE — 3017F COLORECTAL CA SCREEN DOC REV: CPT | Performed by: INTERNAL MEDICINE

## 2022-06-30 PROCEDURE — 99213 OFFICE O/P EST LOW 20 MIN: CPT | Performed by: INTERNAL MEDICINE

## 2022-06-30 PROCEDURE — 1036F TOBACCO NON-USER: CPT | Performed by: INTERNAL MEDICINE

## 2022-06-30 PROCEDURE — 1123F ACP DISCUSS/DSCN MKR DOCD: CPT | Performed by: INTERNAL MEDICINE

## 2022-06-30 PROCEDURE — G8417 CALC BMI ABV UP PARAM F/U: HCPCS | Performed by: INTERNAL MEDICINE

## 2022-06-30 PROCEDURE — 93000 ELECTROCARDIOGRAM COMPLETE: CPT | Performed by: INTERNAL MEDICINE

## 2022-06-30 PROCEDURE — G8427 DOCREV CUR MEDS BY ELIG CLIN: HCPCS | Performed by: INTERNAL MEDICINE

## 2022-06-30 RX ORDER — SPIRONOLACTONE 25 MG/1
25 TABLET ORAL DAILY
Status: ON HOLD | COMMUNITY
End: 2022-10-27 | Stop reason: HOSPADM

## 2022-06-30 RX ORDER — SPIRONOLACTONE 25 MG/1
25 TABLET ORAL DAILY
Qty: 90 TABLET | Refills: 3 | Status: CANCELLED | OUTPATIENT
Start: 2022-06-30

## 2022-06-30 NOTE — PROGRESS NOTES
[id number] [facility] [address] [place] [address]  Dept: [phone]  Dept Fax: [phone]  Loc: [phone]    Visit Date: 6/30/2022    Mr. Nancy Luna is a 78 y.o. 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 Some mild LIRA but no significant changes. Taking all meds. No side effects. No bleeding. No f/c/ns. Doing all of his ADLs. Current Outpatient Medications:     cloNIDine (CATAPRES) 0.1 MG tablet, Take 0.1 mg by mouth 2 times daily, Disp: , Rfl:     furosemide (LASIX) 20 MG tablet, Take 1 tablet by mouth three times a week And as directed by CHF clinic (Patient taking differently: Take 20 mg by mouth as needed And as directed by CHF clinic), Disp: 60 tablet, Rfl: 1    ferrous sulfate (IRON 325) 325 (65 Fe) MG tablet, Take 650 mg by mouth daily (with breakfast), Disp: , Rfl:     nitroGLYCERIN (NITROSTAT) 0.4 MG SL tablet, up to max of 3 total doses. If no relief after 1 dose, call 911., Disp: 25 tablet, Rfl: 3    atorvastatin (LIPITOR) 80 MG tablet, Take 40 mg by mouth every evening , Disp: , Rfl:     levothyroxine (SYNTHROID) 75 MCG tablet, Take 75 mcg by mouth Daily, Disp: , Rfl:     metoprolol succinate (TOPROL XL) 50 MG extended release tablet, Take 1 tablet by mouth daily, Disp: 90 tablet, Rfl: 3    sacubitril-valsartan (ENTRESTO)  MG per tablet, Take 1 tablet by mouth 2 times daily, Disp: 180 tablet, Rfl: 3    aspirin 81 MG tablet, Take 81 mg by mouth daily, Disp: , Rfl:     gabapentin (NEURONTIN) 400 MG capsule, Take 400 mg by mouth 2 times daily. , Disp: , Rfl:     glimepiride (AMARYL) 1 MG tablet, Take 0.5 mg by mouth every morning (before breakfast) , Disp: , Rfl:     docusate sodium (COLACE) 100 MG capsule, Take 100 mg by mouth as needed , Disp: , Rfl:     ONE TOUCH ULTRA TEST strip, as needed, Disp: , Rfl: 0    Past Medical History  Charline Gutierrez  has a past medical history of Acute on chronic systolic CHF (congestive heart failure), NYHA class 3 ([facility]) EF 25-30%, CAD (coronary artery disease), CKD (chronic kidney disease) stage 3, GFR 30-59 ml/min ([facility] [address] Diabetes mellitus ([facility] [address] HTN (hypertension), benign, Hyperlipidemia, Hypothyroidism, Mitral and aortic incompetence, Neuropathy, Positive blood culture, and S/P CABG x 2. Social History  Charline Gutierrez  reports that he is a non-smoker but has been exposed to tobacco smoke. He has been exposed to 1.00 pack per day for the past 3.00 years. He has never used smokeless tobacco. He reports current alcohol use of about 14.0 standard drinks of alcohol per week. He reports that he does not use drugs. Family History  Charline Gutierrez family history includes Cancer (age of onset: 28) in his brother; Cancer (age of onset: 46) in his brother; Diabetes in his mother; Other in his mother. There is no family history of bicuspid aortic valve, aneurysms, heart transplant, pacemakers, defibrillators, or sudden cardiac death. Past Surgical History   Past Surgical History:   Procedure Laterality Date    CARDIAC SURGERY      heart cath    CATARACT EXTRACTION W/  INTRAOCULAR LENS IMPLANT Bilateral     COLONOSCOPY      CORONARY ARTERY BYPASS GRAFT  11/14/2018    DIAGNOSTIC CARDIAC CATH LAB PROCEDURE      EYE SURGERY      [place] CABG, VEIN, THREE N/A 11/14/2018    CABG CORONARY ARTERY BYPASS,  AORTIC AND MITRAL VALVE REPAIR WITH MICHELE, BALLOON PUMP INSERTION performed by Tati Bird MD at [address] ECHO TRANSESOPHAG R-T 2D IMG ACQUISJ I&R ONLY Left 10/25/2018    TRANSESOPHAGEAL ECHOCARDIOGRAM performed by Miller Velázquez MD at [address] [facility]       Review of Systems   Constitutional: Negative for chills and fever  HENT: Negative for congestion, sinus pressure, sneezing and sore throat. Eyes: Negative for pain, discharge, redness and itching. Respiratory: Negative for apnea, cough  Gastrointestinal: Negative for blood in stool, constipation, diarrhea   Endocrine: Negative for cold intolerance, heat intolerance, polydipsia. Genitourinary: Negative for dysuria, enuresis, flank pain and hematuria. Musculoskeletal: Negative for arthralgias, joint swelling and neck pain. Neurological: Negative for numbness and headaches. Psychiatric/Behavioral: Negative for agitation, confusion, decreased concentration and dysphoric mood. Objective:     /64   Pulse 50   Ht 6' (1.829 m)   Wt 225 lb 3.2 oz (102.2 kg)   BMI 30.54 kg/m²     Wt Readings from Last 3 Encounters:   06/30/22 225 lb 3.2 oz (102.2 kg)   06/01/22 223 lb (101.2 kg)   12/13/21 222 lb (100.7 kg)     BP Readings from Last 3 Encounters:   06/30/22 130/64   06/01/22 138/64   12/13/21 116/72       Nursing note and vitals reviewed. Physical Exam   Constitutional: Oriented to person, place, and time. Appears well-developed and well-nourished. HENT:   Head: Normocephalic and atraumatic. Eyes: EOM are normal. Pupils are equal, round, and reactive to light. Neck: Normal range of motion. Neck supple. No JVD present. Cardiovascular: Normal rate, regular rhythm, normal heart sounds and intact distal pulses. No murmur heard. Pulmonary/Chest: Effort normal and breath sounds normal. No respiratory distress. No wheezes. No rales. Abdominal: Soft. Bowel sounds are normal. No distension. There is no tenderness. Musculoskeletal: Normal range of motion. No edema. Neurological: Alert and oriented to person, place, and time. No cranial nerve deficit. Coordination normal.   Skin: Skin is warm and dry. Psychiatric: Normal mood and affect.        No results found for: CKTOTAL, CKMB, CKMBINDEX    Lab Results   Component Value Date/Time    WBC >720 05/19/2022 10:00 AM    WBC 2.2 12/06/2018 04:00 AM    RBC 45 05/19/2022 10:00 AM HGB 8.1 12/06/2018 04:00 AM    HCT 38.1 03/05/2021 12:00 AM    MCV 92.9 12/06/2018 04:00 AM    MCH 28.8 12/06/2018 04:00 AM    MCHC 31.0 12/06/2018 04:00 AM     12/06/2018 04:00 AM    MPV 9.0 12/06/2018 04:00 AM       Lab Results   Component Value Date/Time     05/19/2022 08:00 AM    K 4.8 05/19/2022 08:00 AM    K 4.4 11/14/2018 12:30 AM     05/19/2022 08:00 AM    CO2 24 05/19/2022 08:00 AM    BUN 18 05/19/2022 08:00 AM    LABALBU 3.5 11/10/2018 04:40 AM    CREATININE 1.26 05/19/2022 08:00 AM    CALCIUM 9.3 05/19/2022 08:00 AM    LABGLOM 59 05/27/2020 12:00 AM    LABGLOM 59 10/28/2019 12:07 PM    GLUCOSE Negative 05/19/2022 10:00 AM    GLUCOSE 108 05/19/2022 08:00 AM       Lab Results   Component Value Date/Time    ALKPHOS 85 11/10/2018 04:40 AM    ALT 19 11/10/2018 04:40 AM    AST 26 11/10/2018 04:40 AM    PROT 7.4 11/10/2018 04:40 AM    BILITOT Negative 05/19/2022 10:00 AM    BILIDIR <0.2 11/05/2018 04:49 PM    LABALBU 3.5 11/10/2018 04:40 AM       Lab Results   Component Value Date/Time    MG 2.5 12/06/2018 04:00 AM       Lab Results   Component Value Date    INR 2.02 (H) 12/27/2018    INR 4.54 (H) 12/06/2018    INR 5.43 (HH) 12/06/2018         Lab Results   Component Value Date/Time    LABA1C 6.0 11/07/2018 10:00 PM       Lab Results   Component Value Date/Time    TRIG 96 03/05/2021 12:00 AM    HDL 32 03/05/2021 12:00 AM    LDLCALC 125 03/05/2021 12:00 AM       Lab Results   Component Value Date/Time    TSH 2.820 10/23/2018 09:00 AM         Testing Reviewed:      I have individually reviewed the cardiac test below:    ECHO: Results for orders placed during the hospital encounter of 05/25/22    Echo 2D w doppler w color complete    Narrative  Transthoracic Echocardiography Report (TTE)    Demographics    Patient Name     KHUSHI Haskins Gender                Male    MR #             [id number]    Race                      Ethnicity    Account #        [de-identified]    Room Number    Accession Bry OLMEDO (Interpreting  physician) on 05/26/2022 at 05:34 PM  ----------------------------------------------------------------    Findings    Mitral Valve  S/p MV ring repair. DOPPLER: The transmitral velocity was within the  normal range with no evidence for mitral stenosis (mean gradient 2 mmHg). There was no evidence of mitral regurgitation. Aortic Valve  S/p AVR. DOPPLER: Transaortic velocity was within the normal range with no  evidence of aortic stenosis (mean gradient 5 mmHg). There was no evidence  of aortic regurgitation. Tricuspid Valve  The tricuspid valve structure was normal with normal leaflet separation. DOPPLER: There was no evidence of tricuspid stenosis. There was no  evidence of tricuspid regurgitation. Pulmonic Valve  The pulmonic valve leaflets were not well seen. DOPPLER: The transpulmonic  velocity was within the normal range with no evidence for regurgitation. Left Atrium  Left atrial size was moderately dilated. Left Ventricle  Normal left ventricular size and moderately reduced systolic function. There was moderate global hypokinesis. Wall thickness was within normal limits. Ejection fraction was estimated at 40-45%. Right Atrium  Right atrial size was normal.    Right Ventricle  The right ventricular size was normal with normal systolic function and  wall thickness. Pericardial Effusion  The pericardium was normal in appearance with no evidence of a pericardial  effusion. Pleural Effusion  No evidence of pleural effusion. Aorta / Great Vessels  IVC size is within normal limits with normal respiratory phasic changes.     M-Mode/2D Measurements & Calculations    LV Diastolic       LV Systolic Dimension: 3.8 cm     LA Area: 20.2 cm^2  Dimension: 4.9 cm  LV Volume Diastolic: 091 ml  LV MD:26.4 %       LV Volume Systolic: 62 ml  LV PW Diastolic:   LV EDV/LV EDV Index: 113 ml/51  1.3 cm             m^2LV ESV/LV ESV Index: 62 ID/63  RV Diastolic  Septum Diastolic: m^2                               Dimension: 3.3 cm  1.4 cm             EF Calculated: 45.1 %  Ascending Aorta: 2.7  LV Length: 8.3 cm                 cm  LA volume/Index: 14.4  LV Area Diastolic: LVOT: 1.9 cm                      ml /28m^2  33.1 cm^2  LV Area Systolic:  75.5 cm^2    Doppler Measurements & Calculations    MV Peak E-Wave: 129  AV Peak Velocity: 159    LVOT Peak Velocity: 82.5  cm/s                 cm/s                     cm/s  MV Peak A-Wave: 111  AV Peak Gradient: 10.11  LVOT Mean Velocity: 51.8  cm/s                 mmHg                     cm/s  MV E/A Ratio: 1.16   AV Mean Velocity: 101    LVOT Peak Gradient: 3  MV Peak Gradient:    cm/s                     mmHgLVOT Mean Gradient: 1  6.66 mmHg            AV Mean Gradient: 7 mmHg mmHg  MV Mean Gradient: 2  AV VTI: 50.3 cm  mmHg                 AV Area                  TV Peak E-Wave: 54.4 cm/s  MV Mean Velocity:    (Continuity):1.31 cm^2   TV Peak A-Wave: 35.6 cm/s  67.8 cm/s  MV Deceleration      LVOT VTI: 23.3 cm        TV Peak Gradient: 1.18 mmHg  Time: 535 msec       IVRT: 106 msec           TR Velocity:300 cm/s  MV P1/2t: 191 msec                            TR Gradient:36 mmHg  MVA by PHT:1.15 cm^2                          PV Peak Velocity: 50.1 cm/s  MV Area              AV DVI (VTI): 0.46AV DVI PV Peak Gradient: 1 mmHg  (continuity): 1.02   (Vmax):0.52  cm^2  MV E' Septal  Velocity: 4.3 cm/s  MV A' Septal  Velocity: 3.8 cm/s  MV E' Lateral  Velocity: 8.1 cm/s  MV A' Lateral  Velocity: 5.4 cm/s  E/E' septal: 30  E/E' lateral: 15.93  MR Velocity: 522  cm/s    [email or web address] [id number]       Assessment/Plan   Cardiomyopathy, combined ischemic/valvular heart disease - EF improved after Entresto  S/p AVR  S/p MV repair  CABG  EF 40-45%, NYHA II  LBBB  Hx of LORENA  ECG shows sinus bradycardia, he is asymptomatic.   BP and HR otherwise stable. No major volume. Making urine. Add Farxiga 10 mg q day and Aldactone 25 mg q day for GDMT if he allows, follows with CHF. Monitor labs. Nephrology following. Discussed diet/exercise/BP/weight loss/health lifestyle choices/lipids; the patient understands the goals and will try to comply.     Disposition:  1 year         Electronically signed by Jeremie Hirsch MD   6/30/2022 at 1:48 PM EDT

## 2022-10-24 ENCOUNTER — APPOINTMENT (OUTPATIENT)
Dept: ULTRASOUND IMAGING | Age: 77
DRG: 177 | End: 2022-10-24
Payer: MEDICARE

## 2022-10-24 ENCOUNTER — APPOINTMENT (OUTPATIENT)
Dept: GENERAL RADIOLOGY | Age: 77
DRG: 177 | End: 2022-10-24
Payer: MEDICARE

## 2022-10-24 ENCOUNTER — HOSPITAL ENCOUNTER (INPATIENT)
Age: 77
LOS: 2 days | Discharge: HOME | DRG: 177 | End: 2022-10-27
Attending: STUDENT IN AN ORGANIZED HEALTH CARE EDUCATION/TRAINING PROGRAM | Admitting: INTERNAL MEDICINE
Payer: MEDICARE

## 2022-10-24 DIAGNOSIS — J18.9 PLEURAL EFFUSION ASSOCIATED WITH PULMONARY INFECTION: Primary | ICD-10-CM

## 2022-10-24 DIAGNOSIS — I50.9 CONGESTIVE HEART FAILURE, UNSPECIFIED HF CHRONICITY, UNSPECIFIED HEART FAILURE TYPE (HCC): ICD-10-CM

## 2022-10-24 DIAGNOSIS — U07.1 COVID: ICD-10-CM

## 2022-10-24 DIAGNOSIS — N17.9 ACUTE RENAL FAILURE, UNSPECIFIED ACUTE RENAL FAILURE TYPE (HCC): ICD-10-CM

## 2022-10-24 DIAGNOSIS — J91.8 PLEURAL EFFUSION ASSOCIATED WITH PULMONARY INFECTION: Primary | ICD-10-CM

## 2022-10-24 PROBLEM — E87.20 METABOLIC ACIDOSIS: Status: ACTIVE | Noted: 2022-10-24

## 2022-10-24 PROBLEM — R06.02 SHORTNESS OF BREATH: Status: ACTIVE | Noted: 2022-10-24

## 2022-10-24 LAB
ALBUMIN SERPL-MCNC: 3.7 G/DL (ref 3.5–5.1)
ALP BLD-CCNC: 88 U/L (ref 38–126)
ALT SERPL-CCNC: 19 U/L (ref 11–66)
ANION GAP SERPL CALCULATED.3IONS-SCNC: 18 MEQ/L (ref 8–16)
ANION GAP SERPL CALCULATED.3IONS-SCNC: 20 MEQ/L (ref 8–16)
AST SERPL-CCNC: 26 U/L (ref 5–40)
BASOPHILS # BLD: 0.4 %
BASOPHILS ABSOLUTE: 0 THOU/MM3 (ref 0–0.1)
BILIRUB SERPL-MCNC: 0.5 MG/DL (ref 0.3–1.2)
BILIRUBIN DIRECT: 0.3 MG/DL (ref 0–0.3)
BORDETELLA PARAPERTUSSIS BY PCR: NOT DETECTED
BORDETELLA PERTUSSIS BY PCR: NOT DETECTED
BUN BLDV-MCNC: 91 MG/DL (ref 7–22)
BUN BLDV-MCNC: 99 MG/DL (ref 7–22)
C-REACTIVE PROTEIN: 18.65 MG/DL (ref 0–1)
CALCIUM IONIZED: 1.08 MMOL/L (ref 1.12–1.32)
CALCIUM SERPL-MCNC: 8.2 MG/DL (ref 8.5–10.5)
CALCIUM SERPL-MCNC: 8.3 MG/DL (ref 8.5–10.5)
CHLORIDE BLD-SCNC: 100 MEQ/L (ref 98–111)
CHLORIDE BLD-SCNC: 103 MEQ/L (ref 98–111)
CO2: 16 MEQ/L (ref 23–33)
CO2: 16 MEQ/L (ref 23–33)
CREAT SERPL-MCNC: 3.5 MG/DL (ref 0.4–1.2)
CREAT SERPL-MCNC: 5.1 MG/DL (ref 0.4–1.2)
D-DIMER QUANTITATIVE: 1716 NG/ML FEU (ref 0–500)
EKG ATRIAL RATE: 59 BPM
EKG P-R INTERVAL: 130 MS
EKG Q-T INTERVAL: 428 MS
EKG QRS DURATION: 100 MS
EKG QTC CALCULATION (BAZETT): 423 MS
EKG R AXIS: 47 DEGREES
EKG T AXIS: -102 DEGREES
EKG VENTRICULAR RATE: 59 BPM
EOSINOPHIL # BLD: 0.2 %
EOSINOPHILS ABSOLUTE: 0 THOU/MM3 (ref 0–0.4)
ERYTHROCYTE [DISTWIDTH] IN BLOOD BY AUTOMATED COUNT: 14.1 % (ref 11.5–14.5)
ERYTHROCYTE [DISTWIDTH] IN BLOOD BY AUTOMATED COUNT: 54 FL (ref 35–45)
FERRITIN: 1924 NG/ML (ref 22–322)
FILM ARRAY ADENOVIRUS: NOT DETECTED
FILM ARRAY CHLAMYDOPHILIA PNEUMONIAE: NOT DETECTED
FILM ARRAY CORONAVIRUS 229E: NOT DETECTED
FILM ARRAY CORONAVIRUS HKU1: NOT DETECTED
FILM ARRAY CORONAVIRUS NL63: NOT DETECTED
FILM ARRAY CORONAVIRUS OC43: NOT DETECTED
FILM ARRAY INFLUENZA A VIRUS: NOT DETECTED
FILM ARRAY INFLUENZA B: NOT DETECTED
FILM ARRAY METAPNEUMOVIRUS: NOT DETECTED
FILM ARRAY MYCOPLASMA PNEUMONIAE: NOT DETECTED
FILM ARRAY PARAINFLUENZA VIRUS 1: NOT DETECTED
FILM ARRAY PARAINFLUENZA VIRUS 2: NOT DETECTED
FILM ARRAY PARAINFLUENZA VIRUS 3: NOT DETECTED
FILM ARRAY PARAINFLUENZA VIRUS 4: NOT DETECTED
FILM ARRAY RESPIRATORY SYNCITIAL VIRUS: NOT DETECTED
FILM ARRAY RHINOVIRUS/ENTEROVIRUS: NOT DETECTED
FOLATE: 7.3 NG/ML (ref 4.8–24.2)
GFR SERPL CREATININE-BSD FRML MDRD: 11 ML/MIN/1.73M2
GFR SERPL CREATININE-BSD FRML MDRD: 17 ML/MIN/1.73M2
GLUCOSE BLD-MCNC: 242 MG/DL (ref 70–108)
GLUCOSE BLD-MCNC: 267 MG/DL (ref 70–108)
GLUCOSE BLD-MCNC: 294 MG/DL (ref 70–108)
GLUCOSE BLD-MCNC: 304 MG/DL (ref 70–108)
GLUCOSE BLD-MCNC: 385 MG/DL (ref 70–108)
GLUCOSE BLD-MCNC: 85 MG/DL (ref 70–108)
HCT VFR BLD CALC: 38.9 % (ref 42–52)
HEMOGLOBIN: 12.4 GM/DL (ref 14–18)
IMMATURE GRANS (ABS): 0.05 THOU/MM3 (ref 0–0.07)
IMMATURE GRANULOCYTES: 0.9 %
LACTIC ACID, SEPSIS: 1.5 MMOL/L (ref 0.5–1.9)
LACTIC ACID, SEPSIS: 1.5 MMOL/L (ref 0.5–1.9)
LD: 178 U/L (ref 100–190)
LYMPHOCYTES # BLD: 14.1 %
LYMPHOCYTES ABSOLUTE: 0.8 THOU/MM3 (ref 1–4.8)
MCH RBC QN AUTO: 32.9 PG (ref 26–33)
MCHC RBC AUTO-ENTMCNC: 31.9 GM/DL (ref 32.2–35.5)
MCV RBC AUTO: 103.2 FL (ref 80–94)
MONOCYTES # BLD: 13.7 %
MONOCYTES ABSOLUTE: 0.8 THOU/MM3 (ref 0.4–1.3)
NUCLEATED RED BLOOD CELLS: 0 /100 WBC
OSMOLALITY CALCULATION: 302 MOSMOL/KG (ref 275–300)
PLATELET # BLD: 120 THOU/MM3 (ref 130–400)
PMV BLD AUTO: 11.1 FL (ref 9.4–12.4)
POTASSIUM REFLEX MAGNESIUM: 4.4 MEQ/L (ref 3.5–5.2)
POTASSIUM REFLEX MAGNESIUM: 4.5 MEQ/L (ref 3.5–5.2)
PRO-BNP: 1933 PG/ML (ref 0–1800)
PROCALCITONIN: 0.38 NG/ML (ref 0.01–0.09)
RBC # BLD: 3.77 MILL/MM3 (ref 4.7–6.1)
SARS-COV-2, PCR: DETECTED
SEG NEUTROPHILS: 70.7 %
SEGMENTED NEUTROPHILS ABSOLUTE COUNT: 3.9 THOU/MM3 (ref 1.8–7.7)
SODIUM BLD-SCNC: 136 MEQ/L (ref 135–145)
SODIUM BLD-SCNC: 137 MEQ/L (ref 135–145)
TOTAL PROTEIN: 7.6 G/DL (ref 6.1–8)
TROPONIN T: 0.06 NG/ML
VITAMIN B-12: 556 PG/ML (ref 211–911)
WBC # BLD: 5.5 THOU/MM3 (ref 4.8–10.8)

## 2022-10-24 PROCEDURE — 6360000002 HC RX W HCPCS: Performed by: STUDENT IN AN ORGANIZED HEALTH CARE EDUCATION/TRAINING PROGRAM

## 2022-10-24 PROCEDURE — 84484 ASSAY OF TROPONIN QUANT: CPT

## 2022-10-24 PROCEDURE — 94669 MECHANICAL CHEST WALL OSCILL: CPT

## 2022-10-24 PROCEDURE — 84145 PROCALCITONIN (PCT): CPT

## 2022-10-24 PROCEDURE — 82948 REAGENT STRIP/BLOOD GLUCOSE: CPT

## 2022-10-24 PROCEDURE — 87040 BLOOD CULTURE FOR BACTERIA: CPT

## 2022-10-24 PROCEDURE — 96365 THER/PROPH/DIAG IV INF INIT: CPT

## 2022-10-24 PROCEDURE — 80048 BASIC METABOLIC PNL TOTAL CA: CPT

## 2022-10-24 PROCEDURE — 96366 THER/PROPH/DIAG IV INF ADDON: CPT

## 2022-10-24 PROCEDURE — 82330 ASSAY OF CALCIUM: CPT

## 2022-10-24 PROCEDURE — 93010 ELECTROCARDIOGRAM REPORT: CPT | Performed by: INTERNAL MEDICINE

## 2022-10-24 PROCEDURE — 93005 ELECTROCARDIOGRAM TRACING: CPT

## 2022-10-24 PROCEDURE — 85379 FIBRIN DEGRADATION QUANT: CPT

## 2022-10-24 PROCEDURE — 99223 1ST HOSP IP/OBS HIGH 75: CPT | Performed by: INTERNAL MEDICINE

## 2022-10-24 PROCEDURE — 82728 ASSAY OF FERRITIN: CPT

## 2022-10-24 PROCEDURE — 6370000000 HC RX 637 (ALT 250 FOR IP)

## 2022-10-24 PROCEDURE — 96367 TX/PROPH/DG ADDL SEQ IV INF: CPT

## 2022-10-24 PROCEDURE — 2580000003 HC RX 258

## 2022-10-24 PROCEDURE — 86140 C-REACTIVE PROTEIN: CPT

## 2022-10-24 PROCEDURE — G0378 HOSPITAL OBSERVATION PER HR: HCPCS

## 2022-10-24 PROCEDURE — 0202U NFCT DS 22 TRGT SARS-COV-2: CPT

## 2022-10-24 PROCEDURE — 76770 US EXAM ABDO BACK WALL COMP: CPT

## 2022-10-24 PROCEDURE — 71046 X-RAY EXAM CHEST 2 VIEWS: CPT

## 2022-10-24 PROCEDURE — 2700000000 HC OXYGEN THERAPY PER DAY

## 2022-10-24 PROCEDURE — 85025 COMPLETE CBC W/AUTO DIFF WBC: CPT

## 2022-10-24 PROCEDURE — 82746 ASSAY OF FOLIC ACID SERUM: CPT

## 2022-10-24 PROCEDURE — 83605 ASSAY OF LACTIC ACID: CPT

## 2022-10-24 PROCEDURE — 87070 CULTURE OTHR SPECIMN AEROBIC: CPT

## 2022-10-24 PROCEDURE — 83615 LACTATE (LD) (LDH) ENZYME: CPT

## 2022-10-24 PROCEDURE — 2580000003 HC RX 258: Performed by: STUDENT IN AN ORGANIZED HEALTH CARE EDUCATION/TRAINING PROGRAM

## 2022-10-24 PROCEDURE — 80076 HEPATIC FUNCTION PANEL: CPT

## 2022-10-24 PROCEDURE — 99285 EMERGENCY DEPT VISIT HI MDM: CPT

## 2022-10-24 PROCEDURE — 96375 TX/PRO/DX INJ NEW DRUG ADDON: CPT

## 2022-10-24 PROCEDURE — 82607 VITAMIN B-12: CPT

## 2022-10-24 PROCEDURE — 94640 AIRWAY INHALATION TREATMENT: CPT

## 2022-10-24 PROCEDURE — 99220 PR INITIAL OBSERVATION CARE/DAY 70 MINUTES: CPT

## 2022-10-24 PROCEDURE — 6360000002 HC RX W HCPCS

## 2022-10-24 PROCEDURE — 36415 COLL VENOUS BLD VENIPUNCTURE: CPT

## 2022-10-24 PROCEDURE — 6370000000 HC RX 637 (ALT 250 FOR IP): Performed by: STUDENT IN AN ORGANIZED HEALTH CARE EDUCATION/TRAINING PROGRAM

## 2022-10-24 PROCEDURE — 83880 ASSAY OF NATRIURETIC PEPTIDE: CPT

## 2022-10-24 RX ORDER — POLYETHYLENE GLYCOL 3350 17 G/17G
17 POWDER, FOR SOLUTION ORAL DAILY PRN
Status: DISCONTINUED | OUTPATIENT
Start: 2022-10-24 | End: 2022-10-27 | Stop reason: HOSPADM

## 2022-10-24 RX ORDER — ASCORBIC ACID 500 MG
500 TABLET ORAL DAILY
Status: DISCONTINUED | OUTPATIENT
Start: 2022-10-24 | End: 2022-10-27 | Stop reason: HOSPADM

## 2022-10-24 RX ORDER — SPIRONOLACTONE 25 MG/1
25 TABLET ORAL DAILY
Status: DISCONTINUED | OUTPATIENT
Start: 2022-10-24 | End: 2022-10-27 | Stop reason: HOSPADM

## 2022-10-24 RX ORDER — ATORVASTATIN CALCIUM 40 MG/1
40 TABLET, FILM COATED ORAL EVERY EVENING
Status: DISCONTINUED | OUTPATIENT
Start: 2022-10-24 | End: 2022-10-27 | Stop reason: HOSPADM

## 2022-10-24 RX ORDER — ACETAMINOPHEN 650 MG/1
650 SUPPOSITORY RECTAL EVERY 6 HOURS PRN
Status: DISCONTINUED | OUTPATIENT
Start: 2022-10-24 | End: 2022-10-27 | Stop reason: HOSPADM

## 2022-10-24 RX ORDER — ONDANSETRON 4 MG/1
4 TABLET, ORALLY DISINTEGRATING ORAL EVERY 8 HOURS PRN
Status: DISCONTINUED | OUTPATIENT
Start: 2022-10-24 | End: 2022-10-27 | Stop reason: HOSPADM

## 2022-10-24 RX ORDER — INSULIN LISPRO 100 [IU]/ML
0-8 INJECTION, SOLUTION INTRAVENOUS; SUBCUTANEOUS
Status: DISCONTINUED | OUTPATIENT
Start: 2022-10-24 | End: 2022-10-27 | Stop reason: HOSPADM

## 2022-10-24 RX ORDER — ALBUTEROL SULFATE 2.5 MG/3ML
2.5 SOLUTION RESPIRATORY (INHALATION) EVERY 6 HOURS PRN
Status: DISCONTINUED | OUTPATIENT
Start: 2022-10-24 | End: 2022-10-27 | Stop reason: HOSPADM

## 2022-10-24 RX ORDER — SODIUM CHLORIDE 0.9 % (FLUSH) 0.9 %
10 SYRINGE (ML) INJECTION PRN
Status: DISCONTINUED | OUTPATIENT
Start: 2022-10-24 | End: 2022-10-27 | Stop reason: HOSPADM

## 2022-10-24 RX ORDER — DEXTROSE MONOHYDRATE 100 MG/ML
INJECTION, SOLUTION INTRAVENOUS CONTINUOUS PRN
Status: DISCONTINUED | OUTPATIENT
Start: 2022-10-24 | End: 2022-10-27 | Stop reason: HOSPADM

## 2022-10-24 RX ORDER — ACETAMINOPHEN 325 MG/1
650 TABLET ORAL EVERY 6 HOURS PRN
Status: DISCONTINUED | OUTPATIENT
Start: 2022-10-24 | End: 2022-10-27 | Stop reason: HOSPADM

## 2022-10-24 RX ORDER — ASPIRIN 81 MG/1
81 TABLET ORAL DAILY
Status: DISCONTINUED | OUTPATIENT
Start: 2022-10-24 | End: 2022-10-27 | Stop reason: HOSPADM

## 2022-10-24 RX ORDER — SODIUM CHLORIDE 9 MG/ML
INJECTION, SOLUTION INTRAVENOUS PRN
Status: DISCONTINUED | OUTPATIENT
Start: 2022-10-24 | End: 2022-10-27 | Stop reason: HOSPADM

## 2022-10-24 RX ORDER — LEVOTHYROXINE SODIUM 0.07 MG/1
75 TABLET ORAL DAILY
Status: DISCONTINUED | OUTPATIENT
Start: 2022-10-24 | End: 2022-10-27 | Stop reason: HOSPADM

## 2022-10-24 RX ORDER — INSULIN LISPRO 100 [IU]/ML
0-4 INJECTION, SOLUTION INTRAVENOUS; SUBCUTANEOUS NIGHTLY
Status: DISCONTINUED | OUTPATIENT
Start: 2022-10-24 | End: 2022-10-27 | Stop reason: HOSPADM

## 2022-10-24 RX ORDER — METOPROLOL SUCCINATE 50 MG/1
50 TABLET, EXTENDED RELEASE ORAL DAILY
Status: DISCONTINUED | OUTPATIENT
Start: 2022-10-24 | End: 2022-10-27 | Stop reason: HOSPADM

## 2022-10-24 RX ORDER — SODIUM CHLORIDE, SODIUM LACTATE, POTASSIUM CHLORIDE, AND CALCIUM CHLORIDE .6; .31; .03; .02 G/100ML; G/100ML; G/100ML; G/100ML
250 INJECTION, SOLUTION INTRAVENOUS ONCE
Status: COMPLETED | OUTPATIENT
Start: 2022-10-24 | End: 2022-10-24

## 2022-10-24 RX ORDER — SODIUM CHLORIDE, SODIUM LACTATE, POTASSIUM CHLORIDE, CALCIUM CHLORIDE 600; 310; 30; 20 MG/100ML; MG/100ML; MG/100ML; MG/100ML
INJECTION, SOLUTION INTRAVENOUS ONCE
Status: DISCONTINUED | OUTPATIENT
Start: 2022-10-24 | End: 2022-10-24

## 2022-10-24 RX ORDER — IPRATROPIUM BROMIDE AND ALBUTEROL SULFATE 2.5; .5 MG/3ML; MG/3ML
1 SOLUTION RESPIRATORY (INHALATION) EVERY 4 HOURS PRN
Status: DISCONTINUED | OUTPATIENT
Start: 2022-10-24 | End: 2022-10-25

## 2022-10-24 RX ORDER — SODIUM CHLORIDE 0.9 % (FLUSH) 0.9 %
10 SYRINGE (ML) INJECTION EVERY 12 HOURS SCHEDULED
Status: DISCONTINUED | OUTPATIENT
Start: 2022-10-24 | End: 2022-10-27 | Stop reason: HOSPADM

## 2022-10-24 RX ORDER — DEXAMETHASONE SODIUM PHOSPHATE 4 MG/ML
8 INJECTION, SOLUTION INTRA-ARTICULAR; INTRALESIONAL; INTRAMUSCULAR; INTRAVENOUS; SOFT TISSUE ONCE
Status: COMPLETED | OUTPATIENT
Start: 2022-10-24 | End: 2022-10-24

## 2022-10-24 RX ORDER — IPRATROPIUM BROMIDE AND ALBUTEROL SULFATE 2.5; .5 MG/3ML; MG/3ML
1 SOLUTION RESPIRATORY (INHALATION)
Status: DISCONTINUED | OUTPATIENT
Start: 2022-10-24 | End: 2022-10-24

## 2022-10-24 RX ORDER — IPRATROPIUM BROMIDE AND ALBUTEROL SULFATE 2.5; .5 MG/3ML; MG/3ML
1 SOLUTION RESPIRATORY (INHALATION) 2 TIMES DAILY
Status: DISCONTINUED | OUTPATIENT
Start: 2022-10-24 | End: 2022-10-24

## 2022-10-24 RX ORDER — MAGNESIUM SULFATE IN WATER 40 MG/ML
2000 INJECTION, SOLUTION INTRAVENOUS ONCE
Status: COMPLETED | OUTPATIENT
Start: 2022-10-24 | End: 2022-10-24

## 2022-10-24 RX ORDER — CLONIDINE HYDROCHLORIDE 0.1 MG/1
0.1 TABLET ORAL 2 TIMES DAILY
Status: DISCONTINUED | OUTPATIENT
Start: 2022-10-24 | End: 2022-10-26

## 2022-10-24 RX ORDER — GABAPENTIN 400 MG/1
400 CAPSULE ORAL 2 TIMES DAILY
Status: DISCONTINUED | OUTPATIENT
Start: 2022-10-24 | End: 2022-10-27 | Stop reason: HOSPADM

## 2022-10-24 RX ORDER — ONDANSETRON 2 MG/ML
4 INJECTION INTRAMUSCULAR; INTRAVENOUS EVERY 6 HOURS PRN
Status: DISCONTINUED | OUTPATIENT
Start: 2022-10-24 | End: 2022-10-27 | Stop reason: HOSPADM

## 2022-10-24 RX ORDER — SODIUM CHLORIDE, SODIUM LACTATE, POTASSIUM CHLORIDE, CALCIUM CHLORIDE 600; 310; 30; 20 MG/100ML; MG/100ML; MG/100ML; MG/100ML
INJECTION, SOLUTION INTRAVENOUS CONTINUOUS
Status: DISCONTINUED | OUTPATIENT
Start: 2022-10-24 | End: 2022-10-25

## 2022-10-24 RX ORDER — FERROUS SULFATE 325(65) MG
650 TABLET ORAL
Status: DISCONTINUED | OUTPATIENT
Start: 2022-10-24 | End: 2022-10-27 | Stop reason: HOSPADM

## 2022-10-24 RX ORDER — DEXAMETHASONE SODIUM PHOSPHATE 4 MG/ML
6 INJECTION, SOLUTION INTRA-ARTICULAR; INTRALESIONAL; INTRAMUSCULAR; INTRAVENOUS; SOFT TISSUE EVERY 24 HOURS
Status: DISCONTINUED | OUTPATIENT
Start: 2022-10-25 | End: 2022-10-27 | Stop reason: HOSPADM

## 2022-10-24 RX ORDER — ZINC SULFATE 50(220)MG
50 CAPSULE ORAL DAILY
Status: DISCONTINUED | OUTPATIENT
Start: 2022-10-24 | End: 2022-10-27 | Stop reason: HOSPADM

## 2022-10-24 RX ADMIN — IPRATROPIUM BROMIDE AND ALBUTEROL SULFATE 1 AMPULE: .5; 3 SOLUTION RESPIRATORY (INHALATION) at 18:16

## 2022-10-24 RX ADMIN — OXYCODONE HYDROCHLORIDE AND ACETAMINOPHEN 500 MG: 500 TABLET ORAL at 10:26

## 2022-10-24 RX ADMIN — GABAPENTIN 400 MG: 400 CAPSULE ORAL at 21:52

## 2022-10-24 RX ADMIN — ATORVASTATIN CALCIUM 40 MG: 40 TABLET, FILM COATED ORAL at 21:52

## 2022-10-24 RX ADMIN — SODIUM CHLORIDE, POTASSIUM CHLORIDE, SODIUM LACTATE AND CALCIUM CHLORIDE 250 ML: 600; 310; 30; 20 INJECTION, SOLUTION INTRAVENOUS at 10:29

## 2022-10-24 RX ADMIN — INSULIN LISPRO 8 UNITS: 100 INJECTION, SOLUTION INTRAVENOUS; SUBCUTANEOUS at 11:41

## 2022-10-24 RX ADMIN — SODIUM CHLORIDE, POTASSIUM CHLORIDE, SODIUM LACTATE AND CALCIUM CHLORIDE: 600; 310; 30; 20 INJECTION, SOLUTION INTRAVENOUS at 10:40

## 2022-10-24 RX ADMIN — INSULIN LISPRO 4 UNITS: 100 INJECTION, SOLUTION INTRAVENOUS; SUBCUTANEOUS at 16:40

## 2022-10-24 RX ADMIN — GABAPENTIN 400 MG: 400 CAPSULE ORAL at 10:27

## 2022-10-24 RX ADMIN — ASPIRIN 81 MG: 81 TABLET, COATED ORAL at 10:26

## 2022-10-24 RX ADMIN — FERROUS SULFATE TAB 325 MG (65 MG ELEMENTAL FE) 650 MG: 325 (65 FE) TAB at 10:26

## 2022-10-24 RX ADMIN — Medication 0.5 TABLET: at 10:33

## 2022-10-24 RX ADMIN — DEXAMETHASONE SODIUM PHOSPHATE 8 MG: 4 INJECTION, SOLUTION INTRA-ARTICULAR; INTRALESIONAL; INTRAMUSCULAR; INTRAVENOUS; SOFT TISSUE at 05:17

## 2022-10-24 RX ADMIN — AZITHROMYCIN DIHYDRATE 250 MG: 500 INJECTION, POWDER, LYOPHILIZED, FOR SOLUTION INTRAVENOUS at 10:47

## 2022-10-24 RX ADMIN — SODIUM CHLORIDE, PRESERVATIVE FREE 10 ML: 5 INJECTION INTRAVENOUS at 21:50

## 2022-10-24 RX ADMIN — SODIUM CHLORIDE, PRESERVATIVE FREE 10 ML: 5 INJECTION INTRAVENOUS at 10:29

## 2022-10-24 RX ADMIN — CEFTRIAXONE SODIUM 1000 MG: 1 INJECTION, POWDER, FOR SOLUTION INTRAMUSCULAR; INTRAVENOUS at 07:40

## 2022-10-24 RX ADMIN — ALBUTEROL SULFATE 2.5 MG: 2.5 SOLUTION RESPIRATORY (INHALATION) at 05:24

## 2022-10-24 RX ADMIN — MAGNESIUM SULFATE HEPTAHYDRATE 2000 MG: 40 INJECTION, SOLUTION INTRAVENOUS at 05:23

## 2022-10-24 RX ADMIN — IPRATROPIUM BROMIDE AND ALBUTEROL SULFATE 1 AMPULE: .5; 3 SOLUTION RESPIRATORY (INHALATION) at 06:06

## 2022-10-24 RX ADMIN — LEVOTHYROXINE SODIUM 75 MCG: 0.07 TABLET ORAL at 10:27

## 2022-10-24 RX ADMIN — Medication 50 MG: at 10:26

## 2022-10-24 RX ADMIN — SODIUM CHLORIDE, POTASSIUM CHLORIDE, SODIUM LACTATE AND CALCIUM CHLORIDE: 600; 310; 30; 20 INJECTION, SOLUTION INTRAVENOUS at 14:20

## 2022-10-24 ASSESSMENT — ENCOUNTER SYMPTOMS
SHORTNESS OF BREATH: 1
COLOR CHANGE: 0
ABDOMINAL DISTENTION: 0
COUGH: 1
ABDOMINAL PAIN: 0
VOMITING: 0
CONSTIPATION: 0
SHORTNESS OF BREATH: 0
BACK PAIN: 0
COUGH: 0
RHINORRHEA: 0
EYES NEGATIVE: 1
CHEST TIGHTNESS: 0
DIARRHEA: 0
NAUSEA: 0
SORE THROAT: 0

## 2022-10-24 ASSESSMENT — PAIN - FUNCTIONAL ASSESSMENT
PAIN_FUNCTIONAL_ASSESSMENT: NONE - DENIES PAIN

## 2022-10-24 NOTE — ED NOTES
Lactated ringers at 100ml/hr started at this time via verbal from Yossi, [address] [place] [address]  10/24/22 8741

## 2022-10-24 NOTE — H&P
Hospitalist - History & Physical      Patient: Marti Nuñez    Unit/Bed:12/012A  YOB: 1945  MRN: [id number]   Acct: [de-identified]   PCP: Marcelle ELLISON    Date of Service: Pt seen/examined on 10/24/22  and Admitted to Observation with expected LOS less than two midnights due to medical therapy. Chief Complaint:  Shortness of breath    Assessment and Plan:  Acute hypoxic respiratory failure, with Covid-19 infection:    Pt on 1-2 L O2 NC, up from baseline of RA. Tested positive with home COVID test 10/21, symptom onset 10/19. Pt afebrile, hemodynamically stable, non-toxic appearing. Labs show MISA on CKD, metabolic acidosis with AG, no acute leukocytosis. CXR shows opacity of the left posterior lower lung field, effusion with possible loculation. D-dimer elevated, however forgoing CTA at this time due to MISA. BC x 2 obtained. Given Rocephin, decadron, duonebs, magnesium sulfate in ED. Procal, LDH, Ferritin, CRP ordered. Continue Decadron, duonebs, empiric antibitiotics for now. Obtain respiratory viral antigen panel, respiratory culture, urine strep/urine legionella. Home O2 eval ordered- wean O2 as tolerated. Repeat CBC, BMP daily. Vit C, D, zinc, IS/AC. MISA on CKD:    Cr 5.1, up from baseline of around 1.2. Pt reports not eating or drinking well x 4 days, reports taking medications as prescribed. Will hold aldactone, entresto. UA ordered. Renal US ordered. Start gentle IVF. Continue to monitor with daily BMP. Repeat BMP ordered for this evening. Nephrology consulted- Dr. Dionte Travis made aware. Elevated D-dimer:    Age adjusted ddimer is 750 which indicates a VTE is still possible. No known history of DVT/PE. Holding on CTA due to MISA. Currently requires 1-2L O2 NC. Continue to monitor closely. If pt requires increase in O2 requirements, PE can be of consideration. To note pt has had a reaction to heparin in the past, currently has SCDs ordered for DVT prophylaxis.      HFrEF:    Last ECHO 5/25/2022 reveals EF 40-45%. Pt appears hypovolemic today. Holding entresto, aldactone due to #2. Continue to monitor daily weights, I&Os. Telemetry. Essential HTN:    BP appears stable, but normal to soft. Holding antihypertensives for appropriate BP and MISA. Continue to monitor closely. Elevated troponin, with Hx of CAD and CABG:    Stable- trop appears chronically elevated likely due to demand ischemia. Pt denies chest pain at this time. EKG shows Sinus bradycardia non-specific st-t wave changes noted. T2DM:   Glucose 85 today. Hold home meds. Med dose SSI ordered, Hypoglycemia protocol in place. POCT glucose checks. Continue to monitor with daily BMP. Carb limited diet. History Of Present Illness:    Stefanie Garrison is a 77 y/o  male with a PMHx of CHF, HTN, CAD, T2DM who presents to [facility] ED today for the evaluation of generalized weakness and fatigue. Pt states his symptoms started roughly last week around Wednesday. He first developed a non-productive cough, which then he also developed decreased appetite, fatigue, decreased appeitie and poor PO intake, some shortness of breath with activity. He states he took a home COVID test Friday, for which is positive but came in today for his symptoms progressing and not improving. He denies fever, chills, chest pain, lightheadedness, dizziness, difficulty walking, changes in bowel habits, N/V. He reports decreased urine output. He states he has been taking all his medications as prescribed.        Past Medical History:        Diagnosis Date    Acute on chronic systolic CHF (congestive heart failure), NYHA class 3 ([facility]) EF 25-30% 11/7/2018    CAD (coronary artery disease)     CKD (chronic kidney disease) stage 3, GFR 30-59 ml/min ([facility] [address] 9/23/2015    Diabetes mellitus (HCC)     HTN (hypertension), benign     Hyperlipidemia     Hypothyroidism     Mitral and aortic incompetence     Neuropathy     Positive blood culture     Being treated with rocephhin as outpt. S/P CABG x 2 11/15/2018       Past Surgical History:        Procedure Laterality Date    CARDIAC SURGERY      heart cath    CATARACT REMOVAL WITH IMPLANT Bilateral     COLONOSCOPY      CORONARY ARTERY BYPASS GRAFT  11/14/2018    DIAGNOSTIC CARDIAC CATH LAB PROCEDURE      EYE SURGERY      GA CABG, VEIN, THREE N/A 11/14/2018    CABG CORONARY ARTERY BYPASS,  AORTIC AND MITRAL VALVE REPAIR WITH MICHELE, BALLOON PUMP INSERTION performed by Presley Covarrubias MD at [address] [place] [address] ECHO TRANSESOPHAG R-T 2D IMG ACQUISJ I&R ONLY Left 10/25/2018    TRANSESOPHAGEAL ECHOCARDIOGRAM performed by Ashley Jewell MD at [facility]       Home Medications:   No current facility-administered medications on file prior to encounter. Current Outpatient Medications on File Prior to Encounter   Medication Sig Dispense Refill    dapagliflozin (FARXIGA) 10 MG tablet Take 10 mg by mouth every morning      spironolactone (ALDACTONE) 25 MG tablet Take 25 mg by mouth daily      cloNIDine (CATAPRES) 0.1 MG tablet Take 0.1 mg by mouth 2 times daily      furosemide (LASIX) 20 MG tablet Take 1 tablet by mouth three times a week And as directed by CHF clinic (Patient taking differently: Take 20 mg by mouth as needed And as directed by CHF clinic) 60 tablet 1    ferrous sulfate (IRON 325) 325 (65 Fe) MG tablet Take 650 mg by mouth daily (with breakfast)      nitroGLYCERIN (NITROSTAT) 0.4 MG SL tablet up to max of 3 total doses.  If no relief after 1 dose, call 911. 25 tablet 3    atorvastatin (LIPITOR) 80 MG tablet Take 40 mg by mouth every evening       levothyroxine (SYNTHROID) 75 MCG tablet Take 75 mcg by mouth Daily      metoprolol succinate (TOPROL XL) 50 MG extended release tablet Take 1 tablet by mouth daily 90 tablet 3    sacubitril-valsartan (ENTRESTO)  MG per tablet Take 1 tablet by mouth 2 times daily 180 tablet 3    aspirin 81 MG tablet Take 81 mg by mouth daily      gabapentin (NEURONTIN) 400 MG capsule Take 400 mg by mouth normal.  No rashes or lesions. HEENT: Normal cephalic, atraumatic without obvious deformity. Pupils equal, round, and reactive to light. Extra-ocular muscles intact. Conjunctivae/corneas clear. Neck: Trachea midline. Supple, with full range of motion. Cardiovascular: Regular rate and rhythm with normal S1/S2. No murmurs, rubs or gallops. Respiratory:  Rhonchi appreciated in upper and lower lung fields. Expiratory wheezing noted in Middle and lower lung fields bilaterally. Normal respiratory effort. On 1L O2 NC. Abdomen: Soft, non-tender, non-distended. Hypoactive bowel sounds. Musculoskeletal:  No weakness or instability noted. No edema, erythema, or gross deformity noted. Vascular: Pulses +1 palpable, equal bilaterally. Neurologic:  Neurovascularly intact without any focal sensory/motor deficits. Cranial nerves: II-XII grossly intact. Psychiatric: Alert and oriented, thought content appropriate, normal insight      Labs:   Recent Labs     10/24/22  0450   WBC 5.5   HGB 12.4*   HCT 38.9*   *     Recent Labs     10/24/22  0450      K 4.4      CO2 16*   BUN 99*   CREATININE 5.1*   CALCIUM 8.2*     Recent Labs     10/24/22  0450   AST 26   ALT 19   BILIDIR 0.3   BILITOT 0.5   ALKPHOS 88     No results for input(s): INR in the last 72 hours. No results for input(s): Daylin Coombes in the last 72 hours. Urinalysis:    Lab Results   Component Value Date/Time    NITRU Negative 05/19/2022 10:00 AM    WBCUA 0-2 11/16/2018 02:20 AM    BACTERIA FEW 11/16/2018 02:20 AM    RBCUA 3-5 11/16/2018 02:20 AM    BLOODU SMALL 11/16/2018 02:20 AM    SPECGRAV 1.022 11/13/2018 06:40 PM    GLUCOSEU NEGATIVE 11/16/2018 02:20 AM       Radiology:   XR CHEST (2 VW)   Final Result      Lateral view shows an opacity of the posterior lower thorax. This may be a    loculated pleural effusion or other. CT may be performed for further    evaluation. Cardiomegaly.       This document has been electronically signed by: Elsie Lucia MD on    10/24/2022 06:23 AM        XR CHEST (2 VW)    Result Date: 10/24/2022  Chest 2 views COMPARISON: 2/18/19 FINDINGS: The lateral view shows an opacity of the posterior lower thorax possibly a loculated pleural effusion. The heart is enlarged. The patient is post cardiac valve replacement and atrial appendage clipping. Lateral view shows an opacity of the posterior lower thorax. This may be a loculated pleural effusion or other. CT may be performed for further evaluation. Cardiomegaly.  This document has been electronically signed by: Elsie Lucia MD on 10/24/2022 06:23 AM        EKG:  Sinus bradycardia     Electronically signed by Nelida Sauceda PA-C on 10/24/2022 at 6:47 AM

## 2022-10-24 NOTE — FLOWSHEET NOTE
10/24/22 1031   Safe Environment   Safety Measures   (VIrtual RN uyry complete)   PT sitting upright in bed, No needs identified at this time. No acute distress noted. Landon Rodas

## 2022-10-24 NOTE — ED NOTES
Pt resting on cot. Pt medicated per MAR. Pt denies a need for anything else at this time.       Mari Mcbride RN  10/24/22 0594

## 2022-10-24 NOTE — CONSULTS
[facility]          [facility]        [address]        [place], [address]        [phone]           Inpatient Initial consult note         10/24/2022 10:18 AM      Patient Name:   Jennifer Johns  YOB: 1945  Primary Care Physician:  Carlene Mo   Admit Date:    10/24/2022  4:40 AM    Consultation requested by : Rima Calderon PA-C    Reason for Consult : Nephrology following for acute kidney injury metabolic acidosis. History of presenting illness :Jennifer Johns is a 78 y.o.   male with Past Medical History as stated below presented with a chief complaint of Shortness of Breath   on 10/24/2022 . Patient presented with chief complaints of weakness and fatigue almost since the last 5 days, gradually gotten worse associated with decreased appetite and poor oral intake however he does say that he has been drinking liquids better patient took a home COVID test on Friday and it was positive. Symptoms are very severe no associated symptoms of fever chills nausea or vomiting no other modifying factors. He also has reported decreased urine output he has been very compliant with the medications    Past History:  Past Medical History:   Diagnosis Date    Acute on chronic systolic CHF (congestive heart failure), NYHA class 3 ([facility]) EF 25-30% 11/7/2018    CAD (coronary artery disease)     CKD (chronic kidney disease) stage 3, GFR 30-59 ml/min ([facility] [address] 9/23/2015    Diabetes mellitus (HCC)     HTN (hypertension), benign     Hyperlipidemia     Hypothyroidism     Mitral and aortic incompetence     Neuropathy     Positive blood culture     Being treated with rocephhin as outpt.     S/P CABG x 2 11/15/2018     Past Surgical History:   Procedure Laterality Date    CARDIAC SURGERY      heart cath    CATARACT REMOVAL WITH IMPLANT Bilateral     COLONOSCOPY      CORONARY ARTERY BYPASS GRAFT  11/14/2018    DIAGNOSTIC CARDIAC CATH LAB PROCEDURE      EYE SURGERY      [place] CABG, VEIN, THREE N/A 11/14/2018    CABG CORONARY ARTERY BYPASS,  AORTIC AND MITRAL VALVE REPAIR WITH MICHELE, BALLOON PUMP INSERTION performed by Shaila Gonzales MD at [address] ECHO TRANSESOPHAG R-T 2D IMG ACQUISJ I&R ONLY Left 10/25/2018    TRANSESOPHAGEAL ECHOCARDIOGRAM performed by Devin Velasquez MD at [facility]     Social History     Socioeconomic History    Marital status:      Spouse name: Aidee Cabrales    Number of children: 3    Years of education: Not on file    Highest education level: Not on file   Occupational History    Not on file   Tobacco Use    Smoking status: Passive Smoke Exposure - Never Smoker    Smokeless tobacco: Never    Tobacco comments:     smoked for 4 years while in the [employer] >40 years ago   Vaping Use    Vaping Use: Never used   Substance and Sexual Activity    Alcohol use: Yes     Alcohol/week: 14.0 standard drinks     Types: 14 Cans of beer per week    Drug use: No    Sexual activity: Yes     Partners: Female   Other Topics Concern    Not on file   Social History Narrative    Not on file     Social Determinants of Health     Financial Resource Strain: Not on file   Food Insecurity: Not on file   Transportation Needs: Not on file   Physical Activity: Not on file   Stress: Not on file   Social Connections: Not on file   Intimate Partner Violence: Not on file   Housing Stability: Not on file     Family History   Problem Relation Age of Onset    Diabetes Mother     Other Mother         chf    Cancer Brother 28        renal    Cancer Brother 46        renal       Medications & Allergies :  Prior to Admission medications    Medication Sig Start Date End Date Taking?  Authorizing Provider   dapagliflozin (FARXIGA) 10 MG tablet Take 10 mg by mouth every morning  Patient not taking: Reported on 10/24/2022    Historical Provider, MD   spironolactone (ALDACTONE) 25 MG tablet Take 25 mg by mouth daily  Patient not taking: Reported on 10/24/2022    Historical Provider, MD   cloNIDine (CATAPRES) 0.1 MG tablet Take 0.1 mg by mouth 2 times daily    Historical Provider, MD   furosemide (LASIX) 20 MG tablet Take 1 tablet by mouth three times a week And as directed by CHF clinic  Patient taking differently: Take 20 mg by mouth as needed And as directed by CHF clinic 2/9/22   SIRRAM Gonzales CNP   ferrous sulfate (IRON 325) 325 (65 Fe) MG tablet Take 650 mg by mouth daily (with breakfast)    Historical Provider, MD   nitroGLYCERIN (NITROSTAT) 0.4 MG SL tablet up to max of 3 total doses. If no relief after 1 dose, call 911. 12/13/21   SRIRAM Gonzales CNP   atorvastatin (LIPITOR) 80 MG tablet Take 40 mg by mouth every evening     Historical Provider, MD   levothyroxine (SYNTHROID) 75 MCG tablet Take 75 mcg by mouth Daily    Historical Provider, MD   metoprolol succinate (TOPROL XL) 50 MG extended release tablet Take 1 tablet by mouth daily 1/11/21   SRIRAM Gonzales CNP   sacubitril-valsartan (ENTRESTO)  MG per tablet Take 1 tablet by mouth 2 times daily 1/20/20   SRIRAM Gonzales CNP   aspirin 81 MG tablet Take 81 mg by mouth daily    Historical Provider, MD   gabapentin (NEURONTIN) 400 MG capsule Take 400 mg by mouth 2 times daily.     Historical Provider, MD   glimepiride (AMARYL) 1 MG tablet Take 0.5 mg by mouth every morning (before breakfast)     Historical Provider, MD   docusate sodium (COLACE) 100 MG capsule Take 100 mg by mouth as needed     Historical Provider, MD   ONE TOUCH ULTRA TEST strip as needed 1/30/17   Historical Provider, MD     Allergies: Heparin  IP meds : Scheduled Meds:   ipratropium-albuterol  1 ampule Inhalation Q4H WA    aspirin  81 mg Oral Daily    atorvastatin  40 mg Oral QPM    [Held by provider] cloNIDine  0.1 mg Oral BID    ferrous sulfate  650 mg Oral Daily with breakfast    gabapentin  400 mg Oral BID    levothyroxine  75 mcg Oral Daily    metoprolol succinate  50 mg Oral Daily    [Held by provider] sacubitril-valsartan  1 tablet Oral BID    [Held by provider] spironolactone  25 mg Oral Daily    sodium chloride flush  10 mL IntraVENous 2 times per day    insulin lispro  0-8 Units SubCUTAneous TID WC    insulin lispro  0-4 Units SubCUTAneous Nightly    ascorbic acid  500 mg Oral Daily    zinc sulfate  50 mg Oral Daily    [START ON 10/25/2022] dexamethasone  6 mg Oral Q24H    calcium-cholecalciferol  0.5 tablet Oral Daily    lactated ringers bolus  250 mL IntraVENous Once    azithromycin  250 mg IntraVENous Q24H    [START ON 10/25/2022] cefTRIAXone (ROCEPHIN) IV  1,000 mg IntraVENous Q24H     Continuous Infusions:   sodium chloride      dextrose      lactated ringers         Review of Systems  Review of Systems   Constitutional:  Positive for activity change, appetite change and fatigue. HENT: Negative. Eyes: Negative. Respiratory:  Negative for cough and shortness of breath. Cardiovascular:  Negative for chest pain and leg swelling. Gastrointestinal:  Negative for abdominal pain, diarrhea, nausea and vomiting. Genitourinary:  Negative for difficulty urinating, dysuria, flank pain and frequency. Musculoskeletal:  Negative for back pain and neck pain. Skin:  Negative for rash and wound. Neurological:  Positive for weakness. Negative for dizziness, light-headedness and headaches. Psychiatric/Behavioral:  Negative for agitation and confusion. Physical Exam  Physical Exam  Constitutional:       General: He is not in acute distress. Appearance: He is ill-appearing. HENT:      Head: Normocephalic and atraumatic. Right Ear: External ear normal.      Left Ear: External ear normal.      Nose: Nose normal.      Mouth/Throat:      Mouth: Mucous membranes are moist.   Eyes:      General: No scleral icterus. Right eye: No discharge. Left eye: No discharge. Conjunctiva/sclera: Conjunctivae normal.   Cardiovascular:      Rate and Rhythm: Normal rate and regular rhythm. Heart sounds: Normal heart sounds.    Pulmonary: Effort: Pulmonary effort is normal. No respiratory distress. Breath sounds: Rales present. No wheezing. Abdominal:      General: Bowel sounds are normal. There is no distension. Palpations: Abdomen is soft. Tenderness: There is no abdominal tenderness. Musculoskeletal:         General: No swelling. Cervical back: Normal range of motion and neck supple. Right lower leg: No edema. Left lower leg: No edema. Skin:     General: Skin is warm and dry. Findings: No erythema or rash. Neurological:      General: No focal deficit present. Mental Status: He is alert and oriented to person, place, and time.    Psychiatric:         Mood and Affect: Mood normal.         Behavior: Behavior normal.        Labs, Radiology and Tests :  CBC:   Recent Labs     10/24/22  0450   WBC 5.5   HGB 12.4*   HCT 38.9*   *     CMP:  Recent Labs     10/24/22  0450      K 4.4      CO2 16*   BUN 99*   CREATININE 5.1*   GLUCOSE 85   CALCIUM 8.2*     Hepatic:   Recent Labs     10/24/22  0450   LABALBU 3.7   AST 26   ALT 19   BILITOT 0.5   ALKPHOS 80       Radiology : Chest x-ray reviewed by me shows slight prominence of pulmonary vasculature and cardiomegaly    Old lab data have been reviewed and noted patient's baseline creatinine is around 1.2-1.3    Other : Echocardiogram-5/22 shows ejection fraction 40-45%    Assessment and Plan:  Renal -acute kidney injury with chronic kidney disease stage IIIa with baseline creatinine around 1.3  This appears most likely due to prerenal etiology from poor oral intake and multiple nephrotoxic agents which include (Entresto, Lasix, Farxiga, Aldactone)  Will hold all the above drugs continue gentle hydration patient says, he is urinating better, would expect the renal function to improve  Send urine lites, if not much improvement we will obtain a renal ultrasound scan continue IV hydration  Electrolytes -appear to be within normal limits  Metabolic acidosis, may need bicarbonate however due to his respiratory status we will obtain a venous pH in the morning and go from there  COVID-19 infection  Essential hypertension  Hx of diabetes mellitus  Meds reviewed and discussed with patient and wife    Jonathan Zapata MD  [facility]    This report has been created using voice recognition software.  It may contain minor errors which are inherent in voice recognition technology

## 2022-10-24 NOTE — ED NOTES
Pt breathing treatment completed. Pt resting on cot comfortably at this time. Pt respirations even and slightly labored.  NIKKI Underwood RN  10/24/22 5303

## 2022-10-24 NOTE — ED NOTES
ED nurse-to-nurse bedside report    Chief Complaint   Patient presents with    Shortness of Breath      LOC: alert and orientated to name, place, date  Vital signs   Vitals:    10/24/22 0445 10/24/22 0530 10/24/22 0612 10/24/22 0620   BP: (!) 125/47 (!) 102/51 100/68 (!) 114/92   Pulse: 58 59 61 66   Resp: 20  18 18   Temp: 97.9 °F (36.6 °C)      SpO2: 95% 95% 100% 98%   Weight:  199 lb (90.3 kg)        Pain:    Pain Interventions: NA  Pain Goal: 0  Oxygen: Yes    Current needs required 2L NC   Telemetry: Yes  LDAs:   Peripheral IV 10/24/22 Left Forearm (Active)   Site Assessment Clean, dry & intact 10/24/22 0450   Line Status Normal saline locked; Blood return noted; Flushed 10/24/22 0450   Phlebitis Assessment No symptoms 10/24/22 0450   Infiltration Assessment 0 10/24/22 0450     Continuous Infusions:   Mobility: Requires assistance * 1  Grant Fall Risk Score: No flowsheet data found.   Fall Interventions: Call light, side rails x2  Report given to: Sallie Pinto RN  10/24/22 3374

## 2022-10-24 NOTE — ED NOTES
Two attempts to call [address] [facility], no answer. Charge phone no answer. Duglas Bojorquez RN was able to get an answer to let the nurse know that this patient was coming to them.  Pt transported by bed and in stable condition     Veria Duane, RN  10/24/22 8042

## 2022-10-24 NOTE — ED TRIAGE NOTES
Pt presents to the ED with complaints of SOB and fatigue since two days ago following a covid diagnosis. Per EMS pt was 89-90% RA and place on 4L NC. Upon arrival pt 97% RA. Pt respirations labored. EKG completed.

## 2022-10-24 NOTE — PLAN OF CARE
Problem: Respiratory - Adult  Goal: Clear lung sounds  Outcome: Progressing  Note: Patient understands and agrees to goals

## 2022-10-24 NOTE — RT PROTOCOL NOTE
RT Inhaler-Nebulizer Bronchodilator Protocol Note    There is a bronchodilator order in the chart from a provider indicating to follow the RT Bronchodilator Protocol and there is an Initiate RT Inhaler-Nebulizer Bronchodilator Protocol order as well (see protocol at bottom of note). CXR Findings:  No results found. The findings from the last RT Protocol Assessment were as follows:   History Pulmonary Disease: None or smoker <15 pack years  Respiratory Pattern: Dyspnea on exertion or RR 21-25 bpm  Breath Sounds: Intermittent or unilateral wheezes  Cough: Strong, spontaneous, non-productive  Indication for Bronchodilator Therapy: Decreased or absent breath sounds  Bronchodilator Assessment Score: 6    Aerosolized bronchodilator medication orders have been revised according to the RT Inhaler-Nebulizer Bronchodilator Protocol below. Respiratory Therapist to perform RT Therapy Protocol Assessment initially then follow the protocol. Repeat RT Therapy Protocol Assessment PRN for score 0-3 or on second treatment, BID, and PRN for scores above 3. No Indications - adjust the frequency to every 6 hours PRN wheezing or bronchospasm, if no treatments needed after 48 hours then discontinue using Per Protocol order mode. If indication present, adjust the RT bronchodilator orders based on the Bronchodilator Assessment Score as indicated below. Use Inhaler orders unless patient has one or more of the following: on home nebulizer, not able to hold breath for 10 seconds, is not alert and oriented, cannot activate and use MDI correctly, or respiratory rate 25 breaths per minute or more, then use the equivalent nebulizer order(s) with same Frequency and PRN reasons based on the score. If a patient is on this medication at home then do not decrease Frequency below that used at home.     0-3 - enter or revise RT bronchodilator order(s) to equivalent RT Bronchodilator order with Frequency of every 4 hours PRN for wheezing or increased work of breathing using Per Protocol order mode. 4-6 - enter or revise RT Bronchodilator order(s) to two equivalent RT bronchodilator orders with one order with BID Frequency and one order with Frequency of every 4 hours PRN wheezing or increased work of breathing using Per Protocol order mode. 7-10 - enter or revise RT Bronchodilator order(s) to two equivalent RT bronchodilator orders with one order with TID Frequency and one order with Frequency of every 4 hours PRN wheezing or increased work of breathing using Per Protocol order mode. 11-13 - enter or revise RT Bronchodilator order(s) to one equivalent RT bronchodilator order with QID Frequency and an Albuterol order with Frequency of every 4 hours PRN wheezing or increased work of breathing using Per Protocol order mode. Greater than 13 - enter or revise RT Bronchodilator order(s) to one equivalent RT bronchodilator order with every 4 hours Frequency and an Albuterol order with Frequency of every 2 hours PRN wheezing or increased work of breathing using Per Protocol order mode. RT to enter RT Home Evaluation for COPD & MDI Assessment order using Per Protocol order mode.     Electronically signed by Aide Bah RCP on 10/24/2022 at 12:19 PM

## 2022-10-24 NOTE — ED PROVIDER NOTES
[facility] [address] [address] EMERGENCY DEPT  EMERGENCY DEPARTMENT     Pt Name: Crystal Jauregui  MRN: [id number]  [place] 1945  Date of evaluation: 10/24/2022  Provider: Dhiraj Pizarro MD    CHIEF COMPLAINT       Chief Complaint   Patient presents with    Shortness of Breath       HISTORY OF PRESENT ILLNESS    Crystal Jauregui is a 78 y.o. male who presents to the emergency department from home with a chief complaint of shortness of breath and generalized fatigue ongoing for the last 2 days and gradually worsening. He has a history of CAD and congestive heart failure and was recently diagnosed with COVID-19. He denies a history of COPD however does have a 30-pack-year smoking history. EMS report the patient was hypoxic to the mid 80s on their arrival.  Patient corrected to the mid 90s on 3 L nasal cannula. The patient denies chest pain. He denies lower extremity edema, erythema, pain. He denies pleuritic pain. He does report fever and subjective chills. Triage notes and Nursing notes were reviewed by myself. Any discrepancies are addressed above. PAST MEDICAL HISTORY     Past Medical History:   Diagnosis Date    Acute on chronic systolic CHF (congestive heart failure), NYHA class 3 ([facility]) EF 25-30% 11/7/2018    CAD (coronary artery disease)     CKD (chronic kidney disease) stage 3, GFR 30-59 ml/min ([facility] [address] 9/23/2015    Diabetes mellitus ([facility])     HTN (hypertension), benign     Hyperlipidemia     Hypothyroidism     Mitral and aortic incompetence     Neuropathy     Positive blood culture     Being treated with rocephhin as outpt.     S/P CABG x 2 11/15/2018       SURGICAL HISTORY       Past Surgical History:   Procedure Laterality Date    CARDIAC SURGERY      heart cath    CATARACT REMOVAL WITH IMPLANT Bilateral     COLONOSCOPY      CORONARY ARTERY BYPASS GRAFT  11/14/2018    DIAGNOSTIC CARDIAC CATH LAB PROCEDURE      EYE SURGERY      MT CABG, VEIN, THREE N/A 11/14/2018    CABG CORONARY ARTERY BYPASS,  AORTIC AND MITRAL VALVE REPAIR WITH MICHELE, BALLOON PUMP INSERTION performed by Davidson Shine MD at [address] [facility] [place] [facility]\"" ECHO TRANSESOPHAG R-T 2D IMG ACQUISJ I&R ONLY Left 10/25/2018    TRANSESOPHAGEAL ECHOCARDIOGRAM performed by Rose Mary Bell MD at [place] [address]       Previous Medications    ASPIRIN 81 MG TABLET    Take 81 mg by mouth daily    ATORVASTATIN (LIPITOR) 80 MG TABLET    Take 40 mg by mouth every evening     CLONIDINE (CATAPRES) 0.1 MG TABLET    Take 0.1 mg by mouth 2 times daily    DAPAGLIFLOZIN (FARXIGA) 10 MG TABLET    Take 10 mg by mouth every morning    DOCUSATE SODIUM (COLACE) 100 MG CAPSULE    Take 100 mg by mouth as needed     FERROUS SULFATE (IRON 325) 325 (65 FE) MG TABLET    Take 650 mg by mouth daily (with breakfast)    FUROSEMIDE (LASIX) 20 MG TABLET    Take 1 tablet by mouth three times a week And as directed by CHF clinic    GABAPENTIN (NEURONTIN) 400 MG CAPSULE    Take 400 mg by mouth 2 times daily. GLIMEPIRIDE (AMARYL) 1 MG TABLET    Take 0.5 mg by mouth every morning (before breakfast)     LEVOTHYROXINE (SYNTHROID) 75 MCG TABLET    Take 75 mcg by mouth Daily    METOPROLOL SUCCINATE (TOPROL XL) 50 MG EXTENDED RELEASE TABLET    Take 1 tablet by mouth daily    NITROGLYCERIN (NITROSTAT) 0.4 MG SL TABLET    up to max of 3 total doses. If no relief after 1 dose, call 911.     ONE TOUCH ULTRA TEST STRIP    as needed    SACUBITRIL-VALSARTAN (ENTRESTO)  MG PER TABLET    Take 1 tablet by mouth 2 times daily    SPIRONOLACTONE (ALDACTONE) 25 MG TABLET    Take 25 mg by mouth daily       ALLERGIES     Heparin    FAMILY HISTORY       Family History   Problem Relation Age of Onset    Diabetes Mother     Other Mother         chf    Cancer Brother 28        renal    Cancer Brother 46        renal        SOCIAL HISTORY       Social History     Socioeconomic History    Marital status:      Spouse name: Prisca Bowling    Number of children: 3    Years of education: None    Highest education level: None   Tobacco Use    Smoking status: Passive Smoke Exposure - Never Smoker    Smokeless tobacco: Never    Tobacco comments:     smoked for 4 years while in the [employer] >40 years ago   Vaping Use    Vaping Use: Never used   Substance and Sexual Activity    Alcohol use: Yes     Alcohol/week: 14.0 standard drinks     Types: 14 Cans of beer per week    Drug use: No    Sexual activity: Yes     Partners: Female       REVIEW OF SYSTEMS     Review of Systems  - CONSTITUTIONAL: Reports fever and subjective chills    - HEENT: Denies changes in vision and hearing.    -   RESPIRATORY: Reports shortness of breath and cough      - CV: Denies palpitations and CP.       - GI: Denies abdominal pain, nausea, vomiting and diarrhea.      - : Denies dysuria and urinary frequency. - MSK: Denies myalgia and joint pain. - SKIN: Denies rash and pruritus.    -   NEUROLOGICAL: Denies headache and syncope. - PSYCHIATRIC: Denies recent changes in mood. Denies anxiety and depression. Except as noted above the remainder of the review of systems was reviewed and is. PHYSICAL EXAM    (up to 7 for level 4, 8 or more for level 5)     ED Triage Vitals [10/24/22 0445]   BP Temp Temp src Heart Rate Resp SpO2 Height Weight   -- 97.9 °F (36.6 °C) -- 58 20 95 % -- --       Physical Exam  Constitutional:  Well developed, well nourished, no acute distress, non-toxic appearance   Eyes: No scleral icterus, conjunctiva normal   HENT:  Atraumatic, external ears normal, nose normal, oropharynx moist, no pharyngeal exudates. Neck-no JVD  Respiratory:  No respiratory distress, diffuse wheezing and rhonchorous breath sounds, slightly increased work of breathing  Cardiovascular:  Normal rate, normal rhythm, no murmurs, no gallops, no rubs   GI:  Soft, nondistended, nontender,   Musculoskeletal:  No edema, no tenderness, no deformities.    Integument:  Well hydrated, no rash   Lymphatic:  No lymphadenopathy noted   Neurologic:  Alert & oriented x 3, CN 2-12 normal, normal motor function, normal sensory function, no focal deficits noted   Psychiatric:  Speech and behavior appropriate  DIAGNOSTIC RESULTS     EKG:(none if blank)  All EKG's are interpreted [facility] who either signs or Co-signs this chart in the absence of a cardiologist.    Normal sinus rhythm, normal axis, good R wave progression, normal intervals, ST inversions in leads II, III and aVF, no ST elevations    RADIOLOGY: (none if blank)   Interpretation per the Radiologistbelow, if available at the time of this note:    XR CHEST (2 VW)   Final Result      Lateral view shows an opacity of the posterior lower thorax. This may be a    loculated pleural effusion or other. CT may be performed for further    evaluation. Cardiomegaly.       This document has been electronically signed by: Silvia Orozco MD on    10/24/2022 06:23 AM      US RENAL COMPLETE    (Results Pending)       LABS:  Labs Reviewed   CBC WITH AUTO DIFFERENTIAL - Abnormal; Notable for the following components:       Result Value    RBC 3.77 (*)     Hemoglobin 12.4 (*)     Hematocrit 38.9 (*)     .2 (*)     MCHC 31.9 (*)     RDW-SD 54.0 (*)     Platelets 008 (*)     Lymphocytes Absolute 0.8 (*)     All other components within normal limits   BASIC METABOLIC PANEL W/ REFLEX TO MG FOR LOW K - Abnormal; Notable for the following components:    CO2 16 (*)     BUN 99 (*)     Creatinine 5.1 (*)     Calcium 8.2 (*)     All other components within normal limits   TROPONIN - Abnormal; Notable for the following components:    Troponin T 0.064 (*)     All other components within normal limits   BRAIN NATRIURETIC PEPTIDE - Abnormal; Notable for the following components:    Pro-BNP 1933.0 (*)     All other components within normal limits   D-DIMER, QUANTITATIVE - Abnormal; Notable for the following components:    D-Dimer, Quant 1716.00 (*)     All other components within normal limits   GLOMERULAR FILTRATION RATE, ESTIMATED - Abnormal; Notable for the following components:    Est, Glom Filt Rate 11 (*)     All other components within normal limits   ANION GAP - Abnormal; Notable for the following components:    Anion Gap 20.0 (*)     All other components within normal limits   OSMOLALITY - Abnormal; Notable for the following components:    Osmolality Calc 302.0 (*)     All other components within normal limits   PROCALCITONIN - Abnormal; Notable for the following components:    Procalcitonin 0.38 (*)     All other components within normal limits   FERRITIN - Abnormal; Notable for the following components:    Ferritin 1,924 (*)     All other components within normal limits   C-REACTIVE PROTEIN - Abnormal; Notable for the following components:    CRP 18.65 (*)     All other components within normal limits   CULTURE, BLOOD 1   CULTURE, BLOOD 2   STREP PNEUMONIAE ANTIGEN   LEGIONELLA ANTIGEN, URINE   CULTURE, RESPIRATORY   RESPIRATORY VIRUS ANTIGENS PANEL   HEPATIC FUNCTION PANEL   LACTATE, SEPSIS   LACTATE, SEPSIS   LACTATE DEHYDROGENASE   URINALYSIS WITH MICROSCOPIC   VITAMIN B12 & FOLATE   BASIC METABOLIC PANEL W/ REFLEX TO MG FOR LOW K   POCT GLUCOSE   POCT GLUCOSE   POCT GLUCOSE       All other labs were within normal range or not returned as of this dictation. Please note, any cultures that may have been sent were not resulted at the time of this patient visit. EMERGENCY DEPARTMENT COURSE andMedical Decision Making:     MDM  /   Mr. Maureen Campbell is a 68-year-old male with a history of CAD and CHF who presents for shortness of breath and hypoxia in the setting of COVID-19 infection diagnosed 2 days ago. He had diffuse wheezing and rhonchorous breath sounds. His hypoxia and lung sounds have improved drastically with steroids and nebulizers. His laboratory eval is notable for acute renal failure with a creatinine of 5. He has underlying CKD however has not had a creatinine as high in the past. Electrolytes otherwise unremarkable. Lactic acid negative. Chest x-ray shows a questionable loculated pleural effusion. 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 I think his hypoxia was entirely driven by underlying undiagnosed COPD considering how much he improved with the neb treatments. Troponin lower than baseline. Chest pain-free. ED Medications administered this visit:    Medications   ipratropium-albuterol (DUONEB) nebulizer solution 1 ampule (has no administration in time range)   albuterol (PROVENTIL) nebulizer solution 2.5 mg (has no administration in time range)   dexamethasone (DECADRON) injection 8 mg (has no administration in time range)   magnesium sulfate 2000 mg in 50 mL IVPB premix (has no administration in time range)         Procedures: (None if blank)       CLINICAL       1. Pleural effusion associated with pulmonary infection    2. COVID    3. Acute renal failure, unspecified acute renal failure type ([address]    4. Congestive heart failure, unspecified HF chronicity, unspecified heart failure type [facility])          DISPOSITION/PLAN   DISPOSITION      admit          (Please note that portions of this note were completed with a voice recognition program.  Efforts were made to edit the dictations but occasionallywords are mis-transcribed. )      Tracey Goldberg MD, (electronically signed)  Attending Physician, Emergency Department         Tracey Goldberg MD  10/24/22 6542

## 2022-10-25 PROBLEM — R06.02 SOB (SHORTNESS OF BREATH): Status: ACTIVE | Noted: 2022-10-25

## 2022-10-25 LAB
ALBUMIN SERPL-MCNC: 3.2 G/DL (ref 3.5–5.1)
ALP BLD-CCNC: 78 U/L (ref 38–126)
ALT SERPL-CCNC: 19 U/L (ref 11–66)
ANION GAP SERPL CALCULATED.3IONS-SCNC: 15 MEQ/L (ref 8–16)
ANION GAP SERPL CALCULATED.3IONS-SCNC: 17 MEQ/L (ref 8–16)
AST SERPL-CCNC: 26 U/L (ref 5–40)
BACTERIA: ABNORMAL
BASOPHILS # BLD: 0.3 %
BASOPHILS ABSOLUTE: 0 THOU/MM3 (ref 0–0.1)
BILIRUB SERPL-MCNC: 0.3 MG/DL (ref 0.3–1.2)
BILIRUBIN URINE: NEGATIVE
BLOOD, URINE: NEGATIVE
BUN BLDV-MCNC: 72 MG/DL (ref 7–22)
BUN BLDV-MCNC: 83 MG/DL (ref 7–22)
CALCIUM SERPL-MCNC: 8.9 MG/DL (ref 8.5–10.5)
CALCIUM SERPL-MCNC: 9.3 MG/DL (ref 8.5–10.5)
CASTS: ABNORMAL /LPF
CASTS: ABNORMAL /LPF
CHARACTER, URINE: CLEAR
CHLORIDE BLD-SCNC: 105 MEQ/L (ref 98–111)
CHLORIDE BLD-SCNC: 106 MEQ/L (ref 98–111)
CO2: 18 MEQ/L (ref 23–33)
CO2: 18 MEQ/L (ref 23–33)
COLOR: YELLOW
CREAT SERPL-MCNC: 2.2 MG/DL (ref 0.4–1.2)
CREAT SERPL-MCNC: 2.8 MG/DL (ref 0.4–1.2)
CRYSTALS: ABNORMAL
EKG ATRIAL RATE: 60 BPM
EKG P AXIS: -74 DEGREES
EKG P-R INTERVAL: 138 MS
EKG Q-T INTERVAL: 438 MS
EKG QRS DURATION: 120 MS
EKG QTC CALCULATION (BAZETT): 438 MS
EKG R AXIS: 31 DEGREES
EKG T AXIS: -112 DEGREES
EKG VENTRICULAR RATE: 60 BPM
EOSINOPHIL # BLD: 0 %
EOSINOPHILS ABSOLUTE: 0 THOU/MM3 (ref 0–0.4)
EPITHELIAL CELLS, UA: ABNORMAL /HPF
ERYTHROCYTE [DISTWIDTH] IN BLOOD BY AUTOMATED COUNT: 14.1 % (ref 11.5–14.5)
ERYTHROCYTE [DISTWIDTH] IN BLOOD BY AUTOMATED COUNT: 51.7 FL (ref 35–45)
GFR SERPL CREATININE-BSD FRML MDRD: 23 ML/MIN/1.73M2
GFR SERPL CREATININE-BSD FRML MDRD: 30 ML/MIN/1.73M2
GLUCOSE BLD-MCNC: 153 MG/DL (ref 70–108)
GLUCOSE BLD-MCNC: 159 MG/DL (ref 70–108)
GLUCOSE BLD-MCNC: 193 MG/DL (ref 70–108)
GLUCOSE BLD-MCNC: 202 MG/DL (ref 70–108)
GLUCOSE BLD-MCNC: 223 MG/DL (ref 70–108)
GLUCOSE BLD-MCNC: 264 MG/DL (ref 70–108)
GLUCOSE, URINE: 250 MG/DL
HCT VFR BLD CALC: 34.4 % (ref 42–52)
HEMOGLOBIN: 11.1 GM/DL (ref 14–18)
IMMATURE GRANS (ABS): 0.03 THOU/MM3 (ref 0–0.07)
IMMATURE GRANULOCYTES: 0.8 %
KETONES, URINE: NEGATIVE
LEUKOCYTE ESTERASE, URINE: NEGATIVE
LYMPHOCYTES # BLD: 6.3 %
LYMPHOCYTES ABSOLUTE: 0.2 THOU/MM3 (ref 1–4.8)
MCH RBC QN AUTO: 32.6 PG (ref 26–33)
MCHC RBC AUTO-ENTMCNC: 32.3 GM/DL (ref 32.2–35.5)
MCV RBC AUTO: 101.2 FL (ref 80–94)
MISCELLANEOUS LAB TEST RESULT: ABNORMAL
MONOCYTES # BLD: 9.9 %
MONOCYTES ABSOLUTE: 0.4 THOU/MM3 (ref 0.4–1.3)
NITRITE, URINE: NEGATIVE
NUCLEATED RED BLOOD CELLS: 0 /100 WBC
PH UA: 5 (ref 5–9)
PH VENOUS: 7.34 (ref 7.31–7.41)
PLATELET # BLD: 136 THOU/MM3 (ref 130–400)
PMV BLD AUTO: 10.6 FL (ref 9.4–12.4)
POTASSIUM SERPL-SCNC: 4.5 MEQ/L (ref 3.5–5.2)
POTASSIUM SERPL-SCNC: 4.8 MEQ/L (ref 3.5–5.2)
PROTEIN UA: ABNORMAL MG/DL
RBC # BLD: 3.4 MILL/MM3 (ref 4.7–6.1)
RBC URINE: ABNORMAL /HPF
RENAL EPITHELIAL, UA: ABNORMAL
SEG NEUTROPHILS: 82.7 %
SEGMENTED NEUTROPHILS ABSOLUTE COUNT: 3.1 THOU/MM3 (ref 1.8–7.7)
SODIUM BLD-SCNC: 139 MEQ/L (ref 135–145)
SODIUM BLD-SCNC: 140 MEQ/L (ref 135–145)
SPECIFIC GRAVITY UA: 1.01 (ref 1–1.03)
STREP PNEUMO AG, UR: NEGATIVE
TOTAL PROTEIN: 6.8 G/DL (ref 6.1–8)
UROBILINOGEN, URINE: 0.2 EU/DL (ref 0–1)
WBC # BLD: 3.8 THOU/MM3 (ref 4.8–10.8)
WBC UA: ABNORMAL /HPF
YEAST: ABNORMAL

## 2022-10-25 PROCEDURE — 99233 SBSQ HOSP IP/OBS HIGH 50: CPT | Performed by: INTERNAL MEDICINE

## 2022-10-25 PROCEDURE — 6370000000 HC RX 637 (ALT 250 FOR IP): Performed by: INTERNAL MEDICINE

## 2022-10-25 PROCEDURE — 36415 COLL VENOUS BLD VENIPUNCTURE: CPT

## 2022-10-25 PROCEDURE — 85025 COMPLETE CBC W/AUTO DIFF WBC: CPT

## 2022-10-25 PROCEDURE — 1200000000 HC SEMI PRIVATE

## 2022-10-25 PROCEDURE — 80053 COMPREHEN METABOLIC PANEL: CPT

## 2022-10-25 PROCEDURE — 6360000002 HC RX W HCPCS

## 2022-10-25 PROCEDURE — 93010 ELECTROCARDIOGRAM REPORT: CPT | Performed by: INTERNAL MEDICINE

## 2022-10-25 PROCEDURE — 87899 AGENT NOS ASSAY W/OPTIC: CPT

## 2022-10-25 PROCEDURE — 81001 URINALYSIS AUTO W/SCOPE: CPT

## 2022-10-25 PROCEDURE — 93005 ELECTROCARDIOGRAM TRACING: CPT

## 2022-10-25 PROCEDURE — 87449 NOS EACH ORGANISM AG IA: CPT

## 2022-10-25 PROCEDURE — 94669 MECHANICAL CHEST WALL OSCILL: CPT

## 2022-10-25 PROCEDURE — G0378 HOSPITAL OBSERVATION PER HR: HCPCS

## 2022-10-25 PROCEDURE — 2580000003 HC RX 258

## 2022-10-25 PROCEDURE — 99232 SBSQ HOSP IP/OBS MODERATE 35: CPT | Performed by: INTERNAL MEDICINE

## 2022-10-25 PROCEDURE — 82948 REAGENT STRIP/BLOOD GLUCOSE: CPT

## 2022-10-25 PROCEDURE — 6370000000 HC RX 637 (ALT 250 FOR IP)

## 2022-10-25 PROCEDURE — 96366 THER/PROPH/DIAG IV INF ADDON: CPT

## 2022-10-25 PROCEDURE — 6370000000 HC RX 637 (ALT 250 FOR IP): Performed by: STUDENT IN AN ORGANIZED HEALTH CARE EDUCATION/TRAINING PROGRAM

## 2022-10-25 PROCEDURE — 82800 BLOOD PH: CPT

## 2022-10-25 PROCEDURE — 94640 AIRWAY INHALATION TREATMENT: CPT

## 2022-10-25 RX ORDER — IPRATROPIUM BROMIDE AND ALBUTEROL SULFATE 2.5; .5 MG/3ML; MG/3ML
1 SOLUTION RESPIRATORY (INHALATION) EVERY 4 HOURS PRN
Status: DISCONTINUED | OUTPATIENT
Start: 2022-10-25 | End: 2022-10-27 | Stop reason: HOSPADM

## 2022-10-25 RX ORDER — IPRATROPIUM BROMIDE AND ALBUTEROL SULFATE 2.5; .5 MG/3ML; MG/3ML
1 SOLUTION RESPIRATORY (INHALATION) 3 TIMES DAILY
Status: DISCONTINUED | OUTPATIENT
Start: 2022-10-25 | End: 2022-10-26

## 2022-10-25 RX ADMIN — SODIUM CHLORIDE, PRESERVATIVE FREE 10 ML: 5 INJECTION INTRAVENOUS at 23:35

## 2022-10-25 RX ADMIN — ATORVASTATIN CALCIUM 40 MG: 40 TABLET, FILM COATED ORAL at 17:12

## 2022-10-25 RX ADMIN — IPRATROPIUM BROMIDE AND ALBUTEROL SULFATE 1 AMPULE: .5; 3 SOLUTION RESPIRATORY (INHALATION) at 21:16

## 2022-10-25 RX ADMIN — LEVOTHYROXINE SODIUM 75 MCG: 0.07 TABLET ORAL at 05:01

## 2022-10-25 RX ADMIN — GABAPENTIN 400 MG: 400 CAPSULE ORAL at 23:36

## 2022-10-25 RX ADMIN — Medication 50 MG: at 09:23

## 2022-10-25 RX ADMIN — IPRATROPIUM BROMIDE AND ALBUTEROL SULFATE 1 AMPULE: .5; 3 SOLUTION RESPIRATORY (INHALATION) at 09:56

## 2022-10-25 RX ADMIN — Medication 0.5 TABLET: at 09:22

## 2022-10-25 RX ADMIN — GABAPENTIN 400 MG: 400 CAPSULE ORAL at 09:23

## 2022-10-25 RX ADMIN — ASPIRIN 81 MG: 81 TABLET, COATED ORAL at 09:22

## 2022-10-25 RX ADMIN — INSULIN LISPRO 2 UNITS: 100 INJECTION, SOLUTION INTRAVENOUS; SUBCUTANEOUS at 17:12

## 2022-10-25 RX ADMIN — OXYCODONE HYDROCHLORIDE AND ACETAMINOPHEN 500 MG: 500 TABLET ORAL at 09:22

## 2022-10-25 RX ADMIN — AZITHROMYCIN DIHYDRATE 250 MG: 500 INJECTION, POWDER, LYOPHILIZED, FOR SOLUTION INTRAVENOUS at 11:23

## 2022-10-25 RX ADMIN — METOPROLOL SUCCINATE 50 MG: 50 TABLET, EXTENDED RELEASE ORAL at 09:22

## 2022-10-25 RX ADMIN — CEFTRIAXONE SODIUM 1000 MG: 1 INJECTION, POWDER, FOR SOLUTION INTRAMUSCULAR; INTRAVENOUS at 09:32

## 2022-10-25 RX ADMIN — DEXAMETHASONE SODIUM PHOSPHATE 6 MG: 4 INJECTION, SOLUTION INTRA-ARTICULAR; INTRALESIONAL; INTRAMUSCULAR; INTRAVENOUS; SOFT TISSUE at 09:24

## 2022-10-25 RX ADMIN — FERROUS SULFATE TAB 325 MG (65 MG ELEMENTAL FE) 650 MG: 325 (65 FE) TAB at 09:22

## 2022-10-25 RX ADMIN — IPRATROPIUM BROMIDE AND ALBUTEROL SULFATE 1 AMPULE: .5; 3 SOLUTION RESPIRATORY (INHALATION) at 15:23

## 2022-10-25 RX ADMIN — SODIUM CHLORIDE, PRESERVATIVE FREE 10 ML: 5 INJECTION INTRAVENOUS at 09:24

## 2022-10-25 ASSESSMENT — ENCOUNTER SYMPTOMS
COUGH: 1
CHEST TIGHTNESS: 0
VOMITING: 0
SORE THROAT: 0
DIARRHEA: 0
CONSTIPATION: 0
NAUSEA: 0
ABDOMINAL PAIN: 0
RHINORRHEA: 0
COLOR CHANGE: 0
SHORTNESS OF BREATH: 1
ABDOMINAL DISTENTION: 0

## 2022-10-25 NOTE — PLAN OF CARE
Problem: Respiratory - Adult  Goal: Clear lung sounds  Outcome: Not Progressing     Breath sounds diminished, expiratory wheezes, continuing duoneb per protocol. Patient mutually agreed on goals.

## 2022-10-25 NOTE — PLAN OF CARE
Problem: Discharge Planning  Goal: Discharge to home or other facility with appropriate resources  Outcome: Progressing  Flowsheets (Taken 10/25/2022 0233)  Discharge to home or other facility with appropriate resources:   Identify barriers to discharge with patient and caregiver   Arrange for needed discharge resources and transportation as appropriate   Identify discharge learning needs (meds, wound care, etc)     Problem: Chronic Conditions and Co-morbidities  Goal: Patient's chronic conditions and co-morbidity symptoms are monitored and maintained or improved  Outcome: Progressing  Flowsheets (Taken 10/25/2022 0233)  Care Plan - Patient's Chronic Conditions and Co-Morbidity Symptoms are Monitored and Maintained or Improved:   Monitor and assess patient's chronic conditions and comorbid symptoms for stability, deterioration, or improvement   Collaborate with multidisciplinary team to address chronic and comorbid conditions and prevent exacerbation or deterioration   Update acute care plan with appropriate goals if chronic or comorbid symptoms are exacerbated and prevent overall improvement and discharge  Note: Education about covid, patient has already identified he has a DX that does prevent him from getting the vaccines- but is very receptive of learning other preventive tactics This is his first time reid COVID     Problem: Skin/Tissue Integrity  Goal: Absence of new skin breakdown  Description: 1. Monitor for areas of redness and/or skin breakdown  2. Assess vascular access sites hourly  3. Every 4-6 hours minimum:  Change oxygen saturation probe site  4. Every 4-6 hours:  If on nasal continuous positive airway pressure, respiratory therapy assess nares and determine need for appliance change or resting period.   Outcome: Progressing     Problem: ABCDS Injury Assessment  Goal: Absence of physical injury  Outcome: Progressing  Flowsheets (Taken 10/25/2022 0233)  Absence of Physical Injury: Implement safety measures based on patient assessment     Problem: Safety - Adult  Goal: Free from fall injury  Outcome: Progressing  Flowsheets (Taken 10/25/2022 0233)  Free From Fall Injury:   Instruct family/caregiver on patient safety   Based on caregiver fall risk screen, instruct family/caregiver to ask for assistance with transferring infant if caregiver noted to have fall risk factors

## 2022-10-25 NOTE — PROGRESS NOTES
Hospitalist      Patient: Didi Jones    Unit/Bed:6K-22/022-A  YOB: 1945  MRN: [id number]   Acct: [de-identified]   PCP: Gabriel ELLISON    Date of Service: Pt seen/examined on 10/25/22  and Admitted to Observation with expected LOS less than two midnights due to medical therapy. Chief Complaint:  Shortness of breath    Assessment and Plan:  Acute hypoxic respiratory failure, with Covid-19 infection:    Pt on 1-2 L O2 NC, up from baseline of RA. Tested positive with home COVID test 10/21, symptom onset 10/19. Pt afebrile, hemodynamically stable, non-toxic appearing. Labs show MISA on CKD, metabolic acidosis with AG, no acute leukocytosis. CXR shows opacity of the left posterior lower lung field, effusion with possible loculation. D-dimer elevated, however forgoing CTA at this time due to MISA. BC x 2 obtained. Given Rocephin, decadron, duonebs, magnesium sulfate in ED. Procal, LDH, Ferritin, CRP ordered. Continue Decadron, duonebs, empiric antibitiotics for now. Obtain respiratory viral antigen panel, respiratory culture, urine strep/urine legionella. Home O2 eval ordered- wean O2 as tolerated. Repeat CBC, BMP daily. Vit C, D, zinc, IS/AC. MISA on CKD:    Cr 5.1, up from baseline of around 1.2. Pt reports not eating or drinking well x 4 days, reports taking medications as prescribed. Will hold aldactone, entresto. UA ordered. Renal US ordered. Start gentle IVF. Continue to monitor with daily BMP. Repeat BMP ordered for this evening. Nephrology consulted- Dr. Nicole Hernandez made aware. Elevated D-dimer:    Age adjusted ddimer is 750 which indicates a VTE is still possible. No known history of DVT/PE. Holding on CTA due to MISA. Currently requires 1-2L O2 NC. Continue to monitor closely. If pt requires increase in O2 requirements, PE can be of consideration. To note pt has had a reaction to heparin in the past, currently has SCDs ordered for DVT prophylaxis.      HFrEF:    Last ECHO 5/25/2022 reveals EF 40-45%. Pt appears hypovolemic today. Holding entresto, aldactone due to #2. Continue to monitor daily weights, I&Os. Telemetry. Essential HTN:    BP appears stable, but normal to soft. Holding antihypertensives for appropriate BP and MISA. Continue to monitor closely. Elevated troponin, with Hx of CAD and CABG:    Stable- trop appears chronically elevated likely due to demand ischemia. Pt denies chest pain at this time. EKG shows Sinus bradycardia non-specific st-t wave changes noted. T2DM:   Glucose 85 today. Hold home meds. Med dose SSI ordered, Hypoglycemia protocol in place. POCT glucose checks. Continue to monitor with daily BMP. Carb limited diet. 10.25.2022 seen this a.m. resting comfortably in bed, creatinine 2.8 today down from a high of 5.1, sodium 139, potassium 4.5, CO2 18 ,glucose 153, WBC 3.8 hemoglobin 11.1, platelets 129, blood cultures no growth after 24 hours strep and Legionella antigen are pending I/O balance +562 monitor for volume overload      History Of Present Illness:    Jesus Bledsoe is a 77 y/o  male with a PMHx of CHF, HTN, CAD, T2DM who presents to [facility] ED today for the evaluation of generalized weakness and fatigue. Pt states his symptoms started roughly last week around Wednesday. He first developed a non-productive cough, which then he also developed decreased appetite, fatigue, decreased appeitie and poor PO intake, some shortness of breath with activity. He states he took a home COVID test Friday, for which is positive but came in today for his symptoms progressing and not improving. He denies fever, chills, chest pain, lightheadedness, dizziness, difficulty walking, changes in bowel habits, N/V. He reports decreased urine output. He states he has been taking all his medications as prescribed.        Past Medical History:        Diagnosis Date    Acute on chronic systolic CHF (congestive heart failure), NYHA class 3 ([facility]) EF 25-30% 11/7/2018    CAD (coronary artery disease)     CKD (chronic kidney disease) stage 3, GFR 30-59 ml/min ([facility]) 9/23/2015    Diabetes mellitus ([facility])     HTN (hypertension), benign     Hyperlipidemia     Hypothyroidism     Mitral and aortic incompetence     Neuropathy     Positive blood culture     Being treated with rocephhin as outpt. S/P CABG x 2 11/15/2018       Past Surgical History:        Procedure Laterality Date    CARDIAC SURGERY      heart cath    CATARACT REMOVAL WITH IMPLANT Bilateral     COLONOSCOPY      CORONARY ARTERY BYPASS GRAFT  11/14/2018    DIAGNOSTIC CARDIAC CATH LAB PROCEDURE      EYE SURGERY      NE CABG, VEIN, THREE N/A 11/14/2018    CABG CORONARY ARTERY BYPASS,  AORTIC AND MITRAL VALVE REPAIR WITH MICHELE, BALLOON PUMP INSERTION performed by Jd Tanner MD at [address] [place] [address] ECHO TRANSESOPHAG R-T 2D IMG ACQUISJ I&R ONLY Left 10/25/2018    TRANSESOPHAGEAL ECHOCARDIOGRAM performed by Christal West MD at [facility]       Home Medications:   No current facility-administered medications on file prior to encounter. Current Outpatient Medications on File Prior to Encounter   Medication Sig Dispense Refill    dapagliflozin (FARXIGA) 10 MG tablet Take 10 mg by mouth every morning (Patient not taking: Reported on 10/24/2022)      spironolactone (ALDACTONE) 25 MG tablet Take 25 mg by mouth daily (Patient not taking: Reported on 10/24/2022)      cloNIDine (CATAPRES) 0.1 MG tablet Take 0.1 mg by mouth 2 times daily      furosemide (LASIX) 20 MG tablet Take 1 tablet by mouth three times a week And as directed by CHF clinic (Patient taking differently: Take 20 mg by mouth as needed And as directed by CHF clinic) 60 tablet 1    ferrous sulfate (IRON 325) 325 (65 Fe) MG tablet Take 650 mg by mouth daily (with breakfast)      nitroGLYCERIN (NITROSTAT) 0.4 MG SL tablet up to max of 3 total doses.  If no relief after 1 dose, call 911. 25 tablet 3    atorvastatin (LIPITOR) 80 MG tablet Take 40 mg by mouth every evening levothyroxine (SYNTHROID) 75 MCG tablet Take 75 mcg by mouth Daily      metoprolol succinate (TOPROL XL) 50 MG extended release tablet Take 1 tablet by mouth daily 90 tablet 3    sacubitril-valsartan (ENTRESTO)  MG per tablet Take 1 tablet by mouth 2 times daily 180 tablet 3    aspirin 81 MG tablet Take 81 mg by mouth daily      gabapentin (NEURONTIN) 400 MG capsule Take 400 mg by mouth 2 times daily. glimepiride (AMARYL) 1 MG tablet Take 0.5 mg by mouth every morning (before breakfast)       docusate sodium (COLACE) 100 MG capsule Take 100 mg by mouth as needed       ONE TOUCH ULTRA TEST strip as needed  0       Allergies:    Heparin    Social History:    reports that he is a non-smoker but has been exposed to tobacco smoke. He has been exposed to an average 1.00 packs per day for the past 3.00 years. He has never used smokeless tobacco. He reports current alcohol use of about 14.0 standard drinks per week. He reports that he does not use drugs. Family History:       Problem Relation Age of Onset    Diabetes Mother     Other Mother         chf    Cancer Brother 28        renal    Cancer Brother 46        renal       Diet:  ADULT DIET; Regular; 3 carb choices (45 gm/meal)  ADULT ORAL NUTRITION SUPPLEMENT; Breakfast, Lunch, Dinner; Diabetic Oral Supplement    Review of systems:     Review of Systems   Constitutional:  Positive for activity change, appetite change and fatigue. Negative for chills and fever. HENT:  Negative for congestion, rhinorrhea and sore throat. Eyes:  Negative for visual disturbance. Respiratory:  Positive for cough and shortness of breath. Negative for chest tightness. Cardiovascular:  Negative for chest pain, palpitations and leg swelling. Gastrointestinal:  Negative for abdominal distention, abdominal pain, constipation, diarrhea, nausea and vomiting. Genitourinary:  Positive for decreased urine volume.  Negative for difficulty urinating, frequency, hematuria and urgency. Musculoskeletal:  Negative for arthralgias and gait problem. Skin:  Negative for color change and rash. Neurological:  Positive for weakness. Negative for dizziness and light-headedness. PHYSICAL EXAM:  /72   Pulse 67   Temp 97.6 °F (36.4 °C) (Oral)   Resp 18   Ht 6' (1.829 m)   Wt 201 lb (91.2 kg)   SpO2 96%   BMI 27.26 kg/m²   General appearance: Chronically ill appearing. No apparent distress. Appears stated age and cooperative. Skin: Exposed skin appears dry. Skin color, texture, turgor normal.  No rashes or lesions. HEENT: Normal cephalic, atraumatic without obvious deformity. Pupils equal, round, and reactive to light. Extra-ocular muscles intact. Conjunctivae/corneas clear. Neck: Trachea midline. Supple, with full range of motion. Cardiovascular: Regular rate and rhythm with normal S1/S2. No murmurs, rubs or gallops. Respiratory:  Rhonchi appreciated in upper and lower lung fields. Expiratory wheezing noted in Middle and lower lung fields bilaterally. Normal respiratory effort. On 1L O2 NC. Abdomen: Soft, non-tender, non-distended. Hypoactive bowel sounds. Musculoskeletal:  No weakness or instability noted. No edema, erythema, or gross deformity noted. Vascular: Pulses +1 palpable, equal bilaterally. Neurologic:  Neurovascularly intact without any focal sensory/motor deficits. Cranial nerves: II-XII grossly intact.    Psychiatric: Alert and oriented, thought content appropriate, normal insight      Labs:   Recent Labs     10/24/22  0450 10/25/22  0538   WBC 5.5 3.8*   HGB 12.4* 11.1*   HCT 38.9* 34.4*   * 136       Recent Labs     10/24/22  0450 10/24/22  2129 10/25/22  0538    137 139   K 4.4 4.5 4.5    103 106   CO2 16* 16* 18*   BUN 99* 91* 83*   CREATININE 5.1* 3.5* 2.8*   CALCIUM 8.2* 8.3* 8.9       Recent Labs     10/24/22  0450 10/25/22  0538   AST 26 26   ALT 19 19   BILIDIR 0.3  --    BILITOT 0.5 0.3   ALKPHOS 88 78       No results for input(s): INR in the last 72 hours. No results for input(s): [place]ity Negrony in the last 72 hours. Urinalysis:    Lab Results   Component Value Date/Time    NITRU NEGATIVE 10/25/2022 01:00 AM    WBCUA 0-2 10/25/2022 01:00 AM    BACTERIA NONE SEEN 10/25/2022 01:00 AM    RBCUA 3-5 10/25/2022 01:00 AM    BLOODU NEGATIVE 10/25/2022 01:00 AM    SPECGRAV 1.010 10/25/2022 01:00 AM    GLUCOSEU NEGATIVE 11/16/2018 02:20 AM       Radiology:   US RENAL COMPLETE   Final Result   No sonographic finding for hydronephrosis. Benign bilateral renal cysts. This document has been electronically signed by: Samantha Torres MD on    10/25/2022 12:04 AM      XR CHEST (2 VW)   Final Result      Lateral view shows an opacity of the posterior lower thorax. This may be a    loculated pleural effusion or other. CT may be performed for further    evaluation. Cardiomegaly. This document has been electronically signed by: Gaby Gilman MD on    10/24/2022 06:23 AM        XR CHEST (2 VW)    Result Date: 10/24/2022  Chest 2 views COMPARISON: 2/18/19 FINDINGS: The lateral view shows an opacity of the posterior lower thorax possibly a loculated pleural effusion. The heart is enlarged. The patient is post cardiac valve replacement and atrial appendage clipping. Lateral view shows an opacity of the posterior lower thorax. This may be a loculated pleural effusion or other. CT may be performed for further evaluation. Cardiomegaly.  This document has been electronically signed by: Gaby Gilman MD on 10/24/2022 06:23 AM        EKG:  Sinus bradycardia     Electronically signed by Ninfa Singh DO on 10/25/2022 at 11:18 AM

## 2022-10-25 NOTE — CARE COORDINATION
Case Management Assessment  Initial Evaluation    Date/Time of Evaluation: 10/25/2022 2:52 PM  Assessment Completed by: iRdge Barriga RN    If patient is discharged prior to next notation, then this note serves as note for discharge by case management. Patient Name: Margaret Ridley                   YOB: 1945  Diagnosis: Shortness of breath [R06.02]                   Date / Time: 10/24/2022  4:40 AM    Patient Admission Status: Observation     Current PCP: Toribio ELLISON  PCP verified by CM? Yes    Chart Reviewed: Yes      Patient Orientation: Alert and Oriented    Patient Cognition: Alert  History Provided by: Patient    Hospitalization in the last 30 days (Readmission):  No    If yes, Readmission Assessment in  Navigator will be completed. Advance Directives:     Code Status: Full Code       Discharge Planning  Patient lives with: Spouse/Significant Other Type of Home: Trailer/Mobile Home   Primary Caregiver: Self  Patient Support Systems include: Spouse/Significant Other   Current Financial resources:    Current community resources:    Current services prior to admission: None   Type of Home Care services:  None    ADLS  Prior functional level: Independent in ADLs/IADLs  Current functional level: Independent in ADLs/IADLs      Family can provide assistance at DC: Yes  Would you like Case Management to discuss the discharge plan with any other family members/significant others, and if so, who?     Plans to Return to Present Housing: Yes  Other Identified Issues/Barriers to RETURNING to current housing: None  Potential Assistance needed at discharge: N/A  Patient expects to discharge to: [address] [place] [address] for transportation at discharge:      Financial  Payor: Luis Alfredo Anna / Plan: MEDICARE PART A AND B / Product Type: *No Product type* /     Does insurance require precert for SNF: No    Potential assistance Purchasing Medications: No  Meds-to-Beds request: No      [id number] - [place], [place] - [address] [address] [facility],[address]  [address]  [place] [address]  Phone: [phone] Fax: [phone]    [address], [address] Jigar Reid [phone] [address] [facility] [address]  Phone: [phone] Fax: [phone]      Factors facilitating achievement of predicted outcomes: Family support, Cooperative, Pleasant, and Good insight into deficits    Barriers to discharge: Medication managment    Additional Case Management Notes: Pt admitted through ER with c/o SOB and generalized weakness. States he is feeling better today. Pt on Room air, IV Rocephin, IV Zithromax, Duoneb, Decadron, creat 2.5-down from 5.1, Nephro following-IVF  decreased     The Plan for Transition of Care is related to the following treatment goals of Shortness of breath [R06.02]    Patient Goals/Plan/Treatment Preferences: Spoke with pt. He lives home with wife. Independent with ADL's. Has cane and walker. Utilizes cane daily. Declines need for additional DME/HH. Wife works 3 days a week. Verified PCP and insurance. Will continue to follow for home going needs. Transportation/Food Security/Housekeeping Addressed:  No issues identified.      Beena Baxter RN  Case Management Department

## 2022-10-25 NOTE — PROGRESS NOTES
[facility]   Nephrology progress note  10/25/2022, 9:59 AM      Pt Name:    Kenyon Loo  MRN:     [id number]     YOB: 1945  Admit Date:    10/24/2022  4:40 AM    Chief Complaint: Nephrology following for acute kidney injury and metabolic acidosis. Subjective:  Patient seen and examined  No chest pain still complains of shortness of breath  Says he is making more urine output    Objective:  24HR INTAKE/OUTPUT:    Intake/Output Summary (Last 24 hours) at 10/25/2022 0959  Last data filed at 10/25/2022 0455  Gross per 24 hour   Intake 2112.04 ml   Output 1550 ml   Net 562.04 ml      Admission weight: 199 lb (90.3 kg)  Wt Readings from Last 3 Encounters:   10/25/22 201 lb (91.2 kg)   06/30/22 225 lb 3.2 oz (102.2 kg)   06/01/22 223 lb (101.2 kg)        Vitals :   Vitals:    10/24/22 2000 10/24/22 2346 10/25/22 0445 10/25/22 0915   BP: 134/62 (!) 132/55 129/64 128/72   Pulse: 60 58 65 67   Resp: 16 18 16 18   Temp: 98.7 °F (37.1 °C) 97.7 °F (36.5 °C) 97.6 °F (36.4 °C) 97.6 °F (36.4 °C)   TempSrc: Oral Oral Oral Oral   SpO2: 98% 99% 99% 96%   Weight:   201 lb (91.2 kg)    Height:           Physical examination  General Appearance:  Well developed.  No distress  Mouth/Throat:  Oral mucosa moist  Neck: No accessory muscle use  CNS-grossly intact  Psych-not agitated  Musculoskeletal:  Edema -no significant edema    Medications:  Infusion:    sodium chloride      dextrose      lactated ringers 100 mL/hr at 10/25/22 0236     Meds:    aspirin  81 mg Oral Daily    atorvastatin  40 mg Oral QPM    [Held by provider] cloNIDine  0.1 mg Oral BID    ferrous sulfate  650 mg Oral Daily with breakfast    gabapentin  400 mg Oral BID    levothyroxine  75 mcg Oral Daily    metoprolol succinate  50 mg Oral Daily    [Held by provider] sacubitril-valsartan  1 tablet Oral BID    [Held by provider] spironolactone  25 mg Oral Daily    sodium chloride flush  10 mL IntraVENous 2 times per day    insulin lispro 0-8 Units SubCUTAneous TID WC    insulin lispro  0-4 Units SubCUTAneous Nightly    ascorbic acid  500 mg Oral Daily    zinc sulfate  50 mg Oral Daily    dexamethasone  6 mg Oral Q24H    calcium-cholecalciferol  0.5 tablet Oral Daily    azithromycin  250 mg IntraVENous Q24H    cefTRIAXone (ROCEPHIN) IV  1,000 mg IntraVENous Q24H       Lab Data :  CBC:   Recent Labs     10/24/22  0450 10/25/22  0538   WBC 5.5 3.8*   HGB 12.4* 11.1*   HCT 38.9* 34.4*   * 136     CMP:  Recent Labs     10/24/22  0450 10/24/22  2129 10/25/22  0538    137 139   K 4.4 4.5 4.5    103 106   CO2 16* 16* 18*   BUN 99* 91* 83*   CREATININE 5.1* 3.5* 2.8*   GLUCOSE 85 267* 153*   CALCIUM 8.2* 8.3* 8.9     Hepatic:   Recent Labs     10/24/22  0450 10/25/22  0538   LABALBU 3.7 3.2*   AST 26 26   ALT 19 19   BILITOT 0.5 0.3   ALKPHOS 88 78       Baseline Creatinine: 1.2-1.3    Assessment and Plan:  Renal -acute kidney injury with chronic kidney disease stage IIIa with baseline creatinine around 1.3  This appears most likely due to prerenal etiology from poor oral intake and multiple nephrotoxic agents which include (Entresto, Lasix, Farxiga, Aldactone)  Will hold all the above drugs continue gentle hydration creatinine is getting better  Decrease IV fluids to 50 mill per hour. Steroids are causing elevated BUN to some degree  Electrolytes -appear to be within normal limits  Metabolic acidosis, venous pH is 7.34, no need of any IV bicarbonate  COVID-19 infection  Essential hypertension  Hx of diabetes mellitus  Meds reviewed and discussed with patient and wife    Teresita Sarabia MD  [facility]    This report has been created using voice recognition software.  It may contain minor errors which are inherent in voice recognition technology

## 2022-10-26 PROBLEM — R00.1 BRADYCARDIA: Status: ACTIVE | Noted: 2022-10-26

## 2022-10-26 LAB
ANION GAP SERPL CALCULATED.3IONS-SCNC: 14 MEQ/L (ref 8–16)
BASOPHILS # BLD: 0 %
BASOPHILS ABSOLUTE: 0 THOU/MM3 (ref 0–0.1)
BUN BLDV-MCNC: 62 MG/DL (ref 7–22)
CALCIUM IONIZED: 1.15 MMOL/L (ref 1.12–1.32)
CALCIUM SERPL-MCNC: 9.2 MG/DL (ref 8.5–10.5)
CHLORIDE BLD-SCNC: 110 MEQ/L (ref 98–111)
CO2: 18 MEQ/L (ref 23–33)
CREAT SERPL-MCNC: 1.7 MG/DL (ref 0.4–1.2)
EOSINOPHIL # BLD: 0 %
EOSINOPHILS ABSOLUTE: 0 THOU/MM3 (ref 0–0.4)
ERYTHROCYTE [DISTWIDTH] IN BLOOD BY AUTOMATED COUNT: 14.5 % (ref 11.5–14.5)
ERYTHROCYTE [DISTWIDTH] IN BLOOD BY AUTOMATED COUNT: 56.4 FL (ref 35–45)
GFR SERPL CREATININE-BSD FRML MDRD: 41 ML/MIN/1.73M2
GLUCOSE BLD-MCNC: 178 MG/DL (ref 70–108)
GLUCOSE BLD-MCNC: 183 MG/DL (ref 70–108)
GLUCOSE BLD-MCNC: 216 MG/DL (ref 70–108)
GLUCOSE BLD-MCNC: 235 MG/DL (ref 70–108)
GLUCOSE BLD-MCNC: 247 MG/DL (ref 70–108)
HCT VFR BLD CALC: 36.1 % (ref 42–52)
HEMOGLOBIN: 11.4 GM/DL (ref 14–18)
IMMATURE GRANS (ABS): 0.07 THOU/MM3 (ref 0–0.07)
IMMATURE GRANULOCYTES: 1.2 %
LEGIONELLA PNEUMOPHILIA AG, URINE: NEGATIVE
LYMPHOCYTES # BLD: 4.3 %
LYMPHOCYTES ABSOLUTE: 0.2 THOU/MM3 (ref 1–4.8)
MAGNESIUM: 2.2 MG/DL (ref 1.6–2.4)
MCH RBC QN AUTO: 33.5 PG (ref 26–33)
MCHC RBC AUTO-ENTMCNC: 31.6 GM/DL (ref 32.2–35.5)
MCV RBC AUTO: 106.2 FL (ref 80–94)
MONOCYTES # BLD: 9.4 %
MONOCYTES ABSOLUTE: 0.5 THOU/MM3 (ref 0.4–1.3)
NUCLEATED RED BLOOD CELLS: 0 /100 WBC
PLATELET # BLD: 175 THOU/MM3 (ref 130–400)
PMV BLD AUTO: 9.9 FL (ref 9.4–12.4)
POTASSIUM REFLEX MAGNESIUM: 4.8 MEQ/L (ref 3.5–5.2)
RBC # BLD: 3.4 MILL/MM3 (ref 4.7–6.1)
SEG NEUTROPHILS: 85.1 %
SEGMENTED NEUTROPHILS ABSOLUTE COUNT: 4.8 THOU/MM3 (ref 1.8–7.7)
SODIUM BLD-SCNC: 142 MEQ/L (ref 135–145)
WBC # BLD: 5.6 THOU/MM3 (ref 4.8–10.8)

## 2022-10-26 PROCEDURE — 94640 AIRWAY INHALATION TREATMENT: CPT

## 2022-10-26 PROCEDURE — 94669 MECHANICAL CHEST WALL OSCILL: CPT

## 2022-10-26 PROCEDURE — 2580000003 HC RX 258

## 2022-10-26 PROCEDURE — 82948 REAGENT STRIP/BLOOD GLUCOSE: CPT

## 2022-10-26 PROCEDURE — 83735 ASSAY OF MAGNESIUM: CPT

## 2022-10-26 PROCEDURE — 93005 ELECTROCARDIOGRAM TRACING: CPT | Performed by: INTERNAL MEDICINE

## 2022-10-26 PROCEDURE — 85025 COMPLETE CBC W/AUTO DIFF WBC: CPT

## 2022-10-26 PROCEDURE — 36415 COLL VENOUS BLD VENIPUNCTURE: CPT

## 2022-10-26 PROCEDURE — 80048 BASIC METABOLIC PNL TOTAL CA: CPT

## 2022-10-26 PROCEDURE — 94762 N-INVAS EAR/PLS OXIMTRY CONT: CPT

## 2022-10-26 PROCEDURE — 99232 SBSQ HOSP IP/OBS MODERATE 35: CPT | Performed by: INTERNAL MEDICINE

## 2022-10-26 PROCEDURE — 1200000000 HC SEMI PRIVATE

## 2022-10-26 PROCEDURE — 99233 SBSQ HOSP IP/OBS HIGH 50: CPT | Performed by: INTERNAL MEDICINE

## 2022-10-26 PROCEDURE — 6370000000 HC RX 637 (ALT 250 FOR IP): Performed by: INTERNAL MEDICINE

## 2022-10-26 PROCEDURE — 99223 1ST HOSP IP/OBS HIGH 75: CPT | Performed by: INTERNAL MEDICINE

## 2022-10-26 PROCEDURE — 6360000002 HC RX W HCPCS

## 2022-10-26 PROCEDURE — 94760 N-INVAS EAR/PLS OXIMETRY 1: CPT

## 2022-10-26 PROCEDURE — 82330 ASSAY OF CALCIUM: CPT

## 2022-10-26 PROCEDURE — 6370000000 HC RX 637 (ALT 250 FOR IP)

## 2022-10-26 RX ADMIN — GABAPENTIN 400 MG: 400 CAPSULE ORAL at 20:28

## 2022-10-26 RX ADMIN — LEVOTHYROXINE SODIUM 75 MCG: 0.07 TABLET ORAL at 06:05

## 2022-10-26 RX ADMIN — AZITHROMYCIN DIHYDRATE 250 MG: 500 INJECTION, POWDER, LYOPHILIZED, FOR SOLUTION INTRAVENOUS at 11:44

## 2022-10-26 RX ADMIN — Medication 0.5 TABLET: at 09:07

## 2022-10-26 RX ADMIN — OXYCODONE HYDROCHLORIDE AND ACETAMINOPHEN 500 MG: 500 TABLET ORAL at 09:07

## 2022-10-26 RX ADMIN — Medication 50 MG: at 09:07

## 2022-10-26 RX ADMIN — DEXAMETHASONE SODIUM PHOSPHATE 6 MG: 4 INJECTION, SOLUTION INTRA-ARTICULAR; INTRALESIONAL; INTRAMUSCULAR; INTRAVENOUS; SOFT TISSUE at 09:59

## 2022-10-26 RX ADMIN — FERROUS SULFATE TAB 325 MG (65 MG ELEMENTAL FE) 650 MG: 325 (65 FE) TAB at 09:07

## 2022-10-26 RX ADMIN — CEFTRIAXONE SODIUM 1000 MG: 1 INJECTION, POWDER, FOR SOLUTION INTRAMUSCULAR; INTRAVENOUS at 09:18

## 2022-10-26 RX ADMIN — SODIUM CHLORIDE, PRESERVATIVE FREE 10 ML: 5 INJECTION INTRAVENOUS at 20:28

## 2022-10-26 RX ADMIN — INSULIN LISPRO 2 UNITS: 100 INJECTION, SOLUTION INTRAVENOUS; SUBCUTANEOUS at 11:38

## 2022-10-26 RX ADMIN — ATORVASTATIN CALCIUM 40 MG: 40 TABLET, FILM COATED ORAL at 18:58

## 2022-10-26 RX ADMIN — SODIUM CHLORIDE, PRESERVATIVE FREE 10 ML: 5 INJECTION INTRAVENOUS at 09:13

## 2022-10-26 RX ADMIN — METOPROLOL SUCCINATE 50 MG: 50 TABLET, EXTENDED RELEASE ORAL at 09:07

## 2022-10-26 RX ADMIN — IPRATROPIUM BROMIDE AND ALBUTEROL SULFATE 1 AMPULE: .5; 3 SOLUTION RESPIRATORY (INHALATION) at 00:51

## 2022-10-26 RX ADMIN — ACETAMINOPHEN 650 MG: 325 TABLET ORAL at 10:54

## 2022-10-26 RX ADMIN — IPRATROPIUM BROMIDE AND ALBUTEROL SULFATE 1 AMPULE: .5; 3 SOLUTION RESPIRATORY (INHALATION) at 09:19

## 2022-10-26 RX ADMIN — GABAPENTIN 400 MG: 400 CAPSULE ORAL at 09:07

## 2022-10-26 RX ADMIN — INSULIN LISPRO 2 UNITS: 100 INJECTION, SOLUTION INTRAVENOUS; SUBCUTANEOUS at 16:51

## 2022-10-26 RX ADMIN — ASPIRIN 81 MG: 81 TABLET, COATED ORAL at 09:08

## 2022-10-26 ASSESSMENT — ENCOUNTER SYMPTOMS
COUGH: 1
SORE THROAT: 0
RHINORRHEA: 0
CHEST TIGHTNESS: 0
VOMITING: 0
NAUSEA: 0
CONSTIPATION: 0
ABDOMINAL PAIN: 0
DIARRHEA: 0
ABDOMINAL DISTENTION: 0
SHORTNESS OF BREATH: 1
COLOR CHANGE: 0

## 2022-10-26 NOTE — CARE COORDINATION
10/26/22, 8:04 AM EDT    DISCHARGE ON GOING EVALUATION    [address] day: 1  Location: -22/022-A Reason for admit: Shortness of breath [R06.02]  SOB (shortness of breath) [R06.02]   Procedure:   10/24 CXR: Lateral view shows an opacity of the posterior lower thorax. This may be a loculated pleural effusion or other. CT may be performed for further evaluation. Cardiomegaly. 10/25 Renal US: No sonographic finding for hydronephrosis. Benign bilateral renal cysts. Barriers to Discharge: Hospitalist and nephro following. Creatinine 1.7 (down from 2.2). IV zithromax. IV rocephin. IV decadron. Nebs. Inahler. Zinc. ASA. Vit C. Room air. PCP: Madhu ELLISON  Readmission Risk Score: 18.5%  Patient Goals/Plan/Treatment Preferences: Plans home with wife, has dme and denied needs.

## 2022-10-26 NOTE — PROGRESS NOTES
[facility]   Nephrology progress note  10/26/2022, 11:39 AM      Pt Name:    Rl Jim  MRN:     [id number]     YOB: 1945  Admit Date:    10/24/2022  4:40 AM    Chief Complaint: Nephrology following for acute kidney injury and metabolic acidosis. Subjective:  Patient seen and examined  No chest pain still complains of shortness of breath  Says he is making more urine output    Objective:  24HR INTAKE/OUTPUT:    Intake/Output Summary (Last 24 hours) at 10/26/2022 1139  Last data filed at 10/26/2022 0318  Gross per 24 hour   Intake 1298.97 ml   Output 1175 ml   Net 123.97 ml        Admission weight: 199 lb (90.3 kg)  Wt Readings from Last 3 Encounters:   10/26/22 203 lb (92.1 kg)   06/30/22 225 lb 3.2 oz (102.2 kg)   06/01/22 223 lb (101.2 kg)        Vitals :   Vitals:    10/26/22 0053 10/26/22 0315 10/26/22 0900 10/26/22 1045   BP:  132/69 (!) 144/81 128/61   Pulse: 67 69 69 75   Resp: 18 16 18 16   Temp:  97.5 °F (36.4 °C) 98.6 °F (37 °C) 99.4 °F (37.4 °C)   TempSrc:  Oral Oral Oral   SpO2: 94% 93% 93% 92%   Weight:  203 lb (92.1 kg)     Height:           Physical examination  General Appearance:  Well developed.  No distress  Mouth/Throat:  Oral mucosa moist  Neck: No accessory muscle use  CNS-grossly intact  Psych-not agitated  Musculoskeletal:  Edema -no significant edema    Medications:  Infusion:    sodium chloride      dextrose       Meds:    aspirin  81 mg Oral Daily    atorvastatin  40 mg Oral QPM    [Held by provider] cloNIDine  0.1 mg Oral BID    ferrous sulfate  650 mg Oral Daily with breakfast    gabapentin  400 mg Oral BID    levothyroxine  75 mcg Oral Daily    metoprolol succinate  50 mg Oral Daily    [Held by provider] sacubitril-valsartan  1 tablet Oral BID    [Held by provider] spironolactone  25 mg Oral Daily    sodium chloride flush  10 mL IntraVENous 2 times per day    insulin lispro  0-8 Units SubCUTAneous TID     insulin lispro  0-4 Units SubCUTAneous Nightly    ascorbic acid  500 mg Oral Daily    zinc sulfate  50 mg Oral Daily    dexamethasone  6 mg Oral Q24H    calcium-cholecalciferol  0.5 tablet Oral Daily    azithromycin  250 mg IntraVENous Q24H    cefTRIAXone (ROCEPHIN) IV  1,000 mg IntraVENous Q24H       Lab Data :  CBC:   Recent Labs     10/24/22  0450 10/25/22  0538 10/26/22  0525   WBC 5.5 3.8* 5.6   HGB 12.4* 11.1* 11.4*   HCT 38.9* 34.4* 36.1*   * 136 175       CMP:  Recent Labs     10/25/22  0538 10/25/22  1803 10/26/22  0525    140 142   K 4.5 4.8 4.8    105 110   CO2 18* 18* 18*   BUN 83* 72* 62*   CREATININE 2.8* 2.2* 1.7*   GLUCOSE 153* 264* 183*   CALCIUM 8.9 9.3 9.2       Hepatic:   Recent Labs     10/24/22  0450 10/25/22  0538   LABALBU 3.7 3.2*   AST 26 26   ALT 19 19   BILITOT 0.5 0.3   ALKPHOS 88 78         Baseline Creatinine: 1.2-1.3    Assessment and Plan:  Renal -acute kidney injury with chronic kidney disease stage IIIa with baseline creatinine around 1.3  This appears most likely due to prerenal etiology from poor oral intake and multiple nephrotoxic agents which include (Entresto, Lasix, Farxiga, Aldactone)  Held all the above drugs continue gentle hydration creatinine is getting better  Off IVF due to some crackles   Electrolytes -appear to be within normal limits  Metabolic acidosis, satable, venous pH is 7.34, no need of any IV bicarbonate  COVID-19 infection  Essential hypertension  Hx of diabetes mellitus  Meds reviewed and discussed with patient     Rhiannon Lamb MD  [facility]    This report has been created using voice recognition software.  It may contain minor errors which are inherent in voice recognition technology

## 2022-10-26 NOTE — CONSULTS
[email or web address]    Patient's Name/Date of Birth: Anette Kocher / 1945 (17 y.o.)  Date: October 26, 2022   Referring Provider: Isabela Stoddard DO    CHIEF COMPLAINT: Shortness of breath     REASON FOR CONSULT: Bradycardia      HPI: This is a pleasant 78 y.o. male presents with hx of ischemic cardiomyopathy, chronic systolic HFimEF 80-45% on GDMT, mv-CAD s/p CABG, MV ring repair Zuleta physio annuloplasty band, bioprosthetic AVR, CKD, NIDDM2, HTN, HLD who presented to [address] [place] [address] on 10/24/22 for shortness of breath. Ended up having COVID19, severe MISA, HAGMA. Since admission renal injury has significantly improved and patient is nearing discharge now and doing well with supportive care for his COVID URI. However on 10/26/22 he had a couple episodes of bradycardia with HR decreasing to ~30s. EKG was obtained and showed LBBB, TWI in most leads. However these EKG changes are baseline for him, dating back to 2018. Per nursing and patient himself as well as wife, both episodes of bradycardia occurred while he was taking a nap. Bradycardia promptly resolved to ~70s after waking up. This is with him being on Toprol XL 50 mg daily. He is unsure if he has sleep apnea. Wife reports some snoring from time to time. Patient reports to me that he feels fine with no new symptoms. Denies any chest pain, denies diaphoresis, fevers, chills, arm pain, headache, n/v/d. Echo: No results found for this or any previous visit.        All labs, EKG's, diagnostic testing and images as well as cardiac cath, stress testing were reviewed during this encounter    Past Medical History:   Diagnosis Date    Acute on chronic systolic CHF (congestive heart failure), NYHA class 3 ([facility]) EF 25-30% 11/7/2018    CAD (coronary artery disease)     CKD (chronic kidney disease) stage 3, GFR 30-59 ml/min ([facility] [address] 9/23/2015    Diabetes mellitus (HCC)     HTN (hypertension), benign     Hyperlipidemia     Hypothyroidism     Mitral and aortic incompetence     Neuropathy     Positive blood culture     Being treated with rocephhin as outpt.     S/P CABG x 2 11/15/2018     Past Surgical History:   Procedure Laterality Date    CARDIAC SURGERY      heart cath    CATARACT REMOVAL WITH IMPLANT Bilateral     COLONOSCOPY      CORONARY ARTERY BYPASS GRAFT  11/14/2018    DIAGNOSTIC CARDIAC CATH LAB PROCEDURE      EYE SURGERY      OR CABG, VEIN, THREE N/A 11/14/2018    CABG CORONARY ARTERY BYPASS,  AORTIC AND MITRAL VALVE REPAIR WITH MICHELE, BALLOON PUMP INSERTION performed by Debra Hdz MD at [address] ECHO TRANSESOPHAG R-T 2D IMG ACQUISJ I&R ONLY Left 10/25/2018    TRANSESOPHAGEAL ECHOCARDIOGRAM performed by Willey Lombard, MD at [address] [facility]     Current Facility-Administered Medications   Medication Dose Route Frequency Provider Last Rate Last Admin    ipratropium-albuterol (DUONEB) nebulizer solution 1 ampule  1 ampule Inhalation Q4H PRN Rochelle Enrique DO   1 ampule at 10/26/22 0051    albuterol (PROVENTIL) nebulizer solution 2.5 mg  2.5 mg Nebulization Q6H PRN Luis Eduardo Medina MD   2.5 mg at 10/24/22 0524    aspirin EC tablet 81 mg  81 mg Oral Daily Leila Reveles PA-C   81 mg at 10/26/22 0908    atorvastatin (LIPITOR) tablet 40 mg  40 mg Oral QPM Leila Reveles PA-C   40 mg at 10/25/22 1712    ferrous sulfate (IRON 325) tablet 650 mg  650 mg Oral Daily with breakfast Leila Reveles PA-C   650 mg at 10/26/22 0907    gabapentin (NEURONTIN) capsule 400 mg  400 mg Oral BID Leila Reveles PA-C   400 mg at 10/26/22 2313    levothyroxine (SYNTHROID) tablet 75 mcg  75 mcg Oral Daily Leila Reveles PA-C   75 mcg at 10/26/22 5937    metoprolol succinate (TOPROL XL) extended release tablet 50 mg  50 mg Oral Daily Leila Reveles PA-C   50 mg at 10/26/22 7294    [Held by provider] sacubitril-valsartan (ENTRESTO)  MG per tablet 1 tablet (Patient Supplied)  1 tablet Oral BID Leila Reveles PA-C        [Held by provider] spironolactone (ALDACTONE) tablet 25 mg  25 mg Oral Daily BRIT Peña-C        sodium chloride flush 0.9 % injection 10 mL  10 mL IntraVENous 2 times per day BRIT Peña-C   10 mL at 10/26/22 0913    sodium chloride flush 0.9 % injection 10 mL  10 mL IntraVENous PRN BRIT Peña-C        0.9 % sodium chloride infusion   IntraVENous PRN BRIT Peña-C        ondansetron (ZOFRAN-ODT) disintegrating tablet 4 mg  4 mg Oral Q8H PRN BRIT Peña-C        Or    ondansetron (ZOFRAN) injection 4 mg  4 mg IntraVENous Q6H PRN BRIT Peña-C        polyethylene glycol (GLYCOLAX) packet 17 g  17 g Oral Daily PRN TRU PeñaC        acetaminophen (TYLENOL) tablet 650 mg  650 mg Oral Q6H PRN BRIT Peña-C   650 mg at 10/26/22 1054    Or    acetaminophen (TYLENOL) suppository 650 mg  650 mg Rectal Q6H PRN BRIT Peña-C        insulin lispro (HUMALOG) injection vial 0-8 Units  0-8 Units SubCUTAneous TID WC BRIT Peña-C   2 Units at 10/26/22 1138    insulin lispro (HUMALOG) injection vial 0-4 Units  0-4 Units SubCUTAneous Nightly Leila Reveles PA-C        glucose chewable tablet 16 g  4 tablet Oral PRN BRIT Peña-C        dextrose bolus 10% 125 mL  125 mL IntraVENous PRN BRIT Peña-C        Or    dextrose bolus 10% 250 mL  250 mL IntraVENous PRN BRIT Peña-C        glucagon (rDNA) injection 1 mg  1 mg SubCUTAneous PRN BRIT Peña-C        dextrose 10 % infusion   IntraVENous Continuous PRN Leila Reveles PA-C        ascorbic acid (VITAMIN C) tablet 500 mg  500 mg Oral Daily BRIT Peña-C   500 mg at 10/26/22 3917    zinc sulfate (ZINCATE) capsule 50 mg  50 mg Oral Daily BRIT Peña-C   50 mg at 10/26/22 0907    dexamethasone (DECADRON) injection 6 mg  6 mg Oral Q24H BRIT Peña-C   6 mg at 10/26/22 9159    calcium-cholecalciferol 500-200 MG-UNIT per tablet 0.5 tablet  0.5 tablet Oral Daily Eddy JOSAFAT   0.5 tablet at 10/26/22 0907    azithromycin (ZITHROMAX) 250 mg in dextrose 5 % 250 mL IVPB  250 mg IntraVENous Q24H Leila Reveles PA-C   Stopped at 10/26/22 1300    cefTRIAXone (ROCEPHIN) 1,000 mg in dextrose 5 % 50 mL IVPB mini-bag  1,000 mg IntraVENous Q24H Leila Reveles PA-C   Stopped at 10/26/22 1848     Prior to Admission medications    Medication Sig Start Date End Date Taking? Authorizing Provider   dapagliflozin (FARXIGA) 10 MG tablet Take 10 mg by mouth every morning  Patient not taking: Reported on 10/24/2022    Historical Provider, MD   spironolactone (ALDACTONE) 25 MG tablet Take 25 mg by mouth daily  Patient not taking: Reported on 10/24/2022    Historical Provider, MD   cloNIDine (CATAPRES) 0.1 MG tablet Take 0.1 mg by mouth 2 times daily    Historical Provider, MD   furosemide (LASIX) 20 MG tablet Take 1 tablet by mouth three times a week And as directed by CHF clinic  Patient taking differently: Take 20 mg by mouth as needed And as directed by CHF clinic 2/9/22   Carolann Rockwell, APRN - CNP   ferrous sulfate (IRON 325) 325 (65 Fe) MG tablet Take 650 mg by mouth daily (with breakfast)    Historical Provider, MD   nitroGLYCERIN (NITROSTAT) 0.4 MG SL tablet up to max of 3 total doses. If no relief after 1 dose, call 911. 12/13/21   Carolann Rockwell, APRN - CNP   atorvastatin (LIPITOR) 80 MG tablet Take 40 mg by mouth every evening     Historical Provider, MD   levothyroxine (SYNTHROID) 75 MCG tablet Take 75 mcg by mouth Daily    Historical Provider, MD   metoprolol succinate (TOPROL XL) 50 MG extended release tablet Take 1 tablet by mouth daily 1/11/21   Carolann Rockwell, APRN - [place]   sacubitril-valsartan (ENTRESTO)  MG per tablet Take 1 tablet by mouth 2 times daily 1/20/20   Carolann Rockwell, APRN - CNP   aspirin 81 MG tablet Take 81 mg by mouth daily    Historical Provider, MD   gabapentin (NEURONTIN) 400 MG capsule Take 400 mg by mouth 2 times daily.     Historical Provider, MD   glimepiride (AMARYL) 1 MG tablet Take 0.5 mg by mouth every morning (before breakfast)     Historical Provider, MD   docusate sodium (COLACE) 100 MG capsule Take 100 mg by mouth as needed     Historical Provider, MD   ONE TOUCH ULTRA TEST strip as needed 1/30/17   Historical Provider, MD   Scheduled Meds:   aspirin  81 mg Oral Daily    atorvastatin  40 mg Oral QPM    ferrous sulfate  650 mg Oral Daily with breakfast    gabapentin  400 mg Oral BID    levothyroxine  75 mcg Oral Daily    metoprolol succinate  50 mg Oral Daily    [Held by provider] sacubitril-valsartan  1 tablet Oral BID    [Held by provider] spironolactone  25 mg Oral Daily    sodium chloride flush  10 mL IntraVENous 2 times per day    insulin lispro  0-8 Units SubCUTAneous TID WC    insulin lispro  0-4 Units SubCUTAneous Nightly    ascorbic acid  500 mg Oral Daily    zinc sulfate  50 mg Oral Daily    dexamethasone  6 mg Oral Q24H    calcium-cholecalciferol  0.5 tablet Oral Daily    azithromycin  250 mg IntraVENous Q24H    cefTRIAXone (ROCEPHIN) IV  1,000 mg IntraVENous Q24H     Continuous Infusions:   sodium chloride      dextrose       PRN Meds:.ipratropium-albuterol, albuterol, sodium chloride flush, sodium chloride, ondansetron **OR** ondansetron, polyethylene glycol, acetaminophen **OR** acetaminophen, glucose, dextrose bolus **OR** dextrose bolus, glucagon (rDNA), dextrose    Allergies   Allergen Reactions    Heparin Other (See Comments)     Family History   Problem Relation Age of Onset    Diabetes Mother     Other Mother         chf    Cancer Brother 28        renal    Cancer Brother 46        renal     Social History     Socioeconomic History    Marital status:      Spouse name: Elmira Jama    Number of children: 3    Years of education: Not on file    Highest education level: Not on file   Occupational History    Not on file   Tobacco Use    Smoking status: Passive Smoke Exposure - Never Smoker    Smokeless tobacco: Never    Tobacco comments: smoked for 4 years while in the [employer] >40 years ago   Vaping Use    Vaping Use: Never used   Substance and Sexual Activity    Alcohol use: Yes     Alcohol/week: 14.0 standard drinks     Types: 14 Cans of beer per week    Drug use: No    Sexual activity: Yes     Partners: Female   Other Topics Concern    Not on file   Social History Narrative    Not on file     Social Determinants of Health     Financial Resource Strain: Not on file   Food Insecurity: Not on file   Transportation Needs: Not on file   Physical Activity: Not on file   Stress: Not on file   Social Connections: Not on file   Intimate Partner Violence: Not on file   Housing Stability: Not on file     ROS:   Constitutional: Denies any recent wt change. Eyes:  Denies any blurring or double vision, no glaucoma  Ears/Nose/Mouth/Throat:  Denies any chronic sinus/rhinitis, bleeding gums  Cardiovascular:  As described above. Respiratory:  Denies any frequent cough, wheezing or coughing up blood  Genitourinary:  Denies difficulty with urination and kidney stones  Gastrointestinal:  Denies any chronic problems with abdominal pain, nausea, vomiting or diarrhea  Musculoskeletal:  Denies any joint pain, back pain, or difficulty walking  Integumentary:  Denies any rash  Neurological:  No numbness or tingling  Endocrine:  Denies any polydipsia. Hematologic/Lymphatic:  Denies any hemorrhage or lymphatic drainage problems.     Labs:  CBC:   Recent Labs     10/24/22  0450 10/25/22  0538 10/26/22  0525   WBC 5.5 3.8* 5.6   HGB 12.4* 11.1* 11.4*   HCT 38.9* 34.4* 36.1*   .2* 101.2* 106.2*   * 136 175     BMP:   Recent Labs     10/25/22  0538 10/25/22  1803 10/26/22  0525 10/26/22  1246    140 142  --    K 4.5 4.8 4.8  --     105 110  --    CO2 18* 18* 18*  --    BUN 83* 72* 62*  --    CREATININE 2.8* 2.2* 1.7*  --    MG  --   --   --  2.2     Accucheck Glucoses:   Recent Labs     10/25/22  1124 10/25/22  1550 10/25/22  2057 10/26/22  0604 10/26/22  1050   POCGLU 193* 223* 202* 178* 216*     Cardiac Enzymes: No results for input(s): CKTOTAL, CKMB, CKMBINDEX, TROPONINI in the last 72 hours. PT/INR: No results for input(s): PROTIME, INR in the last 72 hours. APTT: No results for input(s): APTT in the last 72 hours.   Liver Profile:  Lab Results   Component Value Date/Time    AST 26 10/25/2022 05:38 AM    ALT 19 10/25/2022 05:38 AM    BILIDIR 0.3 10/24/2022 04:50 AM    BILITOT 0.3 10/25/2022 05:38 AM    BILITOT Negative 05/19/2022 10:00 AM    ALKPHOS 78 10/25/2022 05:38 AM     Lab Results   Component Value Date/Time    CHOL 154 03/05/2021 12:00 AM    HDL 32 03/05/2021 12:00 AM    TRIG 96 03/05/2021 12:00 AM     TSH:   Lab Results   Component Value Date/Time    TSH 2.820 10/23/2018 09:00 AM     UA:   Lab Results   Component Value Date/Time    COLORU YELLOW 10/25/2022 01:00 AM    PHUR 5.0 10/25/2022 01:00 AM    LABCAST NONE SEEN 10/25/2022 01:00 AM    LABCAST NONE SEEN 10/25/2022 01:00 AM    WBCUA 0-2 10/25/2022 01:00 AM    RBCUA 3-5 10/25/2022 01:00 AM    YEAST NONE SEEN 10/25/2022 01:00 AM    BACTERIA NONE SEEN 10/25/2022 01:00 AM    SPECGRAV 1.010 10/25/2022 01:00 AM    LEUKOCYTESUR NEGATIVE 10/25/2022 01:00 AM    UROBILINOGEN 0.2 10/25/2022 01:00 AM    BILIRUBINUR NEGATIVE 10/25/2022 01:00 AM    BLOODU NEGATIVE 10/25/2022 01:00 AM    GLUCOSEU NEGATIVE 11/16/2018 02:20 AM         Physical Exam:  Vitals:    10/26/22 1045   BP: 128/61   Pulse: 75   Resp: 16   Temp: 99.4 °F (37.4 °C)   SpO2: 92%      Intake/Output Summary (Last 24 hours) at 10/26/2022 1403  Last data filed at 10/26/2022 0318  Gross per 24 hour   Intake 1298.97 ml   Output 1175 ml   Net 123.97 ml      General:  No acute distress, pleasant gentleman   Neck: Supple, no JVD, no carotid bruits  Heart: Regular rhythm normal S1 and S2, no rubs, murmurs or gallops  Lungs: clear to ascultation no rales, wheezes, or rhonchi  Abdomen: positive bowel sounds, soft, non-tender, non-distended, no bruits, no masses  Extremities:no clubbing, cyanosis or edema  Neurologic: alert and oriented x 3, cranial nerves 2-12 grossly intact, motor and sensory intact, moving all extremities  Skin: No rashes  Psych: AO x 3, no depression/priscilla, no pressured speech, normal affect  Lymph: No obvious LAD    Assessment:  Transient Sleep-Related Bradycardia - review of ECGs shows ectopic atrial rhythm with conduction of AVN, possibly related to prior surgery, daytime/awake rates are 70s  Pulse 30s for < 30 seconds  Asymptomatic   During sleep x2  Prompt resolution upon waking up  On Toprol XL 50 mg daily   Ischemic cardiomyopathy   Chronic Systolic HFimEF 45-36%   TTE 05/26/22  On GDMT but some held during current hospitalization 2/2 MISA  MV CAD s/p CABG  Chronically elevated trops, baseline   S/p MV ring repair Zuleta physio annuloplasty band  S/p bioprosthetic AVR   Acute on chronic hypoxic respiratory failure   MISA in CKD  COVID19  NIDDM2  HAGMA  LBBB with TWI  Chronic, dates all the way back to 2018  HTN  HLD      Plan:  Reassurance that both episodes likely 2/2 physiological response to increased vagal tone, as pulse returned to ~70s promptly after waking him up  Currently fully asymptomatic and even passed 6-minute walk test, feels ready to go home and is back on room air. No new signs of ischemia on EKG. LBBB and TWI are baseline and date back to 2018 when he had CABG  Continue to monitor for any clinical changes (ie symptoms)  Recommend outpatient sleep study to evaluate for BENTON  Resume GDMT when nephro believes it is appropriate  Close outpatient follow-up with nephrology   Continue supportive care for COVID19  Maintain Mg > 2.0, K > 4.0   Continue tele     Thank you for allowing us to participate in the care of this patient. Please do not hesitate to call us with questions.     Electronically signed by Mary Ellen Garcia DO on 10/26/2022 at 2:03 PM    Attending Supervising Physician's Attestation Statement  I performed a history and physical examination on the patient and discussed the management with the resident physician. I reviewed and agree with the findings and plan as documented in the resident's note except for as noted below. Asymptomatic bradycardia with sleep/vagal tone, likely some degree of SSS since surgery. Would continue BB, check 2 week event monitor. K>4, Mg>2. No CHF on exam.  If daytime bradycardia, then cut back BB. No indication for PM.  Cr has improved. COVID management per primary management. BENTON evaluation as outpatient. Further recommendations based on results and clinical course.     Electronically signed by Romy Montalvo MD on 10/26/22 at 3:16 PM EDT  Interventional Cardiology - [facility]

## 2022-10-26 NOTE — PLAN OF CARE
Problem: Discharge Planning  Goal: Discharge to home or other facility with appropriate resources  Outcome: Progressing  Flowsheets (Taken 10/25/2022 0233)  Discharge to home or other facility with appropriate resources:   Identify barriers to discharge with patient and caregiver   Arrange for needed discharge resources and transportation as appropriate   Identify discharge learning needs (meds, wound care, etc)  Note: Encourage patient to communicate with healthcare members (ie, nurses and doctors: about his concerns of returning home)     Problem: Chronic Conditions and Co-morbidities  Goal: Patient's chronic conditions and co-morbidity symptoms are monitored and maintained or improved  Outcome: Progressing  Flowsheets (Taken 10/25/2022 0233)  Care Plan - Patient's Chronic Conditions and Co-Morbidity Symptoms are Monitored and Maintained or Improved:   Monitor and assess patient's chronic conditions and comorbid symptoms for stability, deterioration, or improvement   Collaborate with multidisciplinary team to address chronic and comorbid conditions and prevent exacerbation or deterioration   Update acute care plan with appropriate goals if chronic or comorbid symptoms are exacerbated and prevent overall improvement and discharge  Note: Patient has DX of chronic kidney disease and MISA: education of what is being done right now during his stay for his MISA and education so he can understand how it is helping and what that means for him     Problem: Skin/Tissue Integrity  Goal: Absence of new skin breakdown  Description: 1. Monitor for areas of redness and/or skin breakdown  2. Assess vascular access sites hourly  3. Every 4-6 hours minimum:  Change oxygen saturation probe site  4. Every 4-6 hours:  If on nasal continuous positive airway pressure, respiratory therapy assess nares and determine need for appliance change or resting period.   Outcome: Progressing     Problem: ABCDS Injury Assessment  Goal: Absence of physical injury  Outcome: Progressing  Flowsheets (Taken 10/25/2022 0233)  Absence of Physical Injury: Implement safety measures based on patient assessment     Problem: Safety - Adult  Goal: Free from fall injury  Outcome: Progressing  Flowsheets (Taken 10/25/2022 0233)  Free From Fall Injury:   Instruct family/caregiver on patient safety   Based on caregiver fall risk screen, instruct family/caregiver to ask for assistance with transferring infant if caregiver noted to have fall risk factors     Problem: Respiratory - Adult  Goal: Clear lung sounds  10/25/2022 2127 by Marcia Higgins RCP  Outcome: Not Progressing  Goal: Achieves optimal ventilation and oxygenation  Outcome: Progressing  Flowsheets (Taken 10/26/2022 0147)  Achieves optimal ventilation and oxygenation:   Assess for changes in respiratory status   Assess for changes in mentation and behavior   Position to facilitate oxygenation and minimize respiratory effort   Encourage broncho-pulmonary hygiene including cough, deep breathe, incentive spirometry   Assess and instruct to report shortness of breath or any respiratory difficulty   Respiratory therapy support as indicated  Note: Breathing treatments as needed, Education how breathing treatments help, Education how deep breathing and the incentive spirometer helps and how often he should use it     Problem: Musculoskeletal - Adult  Goal: Return mobility to safest level of function  Outcome: Progressing  Flowsheets (Taken 10/26/2022 0147)  Return Mobility to Safest Level of Function:   Assess patient stability and activity tolerance for standing, transferring and ambulating with or without assistive devices   Assist with transfers and ambulation using safe patient handling equipment as needed   Obtain physical therapy/occupational therapy consults as needed   Apply continuous passive motion per provider or physical therapy orders to increase flexion toward goal   Instruct patient/family in ordered activity level  Goal: Maintain proper alignment of affected body part  Outcome: Progressing  Flowsheets (Taken 10/26/2022 0147)  Maintain proper alignment of affected body part:   Support and protect limb and body alignment per provider's orders   Instruct and reinforce with patient and family use of appropriate assistive device and precautions (e.g. spinal or hip dislocation precautions)  Goal: Return ADL status to a safe level of function  Outcome: Progressing  Flowsheets (Taken 10/26/2022 0147)  Return ADL Status to a Safe Level of Function:   Administer medication as ordered   Assess activities of daily living deficits and provide assistive devices as needed   Obtain physical therapy/occupational therapy consults as needed   Assist and instruct patient to increase activity and self care as tolerated     Problem: Genitourinary - Adult  Goal: Absence of urinary retention  Outcome: Progressing  Flowsheets (Taken 10/26/2022 0147)  Absence of urinary retention:   Assess patients ability to void and empty bladder   Monitor intake/output and perform bladder scan as needed  Note: Monitor strict I/O  Goal: Urinary catheter remains patent  Outcome: Completed     Problem: Infection - Adult  Goal: Absence of infection at discharge  Outcome: Progressing  Flowsheets (Taken 10/26/2022 0147)  Absence of infection at discharge:   Assess and monitor for signs and symptoms of infection   Monitor lab/diagnostic results   [place] appropriate cooling/warming therapies per order   Administer medications as ordered   Instruct and encourage patient and family to use good hand hygiene technique   Identify and instruct in appropriate isolation precautions for identified infection/condition  Goal: Absence of infection during hospitalization  Outcome: Progressing  Flowsheets (Taken 10/26/2022 0147)  Absence of infection during hospitalization:   Assess and monitor for signs and symptoms of infection   Monitor lab/diagnostic results [place] appropriate cooling/warming therapies per order   Administer medications as ordered   Instruct and encourage patient and family to use good hand hygiene technique   Identify and instruct in appropriate isolation precautions for identified infection/condition  Goal: Absence of fever/infection during anticipated neutropenic period  Outcome: Progressing  Flowsheets (Taken 10/26/2022 0147)  Absence of fever/infection during anticipated neutropenic period: Monitor white blood cell count     Problem: Metabolic/Fluid and Electrolytes - Adult  Goal: Electrolytes maintained within normal limits  Outcome: Progressing  Flowsheets (Taken 10/26/2022 0147)  Electrolytes maintained within normal limits:   Monitor labs and assess patient for signs and symptoms of electrolyte imbalances   Administer electrolyte replacement as ordered   Monitor response to electrolyte replacements, including repeat lab results as appropriate   Fluid restriction as ordered   Instruct patient on fluid and nutrition restrictions as appropriate  Goal: Hemodynamic stability and optimal renal function maintained  Outcome: Progressing  Flowsheets (Taken 10/26/2022 0147)  Hemodynamic stability and optimal renal function maintained:   Monitor labs and assess for signs and symptoms of volume excess or deficit   Monitor intake, output and patient weight   Monitor response to interventions for patient's volume status, including labs, urine output, blood pressure (other measures as available)   Encourage oral intake as appropriate   Instruct patient on fluid and nutrition restrictions as appropriate  Goal: Glucose maintained within prescribed range  Outcome: Progressing  Flowsheets (Taken 10/26/2022 0147)  Glucose maintained within prescribed range:   Monitor blood glucose as ordered   Assess for signs and symptoms of hyperglycemia and hypoglycemia   Administer ordered medications to maintain glucose within target range   Assess barriers to adequate nutritional intake and initiate nutrition consult as needed   Instruct patient on self management of diabetes and initiate consult as needed     Problem: Nutrition Deficit:  Goal: Optimize nutritional status  Outcome: Progressing  Flowsheets (Taken 10/26/2022 0147)  Nutrient intake appropriate for improving, restoring, or maintaining nutritional needs:   Assess nutritional status and recommend course of action   Monitor oral intake, labs, and treatment plans   Recommend appropriate diets, oral nutritional supplements, and vitamin/mineral supplements   Order, calculate, and assess calorie counts as needed   Provide specific nutrition education to patient or family as appropriate     Problem: Respiratory - Adult  Goal: Clear lung sounds  10/25/2022 2127 by Renee Lepe RCP  Outcome: Not Progressing

## 2022-10-26 NOTE — PROGRESS NOTES
A home oxygen evaluation has been completed. [x]Patient is an inpatient. It is expected that the patient will be discharged within the next 48 hours. Qualified provider to write orders for possible sleep study or home oxygen prescription. Social service/care managers will arrange for home oxygen if ordered. If patient is active, arrange for Home Medical supplier to assess for Oxygen Conserving Device per pulse oximetry. []Patient is an outpatient. Results will be faxed to the ordering provider. Qualified provider to write orders for possible sleep study or home oxygen prescription. Patient was placed on room air for >60 minutes. SpO2 was 94 % on room air at rest. Patients SpO2 was 89% or above and did not qualify for home oxygen. Patient was walked for 6 minutes. SpO2 was 91 % during walking. Patients SpO2 was 89% or above and did not qualify for home oxygen. Patient does not have a positive pressure airway device at home. Patient is not  diagnosed with Obstructive Sleep Apnea. Patient will be set up/instructed on a nocturnal study. Results will be given to qualified provider.

## 2022-10-26 NOTE — PLAN OF CARE
Problem: Respiratory - Adult  Goal: Clear lung sounds  10/25/2022 2127 by Kala Tang RCP  Outcome: Not Progressing  Continue scheduled breathing treatments to help with lung aeration and wheezing.

## 2022-10-26 NOTE — DISCHARGE INSTR - COC
Continuity of Care Form    Patient Name: Kirsty Russo   :  1945  MRN:  [id number]    Admit date:  10/24/2022  Discharge date:  ***    Code Status Order: Full Code   Advance Directives:     Admitting Physician:  Jada Altamirano DO  PCP: Mina ELLISON    Discharging Nurse: [facility] Unit/Room#: 6K-21/200-A  Discharging Unit Phone Number: ***    Emergency Contact:   Extended Emergency Contact Information  Primary Emergency Contact: Sharri Santa  Address: [address] [place] [address] Phone: [phone]  Mobile Phone: [phone]  Relation: Spouse  Secondary Emergency Contact: [facility]  Mobile Phone: [phone]  Relation: Child    Past Surgical History:  Past Surgical History:   Procedure Laterality Date    CARDIAC SURGERY      heart cath    CATARACT REMOVAL WITH IMPLANT Bilateral     COLONOSCOPY      CORONARY ARTERY BYPASS GRAFT  2018    DIAGNOSTIC CARDIAC CATH LAB PROCEDURE      EYE SURGERY      WI CABG, VEIN, THREE N/A 2018    CABG CORONARY ARTERY BYPASS,  AORTIC AND MITRAL VALVE REPAIR WITH MICHELE, BALLOON PUMP INSERTION performed by Slava Hall MD at [facility] ECHO TRANSESOPHAG R-T 2D IMG ACQUISJ I&R ONLY Left 10/25/2018    TRANSESOPHAGEAL ECHOCARDIOGRAM performed by Juan James MD at [facility]       Immunization History: There is no immunization history on file for this patient.     Active Problems:  Patient Active Problem List   Diagnosis Code    CKD (chronic kidney disease) stage 3, GFR 30-59 ml/min ([facility]) N18.30    Essential hypertension I10    Vitamin D deficiency E55.9    Renal cyst N28.1    ACS (acute coronary syndrome) ([facility]) I24.9    Cardiogenic shock ([facility]) R57.0    Aortic stenosis with mitral and aortic insufficiency I08.0    Streptococcus A carrier or suspected carrier Z22.338    Sepsis due to Streptococcus pyogenes ([facility]) A40.0    CHF exacerbation ([facility]) I50.9    Heart failure, systolic, with acute decompensation ([facility]) I50.23    Renal failure syndrome N19    Acute on chronic systolic CHF (congestive heart failure), NYHA class 3 ([facility]) EF 25-30% I50.23    Multi-vessel coronary artery stenosis I25.10    Chest pain R07.9    Moderate malnutrition ([facility]) E44.0    Arterial hypotension I95.9    Elevated troponin level R77.8    Anemia D64.9    Valvular disease I38    Other fluid overload E87.79    Acute respiratory failure with hypoxia ([facility]) J96.01    Pneumonia of both lower lobes due to Pseudomonas species ([facility]) J15.1    Acquired thrombocytopenia ([facility]) D69.6    MISA (acute kidney injury) ([facility] [address] N17.9    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    History of heart valve replacement Z95.2    Coronary atherosclerosis I25.10    Diabetes mellitus type 2, uncomplicated ([facility]) Y97.1    Lung cancer, main bronchus ([facility]) C34.00    CHF (congestive heart failure), NYHA class II, chronic, systolic ([facility]) H76.76    Ischemic cardiomyopathy I25.5    Coronary artery disease involving native coronary artery of native heart without angina pectoris I25.10    S/P AVR Z95.2    S/P MVR (mitral valve repair) Z98.890    S/P CABG x 2 Z95.1    Shortness of breath R06.02    Pleural effusion associated with pulmonary infection J18.9, A89.1    Metabolic acidosis N48.16    SOB (shortness of breath) R06.02       Isolation/Infection:   Isolation            Droplet Plus          Patient Infection Status       Infection Onset Added Last Indicated Last Indicated By Review Planned Expiration Resolved Resolved By    COVID-19 10/24/22 10/24/22 10/24/22 Respiratory Panel, Molecular, with COVID-19 (Restricted: peds pts or suitable admitted adults) 11/01/22 11/07/22      S/S  10/22, +10/24      Resolved    COVID-19 (Rule Out) 10/24/22 10/24/22 10/24/22 Respiratory Panel, Molecular, with COVID-19 (Restricted: peds pts or suitable admitted adults) (Ordered)   10/24/22 Rule-Out Test Resulted            Nurse Assessment:  Last Vital Signs: /61   Pulse 75   Temp 99.4 °F (37.4 °C) (Oral)   Resp 16   Ht 6' (1.829 m)   Wt 203 lb (92.1 kg)   SpO2 92%   BMI 27.53 kg/m²     Last documented pain score (0-10 scale):    Last Weight:   Wt Readings from Last 1 Encounters:   10/26/22 203 lb (92.1 kg)     Mental Status:  {IP PT MENTAL STATUS:60658}    IV Access:  { MINDI IV ACCESS:472540901}    Nursing Mobility/ADLs:  Walking   {P DME KSZF:631805620}  Transfer  {P DME TNCK:688481915}  Bathing  {CHP DME ZJNL:579338412}  Dressing  {CHP DME TIVA:447526302}  Toileting  {CHP DME RKSK:846666383}  Feeding  {P DME ROJP:624537508}  Med Admin  {[facility] DME XOWX:096167755}  Med Delivery   { MINDI MED Delivery:441973219}    Wound Care Documentation and Therapy:  Incision 11/14/18 Chest (Active)   Number of days: 6026       Incision 11/14/18 Leg Right (Active)   Number of days: 7189       Incision 11/23/18 Abdomen Mid (Active)   Number of days: 0277       Incision 11/26/18 Groin Left (Active)   Number of days: 1430        Elimination:  Continence: Bowel: {YES / BY:88244}  Bladder: {YES / AD:06999}  Urinary Catheter: {Urinary Catheter:882950695}   Colostomy/Ileostomy/Ileal Conduit: {YES / PW:57985}       Date of Last BM: ***    Intake/Output Summary (Last 24 hours) at 10/26/2022 1304  Last data filed at 10/26/2022 0318  Gross per 24 hour   Intake 1298.97 ml   Output 1175 ml   Net 123.97 ml     I/O last 3 completed shifts: In: 9991 [P.O.:600;  I.V.:2000; IV Piggyback:811]  Out: 4807 [Urine:2725]    Safety Concerns:     [address] MINDI Safety Concerns:419992228}    Impairments/Disabilities:      [address] MINDI Impairments/Disabilities:208122452}    Nutrition Therapy:  Current Nutrition Therapy:   [address] MINDI Diet List:425747498}    Routes of Feeding: {CHP DME Other Feedings:498674621}  Liquids: {Slp liquid thickness:80948}  Daily Fluid Restriction: {CHP DME Yes amt example:258017509}  Last Modified Barium Swallow with Video (Video Swallowing Test): {Done Not Done ZVFU:398237172}    Treatments at the Time of Hospital Discharge:   Respiratory Treatments: ***  Oxygen Therapy:  {Therapy; copd oxygen:24392}  Ventilator:    {[facility] Vent OMEW:209583064}    Rehab Therapies: {THERAPEUTIC INTERVENTION:7639028138}  Weight Bearing Status/Restrictions: {[facility] Weight Bearin}  Other Medical Equipment (for information only, NOT a DME order):  {EQUIPMENT:548664491}  Other Treatments: ***    Patient's personal belongings (please select all that are sent with patient):  {P DME Belongings:284117865}    RN SIGNATURE:  {Esignature:818671583}    CASE MANAGEMENT/SOCIAL WORK SECTION    Inpatient Status Date: ***    Readmission Risk Assessment Score:  Readmission Risk              Risk of Unplanned Readmission:  24           Discharging to Facility/ Agency   Name:   Address:  Phone:  Fax:    Dialysis Facility (if applicable)   Name:  Address:  Dialysis Schedule:  Phone:  Fax:    / signature: {Esignature:004275822}    PHYSICIAN SECTION

## 2022-10-26 NOTE — PLAN OF CARE
Problem: Respiratory - Adult  Goal: Clear lung sounds  10/26/2022 0925 by Avani Graham, CHARLESP  Outcome: Progressing  Note: Txs to help improve lung aeration and acapella to clear secretions. Patient mutually agreed on goals. Problem: Respiratory - Adult  Goal: Clear lung sounds  10/26/2022 0925 by Avani Graham, CHARLESP  Outcome: Progressing  Note: Txs to help improve lung aeration and acapella to clear secretions. Patient mutually agreed on goals.   10/25/2022 2127 by Elena Zuniga RCP  Outcome: Not Progressing

## 2022-10-26 NOTE — RT PROTOCOL NOTE
RT Inhaler-Nebulizer Bronchodilator Protocol Note    There is a bronchodilator order in the chart from a provider indicating to follow the RT Bronchodilator Protocol and there is an Initiate RT Inhaler-Nebulizer Bronchodilator Protocol order as well (see protocol at bottom of note). CXR Findings:  No results found. The findings from the last RT Protocol Assessment were as follows:   History Pulmonary Disease: Smoker 15 pack years or more  Respiratory Pattern: Regular pattern and RR 12-20 bpm  Breath Sounds: Slightly diminished and/or crackles  Cough: Strong, spontaneous, non-productive  Indication for Bronchodilator Therapy: Decreased or absent breath sounds  Bronchodilator Assessment Score: 3    Aerosolized bronchodilator medication orders have been revised according to the RT Inhaler-Nebulizer Bronchodilator Protocol below. Respiratory Therapist to perform RT Therapy Protocol Assessment initially then follow the protocol. Repeat RT Therapy Protocol Assessment PRN for score 0-3 or on second treatment, BID, and PRN for scores above 3. No Indications - adjust the frequency to every 6 hours PRN wheezing or bronchospasm, if no treatments needed after 48 hours then discontinue using Per Protocol order mode. If indication present, adjust the RT bronchodilator orders based on the Bronchodilator Assessment Score as indicated below. Use Inhaler orders unless patient has one or more of the following: on home nebulizer, not able to hold breath for 10 seconds, is not alert and oriented, cannot activate and use MDI correctly, or respiratory rate 25 breaths per minute or more, then use the equivalent nebulizer order(s) with same Frequency and PRN reasons based on the score. If a patient is on this medication at home then do not decrease Frequency below that used at home.     0-3 - enter or revise RT bronchodilator order(s) to equivalent RT Bronchodilator order with Frequency of every 4 hours PRN for wheezing or increased work of breathing using Per Protocol order mode. 4-6 - enter or revise RT Bronchodilator order(s) to two equivalent RT bronchodilator orders with one order with BID Frequency and one order with Frequency of every 4 hours PRN wheezing or increased work of breathing using Per Protocol order mode. 7-10 - enter or revise RT Bronchodilator order(s) to two equivalent RT bronchodilator orders with one order with TID Frequency and one order with Frequency of every 4 hours PRN wheezing or increased work of breathing using Per Protocol order mode. 11-13 - enter or revise RT Bronchodilator order(s) to one equivalent RT bronchodilator order with QID Frequency and an Albuterol order with Frequency of every 4 hours PRN wheezing or increased work of breathing using Per Protocol order mode. Greater than 13 - enter or revise RT Bronchodilator order(s) to one equivalent RT bronchodilator order with every 4 hours Frequency and an Albuterol order with Frequency of every 2 hours PRN wheezing or increased work of breathing using Per Protocol order mode. RT to enter RT Home Evaluation for COPD & MDI Assessment order using Per Protocol order mode.     Electronically signed by Yannick Kilpatrick RCP on 10/26/2022 at 9:22 AM

## 2022-10-26 NOTE — RT PROTOCOL NOTE
RT Inhaler-Nebulizer Bronchodilator Protocol Note    There is a bronchodilator order in the chart from a provider indicating to follow the RT Bronchodilator Protocol and there is an Initiate RT Inhaler-Nebulizer Bronchodilator Protocol order as well (see protocol at bottom of note). CXR Findings:  No results found. The findings from the last RT Protocol Assessment were as follows:   History Pulmonary Disease: None or smoker <15 pack years  Respiratory Pattern: Dyspnea on exertion or RR 21-25 bpm  Breath Sounds: Inspiratory and expiratory or bilateral wheezing and/or rhonchi  Cough: Strong, spontaneous, non-productive  Indication for Bronchodilator Therapy: Decreased or absent breath sounds  Bronchodilator Assessment Score: 8    Aerosolized bronchodilator medication orders have been revised according to the RT Inhaler-Nebulizer Bronchodilator Protocol below. Respiratory Therapist to perform RT Therapy Protocol Assessment initially then follow the protocol. Repeat RT Therapy Protocol Assessment PRN for score 0-3 or on second treatment, BID, and PRN for scores above 3. No Indications - adjust the frequency to every 6 hours PRN wheezing or bronchospasm, if no treatments needed after 48 hours then discontinue using Per Protocol order mode. If indication present, adjust the RT bronchodilator orders based on the Bronchodilator Assessment Score as indicated below. Use Inhaler orders unless patient has one or more of the following: on home nebulizer, not able to hold breath for 10 seconds, is not alert and oriented, cannot activate and use MDI correctly, or respiratory rate 25 breaths per minute or more, then use the equivalent nebulizer order(s) with same Frequency and PRN reasons based on the score. If a patient is on this medication at home then do not decrease Frequency below that used at home.     0-3 - enter or revise RT bronchodilator order(s) to equivalent RT Bronchodilator order with Frequency of every 4 hours PRN for wheezing or increased work of breathing using Per Protocol order mode. 4-6 - enter or revise RT Bronchodilator order(s) to two equivalent RT bronchodilator orders with one order with BID Frequency and one order with Frequency of every 4 hours PRN wheezing or increased work of breathing using Per Protocol order mode. 7-10 - enter or revise RT Bronchodilator order(s) to two equivalent RT bronchodilator orders with one order with TID Frequency and one order with Frequency of every 4 hours PRN wheezing or increased work of breathing using Per Protocol order mode. 11-13 - enter or revise RT Bronchodilator order(s) to one equivalent RT bronchodilator order with QID Frequency and an Albuterol order with Frequency of every 4 hours PRN wheezing or increased work of breathing using Per Protocol order mode. Greater than 13 - enter or revise RT Bronchodilator order(s) to one equivalent RT bronchodilator order with every 4 hours Frequency and an Albuterol order with Frequency of every 2 hours PRN wheezing or increased work of breathing using Per Protocol order mode. RT to enter RT Home Evaluation for COPD & MDI Assessment order using Per Protocol order mode.     Electronically signed by Marcia Higgins RCP on 10/25/2022 at 9:27 PM

## 2022-10-26 NOTE — PROGRESS NOTES
Hospitalist      Patient: Mariella Frazier    Unit/Bed:6K-22/022-A  YOB: 1945  MRN: [id number]   Acct: [de-identified]   PCP: Bety ELLISON    Date of Service: Pt seen/examined on 10/26/22  and Admitted to Observation with expected LOS less than two midnights due to medical therapy. Chief Complaint:  Shortness of breath    Assessment and Plan:  Acute hypoxic respiratory failure, with Covid-19 infection:    Pt on 1-2 L O2 NC, up from baseline of RA. Tested positive with home COVID test 10/21, symptom onset 10/19. Pt afebrile, hemodynamically stable, non-toxic appearing. Labs show MISA on CKD, metabolic acidosis with AG, no acute leukocytosis. CXR shows opacity of the left posterior lower lung field, effusion with possible loculation. D-dimer elevated, however forgoing CTA at this time due to MISA. BC x 2 obtained. Given Rocephin, decadron, duonebs, magnesium sulfate in ED. Procal, LDH, Ferritin, CRP ordered. Continue Decadron, duonebs, empiric antibitiotics for now. Obtain respiratory viral antigen panel, respiratory culture, urine strep/urine legionella. Home O2 eval ordered- wean O2 as tolerated. Repeat CBC, BMP daily. Vit C, D, zinc, IS/AC. MISA on CKD:    Cr 5.1, up from baseline of around 1.2. Pt reports not eating or drinking well x 4 days, reports taking medications as prescribed. Will hold aldactone, entresto. UA ordered. Renal US ordered. Start gentle IVF. Continue to monitor with daily BMP. Repeat BMP ordered for this evening. Nephrology consulted- Dr. Geovanny Henry made aware. Elevated D-dimer:    Age adjusted ddimer is 750 which indicates a VTE is still possible. No known history of DVT/PE. Holding on CTA due to MISA. Currently requires 1-2L O2 NC. Continue to monitor closely. If pt requires increase in O2 requirements, PE can be of consideration. To note pt has had a reaction to heparin in the past, currently has SCDs ordered for DVT prophylaxis.      HFrEF:    Last ECHO 5/25/2022 reveals EF 40-45%. Pt appears hypovolemic today. Holding entresto, aldactone due to #2. Continue to monitor daily weights, I&Os. Telemetry. Essential HTN:    BP appears stable, but normal to soft. Holding antihypertensives for appropriate BP and MISA. Continue to monitor closely. Elevated troponin, with Hx of CAD and CABG:    Stable- trop appears chronically elevated likely due to demand ischemia. Pt denies chest pain at this time. EKG shows Sinus bradycardia non-specific st-t wave changes noted. T2DM:   Glucose 85 today. Hold home meds. Med dose SSI ordered, Hypoglycemia protocol in place. POCT glucose checks. Continue to monitor with daily BMP. Carb limited diet. 10.25.2022 seen this a.m. resting comfortably in bed, creatinine 2.8 today down from a high of 5.1, sodium 139, potassium 4.5, CO2 18 ,glucose 153, WBC 3.8 hemoglobin 11.1, platelets 276, blood cultures no growth after 24 hours strep and Legionella antigen are pending I/O balance +562 monitor for volume overload    10.26.2022 seen this a.m. resting comfortably in bed creatinine 1.7, potassium 4.8, CO2 18, glucose 178, WBC 5.6, hemoglobin 11.4, platelets 125. Lungs are clear to auscultation but has decreased breath sounds all fields. Patient's on room air satting at 93%. Home O2 eval ordered. Legionella and strep pneumonia antigens are negative blood culture showed no growth after 48 hours. Possible restart Entresto and Aldactone when okay with nephrology. Renal ultrasound negative. History Of Present Illness:    Vi Ware is a 77 y/o  male with a PMHx of CHF, HTN, CAD, T2DM who presents to [facility] ED today for the evaluation of generalized weakness and fatigue. Pt states his symptoms started roughly last week around Wednesday. He first developed a non-productive cough, which then he also developed decreased appetite, fatigue, decreased appeitie and poor PO intake, some shortness of breath with activity.  He states he took a home COVID test Friday, for which is positive but came in today for his symptoms progressing and not improving. He denies fever, chills, chest pain, lightheadedness, dizziness, difficulty walking, changes in bowel habits, N/V. He reports decreased urine output. He states he has been taking all his medications as prescribed. Past Medical History:        Diagnosis Date    Acute on chronic systolic CHF (congestive heart failure), NYHA class 3 ([facility]) EF 25-30% 11/7/2018    CAD (coronary artery disease)     CKD (chronic kidney disease) stage 3, GFR 30-59 ml/min ([facility] 9/23/2015    Diabetes mellitus ([facility] [address]     HTN (hypertension), benign     Hyperlipidemia     Hypothyroidism     Mitral and aortic incompetence     Neuropathy     Positive blood culture     Being treated with rocephhin as outpt. S/P CABG x 2 11/15/2018       Past Surgical History:        Procedure Laterality Date    CARDIAC SURGERY      heart cath    CATARACT REMOVAL WITH IMPLANT Bilateral     COLONOSCOPY      CORONARY ARTERY BYPASS GRAFT  11/14/2018    DIAGNOSTIC CARDIAC CATH LAB PROCEDURE      EYE SURGERY      WI CABG, VEIN, THREE N/A 11/14/2018    CABG CORONARY ARTERY BYPASS,  AORTIC AND MITRAL VALVE REPAIR WITH MICHELE, BALLOON PUMP INSERTION performed by Alm Holstein, MD at [facility] ECHO TRANSESOPHAG R-T 2D IMG ACQUISJ I&R ONLY Left 10/25/2018    TRANSESOPHAGEAL ECHOCARDIOGRAM performed by Donna Bradley MD at [address] [place] [address] Endoscopy       Home Medications:   No current facility-administered medications on file prior to encounter.      Current Outpatient Medications on File Prior to Encounter   Medication Sig Dispense Refill    dapagliflozin (FARXIGA) 10 MG tablet Take 10 mg by mouth every morning (Patient not taking: Reported on 10/24/2022)      spironolactone (ALDACTONE) 25 MG tablet Take 25 mg by mouth daily (Patient not taking: Reported on 10/24/2022)      cloNIDine (CATAPRES) 0.1 MG tablet Take 0.1 mg by mouth 2 times daily      furosemide (LASIX) 20 MG tablet Take 1 tablet by mouth three times a week And as directed by CHF clinic (Patient taking differently: Take 20 mg by mouth as needed And as directed by CHF clinic) 60 tablet 1    ferrous sulfate (IRON 325) 325 (65 Fe) MG tablet Take 650 mg by mouth daily (with breakfast)      nitroGLYCERIN (NITROSTAT) 0.4 MG SL tablet up to max of 3 total doses. If no relief after 1 dose, call 911. 25 tablet 3    atorvastatin (LIPITOR) 80 MG tablet Take 40 mg by mouth every evening       levothyroxine (SYNTHROID) 75 MCG tablet Take 75 mcg by mouth Daily      metoprolol succinate (TOPROL XL) 50 MG extended release tablet Take 1 tablet by mouth daily 90 tablet 3    sacubitril-valsartan (ENTRESTO)  MG per tablet Take 1 tablet by mouth 2 times daily 180 tablet 3    aspirin 81 MG tablet Take 81 mg by mouth daily      gabapentin (NEURONTIN) 400 MG capsule Take 400 mg by mouth 2 times daily. glimepiride (AMARYL) 1 MG tablet Take 0.5 mg by mouth every morning (before breakfast)       docusate sodium (COLACE) 100 MG capsule Take 100 mg by mouth as needed       ONE TOUCH ULTRA TEST strip as needed  0       Allergies:    Heparin    Social History:    reports that he is a non-smoker but has been exposed to tobacco smoke. He has been exposed to an average 1.00 packs per day for the past 3.00 years. He has never used smokeless tobacco. He reports current alcohol use of about 14.0 standard drinks per week. He reports that he does not use drugs. Family History:       Problem Relation Age of Onset    Diabetes Mother     Other Mother         chf    Cancer Brother 28        renal    Cancer Brother 46        renal       Diet:  ADULT DIET; Regular; 3 carb choices (45 gm/meal)  ADULT ORAL NUTRITION SUPPLEMENT; Breakfast, Lunch, Dinner; Diabetic Oral Supplement    Review of systems:     Review of Systems   Constitutional:  Positive for activity change, appetite change and fatigue. Negative for chills and fever.    HENT:  Negative for congestion, rhinorrhea and sore throat. Eyes:  Negative for visual disturbance. Respiratory:  Positive for cough and shortness of breath. Negative for chest tightness. Cardiovascular:  Negative for chest pain, palpitations and leg swelling. Gastrointestinal:  Negative for abdominal distention, abdominal pain, constipation, diarrhea, nausea and vomiting. Genitourinary:  Positive for decreased urine volume. Negative for difficulty urinating, frequency, hematuria and urgency. Musculoskeletal:  Negative for arthralgias and gait problem. Skin:  Negative for color change and rash. Neurological:  Positive for weakness. Negative for dizziness and light-headedness. PHYSICAL EXAM:  BP (!) 144/81   Pulse 69   Temp 98.6 °F (37 °C) (Oral)   Resp 18   Ht 6' (1.829 m)   Wt 203 lb (92.1 kg)   SpO2 93%   BMI 27.53 kg/m²   General appearance: Chronically ill appearing. No apparent distress. Appears stated age and cooperative. Skin: Exposed skin appears dry. Skin color, texture, turgor normal.  No rashes or lesions. HEENT: Normal cephalic, atraumatic without obvious deformity. Pupils equal, round, and reactive to light. Extra-ocular muscles intact. Conjunctivae/corneas clear. Neck: Trachea midline. Supple, with full range of motion. Cardiovascular: Regular rate and rhythm with normal S1/S2. No murmurs, rubs or gallops. Respiratory:  Rhonchi appreciated in upper and lower lung fields. Expiratory wheezing noted in Middle and lower lung fields bilaterally. Normal respiratory effort. On 1L O2 NC. Abdomen: Soft, non-tender, non-distended. Hypoactive bowel sounds. Musculoskeletal:  No weakness or instability noted. No edema, erythema, or gross deformity noted. Vascular: Pulses +1 palpable, equal bilaterally. Neurologic:  Neurovascularly intact without any focal sensory/motor deficits. Cranial nerves: II-XII grossly intact.    Psychiatric: Alert and oriented, thought content appropriate, normal insight      Labs:   Recent Labs     10/24/22  0450 10/25/22  0538 10/26/22  0525   WBC 5.5 3.8* 5.6   HGB 12.4* 11.1* 11.4*   HCT 38.9* 34.4* 36.1*   * 136 175       Recent Labs     10/25/22  0538 10/25/22  1803 10/26/22  0525    140 142   K 4.5 4.8 4.8    105 110   CO2 18* 18* 18*   BUN 83* 72* 62*   CREATININE 2.8* 2.2* 1.7*   CALCIUM 8.9 9.3 9.2       Recent Labs     10/24/22  0450 10/25/22  0538   AST 26 26   ALT 19 19   BILIDIR 0.3  --    BILITOT 0.5 0.3   ALKPHOS 88 78       No results for input(s): INR in the last 72 hours. No results for input(s): Rodney Ebbs in the last 72 hours. Urinalysis:    Lab Results   Component Value Date/Time    NITRU NEGATIVE 10/25/2022 01:00 AM    WBCUA 0-2 10/25/2022 01:00 AM    BACTERIA NONE SEEN 10/25/2022 01:00 AM    RBCUA 3-5 10/25/2022 01:00 AM    BLOODU NEGATIVE 10/25/2022 01:00 AM    SPECGRAV 1.010 10/25/2022 01:00 AM    GLUCOSEU NEGATIVE 11/16/2018 02:20 AM       Radiology:   US RENAL COMPLETE   Final Result   No sonographic finding for hydronephrosis. Benign bilateral renal cysts. This document has been electronically signed by: Shavon Hudson MD on    10/25/2022 12:04 AM      XR CHEST (2 VW)   Final Result      Lateral view shows an opacity of the posterior lower thorax. This may be a    loculated pleural effusion or other. CT may be performed for further    evaluation. Cardiomegaly. This document has been electronically signed by: Tran Valentin MD on    10/24/2022 06:23 AM        XR CHEST (2 VW)    Result Date: 10/24/2022  Chest 2 views COMPARISON: 2/18/19 FINDINGS: The lateral view shows an opacity of the posterior lower thorax possibly a loculated pleural effusion. The heart is enlarged. The patient is post cardiac valve replacement and atrial appendage clipping. Lateral view shows an opacity of the posterior lower thorax. This may be a loculated pleural effusion or other. CT may be performed for further evaluation. Cardiomegaly.  This document has been electronically signed by: Criselda Cordova MD on 10/24/2022 06:23 AM        EKG:  Sinus bradycardia     Electronically signed by Linda Hernandez DO on 10/26/2022 at 9:53 AM

## 2022-10-27 VITALS
RESPIRATION RATE: 16 BRPM | TEMPERATURE: 98 F | SYSTOLIC BLOOD PRESSURE: 166 MMHG | HEART RATE: 68 BPM | WEIGHT: 203 LBS | BODY MASS INDEX: 27.5 KG/M2 | DIASTOLIC BLOOD PRESSURE: 83 MMHG | OXYGEN SATURATION: 95 % | HEIGHT: 72 IN

## 2022-10-27 LAB
ANION GAP SERPL CALCULATED.3IONS-SCNC: 13 MEQ/L (ref 8–16)
ANION GAP SERPL CALCULATED.3IONS-SCNC: 15 MEQ/L (ref 8–16)
BASOPHILS # BLD: 0.2 %
BASOPHILS ABSOLUTE: 0 THOU/MM3 (ref 0–0.1)
BUN BLDV-MCNC: 49 MG/DL (ref 7–22)
BUN BLDV-MCNC: 50 MG/DL (ref 7–22)
CALCIUM SERPL-MCNC: 9.2 MG/DL (ref 8.5–10.5)
CALCIUM SERPL-MCNC: 9.8 MG/DL (ref 8.5–10.5)
CHLORIDE BLD-SCNC: 110 MEQ/L (ref 98–111)
CHLORIDE BLD-SCNC: 113 MEQ/L (ref 98–111)
CO2: 18 MEQ/L (ref 23–33)
CO2: 23 MEQ/L (ref 23–33)
CREAT SERPL-MCNC: 1.5 MG/DL (ref 0.4–1.2)
CREAT SERPL-MCNC: 1.5 MG/DL (ref 0.4–1.2)
EKG ATRIAL RATE: 60 BPM
EKG ATRIAL RATE: 87 BPM
EKG P AXIS: -61 DEGREES
EKG P-R INTERVAL: 124 MS
EKG P-R INTERVAL: 134 MS
EKG Q-T INTERVAL: 416 MS
EKG Q-T INTERVAL: 418 MS
EKG QRS DURATION: 110 MS
EKG QRS DURATION: 116 MS
EKG QTC CALCULATION (BAZETT): 416 MS
EKG QTC CALCULATION (BAZETT): 502 MS
EKG R AXIS: 16 DEGREES
EKG R AXIS: 20 DEGREES
EKG T AXIS: -122 DEGREES
EKG T AXIS: -151 DEGREES
EKG VENTRICULAR RATE: 60 BPM
EKG VENTRICULAR RATE: 87 BPM
EOSINOPHIL # BLD: 0 %
EOSINOPHILS ABSOLUTE: 0 THOU/MM3 (ref 0–0.4)
ERYTHROCYTE [DISTWIDTH] IN BLOOD BY AUTOMATED COUNT: 14.7 % (ref 11.5–14.5)
ERYTHROCYTE [DISTWIDTH] IN BLOOD BY AUTOMATED COUNT: 55.1 FL (ref 35–45)
GFR SERPL CREATININE-BSD FRML MDRD: 48 ML/MIN/1.73M2
GFR SERPL CREATININE-BSD FRML MDRD: 48 ML/MIN/1.73M2
GLUCOSE BLD-MCNC: 173 MG/DL (ref 70–108)
GLUCOSE BLD-MCNC: 185 MG/DL (ref 70–108)
GLUCOSE BLD-MCNC: 203 MG/DL (ref 70–108)
GLUCOSE BLD-MCNC: 231 MG/DL (ref 70–108)
HCT VFR BLD CALC: 40.1 % (ref 42–52)
HEMOGLOBIN: 12.7 GM/DL (ref 14–18)
IMMATURE GRANS (ABS): 0.1 THOU/MM3 (ref 0–0.07)
IMMATURE GRANULOCYTES: 1.2 %
LYMPHOCYTES # BLD: 6.3 %
LYMPHOCYTES ABSOLUTE: 0.5 THOU/MM3 (ref 1–4.8)
MCH RBC QN AUTO: 32.6 PG (ref 26–33)
MCHC RBC AUTO-ENTMCNC: 31.7 GM/DL (ref 32.2–35.5)
MCV RBC AUTO: 102.8 FL (ref 80–94)
MONOCYTES # BLD: 13.3 %
MONOCYTES ABSOLUTE: 1.1 THOU/MM3 (ref 0.4–1.3)
NUCLEATED RED BLOOD CELLS: 0 /100 WBC
PLATELET # BLD: 236 THOU/MM3 (ref 130–400)
PMV BLD AUTO: 9.7 FL (ref 9.4–12.4)
POTASSIUM REFLEX MAGNESIUM: 5.6 MEQ/L (ref 3.5–5.2)
POTASSIUM SERPL-SCNC: 4.9 MEQ/L (ref 3.5–5.2)
RBC # BLD: 3.9 MILL/MM3 (ref 4.7–6.1)
REASON FOR REJECTION: NORMAL
REJECTED TEST: NORMAL
SEG NEUTROPHILS: 79 %
SEGMENTED NEUTROPHILS ABSOLUTE COUNT: 6.8 THOU/MM3 (ref 1.8–7.7)
SODIUM BLD-SCNC: 146 MEQ/L (ref 135–145)
SODIUM BLD-SCNC: 146 MEQ/L (ref 135–145)
WBC # BLD: 8.6 THOU/MM3 (ref 4.8–10.8)

## 2022-10-27 PROCEDURE — 99232 SBSQ HOSP IP/OBS MODERATE 35: CPT | Performed by: INTERNAL MEDICINE

## 2022-10-27 PROCEDURE — 6370000000 HC RX 637 (ALT 250 FOR IP)

## 2022-10-27 PROCEDURE — 36415 COLL VENOUS BLD VENIPUNCTURE: CPT

## 2022-10-27 PROCEDURE — 2580000003 HC RX 258

## 2022-10-27 PROCEDURE — 99239 HOSP IP/OBS DSCHRG MGMT >30: CPT | Performed by: PHYSICIAN ASSISTANT

## 2022-10-27 PROCEDURE — 85025 COMPLETE CBC W/AUTO DIFF WBC: CPT

## 2022-10-27 PROCEDURE — 93010 ELECTROCARDIOGRAM REPORT: CPT | Performed by: INTERNAL MEDICINE

## 2022-10-27 PROCEDURE — 94761 N-INVAS EAR/PLS OXIMETRY MLT: CPT

## 2022-10-27 PROCEDURE — 2580000003 HC RX 258: Performed by: INTERNAL MEDICINE

## 2022-10-27 PROCEDURE — 82948 REAGENT STRIP/BLOOD GLUCOSE: CPT

## 2022-10-27 PROCEDURE — 6360000002 HC RX W HCPCS

## 2022-10-27 PROCEDURE — 80048 BASIC METABOLIC PNL TOTAL CA: CPT

## 2022-10-27 RX ORDER — DEXTROSE MONOHYDRATE 50 MG/ML
INJECTION, SOLUTION INTRAVENOUS CONTINUOUS
Status: DISCONTINUED | OUTPATIENT
Start: 2022-10-27 | End: 2022-10-27 | Stop reason: HOSPADM

## 2022-10-27 RX ADMIN — Medication 50 MG: at 08:33

## 2022-10-27 RX ADMIN — LEVOTHYROXINE SODIUM 75 MCG: 0.07 TABLET ORAL at 05:20

## 2022-10-27 RX ADMIN — SODIUM CHLORIDE, PRESERVATIVE FREE 10 ML: 5 INJECTION INTRAVENOUS at 08:33

## 2022-10-27 RX ADMIN — CEFTRIAXONE SODIUM 1000 MG: 1 INJECTION, POWDER, FOR SOLUTION INTRAMUSCULAR; INTRAVENOUS at 09:34

## 2022-10-27 RX ADMIN — ASPIRIN 81 MG: 81 TABLET, COATED ORAL at 08:32

## 2022-10-27 RX ADMIN — DEXAMETHASONE SODIUM PHOSPHATE 6 MG: 4 INJECTION, SOLUTION INTRA-ARTICULAR; INTRALESIONAL; INTRAMUSCULAR; INTRAVENOUS; SOFT TISSUE at 08:32

## 2022-10-27 RX ADMIN — DEXTROSE MONOHYDRATE: 50 INJECTION, SOLUTION INTRAVENOUS at 11:46

## 2022-10-27 RX ADMIN — AZITHROMYCIN DIHYDRATE 250 MG: 500 INJECTION, POWDER, LYOPHILIZED, FOR SOLUTION INTRAVENOUS at 11:50

## 2022-10-27 RX ADMIN — METOPROLOL SUCCINATE 50 MG: 50 TABLET, EXTENDED RELEASE ORAL at 08:33

## 2022-10-27 RX ADMIN — FERROUS SULFATE TAB 325 MG (65 MG ELEMENTAL FE) 650 MG: 325 (65 FE) TAB at 08:32

## 2022-10-27 RX ADMIN — GABAPENTIN 400 MG: 400 CAPSULE ORAL at 08:33

## 2022-10-27 RX ADMIN — OXYCODONE HYDROCHLORIDE AND ACETAMINOPHEN 500 MG: 500 TABLET ORAL at 08:33

## 2022-10-27 RX ADMIN — Medication 0.5 TABLET: at 08:33

## 2022-10-27 NOTE — PROGRESS NOTES
[facility]   Nephrology progress note  10/27/2022, 9:28 AM      Pt Name:    Alex Fong  MRN:     [id number]     YOB: 1945  Admit Date:    10/24/2022  4:40 AM    Chief Complaint: Nephrology following for acute kidney injury and metabolic acidosis. Subjective:  Patient seen and examined  No chest pain, shortness of breath better    Objective:  24HR INTAKE/OUTPUT:    Intake/Output Summary (Last 24 hours) at 10/27/2022 0928  Last data filed at 10/27/2022 0523  Gross per 24 hour   Intake 370 ml   Output 200 ml   Net 170 ml        Admission weight: 199 lb (90.3 kg)  Wt Readings from Last 3 Encounters:   10/27/22 203 lb (92.1 kg)   06/30/22 225 lb 3.2 oz (102.2 kg)   06/01/22 223 lb (101.2 kg)        Vitals :   Vitals:    10/27/22 0408 10/27/22 0450 10/27/22 0650 10/27/22 0815   BP: (!) 135/123 (!) 131/90  128/83   Pulse: 74 75 72 70   Resp: 16  16 16   Temp: 97.7 °F (36.5 °C)   97.5 °F (36.4 °C)   TempSrc: Oral   Oral   SpO2: 96%  93% 94%   Weight: 203 lb (92.1 kg)      Height:           Physical examination  General Appearance:  Well developed.  No distress  Mouth/Throat:  Oral mucosa moist  Neck: No accessory muscle use  CNS-grossly intact  Psych-not agitated  Musculoskeletal:  Edema -no significant edema    Medications:  Infusion:    sodium chloride      dextrose       Meds:    aspirin  81 mg Oral Daily    atorvastatin  40 mg Oral QPM    ferrous sulfate  650 mg Oral Daily with breakfast    gabapentin  400 mg Oral BID    levothyroxine  75 mcg Oral Daily    metoprolol succinate  50 mg Oral Daily    [Held by provider] sacubitril-valsartan  1 tablet Oral BID    [Held by provider] spironolactone  25 mg Oral Daily    sodium chloride flush  10 mL IntraVENous 2 times per day    insulin lispro  0-8 Units SubCUTAneous TID WC    insulin lispro  0-4 Units SubCUTAneous Nightly    ascorbic acid  500 mg Oral Daily    zinc sulfate  50 mg Oral Daily    dexamethasone  6 mg Oral Q24H calcium-cholecalciferol  0.5 tablet Oral Daily    azithromycin  250 mg IntraVENous Q24H    cefTRIAXone (ROCEPHIN) IV  1,000 mg IntraVENous Q24H       Lab Data :  CBC:   Recent Labs     10/25/22  0538 10/26/22  0525 10/27/22  0640   WBC 3.8* 5.6 8.6   HGB 11.1* 11.4* 12.7*   HCT 34.4* 36.1* 40.1*    175 236       CMP:  Recent Labs     10/25/22  1803 10/26/22  0525 10/26/22  1246 10/27/22  0600    142  --  146*   K 4.8 4.8  --  5.6*    110  --  113*   CO2 18* 18*  --  18*   BUN 72* 62*  --  50*   CREATININE 2.2* 1.7*  --  1.5*   GLUCOSE 264* 183*  --  185*   CALCIUM 9.3 9.2  --  9.2   MG  --   --  2.2  --        Hepatic:   Recent Labs     10/25/22  0538   LABALBU 3.2*   AST 26   ALT 19   BILITOT 0.3   ALKPHOS 78         Baseline Creatinine: 1.2-1.3    Assessment and Plan:  Renal -acute kidney injury with chronic kidney disease stage IIIa with baseline creatinine around 1.3  This appears most likely due to prerenal etiology from poor oral intake and multiple nephrotoxic agents which include (Entresto, Lasix, Farxiga, Aldactone)  Held all the above drugs continue gentle hydration creatinine is getting better  However he is having some hyponatremia and hyperkalemia. We will start on some D5W for today  Electrolytes -hyperkalemia and hypernatremia start D5W and will give Lokelma  Metabolic acidosis, satable, venous pH is 7.34, no need of any IV bicarbonate  COVID-19 infection  Essential hypertension  Hx of diabetes mellitus  Meds reviewed and discussed with patient and wife    Teresita Sarabia MD  [facility]    This report has been created using voice recognition software.  It may contain minor errors which are inherent in voice recognition technology

## 2022-10-27 NOTE — PROGRESS NOTES
PT and wife verbalized instructions on AVS, including f/u appts, labwork by Nov 10th, restarting lasix/entresto this Saturday.  Wife and pt had no questions or concerns about stopping, or resuming medications as ordered

## 2022-10-27 NOTE — RT PROTOCOL NOTE
RT Inhaler-Nebulizer Bronchodilator Protocol Note    There is a bronchodilator order in the chart from a provider indicating to follow the RT Bronchodilator Protocol and there is an Initiate RT Inhaler-Nebulizer Bronchodilator Protocol order as well (see protocol at bottom of note). CXR Findings:  No results found. The findings from the last RT Protocol Assessment were as follows:   History Pulmonary Disease: Smoker 15 pack years or more  Respiratory Pattern: Regular pattern and RR 12-20 bpm  Breath Sounds: Slightly diminished and/or crackles  Cough: Strong, spontaneous, non-productive  Indication for Bronchodilator Therapy: Decreased or absent breath sounds  Bronchodilator Assessment Score: 3    Aerosolized bronchodilator medication orders have been revised according to the RT Inhaler-Nebulizer Bronchodilator Protocol below. Respiratory Therapist to perform RT Therapy Protocol Assessment initially then follow the protocol. Repeat RT Therapy Protocol Assessment PRN for score 0-3 or on second treatment, BID, and PRN for scores above 3. No Indications - adjust the frequency to every 6 hours PRN wheezing or bronchospasm, if no treatments needed after 48 hours then discontinue using Per Protocol order mode. If indication present, adjust the RT bronchodilator orders based on the Bronchodilator Assessment Score as indicated below. Use Inhaler orders unless patient has one or more of the following: on home nebulizer, not able to hold breath for 10 seconds, is not alert and oriented, cannot activate and use MDI correctly, or respiratory rate 25 breaths per minute or more, then use the equivalent nebulizer order(s) with same Frequency and PRN reasons based on the score. If a patient is on this medication at home then do not decrease Frequency below that used at home.     0-3 - enter or revise RT bronchodilator order(s) to equivalent RT Bronchodilator order with Frequency of every 4 hours PRN for wheezing or increased work of breathing using Per Protocol order mode. 4-6 - enter or revise RT Bronchodilator order(s) to two equivalent RT bronchodilator orders with one order with BID Frequency and one order with Frequency of every 4 hours PRN wheezing or increased work of breathing using Per Protocol order mode. 7-10 - enter or revise RT Bronchodilator order(s) to two equivalent RT bronchodilator orders with one order with TID Frequency and one order with Frequency of every 4 hours PRN wheezing or increased work of breathing using Per Protocol order mode. 11-13 - enter or revise RT Bronchodilator order(s) to one equivalent RT bronchodilator order with QID Frequency and an Albuterol order with Frequency of every 4 hours PRN wheezing or increased work of breathing using Per Protocol order mode. Greater than 13 - enter or revise RT Bronchodilator order(s) to one equivalent RT bronchodilator order with every 4 hours Frequency and an Albuterol order with Frequency of every 2 hours PRN wheezing or increased work of breathing using Per Protocol order mode. RT to enter RT Home Evaluation for COPD & MDI Assessment order using Per Protocol order mode.     Electronically signed by Mahad Valdes RCP on 10/27/2022 at 9:57 AM

## 2022-10-27 NOTE — PROGRESS NOTES
10/27/22 0956   RT Protocol   History Pulmonary Disease 1   Respiratory pattern 0   Breath sounds 2   Cough 0   Bronchodilator Assessment Score 3

## 2022-10-27 NOTE — PROGRESS NOTES
Physician Progress Note      Kai Davidson  CSN #:                  [id number]  :                       1945  ADMIT DATE:       10/24/2022 4:40 AM  DISCH DATE:  RESPONDING  PROVIDER #:        Dez Tirado PA-C          QUERY TEXT:    Pt admitted with COVID-19 and noted to have Tachypnea   24 , Procalcitonin 0.38, CRP 18.65, BUN /CREAT 99/5.1,  Hypotension as low as   76/25 and acute hypoxic resp failure with Abn CXR  . If possible, please   make selection below , document in progress notes and discharge summary if you   are evaluating and/or treating: The medical record reflects the following:  Risk Factors: Covid infection, smoke exposure, chronic health conditions  Clinical Indicators: Tachypnea   24 , Procalcitonin 0.38, CRP 18.65, BUN /CREAT 99/5.1,  Hypotension as low as   76/25 and acute hypoxic resp failure with Abn CXR  --Lateral view shows an   opacity of the posterior lower thorax. This may be a loculated pleural   effusion or other. CT may be performed for further  evaluation  Treatment: admission , imaging, labs, respiratory viral antigen panel,   respiratory culture, isolation protocols, blood cultures, supplemental oxygen   , IV antibiotics -Rocephin, decadron, duonebs. Thank Aj Gutierres  RN, BSN, [facility]  Clinical   P: [phone]  F: [phone]  Options provided:  -- Severe Sepsis present on admission due to COVID-19 infection  -- Sepsis present on admission due to COVID-19 infection  -- Sepsis present on admission due to COVID-19 pneumonia  -- Covid-19 infection without sepsis  -- Covid-19 pneumonia without sepsis  -- Other - I will add my own diagnosis  -- Disagree - Not applicable / Not valid  -- Disagree - Clinically unable to determine / Unknown  -- Refer to Clinical Documentation Reviewer    PROVIDER RESPONSE TEXT:    This patient has Covid-19 infection without sepsis.     Query created by: Mansoor Mccarty on 10/26/2022 7:11 AM      Electronically signed by:  Sherrie Bhatt PA-C 10/27/2022 3:07 PM

## 2022-10-27 NOTE — PLAN OF CARE
Problem: Discharge Planning  Goal: Discharge to home or other facility with appropriate resources  10/27/2022 0319 by Silvia Black  Outcome: Progressing  10/27/2022 0218 by Silvia Black  Outcome: Progressing  10/26/2022 1954 by Silvia Black  Outcome: Progressing     Problem: Chronic Conditions and Co-morbidities  Goal: Patient's chronic conditions and co-morbidity symptoms are monitored and maintained or improved  10/27/2022 0319 by Silvia Black  Outcome: Progressing  10/27/2022 0218 by Silvia Black  Outcome: Progressing  10/26/2022 1954 by Silvia Black  Outcome: Progressing     Problem: Skin/Tissue Integrity  Goal: Absence of new skin breakdown  Description: 1. Monitor for areas of redness and/or skin breakdown  2. Assess vascular access sites hourly  3. Every 4-6 hours minimum:  Change oxygen saturation probe site  4. Every 4-6 hours:  If on nasal continuous positive airway pressure, respiratory therapy assess nares and determine need for appliance change or resting period.   10/27/2022 0319 by Silvia Black  Outcome: Progressing  10/27/2022 0218 by Silvia Black  Outcome: Progressing  10/26/2022 1954 by Silvia Black  Outcome: Progressing     Problem: ABCDS Injury Assessment  Goal: Absence of physical injury  10/27/2022 0319 by Silvia Black  Outcome: Progressing  10/27/2022 0218 by Silvia Black  Outcome: Progressing  10/26/2022 1954 by Silvia Black  Outcome: Progressing     Problem: Safety - Adult  Goal: Free from fall injury  10/27/2022 0319 by Silvia Black  Outcome: Progressing  10/27/2022 0218 by Silvia Black  Outcome: Progressing  10/26/2022 1954 by Silvia Black  Outcome: Progressing     Problem: Respiratory - Adult  Goal: Achieves optimal ventilation and oxygenation  10/27/2022 0319 by Silvia Black  Outcome: Progressing  10/27/2022 0218 by Silvia Black  Outcome: Progressing  10/26/2022 1954 by Silvia Black  Outcome: Progressing     Problem: Nutrition Deficit:  Goal: Optimize nutritional status  10/27/2022 0319 by Hannah Latus  Outcome: Progressing  10/27/2022 0218 by Hannah Latus  Outcome: Progressing  10/26/2022 1954 by Hannah Latus  Outcome: Progressing     Problem: Musculoskeletal - Adult  Goal: Return mobility to safest level of function  10/27/2022 0319 by Hannah Latus  Outcome: Progressing  10/27/2022 0218 by Hannah Latus  Outcome: Progressing  10/26/2022 1954 by Hannah Latus  Outcome: Progressing  Goal: Maintain proper alignment of affected body part  10/27/2022 0319 by Hannah Latus  Outcome: Progressing  10/27/2022 0218 by Hannah Latus  Outcome: Progressing  10/26/2022 1954 by Hananh Latus  Outcome: Progressing  Goal: Return ADL status to a safe level of function  10/27/2022 0319 by Hannah Latus  Outcome: Progressing  10/27/2022 0218 by Hannah Latus  Outcome: Progressing  10/26/2022 1954 by Hannah Latus  Outcome: Progressing     Problem: Genitourinary - Adult  Goal: Absence of urinary retention  10/27/2022 0319 by Hannah Latus  Outcome: Progressing  10/27/2022 0218 by Hannah Latus  Outcome: Progressing  10/26/2022 1954 by Hannah Latus  Outcome: Progressing     Problem: Infection - Adult  Goal: Absence of infection at discharge  10/27/2022 0319 by Hannah Latus  Outcome: Progressing  10/27/2022 0218 by Hannah Latus  Outcome: Progressing  10/26/2022 1954 by Hannah Latus  Outcome: Progressing  Goal: Absence of infection during hospitalization  10/27/2022 0319 by Hannah Latus  Outcome: Progressing  10/27/2022 0218 by Hannah Latus  Outcome: Progressing  10/26/2022 1954 by Hannah Latus  Outcome: Progressing  Goal: Absence of fever/infection during anticipated neutropenic period  10/27/2022 0319 by Hannah Latus  Outcome: Progressing  10/27/2022 0218 by Hannah Latus  Outcome: Progressing  10/26/2022 1954 by Hannah Latus  Outcome: Progressing     Problem: Metabolic/Fluid and Electrolytes - Adult  Goal: Electrolytes maintained within normal limits  10/27/2022 0319 by Clay Mckeon  Outcome: Progressing  10/27/2022 0218 by Clay Mckeon  Outcome: Progressing  10/26/2022 1954 by Clay Mckeon  Outcome: Progressing  Goal: Hemodynamic stability and optimal renal function maintained  10/27/2022 0319 by Clay Mckeon  Outcome: Progressing  10/27/2022 0218 by Clay Mckeon  Outcome: Progressing  10/26/2022 1954 by Clay Mckeon  Outcome: Progressing  Goal: Glucose maintained within prescribed range  10/27/2022 0319 by Clay Mckeon  Outcome: Progressing  10/27/2022 0218 by Clay Mckeon  Outcome: Progressing  10/26/2022 1954 by Clay Mckeon  Outcome: Progressing

## 2022-10-27 NOTE — FLOWSHEET NOTE
10/27/22 1019   Safe Environment   Safety Measures   (VIrtual RN rounds complete)       PT sitting upright in bed, conversing with staff. Denies needs at this time. No acute distress noted. Melody Cedeno

## 2022-10-27 NOTE — CARE COORDINATION
10/27/22, 12:04 PM EDT    Patient goals/plan/ treatment preferences discussed by  and . Patient goals/plan/ treatment preferences reviewed with patient/ family. Patient/ family verbalize understanding of discharge plan and are in agreement with goal/plan/treatment preferences. Understanding was demonstrated using the teach back method. AVS provided by RN at time of discharge, which includes all necessary medical information pertaining to the patients current course of illness, treatment, post-discharge goals of care, and treatment preferences. Discharging home with wife, has dme. No new services.      Services At/After Discharge: None       IMM Letter  IMM Letter given to Patient/Family/Significant other/Guardian/POA/by[de-identified] CHRISTIANO Todd  IMM Letter date given[de-identified] 10/25/22  IMM Letter time given[de-identified] 8913  Observation Status Letter date given[de-identified] 10/24/22  Observation Status Letter time given[de-identified] 0695  Observation Status Letter given to Patient/Family/Significant other/Guardian/POA/by[de-identified] Staff

## 2022-10-27 NOTE — DISCHARGE INSTRUCTIONS
F/up dr zarate 3-4 week  14 day monitor upon dc - ordered    F/up Dr. Stepan Flores 2-3 weeks, get BMP a couple days prior. Resume Entresto and Lasix starting on Saturday.

## 2022-10-27 NOTE — DISCHARGE SUMMARY
Hospitalist Discharge Summary    Patient: Timoteo Rome  YOB: 1945  MRN: [id number]   Acct: [de-identified]    Primary Care Physician: Alyssa Tinoco date  10/24/2022    Discharge date:  10/27/2022    Discharge Assessment and Plan:    Acute hypoxic resp failure, 2/2 COVID-19 PNA: Tested positive with home COVID test 10/21, symptom onset 10/19. Pt was treated with azithromycin and ceftriaxone x 4 days for possible superimposed bacterial PNA. He received dexamethasone x 4. Patient has been stable on RA, ambulated in the halls maintaining appropriate SpO2. No need for continued steroids at discharge. Follow up with PCP if symptoms persist.     MISA on CKD: Nephrology assistance appreciated - thought to be prerenal d/t poor po intake along with multiple nephrotoxic agents (Entresto, Lasix, Farxiga, Aldactone), which have been held. Discussed with Nephrology - okay for discharge. Will resume Entresto and Lasix starting Saturday, continue to hold Farixga, aldactone. Repeat BMP and follow up with Nephrology in 2-3 weeks. Elevated D-dimer: likely elevated secondary to covid. No known history of DVT/PE. Holding on CTA due to MISA. Hypoxia resolved with treatment of #1. Low suspicion for PE. Chronic HFrEF:  Last ECHO 5/25/2022 reveals EF 40-45%. Pt appears hypovolemic. Medications adjusted as stated in #1. Follow up with Cardiology OP. Essential HTN: BP stable, continue home meds with adjustments as stated in #2. Elevated troponin: stable with baseline. Trop chronically elevated likely due to demand ischemia in setting of CHF and CKD. EKG shows Sinus bradycardia non-specific st-t wave changes noted. No chest pain. NIDDMII: continue home meds, PCP follow up.        Chief Complaint on presentation: SOB    Initial H&P / Hospital Course: Initial HPI: Liz Schafer is a 77 y/o  male with a PMHx of CHF, HTN, CAD, T2DM who presents to [facility] ED today for the evaluation of generalized weakness and fatigue. Pt states his symptoms started roughly last week around Wednesday. He first developed a non-productive cough, which then he also developed decreased appetite, fatigue, decreased appeitie and poor PO intake, some shortness of breath with activity. He states he took a home COVID test Friday, for which is positive but came in today for his symptoms progressing and not improving. He denies fever, chills, chest pain, lightheadedness, dizziness, difficulty walking, changes in bowel habits, N/V. He reports decreased urine output. He states he has been taking all his medications as prescribed. [facility] course per last progress note:    \"[phone] seen this a.m. resting comfortably in bed, creatinine 2.8 today down from a high of 5.1, sodium 139, potassium 4.5, CO2 18 ,glucose 153, WBC 3.8 hemoglobin 11.1, platelets 249, blood cultures no growth after 24 hours strep and Legionella antigen are pending I/O balance +562 monitor for volume overload     10.26.2022 seen this a.m. resting comfortably in bed creatinine 1.7, potassium 4.8, CO2 18, glucose 178, WBC 5.6, hemoglobin 11.4, platelets 565. Lungs are clear to auscultation but has decreased breath sounds all fields. Patient's on room air satting at 93%. Home O2 eval ordered. Legionella and strep pneumonia antigens are negative blood culture showed no growth after 48 hours. Possible restart Entresto and Aldactone when okay with nephrology. Renal ultrasound negative. \"    Subjective (day of discharge): Patient is stable on RA and wanting to go home. He reports mild cough, otherwise is feeling well. He denies fever/chills, sob, cp, abd pain, nvd. Patient responded well to medical management. Consultants had signed off or were contacted and agree with discharge plan. The patient was discharged in stable condition with appropriate outpatient follow up arranged.           Physical Exam:-  Vitals: Patient Vitals for the past 24 hrs:   BP Temp Temp src Pulse Resp SpO2 Weight   10/27/22 1200 (!) 166/83 98 °F (36.7 °C) Oral 68 16 95 % --   10/27/22 0815 128/83 97.5 °F (36.4 °C) Oral 70 16 94 % --   10/27/22 0800 -- -- -- 70 -- -- --   10/27/22 0650 -- -- -- 72 16 93 % --   10/27/22 0450 (!) 131/90 -- -- 75 -- -- --   10/27/22 0408 (!) 135/123 97.7 °F (36.5 °C) Oral 74 16 96 % 203 lb (92.1 kg)   10/27/22 0120 -- -- -- 67 16 92 % --   10/26/22 2353 (!) 133/90 97.9 °F (36.6 °C) Oral 70 16 95 % --   10/26/22 2247 -- -- -- 63 16 92 % --   10/26/22 1952 (!) 154/73 98.1 °F (36.7 °C) Oral 64 16 96 % --   10/26/22 1615 (!) 153/76 -- -- -- -- -- --   10/26/22 1600 (!) 177/80 97.3 °F (36.3 °C) Oral 65 16 96 % --     Weight: Weight: 203 lb (92.1 kg)   24 hour intake/output:   Intake/Output Summary (Last 24 hours) at 10/27/2022 1507  Last data filed at 10/27/2022 0815  Gross per 24 hour   Intake 610 ml   Output 200 ml   Net 410 ml       General appearance: No apparent distress, appears stated age and cooperative. HEENT: Normal cephalic, atraumatic without obvious deformity. Pupils equal, round, and reactive to light. Extra ocular muscles intact. Conjunctivae/corneas clear. Neck: Supple, with full range of motion. No jugular venous distention. Trachea midline. Respiratory:  Normal respiratory effort. Clear to auscultation, bilaterally without Rales/Wheezes/Rhonchi. Cardiovascular: Regular rate and rhythm with normal S1/S2 without murmurs, rubs or gallops. Abdomen: Soft, non-tender, non-distended with normal bowel sounds. Musculoskeletal:  No clubbing, cyanosis or edema bilaterally. Skin: Skin color, texture, turgor normal.  No rashes or lesions. Neurologic:  Neurovascularly intact without any focal sensory/motor deficits.  Cranial nerves: II-XII intact, grossly non-focal.  Psychiatric: Alert and oriented, thought content appropriate, normal insight  Capillary Refill: Brisk,< 3 seconds   Peripheral Pulses: +2 palpable, equal bilaterally     Labs :  Recent Results (from the past 72 hour(s))   POCT glucose    Collection Time: 10/24/22  4:33 PM   Result Value Ref Range    POC Glucose 294 (H) 70 - 108 mg/dl   Respiratory Panel, Molecular, with COVID-19 (Restricted: peds pts or suitable admitted adults)    Collection Time: 10/24/22  5:50 PM   Result Value Ref Range    Film Array Adenovirus Not Detected Not Detected    Film Array Coronavirus 229E Not Detected Not Detected    Film Array Coronavirus HKU1 Not Detected Not Detected    Film Array Conoravirus NL63 Not Detected Not Detected    Film Array Coronavirus OC43 Not Detected Not Detected    SARS-CoV-2, PCR Detected (A) Not Detected    Film Array Metapneumovirus Not Detected Not Detected    Film Array Rhinovirus/Enterovirus Not Detected Not Detected    Film Array Influenza A Virus Not Detected Not Detected    Film Array Influenza B Not Detected Not Detected    Film Array Parainfluenza Virus 1 Not Detected Not Detected    Film Array Parainfluenza Virus 2 Not Detected Not Detected    Film Array Parainfluenza Virus 3 Not Detected Not Detected    Film Array Parainfluenza Virus 4 Not Detected Not Detected    Film Array Respiratory Syncitial Virus Not Detected Not Detected    Bordetella parapertussis by PCR Not Detected Not Detected    Bordetella pertussis by PCR Not Detected Not Detected    Film Array Chlamydophilia Pneumoniae Not Detected Not Detected    Film Array Mycoplasma Pneumoniae Not Detected Not Detected   Glomerular Filtration Rate, Estimated    Collection Time: 10/24/22  6:13 PM   Result Value Ref Range    Est, Glom Filt Rate 23 (A) >60 IZ/EYK/8.82O9   Basic Metabolic Panel w/ Reflex to MG    Collection Time: 10/24/22  9:29 PM   Result Value Ref Range    Sodium 137 135 - 145 meq/L    Potassium reflex Magnesium 4.5 3.5 - 5.2 meq/L    Chloride 103 98 - 111 meq/L    CO2 16 (L) 23 - 33 meq/L    Glucose 267 (H) 70 - 108 mg/dL    BUN 91 (H) 7 - 22 mg/dL    Creatinine 3.5 (HH) 0.4 - 1.2 mg/dL    Calcium 8.3 (L) 8.5 - 10.5 mg/dL   Calcium, Ionized    Collection Time: 10/24/22  9:29 PM   Result Value Ref Range    Calcium, Ionized 1.08 (L) 1.12 - 1.32 mmol/L   Anion Gap    Collection Time: 10/24/22  9:29 PM   Result Value Ref Range    Anion Gap 18.0 (H) 8.0 - 16.0 meq/L   Glomerular Filtration Rate, Estimated    Collection Time: 10/24/22  9:29 PM   Result Value Ref Range    Est, Glom Filt Rate 17 (A) >60 ml/min/1.73m2   POCT glucose    Collection Time: 10/24/22  9:40 PM   Result Value Ref Range    POC Glucose 304 (H) 70 - 108 mg/dl   POCT glucose    Collection Time: 10/24/22  9:56 PM   Result Value Ref Range    POC Glucose 242 (H) 70 - 108 mg/dl   Strep Pneumoniae Antigen    Collection Time: 10/25/22  1:00 AM    Specimen: Urine, clean catch   Result Value Ref Range    Strep pneumo Ag, Ur NEGATIVE    Legionella Antigen, Urine    Collection Time: 10/25/22  1:00 AM    Specimen: Urine   Result Value Ref Range    Legionella Pneumophilia Ag, Urine NEGATIVE NEG   Urinalysis with Microscopic    Collection Time: 10/25/22  1:00 AM   Result Value Ref Range    Glucose, Urine 250 (A) NEGATIVE mg/dl    Bilirubin Urine NEGATIVE NEGATIVE    Ketones, Urine NEGATIVE NEGATIVE    Specific Gravity, UA 1.010 1.002 - 1.030    Blood, Urine NEGATIVE NEGATIVE    pH, UA 5.0 5.0 - 9.0    Protein, UA TRACE (A) NEGATIVE mg/dl    Urobilinogen, Urine 0.2 0.0 - 1.0 eu/dl    Nitrite, Urine NEGATIVE NEGATIVE    Leukocyte Esterase, Urine NEGATIVE NEGATIVE    Color, UA YELLOW YELLOW-STRAW    Character, Urine CLEAR [id number]    RBC, UA 3-5 0-2/hpf /hpf    WBC, UA 0-2 0-4/hpf /hpf    Epithelial Cells, UA 0-2 3-5/hpf /hpf    Bacteria, UA NONE SEEN FEW/NONE SEEN    Casts NONE SEEN NONE SEEN /lpf    Crystals NONE SEEN NONE SEEN    Renal Epithelial, UA NONE SEEN NONE SEEN    Yeast, UA NONE SEEN NONE SEEN    Casts NONE SEEN /lpf    Miscellaneous Lab Test Result NONE SEEN    CBC with Auto Differential    Collection Time: 10/25/22  5:38 AM   Result Value Ref Range    WBC 3.8 (L) 4.8 - 10.8 thou/mm3    RBC 3.40 (L) 4.70 - 6.10 mill/mm3    Hemoglobin 11.1 (L) 14.0 - 18.0 gm/dl    Hematocrit 34.4 (L) 42.0 - 52.0 %    .2 (H) 80.0 - 94.0 fL    MCH 32.6 26.0 - 33.0 pg    MCHC 32.3 32.2 - 35.5 gm/dl    RDW-CV 14.1 11.5 - 14.5 %    RDW-SD 51.7 (H) 35.0 - 45.0 fL    Platelets 973 815 - 969 thou/mm3    MPV 10.6 9.4 - 12.4 fL    Seg Neutrophils 82.7 %    Lymphocytes 6.3 %    Monocytes 9.9 %    Eosinophils 0.0 %    Basophils 0.3 %    Immature Granulocytes 0.8 %    Segs Absolute 3.1 1.8 - 7.7 thou/mm3    Lymphocytes Absolute 0.2 (L) 1.0 - 4.8 thou/mm3    Monocytes Absolute 0.4 0.4 - 1.3 thou/mm3    Eosinophils Absolute 0.0 0.0 - 0.4 thou/mm3    Basophils Absolute 0.0 0.0 - 0.1 thou/mm3    Immature Grans (Abs) 0.03 0.00 - 0.07 thou/mm3    nRBC 0 /100 wbc   Ph Venous    Collection Time: 10/25/22  5:38 AM   Result Value Ref Range    pH, Shan 7.34 7.31 - 7.41   Comprehensive Metabolic Panel    Collection Time: 10/25/22  5:38 AM   Result Value Ref Range    Glucose 153 (H) 70 - 108 mg/dL    Creatinine 2.8 (H) 0.4 - 1.2 mg/dL    BUN 83 (H) 7 - 22 mg/dL    Sodium 139 135 - 145 meq/L    Potassium 4.5 3.5 - 5.2 meq/L    Chloride 106 98 - 111 meq/L    CO2 18 (L) 23 - 33 meq/L    Calcium 8.9 8.5 - 10.5 mg/dL    AST 26 5 - 40 U/L    Alkaline Phosphatase 78 38 - 126 U/L    Total Protein 6.8 6.1 - 8.0 g/dL    Albumin 3.2 (L) 3.5 - 5.1 g/dL    Total Bilirubin 0.3 0.3 - 1.2 mg/dL    ALT 19 11 - 66 U/L   Anion Gap    Collection Time: 10/25/22  5:38 AM   Result Value Ref Range    Anion Gap 15.0 8.0 - 16.0 meq/L   EKG 12 Lead    Collection Time: 10/25/22  6:03 AM   Result Value Ref Range    Ventricular Rate 60 BPM    Atrial Rate 60 BPM    P-R Interval 138 ms    QRS Duration 120 ms    Q-T Interval 438 ms    QTc Calculation (Bazett) 438 ms    P Axis -74 degrees    R Axis 31 degrees    T Axis -112 degrees   POCT glucose    Collection Time: 10/25/22  6:08 AM   Result Value Ref Range    POC Glucose 159 (H) 70 - 108 mg/dl   POCT glucose    Collection Time: 10/25/22 11:24 AM   Result Value Ref Range    POC Glucose 193 (H) 70 - 108 mg/dl   POCT glucose    Collection Time: 10/25/22  3:50 PM   Result Value Ref Range    POC Glucose 223 (H) 70 - 108 mg/dl   Basic Metabolic Panel    Collection Time: 10/25/22  6:03 PM   Result Value Ref Range    Sodium 140 135 - 145 meq/L    Potassium 4.8 3.5 - 5.2 meq/L    Chloride 105 98 - 111 meq/L    CO2 18 (L) 23 - 33 meq/L    Glucose 264 (H) 70 - 108 mg/dL    BUN 72 (H) 7 - 22 mg/dL    Creatinine 2.2 (H) 0.4 - 1.2 mg/dL    Calcium 9.3 8.5 - 10.5 mg/dL   Anion Gap    Collection Time: 10/25/22  6:03 PM   Result Value Ref Range    Anion Gap 17.0 (H) 8.0 - 16.0 meq/L   Glomerular Filtration Rate, Estimated    Collection Time: 10/25/22  6:03 PM   Result Value Ref Range    Est, Glom Filt Rate 30 (A) >60 ml/min/1.73m2   Glomerular Filtration Rate, Estimated    Collection Time: 10/25/22  6:08 PM   Result Value Ref Range    Est, Glom Filt Rate 41 (A) >60 ml/min/1.73m2   POCT glucose    Collection Time: 10/25/22  8:57 PM   Result Value Ref Range    POC Glucose 202 (H) 70 - 108 mg/dl   Basic Metabolic Panel w/ Reflex to MG    Collection Time: 10/26/22  5:25 AM   Result Value Ref Range    Sodium 142 135 - 145 meq/L    Potassium reflex Magnesium 4.8 3.5 - 5.2 meq/L    Chloride 110 98 - 111 meq/L    CO2 18 (L) 23 - 33 meq/L    Glucose 183 (H) 70 - 108 mg/dL    BUN 62 (H) 7 - 22 mg/dL    Creatinine 1.7 (H) 0.4 - 1.2 mg/dL    Calcium 9.2 8.5 - 10.5 mg/dL   CBC with Auto Differential    Collection Time: 10/26/22  5:25 AM   Result Value Ref Range    WBC 5.6 4.8 - 10.8 thou/mm3    RBC 3.40 (L) 4.70 - 6.10 mill/mm3    Hemoglobin 11.4 (L) 14.0 - 18.0 gm/dl    Hematocrit 36.1 (L) 42.0 - 52.0 %    .2 (H) 80.0 - 94.0 fL    MCH 33.5 (H) 26.0 - 33.0 pg    MCHC 31.6 (L) 32.2 - 35.5 gm/dl    RDW-CV 14.5 11.5 - 14.5 %    RDW-SD 56.4 (H) 35.0 - 45.0 fL    Platelets 984 376 - 967 thou/mm3    MPV 9.9 9.4 - 12.4 fL    Seg Neutrophils 85.1 %    Lymphocytes 4.3 %    Monocytes 9.4 %    Eosinophils 0.0 %    Basophils 0.0 %    Immature Granulocytes 1.2 %    Segs Absolute 4.8 1.8 - 7.7 thou/mm3    Lymphocytes Absolute 0.2 (L) 1.0 - 4.8 thou/mm3    Monocytes Absolute 0.5 0.4 - 1.3 thou/mm3    Eosinophils Absolute 0.0 0.0 - 0.4 thou/mm3    Basophils Absolute 0.0 0.0 - 0.1 thou/mm3    Immature Grans (Abs) 0.07 0.00 - 0.07 thou/mm3    nRBC 0 /100 wbc   Anion Gap    Collection Time: 10/26/22  5:25 AM   Result Value Ref Range    Anion Gap 14.0 8.0 - 16.0 meq/L   POCT glucose    Collection Time: 10/26/22  6:04 AM   Result Value Ref Range    POC Glucose 178 (H) 70 - 108 mg/dl   POCT glucose    Collection Time: 10/26/22 10:50 AM   Result Value Ref Range    POC Glucose 216 (H) 70 - 108 mg/dl   EKG 12 Lead    Collection Time: 10/26/22 12:45 PM   Result Value Ref Range    Ventricular Rate 87 BPM    Atrial Rate 87 BPM    P-R Interval 124 ms    QRS Duration 116 ms    Q-T Interval 418 ms    QTc Calculation (Bazett) 502 ms    R Axis 16 degrees    T Axis -151 degrees   Magnesium    Collection Time: 10/26/22 12:46 PM   Result Value Ref Range    Magnesium 2.2 1.6 - 2.4 mg/dL   Calcium, Ionized    Collection Time: 10/26/22 12:46 PM   Result Value Ref Range    Calcium, Ionized 1.15 1.12 - 1.32 mmol/L   EKG 12 Lead    Collection Time: 10/26/22  1:36 PM   Result Value Ref Range    Ventricular Rate 60 BPM    Atrial Rate 60 BPM    P-R Interval 134 ms    QRS Duration 110 ms    Q-T Interval 416 ms    QTc Calculation (Bazett) 416 ms    P Axis -61 degrees    R Axis 20 degrees    T Axis -122 degrees   POCT glucose    Collection Time: 10/26/22  4:05 PM   Result Value Ref Range    POC Glucose 247 (H) 70 - 108 mg/dl   Glomerular Filtration Rate, Estimated    Collection Time: 10/26/22  6:05 PM   Result Value Ref Range    Est, Glom Filt Rate 48 (A) >60 ml/min/1.73m2   POCT glucose    Collection Time: 10/26/22  7:56 PM   Result Value Ref Range    POC Glucose 235 (H) 70 - 108 mg/dl Basic Metabolic Panel w/ Reflex to MG    Collection Time: 10/27/22  6:00 AM   Result Value Ref Range    Sodium 146 (H) 135 - 145 meq/L    Potassium reflex Magnesium 5.6 (H) 3.5 - 5.2 meq/L    Chloride 113 (H) 98 - 111 meq/L    CO2 18 (L) 23 - 33 meq/L    Glucose 185 (H) 70 - 108 mg/dL    BUN 50 (H) 7 - 22 mg/dL    Creatinine 1.5 (H) 0.4 - 1.2 mg/dL    Calcium 9.2 8.5 - 10.5 mg/dL   Anion Gap    Collection Time: 10/27/22  6:00 AM   Result Value Ref Range    Anion Gap 15.0 8.0 - 16.0 meq/L   SPECIMEN REJECTION    Collection Time: 10/27/22  6:16 AM   Result Value Ref Range    Rejected Test cbcwd     Reason for Rejection see below    POCT glucose    Collection Time: 10/27/22  6:23 AM   Result Value Ref Range    POC Glucose 173 (H) 70 - 108 mg/dl   CBC with Auto Differential    Collection Time: 10/27/22  6:40 AM   Result Value Ref Range    WBC 8.6 4.8 - 10.8 thou/mm3    RBC 3.90 (L) 4.70 - 6.10 mill/mm3    Hemoglobin 12.7 (L) 14.0 - 18.0 gm/dl    Hematocrit 40.1 (L) 42.0 - 52.0 %    .8 (H) 80.0 - 94.0 fL    MCH 32.6 26.0 - 33.0 pg    MCHC 31.7 (L) 32.2 - 35.5 gm/dl    RDW-CV 14.7 (H) 11.5 - 14.5 %    RDW-SD 55.1 (H) 35.0 - 45.0 fL    Platelets 711 861 - 627 thou/mm3    MPV 9.7 9.4 - 12.4 fL    Seg Neutrophils 79.0 %    Lymphocytes 6.3 %    Monocytes 13.3 %    Eosinophils 0.0 %    Basophils 0.2 %    Immature Granulocytes 1.2 %    Segs Absolute 6.8 1.8 - 7.7 thou/mm3    Lymphocytes Absolute 0.5 (L) 1.0 - 4.8 thou/mm3    Monocytes Absolute 1.1 0.4 - 1.3 thou/mm3    Eosinophils Absolute 0.0 0.0 - 0.4 thou/mm3    Basophils Absolute 0.0 0.0 - 0.1 thou/mm3    Immature Grans (Abs) 0.10 (H) 0.00 - 0.07 thou/mm3    nRBC 0 /100 wbc   Basic Metabolic Panel    Collection Time: 10/27/22 10:22 AM   Result Value Ref Range    Sodium 146 (H) 135 - 145 meq/L    Potassium 4.9 3.5 - 5.2 meq/L    Chloride 110 98 - 111 meq/L    CO2 23 23 - 33 meq/L    Glucose 203 (H) 70 - 108 mg/dL    BUN 49 (H) 7 - 22 mg/dL    Creatinine 1.5 (H) 0.4 - 1.2 mg/dL    Calcium 9.8 8.5 - 10.5 mg/dL   Anion Gap    Collection Time: 10/27/22 10:22 AM   Result Value Ref Range    Anion Gap 13.0 8.0 - 16.0 meq/L   Glomerular Filtration Rate, Estimated    Collection Time: 10/27/22 10:22 AM   Result Value Ref Range    Est, Glom Filt Rate 48 (A) >60 ml/min/1.73m2   POCT glucose    Collection Time: 10/27/22 11:01 AM   Result Value Ref Range    POC Glucose 231 (H) 70 - 108 mg/dl        Microbiology:    Blood culture #1:   Lab Results   Component Value Date/Time    BC No growth 24 hours. No growth 48 hours. 10/24/2022 07:41 AM     Blood culture #2:No results found for: BLOODCULT2  Organism:    Lab Results   Component Value Date/Time    LABGRAM  11/16/2018 02:20 AM     Few segmented neutrophils observed. Few epithelial cells observed. Few gram negative bacilli. MRSA culture only:No results found for: [facility]  Urine culture:   Lab Results   Component Value Date/Time    LABURIN No growth-preliminary  No growth   10/21/2018 09:36 PM     Lab Results   Component Value Date/Time    ORG Pseudomonas aeruginosa 11/16/2018 02:20 AM      Respiratory culture: No results found for: CULTRESP  Aerobic and Anaerobic :  No results found for: LABAERO  No results found for: LABANAE    Urinalysis:     Lab Results   Component Value Date/Time    NITRU NEGATIVE 10/25/2022 01:00 AM    WBCUA 0-2 10/25/2022 01:00 AM    BACTERIA NONE SEEN 10/25/2022 01:00 AM    RBCUA 3-5 10/25/2022 01:00 AM    BLOODU NEGATIVE 10/25/2022 01:00 AM    SPECGRAV 1.010 10/25/2022 01:00 AM    GLUCOSEU NEGATIVE 11/16/2018 02:20 AM       Radiology:  XR CHEST (2 VW)    Result Date: 10/24/2022  Chest 2 views COMPARISON: 2/18/19 FINDINGS: The lateral view shows an opacity of the posterior lower thorax possibly a loculated pleural effusion. The heart is enlarged. The patient is post cardiac valve replacement and atrial appendage clipping. Lateral view shows an opacity of the posterior lower thorax.  This may be a loculated pleural effusion or other. CT may be performed for further evaluation. Cardiomegaly. This document has been electronically signed by: Oscar Romano MD on 10/24/2022 06:23 AM    US RENAL COMPLETE    Result Date: 10/25/2022  US Renal Comparison: US/SR - US RENAL COMPLETE - 06/09/2022 09:07 AM EDT Findings: Right kidney normal size and echotexture, 11 cm length. Left kidney normal size and echotexture, 11.5 cm length. Simple anechoic cysts in both kidneys measuring 3.1 x 3 x 2.6 cm on the right and 1.2 x 1.2 x 1 cm on the left. No hydronephrosis of either kidney. Normal color Doppler. Urinary bladder distended with a volume of 1028 mL. Patient declined to void. Bilateral ureteral jets not seen during the exam.     No sonographic finding for hydronephrosis. Benign bilateral renal cysts. This document has been electronically signed by: Bharati Anthony MD on 10/25/2022 12:04 AM       Consults:   IP CONSULT TO NEPHROLOGY  IP CONSULT TO CARDIOLOGY    Discharge Medications:      Medication List        CHANGE how you take these medications      furosemide 20 MG tablet  Commonly known as: LASIX  Take 1 tablet by mouth three times a week And as directed by CHF clinic  What changed:   when to take this  reasons to take this            CONTINUE taking these medications      aspirin 81 MG tablet     atorvastatin 80 MG tablet  Commonly known as: LIPITOR     docusate sodium 100 MG capsule  Commonly known as: COLACE     ferrous sulfate 325 (65 Fe) MG tablet  Commonly known as: IRON 325     gabapentin 400 MG capsule  Commonly known as: NEURONTIN     glimepiride 1 MG tablet  Commonly known as: AMARYL     levothyroxine 75 MCG tablet  Commonly known as: SYNTHROID     metoprolol succinate 50 MG extended release tablet  Commonly known as: Toprol XL  Take 1 tablet by mouth daily     nitroGLYCERIN 0.4 MG SL tablet  Commonly known as: NITROSTAT  up to max of 3 total doses. If no relief after 1 dose, call 911.      ONE TOUCH ULTRA TEST strip  Generic drug: blood glucose test strips     sacubitril-valsartan  MG per tablet  Commonly known as: Entresto  Take 1 tablet by mouth 2 times daily            STOP taking these medications      cloNIDine 0.1 MG tablet  Commonly known as: CATAPRES     dapagliflozin 10 MG tablet  Commonly known as: FARXIGA     spironolactone 25 MG tablet  Commonly known as: ALDACTONE               Patient Instructions:    Discharge lab work: BMP  Activity: activity as tolerated  Diet: ADULT ORAL NUTRITION SUPPLEMENT; Breakfast, Lunch, Dinner; Diabetic Oral Supplement  ADULT DIET; Regular; 3 carb choices (45 gm/meal); Low Potassium (Less than 3000 mg/day)      Follow-up visits:   [place] [id number] [place] [id number]  [phone]    Follow up on 11/3/2022  appointment time 11:20am         Disposition: home  Condition at Discharge: Stable    Time Spent: 50 minutes    Signed: Thank you Mavis Saunders for the opportunity to be involved in this patient's care.     Electronically signed by Zak Wild PA-C on 10/27/2022 at 3:07 PM  Discharging Hospitalist

## 2022-10-27 NOTE — PROGRESS NOTES
Discharge teaching and instructions for diagnosis/procedure of Covid Infection  completed with patient using teachback method. AVS reviewed. Prescriptions given to patient. Patient voiced understanding regarding prescriptions, follow up appointments, and care of self at home. Discharged in a wheelchair to  independent living per self.

## 2022-10-28 ENCOUNTER — CARE COORDINATION (OUTPATIENT)
Dept: CASE MANAGEMENT | Age: 77
End: 2022-10-28

## 2022-10-28 DIAGNOSIS — R00.1 BRADYCARDIA: Primary | ICD-10-CM

## 2022-10-28 NOTE — CARE COORDINATION
[facility] Care Transitions Initial Follow Up Call    Call within 2 business days of discharge: Yes    Care Transition Nurse contacted the family by telephone to perform post hospital discharge assessment. Verified name and  with family as identifiers. Provided introduction to self, and explanation of the Care Transition Nurse role. Patient: Tanvir Bellamy Patient : 1945   MRN: [id number]  Reason for Admission: Covid-19  Discharge Date: 10/27/22 RARS: Readmission Risk Score: 16.3      Last Discharge  Street       Date Complaint Diagnosis Description Type Department Provider    10/24/22 Shortness of Breath Pleural effusion associated with pulmonary infection . .. ED to Hosp-Admission (Discharged) (ADMITTED) MAKENZIE Cortez PA-C; [facility]. .. Challenges to be reviewed by the provider   Additional needs identified to be addressed with provider: Yes  Saw in your consult about checking 2 week event monitor but Daiva Phalen did not get that before discharge. If he needs it, please call and also needs 3-4 wk f/u, late morning to early afternoon, any day. Method of communication with provider: chart routing. Spoke with wife, Pat Mcginnis, said Daiva Phalen is feeling pretty good. Denies fever, chills, chest pain, n/v/d. Has occasional non productive cough and is using the Acapella. Said his FBS is a little higher than normal, BP is good, SPO2 96% RA. Appetite is good, fluid intake not as it should be, strongly encouraged 64 oz/day. Reviewed medications/changes. Will restart Entresto tomorrow and Lasix on Monday (takes )  Doesn't usually monitor his weight. Instructed on the importance of weighing daily, in the morning after urinating, and that if he has weight gain of 3# over night or 5# weight gain in a week to call his physician immediately and report. He does go to CHF clinic and she is aware of signs to watch for, increased SOB, edema.   Saw Dr Yaakov Shipley note about 14 day monitor but he didn't get that placed, sent message to him and needs 3-4 wk f/u. Has f/u with PCP 11/3. Needs f/u with Dr Yana Yates 2-3 wks. Agreed with assistance to schedule, sent to SS. Is aware to get BMP prior to appt. Denies any other needs. No other questions or concerns at this time. Will continue to follow. Care Transition Nurse reviewed discharge instructions, medical action plan, and red flags with family who verbalized understanding. The family was given an opportunity to ask questions and does not have any further questions or concerns at this time. Were discharge instructions available to patient? Yes. Reviewed appropriate site of care based on symptoms and resources available to patient including: PCP  Specialist  Urgent care clinics  When to call 911. The family agrees to contact the PCP office for questions related to their healthcare. Advance Care Planning:   Does patient have an Advance Directive: not on file; education provided. Medication reconciliation was performed with family, who verbalizes understanding of administration of home medications. Medications reviewed, 1111F entered: N/A    Was patient discharged with a pulse oximeter? Has at home, discussed and confirmed pulse oximeter discharge instructions and when to notify provider or seek emergency care.     Non-face-to-face services provided:  Scheduled appointment with PCP-11/3  Scheduled appointment with Specialist-12/13  Obtained and reviewed discharge summary and/or continuity of care documents    Offered patient enrollment in the Remote Patient Monitoring (RPM) program for in-home monitoring: NA.    Care Transitions 24 Hour Call    Schedule Follow Up Appointment with PCP: Completed  Do you have a copy of your discharge instructions?: Yes  Do you have all of your prescriptions and are they filled?: Yes  Have you been contacted by a [facility] Pharmacist?: No  Have you scheduled your follow up appointment?: Yes  How are you going to get to your appointment?: Car - family or friend to transport  Post Acute Services: Home Health (Comment: [facility] )  Patient DME: Straight cane  Do you have support at home?: Partner/Spouse/SO  Do you feel like you have everything you need to keep you well at home?: Yes  Are you an active caregiver in your home?: No  Care Transitions Interventions     Transportation Support: Declined            Follow Up  Future Appointments   Date Time Provider Yudelka Araujo   12/13/2022 11:30 AM SRIRAM Gonzales - CNP [facility]   6/7/2023  9:40 AM Samantha Tim MD N [facility]   7/6/2023  1:45 PM Leann Smlals MD N [address] Transition Nurse provided contact information. Plan for follow-up call in 5-7 days based on severity of symptoms and risk factors. Plan for next call: symptom management-Covid, BP, BS  follow-up appointment-made? ACP?     Km Hagan RN

## 2022-10-28 NOTE — CARE COORDINATION
Request from [address] Lidia Escobar RN.,  please schedule patient for Nephrology appt. Patient scheduled for 11/21 @ 2pm.       Advise patient he may receive a phone call regarding lab work should provider want them done before visit. Left VM with above informaiton.

## 2022-10-29 LAB
BLOOD CULTURE, ROUTINE: NORMAL
BLOOD CULTURE, ROUTINE: NORMAL

## 2022-10-31 ENCOUNTER — TELEPHONE (OUTPATIENT)
Dept: CARDIOLOGY CLINIC | Age: 77
End: 2022-10-31

## 2022-10-31 NOTE — PATIENT INSTRUCTIONS
Event ordered, and pt will be called by our schedulers to get this scheduled, and f/u with Yessy galvin NP on 11/8/22 at [id number]

## 2022-10-31 NOTE — TELEPHONE ENCOUNTER
Order received for 2 week event monitor  Per case management documentation by Indira Winston patient was to have monitor placed at discharged, was not available    Left msg for pt to call office to schedule 2 week event monitor

## 2022-11-04 ENCOUNTER — CARE COORDINATION (OUTPATIENT)
Dept: CASE MANAGEMENT | Age: 77
End: 2022-11-04

## 2022-11-04 ENCOUNTER — HOSPITAL ENCOUNTER (OUTPATIENT)
Dept: NON INVASIVE DIAGNOSTICS | Age: 77
Discharge: HOME | End: 2022-11-04
Payer: MEDICARE

## 2022-11-04 DIAGNOSIS — R00.1 BRADYCARDIA: ICD-10-CM

## 2022-11-04 PROCEDURE — 93270 REMOTE 30 DAY ECG REV/REPORT: CPT

## 2022-11-04 NOTE — PROCEDURES
The skin was prepped and a 14 day cardiac event monitor was applied. The patient was instructed on the documentation of symptoms and the purpose of the monitor as well as the things to avoid while wearing the monitor. The patient was instructed to remove and return the monitor on 11/18/2022.   The serial number of the monitor that was applied is [id number]

## 2022-11-04 NOTE — CARE COORDINATION
Care Transitions Outreach Attempt-1st attempt    Call within 2 business days of discharge: Yes   Attempted to reach patient for subsequent transitional call. VM left to return call to [phone]. Patient: Crystal Jauregui Patient : 1945 MRN: [id number]    Last Discharge  Street       Date Complaint Diagnosis Description Type Department Provider    10/24/22 Shortness of Breath Pleural effusion associated with pulmonary infection . .. ED to Hosp-Admission (Discharged) (ADMITTED) MAKENZIE Munoz PA-C; [facility]. ..               Noted following upcoming appointments from discharge chart review:   [facility] follow up appointment(s):   Future Appointments   Date Time Provider Yudelka Araujo   2022 11:30 AM SRIRAM Malin CNP N [facility] [place]   2022  2:00 PM MD MAYRA Ritchie [facility]   2022 11:30 AM SRIRAM Franco CNP N [facility] CHF [facility] [place]   2023  9:40 AM Jeffry Novoa MD [facility]   2023  1:45 PM MD MAYRA Smith [facility] [place]     Non-[facility] follow up appointment(s): vimal

## 2022-11-04 NOTE — PROCEDURES
The skin was prepped and a 14 cardiac event monitor was applied. The patient was instructed on the documentation of symptoms and the purpose of the monitor as well as the things to avoid while wearing the monitor. The patient was instructed to remove and return the monitor on 11/18/2022.   The serial number of the monitor that was applied is [id number]

## 2022-11-09 ENCOUNTER — CARE COORDINATION (OUTPATIENT)
Dept: CASE MANAGEMENT | Age: 77
End: 2022-11-09

## 2022-11-09 NOTE — CARE COORDINATION
Care Transitions Outreach Attempt-2nd attempt    Call within 2 business days of discharge: Yes   Attempted to reach patient for subsequent transitional call. VM left to return call to [phone]. If no return call, CTN will sign off-2nd attempt. Unable to reach letter not sent, non [facility] PCP, Covid call. Patient: Maverick Arevalo Patient : 1945 MRN: [id number]    Last Discharge  [address]       Date Complaint Diagnosis Description Type Department Provider    10/24/22 Shortness of Breath Pleural effusion associated with pulmonary infection . .. ED to Hosp-Admission (Discharged) (ADMITTED) MAKENZIE Posada PA-C; [facility]. ..               Noted following upcoming appointments from discharge chart review:   [facility] follow up appointment(s):   Future Appointments   Date Time Provider Yudelka Araujo   11/15/2022  2:30 PM Yessy Bowman APRN - CNP N SRPX Heart [address]   2022  2:00 PM Rumaldo Schilder, MD [facility]   2022 11:30 AM SRIRAM Weeks - CNP N SRPX CHF [address]   2023  9:40 AM Rumaldo Schilder, MD [facility]   2023  1:45 PM MD MAYRA Valentin SRPX Heart [address]     Non-[facility] follow up appointment(s): vimal

## 2022-11-11 LAB
BUN BLDV-MCNC: 18 MG/DL
CALCIUM SERPL-MCNC: 8.9 MG/DL
CHLORIDE BLD-SCNC: 107 MMOL/L
CO2: 26 MMOL/L
CREAT SERPL-MCNC: 1.38 MG/DL
GFR CALCULATED: 53
GLUCOSE BLD-MCNC: 135 MG/DL
POTASSIUM SERPL-SCNC: 4.7 MMOL/L
SODIUM BLD-SCNC: 143 MMOL/L

## 2022-11-15 ENCOUNTER — OFFICE VISIT (OUTPATIENT)
Dept: CARDIOLOGY CLINIC | Age: 77
End: 2022-11-15
Payer: MEDICARE

## 2022-11-15 VITALS
BODY MASS INDEX: 28.04 KG/M2 | DIASTOLIC BLOOD PRESSURE: 60 MMHG | SYSTOLIC BLOOD PRESSURE: 122 MMHG | WEIGHT: 207 LBS | HEIGHT: 72 IN | HEART RATE: 58 BPM

## 2022-11-15 DIAGNOSIS — Z95.1 S/P CABG X 2: ICD-10-CM

## 2022-11-15 DIAGNOSIS — Z98.890 S/P MVR (MITRAL VALVE REPAIR): ICD-10-CM

## 2022-11-15 DIAGNOSIS — I50.22 CHF (CONGESTIVE HEART FAILURE), NYHA CLASS II, CHRONIC, SYSTOLIC (HCC): Primary | ICD-10-CM

## 2022-11-15 DIAGNOSIS — I25.10 CORONARY ARTERY DISEASE INVOLVING NATIVE CORONARY ARTERY OF NATIVE HEART WITHOUT ANGINA PECTORIS: ICD-10-CM

## 2022-11-15 DIAGNOSIS — I25.5 ISCHEMIC CARDIOMYOPATHY: ICD-10-CM

## 2022-11-15 DIAGNOSIS — U07.1 COVID-19: ICD-10-CM

## 2022-11-15 DIAGNOSIS — N18.30 STAGE 3 CHRONIC KIDNEY DISEASE, UNSPECIFIED WHETHER STAGE 3A OR 3B CKD (HCC): ICD-10-CM

## 2022-11-15 DIAGNOSIS — I10 HTN (HYPERTENSION), BENIGN: ICD-10-CM

## 2022-11-15 DIAGNOSIS — Z95.2 S/P AVR: ICD-10-CM

## 2022-11-15 PROCEDURE — 3074F SYST BP LT 130 MM HG: CPT | Performed by: NURSE PRACTITIONER

## 2022-11-15 PROCEDURE — 99213 OFFICE O/P EST LOW 20 MIN: CPT | Performed by: NURSE PRACTITIONER

## 2022-11-15 PROCEDURE — G8484 FLU IMMUNIZE NO ADMIN: HCPCS | Performed by: NURSE PRACTITIONER

## 2022-11-15 PROCEDURE — G8427 DOCREV CUR MEDS BY ELIG CLIN: HCPCS | Performed by: NURSE PRACTITIONER

## 2022-11-15 PROCEDURE — 1036F TOBACCO NON-USER: CPT | Performed by: NURSE PRACTITIONER

## 2022-11-15 PROCEDURE — 3017F COLORECTAL CA SCREEN DOC REV: CPT | Performed by: NURSE PRACTITIONER

## 2022-11-15 PROCEDURE — 1124F ACP DISCUSS-NO DSCNMKR DOCD: CPT | Performed by: NURSE PRACTITIONER

## 2022-11-15 PROCEDURE — 3078F DIAST BP <80 MM HG: CPT | Performed by: NURSE PRACTITIONER

## 2022-11-15 PROCEDURE — G8417 CALC BMI ABV UP PARAM F/U: HCPCS | Performed by: NURSE PRACTITIONER

## 2022-11-15 PROCEDURE — 1111F DSCHRG MED/CURRENT MED MERGE: CPT | Performed by: NURSE PRACTITIONER

## 2022-11-15 NOTE — PATIENT INSTRUCTIONS
Continue current medications as prescribed. Stay well hydrated to keep mucous thin and easy to cough up. Do some deep breathing and use the Pickle to keep lungs clear. Stay as active as you can. Follow CHF guidelines, call with any questions or concerns. Follow-up with your PCP as scheduled. Follow-up with CHF clinic as scheduled    Follow-up with      as scheduled or sooner if need.

## 2022-11-15 NOTE — PROGRESS NOTES
[address] [place] [address]  Dept: [phone]  Dept Fax: [phone]  Loc: [phone]    Visit Date: 11/15/2022    Mr. Jay Russell is a 78 y.o. male  who presented for: hospital follow-up. Primary Cardiologist: Julianna Moran MD    Chief Complaint   Patient presents with    Follow-Up from [place] [address]       HPI:   HPI   Hospitalized 10/24 - 10/27/22 for acute hypoxic resp failure, 2/2 COVID -19 PNA. Hypovolemic and meds adjusted. MISA followed by Nephrology. Farxiga and Aldactone stopped. Last seen in office on 6/30/22 per Dr. Gracia Rico. 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 Some mild LIRA but no significant changes. Taking all meds. No side effects. No bleeding. No f/c/ns. Doing all of his ADLs. Assessment/Plan   Cardiomyopathy, combined ischemic/valvular heart disease - EF improved after Entresto  S/p AVR  S/p MV repair  CABG  EF 40-45%, NYHA II  LBBB  Hx of LORENA  ECG shows sinus bradycardia, he is asymptomatic. BP and HR otherwise stable. No major volume. Making urine. Add Farxiga 10 mg q day and Aldactone 25 mg q day for GDMT if he allows, follows with CHF. Monitor labs. Nephrology following. Discussed diet/exercise/BP/weight loss/health lifestyle choices/lipids; the patient understands the goals and will try to comply. Disposition: 1 year      Current Outpatient Medications:     ferrous sulfate (IRON 325) 325 (65 Fe) MG tablet, Take 650 mg by mouth daily (with breakfast), Disp: , Rfl:     nitroGLYCERIN (NITROSTAT) 0.4 MG SL tablet, up to max of 3 total doses.  If no relief after 1 dose, call 911., Disp: 25 tablet, Rfl: 3    atorvastatin (LIPITOR) 80 MG tablet, Take 40 mg by mouth every evening , Disp: , Rfl:     levothyroxine (SYNTHROID) 75 MCG tablet, Take 75 mcg by mouth Daily, Disp: , Rfl:     metoprolol succinate (TOPROL XL) 50 MG extended release tablet, Take 1 tablet by mouth daily, Disp: 90 tablet, Rfl: 3    sacubitril-valsartan (ENTRESTO)  MG per tablet, Take 1 tablet by mouth 2 times daily, Disp: 180 tablet, Rfl: 3    aspirin 81 MG tablet, Take 81 mg by mouth daily, Disp: , Rfl:     gabapentin (NEURONTIN) 400 MG capsule, Take 400 mg by mouth 2 times daily. , Disp: , Rfl:     glimepiride (AMARYL) 1 MG tablet, Take 0.5 mg by mouth every morning (before breakfast) , Disp: , Rfl:     docusate sodium (COLACE) 100 MG capsule, Take 100 mg by mouth as needed , Disp: , Rfl:     ONE TOUCH ULTRA TEST strip, as needed, Disp: , Rfl: 0    furosemide (LASIX) 20 MG tablet, Take 1 tablet by mouth three times a week And as directed by CHF clinic, Disp: 45 tablet, Rfl: 3    Past Medical History  Verito Pitts  has a past medical history of Acute on chronic systolic CHF (congestive heart failure), NYHA class 3 ([facility]) EF 25-30%, CAD (coronary artery disease), CKD (chronic kidney disease) stage 3, GFR 30-59 ml/min ([address] Diabetes mellitus ([facility] [address] HTN (hypertension), benign, Hyperlipidemia, Hypothyroidism, Mitral and aortic incompetence, Neuropathy, Positive blood culture, and S/P CABG x 2. Social History  Verito Pitts  reports that he has never smoked. He has been exposed to tobacco smoke. He has never used smokeless tobacco. He reports current alcohol use of about 14.0 standard drinks per week. He reports that he does not use drugs. Family History  Verito Pitts family history includes Cancer (age of onset: 28) in his brother; Cancer (age of onset: 46) in his brother; Diabetes in his mother; Other in his mother. There is no family history of bicuspid aortic valve, aneurysms, heart transplant, pacemakers, defibrillators, or sudden cardiac death.       Past Surgical History   Past Surgical History:   Procedure Laterality Date    CARDIAC SURGERY      heart cath    CATARACT REMOVAL WITH IMPLANT Bilateral COLONOSCOPY      CORONARY ARTERY BYPASS GRAFT  11/14/2018    DIAGNOSTIC CARDIAC CATH LAB PROCEDURE      EYE SURGERY      MO CABG, VEIN, THREE N/A 11/14/2018    CABG CORONARY ARTERY BYPASS,  AORTIC AND MITRAL VALVE REPAIR WITH MICHELE, BALLOON PUMP INSERTION performed by Maurilio Mora MD at [facility] ECHO TRANSESOPHAG R-T 2D IMG ACQUISJ I&R ONLY Left 10/25/2018    TRANSESOPHAGEAL ECHOCARDIOGRAM performed by Annalise Paz MD at [facility]     Today's visit:   Subjective:  Breathing stable; no home O2  Cough - thick secretions - hard to bring up at times - post COVID  No chest pain or pressure  No lightheadedness or dizziness; no syncope or near syncope  No water retention issues  Weight stable      Review of Systems   Constitutional: Negative for chills and fever  HENT: Negative for congestion, sinus pressure, sneezing and sore throat. Eyes: Negative for pain, discharge, redness and itching. Respiratory: Negative for PND, orthopnea, (+) cough  Gastrointestinal: Negative for blood in stool, constipation, diarrhea   Endocrine: Negative for cold intolerance, heat intolerance, polydipsia. Genitourinary: Negative for dysuria, hematuria. Musculoskeletal: Negative for arthralgias, joint swelling and neck pain. Neurological: Negative for numbness and headaches. Psychiatric/Behavioral: Negative for agitation, confusion, decreased concentration and dysphoric mood. Objective:     /60   Pulse 58   Ht 6' (1.829 m)   Wt 207 lb (93.9 kg)   BMI 28.07 kg/m²     Wt Readings from Last 3 Encounters:   12/13/22 203 lb 12.8 oz (92.4 kg)   11/21/22 203 lb (92.1 kg)   11/15/22 207 lb (93.9 kg)     BP Readings from Last 3 Encounters:   12/13/22 110/66   11/21/22 136/70   11/15/22 122/60       Nursing note and vitals reviewed. Physical Exam   Constitutional: Oriented to person, place, and time. Appears well-developed and well-nourished. Appears well. HENT:   Head: Normocephalic and atraumatic.    Eyes: EOM are normal. Pupils are equal, round, and reactive to light. Neck: Normal range of motion. Neck supple. No JVD present. Cardiovascular: Normal rate, regular rhythm, normal heart sounds and intact distal pulses. No murmur heard. Pulmonary/Chest: Effort normal and breath sounds normal. No respiratory distress. No wheezes. No rales. Abdominal: Soft. Bowel sounds are normal. No distension. There is no tenderness. Musculoskeletal: Normal range of motion. No edema. Neurological: Alert and oriented to person, place, and time. No cranial nerve deficit. Coordination normal.   Skin: Skin is warm and dry. Psychiatric: Normal mood and affect.        No results found for: CKTOTAL, CKMB, CKMBINDEX    Lab Results   Component Value Date/Time    WBC 8.6 10/27/2022 06:40 AM    RBC 3.90 10/27/2022 06:40 AM    RBC 45 05/19/2022 10:00 AM    HGB 12.7 10/27/2022 06:40 AM    HCT 40.1 10/27/2022 06:40 AM    .8 10/27/2022 06:40 AM    MCH 32.6 10/27/2022 06:40 AM    MCHC 31.7 10/27/2022 06:40 AM     10/27/2022 06:40 AM    MPV 9.7 10/27/2022 06:40 AM       Lab Results   Component Value Date/Time     11/11/2022 12:00 AM    K 4.7 11/11/2022 12:00 AM    K 5.6 10/27/2022 06:00 AM     11/11/2022 12:00 AM    CO2 26 11/11/2022 12:00 AM    BUN 18 11/11/2022 12:00 AM    LABALBU 3.2 10/25/2022 05:38 AM    CREATININE 1.38 11/11/2022 12:00 AM    CALCIUM 8.9 11/11/2022 12:00 AM    LABGLOM 53 11/11/2022 12:00 AM    LABGLOM 48 10/27/2022 10:22 AM    GLUCOSE 135 11/11/2022 12:00 AM    GLUCOSE 108 05/19/2022 08:00 AM       Lab Results   Component Value Date/Time    ALKPHOS 78 10/25/2022 05:38 AM    ALT 19 10/25/2022 05:38 AM    AST 26 10/25/2022 05:38 AM    PROT 6.8 10/25/2022 05:38 AM    BILITOT 0.3 10/25/2022 05:38 AM    BILITOT Negative 05/19/2022 10:00 AM    BILIDIR 0.3 10/24/2022 04:50 AM    LABALBU 3.2 10/25/2022 05:38 AM       Lab Results   Component Value Date/Time    MG 2.2 10/26/2022 12:46 PM       Lab Results Component Value Date    INR 2.02 (H) 2018    INR 4.54 (H) 2018    INR 5.43 (HH) 2018         Lab Results   Component Value Date/Time    LABA1C 6.0 2018 10:00 PM       Lab Results   Component Value Date/Time    TRIG 96 2021 12:00 AM    HDL 32 2021 12:00 AM    LDLCALC 125 2021 12:00 AM       Lab Results   Component Value Date/Time    TSH 2.820 10/23/2018 09:00 AM         Testing Reviewed:      I have individually reviewed the cardiac test below:  EKG: 10/26/22  EKG 12 Lead  Order: [id number]  Status: Final result    Visible to patient: No (not released)    Next appt: 2023 at 01:45 PM in Cardiology Juan James MD)    0 Result Notes  Component Ref Range & Units 10/26/22 1336 10/26/22 1245 10/25/22 0603 10/24/22 0444 18 0840 18 2229 18 0356   Ventricular Rate BPM 60  87  60  59  70  157  53    Atrial Rate BPM 60  87  60  59  70  182  46    P-R Interval ms 134  124  138  130  158      QRS Duration ms 110  116  120  100  156  150  124    Q-T Interval ms 416  418  438  428  496  294  460    QTc Calculation (Bazett) ms 416  502  438  423  535  475  431    P Axis degrees -61   -74        R Axis degrees 20  16  31  47  14  20  97    T Axis degrees -122  -151  -112  -102  -33  -145  -19    Resulting Agency  [address] [facility] [address] [facility] [address] [facility] [address] [facility] [address] [facility] [address] [facility] [address] [facility] [address] [facility]           Narrative & Impression    Unusual P axis and short NC, probable junctional rhythm with undetermined rhythm irregularity  ST & T wave abnormality, consider inferolateral ischemia  Abnormal ECG  Confirmed by Wilbert Bran ([id number]) on 10/27/2022 9:19:12 AM           Event: 22   EVENT MONITOR     PATIENT NAME: Zhao Pagan                       :        1945  MED REC NO:   [id number]                           ROOM:  ACCOUNT NO:   [de-identified]                           ADMIT DATE: 2022  PROVIDER:     Ginger Salinas MD     DATE OF PROCEDURE:  11/04/2022     TWO-WEEK EVENT MONITOR     Monitoring period 11/04/2022 and 11/13/2022. INDICATION FOR PROCEDURE:  Bradycardia. FINDINGS:  Baseline rhythm is sinus rhythm with intermittent episodes of  non-sustained VT of 6 beats total.  This is asymptomatic. Less than 1%  of the time, the patient had ventricular ectopy. Less than 1% of the  time, the patient had supraventricular ectopy. No findings of atrial  fibrillation or high-grade AV block were noted. No symptoms recorded  for evaluation. SUMMARY:  One episode of non-sustained VT, otherwise no significant  findings. Javid Alonso MD   D: 11/25/2022      ECHO: 05/25/22    Summary  Normal left ventricular size and moderately reduced systolic function. There was moderate global hypokinesis. Wall thickness was within normal limits. Ejection fraction was estimated at 40-45%. S/p AVR. DOPPLER: Transaortic velocity was within the normal range with no  evidence of aortic stenosis (mean gradient 5 mmHg). There was no evidence  of aortic regurgitation. S/p MV ring repair. DOPPLER: The transmitral velocity was within the  normal range with no evidence for mitral stenosis (mean gradient 2 mmHg). There was no evidence of mitral regurgitation. Left atrial size was moderately dilated. IVC size is within normal limits with normal respiratory phasic changes. Signature  ----------------------------------------------------------------  Electronically signed by Saul Werner MD (Interpreting  physician) on 05/26/2022 at 05:34 PM    Assessment/Plan   Cardiomyopathy, combined ischemic/valvular heart disease - EF improved after Entresto  S/p AVR  S/p MV repair  CABG  EF 40-45%, NYHA II  LBBB  Hx of LORENA  COVID 10/2022 - recovering  BP and HR  stable. No major volume. Making urine. Farxiga and Aldactone stopped with recent hospitalization and MISA - nephrology following. Follow-up with CHF clinic.    Discussed adequate fluid intake yet in accordance with CHF guidelines. Discussed continuing pulmonary hygiene measures to complete COVID PNA recovery. Discussed diet/exercise/BP/weight loss/health lifestyle choices/lipids; the patient understands the goals and will try to comply. Continue current medications as prescribed. Stay well hydrated to keep mucous thin and easy to cough up. Do some deep breathing and use the Pickle to keep lungs clear. Stay as active as you can. Follow CHF guidelines, call with any questions or concerns. Follow-up with your PCP as scheduled. Follow-up with CHF clinic as scheduled    Follow-up with Dr. Jacoby Martinez on 7/26/23  as scheduled or sooner if need.             Electronically signed by SRIRAM Field CNP   12/14/2022 at 1:48 PM EDT

## 2022-11-21 ENCOUNTER — OFFICE VISIT (OUTPATIENT)
Dept: NEPHROLOGY | Age: 77
End: 2022-11-21
Payer: MEDICARE

## 2022-11-21 VITALS
SYSTOLIC BLOOD PRESSURE: 136 MMHG | HEART RATE: 56 BPM | DIASTOLIC BLOOD PRESSURE: 70 MMHG | WEIGHT: 203 LBS | BODY MASS INDEX: 27.53 KG/M2 | OXYGEN SATURATION: 94 %

## 2022-11-21 DIAGNOSIS — I10 HTN (HYPERTENSION), BENIGN: ICD-10-CM

## 2022-11-21 DIAGNOSIS — N18.31 STAGE 3A CHRONIC KIDNEY DISEASE (HCC): ICD-10-CM

## 2022-11-21 DIAGNOSIS — N28.1 RENAL CYST: Primary | ICD-10-CM

## 2022-11-21 PROCEDURE — G8427 DOCREV CUR MEDS BY ELIG CLIN: HCPCS | Performed by: INTERNAL MEDICINE

## 2022-11-21 PROCEDURE — 3017F COLORECTAL CA SCREEN DOC REV: CPT | Performed by: INTERNAL MEDICINE

## 2022-11-21 PROCEDURE — 1036F TOBACCO NON-USER: CPT | Performed by: INTERNAL MEDICINE

## 2022-11-21 PROCEDURE — 1124F ACP DISCUSS-NO DSCNMKR DOCD: CPT | Performed by: INTERNAL MEDICINE

## 2022-11-21 PROCEDURE — G8484 FLU IMMUNIZE NO ADMIN: HCPCS | Performed by: INTERNAL MEDICINE

## 2022-11-21 PROCEDURE — 1111F DSCHRG MED/CURRENT MED MERGE: CPT | Performed by: INTERNAL MEDICINE

## 2022-11-21 PROCEDURE — G8417 CALC BMI ABV UP PARAM F/U: HCPCS | Performed by: INTERNAL MEDICINE

## 2022-11-21 PROCEDURE — 99213 OFFICE O/P EST LOW 20 MIN: CPT | Performed by: INTERNAL MEDICINE

## 2022-11-21 PROCEDURE — 3078F DIAST BP <80 MM HG: CPT | Performed by: INTERNAL MEDICINE

## 2022-11-21 PROCEDURE — 3074F SYST BP LT 130 MM HG: CPT | Performed by: INTERNAL MEDICINE

## 2022-11-21 NOTE — PROGRESS NOTES
[facility]          Outpatient Follow-Up note         11/21/2022 1:38 PM    Patient Name:   Marilyn lGoria  YOB: 1945  Primary Care Physician:  Sheri Herrera     Chief Complaint / Reason for follow-up : Follow Up of chronic kidney disease and hypertension      Interval History :  Patient seen and examined by me. Feels well. Denies any complaints. No chest pain and no shortness of breath. Patient was recently in the hospital 3 weeks ago with COVID at which point he also had severe acute kidney injury with a creatinine of almost 5.1 which was improved with IV fluids     Past History :  Past Medical History:   Diagnosis Date    Acute on chronic systolic CHF (congestive heart failure), NYHA class 3 ([facility]) EF 25-30% 11/7/2018    CAD (coronary artery disease)     CKD (chronic kidney disease) stage 3, GFR 30-59 ml/min ([address] 9/23/2015    Diabetes mellitus (HCC)     HTN (hypertension), benign     Hyperlipidemia     Hypothyroidism     Mitral and aortic incompetence     Neuropathy     Positive blood culture     Being treated with rocephhin as outpt.     S/P CABG x 2 11/15/2018     Past Surgical History:   Procedure Laterality Date    CARDIAC SURGERY      heart cath    CATARACT REMOVAL WITH IMPLANT Bilateral     COLONOSCOPY      CORONARY ARTERY BYPASS GRAFT  11/14/2018    DIAGNOSTIC CARDIAC CATH LAB PROCEDURE      EYE SURGERY      [place] CABG, VEIN, THREE N/A 11/14/2018    CABG CORONARY ARTERY BYPASS,  AORTIC AND MITRAL VALVE REPAIR WITH MICHELE, BALLOON PUMP INSERTION performed by Susy Griffin MD at [address] [place] [address] ECHO TRANSESOPHAG R-T 2D IMG ACQUISJ I&R ONLY Left 10/25/2018    TRANSESOPHAGEAL ECHOCARDIOGRAM performed by Sage Ricci MD at [facility]        Medications :     Outpatient Medications Marked as Taking for the 11/21/22 encounter (Office Visit) with Kendell Eaton MD   Medication Sig Dispense Refill    furosemide (LASIX) 20 MG tablet Take 1 tablet by mouth three significantly improved from previous visit. As per MDRD his chronic kidney disease stage III. Overall creatinine appears to be at baseline        -     no further issues   Essential Hypertension -- running  well. Continue current meds. Diabetes mellitus with neuropathy and retinopathy apparently reasonably controlled. Renal cyst significant in size 4.1 cm. Repeat US scan next visit. Does not want to have it done now  Avoid Nephrotoxins, Nonsteroidal anti-inflammatories and IV Contrast Dye  H/O CHF -stable on on diuretics   Follow up in 12 months      Tests and orders placed this Encounter     Orders Placed This Encounter   Procedures    Comprehensive Metabolic Panel    Microalbumin / Creatinine Urine Ratio    Protein / creatinine ratio, urine         Jeffry Novoa M.D  [facility].

## 2022-12-13 ENCOUNTER — OFFICE VISIT (OUTPATIENT)
Dept: CARDIOLOGY CLINIC | Age: 77
End: 2022-12-13
Payer: MEDICARE

## 2022-12-13 VITALS
SYSTOLIC BLOOD PRESSURE: 110 MMHG | HEIGHT: 72 IN | OXYGEN SATURATION: 97 % | BODY MASS INDEX: 27.6 KG/M2 | DIASTOLIC BLOOD PRESSURE: 66 MMHG | HEART RATE: 60 BPM | WEIGHT: 203.8 LBS

## 2022-12-13 DIAGNOSIS — Z95.2 S/P AVR: ICD-10-CM

## 2022-12-13 DIAGNOSIS — Z98.890 S/P MVR (MITRAL VALVE REPAIR): ICD-10-CM

## 2022-12-13 DIAGNOSIS — I10 ESSENTIAL HYPERTENSION: ICD-10-CM

## 2022-12-13 DIAGNOSIS — Z95.1 S/P CABG X 2: ICD-10-CM

## 2022-12-13 DIAGNOSIS — I25.5 ISCHEMIC CARDIOMYOPATHY: ICD-10-CM

## 2022-12-13 DIAGNOSIS — I50.22 CHF (CONGESTIVE HEART FAILURE), NYHA CLASS II, CHRONIC, SYSTOLIC (HCC): Primary | ICD-10-CM

## 2022-12-13 PROCEDURE — 1036F TOBACCO NON-USER: CPT | Performed by: NURSE PRACTITIONER

## 2022-12-13 PROCEDURE — G8417 CALC BMI ABV UP PARAM F/U: HCPCS | Performed by: NURSE PRACTITIONER

## 2022-12-13 PROCEDURE — 1124F ACP DISCUSS-NO DSCNMKR DOCD: CPT | Performed by: NURSE PRACTITIONER

## 2022-12-13 PROCEDURE — G8484 FLU IMMUNIZE NO ADMIN: HCPCS | Performed by: NURSE PRACTITIONER

## 2022-12-13 PROCEDURE — 3078F DIAST BP <80 MM HG: CPT | Performed by: NURSE PRACTITIONER

## 2022-12-13 PROCEDURE — 99214 OFFICE O/P EST MOD 30 MIN: CPT | Performed by: NURSE PRACTITIONER

## 2022-12-13 PROCEDURE — G8427 DOCREV CUR MEDS BY ELIG CLIN: HCPCS | Performed by: NURSE PRACTITIONER

## 2022-12-13 PROCEDURE — 3017F COLORECTAL CA SCREEN DOC REV: CPT | Performed by: NURSE PRACTITIONER

## 2022-12-13 PROCEDURE — 3074F SYST BP LT 130 MM HG: CPT | Performed by: NURSE PRACTITIONER

## 2022-12-13 RX ORDER — FUROSEMIDE 20 MG/1
20 TABLET ORAL
Qty: 45 TABLET | Refills: 3 | Status: SHIPPED | OUTPATIENT
Start: 2022-12-14

## 2022-12-13 ASSESSMENT — ENCOUNTER SYMPTOMS
COUGH: 0
SHORTNESS OF BREATH: 0
ABDOMINAL DISTENTION: 0

## 2022-12-13 NOTE — PROGRESS NOTES
Heart Failure Clinic       Visit Date: 12/13/2022  Cardiologist:  Jim Mauricio  Primary Care Physician: Dr. Trish Anderson is a 78 y.o. male who presents today for:  Chief Complaint   Patient presents with    Congestive Heart Failure       HPI:   Thao Longoria is a 78 y.o. male who presents to the office for a follow up patient visit in the heart failure clinic. Accompanied by wife, Wisam Hudson    HF: Systolic (EF 10-68% IMPROVED 40-45% 5/2022)  Cause: ICM/NICM (valvular)   Device:  No   HX:  s/p AVR & MV repair & 2V CABG (11/20018), Hx LORENA (no Coumadin), CKD (baseline 1.1-1.4,  [facility]), DM, HTN, HLD     Hospitalization: none for CHF  Referred from [facility] 6/2019 for [facility] = med management. OV CHF 4/2021 - declined further optimization w/ meds (SGLT2) - again declined ICD. Timothy Willams 6/2021 - re-discussed BiV ICD     12/2021 - 222# - feeling about the same - maybe little worsening LIRA. States always worse in winter d/t not doing as much. Some mild rare chest pain - rest helps  Activity: walked from entrance, no break. Tolerates ADLs  Started Lasix 20 MWF    TODAY:  203#  Had Covid 2 months ago - feels back to baseline now  No concerns voiced. No fluid on exam.  Breathing is better on low dose Lasix     Patient has:  Chest Pain: mild - rare  SOB: some LIRA  Orthopnea/PND: no  BENTON: no  Edema: no  Fatigue: no  Abdominal bloating: no  Cough: no  Appetite: good  Home weight: stable  Home blood pressure: elevated at times (prior to meds)    Past Medical History:   Diagnosis Date    Acute on chronic systolic CHF (congestive heart failure), NYHA class 3 ([facility]) EF 25-30% 11/7/2018    CAD (coronary artery disease)     CKD (chronic kidney disease) stage 3, GFR 30-59 ml/min ([facility] 9/23/2015    Diabetes mellitus ([facility]     HTN (hypertension), benign     Hyperlipidemia     Hypothyroidism     Mitral and aortic incompetence     Neuropathy     Positive blood culture     Being treated with rocephhin as outpt. S/P CABG x 2 11/15/2018     Past Surgical History:   Procedure Laterality Date    CARDIAC SURGERY      heart cath    CATARACT REMOVAL WITH IMPLANT Bilateral     COLONOSCOPY      CORONARY ARTERY BYPASS GRAFT  11/14/2018    DIAGNOSTIC CARDIAC CATH LAB PROCEDURE      EYE SURGERY      [place] CABG, VEIN, THREE N/A 11/14/2018    CABG CORONARY ARTERY BYPASS,  AORTIC AND MITRAL VALVE REPAIR WITH MICHELE, BALLOON PUMP INSERTION performed by Debra Hdz MD at [facility] ECHO TRANSESOPHAG R-T 2D IMG ACQUISJ I&R ONLY Left 10/25/2018    TRANSESOPHAGEAL ECHOCARDIOGRAM performed by Willey Lombard, MD at [facility] [address] [facility]     Family History   Problem Relation Age of Onset    Diabetes Mother     Other Mother         chf    Cancer Brother 28        renal    Cancer Brother 46        renal     Social History     Tobacco Use    Smoking status: Never     Passive exposure: Yes    Smokeless tobacco: Never    Tobacco comments:     smoked for 4 years while in the [employer] >40 years ago   Substance Use Topics    Alcohol use: Yes     Alcohol/week: 14.0 standard drinks     Types: 14 Cans of beer per week     Current Outpatient Medications   Medication Sig Dispense Refill    [START ON 12/14/2022] furosemide (LASIX) 20 MG tablet Take 1 tablet by mouth three times a week And as directed by CHF clinic 45 tablet 3    ferrous sulfate (IRON 325) 325 (65 Fe) MG tablet Take 650 mg by mouth daily (with breakfast)      nitroGLYCERIN (NITROSTAT) 0.4 MG SL tablet up to max of 3 total doses.  If no relief after 1 dose, call 911. 25 tablet 3    atorvastatin (LIPITOR) 80 MG tablet Take 40 mg by mouth every evening       levothyroxine (SYNTHROID) 75 MCG tablet Take 75 mcg by mouth Daily      metoprolol succinate (TOPROL XL) 50 MG extended release tablet Take 1 tablet by mouth daily 90 tablet 3    sacubitril-valsartan (ENTRESTO)  MG per tablet Take 1 tablet by mouth 2 times daily 180 tablet 3    aspirin 81 MG tablet Take 81 mg by mouth daily      gabapentin (NEURONTIN) 400 MG capsule Take 400 mg by mouth 2 times daily. glimepiride (AMARYL) 1 MG tablet Take 0.5 mg by mouth every morning (before breakfast)       docusate sodium (COLACE) 100 MG capsule Take 100 mg by mouth as needed       ONE TOUCH ULTRA TEST strip as needed  0     No current facility-administered medications for this visit. Allergies   Allergen Reactions    Heparin Other (See Comments)       SUBJECTIVE:   Review of Systems   Constitutional:  Negative for activity change, appetite change and fatigue. Respiratory:  Negative for cough and shortness of breath. Cardiovascular:  Negative for chest pain, palpitations and leg swelling. Gastrointestinal:  Negative for abdominal distention. Neurological:  Negative for weakness, light-headedness and headaches. Hematological:  Negative for adenopathy. Psychiatric/Behavioral:  Negative for sleep disturbance. OBJECTIVE:   Today's Vitals:  /66   Pulse 60   Ht 6' (1.829 m)   Wt 203 lb 12.8 oz (92.4 kg)   SpO2 97%   BMI 27.64 kg/m²     Physical Exam  Vitals reviewed. Constitutional:       General: He is not in acute distress. Appearance: Normal appearance. He is well-developed. He is not diaphoretic. HENT:      Head: Normocephalic and atraumatic. Eyes:      Conjunctiva/sclera: Conjunctivae normal.   Neck:      Comments: No JVD  Cardiovascular:      Rate and Rhythm: Normal rate and regular rhythm. Heart sounds: Normal heart sounds. No murmur heard. Pulmonary:      Effort: Pulmonary effort is normal. No respiratory distress. Breath sounds: Normal breath sounds. No wheezing or rales. Abdominal:      General: Bowel sounds are normal. There is no distension. Palpations: Abdomen is soft. Tenderness: There is no abdominal tenderness. Musculoskeletal:         General: Normal range of motion. Cervical back: Normal range of motion and neck supple. Right lower leg: No edema.       Left lower leg: No edema.   Skin:     General: Skin is warm and dry. Capillary Refill: Capillary refill takes less than 2 seconds. Neurological:      Mental Status: He is alert and oriented to person, place, and time. Coordination: Coordination normal.   Psychiatric:         Behavior: Behavior normal.       Wt Readings from Last 3 Encounters:   12/13/22 203 lb 12.8 oz (92.4 kg)   11/21/22 203 lb (92.1 kg)   11/15/22 207 lb (93.9 kg)     BP Readings from Last 3 Encounters:   12/13/22 110/66   11/21/22 136/70   11/15/22 122/60     Pulse Readings from Last 3 Encounters:   12/13/22 60   11/21/22 56   11/15/22 58     Body mass index is 27.64 kg/m². ECHO  Summary   Normal left ventricular size and moderately reduced systolic function. There was moderate global hypokinesis. Wall thickness was within normal limits. Ejection fraction was estimated at 40-45%. S/p AVR. DOPPLER: Transaortic velocity was within the normal range with no   evidence of aortic stenosis (mean gradient 5 mmHg). There was no evidence   of aortic regurgitation. S/p MV ring repair. DOPPLER: The transmitral velocity was within the   normal range with no evidence for mitral stenosis (mean gradient 2 mmHg). There was no evidence of mitral regurgitation. Left atrial size was moderately dilated. IVC size is within normal limits with normal respiratory phasic changes. Signature      ----------------------------------------------------------------   Electronically signed by Herb Warner MD (Interpreting   physician) on 05/26/2022 at 05:34 PM   ----------------------------------------------------------------    ECHO:    Summary   Technically difficult examination. Ejection fraction was estimated at 20-25%. There was severe global hypokinesis. s/p AVR. Transaortic velocity was within the normal range with no evidence of   aortic stenosis (mean gradient 6-8 mmHg). There was no evidence of aortic   regurgitation. S/p MVR.    The transmitral velocity was within the normal range with no evidence for   mitral stenosis (mean graident 4 mmHg). There was trace mitral   regurgitation.       Signature      ----------------------------------------------------------------   Electronically signed by Vandana Guzman MD (Interpreting   physician) on 04/05/2019 at 11:51 AM   ----------------------------------------------------------------     CATH/STRESS:   [facility] 11/2018 - none since    Results reviewed:  BNP: No results found for: BNP  CBC:   Lab Results   Component Value Date/Time    WBC 8.6 10/27/2022 06:40 AM    RBC 3.90 10/27/2022 06:40 AM    RBC 45 05/19/2022 10:00 AM    HGB 12.7 10/27/2022 06:40 AM    HCT 40.1 10/27/2022 06:40 AM     10/27/2022 06:40 AM     CMP:    Lab Results   Component Value Date/Time     11/11/2022 12:00 AM    K 4.7 11/11/2022 12:00 AM    K 5.6 10/27/2022 06:00 AM     11/11/2022 12:00 AM    CO2 26 11/11/2022 12:00 AM    BUN 18 11/11/2022 12:00 AM    CREATININE 1.38 11/11/2022 12:00 AM    LABGLOM 53 11/11/2022 12:00 AM    LABGLOM 48 10/27/2022 10:22 AM    GLUCOSE 135 11/11/2022 12:00 AM    GLUCOSE 108 05/19/2022 08:00 AM    CALCIUM 8.9 11/11/2022 12:00 AM     Hepatic Function Panel:    Lab Results   Component Value Date/Time    ALKPHOS 78 10/25/2022 05:38 AM    ALT 19 10/25/2022 05:38 AM    AST 26 10/25/2022 05:38 AM    PROT 6.8 10/25/2022 05:38 AM    BILITOT 0.3 10/25/2022 05:38 AM    BILITOT Negative 05/19/2022 10:00 AM    BILIDIR 0.3 10/24/2022 04:50 AM    LABALBU 3.2 10/25/2022 05:38 AM     Magnesium:    Lab Results   Component Value Date/Time    MG 2.2 10/26/2022 12:46 PM     PT/INR:    Lab Results   Component Value Date/Time    INR 2.02 12/27/2018 08:52 AM     Lipids:    Lab Results   Component Value Date/Time    TRIG 96 03/05/2021 12:00 AM    HDL 32 03/05/2021 12:00 AM    LDLCALC 125 03/05/2021 12:00 AM       ASSESSMENT AND PLAN:   The patient's condition/symptoms are Stable: No clinical evidence of fluid overload today. Continue current medical regimen without changes at present time. Diagnosis Orders   1. CHF (congestive heart failure), NYHA class II, chronic, systolic ([address]        2. S/P CABG x 2        3. S/P MVR (mitral valve repair)        4. S/P AVR        5. Ischemic cardiomyopathy        6. Essential hypertension          Continue:  GDMT:              ACE/ARB/ARNI - Entresto 97/103 BID              BB -Toprol 50/day              Diuretic - Lasix 20 MWF  AA - no - K+ runs high  SGLT2 -  No - declined previously  Vasodilator - no    HFimprovedEF - 20-25%, up to 40-45% (5/2022)  ICM  CAD s/p CABG AVR, MV repair (2018)  HTN  CKD - follows w/ [facility]    Stable, appears Euvolemic - no fluid on exam.   Lab reviewed - stable  Repeat blood work in May for [address]  BP/HR stable   No med changes today   Continue diet/fluid adherence  Continue daily wts. F/U w/ Cardiology  F/U in clinic in 1 year    Tolerating above noted HF meds, no ill side effects noted. Will continue to monitor kidney function and electrolytes. Will optimize as tolerated. Pt is compliant w/ medications. Total visit time of 30 minutes has been spent with patient on education of symptoms, management, medication, and plan of care; as well as review of chart: labs, ECHO, radiology reports, etc.   I personally spent more then 50% of the appt time face to face with the patient. Daily weights  Fluid restriction of 2 Liters per day  Limit sodium in diet to around 9121-3744 mg/day  Monitor BP  Activity as tolerated     Patient was instructed to call the [facility] for any changes in symptoms as noted in AVS.      No follow-ups on file. or sooner if needed     Patient given educational materials - see patient instructions. We discussed the importance of weighing oneself and recording daily. We also discussed the importance of a low sodium diet, higher sodium foods to avoid and better low sodium food options.    Patient verbalizes understanding of plan of care using teach back method, and is agreeable to the treatment plan.        Electronically signed by SRIRAM Whitaker CNP on 12/13/2022 at 1:17 PM

## 2022-12-13 NOTE — PATIENT INSTRUCTIONS
You may receive a survey regarding the care you received during your visit. Your input is valuable to us. We encourage you to complete and return your survey. We hope you will choose us in the future for your healthcare needs.     Continue:  Continue current medications  Daily weights and record  Fluid restriction of 2 Liters per day  Limit sodium in diet to around 9923-6700 mg/day  Monitor BP  Activity as tolerated     Call the Heart Failure Clinic for any of the following symptoms: [phone]  Weight gain of 2-3 pounds in 1 day or 5 pounds in 1 week  Increased shortness of breath  Shortness of breath while laying down  Cough  Chest pain  Swelling in feet, ankles or legs  Tenderness or bloating in the abdomen  Fatigue   Decreased appetite or nausea   Confusion

## 2023-06-07 LAB
ANION GAP SERPL CALCULATED.3IONS-SCNC: 13 MEQ/L (ref 7–16)
BUN BLDV-MCNC: 33 MG/DL (ref 8–23)
CALCIUM SERPL-MCNC: 9.2 MG/DL (ref 8.5–10.5)
CHLORIDE BLD-SCNC: 102 MEQ/L (ref 95–107)
CO2: 25 MEQ/L (ref 19–31)
CREAT SERPL-MCNC: 1.8 MG/DL (ref 0.8–1.4)
CREATINE, URINE: 73.5 MG/DL
CREATINE, URINE: 73.5 MG/DL
EGFR IF NONAFRICAN AMERICAN: 39 ML/MIN/1.73
GLUCOSE: 95 MG/DL (ref 70–99)
MICROALBUMIN/CREAT 24H UR: <1.2 MG/DL
MICROALBUMIN/CREAT UR-RTO: NORMAL MG/G
POTASSIUM SERPL-SCNC: 6.1 MEQ/L (ref 3.5–5.4)
PROTEIN, URINE: 9 MG/DL
PROTEIN/CREAT RATIO: 122 MG/G
SODIUM BLD-SCNC: 140 MEQ/L (ref 133–146)

## 2023-06-22 LAB
ANION GAP SERPL CALCULATED.3IONS-SCNC: 11 MEQ/L (ref 7–16)
BUN BLDV-MCNC: 30 MG/DL (ref 8–23)
CALCIUM SERPL-MCNC: 9 MG/DL (ref 8.5–10.5)
CHLORIDE BLD-SCNC: 106 MEQ/L (ref 95–107)
CO2: 27 MEQ/L (ref 19–31)
CREAT SERPL-MCNC: 1.5 MG/DL (ref 0.8–1.4)
EGFR IF NONAFRICAN AMERICAN: 48 ML/MIN/1.73
GLUCOSE: 114 MG/DL (ref 70–99)
POTASSIUM SERPL-SCNC: 3.9 MEQ/L (ref 3.5–5.4)
SODIUM BLD-SCNC: 144 MEQ/L (ref 133–146)

## 2023-06-27 ENCOUNTER — TELEPHONE (OUTPATIENT)
Dept: NEPHROLOGY | Age: 78
End: 2023-06-27

## 2023-06-27 DIAGNOSIS — E87.5 HYPERKALEMIA: Primary | ICD-10-CM

## 2023-07-06 ENCOUNTER — OFFICE VISIT (OUTPATIENT)
Dept: CARDIOLOGY CLINIC | Age: 78
End: 2023-07-06
Payer: MEDICARE

## 2023-07-06 VITALS
SYSTOLIC BLOOD PRESSURE: 122 MMHG | BODY MASS INDEX: 28.91 KG/M2 | DIASTOLIC BLOOD PRESSURE: 68 MMHG | WEIGHT: 213.4 LBS | HEART RATE: 56 BPM | HEIGHT: 72 IN

## 2023-07-06 DIAGNOSIS — I25.5 ISCHEMIC CARDIOMYOPATHY: Primary | ICD-10-CM

## 2023-07-06 DIAGNOSIS — Z95.2 S/P AVR: ICD-10-CM

## 2023-07-06 DIAGNOSIS — Z98.890 S/P MVR (MITRAL VALVE REPAIR): ICD-10-CM

## 2023-07-06 DIAGNOSIS — Z95.1 S/P CABG X 2: ICD-10-CM

## 2023-07-06 PROCEDURE — 3078F DIAST BP <80 MM HG: CPT | Performed by: INTERNAL MEDICINE

## 2023-07-06 PROCEDURE — 1036F TOBACCO NON-USER: CPT | Performed by: INTERNAL MEDICINE

## 2023-07-06 PROCEDURE — G8427 DOCREV CUR MEDS BY ELIG CLIN: HCPCS | Performed by: INTERNAL MEDICINE

## 2023-07-06 PROCEDURE — 1124F ACP DISCUSS-NO DSCNMKR DOCD: CPT | Performed by: INTERNAL MEDICINE

## 2023-07-06 PROCEDURE — 3074F SYST BP LT 130 MM HG: CPT | Performed by: INTERNAL MEDICINE

## 2023-07-06 PROCEDURE — 99213 OFFICE O/P EST LOW 20 MIN: CPT | Performed by: INTERNAL MEDICINE

## 2023-07-06 PROCEDURE — G8417 CALC BMI ABV UP PARAM F/U: HCPCS | Performed by: INTERNAL MEDICINE

## 2023-07-27 LAB — POTASSIUM SERPL-SCNC: 4.9 MEQ/L (ref 3.5–5.4)

## 2023-11-21 LAB
ALBUMIN SERPL-MCNC: 4.4 G/DL (ref 3.5–5.2)
ALK PHOSPHATASE: 127 U/L (ref 40–125)
ALT SERPL-CCNC: 8 U/L (ref 5–50)
ANION GAP SERPL CALCULATED.3IONS-SCNC: 12 MEQ/L (ref 7–16)
AST SERPL-CCNC: 16 U/L (ref 9–50)
BILIRUB SERPL-MCNC: 0.6 MG/DL
BUN BLDV-MCNC: 30 MG/DL (ref 8–23)
CALCIUM SERPL-MCNC: 9.8 MG/DL (ref 8.5–10.5)
CHLORIDE BLD-SCNC: 102 MEQ/L (ref 95–107)
CO2: 25 MEQ/L (ref 19–31)
CREAT SERPL-MCNC: 1.55 MG/DL (ref 0.8–1.4)
CREATINE, URINE: 104.1 MG/DL
CREATINE, URINE: 104.1 MG/DL
EGFR IF NONAFRICAN AMERICAN: 46 ML/MIN/1.73
GLUCOSE: 88 MG/DL (ref 70–99)
MICROALBUMIN/CREAT 24H UR: <1.2 MG/DL
MICROALBUMIN/CREAT UR-RTO: NORMAL MG/G
POTASSIUM SERPL-SCNC: 4.7 MEQ/L (ref 3.5–5.4)
PROTEIN, URINE: 12 MG/DL
PROTEIN/CREAT RATIO: 115 MG/G
SODIUM BLD-SCNC: 139 MEQ/L (ref 133–146)
TOTAL PROTEIN: 7.3 G/DL (ref 6.1–8.3)

## 2023-11-29 ENCOUNTER — OFFICE VISIT (OUTPATIENT)
Dept: NEPHROLOGY | Age: 78
End: 2023-11-29
Payer: MEDICARE

## 2023-11-29 VITALS
SYSTOLIC BLOOD PRESSURE: 151 MMHG | HEART RATE: 59 BPM | WEIGHT: 220 LBS | DIASTOLIC BLOOD PRESSURE: 66 MMHG | BODY MASS INDEX: 29.84 KG/M2 | OXYGEN SATURATION: 96 %

## 2023-11-29 DIAGNOSIS — I10 HTN (HYPERTENSION), BENIGN: ICD-10-CM

## 2023-11-29 DIAGNOSIS — N18.31 STAGE 3A CHRONIC KIDNEY DISEASE (HCC): ICD-10-CM

## 2023-11-29 DIAGNOSIS — N28.1 RENAL CYST: ICD-10-CM

## 2023-11-29 DIAGNOSIS — E87.5 HYPERKALEMIA: Primary | ICD-10-CM

## 2023-11-29 PROCEDURE — 3077F SYST BP >= 140 MM HG: CPT | Performed by: INTERNAL MEDICINE

## 2023-11-29 PROCEDURE — 1036F TOBACCO NON-USER: CPT | Performed by: INTERNAL MEDICINE

## 2023-11-29 PROCEDURE — G8417 CALC BMI ABV UP PARAM F/U: HCPCS | Performed by: INTERNAL MEDICINE

## 2023-11-29 PROCEDURE — 1124F ACP DISCUSS-NO DSCNMKR DOCD: CPT | Performed by: INTERNAL MEDICINE

## 2023-11-29 PROCEDURE — 99213 OFFICE O/P EST LOW 20 MIN: CPT | Performed by: INTERNAL MEDICINE

## 2023-11-29 PROCEDURE — G8427 DOCREV CUR MEDS BY ELIG CLIN: HCPCS | Performed by: INTERNAL MEDICINE

## 2023-11-29 PROCEDURE — G8484 FLU IMMUNIZE NO ADMIN: HCPCS | Performed by: INTERNAL MEDICINE

## 2023-11-29 PROCEDURE — 3078F DIAST BP <80 MM HG: CPT | Performed by: INTERNAL MEDICINE

## 2023-11-29 NOTE — PROGRESS NOTES
[facility]          Outpatient Follow-Up note         11/29/2023 9:42 AM    Patient Name:   Shant Leroy  YOB: 1945  Primary Care Physician:  Antonia Marte     Chief Complaint / Reason for follow-up : Follow Up of chronic kidney disease and hypertension      Interval History :  Patient seen and examined by me. Feels well. Denies any complaints. No chest pain and no shortness of breath. No hospitalizations no medication changes     Past History :  Past Medical History:   Diagnosis Date    Acute on chronic systolic CHF (congestive heart failure), NYHA class 3 ([facility]) EF 25-30% 11/7/2018    CAD (coronary artery disease)     CKD (chronic kidney disease) stage 3, GFR 30-59 ml/min ([address]) 9/23/2015    Diabetes mellitus ([facility])     HTN (hypertension), benign     Hyperlipidemia     Hypothyroidism     Mitral and aortic incompetence     Neuropathy     Positive blood culture     Being treated with rocephhin as outpt.     S/P CABG x 2 11/15/2018     Past Surgical History:   Procedure Laterality Date    CARDIAC SURGERY      heart cath    CATARACT REMOVAL WITH IMPLANT Bilateral     COLONOSCOPY      CORONARY ARTERY BYPASS GRAFT  11/14/2018    DIAGNOSTIC CARDIAC CATH LAB PROCEDURE      EYE SURGERY      [place] CORONARY ARTERY BYPASS 3 CORONARY VENOUS GRAFTS N/A 11/14/2018    CABG CORONARY ARTERY BYPASS,  AORTIC AND MITRAL VALVE REPAIR WITH MICHELE, BALLOON PUMP INSERTION performed by Verenice Robledo MD at [address] [place] [address] ECHO TRANSESOPHAG R-T 2D IMG ACQUISJ I&R ONLY Left 10/25/2018    TRANSESOPHAGEAL ECHOCARDIOGRAM performed by Ariel Jerome MD at [facility]        Medications :     Outpatient Medications Marked as Taking for the 11/29/23 encounter (Office Visit) with Glenn Echevarria MD   Medication Sig Dispense Refill    furosemide (LASIX) 20 MG tablet Take 1 tablet by mouth three times a week And as directed by CHF clinic 45 tablet 3    ferrous sulfate (IRON 325) 325 (65 Fe) MG

## 2023-12-11 ENCOUNTER — OFFICE VISIT (OUTPATIENT)
Dept: CARDIOLOGY CLINIC | Age: 78
End: 2023-12-11
Payer: MEDICARE

## 2023-12-11 VITALS
OXYGEN SATURATION: 93 % | DIASTOLIC BLOOD PRESSURE: 80 MMHG | WEIGHT: 222.4 LBS | HEART RATE: 53 BPM | SYSTOLIC BLOOD PRESSURE: 116 MMHG | BODY MASS INDEX: 30.12 KG/M2 | HEIGHT: 72 IN

## 2023-12-11 DIAGNOSIS — I50.22 CHF (CONGESTIVE HEART FAILURE), NYHA CLASS II, CHRONIC, SYSTOLIC (HCC): Primary | ICD-10-CM

## 2023-12-11 DIAGNOSIS — I10 ESSENTIAL HYPERTENSION: ICD-10-CM

## 2023-12-11 DIAGNOSIS — Z98.890 S/P MVR (MITRAL VALVE REPAIR): ICD-10-CM

## 2023-12-11 DIAGNOSIS — Z95.2 S/P AVR: ICD-10-CM

## 2023-12-11 DIAGNOSIS — Z95.1 S/P CABG X 2: ICD-10-CM

## 2023-12-11 DIAGNOSIS — I25.10 CORONARY ARTERY DISEASE INVOLVING NATIVE CORONARY ARTERY OF NATIVE HEART WITHOUT ANGINA PECTORIS: ICD-10-CM

## 2023-12-11 PROCEDURE — 3079F DIAST BP 80-89 MM HG: CPT | Performed by: NURSE PRACTITIONER

## 2023-12-11 PROCEDURE — 1036F TOBACCO NON-USER: CPT | Performed by: NURSE PRACTITIONER

## 2023-12-11 PROCEDURE — 99214 OFFICE O/P EST MOD 30 MIN: CPT | Performed by: NURSE PRACTITIONER

## 2023-12-11 PROCEDURE — G8417 CALC BMI ABV UP PARAM F/U: HCPCS | Performed by: NURSE PRACTITIONER

## 2023-12-11 PROCEDURE — G8427 DOCREV CUR MEDS BY ELIG CLIN: HCPCS | Performed by: NURSE PRACTITIONER

## 2023-12-11 PROCEDURE — G8484 FLU IMMUNIZE NO ADMIN: HCPCS | Performed by: NURSE PRACTITIONER

## 2023-12-11 PROCEDURE — 3074F SYST BP LT 130 MM HG: CPT | Performed by: NURSE PRACTITIONER

## 2023-12-11 PROCEDURE — 1124F ACP DISCUSS-NO DSCNMKR DOCD: CPT | Performed by: NURSE PRACTITIONER

## 2023-12-11 ASSESSMENT — ENCOUNTER SYMPTOMS
COUGH: 0
ABDOMINAL DISTENTION: 0
SHORTNESS OF BREATH: 0

## 2023-12-11 NOTE — PATIENT INSTRUCTIONS
You may receive a survey regarding the care you received during your visit. Your input is valuable to us. We encourage you to complete and return your survey. We hope you will choose us in the future for your healthcare needs. Your nurses today were Michelle and [employer].   Office hours:   Mon-Thurs 8-4:30  Friday 8-12  Phone: [phone]    Continue:  Continue current medications  Daily weights and record  Fluid restriction of 2 Liters per day  Limit sodium in diet to around 5207-4879 mg/day  Monitor BP  Activity as tolerated     Call the [address] for any of the following symptoms:   Weight gain of 2-3 pounds in 1 day or 5 pounds in 1 week  Increased shortness of breath  Shortness of breath while laying down  Cough  Chest pain  Swelling in feet, ankles or legs  Bloating in abdomen  Fatigue

## 2023-12-11 NOTE — PROGRESS NOTES
The patient's condition/symptoms are Stable: No clinical evidence of fluid overload today. Continue current medical regimen without changes at present time. Diagnosis Orders   1. CHF (congestive heart failure), NYHA class II, chronic, systolic ([address])        2. Essential hypertension        3. Coronary artery disease involving native coronary artery of native heart without angina pectoris        4. S/P CABG x 2        5. S/P MVR (mitral valve repair)        6. S/P AVR          Continue:  GDMT:              ACE/ARB/ARNI - Entresto 97/103 BID              BB -Toprol 50/day              Diuretic - Lasix 20 2x/week  AA - no - K+ runs high  SGLT2 -  No - declined previously  Vasodilator - no    HFimprovedEF - 20-25%, up to 40-45% (5/2022)   On fair GDMT - pt not interested in more meds. Plan get routine ECHO next year   ICM  CAD s/p CABG AVR, MV repair (2018)  HTN  CKD - follows w/ [facility] - repeat labs in June    Stable, appears Euvolemic - no fluid on exam.   Lab reviewed - stable  BP/HR stable   No med changes today   Continue diet/fluid adherence  Continue daily wts. F/U w/ Cardiology  F/U in clinic in 1 year    Tolerating above noted HF meds, no ill side effects noted. Will continue to monitor kidney function and electrolytes. Will optimize as tolerated. Pt is compliant w/ medications. Total visit time of 30 minutes has been spent with patient on education of symptoms, management, medication, and plan of care; as well as review of chart: labs, ECHO, radiology reports, etc.   I personally spent more then 50% of the appt time face to face with the patient. Daily weights  Fluid restriction of 2 Liters per day  Limit sodium in diet to around 8006-1090 mg/day  Monitor BP  Activity as tolerated     Patient was instructed to call the [address] for any changes in symptoms as noted in AVS.      Return in about 1 year (around 12/11/2024).  or sooner if needed     Patient given educational materials

## 2024-05-30 LAB
ALBUMIN: 4 G/DL (ref 3.5–5.2)
ANION GAP SERPL CALCULATED.3IONS-SCNC: 14 MEQ/L (ref 7–16)
APPEARANCE: ABNORMAL
BACTERIA: ABNORMAL PER HPF
BILIRUB SERPL-MCNC: NEGATIVE MG/DL
BUN BLDV-MCNC: 25 MG/DL (ref 8–23)
CALCIUM SERPL-MCNC: 9.4 MG/DL (ref 8.5–10.5)
CHLORIDE BLD-SCNC: 104 MEQ/L (ref 95–107)
CO2: 21 MEQ/L (ref 19–31)
COLOR: ABNORMAL
CREAT SERPL-MCNC: 1.64 MG/DL (ref 0.8–1.4)
CREATINE, URINE: 252.4 MG/DL
CREATINE, URINE: 252.4 MG/DL
EGFR IF NONAFRICAN AMERICAN: 43 ML/MIN/1.73
EPITHELIAL CELLS: ABNORMAL PER HPF (ref 0–5)
GLUCOSE BLD-MCNC: NEGATIVE MG/DL
GLUCOSE: 128 MG/DL (ref 70–99)
HYALINE CASTS: ABNORMAL
KETONES, URINE: NEGATIVE
LEUKOCYTE ESTERASE, URINE: ABNORMAL
MICROALBUMIN/CREAT 24H UR: 2.2 MG/DL
MICROALBUMIN/CREAT UR-RTO: 9 MG/G
NITRITE, URINE: NEGATIVE
OCCULT BLOOD,URINE: NEGATIVE
PH: 5.5 (ref 5–9)
PHOSPHORUS: 3.7 MG/DL (ref 2.5–4.5)
POTASSIUM SERPL-SCNC: 5.1 MEQ/L (ref 3.5–5.4)
PROTEIN, URINE: 22 MG/DL
PROTEIN, URINE: ABNORMAL
PROTEIN/CREAT RATIO: 87 MG/G
RBC # BLD: ABNORMAL PER HPF (ref 0–5)
SODIUM BLD-SCNC: 139 MEQ/L (ref 133–146)
SP GRAVITY MISCELLANEOUS: 1.02 (ref 1–1.03)
UROBILINOGEN, URINE: NORMAL
WBC # BLD: >50 PER HPF (ref 0–5)

## 2024-06-05 ENCOUNTER — OFFICE VISIT (OUTPATIENT)
Dept: NEPHROLOGY | Age: 79
End: 2024-06-05
Payer: MEDICARE

## 2024-06-05 VITALS
SYSTOLIC BLOOD PRESSURE: 110 MMHG | WEIGHT: 215 LBS | OXYGEN SATURATION: 98 % | DIASTOLIC BLOOD PRESSURE: 58 MMHG | BODY MASS INDEX: 29.16 KG/M2 | HEART RATE: 49 BPM

## 2024-06-05 DIAGNOSIS — E87.5 HYPERKALEMIA: Primary | ICD-10-CM
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
## 2024-06-05 DIAGNOSIS — N18.31 STAGE 3A CHRONIC KIDNEY DISEASE (HCC): ICD-10-CM

## 2024-06-05 DIAGNOSIS — N28.1 RENAL CYST: ICD-10-CM

## 2024-06-05 PROCEDURE — 1124F ACP DISCUSS-NO DSCNMKR DOCD: CPT | Performed by: INTERNAL MEDICINE

## 2024-06-05 PROCEDURE — 3078F DIAST BP <80 MM HG: CPT | Performed by: INTERNAL MEDICINE

## 2024-06-05 PROCEDURE — 1036F TOBACCO NON-USER: CPT | Performed by: INTERNAL MEDICINE

## 2024-06-05 PROCEDURE — 3074F SYST BP LT 130 MM HG: CPT | Performed by: INTERNAL MEDICINE

## 2024-06-05 PROCEDURE — 99213 OFFICE O/P EST LOW 20 MIN: CPT | Performed by: INTERNAL MEDICINE

## 2024-06-05 PROCEDURE — G8427 DOCREV CUR MEDS BY ELIG CLIN: HCPCS | Performed by: INTERNAL MEDICINE

## 2024-06-05 PROCEDURE — G8417 CALC BMI ABV UP PARAM F/U: HCPCS | Performed by: INTERNAL MEDICINE

## 2024-06-05 NOTE — PROGRESS NOTES
[facility]          Outpatient Follow-Up note         6/5/2024 10:00 AM    Patient Name:   Myles Santa  YOB: 1945  Primary Care Physician:  Ted Flynn MD     Chief Complaint / Reason for follow-up : Follow Up of chronic kidney disease and hypertension      Interval History :  Patient seen and examined by me.   Feels well.  Denies any complaints.    No chest pain and no shortness of breath.   No hospitalizations , amaryl stopped .      Past History :  Past Medical History:   Diagnosis Date    Acute on chronic systolic CHF (congestive heart failure), NYHA class 3 ([facility]) EF 25-30% 11/7/2018    CAD (coronary artery disease)     CKD (chronic kidney disease) stage 3, GFR 30-59 ml/min ([facility]) 9/23/2015    Diabetes mellitus ([facility])     HTN (hypertension), benign     Hyperlipidemia     Hypothyroidism     Mitral and aortic incompetence     Neuropathy     Positive blood culture     Being treated with rocephhin as outpt.    S/P CABG x 2 11/15/2018     Past Surgical History:   Procedure Laterality Date    CARDIAC SURGERY      heart cath    CATARACT REMOVAL WITH IMPLANT Bilateral     COLONOSCOPY      CORONARY ARTERY BYPASS GRAFT  11/14/2018    DIAGNOSTIC CARDIAC CATH LAB PROCEDURE      EYE SURGERY      [place] CORONARY ARTERY BYPASS 3 CORONARY VENOUS GRAFTS N/A 11/14/2018    CABG CORONARY ARTERY BYPASS,  AORTIC AND MITRAL VALVE REPAIR WITH MICHELE, BALLOON PUMP INSERTION performed by Alan Landis MD at [facility] OR    ME ECHO TRANSESOPHAG R-T 2D IMG ACQUISJ I&R ONLY Left 10/25/2018    TRANSESOPHAGEAL ECHOCARDIOGRAM performed by Bry Proctor MD at [facility]        Medications :     Outpatient Medications Marked as Taking for the 6/5/24 encounter (Office Visit) with Jamison Scott MD   Medication Sig Dispense Refill    furosemide (LASIX) 20 MG tablet Take 1 tablet by mouth three times a week And as directed by CHF clinic (Patient taking differently: Take 1 tablet by mouth three times

## 2024-06-07 ENCOUNTER — APPOINTMENT (OUTPATIENT)
Dept: GENERAL RADIOLOGY | Age: 79
DRG: 682 | End: 2024-06-07
Payer: MEDICARE

## 2024-06-07 ENCOUNTER — HOSPITAL ENCOUNTER (INPATIENT)
Age: 79
LOS: 4 days | Discharge: HOME | DRG: 682 | End: 2024-06-11
Attending: INTERNAL MEDICINE | Admitting: INTERNAL MEDICINE
Payer: MEDICARE

## 2024-06-07 DIAGNOSIS — R06.02 SOB (SHORTNESS OF BREATH): ICD-10-CM

## 2024-06-07 DIAGNOSIS — J81.1 CHRONIC PULMONARY EDEMA: ICD-10-CM

## 2024-06-07 DIAGNOSIS — N17.9 ACUTE RENAL FAILURE, UNSPECIFIED ACUTE RENAL FAILURE TYPE (HCC): Primary | ICD-10-CM

## 2024-06-07 DIAGNOSIS — I50.20 HFREF (HEART FAILURE WITH REDUCED EJECTION FRACTION) (HCC): ICD-10-CM

## 2024-06-07 LAB
ANION GAP SERPL CALC-SCNC: 18 MEQ/L (ref 8–16)
BACTERIA: ABNORMAL
BASOPHILS ABSOLUTE: 0.1 THOU/MM3 (ref 0–0.1)
BASOPHILS NFR BLD AUTO: 0.9 %
BILIRUB UR QL STRIP: NEGATIVE
BUN SERPL-MCNC: 93 MG/DL (ref 7–22)
CALCIUM SERPL-MCNC: 8.5 MG/DL (ref 8.5–10.5)
CASTS #/AREA URNS LPF: ABNORMAL /LPF
CASTS #/AREA URNS LPF: ABNORMAL /LPF
CHARACTER UR: CLEAR
CHARCOAL URNS QL MICRO: ABNORMAL
CHLORIDE SERPL-SCNC: 105 MEQ/L (ref 98–111)
CO2 SERPL-SCNC: 18 MEQ/L (ref 23–33)
COLOR UR: YELLOW
CREAT SERPL-MCNC: 6.2 MG/DL (ref 0.4–1.2)
CRYSTALS URNS QL MICRO: ABNORMAL
DEPRECATED RDW RBC AUTO: 63.8 FL (ref 35–45)
EOSINOPHIL NFR BLD AUTO: 3 %
EOSINOPHILS ABSOLUTE: 0.2 THOU/MM3 (ref 0–0.4)
EPITHELIAL CELLS, UA: ABNORMAL /HPF
ERYTHROCYTE [DISTWIDTH] IN BLOOD BY AUTOMATED COUNT: 16.1 % (ref 11.5–14.5)
FLUAV RNA RESP QL NAA+PROBE: NOT DETECTED
FLUBV RNA RESP QL NAA+PROBE: NOT DETECTED
GFR SERPL CREATININE-BSD FRML MDRD: 9 ML/MIN/1.73M2
GLUCOSE SERPL-MCNC: 123 MG/DL (ref 70–108)
GLUCOSE UR QL STRIP.AUTO: NEGATIVE MG/DL
HCT VFR BLD AUTO: 37.1 % (ref 42–52)
HGB BLD-MCNC: 11.7 GM/DL (ref 14–18)
HGB UR QL STRIP.AUTO: NEGATIVE
IMM GRANULOCYTES # BLD AUTO: 0.03 THOU/MM3 (ref 0–0.07)
IMM GRANULOCYTES NFR BLD AUTO: 0.5 %
KETONES UR QL STRIP.AUTO: ABNORMAL
LACTATE SERPL-SCNC: 1.4 MMOL/L (ref 0.5–2)
LEUKOCYTE ESTERASE UR QL STRIP.AUTO: ABNORMAL
LYMPHOCYTES ABSOLUTE: 0.9 THOU/MM3 (ref 1–4.8)
LYMPHOCYTES NFR BLD AUTO: 13.8 %
MCH RBC QN AUTO: 33.6 PG (ref 26–33)
MCHC RBC AUTO-ENTMCNC: 31.5 GM/DL (ref 32.2–35.5)
MCV RBC AUTO: 106.6 FL (ref 80–94)
MONOCYTES ABSOLUTE: 0.8 THOU/MM3 (ref 0.4–1.3)
MONOCYTES NFR BLD AUTO: 12.3 %
NEUTROPHILS ABSOLUTE: 4.6 THOU/MM3 (ref 1.8–7.7)
NEUTROPHILS NFR BLD AUTO: 69.5 %
NITRITE UR QL STRIP.AUTO: NEGATIVE
NRBC BLD AUTO-RTO: 0 /100 WBC
NT-PROBNP SERPL IA-MCNC: 5940 PG/ML (ref 0–449)
OSMOLALITY SERPL CALC.SUM OF ELEC: 311.3 MOSMOL/KG (ref 275–300)
PH UR STRIP.AUTO: 5 [PH] (ref 5–9)
PLATELET # BLD AUTO: 167 THOU/MM3 (ref 130–400)
PMV BLD AUTO: 10 FL (ref 9.4–12.4)
POTASSIUM SERPL-SCNC: 4.4 MEQ/L (ref 3.5–5.2)
PROCALCITONIN SERPL IA-MCNC: 0.21 NG/ML (ref 0.01–0.09)
PROT UR STRIP.AUTO-MCNC: 30 MG/DL
RBC # BLD AUTO: 3.48 MILL/MM3 (ref 4.7–6.1)
RBC #/AREA URNS HPF: ABNORMAL /HPF
REASON FOR REJECTION: NORMAL
REJECTED TEST: NORMAL
RENAL EPI CELLS #/AREA URNS HPF: ABNORMAL /[HPF]
SARS-COV-2 RNA RESP QL NAA+PROBE: NOT DETECTED
SODIUM SERPL-SCNC: 141 MEQ/L (ref 135–145)
SPECIFIC GRAVITY UA: 1.02 (ref 1–1.03)
TROPONIN, HIGH SENSITIVITY: 40 NG/L (ref 0–12)
TROPONIN, HIGH SENSITIVITY: 63 NG/L (ref 0–12)
UROBILINOGEN, URINE: 1 EU/DL (ref 0–1)
WBC # BLD AUTO: 6.6 THOU/MM3 (ref 4.8–10.8)
WBC #/AREA URNS HPF: ABNORMAL /HPF
YEAST LIKE FUNGI URNS QL MICRO: ABNORMAL

## 2024-06-07 PROCEDURE — 80048 BASIC METABOLIC PNL TOTAL CA: CPT

## 2024-06-07 PROCEDURE — 36415 COLL VENOUS BLD VENIPUNCTURE: CPT

## 2024-06-07 PROCEDURE — 71046 X-RAY EXAM CHEST 2 VIEWS: CPT

## 2024-06-07 PROCEDURE — 84145 PROCALCITONIN (PCT): CPT

## 2024-06-07 PROCEDURE — 85025 COMPLETE CBC W/AUTO DIFF WBC: CPT

## 2024-06-07 PROCEDURE — 93005 ELECTROCARDIOGRAM TRACING: CPT | Performed by: INTERNAL MEDICINE

## 2024-06-07 PROCEDURE — 6370000000 HC RX 637 (ALT 250 FOR IP): Performed by: INTERNAL MEDICINE

## 2024-06-07 PROCEDURE — 99285 EMERGENCY DEPT VISIT HI MDM: CPT

## 2024-06-07 PROCEDURE — 2580000003 HC RX 258: Performed by: INTERNAL MEDICINE

## 2024-06-07 PROCEDURE — 2580000003 HC RX 258: Performed by: NURSE PRACTITIONER

## 2024-06-07 PROCEDURE — 83880 ASSAY OF NATRIURETIC PEPTIDE: CPT

## 2024-06-07 PROCEDURE — 84484 ASSAY OF TROPONIN QUANT: CPT

## 2024-06-07 PROCEDURE — 83605 ASSAY OF LACTIC ACID: CPT

## 2024-06-07 PROCEDURE — 81001 URINALYSIS AUTO W/SCOPE: CPT

## 2024-06-07 PROCEDURE — 1200000003 HC TELEMETRY R&B

## 2024-06-07 PROCEDURE — 99223 1ST HOSP IP/OBS HIGH 75: CPT | Performed by: INTERNAL MEDICINE

## 2024-06-07 PROCEDURE — 87636 SARSCOV2 & INF A&B AMP PRB: CPT

## 2024-06-07 RX ORDER — ASPIRIN 81 MG/1
81 TABLET, CHEWABLE ORAL DAILY
Status: DISCONTINUED | OUTPATIENT
Start: 2024-06-08 | End: 2024-06-11 | Stop reason: HOSPADM

## 2024-06-07 RX ORDER — FERROUS SULFATE 325(65) MG
650 TABLET ORAL
Status: DISCONTINUED | OUTPATIENT
Start: 2024-06-08 | End: 2024-06-11 | Stop reason: HOSPADM

## 2024-06-07 RX ORDER — ONDANSETRON 2 MG/ML
4 INJECTION INTRAMUSCULAR; INTRAVENOUS EVERY 6 HOURS PRN
Status: DISCONTINUED | OUTPATIENT
Start: 2024-06-07 | End: 2024-06-11 | Stop reason: HOSPADM

## 2024-06-07 RX ORDER — ATORVASTATIN CALCIUM 40 MG/1
40 TABLET, FILM COATED ORAL EVERY EVENING
Status: DISCONTINUED | OUTPATIENT
Start: 2024-06-07 | End: 2024-06-11 | Stop reason: HOSPADM

## 2024-06-07 RX ORDER — GABAPENTIN 100 MG/1
200 CAPSULE ORAL 2 TIMES DAILY
Status: DISCONTINUED | OUTPATIENT
Start: 2024-06-07 | End: 2024-06-11 | Stop reason: HOSPADM

## 2024-06-07 RX ORDER — SODIUM CHLORIDE 0.9 % (FLUSH) 0.9 %
5-40 SYRINGE (ML) INJECTION PRN
Status: DISCONTINUED | OUTPATIENT
Start: 2024-06-07 | End: 2024-06-11 | Stop reason: HOSPADM

## 2024-06-07 RX ORDER — ACETAMINOPHEN 325 MG/1
650 TABLET ORAL EVERY 6 HOURS PRN
Status: DISCONTINUED | OUTPATIENT
Start: 2024-06-07 | End: 2024-06-11 | Stop reason: HOSPADM

## 2024-06-07 RX ORDER — DOCUSATE SODIUM 100 MG/1
100 CAPSULE, LIQUID FILLED ORAL 2 TIMES DAILY PRN
Status: DISCONTINUED | OUTPATIENT
Start: 2024-06-07 | End: 2024-06-07

## 2024-06-07 RX ORDER — 0.9 % SODIUM CHLORIDE 0.9 %
500 INTRAVENOUS SOLUTION INTRAVENOUS ONCE
Status: COMPLETED | OUTPATIENT
Start: 2024-06-07 | End: 2024-06-07

## 2024-06-07 RX ORDER — SODIUM CHLORIDE 9 MG/ML
INJECTION, SOLUTION INTRAVENOUS PRN
Status: DISCONTINUED | OUTPATIENT
Start: 2024-06-07 | End: 2024-06-11 | Stop reason: HOSPADM

## 2024-06-07 RX ORDER — 0.9 % SODIUM CHLORIDE 0.9 %
1000 INTRAVENOUS SOLUTION INTRAVENOUS ONCE
Status: COMPLETED | OUTPATIENT
Start: 2024-06-07 | End: 2024-06-07

## 2024-06-07 RX ORDER — SODIUM CHLORIDE 0.9 % (FLUSH) 0.9 %
5-40 SYRINGE (ML) INJECTION EVERY 12 HOURS SCHEDULED
Status: DISCONTINUED | OUTPATIENT
Start: 2024-06-07 | End: 2024-06-11 | Stop reason: HOSPADM

## 2024-06-07 RX ORDER — LEVOTHYROXINE SODIUM 0.07 MG/1
75 TABLET ORAL DAILY
Status: DISCONTINUED | OUTPATIENT
Start: 2024-06-08 | End: 2024-06-11 | Stop reason: HOSPADM

## 2024-06-07 RX ORDER — SODIUM CHLORIDE, SODIUM LACTATE, POTASSIUM CHLORIDE, CALCIUM CHLORIDE 600; 310; 30; 20 MG/100ML; MG/100ML; MG/100ML; MG/100ML
INJECTION, SOLUTION INTRAVENOUS CONTINUOUS
Status: DISCONTINUED | OUTPATIENT
Start: 2024-06-07 | End: 2024-06-08

## 2024-06-07 RX ORDER — ONDANSETRON 4 MG/1
4 TABLET, ORALLY DISINTEGRATING ORAL EVERY 8 HOURS PRN
Status: DISCONTINUED | OUTPATIENT
Start: 2024-06-07 | End: 2024-06-11 | Stop reason: HOSPADM

## 2024-06-07 RX ORDER — ACETAMINOPHEN 650 MG/1
650 SUPPOSITORY RECTAL EVERY 6 HOURS PRN
Status: DISCONTINUED | OUTPATIENT
Start: 2024-06-07 | End: 2024-06-11 | Stop reason: HOSPADM

## 2024-06-07 RX ORDER — POLYETHYLENE GLYCOL 3350 17 G/17G
17 POWDER, FOR SOLUTION ORAL DAILY PRN
Status: DISCONTINUED | OUTPATIENT
Start: 2024-06-07 | End: 2024-06-11 | Stop reason: HOSPADM

## 2024-06-07 RX ADMIN — SODIUM CHLORIDE, PRESERVATIVE FREE 10 ML: 5 INJECTION INTRAVENOUS at 23:32

## 2024-06-07 RX ADMIN — SODIUM CHLORIDE 1000 ML: 9 INJECTION, SOLUTION INTRAVENOUS at 19:26

## 2024-06-07 RX ADMIN — ATORVASTATIN CALCIUM 40 MG: 40 TABLET, FILM COATED ORAL at 23:32

## 2024-06-07 RX ADMIN — SODIUM CHLORIDE, POTASSIUM CHLORIDE, SODIUM LACTATE AND CALCIUM CHLORIDE: 600; 310; 30; 20 INJECTION, SOLUTION INTRAVENOUS at 23:28

## 2024-06-07 RX ADMIN — SODIUM CHLORIDE 500 ML: 9 INJECTION, SOLUTION INTRAVENOUS at 20:40

## 2024-06-07 RX ADMIN — GABAPENTIN 200 MG: 100 CAPSULE ORAL at 23:32

## 2024-06-07 ASSESSMENT — PAIN - FUNCTIONAL ASSESSMENT
PAIN_FUNCTIONAL_ASSESSMENT: NONE - DENIES PAIN
PAIN_FUNCTIONAL_ASSESSMENT: NONE - DENIES PAIN

## 2024-06-07 NOTE — ED PROVIDER NOTES
[facility] Emergency Department    CHIEF COMPLAINT       Chief Complaint   Patient presents with    Fatigue    Cough       Nurses Notes reviewed and I agree except as noted in the HPI.    HISTORY OF PRESENT ILLNESS   Myles Santa is a 79 y.o. male who presents to the ED for evaluation of weakness and fatigue that has been ongoing but has gotten worse over the past 2 days. His wife, and daughter state that he has had a cough that has been ongoing for 6 weeks, they also states he has been confused at night and that he has been using the furniture to keep his balance. He states that he feels weak and he has a productive cough that is productive in nature. He denies any LOC, headaches, ringing in the ears, CP, SOB, rectal bleeding, or blood in his stool.  They do report diarrhea.  His family states that he has a history of CHF, and CKD. They also state that he saw his PCP within the last couple weeks and was told he was anemic.           PAST MEDICAL HISTORY     Past Medical History:   Diagnosis Date    Acute on chronic systolic CHF (congestive heart failure), NYHA class 3 ([facility]) EF 25-30% 11/7/2018    CAD (coronary artery disease)     CKD (chronic kidney disease) stage 3, GFR 30-59 ml/min ([facility]) 9/23/2015    Diabetes mellitus ([facility])     HTN (hypertension), benign     Hyperlipidemia     Hypothyroidism     Mitral and aortic incompetence     Neuropathy     Positive blood culture     Being treated with rocephhin as outpt.    S/P CABG x 2 11/15/2018       SURGICALHISTORY      has a past surgical history that includes Cataract removal with implant (Bilateral); pr echo transesophag r-t 2d img acquisj i&r only (Left, 10/25/2018); Colonoscopy; eye surgery; Cardiac surgery; pr coronary artery bypass 3 coronary venous grafts (N/A, 11/14/2018); Coronary artery bypass graft (11/14/2018); and Diagnostic Cardiac Cath Lab Procedure.    CURRENT MEDICATIONS       Current Discharge Medication List        CONTINUE these medications which

## 2024-06-07 NOTE — ED NOTES
Patient resting in bed. Respirations easy and unlabored. No distress noted. Call light within reach. Family at bedside, updated on plan of care. Pt states he is unable to void at this time.

## 2024-06-07 NOTE — ED NOTES
ED nurse-to-nurse bedside report    Chief Complaint   Patient presents with    Fatigue    Cough      LOC: alert and orientated to name, place, date  Vital signs   Vitals:    06/07/24 1814   BP: (!) 105/43   Pulse: 59   Resp: 22   SpO2: 96%      Pain:    Pain Interventions: NA  Pain Goal: 0  Oxygen: No    Current needs required RA   Telemetry: Yes  LDAs:   Peripheral IV 06/07/24 Distal;Posterior;Right Forearm (Active)   Site Assessment Clean, dry & intact 06/07/24 1822   Line Status Blood return noted;Flushed;Specimen collected 06/07/24 1822   Phlebitis Assessment No symptoms 06/07/24 1822   Infiltration Assessment 0 06/07/24 1822   Dressing Status Clean, dry & intact 06/07/24 1822     Continuous Infusions:   Mobility: Requires assistance * 1  [place] Fall Risk Score:        No data to display              Fall Interventions: call light in reach, bed in low position with wheels locked, side rails up x2  Report given to: JOAQUÍN Reeves

## 2024-06-08 ENCOUNTER — APPOINTMENT (OUTPATIENT)
Dept: ULTRASOUND IMAGING | Age: 79
DRG: 682 | End: 2024-06-08
Payer: MEDICARE

## 2024-06-08 ENCOUNTER — APPOINTMENT (OUTPATIENT)
Dept: GENERAL RADIOLOGY | Age: 79
DRG: 682 | End: 2024-06-08
Payer: MEDICARE

## 2024-06-08 PROBLEM — E87.29 HIGH ANION GAP METABOLIC ACIDOSIS: Status: ACTIVE | Noted: 2022-10-24

## 2024-06-08 PROBLEM — I50.20 HFREF (HEART FAILURE WITH REDUCED EJECTION FRACTION) (HCC): Status: ACTIVE | Noted: 2024-06-08

## 2024-06-08 LAB
ANION GAP SERPL CALC-SCNC: 16 MEQ/L (ref 8–16)
BUN SERPL-MCNC: 83 MG/DL (ref 7–22)
CALCIUM SERPL-MCNC: 8.3 MG/DL (ref 8.5–10.5)
CHLORIDE SERPL-SCNC: 107 MEQ/L (ref 98–111)
CO2 SERPL-SCNC: 16 MEQ/L (ref 23–33)
CREAT SERPL-MCNC: 4.3 MG/DL (ref 0.4–1.2)
DEPRECATED MEAN GLUCOSE BLD GHB EST-ACNC: 117 MG/DL (ref 70–126)
DEPRECATED RDW RBC AUTO: 61.3 FL (ref 35–45)
EKG ATRIAL RATE: 58 BPM
EKG P-R INTERVAL: 116 MS
EKG Q-T INTERVAL: 470 MS
EKG QRS DURATION: 166 MS
EKG QTC CALCULATION (BAZETT): 461 MS
EKG R AXIS: 9 DEGREES
EKG T AXIS: -148 DEGREES
EKG VENTRICULAR RATE: 58 BPM
ERYTHROCYTE [DISTWIDTH] IN BLOOD BY AUTOMATED COUNT: 15.9 % (ref 11.5–14.5)
GFR SERPL CREATININE-BSD FRML MDRD: 13 ML/MIN/1.73M2
GLUCOSE BLD STRIP.AUTO-MCNC: 118 MG/DL (ref 70–108)
GLUCOSE SERPL-MCNC: 119 MG/DL (ref 70–108)
HBA1C MFR BLD HPLC: 5.9 % (ref 4.4–6.4)
HCT VFR BLD AUTO: 35.1 % (ref 42–52)
HGB BLD-MCNC: 10.8 GM/DL (ref 14–18)
MAGNESIUM SERPL-MCNC: 1.9 MG/DL (ref 1.6–2.4)
MCH RBC QN AUTO: 32.7 PG (ref 26–33)
MCHC RBC AUTO-ENTMCNC: 30.8 GM/DL (ref 32.2–35.5)
MCV RBC AUTO: 106.4 FL (ref 80–94)
PLATELET # BLD AUTO: 151 THOU/MM3 (ref 130–400)
POTASSIUM SERPL-SCNC: 4.2 MEQ/L (ref 3.5–5.2)
RBC # BLD AUTO: 3.3 MILL/MM3 (ref 4.7–6.1)
SODIUM SERPL-SCNC: 139 MEQ/L (ref 135–145)
SODIUM UR-SCNC: 33 MEQ/L
TROPONIN, HIGH SENSITIVITY: 57 NG/L (ref 0–12)

## 2024-06-08 PROCEDURE — 6370000000 HC RX 637 (ALT 250 FOR IP): Performed by: INTERNAL MEDICINE

## 2024-06-08 PROCEDURE — 84300 ASSAY OF URINE SODIUM: CPT

## 2024-06-08 PROCEDURE — 2580000003 HC RX 258: Performed by: INTERNAL MEDICINE

## 2024-06-08 PROCEDURE — 84484 ASSAY OF TROPONIN QUANT: CPT

## 2024-06-08 PROCEDURE — 6360000002 HC RX W HCPCS

## 2024-06-08 PROCEDURE — 93010 ELECTROCARDIOGRAM REPORT: CPT | Performed by: NUCLEAR MEDICINE

## 2024-06-08 PROCEDURE — 85027 COMPLETE CBC AUTOMATED: CPT

## 2024-06-08 PROCEDURE — 1200000003 HC TELEMETRY R&B

## 2024-06-08 PROCEDURE — 83036 HEMOGLOBIN GLYCOSYLATED A1C: CPT

## 2024-06-08 PROCEDURE — 94640 AIRWAY INHALATION TREATMENT: CPT

## 2024-06-08 PROCEDURE — 80048 BASIC METABOLIC PNL TOTAL CA: CPT

## 2024-06-08 PROCEDURE — 82948 REAGENT STRIP/BLOOD GLUCOSE: CPT

## 2024-06-08 PROCEDURE — 36415 COLL VENOUS BLD VENIPUNCTURE: CPT

## 2024-06-08 PROCEDURE — 83735 ASSAY OF MAGNESIUM: CPT

## 2024-06-08 PROCEDURE — 71045 X-RAY EXAM CHEST 1 VIEW: CPT

## 2024-06-08 PROCEDURE — 99222 1ST HOSP IP/OBS MODERATE 55: CPT | Performed by: INTERNAL MEDICINE

## 2024-06-08 PROCEDURE — 51798 US URINE CAPACITY MEASURE: CPT

## 2024-06-08 PROCEDURE — 94761 N-INVAS EAR/PLS OXIMETRY MLT: CPT

## 2024-06-08 PROCEDURE — 76770 US EXAM ABDO BACK WALL COMP: CPT

## 2024-06-08 PROCEDURE — 99232 SBSQ HOSP IP/OBS MODERATE 35: CPT

## 2024-06-08 RX ORDER — ALBUTEROL SULFATE 2.5 MG/3ML
2.5 SOLUTION RESPIRATORY (INHALATION) 3 TIMES DAILY
Status: DISCONTINUED | OUTPATIENT
Start: 2024-06-08 | End: 2024-06-11

## 2024-06-08 RX ORDER — ALBUTEROL SULFATE 2.5 MG/3ML
2.5 SOLUTION RESPIRATORY (INHALATION) EVERY 6 HOURS PRN
Status: DISCONTINUED | OUTPATIENT
Start: 2024-06-08 | End: 2024-06-08

## 2024-06-08 RX ORDER — ALBUTEROL SULFATE 2.5 MG/3ML
2.5 SOLUTION RESPIRATORY (INHALATION) EVERY 4 HOURS PRN
Status: DISCONTINUED | OUTPATIENT
Start: 2024-06-08 | End: 2024-06-11 | Stop reason: HOSPADM

## 2024-06-08 RX ORDER — SODIUM BICARBONATE 650 MG/1
1300 TABLET ORAL 2 TIMES DAILY
Status: COMPLETED | OUTPATIENT
Start: 2024-06-08 | End: 2024-06-11

## 2024-06-08 RX ADMIN — ALBUTEROL SULFATE 2.5 MG: 2.5 SOLUTION RESPIRATORY (INHALATION) at 22:34

## 2024-06-08 RX ADMIN — SODIUM CHLORIDE, PRESERVATIVE FREE 10 ML: 5 INJECTION INTRAVENOUS at 20:24

## 2024-06-08 RX ADMIN — GABAPENTIN 200 MG: 100 CAPSULE ORAL at 20:24

## 2024-06-08 RX ADMIN — ASPIRIN 81 MG 81 MG: 81 TABLET ORAL at 08:41

## 2024-06-08 RX ADMIN — ALBUTEROL SULFATE 2.5 MG: 2.5 SOLUTION RESPIRATORY (INHALATION) at 13:51

## 2024-06-08 RX ADMIN — GABAPENTIN 200 MG: 100 CAPSULE ORAL at 08:41

## 2024-06-08 RX ADMIN — SODIUM BICARBONATE 1300 MG: 650 TABLET ORAL at 20:24

## 2024-06-08 RX ADMIN — LEVOTHYROXINE SODIUM 75 MCG: 0.07 TABLET ORAL at 05:00

## 2024-06-08 RX ADMIN — ATORVASTATIN CALCIUM 40 MG: 40 TABLET, FILM COATED ORAL at 17:45

## 2024-06-08 RX ADMIN — FERROUS SULFATE TAB 325 MG (65 MG ELEMENTAL FE) 650 MG: 325 (65 FE) TAB at 08:41

## 2024-06-08 NOTE — CARE COORDINATION
06/08/24 1102   Service Assessment   Patient Orientation Alert and Oriented   Cognition Alert   History Provided By Patient;Spouse   Primary Caregiver Self   Accompanied By/Relationship wife   Support Systems Spouse/Significant Other   Patient's Healthcare Decision Maker is: Legal Next of Kin   PCP Verified by CM Yes   Last Visit to PCP Within last 3 months   Prior Functional Level Independent in ADLs/IADLs   Current Functional Level Independent in ADLs/IADLs   Can patient return to prior living arrangement Yes   Ability to make needs known: Good   Family able to assist with home care needs: Yes   Would you like for me to discuss the discharge plan with any other family members/significant others, and if so, who? Yes   Financial Resources Medicare   Community Resources None   Discharge Planning   Type of Residence Trailer/Mobile Home   Living Arrangements Spouse/Significant Other   Current Services Prior To Admission Durable Medical Equipment;Other (Comment)  (OP therapy at [facility])   Current DME Prior to Arrival Walker;Cane   Potential Assistance Needed Outpatient PT/OT  (scheduled to start Monday)   DME Ordered? No   Potential Assistance Purchasing Medications No   Type of Home Care Services None   Patient expects to be discharged to: Trailer/mobile home   One/Two Story Residence One story  (5 steps to get in)   Services At/After Discharge   Transition of Care Consult (CM Consult) Discharge Planning   Services At/After Discharge Outpatient;PT;OT   Mode of Transport at Discharge Other (see comment)  (wife)   Confirm Follow Up Transport Family   Condition of Participation: Discharge Planning   The Plan for Transition of Care is related to the following treatment goals: \"try to get this all figured out and get stronger\"     Patient Goals/Plan/Treatment Preferences: Planning to return home with his wife to their mobile home. Home has 5 steps in. He has a walker and cane. They report he has been getting more

## 2024-06-08 NOTE — CONSULTS
[facility]    [address] [place], [place] [id number]  [phone]     Hospital Consult  6/8/2024 1:33 PM    Pt Name:    Myles Santa  MRN:     [id number]   [id number]  YOB: 1945  Admit Date:    6/7/2024  6:05 PM  Primary Care Physician:  Ted Flynn MD    CSN Number:   [id number]    Reason for Consult:  MISA  Requesting provider:  Hospitalist    History:   The patient is a 79 y.o. male with history of CKD 3, hypertension, diabetes, renal cyst.  Patient has baseline serum creatinine of around 1.5-1.7.  He was admitted yesterday with complaints of progressively worsening fatigue, generalized weakness, poor oral intake for last 3 to 4 days.  Symptoms associated with some diarrhea for about a week.  No alleviating or aggravating factors.  In the emergency room patient serum creatinine was over 6.  He was started on fluids.  Nephrology consulted this morning for further evaluation.  Currently patient is reporting to be feeling better.  Wife is in the room.  Patient is on room air.  He is awake and alert.  He reports some cough.  Watching TV.  No urinary complaints.    Past Medical History:  Past Medical History:   Diagnosis Date    Acute on chronic systolic CHF (congestive heart failure), NYHA class 3 ([facility]) EF 25-30% 11/7/2018    CAD (coronary artery disease)     CKD (chronic kidney disease) stage 3, GFR 30-59 ml/min ([facility]) 9/23/2015    Diabetes mellitus ([facility])     HTN (hypertension), benign     Hyperlipidemia     Hypothyroidism     Mitral and aortic incompetence     Neuropathy     Positive blood culture     Being treated with rocephhin as outpt.    S/P CABG x 2 11/15/2018       Past Surgical History:  Past Surgical History:   Procedure Laterality Date    CARDIAC SURGERY      heart cath    CATARACT REMOVAL WITH IMPLANT Bilateral     COLONOSCOPY      CORONARY ARTERY BYPASS GRAFT  11/14/2018    DIAGNOSTIC CARDIAC CATH LAB PROCEDURE      EYE SURGERY      IL CORONARY ARTERY BYPASS 3

## 2024-06-08 NOTE — ED NOTES
ED to inpatient nurses report      Chief Complaint:  Chief Complaint   Patient presents with    Fatigue    Cough     Present to ED from: home    MOA:     LOC: alert and orientated to name, place, date  Mobility: Requires assistance * 1  Oxygen Baseline: ra    Current needs required: ra     Code Status:   Prior    What abnormal results were found and what did you give/do to treat them? Acute renal failure  Any procedures or intervention occur? N/a    Mental Status:  Level of Consciousness: Alert (0)    Psych Assessment:        Vitals:  Patient Vitals for the past 24 hrs:   BP Pulse Resp SpO2   06/07/24 2040 (!) 137/45 59 20 95 %   06/07/24 1927 (!) 91/52 64 22 96 %   06/07/24 1814 (!) 105/43 59 22 96 %        LDAs:   Peripheral IV 06/07/24 Distal;Posterior;Right Forearm (Active)   Site Assessment Clean, dry & intact 06/07/24 1822   Line Status Blood return noted;Flushed;Specimen collected 06/07/24 1822   Phlebitis Assessment No symptoms 06/07/24 1822   Infiltration Assessment 0 06/07/24 1822   Dressing Status Clean, dry & intact 06/07/24 1822       Ambulatory Status:  Presents to emergency department  because of falls (Syncope, seizure, or loss of consciousness): No, Age > 70: Yes, Altered Mental Status, Intoxication with alcohol or substance confusion (Disorientation, impaired judgment, poor safety awaremess, or inability to follow instructions): No, Impaired Mobility: Ambulates or transfers with assistive devices or assistance; Unable to ambulate or transer.: Yes    Diagnosis:  DISPOSITION Admitted 06/07/2024 08:51:26 PM   Final diagnoses:   Acute renal failure, unspecified acute renal failure type (HCC)   Chronic pulmonary edema        Consults:  None     Pain Score:  Pain Assessment  Pain Assessment: None - Denies Pain    C-SSRS:   Risk of Suicide: No Risk    Sepsis Screening:  Sepsis Risk Score: 5.57    [place] Fall Risk:  [place] 1 Fall Risk  Presents to emergency department  because of falls (Syncope, seizure,

## 2024-06-08 NOTE — PROCEDURES
PROCEDURE NOTE  Date: 6/8/2024   Name: Myles Santa  YOB: 1945    Procedures  Bladder scan completed at 0026 and results told to Julia YANES. Scan showed 202 mL in the patients bladder. Last void was unknown.

## 2024-06-08 NOTE — H&P
Hospitalist H&P    Patient:  Myles Santa    MRN: [id number]    Unit/Bed:6K-09/009-A    Acct: [id number]    YOB: 1945    PCP: Ted Flynn MD    Date of Admission: 6/7/2024      Assessment and Plan:          Acute renal failure: suspect 2/2 intravascular depletion, but await bladder scan to rule out obstruction; pt with hx of diarrhea, compliance with diuretics and Entresto. Given 1.5L NS in the ED, switch to LR at 100mL/hr overnight  HAGMA with uremia: pt having increasing fatigue over the past 4-5 days, consistent with uremia  HFrEF: 40-45% in 12/21; holding diuretics at this time, giving IVF due to MISA with oliguria, CXR and exam consistent with pulmonary edema, monitor closely - pt encouraged to notify for any increasing WOB/SOB  Type 2 NSTEMI: suspect 2/2 hypovolemic hypotension, monitor clinically, treating MISA/hypovolemia  CAD, s/p CABGx2, AVR, MVR: c/w ASA, lipitor; monitor fluids closely    CC:  Fatigue    HPI: 80 yo with hx of CABG, AVR, HFrEF, presents to the ED with complaint of worsening fatigue over the past 4-5 days, especially bad in the past two days. He had diarrhea for about a weeks duration preceding the worsening (he notes it has now resolved prior to arrival). When seen family cites nocturnal confusion, decreased balance and exercise tolerance. He cites compliance with his medications. He reiterates the fatigue, denies any SOB or increased WOB, denies any chest pain/pressure.    ROS (Review of systems completed.  Pertinent positives noted. Otherwise ROS is negative)    PMH:  Per HPI and       Diagnosis Date    Acute on chronic systolic CHF (congestive heart failure), NYHA class 3 ([facility]) EF 25-30% 11/7/2018    CAD (coronary artery disease)     CKD (chronic kidney disease) stage 3, GFR 30-59 ml/min ([facility]) 9/23/2015    Diabetes mellitus ([facility])     HTN (hypertension), benign     Hyperlipidemia     Hypothyroidism     Mitral and aortic incompetence     Neuropathy     Positive blood

## 2024-06-08 NOTE — RT PROTOCOL NOTE
RT Inhaler-Nebulizer Bronchodilator Protocol Note    There is a bronchodilator order in the chart from a provider indicating to follow the RT Bronchodilator Protocol and there is an “Initiate RT Inhaler-Nebulizer Bronchodilator Protocol” order as well (see protocol at bottom of note).    CXR Findings:  No results found.    The findings from the last RT Protocol Assessment were as follows:   History Pulmonary Disease: None or smoker <15 pack years  Respiratory Pattern: Mild dyspnea at rest, irregular pattern, or RR 21-25 bpm  Breath Sounds: Intermittent or unilateral wheezes  Cough: Strong, spontaneous, non-productive  Indication for Bronchodilator Therapy: Decreased or absent breath sounds  Bronchodilator Assessment Score: 8    Aerosolized bronchodilator medication orders have been revised according to the RT Inhaler-Nebulizer Bronchodilator Protocol below.    Respiratory Therapist to perform RT Therapy Protocol Assessment initially then follow the protocol.  Repeat RT Therapy Protocol Assessment PRN for score 0-3 or on second treatment, BID, and PRN for scores above 3.    No Indications - adjust the frequency to every 6 hours PRN wheezing or bronchospasm, if no treatments needed after 48 hours then discontinue using Per Protocol order mode.     If indication present, adjust the RT bronchodilator orders based on the Bronchodilator Assessment Score as indicated below.  Use Inhaler orders unless patient has one or more of the following: on home nebulizer, not able to hold breath for 10 seconds, is not alert and oriented, cannot activate and use MDI correctly, or respiratory rate 25 breaths per minute or more, then use the equivalent nebulizer order(s) with same Frequency and PRN reasons based on the score.  If a patient is on this medication at home then do not decrease Frequency below that used at home.    0-3 - enter or revise RT bronchodilator order(s) to equivalent RT Bronchodilator order with Frequency of every 4

## 2024-06-09 LAB
ANION GAP SERPL CALC-SCNC: 12 MEQ/L (ref 8–16)
BUN SERPL-MCNC: 64 MG/DL (ref 7–22)
CALCIUM SERPL-MCNC: 8.6 MG/DL (ref 8.5–10.5)
CHLORIDE SERPL-SCNC: 115 MEQ/L (ref 98–111)
CO2 SERPL-SCNC: 20 MEQ/L (ref 23–33)
CREAT SERPL-MCNC: 2 MG/DL (ref 0.4–1.2)
GFR SERPL CREATININE-BSD FRML MDRD: 34 ML/MIN/1.73M2
GLUCOSE SERPL-MCNC: 124 MG/DL (ref 70–108)
POTASSIUM SERPL-SCNC: 4.2 MEQ/L (ref 3.5–5.2)
SODIUM SERPL-SCNC: 147 MEQ/L (ref 135–145)

## 2024-06-09 PROCEDURE — 36415 COLL VENOUS BLD VENIPUNCTURE: CPT

## 2024-06-09 PROCEDURE — 2580000003 HC RX 258: Performed by: INTERNAL MEDICINE

## 2024-06-09 PROCEDURE — 99232 SBSQ HOSP IP/OBS MODERATE 35: CPT

## 2024-06-09 PROCEDURE — 1200000003 HC TELEMETRY R&B

## 2024-06-09 PROCEDURE — 99232 SBSQ HOSP IP/OBS MODERATE 35: CPT | Performed by: INTERNAL MEDICINE

## 2024-06-09 PROCEDURE — 6360000002 HC RX W HCPCS

## 2024-06-09 PROCEDURE — 94760 N-INVAS EAR/PLS OXIMETRY 1: CPT

## 2024-06-09 PROCEDURE — 6370000000 HC RX 637 (ALT 250 FOR IP): Performed by: INTERNAL MEDICINE

## 2024-06-09 PROCEDURE — 94640 AIRWAY INHALATION TREATMENT: CPT

## 2024-06-09 PROCEDURE — 80048 BASIC METABOLIC PNL TOTAL CA: CPT

## 2024-06-09 RX ADMIN — ALBUTEROL SULFATE 2.5 MG: 2.5 SOLUTION RESPIRATORY (INHALATION) at 08:34

## 2024-06-09 RX ADMIN — FERROUS SULFATE TAB 325 MG (65 MG ELEMENTAL FE) 650 MG: 325 (65 FE) TAB at 09:36

## 2024-06-09 RX ADMIN — GABAPENTIN 200 MG: 100 CAPSULE ORAL at 09:36

## 2024-06-09 RX ADMIN — ATORVASTATIN CALCIUM 40 MG: 40 TABLET, FILM COATED ORAL at 18:10

## 2024-06-09 RX ADMIN — SODIUM BICARBONATE 1300 MG: 650 TABLET ORAL at 20:22

## 2024-06-09 RX ADMIN — ALBUTEROL SULFATE 2.5 MG: 2.5 SOLUTION RESPIRATORY (INHALATION) at 17:10

## 2024-06-09 RX ADMIN — SODIUM CHLORIDE, PRESERVATIVE FREE 10 ML: 5 INJECTION INTRAVENOUS at 20:22

## 2024-06-09 RX ADMIN — ALBUTEROL SULFATE 2.5 MG: 2.5 SOLUTION RESPIRATORY (INHALATION) at 13:03

## 2024-06-09 RX ADMIN — GABAPENTIN 200 MG: 100 CAPSULE ORAL at 20:22

## 2024-06-09 RX ADMIN — SODIUM BICARBONATE 1300 MG: 650 TABLET ORAL at 09:37

## 2024-06-09 RX ADMIN — SODIUM CHLORIDE, PRESERVATIVE FREE 10 ML: 5 INJECTION INTRAVENOUS at 09:38

## 2024-06-09 RX ADMIN — ASPIRIN 81 MG 81 MG: 81 TABLET ORAL at 09:36

## 2024-06-09 RX ADMIN — LEVOTHYROXINE SODIUM 75 MCG: 0.07 TABLET ORAL at 05:15

## 2024-06-09 NOTE — RT PROTOCOL NOTE
RT Inhaler-Nebulizer Bronchodilator Protocol Note    There is a bronchodilator order in the chart from a provider indicating to follow the RT Bronchodilator Protocol and there is an “Initiate RT Inhaler-Nebulizer Bronchodilator Protocol” order as well (see protocol at bottom of note).    CXR Findings:  XR CHEST PORTABLE    Result Date: 6/8/2024  1. Small bilateral pleural effusions. 2. Nonspecific prominence of the interstitial pulmonary markings. Underlying interstitial pulmonary edema is not excluded. **This report has been created using voice recognition software.  It may contain minor errors which are inherent in voice recognition technology.** Electronically signed by Dr. Loreto Mercedes      The findings from the last RT Protocol Assessment were as follows:   History Pulmonary Disease: None or smoker <15 pack years  Respiratory Pattern: Dyspnea on exertion or RR 21-25 bpm  Breath Sounds: Inspiratory and expiratory or bilateral wheezing and/or rhonchi  Cough: Strong, spontaneous, non-productive  Indication for Bronchodilator Therapy: Decreased or absent breath sounds  Bronchodilator Assessment Score: 8    Aerosolized bronchodilator medication orders have been revised according to the RT Inhaler-Nebulizer Bronchodilator Protocol below.    Respiratory Therapist to perform RT Therapy Protocol Assessment initially then follow the protocol.  Repeat RT Therapy Protocol Assessment PRN for score 0-3 or on second treatment, BID, and PRN for scores above 3.    No Indications - adjust the frequency to every 6 hours PRN wheezing or bronchospasm, if no treatments needed after 48 hours then discontinue using Per Protocol order mode.     If indication present, adjust the RT bronchodilator orders based on the Bronchodilator Assessment Score as indicated below.  Use Inhaler orders unless patient has one or more of the following: on home nebulizer, not able to hold breath for 10 seconds, is not alert and oriented, cannot

## 2024-06-10 ENCOUNTER — APPOINTMENT (OUTPATIENT)
Age: 79
DRG: 682 | End: 2024-06-10
Payer: MEDICARE

## 2024-06-10 LAB
ANION GAP SERPL CALC-SCNC: 10 MEQ/L (ref 8–16)
BUN SERPL-MCNC: 40 MG/DL (ref 7–22)
CALCIUM SERPL-MCNC: 8.7 MG/DL (ref 8.5–10.5)
CHLORIDE SERPL-SCNC: 114 MEQ/L (ref 98–111)
CO2 SERPL-SCNC: 23 MEQ/L (ref 23–33)
CREAT SERPL-MCNC: 1.2 MG/DL (ref 0.4–1.2)
ECHO AV MEAN GRADIENT: 9 MMHG
ECHO AV MEAN VELOCITY: 1.4 M/S
ECHO AV PEAK GRADIENT: 17 MMHG
ECHO AV PEAK VELOCITY: 2.1 M/S
ECHO AV VELOCITY RATIO: 0.48
ECHO AV VTI: 44.8 CM
ECHO BSA: 2.21 M2
ECHO EST RA PRESSURE: 5 MMHG
ECHO LA AREA 4C: 15.7 CM2
ECHO LA DIAMETER INDEX: 2.27 CM/M2
ECHO LA DIAMETER: 5 CM
ECHO LA MAJOR AXIS: 5.1 CM
ECHO LA VOL MOD A4C: 38 ML (ref 18–58)
ECHO LA VOLUME INDEX MOD A4C: 17 ML/M2 (ref 16–34)
ECHO LV E' LATERAL VELOCITY: 11 CM/S
ECHO LV E' SEPTAL VELOCITY: 6 CM/S
ECHO LV EDV A2C: 78 ML
ECHO LV EDV A4C: 123 ML
ECHO LV EDV INDEX A4C: 56 ML/M2
ECHO LV EDV NDEX A2C: 35 ML/M2
ECHO LV EJECTION FRACTION A2C: 35 %
ECHO LV EJECTION FRACTION A4C: 32 %
ECHO LV EJECTION FRACTION BIPLANE: 32 % (ref 55–100)
ECHO LV ESV A2C: 50 ML
ECHO LV ESV A4C: 83 ML
ECHO LV ESV INDEX A2C: 23 ML/M2
ECHO LV ESV INDEX A4C: 38 ML/M2
ECHO LV FRACTIONAL SHORTENING: 21 % (ref 28–44)
ECHO LV INTERNAL DIMENSION DIASTOLE INDEX: 1.91 CM/M2
ECHO LV INTERNAL DIMENSION DIASTOLIC: 4.2 CM (ref 4.2–5.9)
ECHO LV INTERNAL DIMENSION SYSTOLIC INDEX: 1.5 CM/M2
ECHO LV INTERNAL DIMENSION SYSTOLIC: 3.3 CM
ECHO LV IVSD: 1.4 CM (ref 0.6–1)
ECHO LV MASS 2D: 189.2 G (ref 88–224)
ECHO LV MASS INDEX 2D: 86 G/M2 (ref 49–115)
ECHO LV POSTERIOR WALL DIASTOLIC: 1.1 CM (ref 0.6–1)
ECHO LV RELATIVE WALL THICKNESS RATIO: 0.52
ECHO LVOT AV VTI INDEX: 0.53
ECHO LVOT MEAN GRADIENT: 2 MMHG
ECHO LVOT PEAK GRADIENT: 4 MMHG
ECHO LVOT PEAK VELOCITY: 1 M/S
ECHO LVOT VTI: 23.6 CM
ECHO MV A VELOCITY: 1.31 M/S
ECHO MV E DECELERATION TIME (DT): 282 MS
ECHO MV E VELOCITY: 1.16 M/S
ECHO MV E/A RATIO: 0.89
ECHO MV E/E' LATERAL: 10.55
ECHO MV E/E' RATIO (AVERAGED): 14.94
ECHO MV E/E' SEPTAL: 19.33
ECHO MV LVOT VTI INDEX: 1.87
ECHO MV MAX VELOCITY: 1.7 M/S
ECHO MV MEAN GRADIENT: 7 MMHG
ECHO MV MEAN VELOCITY: 1.2 M/S
ECHO MV PEAK GRADIENT: 11 MMHG
ECHO MV REGURGITANT PEAK GRADIENT: 139 MMHG
ECHO MV REGURGITANT PEAK VELOCITY: 5.9 M/S
ECHO MV VTI: 44.1 CM
ECHO PV MAX VELOCITY: 0.8 M/S
ECHO PV PEAK GRADIENT: 2 MMHG
ECHO RIGHT VENTRICULAR SYSTOLIC PRESSURE (RVSP): 35 MMHG
ECHO RV INTERNAL DIMENSION: 3.3 CM
ECHO TV E WAVE: 0.3 M/S
ECHO TV REGURGITANT MAX VELOCITY: 2.74 M/S
ECHO TV REGURGITANT PEAK GRADIENT: 30 MMHG
GFR SERPL CREATININE-BSD FRML MDRD: 62 ML/MIN/1.73M2
GLUCOSE BLD STRIP.AUTO-MCNC: 123 MG/DL (ref 70–108)
GLUCOSE BLD STRIP.AUTO-MCNC: 152 MG/DL (ref 70–108)
GLUCOSE SERPL-MCNC: 133 MG/DL (ref 70–108)
POTASSIUM SERPL-SCNC: 3.8 MEQ/L (ref 3.5–5.2)
SODIUM SERPL-SCNC: 147 MEQ/L (ref 135–145)

## 2024-06-10 PROCEDURE — 1200000003 HC TELEMETRY R&B

## 2024-06-10 PROCEDURE — 97162 PT EVAL MOD COMPLEX 30 MIN: CPT

## 2024-06-10 PROCEDURE — 6370000000 HC RX 637 (ALT 250 FOR IP): Performed by: INTERNAL MEDICINE

## 2024-06-10 PROCEDURE — 6360000002 HC RX W HCPCS

## 2024-06-10 PROCEDURE — 6360000002 HC RX W HCPCS: Performed by: INTERNAL MEDICINE

## 2024-06-10 PROCEDURE — 99232 SBSQ HOSP IP/OBS MODERATE 35: CPT | Performed by: INTERNAL MEDICINE

## 2024-06-10 PROCEDURE — 82948 REAGENT STRIP/BLOOD GLUCOSE: CPT

## 2024-06-10 PROCEDURE — 2580000003 HC RX 258: Performed by: INTERNAL MEDICINE

## 2024-06-10 PROCEDURE — 80048 BASIC METABOLIC PNL TOTAL CA: CPT

## 2024-06-10 PROCEDURE — 94640 AIRWAY INHALATION TREATMENT: CPT

## 2024-06-10 PROCEDURE — 6370000000 HC RX 637 (ALT 250 FOR IP): Performed by: PHYSICIAN ASSISTANT

## 2024-06-10 PROCEDURE — 93306 TTE W/DOPPLER COMPLETE: CPT

## 2024-06-10 PROCEDURE — 93005 ELECTROCARDIOGRAM TRACING: CPT | Performed by: PHYSICIAN ASSISTANT

## 2024-06-10 PROCEDURE — 99232 SBSQ HOSP IP/OBS MODERATE 35: CPT | Performed by: PHYSICIAN ASSISTANT

## 2024-06-10 PROCEDURE — 97116 GAIT TRAINING THERAPY: CPT

## 2024-06-10 PROCEDURE — 36415 COLL VENOUS BLD VENIPUNCTURE: CPT

## 2024-06-10 RX ORDER — BUMETANIDE 0.25 MG/ML
1 INJECTION INTRAMUSCULAR; INTRAVENOUS ONCE
Status: COMPLETED | OUTPATIENT
Start: 2024-06-10 | End: 2024-06-10

## 2024-06-10 RX ORDER — METOPROLOL SUCCINATE 50 MG/1
50 TABLET, EXTENDED RELEASE ORAL DAILY
Status: DISCONTINUED | OUTPATIENT
Start: 2024-06-10 | End: 2024-06-11 | Stop reason: HOSPADM

## 2024-06-10 RX ADMIN — ALBUTEROL SULFATE 2.5 MG: 2.5 SOLUTION RESPIRATORY (INHALATION) at 08:24

## 2024-06-10 RX ADMIN — FERROUS SULFATE TAB 325 MG (65 MG ELEMENTAL FE) 650 MG: 325 (65 FE) TAB at 08:53

## 2024-06-10 RX ADMIN — GABAPENTIN 200 MG: 100 CAPSULE ORAL at 20:40

## 2024-06-10 RX ADMIN — GABAPENTIN 200 MG: 100 CAPSULE ORAL at 08:53

## 2024-06-10 RX ADMIN — SODIUM BICARBONATE 1300 MG: 650 TABLET ORAL at 08:54

## 2024-06-10 RX ADMIN — SODIUM CHLORIDE, PRESERVATIVE FREE 10 ML: 5 INJECTION INTRAVENOUS at 20:39

## 2024-06-10 RX ADMIN — ALBUTEROL SULFATE 2.5 MG: 2.5 SOLUTION RESPIRATORY (INHALATION) at 12:02

## 2024-06-10 RX ADMIN — METOPROLOL SUCCINATE 50 MG: 50 TABLET, EXTENDED RELEASE ORAL at 16:58

## 2024-06-10 RX ADMIN — BUMETANIDE 1 MG: 0.25 INJECTION INTRAMUSCULAR; INTRAVENOUS at 11:56

## 2024-06-10 RX ADMIN — SODIUM BICARBONATE 1300 MG: 650 TABLET ORAL at 20:40

## 2024-06-10 RX ADMIN — LEVOTHYROXINE SODIUM 75 MCG: 0.07 TABLET ORAL at 06:02

## 2024-06-10 RX ADMIN — ASPIRIN 81 MG 81 MG: 81 TABLET ORAL at 08:53

## 2024-06-10 RX ADMIN — SODIUM CHLORIDE, PRESERVATIVE FREE 10 ML: 5 INJECTION INTRAVENOUS at 08:53

## 2024-06-10 RX ADMIN — ATORVASTATIN CALCIUM 40 MG: 40 TABLET, FILM COATED ORAL at 16:59

## 2024-06-10 NOTE — CARE COORDINATION
6/10/24, 2:57 PM EDT    DISCHARGE ON GOING EVALUATION    Myles Santa       Hospital day: 3  Location: -09/009-A Reason for admit: Chronic pulmonary edema [J81.1]  ARF (acute renal failure) (HCC) [N17.9]  Acute renal failure, unspecified acute renal failure type (HCC) [N17.9]     Procedures:   6/10 ECHO: EF of 35 - 40%     Imaging since last note:   6/7 CXR: Cardiomegaly present. Probable component of pulmonary edema. Peripheral opacity right lung. This could represent component of pneumonia.  6/8 CXR: Small bilateral pleural effusions.  Nonspecific prominence of the interstitial pulmonary markings. Underlying interstitial pulmonary edema is not excluded.  6/8 US Renal: No hydronephrosis. Anechoic cysts of the bilateral kidneys larger on   the right as described above. The bilateral ureteral jets were not identified. The prevoid urinary bladder is 120 mL. The patient declined the postvoid portion of the exam    Barriers to Discharge: Hospitalist and Nephrology following. Cardiology to see. PT/OT. Nebulizers. 1x IV bumex today. Sodium bicarb tabs. Na 147. Creat 1.2 (2.0)     PCP: Ted Flynn MD  Readmission Risk Score: 18.2    Patient Goals/Plan/Treatment Preferences: Return home w/ wife. Has DME. PTA, set up to start OP therapy at [facility].

## 2024-06-10 NOTE — PROCEDURES
PROCEDURE NOTE  Date: 6/10/2024   Name: Myles Santa  YOB: 1945    Procedures EKG completed, Yuliya YANES notified

## 2024-06-10 NOTE — RT PROTOCOL NOTE
RT Inhaler-Nebulizer Bronchodilator Protocol Note    There is a bronchodilator order in the chart from a provider indicating to follow the RT Bronchodilator Protocol and there is an “Initiate RT Inhaler-Nebulizer Bronchodilator Protocol” order as well (see protocol at bottom of note).    CXR Findings:  XR CHEST PORTABLE    Result Date: 6/8/2024  1. Small bilateral pleural effusions. 2. Nonspecific prominence of the interstitial pulmonary markings. Underlying interstitial pulmonary edema is not excluded. **This report has been created using voice recognition software.  It may contain minor errors which are inherent in voice recognition technology.** Electronically signed by Dr. Loreto Mercedes      The findings from the last RT Protocol Assessment were as follows:   History Pulmonary Disease: None or smoker <15 pack years  Respiratory Pattern: Regular pattern and RR 12-20 bpm  Breath Sounds: Inspiratory and expiratory or bilateral wheezing and/or rhonchi  Cough: Strong, spontaneous, non-productive  Indication for Bronchodilator Therapy: Decreased or absent breath sounds  Bronchodilator Assessment Score: 6    Aerosolized bronchodilator medication orders have been revised according to the RT Inhaler-Nebulizer Bronchodilator Protocol below.    Respiratory Therapist to perform RT Therapy Protocol Assessment initially then follow the protocol.  Repeat RT Therapy Protocol Assessment PRN for score 0-3 or on second treatment, BID, and PRN for scores above 3.    No Indications - adjust the frequency to every 6 hours PRN wheezing or bronchospasm, if no treatments needed after 48 hours then discontinue using Per Protocol order mode.     If indication present, adjust the RT bronchodilator orders based on the Bronchodilator Assessment Score as indicated below.  Use Inhaler orders unless patient has one or more of the following: on home nebulizer, not able to hold breath for 10 seconds, is not alert and oriented, cannot activate

## 2024-06-11 VITALS
HEIGHT: 72 IN | BODY MASS INDEX: 29.33 KG/M2 | TEMPERATURE: 97.4 F | DIASTOLIC BLOOD PRESSURE: 84 MMHG | SYSTOLIC BLOOD PRESSURE: 154 MMHG | HEART RATE: 58 BPM | OXYGEN SATURATION: 96 % | WEIGHT: 216.52 LBS | RESPIRATION RATE: 18 BRPM

## 2024-06-11 LAB
ANION GAP SERPL CALC-SCNC: 11 MEQ/L (ref 8–16)
BUN SERPL-MCNC: 27 MG/DL (ref 7–22)
CALCIUM SERPL-MCNC: 8.9 MG/DL (ref 8.5–10.5)
CHLORIDE SERPL-SCNC: 111 MEQ/L (ref 98–111)
CO2 SERPL-SCNC: 25 MEQ/L (ref 23–33)
CREAT SERPL-MCNC: 1.1 MG/DL (ref 0.4–1.2)
EKG ATRIAL RATE: 65 BPM
EKG ATRIAL RATE: 77 BPM
EKG P AXIS: 53 DEGREES
EKG P AXIS: 93 DEGREES
EKG P-R INTERVAL: 128 MS
EKG P-R INTERVAL: 130 MS
EKG Q-T INTERVAL: 452 MS
EKG Q-T INTERVAL: 494 MS
EKG QRS DURATION: 156 MS
EKG QRS DURATION: 160 MS
EKG QTC CALCULATION (BAZETT): 511 MS
EKG QTC CALCULATION (BAZETT): 513 MS
EKG R AXIS: -3 DEGREES
EKG R AXIS: 6 DEGREES
EKG T AXIS: -172 DEGREES
EKG T AXIS: 164 DEGREES
EKG VENTRICULAR RATE: 65 BPM
EKG VENTRICULAR RATE: 77 BPM
GFR SERPL CREATININE-BSD FRML MDRD: 69 ML/MIN/1.73M2
GLUCOSE BLD STRIP.AUTO-MCNC: 150 MG/DL (ref 70–108)
GLUCOSE BLD STRIP.AUTO-MCNC: 153 MG/DL (ref 70–108)
GLUCOSE SERPL-MCNC: 139 MG/DL (ref 70–108)
MAGNESIUM SERPL-MCNC: 1.5 MG/DL (ref 1.6–2.4)
POTASSIUM SERPL-SCNC: 4.3 MEQ/L (ref 3.5–5.2)
SODIUM SERPL-SCNC: 147 MEQ/L (ref 135–145)

## 2024-06-11 PROCEDURE — 2580000003 HC RX 258: Performed by: INTERNAL MEDICINE

## 2024-06-11 PROCEDURE — 6360000002 HC RX W HCPCS

## 2024-06-11 PROCEDURE — 36415 COLL VENOUS BLD VENIPUNCTURE: CPT

## 2024-06-11 PROCEDURE — 99223 1ST HOSP IP/OBS HIGH 75: CPT | Performed by: NUCLEAR MEDICINE

## 2024-06-11 PROCEDURE — 97110 THERAPEUTIC EXERCISES: CPT

## 2024-06-11 PROCEDURE — 97166 OT EVAL MOD COMPLEX 45 MIN: CPT

## 2024-06-11 PROCEDURE — 97116 GAIT TRAINING THERAPY: CPT

## 2024-06-11 PROCEDURE — 6370000000 HC RX 637 (ALT 250 FOR IP): Performed by: INTERNAL MEDICINE

## 2024-06-11 PROCEDURE — 82948 REAGENT STRIP/BLOOD GLUCOSE: CPT

## 2024-06-11 PROCEDURE — 97530 THERAPEUTIC ACTIVITIES: CPT

## 2024-06-11 PROCEDURE — 99232 SBSQ HOSP IP/OBS MODERATE 35: CPT | Performed by: INTERNAL MEDICINE

## 2024-06-11 PROCEDURE — 94640 AIRWAY INHALATION TREATMENT: CPT

## 2024-06-11 PROCEDURE — 80048 BASIC METABOLIC PNL TOTAL CA: CPT

## 2024-06-11 PROCEDURE — 2580000003 HC RX 258: Performed by: PHYSICIAN ASSISTANT

## 2024-06-11 PROCEDURE — 94761 N-INVAS EAR/PLS OXIMETRY MLT: CPT

## 2024-06-11 PROCEDURE — 83735 ASSAY OF MAGNESIUM: CPT

## 2024-06-11 PROCEDURE — 93005 ELECTROCARDIOGRAM TRACING: CPT

## 2024-06-11 PROCEDURE — 99239 HOSP IP/OBS DSCHRG MGMT >30: CPT | Performed by: PHYSICIAN ASSISTANT

## 2024-06-11 PROCEDURE — 6370000000 HC RX 637 (ALT 250 FOR IP): Performed by: PHYSICIAN ASSISTANT

## 2024-06-11 RX ORDER — ALBUTEROL SULFATE 2.5 MG/3ML
2.5 SOLUTION RESPIRATORY (INHALATION) EVERY 4 HOURS PRN
Status: DISCONTINUED | OUTPATIENT
Start: 2024-06-11 | End: 2024-06-11 | Stop reason: HOSPADM

## 2024-06-11 RX ORDER — ALBUTEROL SULFATE 90 UG/1
2 POWDER, METERED RESPIRATORY (INHALATION) EVERY 6 HOURS PRN
Qty: 1 EACH | Refills: 1 | Status: SHIPPED | OUTPATIENT
Start: 2024-06-11

## 2024-06-11 RX ORDER — GABAPENTIN 100 MG/1
200 CAPSULE ORAL 2 TIMES DAILY
Qty: 120 CAPSULE | Refills: 0 | Status: SHIPPED | OUTPATIENT
Start: 2024-06-11 | End: 2024-07-11

## 2024-06-11 RX ORDER — DEXTROSE MONOHYDRATE 50 MG/ML
INJECTION, SOLUTION INTRAVENOUS CONTINUOUS
Status: DISCONTINUED | OUTPATIENT
Start: 2024-06-11 | End: 2024-06-11 | Stop reason: HOSPADM

## 2024-06-11 RX ORDER — DEXTROSE MONOHYDRATE 50 MG/ML
INJECTION, SOLUTION INTRAVENOUS CONTINUOUS
Status: DISCONTINUED | OUTPATIENT
Start: 2024-06-11 | End: 2024-06-11

## 2024-06-11 RX ORDER — MAGNESIUM SULFATE IN WATER 40 MG/ML
2000 INJECTION, SOLUTION INTRAVENOUS PRN
Status: DISCONTINUED | OUTPATIENT
Start: 2024-06-11 | End: 2024-06-11 | Stop reason: HOSPADM

## 2024-06-11 RX ADMIN — ASPIRIN 81 MG 81 MG: 81 TABLET ORAL at 09:11

## 2024-06-11 RX ADMIN — SODIUM BICARBONATE 1300 MG: 650 TABLET ORAL at 09:11

## 2024-06-11 RX ADMIN — FERROUS SULFATE TAB 325 MG (65 MG ELEMENTAL FE) 650 MG: 325 (65 FE) TAB at 09:11

## 2024-06-11 RX ADMIN — GABAPENTIN 200 MG: 100 CAPSULE ORAL at 09:11

## 2024-06-11 RX ADMIN — DEXTROSE MONOHYDRATE: 50 INJECTION, SOLUTION INTRAVENOUS at 13:05

## 2024-06-11 RX ADMIN — ALBUTEROL SULFATE 2.5 MG: 2.5 SOLUTION RESPIRATORY (INHALATION) at 07:45

## 2024-06-11 RX ADMIN — METOPROLOL SUCCINATE 50 MG: 50 TABLET, EXTENDED RELEASE ORAL at 09:11

## 2024-06-11 RX ADMIN — LEVOTHYROXINE SODIUM 75 MCG: 0.07 TABLET ORAL at 05:18

## 2024-06-11 RX ADMIN — SODIUM CHLORIDE, PRESERVATIVE FREE 10 ML: 5 INJECTION INTRAVENOUS at 09:10

## 2024-06-11 RX ADMIN — MAGNESIUM SULFATE HEPTAHYDRATE 2000 MG: 40 INJECTION, SOLUTION INTRAVENOUS at 11:30

## 2024-06-11 ASSESSMENT — PAIN SCALES - GENERAL: PAINLEVEL_OUTOF10: 0

## 2024-06-11 NOTE — PLAN OF CARE
Problem: Discharge Planning  Goal: Discharge to home or other facility with appropriate resources  6/10/2024 2258 by Shreya Castrejon RN  Outcome: Progressing  Flowsheets (Taken 6/10/2024 0851 by Yuliya Weathers RN)  Discharge to home or other facility with appropriate resources: Identify barriers to discharge with patient and caregiver  6/10/2024 1448 by Yuliya Weathers RN  Outcome: Progressing  Flowsheets (Taken 6/10/2024 0851)  Discharge to home or other facility with appropriate resources: Identify barriers to discharge with patient and caregiver     Problem: Safety - Adult  Goal: Free from fall injury  6/10/2024 2258 by Shreya Castrejon RN  Outcome: Progressing  Flowsheets (Taken 6/10/2024 1448 by Yuliya Weathers RN)  Free From Fall Injury: Instruct family/caregiver on patient safety  6/10/2024 1448 by Yuliya Weathers RN  Outcome: Progressing  Flowsheets (Taken 6/10/2024 1448)  Free From Fall Injury: Instruct family/caregiver on patient safety  Note: No falls noted this shift. Continue falling star program.       Problem: ABCDS Injury Assessment  Goal: Absence of physical injury  6/10/2024 2258 by Shreya Castrejon RN  Outcome: Progressing  Flowsheets (Taken 6/10/2024 1448 by Yuliya Weathers RN)  Absence of Physical Injury: Implement safety measures based on patient assessment  6/10/2024 1448 by Yuliya Weathers RN  Outcome: Progressing  Flowsheets (Taken 6/10/2024 1448)  Absence of Physical Injury: Implement safety measures based on patient assessment     Problem: Skin/Tissue Integrity  Goal: Absence of new skin breakdown  Description: 1.  Monitor for areas of redness and/or skin breakdown  2.  Assess vascular access sites hourly  3.  Every 4-6 hours minimum:  Change oxygen saturation probe site  4.  Every 4-6 hours:  If on nasal continuous positive airway pressure, respiratory therapy assess nares and determine need for appliance change or resting period.  6/10/2024 2258 by Shreya Castrejon RN  Outcome: 
  Problem: Discharge Planning  Goal: Discharge to home or other facility with appropriate resources  Outcome: Progressing     Problem: Safety - Adult  Goal: Free from fall injury  Outcome: Progressing     Problem: ABCDS Injury Assessment  Goal: Absence of physical injury  Outcome: Progressing     Problem: Skin/Tissue Integrity  Goal: Absence of new skin breakdown  Description: 1.  Monitor for areas of redness and/or skin breakdown  2.  Assess vascular access sites hourly  3.  Every 4-6 hours minimum:  Change oxygen saturation probe site  4.  Every 4-6 hours:  If on nasal continuous positive airway pressure, respiratory therapy assess nares and determine need for appliance change or resting period.  Outcome: Progressing     Problem: Skin/Tissue Integrity - Adult  Goal: Skin integrity remains intact  Outcome: Progressing  Goal: Incisions, wounds, or drain sites healing without S/S of infection  Outcome: Progressing  Goal: Oral mucous membranes remain intact  Outcome: Progressing     Problem: Gastrointestinal - Adult  Goal: Maintains or returns to baseline bowel function  Outcome: Progressing  Goal: Maintains adequate nutritional intake  Outcome: Progressing  Goal: Establish and maintain optimal ostomy function  Outcome: Progressing     Problem: Genitourinary - Adult  Goal: Absence of urinary retention  Outcome: Progressing  Goal: Urinary catheter remains patent  Outcome: Progressing     Problem: Metabolic/Fluid and Electrolytes - Adult  Goal: Electrolytes maintained within normal limits  Outcome: Progressing  Goal: Hemodynamic stability and optimal renal function maintained  Outcome: Progressing     Problem: Hematologic - Adult  Goal: Maintains hematologic stability  Outcome: Progressing     Problem: Chronic Conditions and Co-morbidities  Goal: Patient's chronic conditions and co-morbidity symptoms are monitored and maintained or improved  Outcome: Progressing     P  
  Problem: Discharge Planning  Goal: Discharge to home or other facility with appropriate resources  Outcome: Progressing  Flowsheets (Taken 6/7/2024 2238 by Julia Quiroga, RN)  Discharge to home or other facility with appropriate resources: Identify barriers to discharge with patient and caregiver     Problem: Safety - Adult  Goal: Free from fall injury  Outcome: Progressing  Flowsheets (Taken 6/10/2024 0340)  Free From Fall Injury:   Instruct family/caregiver on patient safety   Based on caregiver fall risk screen, instruct family/caregiver to ask for assistance with transferring infant if caregiver noted to have fall risk factors     Problem: ABCDS Injury Assessment  Goal: Absence of physical injury  Outcome: Progressing  Flowsheets (Taken 6/8/2024 0210 by Julia Quiroga, RN)  Absence of Physical Injury: Implement safety measures based on patient assessment     Problem: Skin/Tissue Integrity  Goal: Absence of new skin breakdown  Description: 1.  Monitor for areas of redness and/or skin breakdown  2.  Assess vascular access sites hourly  3.  Every 4-6 hours minimum:  Change oxygen saturation probe site  4.  Every 4-6 hours:  If on nasal continuous positive airway pressure, respiratory therapy assess nares and determine need for appliance change or resting period.  Outcome: Progressing     Problem: Skin/Tissue Integrity - Adult  Goal: Skin integrity remains intact  Outcome: Progressing  Flowsheets (Taken 6/10/2024 0340)  Skin Integrity Remains Intact:   Monitor for areas of redness and/or skin breakdown   Assess vascular access sites hourly  Goal: Incisions, wounds, or drain sites healing without S/S of infection  Outcome: Progressing  Flowsheets (Taken 6/10/2024 0340)  Incisions, Wounds, or Drain Sites Healing Without Sign and Symptoms of Infection:   ADMISSION and DAILY: Assess and document risk factors for pressure ulcer development   TWICE DAILY: Assess and document skin integrity  Goal: Oral mucous membranes 
  Problem: Discharge Planning  Goal: Discharge to home or other facility with appropriate resources  Outcome: Progressing  Flowsheets (Taken 6/7/2024 2238)  Discharge to home or other facility with appropriate resources: Identify barriers to discharge with patient and caregiver     Problem: Safety - Adult  Goal: Free from fall injury  Outcome: Progressing  Note: Call light in reach, bed in lowest position, and bed alarm activated.  Education given on use of call light before ambulation and when in need of assistance.  Patient expressed understanding.  Hourly visual checks performed and charted.  Toileting offered to patient.  No falls this shift, at any time.  Arm band and falling star in place.  Will continue to monitor.      Problem: ABCDS Injury Assessment  Goal: Absence of physical injury  Outcome: Progressing  Flowsheets (Taken 6/8/2024 0210)  Absence of Physical Injury: Implement safety measures based on patient assessment     Problem: Gastrointestinal - Adult  Goal: Maintains or returns to baseline bowel function  Outcome: Progressing  Flowsheets (Taken 6/8/2024 0210)  Maintains or returns to baseline bowel function:   Assess bowel function   Administer IV fluids as ordered to ensure adequate hydration     Problem: Genitourinary - Adult  Goal: Absence of urinary retention  Outcome: Progressing  Flowsheets (Taken 6/8/2024 0210)  Absence of urinary retention:   Assess patient’s ability to void and empty bladder   Monitor intake/output and perform bladder scan as needed     Problem: Metabolic/Fluid and Electrolytes - Adult  Goal: Hemodynamic stability and optimal renal function maintained  Outcome: Progressing  Flowsheets (Taken 6/8/2024 0210)  Hemodynamic stability and optimal renal function maintained:   Monitor labs and assess for signs and symptoms of volume excess or deficit   Monitor intake, output and patient weight     
  Problem: Respiratory - Adult  Goal: Clear lung sounds  Outcome: Progressing   Pt started on aerosols to clear lung sounds. Patient mutually agreed on goals.     
  Problem: Respiratory - Adult  Goal: Clear lung sounds  Outcome: Progressing  Pt started on aerosols to clear lung sounds. Patient mutually agreed on goals.      
deterioration, or improvement  Care plan reviewed with patient.  Patient verbalizes understanding of the plan of care and contributes to goal setting.

## 2024-06-11 NOTE — CONSULTS
[facility]'s  Consult    Patient's Name/Date of Birth: Myles Benavidesdd / 1945 (79 y.o.)    Date: June 11, 2024     Referring Provider: Cecilia Gavin PA-C    CHIEF COMPLAINT:      HPI: This is a pleasant 79 y.o. male presents with with fatigue.  Patient was admitted to [facility] hospital service on 6/7/2024 for progressively worsening fatigue and general weakness.  PMH significant for CKD 3, hypertension, diabetes, CABG, AVR, HFrEF.   Initially in the ED patient's creatinine was over 6 nephrology was consulted, baseline creatinine runs around 1.5-1.6.  Per nephrology MISA in the setting of CKD is likely due to prerenal from diarrhea and Entresto.     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 ECHO 2022 EF improved to 40-45% with normal left ventricular size and moderately reduced systolic function.  - ECHO 06/2024 showed improved EF 35-40% with left ventricle mildly dilated left ventricle and severely reduced left ventricular systolic function.        Echo: No results found for this or any previous visit.       All labs, EKG's, diagnostic testing and images as well as cardiac cath, stress testing were reviewed during this encounter    Past Medical History:   Diagnosis Date    Acute on chronic systolic CHF (congestive heart failure), NYHA class 3 ([facility]) EF 25-30% 11/7/2018    CAD (coronary artery disease)     CKD (chronic kidney disease) stage 3, GFR 30-59 ml/min ([facility]) 9/23/2015    Diabetes mellitus ([facility])     HTN (hypertension), benign     Hyperlipidemia     Hypothyroidism     Mitral and aortic incompetence     Neuropathy     Positive blood culture     Being treated with rocephhin as outpt.    S/P CABG x 2 11/15/2018     Past Surgical History:   Procedure Laterality Date    CARDIAC SURGERY      heart cath    CATARACT REMOVAL WITH IMPLANT Bilateral     COLONOSCOPY

## 2024-06-11 NOTE — DISCHARGE INSTRUCTIONS
Continue with outpatient physical therapy.     You were diagnosed with acute kidney injury and heart failure exacerbation.  Your kidney function has returned to your baseline.   You completed an echocardiogram of your heart which demonstrated an reduced ejection fraction (heart function) of 35%.    Your cardiologist is considering a heart catheterization for further evaluation of your heart vessels. However, due to your kidney function, they are postponing that test at this time.

## 2024-06-11 NOTE — CARE COORDINATION
6/11/24, 3:48 PM EDT    Patient goals/plan/ treatment preferences discussed by  and .  Patient goals/plan/ treatment preferences reviewed with patient/ family.  Patient/ family verbalize understanding of discharge plan and are in agreement with goal/plan/treatment preferences.  Understanding was demonstrated using the teach back method.  AVS provided by RN at time of discharge, which includes all necessary medical information pertaining to the patients current course of illness, treatment, post-discharge goals of care, and treatment preferences.     Services At/After Discharge: Outpatient    Met w/ Myles and his wife this AM. Talked about potential discharge. They are agreeable. Plans for home w/ wife and start OP therapies at [facility] tomorrow. Denies needs.

## 2024-06-11 NOTE — PROGRESS NOTES
Hospitalist Progress Note      Patient:  Myles Santa    Unit/Bed:6K-09/009-A  YOB: 1945  MRN: [id number]   Acct: [id number]   PCP: Ted Flynn MD  Date of Admission: 6/7/2024    Assessment/Plan:    MISA on CKD III: Baseline creatinine around 1.5-1.6.  Creatinine on arrival 6.2, BUN 93.  Possibly prerenal in setting of diarrhea and diuretics.  IV fluids started on admission.  Creatinine improved to 4.3.  Nephrology consulted.  Continue to hold diuretics and Entresto.  Renal ultrasound ordered by nephrology.  Reported increased shortness of breath and wheezing on exam.  Concern for pulmonary edema on chest x-ray.  IV fluids discontinued by nephrology. 6/9 creatinine improved to 2.0.  Currently not on any IV fluids.  Nephrology managing.  Repeat BMP in the morning.  Mild metabolic acidosis: Serum bicarb 16, anion gap 16. Sodium bicarb added by nephrology.  6/9 serum bicarb improved to 20.  Continue sodium bicarb tabs per nephrology.  Hyponatremia: Sodium up to 147, was 139 yesterday.  Nephrology managing fluid status.  Await their input from today.  Acute on chronic HFrEF: Echo from 5/25/2022 showed an EF of 40 to 45%.  BNP on admission elevated at 5940.  Concern for pulmonary edema on chest x-ray.  Also reports fatigue and shortness of breath.  Will repeat echocardiogram.  Nephrology consulted for diuretic management in setting of MISA.  Currently diuretics are held. 6/9 patient reports improvement in shortness of breath.  IV fluids stopped yesterday.  Diuretic still held at this time.  Echocardiogram ordered, not yet completed.  Continue to monitor daily weights, strict intake and output.  Elevated troponin: Suspect secondary to hypovolemic hypotension with MISA.  Troponin trend 40, 63, 57.  EKG showing sinus arrhythmia with left bundle branch block.  LBBB noted on EKGs in 2018.  Does have a history of CABG.  Denies any chest pain. 
              Hospitalist Progress Note    Patient:  Myles Santa      Unit/Bed:[facility]09/009-A    YOB: 1945    MRN: [id number]       Acct: [id number]     PCP: Ted Flynn MD    Date of Admission: 6/7/2024    Assessment/Plan:    MISA on CKD III-improved  Baseline creatinine around 1.5-1.6.  Creatinine on arrival 6.2, BUN 93   Creatinine 1.2 today  Nephrology following -suspect due to diarrhea and Entresto.  1 dose Bumex IV 1 mg today   renal ultrasound ordered by nephrology.     Hyponatremia   147.  Possibly due to IV fluids   BMP daily.  1 dose Bumex today    Mild metabolic acidosis:   Serum bicarb 16, anion gap 16. Sodium bicarb added by nephrology.  6/9 serum bicarb improved to 20.  Continue sodium bicarb tabs per nephrology.    Acute on chronic HFrEF:   Echo from 5/25/2022 showed an EF of 40 to 45%.    Echo 6/10/2024 with a EF 35 to 40% and severe global hypokinesis  BNP on admission elevated at 5940.  Concern for pulmonary edema on chest x-ray.  Also reports fatigue and shortness of breath.  GDMT: Entresto (currently on hold), Toprol XL 50 mg daily , Lasix MWF (currently on hold)  Continue to monitor daily weights, strict intake and output.  Consult cardiology for discharge recommendations     Elevated troponin: Suspect secondary to hypovolemic hypotension with MISA.  Troponin trend 40, 63, 57.  EKG showing sinus arrhythmia with left bundle branch block.  LBBB noted on EKGs in 2018.  Does have a history of CABG.  Denies any chest pain.  Repeat echocardiogram ordered.  Continue telemetry monitoring.    CAD, s/p CABGx2, AVR, MVR: c/w ASA, lipitor; monitor fluids closely.  Fatigue: Likely in setting of MISA as well as heart failure diagnosis.  PT/OT ordered.      LDA: []CVC / []PICC / []Midline / []Gabriel / []Drains / []Mediport / [x]None  Antibiotics: no  Steroids: no  Labs (still needed?): [x]Yes / []No  IVF (still needed?): []Yes / [x]No    Level of care: []Step Down / [x]Med-Surg  Bed Status: 
   06/10/24 1028   Encounter Summary   Encounter Overview/Reason Spiritual/Emotional Needs   Service Provided For Patient and family together   Referral/Consult From Rounding   Support System Spouse;Children;Family members   Last Encounter  06/10/24   Complexity of Encounter Moderate   Begin Time 1022   End Time  1028   Total Time Calculated 6 min   Spiritual/Emotional needs   Type Spiritual Support   Assessment/Intervention/Outcome   Assessment Coping   Intervention Nurtured Hope;Prayer (assurance of)/[place];Sustaining Presence/Ministry of presence   Outcome Comfort     Assessment:  In my encounter with the 79 yr old patient the pt's family was supportively present. While rounding the unit 6K,  I provided spiritual care to patient and their family through conversation, I also came to assess their spiritual needs. The pt was admitted due to ARF/ acute renal failure.     Interventions:  I provided, prayer, emotional support and words of comfort.  provided a listening presence and encouraged pt to share their beliefs and how I can support them during their hospitalization.       Outcomes:  The patient was encouraged and didn't share any further spiritual needs at this time. The pt remains optimistic and hopeful. The pt shared that they were appreciative for the support.     Plan:  Chaplains will follow-up at a later time for assessment of any spiritual care needs present.  
  Physician Progress Note      PATIENT:               VAIBHAV PURI  [facility] #:                  [id number]  :                       1945  ADMIT DATE:       2024 6:05 PM  DISCH DATE:  RESPONDING  PROVIDER #:        Cecilia Enamorado PA-C          QUERY TEXT:    Patient admitted with MISA . Documentation reflects \"Type 2 NSTEMI\"  in H&P   note(s) dated 24 and \"Elevated troponin: Suspect secondary to hypovolemic   hypotension with MISA\".  If possible, please document in the progress notes and   discharge summary if Type 2 NSTEMI was:    The medical record reflects the following:  Risk Factors: MISA, CKD, HTN , CHF, hx cabd 2018  Clinical Indicators: Troponin trend 40, 63, 57.  EKG showing sinus arrhythmia   with left bundle branch block.  LBBB noted on EKGs in 2018. Denies chest pain   , presents with cough , confusion, off balance , CXR - Cardiomegaly present.    Probable component of pulmonary edema. Peripheral opacity right lung. This   could represent component of pneumonia.  Treatment: admission , imaging labs- trend troponin, bnp, EKG, Nephrology   consult, electrolyte, diuretic and fluid management for treatment of   underlying MISA, hypervolemia, Echo repeated    Thank you,  Cecil BOOKERN, RN, CRCR  Clinical   P: [phone]  F: [phone]  Options provided:  -- Type 2 NSTEMI poa and confirmed after study  -- Non-ischemic myocardial injury due to MISA with hypovolemia, and hypotension  -- Type 2 NSTEMI  ruled out after study  -- Other - I will add my own diagnosis  -- Disagree - Not applicable / Not valid  -- Disagree - Clinically unable to determine / Unknown  -- Refer to Clinical Documentation Reviewer    PROVIDER RESPONSE TEXT:    This patient has non-ischemic myocardial injury due to MISA with Hypovolemia   and hypotension .    Query created by: Cecil Machado on 6/10/2024 2:28 PM      Electronically signed by:  Cecilia Enamorado PA-C 2024 8:44 
 [facility]  INPATIENT PHYSICAL THERAPY  STRZ RENAL TELEMETRY 6K - 6K-09/009-A  Daily Note    Time In: 0835  Time Out: 0900  Timed Code Treatment Minutes: 25 Minutes  Minutes: 25          Date: 2024  Patient Name: Myles Santa,  Gender:  male        MRN: [id number]  : 1945  (79 y.o.)     Referring Practitioner: ROBERTO Mary CNP  Diagnosis: chronic pulmonary edema  Additional Pertinent Hx: per MD:The patient is a 79 y.o. male with history of CKD 3, hypertension, diabetes, renal cyst.  Patient has baseline serum creatinine of around 1.5-1.7.  He was admitted yesterday with complaints of progressively worsening fatigue, generalized weakness, poor oral intake for last 3 to 4 days.  Symptoms associated with some diarrhea for about a week.  No alleviating or aggravating factors.  In the emergency room patient serum creatinine was over 6.  He was started on fluids.  Nephrology consulted this morning for further evaluation.  Currently patient is reporting to be feeling better.  Wife is in the room.  Patient is on room air.  He is awake and alert.  He reports some cough.  Watching TV.  No urinary complaints.     Prior Level of Function:  Lives With: Spouse  Type of Home: Trailer  Home Layout: One level  Home Access: Stairs to enter with rails  Entrance Stairs - Number of Steps: 5  Entrance Stairs - Rails: Both  Home Equipment: Walker - Rolling        Ambulation Assistance: Independent  Transfer Assistance: Independent  Additional Comments: per pt was set up for outpt PT to start to work on overall strengthening    Restrictions/Precautions:  Restrictions/Precautions: Fall Risk     SUBJECTIVE: Pt. Seated on his BS chair and pleasantly agrees to therapy session.  RN approved therapy session. Spouse present. Pt. Motivated for discharge.     Pain: None indicated    Vitals:   Patient Vitals for the past 8 hrs:   BP Patient Position Temp Temp src Pulse Resp SpO2 O2 Device   24 1119 136/71 Sitting;Up in 
Discharge teaching and instructions for diagnosis/procedure of CHF/ MISA completed with patient using teachback method. AVS reviewed. Printed prescriptions given to patient. Patient voiced understanding regarding prescriptions, follow up appointments, and care of self at home. Discharged in a wheelchair to  home with support per self   
[facility]   Nephrology progress note  6/10/2024, 10:52 AM      Pt Name:    Myles Santa  MRN:     [id number]     YOB: 1945  Admit Date:    6/7/2024  6:05 PM    Chief Complaint: Nephrology following for MISA    Subjective:  Patient was seen and examined this morning  No chest pain or shortness of breath  Overall no complaints decent urine output    Objective:  24HR INTAKE/OUTPUT:    Intake/Output Summary (Last 24 hours) at 6/10/2024 1052  Last data filed at 6/10/2024 0932  Gross per 24 hour   Intake 10 ml   Output 1000 ml   Net -990 ml      Admission weight: 96.6 kg (212 lb 14.4 oz)  Wt Readings from Last 3 Encounters:   06/10/24 98.2 kg (216 lb 7.9 oz)   06/05/24 97.5 kg (215 lb)   12/11/23 100.9 kg (222 lb 6.4 oz)        Vitals :   Vitals:    06/10/24 0510 06/10/24 0600 06/10/24 0824 06/10/24 0851   BP: (!) 151/72   (!) 139/98   Pulse: 75  74 79   Resp: 16  20 16   Temp: 97.6 °F (36.4 °C)   98.3 °F (36.8 °C)   TempSrc: Oral   Oral   SpO2: 93%  95% 94%   Weight:  98.2 kg (216 lb 7.9 oz)     Height:           Physical examination  General Appearance: alert  no distress  Mouth/Throat: Oral mucosa moist  Neck: No JVD  Lungs: Faint Rales noted bilaterally  Heart:  S1, S2 heard  GI: soft, non-tender, no guarding  Extremities: Trace LE edema    Medications:  Infusion:    sodium chloride       Meds:    albuterol  2.5 mg Nebulization TID    sodium bicarbonate  1,300 mg Oral BID    levothyroxine  75 mcg Oral Daily    gabapentin  200 mg Oral BID    ferrous sulfate  650 mg Oral Daily with breakfast    atorvastatin  40 mg Oral QPM    aspirin  81 mg Oral Daily    sodium chloride flush  5-40 mL IntraVENous 2 times per day     Meds prn: albuterol, sodium chloride flush, sodium chloride, ondansetron **OR** ondansetron, polyethylene glycol, acetaminophen **OR** acetaminophen     Lab Data :  CBC:   Recent Labs     06/07/24  1820 06/08/24  0539   WBC 6.6 5.2   HGB 11.7* 10.8*   HCT 37.1* 35.1*   PLT 
[facility]   Nephrology progress note  6/11/2024, 11:31 AM      Pt Name:    Myles Santa  MRN:     [id number]     YOB: 1945  Admit Date:    6/7/2024  6:05 PM    Chief Complaint: Nephrology following for MISA    Subjective:  Patient was seen and examined this morning  No chest pain or shortness of breath  Overall no complaints decent urine output    Objective:  24HR INTAKE/OUTPUT:    Intake/Output Summary (Last 24 hours) at 6/11/2024 1131  Last data filed at 6/11/2024 1119  Gross per 24 hour   Intake 500 ml   Output 1075 ml   Net -575 ml      Admission weight: 96.6 kg (212 lb 14.4 oz)  Wt Readings from Last 3 Encounters:   06/11/24 98.2 kg (216 lb 8.3 oz)   06/05/24 97.5 kg (215 lb)   12/11/23 100.9 kg (222 lb 6.4 oz)        Vitals :   Vitals:    06/11/24 0516 06/11/24 0745 06/11/24 0911 06/11/24 1119   BP:   125/61 136/71   Pulse:  66 73 65   Resp:  16 22 18   Temp:    97.5 °F (36.4 °C)   TempSrc:    Axillary   SpO2:  97% 95% 94%   Weight: 98.2 kg (216 lb 8.3 oz)      Height:           Physical examination  General Appearance: alert  no distress  Mouth/Throat: Oral mucosa moist  Neck: No JVD  Lungs: Much clear faint wheeze noted  Heart:  S1, S2 heard  GI: soft, non-tender, no guarding  Extremities: Trace LE edema    Medications:  Infusion:    sodium chloride       Meds:    metoprolol succinate  50 mg Oral Daily    albuterol  2.5 mg Nebulization TID    levothyroxine  75 mcg Oral Daily    gabapentin  200 mg Oral BID    ferrous sulfate  650 mg Oral Daily with breakfast    atorvastatin  40 mg Oral QPM    aspirin  81 mg Oral Daily    sodium chloride flush  5-40 mL IntraVENous 2 times per day     Meds prn: magnesium sulfate, albuterol, sodium chloride flush, sodium chloride, ondansetron **OR** ondansetron, polyethylene glycol, acetaminophen **OR** acetaminophen     Lab Data :  CBC:   No results for input(s): \"WBC\", \"HGB\", \"HCT\", \"PLT\" in the last 72 hours.    CMP:  Recent Labs     
[facility]   Nephrology progress note  6/9/2024, 12:58 PM      Pt Name:    Myles Santa  MRN:     [id number]     YOB: 1945  Admit Date:    6/7/2024  6:05 PM    Chief Complaint: Nephrology following for MISA    Subjective:  Patient was seen and examined this morning  No chest pain or shortness of breath  Feels okay    Objective:  24HR INTAKE/OUTPUT:    Intake/Output Summary (Last 24 hours) at 6/9/2024 1258  Last data filed at 6/9/2024 0759  Gross per 24 hour   Intake --   Output 925 ml   Net -925 ml         I/O last 3 completed shifts:  In: 618.1 [P.O.:200; I.V.:418.1]  Out: 1545 [Urine:1545]  I/O this shift:  In: -   Out: 200 [Urine:200]   Admission weight: 96.6 kg (212 lb 14.4 oz)  Wt Readings from Last 3 Encounters:   06/07/24 96.6 kg (212 lb 14.4 oz)   06/05/24 97.5 kg (215 lb)   12/11/23 100.9 kg (222 lb 6.4 oz)        Vitals :   Vitals:    06/09/24 0449 06/09/24 0759 06/09/24 0834 06/09/24 1111   BP: 125/72 137/64  (!) 154/67   Pulse: 77 72  74   Resp:  18  18   Temp: 97.3 °F (36.3 °C) 97.6 °F (36.4 °C)  97.9 °F (36.6 °C)   TempSrc: Oral Oral  Oral   SpO2: 90% 92% 92% 92%   Weight:       Height:           Physical examination  General Appearance: alert and cooperative with exam, appears comfortable, no distress  Mouth/Throat: Oral mucosa moist  Neck: No JVD  Lungs: Air entry B/L, no rales, no use of accessory muscles  Heart:  S1, S2 heard  GI: soft, non-tender, no guarding  Extremities: Trace LE edema    Medications:  Infusion:    sodium chloride       Meds:    albuterol  2.5 mg Nebulization TID    sodium bicarbonate  1,300 mg Oral BID    levothyroxine  75 mcg Oral Daily    gabapentin  200 mg Oral BID    ferrous sulfate  650 mg Oral Daily with breakfast    atorvastatin  40 mg Oral QPM    aspirin  81 mg Oral Daily    sodium chloride flush  5-40 mL IntraVENous 2 times per day     Meds prn: albuterol, sodium chloride flush, sodium chloride, ondansetron **OR** ondansetron, 
[facility]  INPATIENT PHYSICAL THERAPY  EVALUATION  STRZ RENAL TELEMETRY 6K - 6K-09/009-A    Time In: 1027  Time Out: 1042  Timed Code Treatment Minutes: 8 Minutes  Minutes: 15          Date: 6/10/2024  Patient Name: Myles Santa,  Gender:  male        MRN: [id number]  : 1945  (79 y.o.)      Referring Practitioner: ROBERTO Mary CNP  Diagnosis: chronic pulmonary edema  Additional Pertinent Hx: per MD:The patient is a 79 y.o. male with history of CKD 3, hypertension, diabetes, renal cyst.  Patient has baseline serum creatinine of around 1.5-1.7.  He was admitted yesterday with complaints of progressively worsening fatigue, generalized weakness, poor oral intake for last 3 to 4 days.  Symptoms associated with some diarrhea for about a week.  No alleviating or aggravating factors.  In the emergency room patient serum creatinine was over 6.  He was started on fluids.  Nephrology consulted this morning for further evaluation.  Currently patient is reporting to be feeling better.  Wife is in the room.  Patient is on room air.  He is awake and alert.  He reports some cough.  Watching TV.  No urinary complaints.     Restrictions/Precautions:  Restrictions/Precautions: Fall Risk    Subjective:  Chart Reviewed: Yes  Patient assessed for rehabilitation services?: Yes  Subjective: pleasant and cooperative. pt hoping to go home soon.    General:        Hearing: Within functional limits       Pain: no pain per pt    Vitals: Vitals not assessed per clinical judgement, see nursing flowsheet    Social/Functional History:    Lives With: Spouse  Type of Home: Trailer  Home Layout: One level  Home Access: Stairs to enter with rails  Entrance Stairs - Number of Steps: 5  Entrance Stairs - Rails: Both  Home Equipment: Walker - Rolling                   Ambulation Assistance: Independent  Transfer Assistance: Independent          Additional Comments: per pt was set up for outpt PT to start to work on overall 
Patient complaints of shortness of breath. VS obtained, stable. Lung sounds inspiratory wheezing and exp wheezing through out. Notified attending provider and respiratory notified. See orders placed.     1400 - patient appears comfortable in bed, shortness of breath improving   
Pt admitted to  6K9 from ED.   Complaints: weakness.    IV normal saline infusing into the wrist right, condition patent, no redness. IV site free of s/s of infection or infiltration. Vital signs obtained. Assessment and data collection initiated. Two nurse skin assessment performed by Julia YANES and Brie YANES. Oriented to room. Policies and procedures for 6K explained. Julia YANES discussed hourly rounding with patient addressing 5 P's. Fall prevention and safety brochure discussed with patient.  Bed alarm on. Call light in reach.  The best day to schedule a follow up Dr appointment is:  Wednesday a.m.  Explained patients right to have family, representative or physician notified of their admission.  Patient has Declined for physician to be notified.  Patient has Declined for family/representative to be notified. All questions answered with no further questions at this time.           
Yes  Decision Making: Medium Complexity    Treatment Initiated: Treatment and education initiated within context of evaluation.  Evaluation time included review of current medical information, gathering information related to past medical, social and functional history, completion of standardized testing, formal and informal observation of tasks, assessment of data and development of plan of care and goals.  Treatment time included skilled education and facilitation of tasks to increase safety and independence with ADL's for improved functional independence and quality of life.    Discharge Recommendations:  Continue to assess pending progress, Patient would benefit from continued therapy after discharge, Subacute/Skilled Nursing Facility    Patient Education:          Patient Education  Education Given To: Patient;Family  Education Provided: Role of Therapy;Fall Prevention Strategies;Plan of Care;Precautions;ADL Adaptive Strategies;Transfer Training  Barriers to Learning: Cognition    Equipment Recommendations:  Equipment Needed: No    Plan:  Times Per Week: 3-5x  Times Per Day: Once a day  Current Treatment Recommendations: Balance training, Functional mobility training, Endurance training, Pain management, Neuromuscular re-education, Safety education & training, Patient/Caregiver education & training, Self-Care / ADL, Positioning, Coordination training.  See long-term goal time frame for expected duration of plan of care.  If no long-term goals established, a short length of stay is anticipated.    Goals:  Patient goals : return home at [facility]  Short Term Goals  Time Frame for Short Term Goals: by discharge  Short Term Goal 1: patient will tolerate 5 min functional standing with (S) to increase ease with toileting.  Short Term Goal 2: patient will functionally ambulate to/from BR with (S) with least restrictive device.  Short Term Goal 3: patient will complete ADL routine with (S).    AM-[facility] Inpatient Daily 
interstitial pulmonary edema is not excluded. **This report has been created using voice recognition software.  It may contain minor errors which are inherent in voice recognition technology.** Electronically signed by Dr. Loreto Mercedes    XR CHEST (2 VW)    Result Date: 6/7/2024  Single view of the chest Comparison: CR/SR - XR CHEST STANDARD (2 VW) - 10/24/2022 05:03 AM EDT Findings: Cardiomegaly present. Bronchial wall thickening. There are Kerley B-lines. This likely represents component of pulmonary edema. Peripheral opacity right lung, increased. This could be secondary to edema or pneumonia. Status post median sternotomy.     Impression: Cardiomegaly present. Probable component of pulmonary edema. Peripheral opacity right lung. This could represent component of pneumonia. This document has been electronically signed by: Ozzy Crawford MD on 06/07/2024 07:10 PM      Electronically signed by SRIRAM Styles CNP on 6/8/2024 at 2:58 PM         
Mother is RH Positive

## 2024-06-11 NOTE — DISCHARGE SUMMARY
states it is not worse than normal.  Denies chest pain.  No further complaints of diarrhea.  Continues to have fatigue.  Lung sounds diminished on initial exam.  Nursing then notified provider that he was having some new wheezing and shortness of breath.  Albuterol nebulizer ordered.  Repeat chest x-ray continue to show some concern for pulmonary edema.  IV fluids discontinued by nephrology.\"       Exam:     Vitals:  Vitals:    06/11/24 0516 06/11/24 0745 06/11/24 0911 06/11/24 1119   BP:   125/61 136/71   Pulse:  66 73 65   Resp:  16 22 18   Temp:    97.5 °F (36.4 °C)   TempSrc:    Axillary   SpO2:  97% 95% 94%   Weight: 98.2 kg (216 lb 8.3 oz)      Height:         Weight: Weight - Scale: 98.2 kg (216 lb 8.3 oz)     24 hour intake/output:  Intake/Output Summary (Last 24 hours) at 6/11/2024 1511  Last data filed at 6/11/2024 1119  Gross per 24 hour   Intake 300 ml   Output 475 ml   Net -175 ml       General appearance: No apparent distress, appears stated age and cooperative.  Overweight  HEENT: Pupils equal, round, and reactive to light. Conjunctivae/corneas clear.  Neck: Supple, with full range of motion. No jugular venous distention. Trachea midline.  Respiratory:  Normal respiratory effort. Clear to auscultation, bilaterally without Rales/Wheezes/Rhonchi.  Cardiovascular: Regular rate and rhythm.  2/6 LLSB murmur   1+ edema bilaterally  Abdomen: Soft, non-tender, non-distended with normal bowel sounds.  Musculoskeletal: passive and active ROM x 4 extremities.  Skin: Skin color, texture, turgor normal.  No rashes or lesions.  Neurologic:  Neurovascularly intact without any focal sensory/motor deficits. Cranial nerves: II-XII intact, grossly non-focal.  Psychiatric: Alert and oriented, thought content appropriate, normal insight  Capillary Refill: Brisk,< 3 seconds   Peripheral Pulses: +2 palpable, equal bilaterally      Labs: For convenience and continuity at follow-up the following most recent labs are

## 2024-06-11 NOTE — PROCEDURES
PROCEDURE NOTE  Date: 6/11/2024   Name: Myles DAVIS Arina  YOB: 1945    Procedures EKG completed,

## 2024-06-11 NOTE — RT PROTOCOL NOTE
RT Inhaler-Nebulizer Bronchodilator Protocol Note    There is a bronchodilator order in the chart from a provider indicating to follow the RT Bronchodilator Protocol and there is an “Initiate RT Inhaler-Nebulizer Bronchodilator Protocol” order as well (see protocol at bottom of note).    CXR Findings:  No results found.    The findings from the last RT Protocol Assessment were as follows:   History Pulmonary Disease: None or smoker <15 pack years  Respiratory Pattern: Regular pattern and RR 12-20 bpm  Breath Sounds: Slightly diminished and/or crackles  Cough: Strong, productive  Indication for Bronchodilator Therapy: Decreased or absent breath sounds  Bronchodilator Assessment Score: 3    Aerosolized bronchodilator medication orders have been revised according to the RT Inhaler-Nebulizer Bronchodilator Protocol below.    Respiratory Therapist to perform RT Therapy Protocol Assessment initially then follow the protocol.  Repeat RT Therapy Protocol Assessment PRN for score 0-3 or on second treatment, BID, and PRN for scores above 3.    No Indications - adjust the frequency to every 6 hours PRN wheezing or bronchospasm, if no treatments needed after 48 hours then discontinue using Per Protocol order mode.     If indication present, adjust the RT bronchodilator orders based on the Bronchodilator Assessment Score as indicated below.  Use Inhaler orders unless patient has one or more of the following: on home nebulizer, not able to hold breath for 10 seconds, is not alert and oriented, cannot activate and use MDI correctly, or respiratory rate 25 breaths per minute or more, then use the equivalent nebulizer order(s) with same Frequency and PRN reasons based on the score.  If a patient is on this medication at home then do not decrease Frequency below that used at home.    0-3 - enter or revise RT bronchodilator order(s) to equivalent RT Bronchodilator order with Frequency of every 4 hours PRN for wheezing or increased

## 2024-06-24 LAB
BUN BLDV-MCNC: 15 MG/DL
CALCIUM SERPL-MCNC: 9.1 MG/DL
CHLORIDE BLD-SCNC: 100 MMOL/L
CO2: 24 MMOL/L
CREAT SERPL-MCNC: 1.37 MG/DL
EGFR: 53
GLUCOSE BLD-MCNC: 125 MG/DL
POTASSIUM SERPL-SCNC: 4.2 MMOL/L
SODIUM BLD-SCNC: 138 MMOL/L

## 2024-06-26 ENCOUNTER — OFFICE VISIT (OUTPATIENT)
Dept: NEPHROLOGY | Age: 79
End: 2024-06-26
Payer: MEDICARE

## 2024-06-26 VITALS — SYSTOLIC BLOOD PRESSURE: 114 MMHG | DIASTOLIC BLOOD PRESSURE: 70 MMHG | HEART RATE: 48 BPM | OXYGEN SATURATION: 97 %

## 2024-06-26 DIAGNOSIS — I10 HTN (HYPERTENSION), BENIGN: ICD-10-CM

## 2024-06-26 DIAGNOSIS — N18.31 STAGE 3A CHRONIC KIDNEY DISEASE (HCC): Primary | ICD-10-CM

## 2024-06-26 DIAGNOSIS — N28.1 RENAL CYST: ICD-10-CM

## 2024-06-26 PROCEDURE — 3078F DIAST BP <80 MM HG: CPT | Performed by: INTERNAL MEDICINE

## 2024-06-26 PROCEDURE — 1111F DSCHRG MED/CURRENT MED MERGE: CPT | Performed by: INTERNAL MEDICINE

## 2024-06-26 PROCEDURE — G8417 CALC BMI ABV UP PARAM F/U: HCPCS | Performed by: INTERNAL MEDICINE

## 2024-06-26 PROCEDURE — 1036F TOBACCO NON-USER: CPT | Performed by: INTERNAL MEDICINE

## 2024-06-26 PROCEDURE — 3074F SYST BP LT 130 MM HG: CPT | Performed by: INTERNAL MEDICINE

## 2024-06-26 PROCEDURE — G8427 DOCREV CUR MEDS BY ELIG CLIN: HCPCS | Performed by: INTERNAL MEDICINE

## 2024-06-26 PROCEDURE — 1123F ACP DISCUSS/DSCN MKR DOCD: CPT | Performed by: INTERNAL MEDICINE

## 2024-06-26 PROCEDURE — 99213 OFFICE O/P EST LOW 20 MIN: CPT | Performed by: INTERNAL MEDICINE

## 2024-06-26 NOTE — PROGRESS NOTES
[facility]          Outpatient Follow-Up note         6/26/2024 10:14 AM    Patient Name:   Myles Santa  YOB: 1945  Primary Care Physician:  Ted Flynn MD     Chief Complaint / Reason for follow-up : Follow Up of chronic kidney disease and hypertension      Interval History :  Patient seen and examined by me.   Feels well.  Denies any complaints.    No chest pain and no shortness of breath.   Patient was recently in the hospital for acute kidney injury secondary to prerenal from diarrhea and he was also on Entresto at this time his discharge creatinine was down to 1.1     Past History :  Past Medical History:   Diagnosis Date    Acute on chronic systolic CHF (congestive heart failure), NYHA class 3 ([facility]) EF 25-30% 11/7/2018    CAD (coronary artery disease)     CKD (chronic kidney disease) stage 3, GFR 30-59 ml/min ([facility]) 9/23/2015    Diabetes mellitus ([facility])     HTN (hypertension), benign     Hyperlipidemia     Hypothyroidism     Mitral and aortic incompetence     Neuropathy     Positive blood culture     Being treated with rocephhin as outpt.    S/P CABG x 2 11/15/2018     Past Surgical History:   Procedure Laterality Date    CARDIAC SURGERY      heart cath    CATARACT REMOVAL WITH IMPLANT Bilateral     COLONOSCOPY      CORONARY ARTERY BYPASS GRAFT  11/14/2018    DIAGNOSTIC CARDIAC CATH LAB PROCEDURE      EYE SURGERY      AR CORONARY ARTERY BYPASS 3 CORONARY VENOUS GRAFTS N/A 11/14/2018    CABG CORONARY ARTERY BYPASS,  AORTIC AND MITRAL VALVE REPAIR WITH MICHELE, BALLOON PUMP INSERTION performed by Alan Landis MD at [facility] OR    AR ECHO TRANSESOPHAG R-T 2D IMG ACQUISJ I&R ONLY Left 10/25/2018    TRANSESOPHAGEAL ECHOCARDIOGRAM performed by Bry Proctor MD at [facility] Endoscopy        Medications :     Outpatient Medications Marked as Taking for the 6/26/24 encounter (Office Visit) with Jamison Scott MD   Medication Sig Dispense Refill    albuterol sulfate (PROAIR

## 2024-07-10 ENCOUNTER — OFFICE VISIT (OUTPATIENT)
Dept: CARDIOLOGY CLINIC | Age: 79
End: 2024-07-10
Payer: MEDICARE

## 2024-07-10 VITALS
HEIGHT: 72 IN | DIASTOLIC BLOOD PRESSURE: 78 MMHG | BODY MASS INDEX: 27.8 KG/M2 | HEART RATE: 56 BPM | WEIGHT: 205.25 LBS | SYSTOLIC BLOOD PRESSURE: 122 MMHG

## 2024-07-10 DIAGNOSIS — Z98.890 S/P MVR (MITRAL VALVE REPAIR): ICD-10-CM

## 2024-07-10 DIAGNOSIS — I50.22 CHF (CONGESTIVE HEART FAILURE), NYHA CLASS II, CHRONIC, SYSTOLIC (HCC): ICD-10-CM

## 2024-07-10 DIAGNOSIS — I25.5 ISCHEMIC CARDIOMYOPATHY: ICD-10-CM

## 2024-07-10 DIAGNOSIS — Z95.1 S/P CABG X 2: ICD-10-CM

## 2024-07-10 DIAGNOSIS — I42.9 CARDIOMYOPATHY, UNSPECIFIED TYPE (HCC): ICD-10-CM

## 2024-07-10 DIAGNOSIS — I10 ESSENTIAL HYPERTENSION: ICD-10-CM

## 2024-07-10 DIAGNOSIS — R93.1 ABNORMAL ECHOCARDIOGRAM: Primary | ICD-10-CM

## 2024-07-10 DIAGNOSIS — N18.30 STAGE 3 CHRONIC KIDNEY DISEASE, UNSPECIFIED WHETHER STAGE 3A OR 3B CKD (HCC): ICD-10-CM

## 2024-07-10 PROCEDURE — G8427 DOCREV CUR MEDS BY ELIG CLIN: HCPCS | Performed by: NURSE PRACTITIONER

## 2024-07-10 PROCEDURE — 1036F TOBACCO NON-USER: CPT | Performed by: NURSE PRACTITIONER

## 2024-07-10 PROCEDURE — 1111F DSCHRG MED/CURRENT MED MERGE: CPT | Performed by: NURSE PRACTITIONER

## 2024-07-10 PROCEDURE — 3078F DIAST BP <80 MM HG: CPT | Performed by: NURSE PRACTITIONER

## 2024-07-10 PROCEDURE — 3074F SYST BP LT 130 MM HG: CPT | Performed by: NURSE PRACTITIONER

## 2024-07-10 PROCEDURE — 99214 OFFICE O/P EST MOD 30 MIN: CPT | Performed by: NURSE PRACTITIONER

## 2024-07-10 PROCEDURE — 1123F ACP DISCUSS/DSCN MKR DOCD: CPT | Performed by: NURSE PRACTITIONER

## 2024-07-10 PROCEDURE — G8417 CALC BMI ABV UP PARAM F/U: HCPCS | Performed by: NURSE PRACTITIONER

## 2024-07-10 NOTE — PROGRESS NOTES
Pt here for 1 yr check up     [facility]    Pt states no c/o      
valve, aneurysms, heart transplant, pacemakers, defibrillators, or sudden cardiac death.      Past Surgical History   Past Surgical History:   Procedure Laterality Date    CARDIAC SURGERY      heart cath    CATARACT REMOVAL WITH IMPLANT Bilateral     COLONOSCOPY      CORONARY ARTERY BYPASS GRAFT  11/14/2018    DIAGNOSTIC CARDIAC CATH LAB PROCEDURE      EYE SURGERY      AL CORONARY ARTERY BYPASS 3 CORONARY VENOUS GRAFTS N/A 11/14/2018    CABG CORONARY ARTERY BYPASS,  AORTIC AND MITRAL VALVE REPAIR WITH MICHELE, BALLOON PUMP INSERTION performed by Alan Landis MD at [facility] OR    AL ECHO TRANSESOPHAG R-T 2D IMG ACQUISJ I&R ONLY Left 10/25/2018    TRANSESOPHAGEAL ECHOCARDIOGRAM performed by Bry Proctor MD at [facility] Endoscopy       Review of Systems   Constitutional: Negative for chills and fever  HENT: Negative for congestion, sinus pressure, sneezing and sore throat.    Eyes: Negative for pain, discharge, redness and itching.   Respiratory: Negative for PND, orthopnea, (+) occasional baseline cough  Gastrointestinal: Negative for blood in stool, constipation, diarrhea   Endocrine: Negative for cold intolerance, heat intolerance, polydipsia.  Genitourinary: Negative for dysuria, hematuria.   Musculoskeletal: Negative for arthralgias, joint swelling and neck pain.   Neurological: Negative for numbness and headaches.   Psychiatric/Behavioral: Negative for agitation, confusion, decreased concentration and dysphoric mood.     Objective:     /78   Pulse 56   Ht 1.829 m (6')   Wt 93.1 kg (205 lb 4 oz)   BMI 27.84 kg/m²     Wt Readings from Last 3 Encounters:   07/10/24 93.1 kg (205 lb 4 oz)   06/11/24 98.2 kg (216 lb 8.3 oz)   06/05/24 97.5 kg (215 lb)     BP Readings from Last 3 Encounters:   07/10/24 122/78   06/26/24 114/70   06/11/24 (!) 154/84       Nursing note and vitals reviewed.    Physical Exam   Constitutional: Oriented to person, place, and time. Appears well-developed and well-nourished. Appears well. No

## 2024-07-10 NOTE — PATIENT INSTRUCTIONS
Continue current medications as prescribed.    Stay as active as you can.     Eat heart healthy diet.     Continue to monitor the blood pressure and heart rate.   Call if Heart rate wanting to run below 50 or if not feeling well.  [phone]- use option #3    Stay well hydrated. If urine darker - drink more water.   If urine darker or blood pressure lower - do not take the water pill. Drink a little more and then resume the water pill as scheduled. If blood pressure and urine do not improve call our office.     Follow-up with your PCP as scheduled.    Follow-up with Dr. Proctor or Yessy in 4 months as scheduled or sooner if need.

## 2024-09-09 ENCOUNTER — HOSPITAL ENCOUNTER (OUTPATIENT)
Age: 79
Discharge: HOME | End: 2024-09-11
Attending: NURSE PRACTITIONER
Payer: MEDICARE

## 2024-09-09 DIAGNOSIS — I50.22 CHF (CONGESTIVE HEART FAILURE), NYHA CLASS II, CHRONIC, SYSTOLIC (HCC): ICD-10-CM

## 2024-09-09 DIAGNOSIS — Z95.1 S/P CABG X 2: ICD-10-CM

## 2024-09-09 DIAGNOSIS — I42.9 CARDIOMYOPATHY, UNSPECIFIED TYPE (HCC): ICD-10-CM

## 2024-09-09 DIAGNOSIS — I25.5 ISCHEMIC CARDIOMYOPATHY: ICD-10-CM

## 2024-09-09 DIAGNOSIS — I10 ESSENTIAL HYPERTENSION: ICD-10-CM

## 2024-09-09 DIAGNOSIS — R93.1 ABNORMAL ECHOCARDIOGRAM: ICD-10-CM

## 2024-09-09 DIAGNOSIS — Z98.890 S/P MVR (MITRAL VALVE REPAIR): ICD-10-CM

## 2024-09-09 LAB
ECHO LA AREA 2C: 22.2 CM2
ECHO LA AREA 4C: 22.7 CM2
ECHO LA DIAMETER: 5 CM
ECHO LA MAJOR AXIS: 6.1 CM
ECHO LA MINOR AXIS: 5.9 CM
ECHO LA VOL BP: 67 ML (ref 18–58)
ECHO LA VOL MOD A2C: 67 ML (ref 18–58)
ECHO LA VOL MOD A4C: 66 ML (ref 18–58)
ECHO LV EDV A2C: 120 ML
ECHO LV EDV A4C: 163 ML
ECHO LV EJECTION FRACTION A2C: 47 %
ECHO LV EJECTION FRACTION A4C: 36 %
ECHO LV EJECTION FRACTION BIPLANE: 41 % (ref 55–100)
ECHO LV ESV A2C: 64 ML
ECHO LV ESV A4C: 105 ML
ECHO LV FRACTIONAL SHORTENING: 17 % (ref 28–44)
ECHO LV INTERNAL DIMENSION DIASTOLIC: 4.8 CM (ref 4.2–5.9)
ECHO LV INTERNAL DIMENSION SYSTOLIC: 4 CM
ECHO LV IVSD: 1.4 CM (ref 0.6–1)
ECHO LV MASS 2D: 273.8 G (ref 88–224)
ECHO LV POSTERIOR WALL DIASTOLIC: 1.4 CM (ref 0.6–1)
ECHO LV RELATIVE WALL THICKNESS RATIO: 0.58
ECHO RV INTERNAL DIMENSION: 3.5 CM
ECHO RV TAPSE: 1.6 CM (ref 1.7–?)

## 2024-09-09 PROCEDURE — 6360000004 HC RX CONTRAST MEDICATION: Performed by: NURSE PRACTITIONER

## 2024-09-09 PROCEDURE — 2580000003 HC RX 258: Performed by: NURSE PRACTITIONER

## 2024-09-09 PROCEDURE — C8923 2D TTE W OR W/O FOL W/CON,CO: HCPCS

## 2024-09-09 PROCEDURE — 93307 TTE W/O DOPPLER COMPLETE: CPT | Performed by: INTERNAL MEDICINE

## 2024-09-09 RX ADMIN — PERFLUTREN 10 ML: 6.52 INJECTION, SUSPENSION INTRAVENOUS at 11:33

## 2024-09-10 ENCOUNTER — TELEPHONE (OUTPATIENT)
Dept: CARDIOLOGY CLINIC | Age: 79
End: 2024-09-10

## 2024-11-20 ENCOUNTER — OFFICE VISIT (OUTPATIENT)
Dept: CARDIOLOGY CLINIC | Age: 79
End: 2024-11-20

## 2024-11-20 VITALS
WEIGHT: 206.6 LBS | HEIGHT: 72 IN | BODY MASS INDEX: 27.98 KG/M2 | HEART RATE: 68 BPM | DIASTOLIC BLOOD PRESSURE: 72 MMHG | SYSTOLIC BLOOD PRESSURE: 118 MMHG

## 2024-11-20 DIAGNOSIS — I50.22 CHF (CONGESTIVE HEART FAILURE), NYHA CLASS II, CHRONIC, SYSTOLIC (HCC): ICD-10-CM

## 2024-11-20 DIAGNOSIS — I25.5 ISCHEMIC CARDIOMYOPATHY: ICD-10-CM

## 2024-11-20 DIAGNOSIS — Z95.1 S/P CABG X 2: ICD-10-CM

## 2024-11-20 DIAGNOSIS — Z98.890 S/P MVR (MITRAL VALVE REPAIR): ICD-10-CM

## 2024-11-20 DIAGNOSIS — I25.10 CORONARY ARTERY DISEASE INVOLVING NATIVE CORONARY ARTERY OF NATIVE HEART WITHOUT ANGINA PECTORIS: Primary | ICD-10-CM

## 2024-11-20 DIAGNOSIS — I10 ESSENTIAL HYPERTENSION: ICD-10-CM

## 2024-11-20 NOTE — PROGRESS NOTES
normal range with no   evidence of aortic stenosis (mean gradient 5 mmHg). There was no evidence   of aortic regurgitation.   S/p MV ring repair. DOPPLER: The transmitral velocity was within the   normal range with no evidence for mitral stenosis (mean gradient 2 mmHg).   There was no evidence of mitral regurgitation.   Left atrial size was moderately dilated.   IVC size is within normal limits with normal respiratory phasic changes.    Signature    ----------------------------------------------------------------   Electronically signed by Bry Proctor MD (Interpreting   physician) on 05/26/2022 at 05:34 PM      Echo Findings     Left Ventricle Severely reduced left ventricular systolic function with a visually estimated EF of 35 - 40%. Left ventricle is mildly dilated. Normal wall thickness. Severe global hypokinesis present. Indeterminate diastolic function.   Left Atrium Left atrium size is normal.   Right Ventricle Right ventricle size is normal. Normal systolic function.   Right Atrium Right atrium size is normal.   Aortic Valve Transcatheter bioprosthetic valve that is well-seated. AV mean gradient is 9 mmHg. No regurgitation. Normal prosthetic gradient. AV mean gradient is 9 mmHg. AV peak gradient is 17 mmHg. AV peak velocity is 2.1 m/s.   Mitral Valve Valve repaired by annular ring. MV mean gradient is 7 mmHg. Trace regurgitation. MV mean gradient is 7 mmHg.   Tricuspid Valve Valve structure is normal. Trace regurgitation. No stenosis noted. The estimated RVSP is 35 mmHg.   Pulmonic Valve The pulmonic valve visualization is suboptimal but appears to be functioning normally. No regurgitation. No stenosis noted.   Aorta Normal sized aortic root and ascending aorta.   IVC/Hepatic Veins IVC diameter is less than or equal to 21 mm and decreases less than 50% during inspiration; therefore the estimated right atrial pressure is intermediate (~8 mmHg).   Pericardium The pericardium is normal. No pericardial

## 2024-11-20 NOTE — PATIENT INSTRUCTIONS
You may receive a survey regarding the care you received during your visit. We encourage you to complete and return your survey, as your input is valuable to us. We hope you will choose us in the future for your healthcare needs. Thank you!    Your Medical Assistant today: BRETT Chaudhary  Thank you for coming to our office! It was a pleasure to serve you.         Continue current medications as prescribed.    Stay as active as you can.     Eat heart healthy diet.     Follow-up with your PCP as scheduled.    Follow-up with Dr. Proctor or Yessy in 8 months as scheduled or sooner if need.

## 2024-11-26 LAB
ALBUMIN: 3.9 G/DL (ref 3.5–5.2)
ANION GAP SERPL CALCULATED.3IONS-SCNC: 12 MMOL/L (ref 7–16)
APPEARANCE: CLEAR
BACTERIA: ABNORMAL
BILIRUB SERPL-MCNC: NEGATIVE MG/DL
BUN BLDV-MCNC: 25 MG/DL (ref 8–23)
CALCIUM SERPL-MCNC: 9.2 MG/DL (ref 8.6–10.5)
CHLORIDE BLD-SCNC: 104 MMOL/L (ref 96–107)
CO2: 23 MMOL/L (ref 18–32)
COLOR, UA: YELLOW
CREAT SERPL-MCNC: 1.55 MG/DL (ref 0.67–1.3)
CREATINE, URINE: 22.3 MG/DL
CREATINE, URINE: 22.3 MG/DL
EGFR IF NONAFRICAN AMERICAN: 46 ML/MIN/1.73M2
EPITHELIAL CELLS: ABNORMAL HPF
GLUCOSE BLD-MCNC: NEGATIVE MG/DL
GLUCOSE: 99 MG/DL (ref 70–100)
HYALINE CASTS: ABNORMAL LPF
KETONES, URINE: NEGATIVE
LEUKOCYTE ESTERASE, URINE: ABNORMAL
MICROALBUMIN/CREAT 24H UR: 25.4 MG/L
MICROALBUMIN/CREAT UR-RTO: 114 MG/G
NITRITE, URINE: NEGATIVE
OCCULT BLOOD,URINE: NEGATIVE
PH: 6 (ref 5–8)
PHOSPHORUS: 3.8 MG/DL (ref 2.5–4.5)
POTASSIUM SERPL-SCNC: 4.5 MMOL/L (ref 3.5–5.4)
PROTEIN, URINE: 8.7 MG/DL
PROTEIN, URINE: NEGATIVE
PROTEIN/CREAT RATIO: 390.13 MG/G CREAT (ref 0–200)
RBC # BLD: ABNORMAL HPF (ref 0–2)
SODIUM BLD-SCNC: 139 MMOL/L (ref 135–148)
SP GRAVITY MISCELLANEOUS: 1.01 (ref 1–1.03)
UROBILINOGEN, URINE: <2 MG/DL
WBC # BLD: ABNORMAL HPF (ref 0–5)

## 2024-12-04 ENCOUNTER — OFFICE VISIT (OUTPATIENT)
Dept: NEPHROLOGY | Age: 79
End: 2024-12-04
Payer: MEDICARE

## 2024-12-04 VITALS
DIASTOLIC BLOOD PRESSURE: 74 MMHG | BODY MASS INDEX: 28.07 KG/M2 | OXYGEN SATURATION: 93 % | WEIGHT: 207 LBS | HEART RATE: 65 BPM | SYSTOLIC BLOOD PRESSURE: 120 MMHG

## 2024-12-04 DIAGNOSIS — N18.31 STAGE 3A CHRONIC KIDNEY DISEASE (HCC): Primary | ICD-10-CM

## 2024-12-04 DIAGNOSIS — I10 HTN (HYPERTENSION), BENIGN: ICD-10-CM

## 2024-12-04 DIAGNOSIS — N28.1 RENAL CYST: ICD-10-CM

## 2024-12-04 PROCEDURE — 3074F SYST BP LT 130 MM HG: CPT | Performed by: INTERNAL MEDICINE

## 2024-12-04 PROCEDURE — G8427 DOCREV CUR MEDS BY ELIG CLIN: HCPCS | Performed by: INTERNAL MEDICINE

## 2024-12-04 PROCEDURE — 1159F MED LIST DOCD IN RCRD: CPT | Performed by: INTERNAL MEDICINE

## 2024-12-04 PROCEDURE — G8417 CALC BMI ABV UP PARAM F/U: HCPCS | Performed by: INTERNAL MEDICINE

## 2024-12-04 PROCEDURE — 3078F DIAST BP <80 MM HG: CPT | Performed by: INTERNAL MEDICINE

## 2024-12-04 PROCEDURE — 99213 OFFICE O/P EST LOW 20 MIN: CPT | Performed by: INTERNAL MEDICINE

## 2024-12-04 PROCEDURE — G8484 FLU IMMUNIZE NO ADMIN: HCPCS | Performed by: INTERNAL MEDICINE

## 2024-12-04 PROCEDURE — 1036F TOBACCO NON-USER: CPT | Performed by: INTERNAL MEDICINE

## 2024-12-04 PROCEDURE — 1123F ACP DISCUSS/DSCN MKR DOCD: CPT | Performed by: INTERNAL MEDICINE

## 2024-12-04 PROCEDURE — G2211 COMPLEX E/M VISIT ADD ON: HCPCS | Performed by: INTERNAL MEDICINE

## 2024-12-04 NOTE — PROGRESS NOTES
[facility]          Outpatient Follow-Up note         12/4/2024 10:07 AM    Patient Name:   Myles Santa  YOB: 1945  Primary Care Physician:  Ted Flynn MD     Chief Complaint / Reason for follow-up : Follow Up of chronic kidney disease and hypertension      Interval History :  Patient seen and examined by me.   Feels well.  Denies any complaints.    No chest pain and no shortness of breath.   No med changes and no hospitalizations     Past History :  Past Medical History:   Diagnosis Date    Acute on chronic systolic CHF (congestive heart failure), NYHA class 3 ([facility]) EF 25-30% 11/7/2018    CAD (coronary artery disease)     CKD (chronic kidney disease) stage 3, GFR 30-59 ml/min ([facility]) 9/23/2015    Diabetes mellitus ([facility])     HTN (hypertension), benign     Hyperlipidemia     Hypothyroidism     Mitral and aortic incompetence     Neuropathy     Positive blood culture     Being treated with rocephhin as outpt.    S/P CABG x 2 11/15/2018     Past Surgical History:   Procedure Laterality Date    CARDIAC SURGERY      heart cath    CATARACT REMOVAL WITH IMPLANT Bilateral     COLONOSCOPY      CORONARY ARTERY BYPASS GRAFT  11/14/2018    DIAGNOSTIC CARDIAC CATH LAB PROCEDURE      EYE SURGERY      NM CORONARY ARTERY BYPASS 3 CORONARY VENOUS GRAFTS N/A 11/14/2018    CABG CORONARY ARTERY BYPASS,  AORTIC AND MITRAL VALVE REPAIR WITH MICHELE, BALLOON PUMP INSERTION performed by Alan Landis MD at [facility] OR    NM ECHO TRANSESOPHAG R-T 2D IMG ACQUISJ I&R ONLY Left 10/25/2018    TRANSESOPHAGEAL ECHOCARDIOGRAM performed by Bry Proctor MD at [facility] Endoscopy        Medications :     Outpatient Medications Marked as Taking for the 12/4/24 encounter (Office Visit) with Jamison Scott MD   Medication Sig Dispense Refill    albuterol sulfate (PROAIR RESPICLICK) 108 (90 Base) MCG/ACT aerosol powder inhalation Inhale 2 puffs into the lungs every 6 hours as needed for Wheezing or Shortness

## 2024-12-10 ENCOUNTER — OFFICE VISIT (OUTPATIENT)
Dept: CARDIOLOGY CLINIC | Age: 79
End: 2024-12-10
Payer: MEDICARE

## 2024-12-10 VITALS
WEIGHT: 207 LBS | OXYGEN SATURATION: 96 % | HEART RATE: 56 BPM | HEIGHT: 72 IN | DIASTOLIC BLOOD PRESSURE: 80 MMHG | BODY MASS INDEX: 28.04 KG/M2 | SYSTOLIC BLOOD PRESSURE: 144 MMHG

## 2024-12-10 DIAGNOSIS — N18.30 STAGE 3 CHRONIC KIDNEY DISEASE, UNSPECIFIED WHETHER STAGE 3A OR 3B CKD (HCC): ICD-10-CM

## 2024-12-10 DIAGNOSIS — I50.22 CHF (CONGESTIVE HEART FAILURE), NYHA CLASS II, CHRONIC, SYSTOLIC (HCC): Primary | ICD-10-CM

## 2024-12-10 DIAGNOSIS — I10 ESSENTIAL HYPERTENSION: ICD-10-CM

## 2024-12-10 DIAGNOSIS — Z95.1 HX OF CABG: ICD-10-CM

## 2024-12-10 PROCEDURE — 1123F ACP DISCUSS/DSCN MKR DOCD: CPT | Performed by: NURSE PRACTITIONER

## 2024-12-10 PROCEDURE — G8417 CALC BMI ABV UP PARAM F/U: HCPCS | Performed by: NURSE PRACTITIONER

## 2024-12-10 PROCEDURE — 3077F SYST BP >= 140 MM HG: CPT | Performed by: NURSE PRACTITIONER

## 2024-12-10 PROCEDURE — G8427 DOCREV CUR MEDS BY ELIG CLIN: HCPCS | Performed by: NURSE PRACTITIONER

## 2024-12-10 PROCEDURE — 1036F TOBACCO NON-USER: CPT | Performed by: NURSE PRACTITIONER

## 2024-12-10 PROCEDURE — 1159F MED LIST DOCD IN RCRD: CPT | Performed by: NURSE PRACTITIONER

## 2024-12-10 PROCEDURE — 99214 OFFICE O/P EST MOD 30 MIN: CPT | Performed by: NURSE PRACTITIONER

## 2024-12-10 PROCEDURE — G8484 FLU IMMUNIZE NO ADMIN: HCPCS | Performed by: NURSE PRACTITIONER

## 2024-12-10 PROCEDURE — 3079F DIAST BP 80-89 MM HG: CPT | Performed by: NURSE PRACTITIONER

## 2024-12-10 ASSESSMENT — ENCOUNTER SYMPTOMS
COUGH: 0
SHORTNESS OF BREATH: 0
ABDOMINAL DISTENTION: 0

## 2024-12-10 NOTE — PATIENT INSTRUCTIONS
You may receive a survey regarding the care you received during your visit.  Your input is valuable to us.  We encourage you to complete and return your survey.  We hope you will choose us in the future for your healthcare needs.    Your nurses today were Freda.  Office hours:   Mon-Thurs 8-4:30  Friday 8-12  Phone: [phone]    Continue:  Continue current medications  Daily weights and record  Fluid restriction of 2 Liters per day  Limit sodium in diet to around 3165-9446 mg/day  Monitor BP  Activity as tolerated     Call the [facility] Failure Clinic for any of the following symptoms:   Weight gain of 3 pounds in 1 day or 5 pounds in 1 week  Increased shortness of breath  Shortness of breath while laying down  Cough  Chest pain  Swelling in feet, ankles or legs  Bloating in abdomen  Fatigue

## 2024-12-10 NOTE — PROGRESS NOTES
Lab reviewed - stable  BP/HR stable   No med changes today   Continue diet/fluid adherence  Continue daily wts.    F/U w/ Cardiology  F/U in clinic in 1 year    Tolerating above noted HF meds, no ill side effects noted. Will continue to monitor kidney function and electrolytes. Will optimize as tolerated.   Pt is compliant w/ medications.    Total visit time of 30 minutes has been spent with patient on education of symptoms, management, medication, and plan of care; as well as review of chart: labs, ECHO, radiology reports, etc.   I personally spent more then 50% of the appt time face to face with the patient.  Daily weights  Fluid restriction of 2 Liters per day  Limit sodium in diet to around 8572-9703 mg/day  Monitor BP  Activity as tolerated     Patient was instructed to call the Heart Failure Clinic for any changes in symptoms as noted in AVS.      No follow-ups on file. or sooner if needed     Patient given educational materials - see patient instructions.   We discussed the importance of weighing oneself and recording daily. We also discussed the importance of a low sodium diet, higher sodium foods to avoid and better low sodium food options.   Patient verbalizes understanding of plan of care using teach back method, and is agreeable to the treatment plan.       Electronically signed by SRIRAM Greco CNP on 12/10/2024 at 11:44 AM

## 2025-01-04 ENCOUNTER — APPOINTMENT (OUTPATIENT)
Dept: CT IMAGING | Age: 80
DRG: 871 | End: 2025-01-04
Payer: MEDICARE

## 2025-01-04 ENCOUNTER — APPOINTMENT (OUTPATIENT)
Dept: GENERAL RADIOLOGY | Age: 80
DRG: 871 | End: 2025-01-04
Payer: MEDICARE

## 2025-01-04 ENCOUNTER — HOSPITAL ENCOUNTER (INPATIENT)
Age: 80
LOS: 3 days | Discharge: HOME | DRG: 871 | End: 2025-01-07
Attending: STUDENT IN AN ORGANIZED HEALTH CARE EDUCATION/TRAINING PROGRAM
Payer: MEDICARE

## 2025-01-04 DIAGNOSIS — A41.9 SEPSIS WITHOUT ACUTE ORGAN DYSFUNCTION, DUE TO UNSPECIFIED ORGANISM (HCC): ICD-10-CM

## 2025-01-04 DIAGNOSIS — J18.9 PNEUMONIA DUE TO INFECTIOUS ORGANISM, UNSPECIFIED LATERALITY, UNSPECIFIED PART OF LUNG: Primary | ICD-10-CM

## 2025-01-04 DIAGNOSIS — A41.9 SEPTIC SHOCK (HCC): ICD-10-CM

## 2025-01-04 DIAGNOSIS — R01.1 HEART MURMUR: ICD-10-CM

## 2025-01-04 DIAGNOSIS — U07.1 COVID-19: ICD-10-CM

## 2025-01-04 DIAGNOSIS — R65.21 SEPTIC SHOCK (HCC): ICD-10-CM

## 2025-01-04 PROBLEM — R57.9 SHOCK: Status: ACTIVE | Noted: 2025-01-04

## 2025-01-04 LAB
ALBUMIN SERPL BCG-MCNC: 3.8 G/DL (ref 3.5–5.1)
ALP SERPL-CCNC: 109 U/L (ref 38–126)
ALT SERPL W/O P-5'-P-CCNC: 18 U/L (ref 11–66)
ANION GAP SERPL CALC-SCNC: 14 MEQ/L (ref 8–16)
AST SERPL-CCNC: 20 U/L (ref 5–40)
BACTERIA: ABNORMAL
BASOPHILS ABSOLUTE: 0 THOU/MM3 (ref 0–0.1)
BASOPHILS NFR BLD AUTO: 0.4 %
BILIRUB SERPL-MCNC: 0.7 MG/DL (ref 0.3–1.2)
BILIRUB UR QL STRIP: NEGATIVE
BUN SERPL-MCNC: 24 MG/DL (ref 7–22)
CALCIUM SERPL-MCNC: 9.4 MG/DL (ref 8.5–10.5)
CASTS #/AREA URNS LPF: ABNORMAL /LPF
CASTS #/AREA URNS LPF: ABNORMAL /LPF
CHARACTER UR: ABNORMAL
CHARCOAL URNS QL MICRO: ABNORMAL
CHLORIDE SERPL-SCNC: 108 MEQ/L (ref 98–111)
CO2 SERPL-SCNC: 20 MEQ/L (ref 23–33)
COLOR UR: YELLOW
CREAT SERPL-MCNC: 1.1 MG/DL (ref 0.4–1.2)
CRP SERPL-MCNC: 0.82 MG/DL (ref 0–1)
CRYSTALS URNS QL MICRO: ABNORMAL
DEPRECATED RDW RBC AUTO: 54.8 FL (ref 35–45)
EKG ATRIAL RATE: 83 BPM
EKG P AXIS: -58 DEGREES
EKG P-R INTERVAL: 132 MS
EKG Q-T INTERVAL: 406 MS
EKG QRS DURATION: 152 MS
EKG QTC CALCULATION (BAZETT): 477 MS
EKG R AXIS: -4 DEGREES
EKG T AXIS: 166 DEGREES
EKG VENTRICULAR RATE: 83 BPM
EOSINOPHIL NFR BLD AUTO: 0.4 %
EOSINOPHILS ABSOLUTE: 0 THOU/MM3 (ref 0–0.4)
EPITHELIAL CELLS, UA: ABNORMAL /HPF
ERYTHROCYTE [DISTWIDTH] IN BLOOD BY AUTOMATED COUNT: 14.2 % (ref 11.5–14.5)
ERYTHROCYTE [SEDIMENTATION RATE] IN BLOOD BY WESTERGREN METHOD: 26 MM/HR (ref 0–10)
FLUAV RNA RESP QL NAA+PROBE: NOT DETECTED
FLUBV RNA RESP QL NAA+PROBE: NOT DETECTED
GFR SERPL CREATININE-BSD FRML MDRD: 69 ML/MIN/1.73M2
GLUCOSE SERPL-MCNC: 187 MG/DL (ref 70–108)
GLUCOSE UR QL STRIP.AUTO: NEGATIVE MG/DL
HCT VFR BLD AUTO: 40.1 % (ref 42–52)
HGB BLD-MCNC: 13.1 GM/DL (ref 14–18)
HGB UR QL STRIP.AUTO: ABNORMAL
IMM GRANULOCYTES # BLD AUTO: 0.05 THOU/MM3 (ref 0–0.07)
IMM GRANULOCYTES NFR BLD AUTO: 0.4 %
KETONES UR QL STRIP.AUTO: ABNORMAL
LACTIC ACID, SEPSIS: 1.2 MMOL/L (ref 0.5–1.9)
LACTIC ACID, SEPSIS: 1.3 MMOL/L (ref 0.5–1.9)
LEUKOCYTE ESTERASE UR QL STRIP.AUTO: ABNORMAL
LIPASE SERPL-CCNC: 35 U/L (ref 5.6–51.3)
LYMPHOCYTES ABSOLUTE: 0.4 THOU/MM3 (ref 1–4.8)
LYMPHOCYTES NFR BLD AUTO: 3.5 %
MAGNESIUM SERPL-MCNC: 1.7 MG/DL (ref 1.6–2.4)
MCH RBC QN AUTO: 34.5 PG (ref 26–33)
MCHC RBC AUTO-ENTMCNC: 32.7 GM/DL (ref 32.2–35.5)
MCV RBC AUTO: 105.5 FL (ref 80–94)
MONOCYTES ABSOLUTE: 1.2 THOU/MM3 (ref 0.4–1.3)
MONOCYTES NFR BLD AUTO: 11.1 %
NEUTROPHILS ABSOLUTE: 9.4 THOU/MM3 (ref 1.8–7.7)
NEUTROPHILS NFR BLD AUTO: 84.2 %
NITRITE UR QL STRIP.AUTO: NEGATIVE
NRBC BLD AUTO-RTO: 0 /100 WBC
NT-PROBNP SERPL IA-MCNC: 1347 PG/ML (ref 0–449)
OSMOLALITY SERPL CALC.SUM OF ELEC: 292.1 MOSMOL/KG (ref 275–300)
PH UR STRIP.AUTO: 6 [PH] (ref 5–9)
PLATELET # BLD AUTO: 149 THOU/MM3 (ref 130–400)
PMV BLD AUTO: 9.7 FL (ref 9.4–12.4)
POTASSIUM SERPL-SCNC: 4.2 MEQ/L (ref 3.5–5.2)
PROCALCITONIN SERPL IA-MCNC: 2.3 NG/ML (ref 0.01–0.09)
PROT SERPL-MCNC: 7.3 G/DL (ref 6.1–8)
PROT UR STRIP.AUTO-MCNC: 100 MG/DL
RBC # BLD AUTO: 3.8 MILL/MM3 (ref 4.7–6.1)
RBC #/AREA URNS HPF: ABNORMAL /HPF
RENAL EPI CELLS #/AREA URNS HPF: ABNORMAL /[HPF]
SARS-COV-2 RNA RESP QL NAA+PROBE: DETECTED
SODIUM SERPL-SCNC: 142 MEQ/L (ref 135–145)
SPECIFIC GRAVITY UA: 1.02 (ref 1–1.03)
TROPONIN, HIGH SENSITIVITY: 47 NG/L (ref 0–12)
TROPONIN, HIGH SENSITIVITY: 58 NG/L (ref 0–12)
UROBILINOGEN, URINE: 1 EU/DL (ref 0–1)
WBC # BLD AUTO: 11.2 THOU/MM3 (ref 4.8–10.8)
WBC #/AREA URNS HPF: > 200 /HPF
YEAST LIKE FUNGI URNS QL MICRO: ABNORMAL

## 2025-01-04 PROCEDURE — 3E033XZ INTRODUCTION OF VASOPRESSOR INTO PERIPHERAL VEIN, PERCUTANEOUS APPROACH: ICD-10-PCS

## 2025-01-04 PROCEDURE — 96374 THER/PROPH/DIAG INJ IV PUSH: CPT

## 2025-01-04 PROCEDURE — 36415 COLL VENOUS BLD VENIPUNCTURE: CPT

## 2025-01-04 PROCEDURE — 83605 ASSAY OF LACTIC ACID: CPT

## 2025-01-04 PROCEDURE — 2580000003 HC RX 258

## 2025-01-04 PROCEDURE — 96375 TX/PRO/DX INJ NEW DRUG ADDON: CPT

## 2025-01-04 PROCEDURE — 83735 ASSAY OF MAGNESIUM: CPT

## 2025-01-04 PROCEDURE — 93005 ELECTROCARDIOGRAM TRACING: CPT

## 2025-01-04 PROCEDURE — 87040 BLOOD CULTURE FOR BACTERIA: CPT

## 2025-01-04 PROCEDURE — 2000000000 HC ICU R&B

## 2025-01-04 PROCEDURE — 87636 SARSCOV2 & INF A&B AMP PRB: CPT

## 2025-01-04 PROCEDURE — 83880 ASSAY OF NATRIURETIC PEPTIDE: CPT

## 2025-01-04 PROCEDURE — 80053 COMPREHEN METABOLIC PANEL: CPT

## 2025-01-04 PROCEDURE — 85025 COMPLETE CBC W/AUTO DIFF WBC: CPT

## 2025-01-04 PROCEDURE — 71275 CT ANGIOGRAPHY CHEST: CPT

## 2025-01-04 PROCEDURE — 81001 URINALYSIS AUTO W/SCOPE: CPT

## 2025-01-04 PROCEDURE — 84145 PROCALCITONIN (PCT): CPT

## 2025-01-04 PROCEDURE — 87077 CULTURE AEROBIC IDENTIFY: CPT

## 2025-01-04 PROCEDURE — 71045 X-RAY EXAM CHEST 1 VIEW: CPT

## 2025-01-04 PROCEDURE — 06HY33Z INSERTION OF INFUSION DEVICE INTO LOWER VEIN, PERCUTANEOUS APPROACH: ICD-10-PCS

## 2025-01-04 PROCEDURE — 99291 CRITICAL CARE FIRST HOUR: CPT

## 2025-01-04 PROCEDURE — 84484 ASSAY OF TROPONIN QUANT: CPT

## 2025-01-04 PROCEDURE — 6360000004 HC RX CONTRAST MEDICATION: Performed by: STUDENT IN AN ORGANIZED HEALTH CARE EDUCATION/TRAINING PROGRAM

## 2025-01-04 PROCEDURE — 6370000000 HC RX 637 (ALT 250 FOR IP)

## 2025-01-04 PROCEDURE — 2500000003 HC RX 250 WO HCPCS

## 2025-01-04 PROCEDURE — 6360000002 HC RX W HCPCS

## 2025-01-04 PROCEDURE — 83690 ASSAY OF LIPASE: CPT

## 2025-01-04 PROCEDURE — 87641 MR-STAPH DNA AMP PROBE: CPT

## 2025-01-04 PROCEDURE — 93010 ELECTROCARDIOGRAM REPORT: CPT | Performed by: INTERNAL MEDICINE

## 2025-01-04 PROCEDURE — 87086 URINE CULTURE/COLONY COUNT: CPT

## 2025-01-04 PROCEDURE — 85651 RBC SED RATE NONAUTOMATED: CPT

## 2025-01-04 PROCEDURE — 99285 EMERGENCY DEPT VISIT HI MDM: CPT

## 2025-01-04 PROCEDURE — 86140 C-REACTIVE PROTEIN: CPT

## 2025-01-04 RX ORDER — SODIUM CHLORIDE 0.9 % (FLUSH) 0.9 %
5-40 SYRINGE (ML) INJECTION EVERY 12 HOURS SCHEDULED
Status: DISCONTINUED | OUTPATIENT
Start: 2025-01-05 | End: 2025-01-07 | Stop reason: HOSPADM

## 2025-01-04 RX ORDER — LORAZEPAM 2 MG/ML
0.5 INJECTION INTRAMUSCULAR ONCE
Status: COMPLETED | OUTPATIENT
Start: 2025-01-04 | End: 2025-01-04

## 2025-01-04 RX ORDER — GLUCAGON 1 MG/ML
1 KIT INJECTION PRN
Status: DISCONTINUED | OUTPATIENT
Start: 2025-01-04 | End: 2025-01-07 | Stop reason: HOSPADM

## 2025-01-04 RX ORDER — MAGNESIUM SULFATE HEPTAHYDRATE 40 MG/ML
2000 INJECTION, SOLUTION INTRAVENOUS ONCE
Status: COMPLETED | OUTPATIENT
Start: 2025-01-04 | End: 2025-01-05

## 2025-01-04 RX ORDER — POTASSIUM CHLORIDE 29.8 MG/ML
20 INJECTION INTRAVENOUS PRN
Status: DISCONTINUED | OUTPATIENT
Start: 2025-01-04 | End: 2025-01-07 | Stop reason: HOSPADM

## 2025-01-04 RX ORDER — ONDANSETRON 2 MG/ML
4 INJECTION INTRAMUSCULAR; INTRAVENOUS ONCE
Status: COMPLETED | OUTPATIENT
Start: 2025-01-04 | End: 2025-01-04

## 2025-01-04 RX ORDER — IOPAMIDOL 755 MG/ML
80 INJECTION, SOLUTION INTRAVASCULAR
Status: COMPLETED | OUTPATIENT
Start: 2025-01-04 | End: 2025-01-04

## 2025-01-04 RX ORDER — POLYETHYLENE GLYCOL 3350 17 G/17G
17 POWDER, FOR SOLUTION ORAL DAILY PRN
Status: DISCONTINUED | OUTPATIENT
Start: 2025-01-04 | End: 2025-01-07 | Stop reason: HOSPADM

## 2025-01-04 RX ORDER — ATORVASTATIN CALCIUM 40 MG/1
40 TABLET, FILM COATED ORAL DAILY
Status: DISCONTINUED | OUTPATIENT
Start: 2025-01-05 | End: 2025-01-07 | Stop reason: HOSPADM

## 2025-01-04 RX ORDER — GABAPENTIN 400 MG/1
400 CAPSULE ORAL NIGHTLY
COMMUNITY

## 2025-01-04 RX ORDER — NOREPINEPHRINE BITARTRATE 0.06 MG/ML
1-100 INJECTION, SOLUTION INTRAVENOUS CONTINUOUS
Status: DISCONTINUED | OUTPATIENT
Start: 2025-01-04 | End: 2025-01-05

## 2025-01-04 RX ORDER — GABAPENTIN 400 MG/1
400 CAPSULE ORAL NIGHTLY
Status: DISCONTINUED | OUTPATIENT
Start: 2025-01-05 | End: 2025-01-07 | Stop reason: HOSPADM

## 2025-01-04 RX ORDER — ACETAMINOPHEN 650 MG/1
650 SUPPOSITORY RECTAL EVERY 6 HOURS PRN
Status: DISCONTINUED | OUTPATIENT
Start: 2025-01-04 | End: 2025-01-07 | Stop reason: HOSPADM

## 2025-01-04 RX ORDER — ONDANSETRON 2 MG/ML
4 INJECTION INTRAMUSCULAR; INTRAVENOUS EVERY 6 HOURS PRN
Status: DISCONTINUED | OUTPATIENT
Start: 2025-01-04 | End: 2025-01-07 | Stop reason: HOSPADM

## 2025-01-04 RX ORDER — ACETAMINOPHEN 325 MG/1
650 TABLET ORAL EVERY 6 HOURS PRN
Status: DISCONTINUED | OUTPATIENT
Start: 2025-01-04 | End: 2025-01-07 | Stop reason: HOSPADM

## 2025-01-04 RX ORDER — ALBUTEROL SULFATE 90 UG/1
2 INHALANT RESPIRATORY (INHALATION) EVERY 6 HOURS PRN
Status: DISCONTINUED | OUTPATIENT
Start: 2025-01-04 | End: 2025-01-07 | Stop reason: HOSPADM

## 2025-01-04 RX ORDER — ACETAMINOPHEN 325 MG/1
650 TABLET ORAL EVERY 4 HOURS PRN
Status: DISCONTINUED | OUTPATIENT
Start: 2025-01-04 | End: 2025-01-04

## 2025-01-04 RX ORDER — FERROUS SULFATE 325(65) MG
650 TABLET ORAL
Status: DISCONTINUED | OUTPATIENT
Start: 2025-01-05 | End: 2025-01-06

## 2025-01-04 RX ORDER — METOPROLOL SUCCINATE 50 MG/1
50 TABLET, EXTENDED RELEASE ORAL DAILY
Status: DISCONTINUED | OUTPATIENT
Start: 2025-01-05 | End: 2025-01-06

## 2025-01-04 RX ORDER — FUROSEMIDE 20 MG/1
20 TABLET ORAL
Status: DISCONTINUED | OUTPATIENT
Start: 2025-01-06 | End: 2025-01-07 | Stop reason: HOSPADM

## 2025-01-04 RX ORDER — POTASSIUM CHLORIDE 7.45 MG/ML
10 INJECTION INTRAVENOUS PRN
Status: DISCONTINUED | OUTPATIENT
Start: 2025-01-04 | End: 2025-01-07 | Stop reason: HOSPADM

## 2025-01-04 RX ORDER — MAGNESIUM SULFATE IN WATER 40 MG/ML
2000 INJECTION, SOLUTION INTRAVENOUS PRN
Status: DISCONTINUED | OUTPATIENT
Start: 2025-01-04 | End: 2025-01-07 | Stop reason: HOSPADM

## 2025-01-04 RX ORDER — DEXTROSE MONOHYDRATE 100 MG/ML
INJECTION, SOLUTION INTRAVENOUS CONTINUOUS PRN
Status: DISCONTINUED | OUTPATIENT
Start: 2025-01-04 | End: 2025-01-07 | Stop reason: HOSPADM

## 2025-01-04 RX ORDER — SODIUM CHLORIDE 9 MG/ML
INJECTION, SOLUTION INTRAVENOUS PRN
Status: DISCONTINUED | OUTPATIENT
Start: 2025-01-04 | End: 2025-01-07 | Stop reason: HOSPADM

## 2025-01-04 RX ORDER — ONDANSETRON 4 MG/1
4 TABLET, ORALLY DISINTEGRATING ORAL EVERY 8 HOURS PRN
Status: DISCONTINUED | OUTPATIENT
Start: 2025-01-04 | End: 2025-01-07 | Stop reason: HOSPADM

## 2025-01-04 RX ORDER — SODIUM CHLORIDE 0.9 % (FLUSH) 0.9 %
5-40 SYRINGE (ML) INJECTION PRN
Status: DISCONTINUED | OUTPATIENT
Start: 2025-01-04 | End: 2025-01-07 | Stop reason: HOSPADM

## 2025-01-04 RX ORDER — ASPIRIN 81 MG/1
81 TABLET, CHEWABLE ORAL DAILY
Status: DISCONTINUED | OUTPATIENT
Start: 2025-01-05 | End: 2025-01-07 | Stop reason: HOSPADM

## 2025-01-04 RX ORDER — LEVOTHYROXINE SODIUM 75 UG/1
75 TABLET ORAL DAILY
Status: DISCONTINUED | OUTPATIENT
Start: 2025-01-05 | End: 2025-01-07 | Stop reason: HOSPADM

## 2025-01-04 RX ORDER — ACETAMINOPHEN 500 MG
1000 TABLET ORAL ONCE
Status: COMPLETED | OUTPATIENT
Start: 2025-01-04 | End: 2025-01-04

## 2025-01-04 RX ORDER — INSULIN LISPRO 100 [IU]/ML
0-4 INJECTION, SOLUTION INTRAVENOUS; SUBCUTANEOUS
Status: DISCONTINUED | OUTPATIENT
Start: 2025-01-04 | End: 2025-01-07 | Stop reason: HOSPADM

## 2025-01-04 RX ADMIN — Medication 5 MCG/MIN: at 20:50

## 2025-01-04 RX ADMIN — ACETAMINOPHEN 1000 MG: 500 TABLET ORAL at 19:23

## 2025-01-04 RX ADMIN — LORAZEPAM 0.5 MG: 2 INJECTION INTRAMUSCULAR; INTRAVENOUS at 21:10

## 2025-01-04 RX ADMIN — ONDANSETRON 4 MG: 2 INJECTION INTRAMUSCULAR; INTRAVENOUS at 18:07

## 2025-01-04 RX ADMIN — MAGNESIUM SULFATE HEPTAHYDRATE 2000 MG: 40 INJECTION, SOLUTION INTRAVENOUS at 22:56

## 2025-01-04 RX ADMIN — AZITHROMYCIN MONOHYDRATE 500 MG: 500 INJECTION, POWDER, LYOPHILIZED, FOR SOLUTION INTRAVENOUS at 19:25

## 2025-01-04 RX ADMIN — WATER 1000 MG: 1 INJECTION INTRAMUSCULAR; INTRAVENOUS; SUBCUTANEOUS at 19:11

## 2025-01-04 RX ADMIN — IOPAMIDOL 80 ML: 755 INJECTION, SOLUTION INTRAVENOUS at 20:12

## 2025-01-04 ASSESSMENT — PAIN - FUNCTIONAL ASSESSMENT
PAIN_FUNCTIONAL_ASSESSMENT: 0-10
PAIN_FUNCTIONAL_ASSESSMENT: 0-10
PAIN_FUNCTIONAL_ASSESSMENT: NONE - DENIES PAIN

## 2025-01-04 ASSESSMENT — PAIN SCALES - GENERAL: PAINLEVEL_OUTOF10: 5

## 2025-01-04 ASSESSMENT — PAIN DESCRIPTION - LOCATION
LOCATION: HEAD
LOCATION: HEAD

## 2025-01-04 NOTE — ED TRIAGE NOTES
Pt to the ED through intake with c/o shortness of breath and \"shakiness.\" Pt reports he tested positive for COVID around Dec 27th. Pt reports he had mild symptoms of congestion/cough during that time. Pt reports he started feeling better two days ago but this morning he noticed the shortness of breath worsening. Pt has extensive cardiac history and sees Dr. Proctor. Pt reports he has been doing breathing treatments to help also. EKG obtained upon arrival.

## 2025-01-05 PROBLEM — E11.43 TYPE 2 DIABETES MELLITUS WITH DIABETIC AUTONOMIC NEUROPATHY, WITHOUT LONG-TERM CURRENT USE OF INSULIN (HCC): Status: ACTIVE | Noted: 2025-01-05

## 2025-01-05 PROBLEM — R65.21 SEPTIC SHOCK (HCC): Status: ACTIVE | Noted: 2025-01-05

## 2025-01-05 PROBLEM — I50.22 CHRONIC HEART FAILURE WITH REDUCED EJECTION FRACTION (HFREF, <= 40%) (HCC): Status: ACTIVE | Noted: 2025-01-05

## 2025-01-05 PROBLEM — A41.9 SEPTIC SHOCK (HCC): Status: ACTIVE | Noted: 2025-01-05

## 2025-01-05 PROBLEM — I10 PRIMARY HYPERTENSION: Status: ACTIVE | Noted: 2025-01-05

## 2025-01-05 PROBLEM — U07.1 COVID-19: Status: ACTIVE | Noted: 2025-01-05

## 2025-01-05 PROBLEM — D53.9 MACROCYTIC ANEMIA: Status: ACTIVE | Noted: 2025-01-05

## 2025-01-05 PROBLEM — N39.0 ACUTE UTI: Status: ACTIVE | Noted: 2025-01-05

## 2025-01-05 PROBLEM — E03.9 HYPOTHYROIDISM: Status: ACTIVE | Noted: 2025-01-05

## 2025-01-05 PROBLEM — J18.9 PNEUMONIA DUE TO INFECTIOUS ORGANISM: Status: ACTIVE | Noted: 2025-01-05

## 2025-01-05 LAB
ANION GAP SERPL CALC-SCNC: 11 MEQ/L (ref 8–16)
BASOPHILS ABSOLUTE: 0.1 THOU/MM3 (ref 0–0.1)
BASOPHILS NFR BLD AUTO: 0.4 %
BUN SERPL-MCNC: 24 MG/DL (ref 7–22)
CA-I BLD ISE-SCNC: 1.21 MMOL/L (ref 1.12–1.32)
CALCIUM SERPL-MCNC: 8.4 MG/DL (ref 8.5–10.5)
CHLORIDE SERPL-SCNC: 108 MEQ/L (ref 98–111)
CO2 SERPL-SCNC: 22 MEQ/L (ref 23–33)
CREAT SERPL-MCNC: 1.3 MG/DL (ref 0.4–1.2)
DEPRECATED RDW RBC AUTO: 56.4 FL (ref 35–45)
EOSINOPHIL NFR BLD AUTO: 0.1 %
EOSINOPHILS ABSOLUTE: 0 THOU/MM3 (ref 0–0.4)
ERYTHROCYTE [DISTWIDTH] IN BLOOD BY AUTOMATED COUNT: 14.4 % (ref 11.5–14.5)
FOLATE SERPL-MCNC: 3 NG/ML (ref 4.8–24.2)
GFR SERPL CREATININE-BSD FRML MDRD: 56 ML/MIN/1.73M2
GLUCOSE BLD STRIP.AUTO-MCNC: 128 MG/DL (ref 70–108)
GLUCOSE BLD STRIP.AUTO-MCNC: 148 MG/DL (ref 70–108)
GLUCOSE BLD STRIP.AUTO-MCNC: 151 MG/DL (ref 70–108)
GLUCOSE BLD STRIP.AUTO-MCNC: 159 MG/DL (ref 70–108)
GLUCOSE BLD STRIP.AUTO-MCNC: 221 MG/DL (ref 70–108)
GLUCOSE SERPL-MCNC: 145 MG/DL (ref 70–108)
HCT VFR BLD AUTO: 34.9 % (ref 42–52)
HCT VFR BLD AUTO: 35.7 % (ref 42–52)
HGB BLD-MCNC: 11.2 GM/DL (ref 14–18)
HGB BLD-MCNC: 11.3 GM/DL (ref 14–18)
IMM GRANULOCYTES # BLD AUTO: 0.06 THOU/MM3 (ref 0–0.07)
IMM GRANULOCYTES NFR BLD AUTO: 0.5 %
LYMPHOCYTES ABSOLUTE: 0.6 THOU/MM3 (ref 1–4.8)
LYMPHOCYTES NFR BLD AUTO: 4.5 %
MAGNESIUM SERPL-MCNC: 2.4 MG/DL (ref 1.6–2.4)
MCH RBC QN AUTO: 33.8 PG (ref 26–33)
MCHC RBC AUTO-ENTMCNC: 31.7 GM/DL (ref 32.2–35.5)
MCV RBC AUTO: 106.9 FL (ref 80–94)
MONOCYTES ABSOLUTE: 0.8 THOU/MM3 (ref 0.4–1.3)
MONOCYTES NFR BLD AUTO: 6.5 %
MRSA DNA SPEC QL NAA+PROBE: POSITIVE
NEUTROPHILS ABSOLUTE: 11.1 THOU/MM3 (ref 1.8–7.7)
NEUTROPHILS NFR BLD AUTO: 88 %
NRBC BLD AUTO-RTO: 0 /100 WBC
OSMOLALITY SERPL CALC.SUM OF ELEC: 287.9 MOSMOL/KG (ref 275–300)
PLATELET # BLD AUTO: 155 THOU/MM3 (ref 130–400)
PMV BLD AUTO: 9.6 FL (ref 9.4–12.4)
POTASSIUM SERPL-SCNC: 4.4 MEQ/L (ref 3.5–5.2)
RBC # BLD AUTO: 3.34 MILL/MM3 (ref 4.7–6.1)
SODIUM SERPL-SCNC: 141 MEQ/L (ref 135–145)
TSH SERPL DL<=0.005 MIU/L-ACNC: 1.29 UIU/ML (ref 0.4–4.2)
VIT B12 SERPL-MCNC: 312 PG/ML (ref 211–911)
WBC # BLD AUTO: 12.6 THOU/MM3 (ref 4.8–10.8)

## 2025-01-05 PROCEDURE — 82746 ASSAY OF FOLIC ACID SERUM: CPT

## 2025-01-05 PROCEDURE — 2580000003 HC RX 258

## 2025-01-05 PROCEDURE — 6370000000 HC RX 637 (ALT 250 FOR IP)

## 2025-01-05 PROCEDURE — 83735 ASSAY OF MAGNESIUM: CPT

## 2025-01-05 PROCEDURE — 85014 HEMATOCRIT: CPT

## 2025-01-05 PROCEDURE — 87205 SMEAR GRAM STAIN: CPT

## 2025-01-05 PROCEDURE — 85025 COMPLETE CBC W/AUTO DIFF WBC: CPT

## 2025-01-05 PROCEDURE — 82948 REAGENT STRIP/BLOOD GLUCOSE: CPT

## 2025-01-05 PROCEDURE — 94669 MECHANICAL CHEST WALL OSCILL: CPT

## 2025-01-05 PROCEDURE — 6360000002 HC RX W HCPCS

## 2025-01-05 PROCEDURE — 2000000000 HC ICU R&B

## 2025-01-05 PROCEDURE — 82330 ASSAY OF CALCIUM: CPT

## 2025-01-05 PROCEDURE — 84443 ASSAY THYROID STIM HORMONE: CPT

## 2025-01-05 PROCEDURE — 82607 VITAMIN B-12: CPT

## 2025-01-05 PROCEDURE — 36415 COLL VENOUS BLD VENIPUNCTURE: CPT

## 2025-01-05 PROCEDURE — 85018 HEMOGLOBIN: CPT

## 2025-01-05 PROCEDURE — 2500000003 HC RX 250 WO HCPCS

## 2025-01-05 PROCEDURE — 99291 CRITICAL CARE FIRST HOUR: CPT | Performed by: SURGERY

## 2025-01-05 PROCEDURE — 94640 AIRWAY INHALATION TREATMENT: CPT

## 2025-01-05 PROCEDURE — 80048 BASIC METABOLIC PNL TOTAL CA: CPT

## 2025-01-05 RX ORDER — NOREPINEPHRINE BITARTRATE 0.06 MG/ML
1-100 INJECTION, SOLUTION INTRAVENOUS CONTINUOUS
Status: DISCONTINUED | OUTPATIENT
Start: 2025-01-05 | End: 2025-01-06

## 2025-01-05 RX ORDER — SODIUM CHLORIDE 9 MG/ML
INJECTION, SOLUTION INTRAVENOUS CONTINUOUS
Status: ACTIVE | OUTPATIENT
Start: 2025-01-05 | End: 2025-01-05

## 2025-01-05 RX ORDER — FOLIC ACID 1 MG/1
1 TABLET ORAL DAILY
Status: DISCONTINUED | OUTPATIENT
Start: 2025-01-05 | End: 2025-01-07 | Stop reason: HOSPADM

## 2025-01-05 RX ORDER — 0.9 % SODIUM CHLORIDE 0.9 %
500 INTRAVENOUS SOLUTION INTRAVENOUS ONCE
Status: COMPLETED | OUTPATIENT
Start: 2025-01-05 | End: 2025-01-05

## 2025-01-05 RX ADMIN — CEFTRIAXONE SODIUM 2000 MG: 2 INJECTION, POWDER, FOR SOLUTION INTRAMUSCULAR; INTRAVENOUS at 23:17

## 2025-01-05 RX ADMIN — ATORVASTATIN CALCIUM 40 MG: 40 TABLET, FILM COATED ORAL at 19:51

## 2025-01-05 RX ADMIN — ACETAMINOPHEN 650 MG: 325 TABLET ORAL at 10:43

## 2025-01-05 RX ADMIN — FOLIC ACID 1 MG: 1 TABLET ORAL at 08:17

## 2025-01-05 RX ADMIN — ALBUTEROL SULFATE 2 PUFF: 90 AEROSOL, METERED RESPIRATORY (INHALATION) at 18:15

## 2025-01-05 RX ADMIN — GABAPENTIN 400 MG: 400 CAPSULE ORAL at 19:51

## 2025-01-05 RX ADMIN — INSULIN LISPRO 1 UNITS: 100 INJECTION, SOLUTION INTRAVENOUS; SUBCUTANEOUS at 00:25

## 2025-01-05 RX ADMIN — WATER 1000 MG: 1 INJECTION INTRAMUSCULAR; INTRAVENOUS; SUBCUTANEOUS at 00:15

## 2025-01-05 RX ADMIN — SODIUM CHLORIDE 500 ML: 9 INJECTION, SOLUTION INTRAVENOUS at 05:31

## 2025-01-05 RX ADMIN — LEVOTHYROXINE SODIUM 75 MCG: 0.07 TABLET ORAL at 08:17

## 2025-01-05 RX ADMIN — VANCOMYCIN HYDROCHLORIDE 1250 MG: 5 INJECTION, POWDER, LYOPHILIZED, FOR SOLUTION INTRAVENOUS at 16:29

## 2025-01-05 RX ADMIN — SODIUM CHLORIDE, PRESERVATIVE FREE 10 ML: 5 INJECTION INTRAVENOUS at 08:17

## 2025-01-05 RX ADMIN — AZITHROMYCIN MONOHYDRATE 500 MG: 500 INJECTION, POWDER, LYOPHILIZED, FOR SOLUTION INTRAVENOUS at 18:34

## 2025-01-05 RX ADMIN — ASPIRIN 81 MG: 81 TABLET, CHEWABLE ORAL at 08:17

## 2025-01-05 RX ADMIN — SODIUM CHLORIDE: 9 INJECTION, SOLUTION INTRAVENOUS at 00:29

## 2025-01-05 ASSESSMENT — PAIN SCALES - GENERAL
PAINLEVEL_OUTOF10: 0
PAINLEVEL_OUTOF10: 0
PAINLEVEL_OUTOF10: 3

## 2025-01-05 ASSESSMENT — PAIN DESCRIPTION - DESCRIPTORS: DESCRIPTORS: ACHING;DISCOMFORT

## 2025-01-05 ASSESSMENT — PAIN DESCRIPTION - LOCATION: LOCATION: HEAD

## 2025-01-05 NOTE — CARE COORDINATION
01/05/25 1441   Service Assessment   Patient Orientation Alert and Oriented   Cognition Alert   History Provided By Patient   Primary Caregiver Self   Accompanied By/Relationship wife   Support Systems Spouse/Significant Other   Patient's Healthcare Decision Maker is: Patient Declined (Legal Next of Kin Remains as Decision Maker)   PCP Verified by CM Yes   Last Visit to PCP Within last year   Prior Functional Level Assistance with the following:;Shopping;Housework   Current Functional Level Assistance with the following:;Shopping;Housework;Bathing;Toileting   Can patient return to prior living arrangement Yes   Ability to make needs known: Good   Family able to assist with home care needs: Yes   Would you like for me to discuss the discharge plan with any other family members/significant others, and if so, who? No   Financial Resources Medicare   Community Resources None   Discharge Planning   Type of Residence Trailer/Mobile Home   Living Arrangements Spouse/Significant Other   Current Services Prior To Admission Durable Medical Equipment   Current DME Prior to Arrival Walker;[facility]   Potential Assistance Needed Home Care   DME Ordered? No   Potential Assistance Purchasing Medications No   Type of Home Care Services None   Patient expects to be discharged to: Trailer/mobile home   Services At/After Discharge   Transition of Care Consult (CM Consult) Discharge Planning   Services At/After Discharge None   Mode of Transport at Discharge Other (see comment)  (family)   Confirm Follow Up Transport Family   Condition of Participation: Discharge Planning   The Plan for Transition of Care is related to the following treatment goals: get stronger and discharge     Patient Goals/Plan/Treatment Preferences: Spoke with Myles and wife. Plans to return home. Has dme but does not have any home services. He and wife do not anticipate needs.     If patient is discharged prior to next notation, then this note serves as note for

## 2025-01-05 NOTE — H&P
exacerbation.  proBNP 1347 on arrival.  Prior proBNP elevated at 5000.  No increased shortness of breath or swelling of lower extremities.  TTE 9/9/2024 showed EF 35 to 40%, severe global hypokinesis.  Ischemic/nonischemic cardiomyopathy secondary to valvular disease.  GDMT Entresto.  Toprol 50.  No SGLT2 inhibitor, no A.  On Lasix 20 - 3 times per week.  Standing weights.  Low-sodium diet. Follows w/ Dr. Proctor.  Metoprolol is held in the setting of shock with pressor requirements  CAD: S/p CABG AVR, MVR repair, 2018.  ASA continued.  High intensity statin continued.  Known LBBB.  CKD: Creatinine 1.1 on arrival.  Appears improved from previous creatinines.  Follows with Dr. Scott outpatient.  Monitor urine output, strict I's and O's.  Will avoid nephrotoxic agents as tolerated  Diabetes mellitus: Associated neuropathy.  Most recent hemoglobin A1c 6/8/2024 5.9.  Low-dose sliding scale.  POCT glucose checks.  Hypoglycemia protocol.  Continuing home gabapentin  Hypertension: Hypotensive on the emergency department requiring pressor support.  Holding metoprolol.  Hypothyroidism: Continuing home Synthroid, 75 mcg.  TSH with reflex ordered  Hx LORENA: No heparin products.  For DVT prophylaxis on SCDs    CC: Chills    HPI: Myles Santa is a 79-year-old male with a PMH pertinent for HFrEF, CAD status post CABG, DM, hypothyroidism, CKD, HLD and HTN who presented to [facility] emergency department on 1/4/2025 for evaluation treatment of new onset chills and shakes.  Patient notes these symptoms started in the last 24 hours.  Patient did recently test positive for COVID-19 on December 27 and was retested 12/29 it was negative at that time.  Patient states that he has had a chronic cough no changes in his overall baseline, has been having some production with white sputum.  Patient states that he did have some intermittent suprapubic pain that was sharp stabbing-like that occurred several times in the last 24 hours.  Patient adds

## 2025-01-05 NOTE — ED PROVIDER NOTES
[facility] Emergency Department  Emergency Medicine Attending Physician Attestation    Patient Name: Myles Santa  MRN: [id number]  YOB: 1945  Date of Evaluation: 1/4/2025    I performed a history and physical examination on the patient and discussed the management with the Resident Physician. I reviewed and agree with the findings and plan as documented in the resident physician note.  This includes all diagnostic interpretations and treatment plans as written. I was present for the key portion of any procedures performed and the inclusive time noted in any critical care statement.  I have reviewed and interpreted all available lab, radiology and ekg results available at the moment. See resident's note for full documentation.     Summary and Plan:  This is a pleasant 79-year-old gentleman who recently had COVID-19 infection on December 27 had been getting better however over the last days having worsening shortness of breath shaking and chills at home and overall feeling worse.  Reduced appetite.  Does have history of coronary artery disease, CHF with reduced ejection fraction.  Here in the emergency department found to have ongoing COVID-19 infection with a positive swab as well as a likely bacterial pneumonia in addition given the elevated procalcitonin and fevers.  CT imaging of his chest also shows bilateral pleural effusions.  Patient was started on ceftriaxone and azithromycin as well as IV fluids given blood pressure on the lower side appearing to be dehydrated.  Although the initial blood pressure when he arrives in the 130s this turned trended downwards and after he received the liter of IV fluids his blood pressure was in the 70s systolically.  Given the already pre-existing bilateral pleural effusions and history of CHF with reduced ejection fraction we therefore started norepinephrine have concern of giving him more fluids would cause him harm.  Discussed patient with ICU 
MD       PROCEDURES: (None if blank)  Procedures:     CRITICAL CARE:  None    MEDICATION CHANGES     New Prescriptions    No medications on file       FINAL DISPOSITION   Shared Decision-Making was performed, disposition discussed with the patient/family and questions answered.    Outpatient follow up (If applicable):  No follow-up provider specified.  Not applicable          FINAL DIAGNOSES:  Final diagnoses:   Pneumonia due to infectious organism, unspecified laterality, unspecified part of lung   COVID-19       Condition: condition: stable  Dispo: Admit to hospitalist  DISPOSITION                 This transcription was electronically signed. It was dictated by use of voice recognition software and electronically transcribed. The transcription may contain errors not detected in proofreading.

## 2025-01-05 NOTE — PROCEDURES
PROCEDURE NOTE  Date: 1/4/2025   Name: Myles Santa  YOB: 1945    Central Line    Date/Time: 1/4/2025 9:30 PM    Performed by: aLla Florentino MD  Authorized by: Johnny Villatoro DO    Consent:     Consent obtained:  Written    Consent given by:  Patient    Risks, benefits, and alternatives were discussed: yes      Risks discussed:  Arterial puncture, bleeding, incorrect placement, infection and nerve damage    Alternatives discussed:  No treatment  Universal protocol:     Procedure explained and questions answered to patient or proxy's satisfaction: yes      Relevant documents present and verified: yes      Site/side marked: yes      Patient identity confirmed:  Verbally with patient  Pre-procedure details:     Indication(s): central venous access      Hand hygiene: Hand hygiene performed prior to insertion      Sterile barrier technique: All elements of maximal sterile technique followed      Skin preparation:  Chlorhexidine    Skin preparation agent: Skin preparation agent completely dried prior to procedure    Sedation:     Sedation type:  None  Anesthesia:     Anesthesia method:  Local infiltration    Local anesthetic:  Lidocaine 1% w/o epi  Procedure details:     Location:  R femoral    Site selection rationale:  Patient unable to tolerate laying flat and is anatomically difficult for placement of IJ.    Procedural supplies:  Triple lumen    Catheter size:  7 Fr    Landmarks identified: yes      Ultrasound guidance: yes      Ultrasound guidance timing: prior to insertion      Sterile ultrasound techniques: Sterile gel and sterile probe covers were used      Number of attempts:  1    Successful placement: yes    Post-procedure details:     Post-procedure:  Dressing applied and line sutured    Assessment:  Blood return through all ports    Procedure completion:  Tolerated well, no immediate complications

## 2025-01-05 NOTE — ED NOTES
Levophed started at this time. Dr. Florentino at bedside discussing central line with patient and family.

## 2025-01-06 ENCOUNTER — APPOINTMENT (OUTPATIENT)
Age: 80
DRG: 871 | End: 2025-01-06
Payer: MEDICARE

## 2025-01-06 LAB
ANION GAP SERPL CALC-SCNC: 11 MEQ/L (ref 8–16)
BUN SERPL-MCNC: 23 MG/DL (ref 7–22)
CALCIUM SERPL-MCNC: 8.1 MG/DL (ref 8.5–10.5)
CHLORIDE SERPL-SCNC: 110 MEQ/L (ref 98–111)
CO2 SERPL-SCNC: 21 MEQ/L (ref 23–33)
CREAT SERPL-MCNC: 1.1 MG/DL (ref 0.4–1.2)
DEPRECATED RDW RBC AUTO: 57.4 FL (ref 35–45)
ECHO AO ROOT DIAM: 3 CM
ECHO AO ROOT INDEX: 1.38 CM/M2
ECHO AV ACCELERATION TIME: 92 MS
ECHO AV AREA PEAK VELOCITY: 1.9 CM2
ECHO AV AREA VTI: 2 CM2
ECHO AV AREA/BSA PEAK VELOCITY: 0.9 CM2/M2
ECHO AV AREA/BSA VTI: 0.9 CM2/M2
ECHO AV MEAN GRADIENT: 8 MMHG
ECHO AV MEAN VELOCITY: 1.3 M/S
ECHO AV PEAK GRADIENT: 14 MMHG
ECHO AV PEAK VELOCITY: 1.9 M/S
ECHO AV VELOCITY RATIO: 0.58
ECHO AV VTI: 39 CM
ECHO BSA: 2.18 M2
ECHO EST RA PRESSURE: 15 MMHG
ECHO LA AREA 2C: 23.5 CM2
ECHO LA AREA 4C: 22.2 CM2
ECHO LA DIAMETER INDEX: 2.26 CM/M2
ECHO LA DIAMETER: 4.9 CM
ECHO LA MAJOR AXIS: 6.5 CM
ECHO LA MINOR AXIS: 6.2 CM
ECHO LA TO AORTIC ROOT RATIO: 1.63
ECHO LA VOL BP: 67 ML (ref 18–58)
ECHO LA VOL MOD A2C: 73 ML (ref 18–58)
ECHO LA VOL MOD A4C: 59 ML (ref 18–58)
ECHO LA VOL/BSA BIPLANE: 31 ML/M2 (ref 16–34)
ECHO LA VOLUME INDEX MOD A2C: 34 ML/M2 (ref 16–34)
ECHO LA VOLUME INDEX MOD A4C: 27 ML/M2 (ref 16–34)
ECHO LV E' LATERAL VELOCITY: 5.4 CM/S
ECHO LV E' SEPTAL VELOCITY: 3.5 CM/S
ECHO LV EDV A2C: 123 ML
ECHO LV EDV A4C: 146 ML
ECHO LV EDV INDEX A4C: 67 ML/M2
ECHO LV EDV NDEX A2C: 57 ML/M2
ECHO LV EJECTION FRACTION A2C: 37 %
ECHO LV EJECTION FRACTION A4C: 40 %
ECHO LV EJECTION FRACTION BIPLANE: 39 % (ref 55–100)
ECHO LV ESV A2C: 77 ML
ECHO LV ESV A4C: 87 ML
ECHO LV ESV INDEX A2C: 35 ML/M2
ECHO LV ESV INDEX A4C: 40 ML/M2
ECHO LV FRACTIONAL SHORTENING: 16 % (ref 28–44)
ECHO LV INTERNAL DIMENSION DIASTOLE INDEX: 2.26 CM/M2
ECHO LV INTERNAL DIMENSION DIASTOLIC: 4.9 CM (ref 4.2–5.9)
ECHO LV INTERNAL DIMENSION SYSTOLIC INDEX: 1.89 CM/M2
ECHO LV INTERNAL DIMENSION SYSTOLIC: 4.1 CM
ECHO LV ISOVOLUMETRIC RELAXATION TIME (IVRT): 49 MS
ECHO LV IVSD: 0.8 CM (ref 0.6–1)
ECHO LV MASS 2D: 141.9 G (ref 88–224)
ECHO LV MASS INDEX 2D: 65.4 G/M2 (ref 49–115)
ECHO LV POSTERIOR WALL DIASTOLIC: 0.9 CM (ref 0.6–1)
ECHO LV RELATIVE WALL THICKNESS RATIO: 0.37
ECHO LVOT AREA: 3.5 CM2
ECHO LVOT AV VTI INDEX: 0.57
ECHO LVOT DIAM: 2.1 CM
ECHO LVOT MEAN GRADIENT: 2 MMHG
ECHO LVOT PEAK GRADIENT: 4 MMHG
ECHO LVOT PEAK VELOCITY: 1.1 M/S
ECHO LVOT STROKE VOLUME INDEX: 35.4 ML/M2
ECHO LVOT SV: 76.9 ML
ECHO LVOT VTI: 22.2 CM
ECHO MV A VELOCITY: 1 M/S
ECHO MV AREA VTI: 1.5 CM2
ECHO MV E DECELERATION TIME (DT): 280 MS
ECHO MV E VELOCITY: 1.47 M/S
ECHO MV E/A RATIO: 1.47
ECHO MV E/E' LATERAL: 27.22
ECHO MV E/E' RATIO (AVERAGED): 34.61
ECHO MV E/E' SEPTAL: 42
ECHO MV LVOT VTI INDEX: 2.24
ECHO MV MAX VELOCITY: 1.7 M/S
ECHO MV MEAN GRADIENT: 5 MMHG
ECHO MV MEAN VELOCITY: 1 M/S
ECHO MV PEAK GRADIENT: 12 MMHG
ECHO MV REGURGITANT PEAK GRADIENT: 81 MMHG
ECHO MV REGURGITANT PEAK VELOCITY: 4.5 M/S
ECHO MV VTI: 49.8 CM
ECHO PULMONARY ARTERY END DIASTOLIC PRESSURE: 10 MMHG
ECHO PV MAX VELOCITY: 0.5 M/S
ECHO PV PEAK GRADIENT: 1 MMHG
ECHO PV REGURGITANT MAX VELOCITY: 1.6 M/S
ECHO RIGHT VENTRICULAR SYSTOLIC PRESSURE (RVSP): 38 MMHG
ECHO RV INTERNAL DIMENSION: 3.6 CM
ECHO RV TAPSE: 1.5 CM (ref 1.7–?)
ECHO TV E WAVE: 0.5 M/S
ECHO TV REGURGITANT MAX VELOCITY: 2.41 M/S
ECHO TV REGURGITANT PEAK GRADIENT: 23 MMHG
ERYTHROCYTE [DISTWIDTH] IN BLOOD BY AUTOMATED COUNT: 14.5 % (ref 11.5–14.5)
GFR SERPL CREATININE-BSD FRML MDRD: 69 ML/MIN/1.73M2
GLUCOSE BLD STRIP.AUTO-MCNC: 144 MG/DL (ref 70–108)
GLUCOSE BLD STRIP.AUTO-MCNC: 157 MG/DL (ref 70–108)
GLUCOSE BLD STRIP.AUTO-MCNC: 207 MG/DL (ref 70–108)
GLUCOSE SERPL-MCNC: 108 MG/DL (ref 70–108)
HCT VFR BLD AUTO: 34.9 % (ref 42–52)
HGB BLD-MCNC: 11 GM/DL (ref 14–18)
MCH RBC QN AUTO: 34.4 PG (ref 26–33)
MCHC RBC AUTO-ENTMCNC: 31.5 GM/DL (ref 32.2–35.5)
MCV RBC AUTO: 109.1 FL (ref 80–94)
PLATELET # BLD AUTO: 113 THOU/MM3 (ref 130–400)
PMV BLD AUTO: 10 FL (ref 9.4–12.4)
POTASSIUM SERPL-SCNC: 4.2 MEQ/L (ref 3.5–5.2)
RBC # BLD AUTO: 3.2 MILL/MM3 (ref 4.7–6.1)
SODIUM SERPL-SCNC: 142 MEQ/L (ref 135–145)
WBC # BLD AUTO: 5.6 THOU/MM3 (ref 4.8–10.8)

## 2025-01-06 PROCEDURE — 36415 COLL VENOUS BLD VENIPUNCTURE: CPT

## 2025-01-06 PROCEDURE — 6370000000 HC RX 637 (ALT 250 FOR IP)

## 2025-01-06 PROCEDURE — 2580000003 HC RX 258

## 2025-01-06 PROCEDURE — 93306 TTE W/DOPPLER COMPLETE: CPT | Performed by: INTERNAL MEDICINE

## 2025-01-06 PROCEDURE — 2500000003 HC RX 250 WO HCPCS

## 2025-01-06 PROCEDURE — 85027 COMPLETE CBC AUTOMATED: CPT

## 2025-01-06 PROCEDURE — 93306 TTE W/DOPPLER COMPLETE: CPT

## 2025-01-06 PROCEDURE — 2060000000 HC ICU INTERMEDIATE R&B

## 2025-01-06 PROCEDURE — 6360000002 HC RX W HCPCS

## 2025-01-06 PROCEDURE — 6370000000 HC RX 637 (ALT 250 FOR IP): Performed by: INTERNAL MEDICINE

## 2025-01-06 PROCEDURE — 80048 BASIC METABOLIC PNL TOTAL CA: CPT

## 2025-01-06 PROCEDURE — 82948 REAGENT STRIP/BLOOD GLUCOSE: CPT

## 2025-01-06 PROCEDURE — 99223 1ST HOSP IP/OBS HIGH 75: CPT | Performed by: INTERNAL MEDICINE

## 2025-01-06 RX ORDER — MIDODRINE HYDROCHLORIDE 5 MG/1
5 TABLET ORAL
Status: DISCONTINUED | OUTPATIENT
Start: 2025-01-06 | End: 2025-01-07 | Stop reason: HOSPADM

## 2025-01-06 RX ADMIN — GABAPENTIN 400 MG: 400 CAPSULE ORAL at 20:47

## 2025-01-06 RX ADMIN — FOLIC ACID 1 MG: 1 TABLET ORAL at 08:19

## 2025-01-06 RX ADMIN — ATORVASTATIN CALCIUM 40 MG: 40 TABLET, FILM COATED ORAL at 20:47

## 2025-01-06 RX ADMIN — MIDODRINE HYDROCHLORIDE 5 MG: 5 TABLET ORAL at 16:43

## 2025-01-06 RX ADMIN — LEVOTHYROXINE SODIUM 75 MCG: 0.07 TABLET ORAL at 05:16

## 2025-01-06 RX ADMIN — VANCOMYCIN HYDROCHLORIDE 1250 MG: 5 INJECTION, POWDER, LYOPHILIZED, FOR SOLUTION INTRAVENOUS at 09:50

## 2025-01-06 RX ADMIN — SODIUM CHLORIDE, PRESERVATIVE FREE 10 ML: 5 INJECTION INTRAVENOUS at 08:20

## 2025-01-06 RX ADMIN — FUROSEMIDE 20 MG: 20 TABLET ORAL at 20:47

## 2025-01-06 RX ADMIN — SODIUM CHLORIDE, PRESERVATIVE FREE 10 ML: 5 INJECTION INTRAVENOUS at 20:47

## 2025-01-06 RX ADMIN — ASPIRIN 81 MG: 81 TABLET, CHEWABLE ORAL at 08:19

## 2025-01-06 RX ADMIN — INSULIN LISPRO 1 UNITS: 100 INJECTION, SOLUTION INTRAVENOUS; SUBCUTANEOUS at 12:22

## 2025-01-06 ASSESSMENT — PAIN SCALES - GENERAL: PAINLEVEL_OUTOF10: 0

## 2025-01-06 NOTE — CARE COORDINATION
Case Management Assessment Initial Evaluation    Date/Time of Evaluation: 2025 2:38 PM  Assessment Completed by: Beatriz Barnhart RN    If patient is discharged prior to next notation, then this note serves as note for discharge by case management.    Patient Name: Myles Santa                   YOB: 1945  Diagnosis: Shock [R57.9]  Septic shock (HCC) [A41.9, R65.21]  Pneumonia due to infectious organism, unspecified laterality, unspecified part of lung [J18.9]  Sepsis without acute organ dysfunction, due to unspecified organism (HCC) [A41.9]  COVID-19 [U07.1]                   Date / Time: 2025  4:13 PM  Location: [address] [place] [address]     Patient Admission Status: Inpatient   Readmission Risk Low 0-14, Mod 15-19), High > 20: Readmission Risk Score: 17.2    Current PCP: Ted Flynn MD  Health Care Decision Makers:   Primary Decision Maker: Sharri Santa - Spouse - [phone]    Additional Case Management Notes: Presented to ED with c/o chills, shakes, intermittent suprapubic pain for 24 hours. Had tested +COVID 19 on , but tested negative on . Hypotensive in ED, started on levophed drip. +COVID 19 in ED. Admitted to ICU. Weaned off levophed last evening. Transferred to stepdown this shift. Echo ordered.     Afebrile. NSR. On room air. Ox4. Follows commands. Urine +gram positive cocci. +MRSA nares. +COVID 19. Telemetry, ortho BP & pulse, IS, SCDs. SSI, midodrine started - 5 mg tid, IV vancomycin, Electrolyte replacement protocols. Received 500 ml fld bolus x1 yesterday. Blood cultures sent.     Procedures:    CVC right femoral -  removed    Imagin/4 CXR: Mild patchy airspace opacities are seen at the lung bases bilaterally which  may represent atelectasis or pneumonia.   CTA Chest: 1. Small bilateral pleural effusions are noted. Mild dependent bibasilar  airspace opacities may represent mild atelectasis or pneumonia.  2. There is no CT angiographic evidence of pulmonary

## 2025-01-06 NOTE — SIGNIFICANT EVENT
Patient deemed stable for transfer to step down service and is heading to room 4K27. Hand off was given to Dr Hensley.

## 2025-01-06 NOTE — PROGRESS NOTES
[facility]   Pharmacy Pharmacokinetic Monitoring Service - Vancomycin     Myles Santa is a 79 y.o. male starting on vancomycin therapy for UTI. Pharmacy consulted by Pasquale Rae MD for monitoring and adjustment.    Target Concentration: Goal AUC/RICHARDSON 400-600 mg*hr/L    Additional Antimicrobials: ceftriaxone + azithromycin     Pertinent Laboratory Values:   Wt Readings from Last 1 Encounters:   01/04/25 92.1 kg (203 lb 0.7 oz)     Temp Readings from Last 1 Encounters:   01/05/25 98.2 °F (36.8 °C)     Estimated Creatinine Clearance: 52 mL/min (A) (based on SCr of 1.3 mg/dL (H)).  Recent Labs     01/04/25  1720 01/05/25  0328   CREATININE 1.1 1.3*   BUN 24* 24*   WBC 11.2* 12.6*     Pertinent Cultures:  Date Source Results   1/4/2025 Urine Sent   1/4/2024 BC x2 Sent   MRSA Nasal Swab: showed MRSA positive result on 1/4/25    Plan:  Dosing recommendations based on Bayesian software  Start vancomycin 1250mg  Q24H   Anticipated AUC of 461 and trough concentration of 12.7 at steady state  Renal labs as indicated   Vancomycin concentration not yet ordered at this time  Pharmacy will continue to monitor patient and adjust therapy as indicated    Thank you for the consult,  Iris Singh, PharmD 1/5/2025 3:46 PM      
Echoc completed. Dr. Proctor to read.   
off evening of 1/5.     1/6: Patient seen and evaluated today at bedside. Denies any acute issues and states that he would like to get out of the hospital as soon as possible. No issues overnight.     ROS: Patient denies shakes, fever, chills, nausea, vomiting, shortness of breath, chest pain, abdominal pain or dysuria.   PMH:  Per HPI  SHX:  Former smoker. Current alcohol use. No drug use.   FHX: Mother: DM, CHF. Brother: Renal cancer.   Allergies: Heparin: LORENA   Medications:     dextrose      sodium chloride        azithromycin  500 mg IntraVENous Q24H    folic acid  1 mg Oral Daily    vancomycin (VANCOCIN) intermittent dosing (placeholder)   Other RX Placeholder    vancomycin  1,250 mg IntraVENous Q24H    insulin lispro  0-4 Units SubCUTAneous 4x Daily AC & HS    cefTRIAXone (ROCEPHIN) IV  2,000 mg IntraVENous Q24H    aspirin  81 mg Oral Daily    atorvastatin  40 mg Oral Daily    [Held by provider] ferrous sulfate  650 mg Oral Daily with breakfast    gabapentin  400 mg Oral Nightly    levothyroxine  75 mcg Oral Daily    [Held by provider] metoprolol succinate  50 mg Oral Daily    [Held by provider] sacubitril-valsartan  1 tablet Oral BID    furosemide  20 mg Oral Once per day on Monday Wednesday Friday    sodium chloride flush  5-40 mL IntraVENous 2 times per day       Vital Signs:   T: 98.1: P: 68 RR: 20 B/P: 141/51: FiO2: 21: O2 Sat:97: I/O: 1/4 +1.54 L GCS: 15  Body mass index is 28.43 kg/m²..  General:   Acutely ill appearing male  HEENT:  normocephalic and atraumatic.  No scleral icterus. PERR  Neck: supple.  No Thyromegaly.  Lungs: clear to auscultation.  No retractions  Cardiac: RRR.  No JVD.  Abdomen: soft.  Nontender.  Extremities:  No clubbing, cyanosis, or edema x 4.    Vasculature: capillary refill < 3 seconds. Palpable dorsalis pedis pulses.  Skin:  warm and dry.  Psych:  Alert and oriented x3.  Affect appropriate  Lymph:  No supraclavicular adenopathy.  Neurologic:  No focal deficit. No 
pulses.  Skin:  warm and dry.  Psych:  Alert and oriented x3.  Affect appropriate  Lymph:  No supraclavicular adenopathy.  Neurologic:  No focal deficit. No seizures.    Data: (All radiographs, tracings, PFTs, and imaging are personally viewed and interpreted unless otherwise noted).   Labs:  BMP sodium 141, potassium 4.4, chloride 108, CO2 22, BUN 24, creatinine 1.3, glucose 145, calcium 8.4, iCal 1.21, magnesium 2.4  CBC: WBC 12.6, hemoglobin 12.1, hematocrit 34.9, platelets 155  Folate 3.0, B12 312  UA trace ketones, trace blood, 100 protein, large leukocyte esterase, greater than 200 WBC, 5-10 RBC, many bacteria seen.  Imaging:  CTA chest 1/4: Small bilateral pleural effusions noted.  Mild dependent bibasilar airspace opacity.  No evidence of PE.  CXR 1/4 mild patchy airspace opacity seen at lung bases bilaterally which may represent atelectasis/pneumonia  Micro:  COVID-positive, influenza negative  Blood culture x 2, urine culture, respiratory culture, MRSA nares ordered and pending    Seen with multidisciplinary ICU team.  Meets Continued ICU Level Care Criteria:    [x] Yes   [] No - Transfer Planned to listed location:   [] HOSPITALIST CONTACTED    CC time 35 minutes.  Time was discontiguous and does not include procedures.  Electronically signed by Dr. Pasquale Rae MD on 1/5/2025 at 7:56 AM.    Case and plan reviewed and discussed with Dr. Echeverria    This report has been created using voice recognition software. It may contain minor errors which are inherent in voice recognition technology

## 2025-01-06 NOTE — FLOWSHEET NOTE
0800 Mr Santa rests in the bed without apparent distress. He denies pain ro SOB. He requires no supplemental O2. He has a femorla 3 lumen CVC right groin infusing NS at kvo. He has a peripheral IV left arm site clear. He is able to demonstrate orientation X 3. He had required pressors yesterday but this was stopped during the night. Plan to remove CVC and transfer to stepdown.    0830 CVC right groin was removed. He tolerated this well.    0930 He was assisted up to the chair. He tolerates this well.

## 2025-01-07 VITALS
WEIGHT: 186.07 LBS | TEMPERATURE: 97.8 F | SYSTOLIC BLOOD PRESSURE: 145 MMHG | DIASTOLIC BLOOD PRESSURE: 74 MMHG | RESPIRATION RATE: 18 BRPM | HEART RATE: 73 BPM | BODY MASS INDEX: 25.2 KG/M2 | OXYGEN SATURATION: 93 % | HEIGHT: 72 IN

## 2025-01-07 LAB
ANION GAP SERPL CALC-SCNC: 11 MEQ/L (ref 8–16)
BACTERIA UR CULT: ABNORMAL
BUN SERPL-MCNC: 20 MG/DL (ref 7–22)
CALCIUM SERPL-MCNC: 8.3 MG/DL (ref 8.5–10.5)
CHLORIDE SERPL-SCNC: 105 MEQ/L (ref 98–111)
CO2 SERPL-SCNC: 22 MEQ/L (ref 23–33)
CREAT SERPL-MCNC: 0.9 MG/DL (ref 0.4–1.2)
DEPRECATED RDW RBC AUTO: 55.5 FL (ref 35–45)
ERYTHROCYTE [DISTWIDTH] IN BLOOD BY AUTOMATED COUNT: 14.2 % (ref 11.5–14.5)
GFR SERPL CREATININE-BSD FRML MDRD: 88 ML/MIN/1.73M2
GLUCOSE BLD STRIP.AUTO-MCNC: 124 MG/DL (ref 70–108)
GLUCOSE SERPL-MCNC: 120 MG/DL (ref 70–108)
HCT VFR BLD AUTO: 34.7 % (ref 42–52)
HGB BLD-MCNC: 11.2 GM/DL (ref 14–18)
MAGNESIUM SERPL-MCNC: 1.9 MG/DL (ref 1.6–2.4)
MCH RBC QN AUTO: 34.5 PG (ref 26–33)
MCHC RBC AUTO-ENTMCNC: 32.3 GM/DL (ref 32.2–35.5)
MCV RBC AUTO: 106.8 FL (ref 80–94)
ORGANISM: ABNORMAL
PLATELET # BLD AUTO: 108 THOU/MM3 (ref 130–400)
PMV BLD AUTO: 10 FL (ref 9.4–12.4)
POTASSIUM SERPL-SCNC: 3.9 MEQ/L (ref 3.5–5.2)
RBC # BLD AUTO: 3.25 MILL/MM3 (ref 4.7–6.1)
SODIUM SERPL-SCNC: 138 MEQ/L (ref 135–145)
WBC # BLD AUTO: 4.3 THOU/MM3 (ref 4.8–10.8)

## 2025-01-07 PROCEDURE — 6370000000 HC RX 637 (ALT 250 FOR IP): Performed by: INTERNAL MEDICINE

## 2025-01-07 PROCEDURE — 6370000000 HC RX 637 (ALT 250 FOR IP)

## 2025-01-07 PROCEDURE — 80048 BASIC METABOLIC PNL TOTAL CA: CPT

## 2025-01-07 PROCEDURE — 99239 HOSP IP/OBS DSCHRG MGMT >30: CPT | Performed by: INTERNAL MEDICINE

## 2025-01-07 PROCEDURE — 2500000003 HC RX 250 WO HCPCS

## 2025-01-07 PROCEDURE — 85027 COMPLETE CBC AUTOMATED: CPT

## 2025-01-07 PROCEDURE — 83735 ASSAY OF MAGNESIUM: CPT

## 2025-01-07 PROCEDURE — 36415 COLL VENOUS BLD VENIPUNCTURE: CPT

## 2025-01-07 PROCEDURE — 82948 REAGENT STRIP/BLOOD GLUCOSE: CPT

## 2025-01-07 PROCEDURE — 94669 MECHANICAL CHEST WALL OSCILL: CPT

## 2025-01-07 RX ORDER — POTASSIUM CHLORIDE 750 MG/1
10 TABLET, EXTENDED RELEASE ORAL ONCE
Status: COMPLETED | OUTPATIENT
Start: 2025-01-07 | End: 2025-01-07

## 2025-01-07 RX ADMIN — FOLIC ACID 1 MG: 1 TABLET ORAL at 08:00

## 2025-01-07 RX ADMIN — ASPIRIN 81 MG: 81 TABLET, CHEWABLE ORAL at 08:00

## 2025-01-07 RX ADMIN — LEVOTHYROXINE SODIUM 75 MCG: 0.07 TABLET ORAL at 05:38

## 2025-01-07 RX ADMIN — MIDODRINE HYDROCHLORIDE 5 MG: 5 TABLET ORAL at 08:00

## 2025-01-07 RX ADMIN — SODIUM CHLORIDE, PRESERVATIVE FREE 10 ML: 5 INJECTION INTRAVENOUS at 08:00

## 2025-01-07 RX ADMIN — POTASSIUM CHLORIDE 10 MEQ: 750 TABLET, EXTENDED RELEASE ORAL at 08:45

## 2025-01-07 ASSESSMENT — PAIN SCALES - GENERAL: PAINLEVEL_OUTOF10: 0

## 2025-01-07 NOTE — PLAN OF CARE
Problem: Chronic Conditions and Co-morbidities  Goal: Patient's chronic conditions and co-morbidity symptoms are monitored and maintained or improved  1/6/2025 1026 by Dat Mc RN  Outcome: Progressing  Flowsheets (Taken 1/6/2025 1026)  Care Plan - Patient's Chronic Conditions and Co-Morbidity Symptoms are Monitored and Maintained or Improved:   Monitor and assess patient's chronic conditions and comorbid symptoms for stability, deterioration, or improvement   Collaborate with multidisciplinary team to address chronic and comorbid conditions and prevent exacerbation or deterioration   Update acute care plan with appropriate goals if chronic or comorbid symptoms are exacerbated and prevent overall improvement and discharge     Problem: Discharge Planning  Goal: Discharge to home or other facility with appropriate resources  1/6/2025 1026 by Dat Mc, RN  Outcome: Progressing  Flowsheets (Taken 1/6/2025 1026)  Discharge to home or other facility with appropriate resources: Identify barriers to discharge with patient and caregiver     Problem: Safety - Adult  Goal: Free from fall injury  1/6/2025 1026 by Dat Mc RN  Outcome: Progressing  Flowsheets (Taken 1/6/2025 1026)  Free From Fall Injury:   Instruct family/caregiver on patient safety   Based on caregiver fall risk screen, instruct family/caregiver to ask for assistance with transferring infant if caregiver noted to have fall risk factors     Problem: Respiratory - Adult  Goal: Achieves optimal ventilation and oxygenation  1/6/2025 1026 by Dat Mc RN  Outcome: Progressing  Flowsheets (Taken 1/6/2025 1026)  Achieves optimal ventilation and oxygenation:   Assess for changes in respiratory status   Assess for changes in mentation and behavior   Position to facilitate oxygenation and minimize respiratory effort   Oxygen supplementation based on oxygen saturation or arterial blood gases     Problem: Cardiovascular - Adult  Goal: 
  Problem: Chronic Conditions and Co-morbidities  Goal: Patient's chronic conditions and co-morbidity symptoms are monitored and maintained or improved  1/7/2025 1029 by Gita Scott RN  Outcome: Progressing  1/7/2025 0450 by Dennis Noyola RN  Outcome: Progressing     Problem: Discharge Planning  Goal: Discharge to home or other facility with appropriate resources  1/7/2025 1029 by Gita Scott RN  Outcome: Progressing  1/7/2025 0450 by Dennis Noyola RN  Outcome: Progressing     Problem: Safety - Adult  Goal: Free from fall injury  1/7/2025 1029 by Gita Scott RN  Outcome: Progressing  1/7/2025 0450 by Dennis Noyola RN  Outcome: Progressing     Problem: Respiratory - Adult  Goal: Achieves optimal ventilation and oxygenation  1/7/2025 1029 by Gita Scott RN  Outcome: Progressing  1/7/2025 0450 by Dennis Noyola RN  Outcome: Progressing     Problem: Cardiovascular - Adult  Goal: Maintains optimal cardiac output and hemodynamic stability  1/7/2025 1029 by Gita Scott RN  Outcome: Progressing  1/7/2025 0450 by Dennis Noyola RN  Outcome: Progressing     Problem: Skin/Tissue Integrity - Adult  Goal: Skin integrity remains intact  1/7/2025 1029 by Gita Scott RN  Outcome: Progressing  1/7/2025 0450 by Dennis Noyola RN  Outcome: Progressing  Flowsheets (Taken 1/6/2025 2000)  Skin Integrity Remains Intact:   Monitor for areas of redness and/or skin breakdown   Assess vascular access sites hourly     Problem: Infection - Adult  Goal: Absence of fever/infection during anticipated neutropenic period  1/7/2025 1029 by Gita Scott RN  Outcome: Progressing  1/7/2025 0450 by Dennis Noyola RN  Outcome: Progressing     Problem: Metabolic/Fluid and Electrolytes - Adult  Goal: Electrolytes maintained within normal limits  1/7/2025 1029 by Gita Scott RN  Outcome: Progressing  1/7/2025 0450 by Dennis Noyola RN  Outcome: Progressing     Problem: Gastrointestinal - Adult  Goal: Minimal or 
  Problem: Chronic Conditions and Co-morbidities  Goal: Patient's chronic conditions and co-morbidity symptoms are monitored and maintained or improved  1/7/2025 1033 by Gita Scott RN  Outcome: Adequate for Discharge  1/7/2025 1029 by Gita Scott RN  Outcome: Progressing  1/7/2025 0450 by Dennis Noyola RN  Outcome: Progressing     Problem: Discharge Planning  Goal: Discharge to home or other facility with appropriate resources  1/7/2025 1033 by Gita Scott RN  Outcome: Adequate for Discharge  1/7/2025 1029 by Gita Scott RN  Outcome: Progressing  1/7/2025 0450 by Dennis Noyola RN  Outcome: Progressing     Problem: Safety - Adult  Goal: Free from fall injury  1/7/2025 1033 by Gita Scott RN  Outcome: Adequate for Discharge  1/7/2025 1029 by Gita Scott RN  Outcome: Progressing  1/7/2025 0450 by Dennis Noyola RN  Outcome: Progressing     Problem: Respiratory - Adult  Goal: Achieves optimal ventilation and oxygenation  1/7/2025 1033 by Gita Scott RN  Outcome: Adequate for Discharge  1/7/2025 1029 by Gita Scott RN  Outcome: Progressing  1/7/2025 0450 by Dennis Noyola RN  Outcome: Progressing     Problem: Cardiovascular - Adult  Goal: Maintains optimal cardiac output and hemodynamic stability  1/7/2025 1033 by Gita Scott RN  Outcome: Adequate for Discharge  1/7/2025 1029 by Gita Scott RN  Outcome: Progressing  1/7/2025 0450 by Dennis Noyola RN  Outcome: Progressing     Problem: Skin/Tissue Integrity - Adult  Goal: Skin integrity remains intact  1/7/2025 1033 by Gita Scott RN  Outcome: Adequate for Discharge  1/7/2025 1029 by Gita Scott RN  Outcome: Progressing  1/7/2025 0450 by Dennis Noyola RN  Outcome: Progressing  Flowsheets (Taken 1/6/2025 2000)  Skin Integrity Remains Intact:   Monitor for areas of redness and/or skin breakdown   Assess vascular access sites hourly     Problem: Infection - Adult  Goal: Absence of fever/infection during anticipated 
  Problem: Chronic Conditions and Co-morbidities  Goal: Patient's chronic conditions and co-morbidity symptoms are monitored and maintained or improved  Outcome: Progressing     Problem: Discharge Planning  Goal: Discharge to home or other facility with appropriate resources  Outcome: Progressing     Problem: Safety - Adult  Goal: Free from fall injury  Outcome: Progressing     Problem: Respiratory - Adult  Goal: Achieves optimal ventilation and oxygenation  Outcome: Progressing     Problem: Cardiovascular - Adult  Goal: Maintains optimal cardiac output and hemodynamic stability  Outcome: Progressing     Problem: Skin/Tissue Integrity - Adult  Goal: Skin integrity remains intact  Outcome: Progressing  Flowsheets (Taken 1/6/2025 2000)  Skin Integrity Remains Intact:   Monitor for areas of redness and/or skin breakdown   Assess vascular access sites hourly     Problem: Infection - Adult  Goal: Absence of fever/infection during anticipated neutropenic period  Outcome: Progressing     Problem: Metabolic/Fluid and Electrolytes - Adult  Goal: Electrolytes maintained within normal limits  Outcome: Progressing     Problem: Gastrointestinal - Adult  Goal: Minimal or absence of nausea and vomiting  Outcome: Progressing     Problem: ABCDS Injury Assessment  Goal: Absence of physical injury  Outcome: Progressing     
  Problem: Chronic Conditions and Co-morbidities  Goal: Patient's chronic conditions and co-morbidity symptoms are monitored and maintained or improved  Outcome: Progressing  Flowsheets (Taken 1/4/2025 2320 by Jerica Correa)  Care Plan - Patient's Chronic Conditions and Co-Morbidity Symptoms are Monitored and Maintained or Improved:   Monitor and assess patient's chronic conditions and comorbid symptoms for stability, deterioration, or improvement   Collaborate with multidisciplinary team to address chronic and comorbid conditions and prevent exacerbation or deterioration   Update acute care plan with appropriate goals if chronic or comorbid symptoms are exacerbated and prevent overall improvement and discharge     Problem: Discharge Planning  Goal: Discharge to home or other facility with appropriate resources  Outcome: Progressing  Flowsheets (Taken 1/4/2025 2320 by Jerica Correa)  Discharge to home or other facility with appropriate resources:   Identify barriers to discharge with patient and caregiver   Arrange for needed discharge resources and transportation as appropriate   Identify discharge learning needs (meds, wound care, etc)   Arrange for interpreters to assist at discharge as needed   Refer to discharge planning if patient needs post-hospital services based on physician order or complex needs related to functional status, cognitive ability or social support system     Problem: Safety - Adult  Goal: Free from fall injury  Outcome: Progressing     
Patient transferred to stepdown unit from ICU.  Medical service to assume care.  Patient is a 79-year-old male with PMH of HFrEF, CAD s/p CABG, surgical AVR, MV ring repair, DM2, hypothyroidism, CKD, HLD HTN who presented to [facility] on 1/4.  He was previously found to have COVID on 12/27.  Diagnosed with septic shock and admitted to the ICU for hypotension and vasopressor support.  This was secondary to a gram-positive cocci UTI.  Vasopressors were weaned off.     Patient was seen about at the bedside.  He is feeling much better.  Has no complaints at this point.  Dysuria has resolved.  No chest pain, shortness of breath, abdominal pain.     Objectively he has no leukocytosis, labs are largely unremarkable other than chronic anemia.  Blood cultures x 2 are negative at this point.  Physical exam is positive for 2/6 systolic murmur over LLSB and apex.  1/6 diastolic murmur over apex.  No Janeway lesions.  He has no evidence of volume overload.    Given that this is a gram-positive cocci UTI will continue with vancomycin.  Source unknown at this point.  Does have valvular history as described in subjective portion.  TTE has been ordered.  Will await culture results and consider infectious disease consult.  May need to do MICHELE.  Continue home dose diuretics.    Electronically signed by Demetrius Zacarias DO on 1/6/2025 at 2:21 PM    
1/5/2025 2052)  Electrolytes maintained within normal limits: Monitor labs and assess patient for signs and symptoms of electrolyte imbalances     Problem: Gastrointestinal - Adult  Goal: Minimal or absence of nausea and vomiting  Outcome: Progressing  Flowsheets (Taken 1/5/2025 2052)  Minimal or absence of nausea and vomiting:   Advance diet as tolerated, if ordered   Nutrition consult to assist patient with adequate nutrition and appropriate food choices     Care plan reviewed with patient.  Patient verbalizes understanding of the plan of care and contributes to goal setting.

## 2025-01-07 NOTE — DISCHARGE SUMMARY
Resident Discharge Summary (Hospitalist)      Patient: Myles Santa 79 y.o. male  : 1945  MRN: [id number]   Account: [id number]   Patient's PCP: Tde Flynn MD    Admit Date: 2025   Discharge Date: 2025      Admitting Physician: Brandy Cardenas, DO  Discharge Physician: Alyssa Hurtado, DO       Discharge Diagnoses:    Septic shock secondary to UTI requiring pressors, shock resolved  Patient discharged with 5-day supply of Augmentin. He is to follow-up with his PCP in 1 week.      Hospital Course:   Myles Santa is a 79 y.o. male with PMHx of HFrEF 30-35%, CAD s/p CABG, CKD, DM2, surgical AVR, MV ring repair who admitted to [facility] on 2025 for chills and dysuria. Patient was found to have septic shock secondary to UTI.  He was COVID-positive.  However, he previously had COVID . Patient was not responsive to fluids in the ED and was admitted to ICU for pressors. Pressors were weaned and he was transferred to stepdown. his urine grew Aerococcus urinae. He was additionally treated with vancomycin.  This was de-escalated to Augmentin at discharge. Patient's symptoms resolved and he was discharged in stable condition with 5-day supply of Augmentin.    The patient was seen and examined on day of discharge and this discharge summary is in conjunction with any daily progress note from day of discharge.    The patient is discharged in stable condition.       Exam:   Vitals:  Vitals:    25 2327 25 0622 25 0757 25 1118   BP: (!) 149/67 (!) 118/55 132/61 (!) 145/74   Pulse: 64 73 74 73   Resp: 18 18 18    Temp: 97.8 °F (36.6 °C) 98.2 °F (36.8 °C) 98.4 °F (36.9 °C) 97.8 °F (36.6 °C)   TempSrc: Oral Oral Oral Oral   SpO2: 95% 92% 94% 93%   Weight:  84.4 kg (186 lb 1.1 oz)     Height:         Weight: Weight - Scale: 84.4 kg (186 lb 1.1 oz)     General appearance: No apparent distress, well developed, appears stated age.  Eyes:  Pupils equal, round,

## 2025-01-07 NOTE — RT PROTOCOL NOTE
breathing using Per Protocol order mode.        4-6 - enter or revise RT Bronchodilator order(s) to two equivalent RT bronchodilator orders with one order with BID Frequency and one order with Frequency of every 4 hours PRN wheezing or increased work of breathing using Per Protocol order mode.        7-10 - enter or revise RT Bronchodilator order(s) to two equivalent RT bronchodilator orders with one order with TID Frequency and one order with Frequency of every 4 hours PRN wheezing or increased work of breathing using Per Protocol order mode.       11-13 - enter or revise RT Bronchodilator order(s) to one equivalent RT bronchodilator order with QID Frequency and an Albuterol order with Frequency of every 4 hours PRN wheezing or increased work of breathing using Per Protocol order mode.      Greater than 13 - enter or revise RT Bronchodilator order(s) to one equivalent RT bronchodilator order with every 4 hours Frequency and an Albuterol order with Frequency of every 2 hours PRN wheezing or increased work of breathing using Per Protocol order mode.     RT to enter RT Home Evaluation for COPD & MDI Assessment order using Per Protocol order mode.    Electronically signed by Amisha Ernandez RCP on 1/7/2025 at 10:44 AM

## 2025-01-07 NOTE — CARE COORDINATION

## 2025-01-07 NOTE — DISCHARGE INSTRUCTIONS
You have been prescribed augmentin twice a day for 5 days for you UTI. Please follow up with your PCP in one week.

## 2025-01-09 LAB
BACTERIA BLD AEROBE CULT: NORMAL
BACTERIA BLD AEROBE CULT: NORMAL

## 2025-04-25 NOTE — PROGRESS NOTES
[employer] [facility]  Lab Results   Component Value Date    APTT 82.2 11/26/2018     Lab Results   Component Value Date    HGB 8.8 11/26/2018    HCT 28.6 11/26/2018    PLT 83 11/26/2018    INR 1.18 11/10/2018       Current Rate: 2.5 mcg/kg/min    Plan:  Rate: Continue at 2.5 mcg/kg/min  Next aPTT: 0600 11/27/18 and every 12 hours thereafter    Ronal Kathleen RPh  11/26/2018  6:50 PM Past Medical History:   Diagnosis Date    Arthritis     rheumatoid    Asthma     Breast cancer     Cancer 2020    BREAST LEFT 2-19    GERD (gastroesophageal reflux disease) 2022    Hearing loss [id number]    Hyperlipidemia     Hypertension 2021    Limb alert care status     NO BP/IV LEFT ARM    Lung disease     Personal history of colonic polyps 01/23/2024    Pseudocholinesterase deficiency     family history    Radiation 2020    Rheumatoid arthritis 2020    Thyroid disease        Past Surgical History:   Procedure Laterality Date    AXILLARY NODE DISSECTION Left 03/14/2019    Procedure: LYMPHADENECTOMY, AXILLARY;  Surgeon: Mp Gonzalez MD;  Location: [facility] OR;  Service: General;  Laterality: Left;  left lumpectomy with axillary lypmh node dissection    BALLOON DILATION OF URETER Left 06/24/2019    Procedure: DILATION, URETER, USING BALLOON;  Surgeon: Jorden Dickson MD;  Location: [facility] OR;  Service: Urology;  Laterality: Left;    BREAST BIOPSY Left 20 yrs ago    benign    BREAST CYST EXCISION  15 yrs ago    BREAST LUMPECTOMY      x2    CHOLECYSTECTOMY  2000    COLONOSCOPY  09/25/2018    LUC EASTON MD    CYSTOSCOPY W/ URETERAL STENT PLACEMENT Left 06/24/2019    Procedure: CYSTOSCOPY, WITH URETERAL STENT INSERTION;  Surgeon: Jorden Dickson MD;  Location: [facility] OR;  Service: Urology;  Laterality: Left;    CYSTOSCOPY W/ URETERAL STENT REMOVAL Left 07/23/2019    Procedure: CYSTOSCOPY, WITH URETERAL STENT REMOVAL;  Surgeon: Jorden Dickson MD;  Location: [facility] OR;  Service: Urology;  Laterality: Left;    EPIDURAL STEROID INJECTION INTO LUMBAR SPINE N/A 09/30/2021    Procedure: Injection-steroid-epidural-lumbar;  Surgeon: Nathen Lyons MD;  Location: [facility] OR;  Service: Pain Management;  Laterality: N/A;  L5-S1      EPIDURAL STEROID INJECTION INTO LUMBAR SPINE N/A 11/16/2021    Procedure: Injection-steroid-epidural-lumbar;  Surgeon: Nathen Lyons MD;  Location: [facility] OR;  Service: Pain Management;  Laterality: N/A;   L5-S1    EXTRACORPOREAL SHOCK WAVE LITHOTRIPSY Left 07/23/2019    Procedure: LITHOTRIPSY, ESWL;  Surgeon: Jorden Dickson MD;  Location: [facility] OR;  Service: Urology;  Laterality: Left;    EYE SURGERY Bilateral 2000    cataracts    HYSTERECTOMY  1994    MASTECTOMY, PARTIAL Left 04/25/2019    Procedure: MASTECTOMY, PARTIAL;  Surgeon: Mp Gonzalez MD;  Location: [facility] OR;  Service: General;  Laterality: Left;    NEEDLE LOCALIZATION Left 03/14/2019    Procedure: NEEDLE LOCALIZATION;  Surgeon: Mp Gonzalez MD;  Location: [facility] OR;  Service: General;  Laterality: Left;  left lumpectomy with wire needle localization     PARTIAL NEPHRECTOMY Right 05/22/2023    RETROGRADE PYELOGRAPHY Bilateral 06/24/2019    Procedure: PYELOGRAM, RETROGRADE;  Surgeon: Jorden Dickson MD;  Location: [facility];  Service: Urology;  Laterality: Bilateral;    SENTINEL LYMPH NODE BIOPSY Left 03/14/2019    Procedure: BIOPSY, LYMPH NODE, SENTINEL;  Surgeon: Mp Gonzalez MD;  Location: [facility];  Service: General;  Laterality: Left;  left sentinel node lymph node biopsy    TONSILLECTOMY  1948    TRANSFORAMINAL EPIDURAL INJECTION OF STEROID Right 01/04/2022    Procedure: Injection,steroid,epidural,transforaminal approach;  Surgeon: Nathen Lyons MD;  Location: [facility] OR;  Service: Pain Management;  Laterality: Right;  L4-L5, L5-s1    TRANSFORAMINAL EPIDURAL INJECTION OF STEROID Right 07/09/2024    Procedure: Injection,steroid,epidural,transforaminal approach;  Surgeon: Nathen Lyons MD;  Location: [facility] OR;  Service: Pain Management;  Laterality: Right;    URETEROSCOPY Left 06/24/2019    Procedure: URETEROSCOPY;  Surgeon: Jorden Dickson MD;  Location: [facility] OR;  Service: Urology;  Laterality: Left;       Review of patient's allergies indicates:   Allergen Reactions    Succinylcholine chloride Other (See Comments)    Sulfur dioxide Hives    Sulfamethoxazole-trimethoprim      Other reaction(s): per dr daryn Rodríguez (sulfonamide  antibiotics)      NOT SURE REACTION       Current Facility-Administered Medications on File Prior to Encounter   Medication    lactated ringers infusion    lidocaine (PF) 10 mg/ml (1%) injection 10 mg     Current Outpatient Medications on File Prior to Encounter   Medication Sig    albuterol (PROVENTIL) 2.5 mg /3 mL (0.083 %) nebulizer solution Take 3 mLs (2.5 mg total) by nebulization every 6 (six) hours as needed for Wheezing.    albuterol (PROVENTIL/VENTOLIN HFA) 90 mcg/actuation inhaler INHALE 2 PUFFS INTO THE LUNGS EVERY 6 (SIX) HOURS AS NEEDED FOR WHEEZING. RESCUE    amLODIPine (NORVASC) 5 MG tablet TAKE 1 TABLET BY MOUTH EVERY DAY (Patient taking differently: Take 5 mg by mouth every evening.)    atorvastatin (LIPITOR) 20 MG tablet TAKE 1 TABLET BY MOUTH EVERY DAY (Patient taking differently: Take 20 mg by mouth every evening.)    benzonatate (TESSALON) 200 MG capsule Take 1 capsule by mouth 3 times daily as needed for Cough.    cholecalciferol, vitamin D3, (VITAMIN D3) 25 mcg (1,000 unit) capsule Take 1 capsule by mouth every morning.    cyanocobalamin (VITAMIN B-12) 1000 MCG tablet Take 100 mcg by mouth once daily.    diphenhydrAMINE (SOMINEX) 25 mg tablet Take 25 mg by mouth every evening.    exemestane (AROMASIN) 25 mg tablet TAKE 1 TABLET BY MOUTH EVERY DAY (Patient taking differently: Take 25 mg by mouth once daily.)    fluticasone propionate (FLONASE) 50 mcg/actuation nasal spray USE 1 SPRAY (50 MCG TOTAL) IN EACH NOSTRIL ONCE DAILY (Patient taking differently: 1 spray by Each Nostril route once daily.)    fluticasone-salmeterol 250-50 mcg/dose (WIXELA INHUB) 250-50 mcg/dose diskus inhaler INHALE 1 PUFF INTO THE LUNGS 2 (TWO) TIMES DAILY. CONTROLLER    folic acid (FOLVITE) 1 MG tablet Take 1 tablet (1,000 mcg total) by mouth once daily.    gabapentin (NEURONTIN) 300 MG capsule Take 1 capsule (300 mg total) by mouth 2 (two) times daily.    hydroCHLOROthiazide (HYDRODIURIL) 25 MG tablet TAKE 1 TABLET BY  MOUTH EVERY DAY (Patient taking differently: Take 25 mg by mouth once daily.)    hydroxychloroquine (PLAQUENIL) 200 mg tablet Take 200 mg by mouth 2 (two) times daily.    levocetirizine (XYZAL) 5 MG tablet Take 1 tablet (5 mg total) by mouth every evening.    levothyroxine (SYNTHROID) 125 MCG tablet Take 125 mcg by mouth before breakfast.    methotrexate 2.5 MG Tab Take 8 tablets (20 mg total) by mouth every 7 days. (Patient taking differently: Take 20 mg by mouth Every Friday.)    prochlorperazine (COMPAZINE) 10 MG tablet TAKE 1 TABLET BY MOUTH EVERY 6 HOURS AS NEEDED (Patient taking differently: Take 10 mg by mouth every 6 (six) hours as needed (nausea).)    doxycycline (VIBRAMYCIN) 100 MG Cap  (Patient not taking: Reported on 4/24/2025)    ondansetron (ZOFRAN-ODT) 8 MG TbDL DISSOLVE 1 TABLET IN MOUTH EVERY 12 HOURS AS NEEDED (Patient not taking: Reported on 4/24/2025)    predniSONE (DELTASONE) 5 MG tablet Take 1 tablet (5 mg total) by mouth once daily. (Patient not taking: Reported on 4/24/2025)    [DISCONTINUED] azithromycin (Z-MERRILL) 250 MG tablet Take 2 tablets by mouth on day 1; Take 1 tablet by mouth on days 2-5    [DISCONTINUED] benzonatate (TESSALON) 100 MG capsule Take 1 capsule (100 mg total) by mouth 3 (three) times daily as needed for Cough.    [DISCONTINUED] fluticasone propionate (FLONASE) 50 mcg/actuation nasal spray 1 spray (50 mcg total) by Each Nostril route once daily.    [DISCONTINUED] guaiFENesin (MUCINEX) 600 mg 12 hr tablet Take 2 tablets (1,200 mg total) by mouth 2 (two) times daily.    [DISCONTINUED] levothyroxine (SYNTHROID) 112 MCG tablet Take 1 tablet (112 mcg total) by mouth before breakfast.    [DISCONTINUED] predniSONE (DELTASONE) 20 MG tablet Take 1 tablet (20 mg total) by mouth once daily. for 5 days    [DISCONTINUED] traMADoL (ULTRAM) 50 mg tablet Take 1 tablet (50 mg total) by mouth every 12 (twelve) hours as needed for Pain (severe pain). TAKE 1 TABLET BY MOUTH EVERY 8 HOURS AS  NEEDED FOR SEVERE PAIN- DOSE DECREASE    [DISCONTINUED] zolpidem (AMBIEN) 5 MG Tab Take 1 tablet (5 mg total) by mouth every evening.     Family History       Problem Relation (Age of Onset)    Alzheimer's disease Mother    Arthritis Sister    Cancer Father, Daughter    Diabetes Brother    Heart disease Mother, Sister    Hypertension Mother    Kidney disease Sister    Stroke Sister, Sister          Tobacco Use    Smoking status: Former     Current packs/day: 0.00     Average packs/day: 0.5 packs/day for 59.3 years (29.6 ttl pk-yrs)     Types: Cigarettes     Start date: 1960     Quit date: 2019     Years since quittin.7    Smokeless tobacco: Never   Substance and Sexual Activity    Alcohol use: Yes     Alcohol/week: 2.0 standard drinks of alcohol     Types: 2 Glasses of wine per week     Comment: Social    Drug use: No    Sexual activity: Never     Review of Systems   Constitutional:  Positive for fever.   HENT:  Positive for voice change.    Respiratory:  Positive for cough and shortness of breath.    Neurological:  Positive for weakness.     Objective:     Vital Signs (Most Recent):  Temp: 98.6 °F (37 °C) (25 1203)  Pulse: 90 (25)  Resp: 16 (25 192)  BP: 124/74 (25)  SpO2: 97 % (25) Vital Signs (24h Range):  Temp:  [98.6 °F (37 °C)] 98.6 °F (37 °C)  Pulse:  [75-90] 90  Resp:  [16-23] 16  SpO2:  [75 %-100 %] 97 %  BP: (112-145)/(69-81) 124/74     Weight: 76.2 kg (168 lb)  Body mass index is 25.54 kg/m².     Physical Exam  Vitals reviewed.   Constitutional:       Appearance: Normal appearance. She is ill-appearing.   HENT:      Head: Normocephalic.      Mouth/Throat:      Mouth: Mucous membranes are moist.      Pharynx: Oropharynx is clear.   Eyes:      Pupils: Pupils are equal, round, and reactive to light.   Cardiovascular:      Rate and Rhythm: Normal rate and regular rhythm.      Pulses: Normal pulses.      Heart sounds: Normal heart sounds.   Pulmonary:       Breath sounds: Wheezing present.   Abdominal:      General: Bowel sounds are normal.      Palpations: Abdomen is soft.   Musculoskeletal:         General: Normal range of motion.      Cervical back: Normal range of motion.   Skin:     General: Skin is warm and dry.      Capillary Refill: Capillary refill takes less than 2 seconds.   Neurological:      General: No focal deficit present.      Mental Status: She is alert and oriented to person, place, and time. Mental status is at baseline.   Psychiatric:         Mood and Affect: Mood normal.              CRANIAL NERVES     CN III, IV, VI   Pupils are equal, round, and reactive to light.       Significant Labs: All pertinent labs within the past 24 hours have been reviewed.  Recent Lab Results         04/24/25  1537   04/24/25  1403        Influenza A, Molecular Negative         Influenza B, Molecular Negative         Albumin   4.0       ALP   60       ALT   47       Anion Gap   8       AST   24       Baso #   0.01       Basophil %   0.1       BILIRUBIN TOTAL   0.6  Comment: For infants and newborns, interpretation of results should be based   on gestational age, weight and in agreement with clinical   observations.    Premature Infant recommended reference ranges:   0-24 hours:  <8.0 mg/dL   24-48 hours: <12.0 mg/dL   3-5 days:    <15.0 mg/dL   6-29 days:   <15.0 mg/dL       BNP   138  Comment: Values of less than 100 pg/ml are consistent with non-CHF populations.       BUN   29       Calcium   9.8       Chloride   100       CO2   35       SARS COV-2 MOLECULAR   Negative  Comment: This test utilizes isothermal nucleic acid amplification technology to detect the SARS-CoV-2 RdRp nucleic acid segment. The analytical sensitivity (limit of detection) is 500 copies/swab.     A POSITIVE result is indicative of the presence of SARS-CoV-2 RNA; clinical correlation with patient history and other diagnostic information is necessary to determine patient infection status.      A NEGATIVE result means that SARS-CoV-2 nucleic acids are not present above the limit of detection. A NEGATIVE result should be treated as presumptive. It does not rule out the possibility of COVID-19 and should not be the sole basis for treatment decisions. If COVID-19 is strongly suspected based on clinical and exposure history, re-testing using an alternate molecular assay should be considered.     This test is FDA approved.     Performance characteristics of this test has been independently verified by [facility] in conjunction with [facility] Department of Pathology and Laboratory Medicine.          Creatinine   1.3       eGFR   42       Eos #   0.03       Eos %   0.2       Glucose   77       Hematocrit   37.0       Hemoglobin   11.4       Immature Grans (Abs)   0.31  Comment: Mild elevation in immature granulocytes is non specific and can be seen in a variety of conditions including stress response, acute inflammation, trauma and pregnancy. Correlation with other laboratory and clinical findings is essential.       Immature Granulocytes   2.2       Lymph #   1.05       LYMPH %   7.3       MCH   31.8       MCHC   30.8       MCV   103       Mono #   0.91       Mono %   6.4       MPV   10.5       Neut #   12.0       Neut %   83.8       nRBC   0       Platelet Count   290       Potassium   3.9       PROTEIN TOTAL   7.3       RBC   3.59       RDW   17.0       Sodium   143       Troponin I High Sensitivity   12.7  Comment: Troponin results differ between methods. Do not use   results between Troponin methods interchangeably.    Alkaline Phospatase levels above 400 U/L may   cause false positive results.    Access hsTnI should not be used for patients taking   Asfotase billy (Strensiq).       WBC   14.33               Significant Imaging: I have reviewed all pertinent imaging results/findings within the past 24 hours.  I have reviewed and interpreted all pertinent imaging  results/findings within the past 24 hours.

## 2025-06-03 LAB
ALBUMIN: 3.9 G/DL (ref 3.5–5.2)
ANION GAP SERPL CALCULATED.3IONS-SCNC: 13 MMOL/L (ref 7–16)
BUN BLDV-MCNC: 28 MG/DL (ref 8–23)
CALCIUM SERPL-MCNC: 9.3 MG/DL (ref 8.6–10.5)
CHLORIDE BLD-SCNC: 102 MMOL/L (ref 96–107)
CO2: 23 MMOL/L (ref 18–32)
CREAT SERPL-MCNC: 1.59 MG/DL (ref 0.67–1.3)
EGFR IF NONAFRICAN AMERICAN: 44 ML/MIN/1.73M2
GLUCOSE: 244 MG/DL (ref 70–100)
PHOSPHORUS: 3.6 MG/DL (ref 2.5–4.5)
POTASSIUM SERPL-SCNC: 4.9 MMOL/L (ref 3.5–5.4)
SODIUM BLD-SCNC: 138 MMOL/L (ref 135–148)

## 2025-06-04 ENCOUNTER — OFFICE VISIT (OUTPATIENT)
Dept: NEPHROLOGY | Age: 80
End: 2025-06-04
Payer: MEDICARE

## 2025-06-04 VITALS — DIASTOLIC BLOOD PRESSURE: 64 MMHG | HEART RATE: 51 BPM | SYSTOLIC BLOOD PRESSURE: 116 MMHG | OXYGEN SATURATION: 93 %

## 2025-06-04 DIAGNOSIS — N28.1 RENAL CYST: ICD-10-CM

## 2025-06-04 DIAGNOSIS — N18.32 STAGE 3B CHRONIC KIDNEY DISEASE (HCC): ICD-10-CM

## 2025-06-04 DIAGNOSIS — I10 HTN (HYPERTENSION), BENIGN: Primary | ICD-10-CM

## 2025-06-04 PROCEDURE — G8427 DOCREV CUR MEDS BY ELIG CLIN: HCPCS | Performed by: INTERNAL MEDICINE

## 2025-06-04 PROCEDURE — 3074F SYST BP LT 130 MM HG: CPT | Performed by: INTERNAL MEDICINE

## 2025-06-04 PROCEDURE — G8417 CALC BMI ABV UP PARAM F/U: HCPCS | Performed by: INTERNAL MEDICINE

## 2025-06-04 PROCEDURE — 1036F TOBACCO NON-USER: CPT | Performed by: INTERNAL MEDICINE

## 2025-06-04 PROCEDURE — 1159F MED LIST DOCD IN RCRD: CPT | Performed by: INTERNAL MEDICINE

## 2025-06-04 PROCEDURE — G2211 COMPLEX E/M VISIT ADD ON: HCPCS | Performed by: INTERNAL MEDICINE

## 2025-06-04 PROCEDURE — 3078F DIAST BP <80 MM HG: CPT | Performed by: INTERNAL MEDICINE

## 2025-06-04 PROCEDURE — 99213 OFFICE O/P EST LOW 20 MIN: CPT | Performed by: INTERNAL MEDICINE

## 2025-06-04 PROCEDURE — 1124F ACP DISCUSS-NO DSCNMKR DOCD: CPT | Performed by: INTERNAL MEDICINE

## 2025-06-04 NOTE — PROGRESS NOTES
2 puffs into the lungs every 6 hours as needed for Wheezing or Shortness of Breath 1 each 1    furosemide (LASIX) 20 MG tablet Take 1 tablet by mouth three times a week And as directed by CHF clinic (Patient taking differently: Take 1 tablet by mouth three times a week And as directed by CHF clinic) 45 tablet 3    ferrous sulfate (IRON 325) 325 (65 Fe) MG tablet Take 2 tablets by mouth daily (with breakfast)      atorvastatin (LIPITOR) 80 MG tablet Take 0.5 tablets by mouth every evening      levothyroxine (SYNTHROID) 75 MCG tablet Take 1 tablet by mouth Daily      metoprolol succinate (TOPROL XL) 50 MG extended release tablet Take 1 tablet by mouth daily 90 tablet 3    sacubitril-valsartan (ENTRESTO)  MG per tablet Take 1 tablet by mouth 2 times daily 180 tablet 3    aspirin 81 MG tablet Take 1 tablet by mouth daily         Vitals     /64 (BP Site: Left Upper Arm, Patient Position: Sitting, BP Cuff Size: Medium Adult)   Pulse 51   SpO2 93%  Wt Readings from Last 3 Encounters:   01/07/25 84.4 kg (186 lb 1.1 oz)   12/10/24 93.9 kg (207 lb)   12/04/24 93.9 kg (207 lb)        Physical Exam     General -- well developed and well nourishes  Lungs -- clear  Heart -- S1, S2 heard, JVD - no  Abdomen - soft, non-tender  Extremities -- no edema  CNS - awake and alert    Labs, Radiology and Tests    Labs -    4/16 4/19 5/22 11/23 5/24 6/24 11/24 6/25    Potassium 4 4.4 4.8 4.7 5.1 4.2 4.5 4.9    BUN 24 19 18 30 25 15 25 28    Creatinine 1.3 1.10 1.26 1.55 1.64 1.37 1.55 1.59    eGFR 55 > 60 56 46 44 53 46 44                UPCR  250 300 115 87  390     UMCR   1.19 20 9  114                   Renal Ultrasound Scan -- 10/2015  Right kidney 11.6 cm and left kidney 11.6 cm   4.1 cm cyst on the right kidney, bilateral renal cysts      Assessment      Renal  -- patient renal function significantly improved from previous visit.  As per MDRD his chronic kidney disease stage III.      Overall creatinine stable. At

## 2025-07-05 NOTE — PATIENT INSTRUCTIONS
Continue:  · Continue current medications  · Daily weights and record  · Fluid restriction of 2 Liters per day  · Limit sodium in diet to around 3197-8773 mg/day  · Monitor BP  · Activity as tolerated     Call the Heart Failure Clinic for any of the following symptoms: [phone]  Weight gain of 2-3 pounds in 1 day or 5 pounds in 1 week  Increased shortness of breath  Shortness of breath while laying down  Cough  Chest pain  Swelling in feet, ankles or legs  Tenderness or bloating in the abdomen  Fatigue   Decreased appetite or nausea   Confusion     Get blood work 3-4 weeks after starting Entresto. 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    Puncture wound to left thumb.     Last T Dap: 06/11/2024    OTCs used: none today.    Denies known Latex allergy or symptoms of Latex sensitivity.    Medications reviewed and updated.  Pharmacy reviewed & updated as per patient preference.     Standing orders collected during rooming process: NONE.    Patient advised staff will contact with positive results only.  Patient instructed can also view results on [employer].     Patient location: Room# [facility].    Patient would like communication of their results via:      Cell Phone:   Telephone Information:   Mobile [phone]     Okay to leave a message containing results? Yes

## 2025-07-22 ENCOUNTER — OFFICE VISIT (OUTPATIENT)
Dept: CARDIOLOGY CLINIC | Age: 80
End: 2025-07-22
Payer: MEDICARE

## 2025-07-22 VITALS
SYSTOLIC BLOOD PRESSURE: 154 MMHG | BODY MASS INDEX: 28.58 KG/M2 | HEART RATE: 60 BPM | WEIGHT: 211 LBS | HEIGHT: 72 IN | DIASTOLIC BLOOD PRESSURE: 70 MMHG

## 2025-07-22 DIAGNOSIS — I50.22 CHF (CONGESTIVE HEART FAILURE), NYHA CLASS II, CHRONIC, SYSTOLIC (HCC): Primary | ICD-10-CM

## 2025-07-22 DIAGNOSIS — Z95.1 S/P CABG X 2: ICD-10-CM

## 2025-07-22 DIAGNOSIS — Z98.890 S/P MVR (MITRAL VALVE REPAIR): ICD-10-CM

## 2025-07-22 DIAGNOSIS — I10 ESSENTIAL HYPERTENSION: ICD-10-CM

## 2025-07-22 DIAGNOSIS — Z95.2 S/P AVR: ICD-10-CM

## 2025-07-22 PROCEDURE — 1124F ACP DISCUSS-NO DSCNMKR DOCD: CPT | Performed by: PHYSICIAN ASSISTANT

## 2025-07-22 PROCEDURE — G8417 CALC BMI ABV UP PARAM F/U: HCPCS | Performed by: PHYSICIAN ASSISTANT

## 2025-07-22 PROCEDURE — 3078F DIAST BP <80 MM HG: CPT | Performed by: PHYSICIAN ASSISTANT

## 2025-07-22 PROCEDURE — 1159F MED LIST DOCD IN RCRD: CPT | Performed by: PHYSICIAN ASSISTANT

## 2025-07-22 PROCEDURE — G8427 DOCREV CUR MEDS BY ELIG CLIN: HCPCS | Performed by: PHYSICIAN ASSISTANT

## 2025-07-22 PROCEDURE — 99214 OFFICE O/P EST MOD 30 MIN: CPT | Performed by: PHYSICIAN ASSISTANT

## 2025-07-22 PROCEDURE — 1036F TOBACCO NON-USER: CPT | Performed by: PHYSICIAN ASSISTANT

## 2025-07-22 PROCEDURE — 3077F SYST BP >= 140 MM HG: CPT | Performed by: PHYSICIAN ASSISTANT

## 2025-07-22 NOTE — PROGRESS NOTES
[facility] [place] [facility]  [facility] CARDIOLOGY  [address]  [address]  [place] [address]  Dept: [phone]  Dept Fax: [phone]  Loc: [phone]    Chief Complaint   Patient presents with    Follow-up     8 month  No cardiac complaints        History of Present Illness  The patient is a 80-year-old gentleman with a history of nonischemic cardiomyopathy, status post CABG, status post mitral valve repair, and status post AVR, who presents for an 8-month follow-up.    He reports no chest pain, shortness of breath, or palpitations since his last visit. He has been monitoring his blood pressure at home, which has remained within normal limits. His diabetes is well-managed. He has been under the regular care of the heart failure clinic. The possibility of an ICD defibrillator was discussed with him in the past. He continue to not be interested in it.  He understand that he is at increased risk of SCD.         Cardiologist:  Dr. Proctor        General:   No fever, no chills, No fatigue or weight loss  Pulmonary:    No dyspnea, no wheezing  Cardiac:    Denies recent chest pain   GI:     No nausea or vomiting, no abdominal pain  Neuro:    No dizziness or light headedness  Musculoskeletal:  No recent active issues  Extremities:   No edema, good peripheral pulses      Past Medical History:   Diagnosis Date    Acute on chronic systolic CHF (congestive heart failure), NYHA class 3 ([facility]) EF 25-30% 11/7/2018    CAD (coronary artery disease)     CKD (chronic kidney disease) stage 3, GFR 30-59 ml/min ([facility]) 9/23/2015    Diabetes mellitus ([facility])     HTN (hypertension), benign     Hyperlipidemia     Hypothyroidism     Mitral and aortic incompetence     Neuropathy     Positive blood culture     Being treated with rocephhin as outpt.    S/P CABG x 2 11/15/2018       Allergies   Allergen Reactions    Heparin Other (See Comments)       Current Outpatient Medications   Medication Sig Dispense Refill

## (undated) DEVICE — PROBE VASC STD 1-1.5 MM 15 CM OLV

## (undated) DEVICE — Z DUP USE 2582811 CATHETER IABP 8FR 50CC FBROPT CONN W/ INSRT KT TWO STATLOK

## (undated) DEVICE — Z DISCONTINUED USE 2717539 NO SUB IDED SUTURE ETHBND 2-0 L30IN NONABSORBABLE GRN WHT V-5 L17MM 1/2

## (undated) DEVICE — CLIP LIG M TI 6 SIL HNDL FOR OPN AND ENDOSCP SGL APPL

## (undated) DEVICE — Z INACTIVE USE 2662641 SOLUTION IV 1000ML 140MEQ/L SOD 5MEQ/L K 3MEQ/L MG 27MEQ/L

## (undated) DEVICE — PACK SUCT CATHETER 14FR OPN WHSTL W NO VLV

## (undated) DEVICE — SOLUTION IV 250ML 0.9% SOD CHL PH 5 INJ USP VIAFLX PLAS

## (undated) DEVICE — SKIN AFFIX SURG ADHESIVE 72/CS 0.55ML: Brand: MEDLINE

## (undated) DEVICE — SET ADMIN 25ML L117IN PMP MOD CK VLV RLER CLMP 2 SMRTSITE

## (undated) DEVICE — HEMOCONCENTRATOR AUTOTRNS L6IN OD0.25IN W/ TBNG CONN DHF 0.6

## (undated) DEVICE — EZ GLIDE AORTIC CANNULA: Brand: EDWARDS LIFESCIENCES EZ GLIDE AORTIC CANNULA

## (undated) DEVICE — TUBING PRESSURE MONITORING FIX M TO M LUER

## (undated) DEVICE — SET PRSS DISPOSABLE LAY L45IN COIL TBNG SIL DIAPH PLAS DOME

## (undated) DEVICE — DRAPE SLUSH DISC W44XL66IN ST FOR RND BSIN HUSH SLUSH SYS

## (undated) DEVICE — CANNULA PERF L5.5IN DIA9FR AORT ROOT AG STD TIP W/ VENT LN

## (undated) DEVICE — SPONGE DRN W4XL4IN RAYON/POLYESTER 6 PLY NONWOVEN PRECUT

## (undated) DEVICE — 3M™ RANGER™ BLOOD/FLUID WARMING STANDARD FLOW SET, 24250, 10/CASE: Brand: 3M™ RANGER™

## (undated) DEVICE — GLOVE ORANGE PI 8 1/2   MSG9085

## (undated) DEVICE — SURGICAL PROCEDURE PACK OXGNTR ST MARYS

## (undated) DEVICE — SUTURE MCRYL SZ 4-0 L27IN ABSRB UD L19MM PS-2 1/2 CIR PRIM Y426H

## (undated) DEVICE — SOLUTION IV 1000ML 0.9% SOD CHL PH 5 INJ USP VIAFLX PLAS

## (undated) DEVICE — GLOVE ORANGE PI 7   MSG9070

## (undated) DEVICE — EXCEL 10FT (3.05 M) INSUFFLATION TUBING SET WITH 0.1 MICRON FILTER: Brand: EXCEL

## (undated) DEVICE — DRESSING TRNSPAR W2XL2.75IN FLM SHT SEMIPERMEABLE WIND

## (undated) DEVICE — DRESSING GRMCDL 6 12FR D1N CNTR HOLE 4MM ANTMCRBL PRTCTVE DI

## (undated) DEVICE — OPEN HRT CDS LF

## (undated) DEVICE — LINER SUCT CANSTR 1500CC SEMI RIG W/ POR HYDROPHOBIC SHUT

## (undated) DEVICE — SOLUTION IV 500ML 0.9% SOD CHL PH 5 INJ USP VIAFLX PLAS

## (undated) DEVICE — SOLUTION IV 1000ML LAC RINGERS PH 6.5 INJ USP VIAFLX PLAS

## (undated) DEVICE — SUTURE N ABSRB MFIL TAPR PNT PREM NDL EXP PTFE CV SUT DBL 7M10

## (undated) DEVICE — PRESSURE MONITORING LINES 2FT. (61CM) M/F: Brand: PRESSURE MONITORING LINES

## (undated) DEVICE — GOWN,SIRUS,NONRNF,SETINSLV,XL,20/CS: Brand: MEDLINE

## (undated) DEVICE — COR-KNOT MINI® COMBO KITBASE PACKAGE TYPE - KITEACH STERILE PACKAGE KIT CONTAINS (2) SINGLE PATIENT USE COR-KNOT MINI® DEVICES AND (12) COR-KNOT® QUICK LOADS®.: Brand: COR-KNOT MINI®

## (undated) DEVICE — CANNULA PVC RCSP 15FR SLD STYL W/ HNDL OVERALL LEN 11 IN

## (undated) DEVICE — SYSTEM ENDOSCP VES HARV W/ TOOL CANN SEAL SHT PRT BLNT TIP

## (undated) DEVICE — Device

## (undated) DEVICE — CONNECTOR PERF W3/8XL0.25IN STR REDUC

## (undated) DEVICE — THIN-FLEX SINGLE STAGE VENOUS DRAINAGE CANNULA: Brand: THIN-FLEX SINGLE STAGE VENOUS DRAINAGE CANNULA

## (undated) DEVICE — WAX SURG 2.5GM HEMSTAT BNE BEESWAX PARAFFIN ISO PALMITATE

## (undated) DEVICE — RETRACTOR SURG INSRT SUT HLD OCTOBASE

## (undated) DEVICE — SPONGE LAP W18XL18IN WHT COT 4 PLY FLD STRUNG RADPQ DISP ST

## (undated) DEVICE — SUTURE PROL SZ 7-0 L24IN NONABSORBABLE BLU L9.3MM CC 3/8 8704H

## (undated) DEVICE — YANKAUER,BULB TIP,W/O VENT,RIGID,STERILE: Brand: MEDLINE

## (undated) DEVICE — SUTURE ETHBND 2-0 L30IN NONABSORBABLE GRN WHT V-5 L17IN 1/2 10X52

## (undated) DEVICE — PRESSURE MONITORING SET: Brand: TRUWAVE

## (undated) DEVICE — CONNECTOR PERF 3/8X3/8X3/8IN EQL WYE

## (undated) DEVICE — THORACIC CATHETER,RIGHT ANGLE: Brand: ARGYLE

## (undated) DEVICE — 500ML,PRESSURE INFUSER W/STOPCOCK: Brand: MEDLINE

## (undated) DEVICE — THORACIC CATHETER,STRAIGHT: Brand: ARGYLE

## (undated) DEVICE — SENSOR OXMTR L AD WT GREATER THAN 40KG FORE-SIGHT ELITE

## (undated) DEVICE — SUTURE PROL SZ 4-0 L36IN NONABSORBABLE BLU L26MM SH 1/2 CIR 8521H

## (undated) DEVICE — GLOVE ORANGE PI 7 1/2   MSG9075

## (undated) DEVICE — CANNULA PERF 17FR L10IN SIL COR ART OSTIAL SFT BLB SHP TIP

## (undated) DEVICE — KIT VEN DRNGE VAC ACCSRY PERF VAVD STOCK W/ SPEC TRAP

## (undated) DEVICE — COUNTER NDL 40 COUNT HLD 70 FOAM BLK ADH W/ MAG

## (undated) DEVICE — STRIP,CLOSURE,WOUND,MEDI-STRIP,1/2X4: Brand: MEDLINE

## (undated) DEVICE — DRESSING TRNSPAR W5XL4.5IN FLM SHT SEMIPERMEABLE WIND

## (undated) DEVICE — TUBING IV STOPCOCK 48 CM 3 W

## (undated) DEVICE — TUBING, SUCTION, 1/4" X 20', STRAIGHT: Brand: MEDLINE INDUSTRIES, INC.

## (undated) DEVICE — KENDALL 1710H DEFIBRILLATION ELECTRODE: Brand: KENDALL

## (undated) DEVICE — FOGARTY - HYDRAGRIP SURGICAL - CLAMP INSERTS: Brand: FOGARTY SOFTJAW

## (undated) DEVICE — GLOVE SURG SZ 65 THK91MIL LTX FREE SYN POLYISOPRENE

## (undated) DEVICE — SOLUTION IV IRRIG POUR BRL 0.9% SODIUM CHL 2F7124

## (undated) DEVICE — SUTURE SZ 7 L18IN NONABSORBABLE SIL CCS L48MM 1/2 CIR STRNM M655G

## (undated) DEVICE — DRESSING FOAM DISK DIA1IN H 7MM HYDRPHLC CHG IMPREG IN SL

## (undated) DEVICE — COR-KNOT® QUICK LOAD® SINGLES: Brand: COR-KNOT® QUICK LOAD®

## (undated) DEVICE — SUTURE SOFSILK SZ 1 L30IN NABSORBABLE BLK C-17 L39MM 3/8 SS646

## (undated) DEVICE — SET TRNQT STD 12FR TB LEN 7 IN 2 RED 2 BLU 2 CLR 16FR 2 L

## (undated) DEVICE — SET AUTOTRNS C175ML BOWL BTM OUTLT RESERVOIRXTRA

## (undated) DEVICE — CANNULA PERF 28FR L14IN 3/8IN CONN VEN R ANG SGL STG BVL

## (undated) DEVICE — Device: Brand: SUCTION TIP

## (undated) DEVICE — CONNECTOR STR 3/8IN ST 050506000 375STRCONN

## (undated) DEVICE — CLIP LIG SM TI 6 BLU HNDL FOR OPN AND ENDOSCP SGL APPL

## (undated) DEVICE — TUBING PRSS 36 M F

## (undated) DEVICE — SUTURE NONABSORBABLE MONOFILAMENT 4-0 RB-1 36 IN BLU PROLENE 8557H

## (undated) DEVICE — MARKER,SKIN,WI/RULER AND LABELS: Brand: MEDLINE

## (undated) DEVICE — KIT BLWR MISTER 5P 15L W/ TBNG SET IRRIG MIST TO IMPROVE

## (undated) DEVICE — SUTURE GOR TX SZ 5-0 L30IN NONABSORBABLE L13MM THC-13 1/2 6M12A